# Patient Record
Sex: FEMALE | Race: WHITE | NOT HISPANIC OR LATINO | Employment: FULL TIME | URBAN - METROPOLITAN AREA
[De-identification: names, ages, dates, MRNs, and addresses within clinical notes are randomized per-mention and may not be internally consistent; named-entity substitution may affect disease eponyms.]

---

## 2017-02-05 DIAGNOSIS — I10 BENIGN ESSENTIAL HTN: ICD-10-CM

## 2017-02-06 RX ORDER — LISINOPRIL AND HYDROCHLOROTHIAZIDE 25; 20 MG/1; MG/1
TABLET ORAL
Qty: 30 TABLET | Refills: 2 | Status: SHIPPED | OUTPATIENT
Start: 2017-02-06 | End: 2018-10-04 | Stop reason: SDUPTHER

## 2017-03-11 DIAGNOSIS — I10 BENIGN ESSENTIAL HTN: ICD-10-CM

## 2017-03-13 RX ORDER — LISINOPRIL AND HYDROCHLOROTHIAZIDE 25; 20 MG/1; MG/1
TABLET ORAL
Qty: 30 TABLET | Refills: 3 | Status: SHIPPED | OUTPATIENT
Start: 2017-03-13 | End: 2017-03-21 | Stop reason: SDUPTHER

## 2017-03-21 ENCOUNTER — HOSPITAL ENCOUNTER (OUTPATIENT)
Dept: GENERAL RADIOLOGY | Facility: HOSPITAL | Age: 47
Discharge: HOME | End: 2017-03-21
Admitting: NURSE PRACTITIONER

## 2017-03-21 ENCOUNTER — OFFICE VISIT (OUTPATIENT)
Dept: INTERNAL MEDICINE | Facility: CLINIC | Age: 47
End: 2017-03-21

## 2017-03-21 VITALS
DIASTOLIC BLOOD PRESSURE: 80 MMHG | BODY MASS INDEX: 45.99 KG/M2 | HEIGHT: 67 IN | OXYGEN SATURATION: 98 % | WEIGHT: 293 LBS | HEART RATE: 92 BPM | SYSTOLIC BLOOD PRESSURE: 122 MMHG | TEMPERATURE: 98.3 F

## 2017-03-21 DIAGNOSIS — R31.9 HEMATURIA: Primary | ICD-10-CM

## 2017-03-21 DIAGNOSIS — R05.9 COUGH: ICD-10-CM

## 2017-03-21 DIAGNOSIS — J40 BRONCHITIS: ICD-10-CM

## 2017-03-21 LAB
BACTERIA UR QL AUTO: ABNORMAL /HPF
BILIRUB UR QL STRIP: NEGATIVE
CLARITY UR: ABNORMAL
COLOR UR: YELLOW
GLUCOSE UR STRIP-MCNC: NEGATIVE MG/DL
HGB UR QL STRIP.AUTO: NEGATIVE
HYALINE CASTS UR QL AUTO: ABNORMAL /LPF
KETONES UR QL STRIP: NEGATIVE
LEUKOCYTE ESTERASE UR QL STRIP.AUTO: ABNORMAL
MUCOUS THREADS URNS QL MICRO: ABNORMAL /HPF
NITRITE UR QL STRIP: NEGATIVE
PH UR STRIP.AUTO: 6 [PH] (ref 5–8)
PROT UR QL STRIP: ABNORMAL
RBC # UR: ABNORMAL /HPF
REF LAB TEST METHOD: ABNORMAL
SP GR UR STRIP: 1.01 (ref 1–1.03)
SQUAMOUS #/AREA URNS HPF: ABNORMAL /HPF
UROBILINOGEN UR QL STRIP: ABNORMAL
WBC UR QL AUTO: ABNORMAL /HPF

## 2017-03-21 PROCEDURE — 81001 URINALYSIS AUTO W/SCOPE: CPT | Performed by: NURSE PRACTITIONER

## 2017-03-21 PROCEDURE — 71020 HC CHEST PA AND LATERAL: CPT

## 2017-03-21 PROCEDURE — 99214 OFFICE O/P EST MOD 30 MIN: CPT | Performed by: NURSE PRACTITIONER

## 2017-03-21 RX ORDER — GUAIFENESIN 600 MG/1
1200 TABLET, EXTENDED RELEASE ORAL 2 TIMES DAILY
Qty: 14 TABLET
Start: 2017-03-21 | End: 2017-03-28

## 2017-03-21 RX ORDER — LEVOFLOXACIN 500 MG/1
500 TABLET, FILM COATED ORAL DAILY
Qty: 7 TABLET | Refills: 0 | Status: SHIPPED | OUTPATIENT
Start: 2017-03-21 | End: 2017-03-28

## 2017-03-21 RX ORDER — BUDESONIDE AND FORMOTEROL FUMARATE DIHYDRATE 160; 4.5 UG/1; UG/1
2 AEROSOL RESPIRATORY (INHALATION)
Qty: 6 G | Refills: 0 | COMMUNITY
Start: 2017-03-21 | End: 2017-04-04

## 2017-03-22 NOTE — PROGRESS NOTES
Subjective   Emilie Soto is a 46 y.o. female who presents due to respiratory symptoms.    HPI Comments: She was treated for similar sx 2 months ago which improved. However, she c/o return of symptoms over the past couple weeks.  She states symptoms began with head congestion and sinus pressure and have gradually worsened.  She now complains of a worsening cough which she states is mainly productive along with chest tightness and intermittent wheezing.    URI    This is a new problem. The current episode started 1 to 4 weeks ago. The problem has been gradually worsening. There has been no fever. Associated symptoms include chest pain (c/o tightness), congestion, coughing, headaches, nausea, rhinorrhea, sinus pain, a sore throat and wheezing (at night). Pertinent negatives include no abdominal pain, diarrhea, ear pain, neck pain, sneezing or vomiting. She has tried antihistamine (Mucinex) for the symptoms. The treatment provided moderate relief.        The following portions of the patient's history were reviewed and updated as appropriate: allergies, current medications, past social history and problem list.    Past Medical History:   Diagnosis Date   • Anxiety    • Hyperlipidemia    • Hypertension    • Lumbago    • Mood disorder    • Vitamin D deficiency          Current Outpatient Prescriptions:   •  ibuprofen (ADVIL,MOTRIN) 800 MG tablet, Take 1 tablet by mouth Every 6 (Six) Hours As Needed for mild pain (1-3) or moderate pain (4-6)., Disp: 30 tablet, Rfl: 0  •  lisinopril-hydrochlorothiazide (PRINZIDE,ZESTORETIC) 20-25 MG per tablet, TAKE 1 TABLET BY MOUTH DAILY, Disp: 30 tablet, Rfl: 2  •  budesonide-formoterol (SYMBICORT) 160-4.5 MCG/ACT inhaler, Inhale 2 puffs 2 (Two) Times a Day for 14 days. Rinse and spit after use, Disp: 6 g, Rfl: 0  •  guaiFENesin (MUCINEX) 600 MG 12 hr tablet, Take 2 tablets by mouth 2 (Two) Times a Day for 7 days., Disp: 14 tablet, Rfl:   •  levoFLOXacin (LEVAQUIN) 500 MG tablet, Take  "1 tablet by mouth Daily for 7 days., Disp: 7 tablet, Rfl: 0    Allergies   Allergen Reactions   • Citalopram    • Flexeril [Cyclobenzaprine]    • Doxycycline Rash     Blisters on hands   • Keflex [Cephalexin] Rash   • Sulfa Antibiotics Rash       Review of Systems   Constitutional: Negative for appetite change, chills, fatigue and fever.   HENT: Positive for congestion, rhinorrhea and sore throat. Negative for ear discharge, ear pain, facial swelling, hearing loss, postnasal drip, sinus pressure, sneezing and tinnitus.    Respiratory: Positive for cough and wheezing (at night). Negative for chest tightness and shortness of breath.    Cardiovascular: Positive for chest pain (c/o tightness). Negative for palpitations and leg swelling.   Gastrointestinal: Positive for nausea. Negative for abdominal pain, diarrhea and vomiting.   Musculoskeletal: Negative for neck pain and neck stiffness.   Neurological: Positive for headaches.   Hematological: Negative for adenopathy.       Objective   Vitals:    03/21/17 1252   BP: 122/80   BP Location: Left arm   Patient Position: Sitting   Cuff Size: Adult   Pulse: 92   Temp: 98.3 °F (36.8 °C)   TempSrc: Oral   SpO2: 98%   Weight: (!) 311 lb (141 kg)   Height: 66.5\" (168.9 cm)     Physical Exam   Constitutional: She appears well-developed and well-nourished. She is cooperative. She does not have a sickly appearance. She does not appear ill.   HENT:   Head: Normocephalic.   Right Ear: Hearing, tympanic membrane and external ear normal. No drainage, swelling or tenderness. No mastoid tenderness. Tympanic membrane is not injected, not scarred, not erythematous and not bulging. Tympanic membrane mobility is normal. No middle ear effusion. No decreased hearing is noted.   Left Ear: Hearing, tympanic membrane and external ear normal.   Nose: Nose normal. No mucosal edema, rhinorrhea, sinus tenderness or nasal deformity. Right sinus exhibits no maxillary sinus tenderness and no frontal " sinus tenderness. Left sinus exhibits no maxillary sinus tenderness and no frontal sinus tenderness.   Mouth/Throat: Oropharynx is clear and moist and mucous membranes are normal. Normal dentition.   Eyes: Conjunctivae and lids are normal. Pupils are equal, round, and reactive to light. Right eye exhibits no discharge and no exudate. Left eye exhibits no discharge and no exudate.   Neck: Trachea normal and normal range of motion. Carotid bruit is not present. No edema present. No thyroid mass and no thyromegaly present.   Cardiovascular: Regular rhythm, normal heart sounds and normal pulses.    No murmur heard.  Pulmonary/Chest: No respiratory distress. She has no decreased breath sounds. She has wheezes (expiratory wheezes). She has no rhonchi. She has no rales. She exhibits no tenderness.   Lymphadenopathy:        Head (right side): No submental, no submandibular, no tonsillar, no preauricular, no posterior auricular and no occipital adenopathy present.        Head (left side): No submental, no submandibular, no tonsillar, no preauricular, no posterior auricular and no occipital adenopathy present.   Neurological: She is alert.   Skin: Skin is warm, dry and intact. No cyanosis. Nails show no clubbing.       Assessment/Plan   Emilie was seen today for uri.    Diagnoses and all orders for this visit:    Hematuria  Comments:  White blood cells noted at last exam however she was treated for UTI, will repeat UA in office today.  Orders:  -     Urinalysis With / Microscopic If Indicated  -     Urinalysis, Microscopic Only; Future  -     Urinalysis, Microscopic Only    Cough  Comments:  Due to recurrent cough with wheezing will send for chest x-ray for further evaluation  Orders:  -     Cancel: XR Chest 2 View  -     XR Chest 2 View    Bronchitis  Comments:  Will start Symbicort and due to bronchospasm and wheezing  Orders:  -     XR Chest 2 View  -     levoFLOXacin (LEVAQUIN) 500 MG tablet; Take 1 tablet by mouth Daily  for 7 days.  -     guaiFENesin (MUCINEX) 600 MG 12 hr tablet; Take 2 tablets by mouth 2 (Two) Times a Day for 7 days.  -     budesonide-formoterol (SYMBICORT) 160-4.5 MCG/ACT inhaler; Inhale 2 puffs 2 (Two) Times a Day for 14 days. Rinse and spit after use    Repeat UA showed resolution of hematuria.

## 2017-07-11 DIAGNOSIS — I10 BENIGN ESSENTIAL HTN: ICD-10-CM

## 2017-07-12 RX ORDER — LISINOPRIL AND HYDROCHLOROTHIAZIDE 25; 20 MG/1; MG/1
TABLET ORAL
Qty: 30 TABLET | Refills: 3 | Status: SHIPPED | OUTPATIENT
Start: 2017-07-12 | End: 2017-09-11 | Stop reason: SDUPTHER

## 2017-09-11 ENCOUNTER — OFFICE VISIT (OUTPATIENT)
Dept: INTERNAL MEDICINE | Facility: CLINIC | Age: 47
End: 2017-09-11

## 2017-09-11 VITALS
DIASTOLIC BLOOD PRESSURE: 78 MMHG | TEMPERATURE: 100.8 F | BODY MASS INDEX: 45.99 KG/M2 | HEART RATE: 101 BPM | OXYGEN SATURATION: 96 % | SYSTOLIC BLOOD PRESSURE: 122 MMHG | WEIGHT: 293 LBS | HEIGHT: 67 IN

## 2017-09-11 DIAGNOSIS — R82.90 ABNORMAL FINDING IN URINE: ICD-10-CM

## 2017-09-11 DIAGNOSIS — R50.9 FEVER, UNSPECIFIED FEVER CAUSE: Primary | ICD-10-CM

## 2017-09-11 DIAGNOSIS — M79.605 LEFT LEG PAIN: ICD-10-CM

## 2017-09-11 DIAGNOSIS — J40 BRONCHITIS: ICD-10-CM

## 2017-09-11 LAB
ALBUMIN SERPL-MCNC: 4.4 G/DL (ref 3.5–5.2)
ALBUMIN/GLOB SERPL: 1.3 G/DL
ALP SERPL-CCNC: 74 U/L (ref 39–117)
ALT SERPL W P-5'-P-CCNC: 25 U/L (ref 1–33)
ANION GAP SERPL CALCULATED.3IONS-SCNC: 14 MMOL/L
AST SERPL-CCNC: 18 U/L (ref 1–32)
BACTERIA UR QL AUTO: ABNORMAL /HPF
BASOPHILS # BLD AUTO: 0.04 10*3/MM3 (ref 0–0.2)
BASOPHILS NFR BLD AUTO: 0.5 % (ref 0–2)
BILIRUB SERPL-MCNC: 1.4 MG/DL (ref 0.1–1.2)
BILIRUB UR QL CFM: NEGATIVE
BILIRUB UR QL STRIP: ABNORMAL
BUN BLD-MCNC: 7 MG/DL (ref 6–20)
BUN/CREAT SERPL: 8.3 (ref 7–25)
CALCIUM SPEC-SCNC: 9.9 MG/DL (ref 8.6–10.5)
CHLORIDE SERPL-SCNC: 96 MMOL/L (ref 98–107)
CLARITY UR: CLEAR
CO2 SERPL-SCNC: 26 MMOL/L (ref 22–29)
COLOR UR: ABNORMAL
CREAT BLD-MCNC: 0.84 MG/DL (ref 0.57–1)
DEPRECATED RDW RBC AUTO: 40 FL (ref 37–54)
EOSINOPHIL # BLD AUTO: 0.15 10*3/MM3 (ref 0–0.7)
EOSINOPHIL NFR BLD AUTO: 1.7 % (ref 0–5)
ERYTHROCYTE [DISTWIDTH] IN BLOOD BY AUTOMATED COUNT: 13 % (ref 11.5–15)
GFR SERPL CREATININE-BSD FRML MDRD: 73 ML/MIN/1.73
GLOBULIN UR ELPH-MCNC: 3.3 GM/DL
GLUCOSE BLD-MCNC: 135 MG/DL (ref 65–99)
GLUCOSE UR STRIP-MCNC: NEGATIVE MG/DL
HCT VFR BLD AUTO: 40.8 % (ref 34.1–44.9)
HGB BLD-MCNC: 13.7 G/DL (ref 11.2–15.7)
HGB UR QL STRIP.AUTO: ABNORMAL
HYALINE CASTS UR QL AUTO: ABNORMAL /LPF
KETONES UR QL STRIP: NEGATIVE
LEUKOCYTE ESTERASE UR QL STRIP.AUTO: ABNORMAL
LYMPHOCYTES # BLD AUTO: 1.13 10*3/MM3 (ref 0.8–7)
LYMPHOCYTES NFR BLD AUTO: 13.1 % (ref 10–60)
MCH RBC QN AUTO: 28.7 PG (ref 26–34)
MCHC RBC AUTO-ENTMCNC: 33.6 G/DL (ref 31–37)
MCV RBC AUTO: 85.5 FL (ref 80–100)
MONOCYTES # BLD AUTO: 0.64 10*3/MM3 (ref 0–1)
MONOCYTES NFR BLD AUTO: 7.4 % (ref 0–13)
MUCOUS THREADS URNS QL MICRO: ABNORMAL /HPF
NEUTROPHILS # BLD AUTO: 6.64 10*3/MM3 (ref 1–11)
NEUTROPHILS NFR BLD AUTO: 77.3 % (ref 30–85)
NITRITE UR QL STRIP: NEGATIVE
PH UR STRIP.AUTO: 6 [PH] (ref 5–8)
PLATELET # BLD AUTO: 242 10*3/MM3 (ref 150–450)
PMV BLD AUTO: 9.4 FL (ref 6–12)
POTASSIUM BLD-SCNC: 3.7 MMOL/L (ref 3.5–5.2)
PROT SERPL-MCNC: 7.7 G/DL (ref 6–8.5)
PROT UR QL STRIP: ABNORMAL
RBC # BLD AUTO: 4.77 10*6/MM3 (ref 3.93–5.22)
RBC # UR: ABNORMAL /HPF
REF LAB TEST METHOD: ABNORMAL
SODIUM BLD-SCNC: 136 MMOL/L (ref 136–145)
SP GR UR STRIP: 1.02 (ref 1–1.03)
SQUAMOUS #/AREA URNS HPF: ABNORMAL /HPF
UROBILINOGEN UR QL STRIP: ABNORMAL
WBC NRBC COR # BLD: 8.6 10*3/MM3 (ref 5–10)
WBC UR QL AUTO: ABNORMAL /HPF

## 2017-09-11 PROCEDURE — 85025 COMPLETE CBC W/AUTO DIFF WBC: CPT | Performed by: NURSE PRACTITIONER

## 2017-09-11 PROCEDURE — 99214 OFFICE O/P EST MOD 30 MIN: CPT | Performed by: NURSE PRACTITIONER

## 2017-09-11 PROCEDURE — 87086 URINE CULTURE/COLONY COUNT: CPT | Performed by: NURSE PRACTITIONER

## 2017-09-11 PROCEDURE — 80053 COMPREHEN METABOLIC PANEL: CPT | Performed by: NURSE PRACTITIONER

## 2017-09-11 PROCEDURE — 81001 URINALYSIS AUTO W/SCOPE: CPT | Performed by: NURSE PRACTITIONER

## 2017-09-11 PROCEDURE — 36415 COLL VENOUS BLD VENIPUNCTURE: CPT | Performed by: NURSE PRACTITIONER

## 2017-09-11 RX ORDER — LORATADINE 10 MG/1
10 TABLET ORAL DAILY
Qty: 10 TABLET
Start: 2017-09-11 | End: 2017-09-21

## 2017-09-11 RX ORDER — GUAIFENESIN 600 MG/1
1200 TABLET, EXTENDED RELEASE ORAL 2 TIMES DAILY
Qty: 14 TABLET
Start: 2017-09-11 | End: 2017-09-18

## 2017-09-11 RX ORDER — AMOXICILLIN AND CLAVULANATE POTASSIUM 875; 125 MG/1; MG/1
1 TABLET, FILM COATED ORAL 2 TIMES DAILY
Qty: 14 TABLET | Refills: 0 | Status: SHIPPED | OUTPATIENT
Start: 2017-09-11 | End: 2017-09-18

## 2017-09-11 NOTE — PROGRESS NOTES
Jaspal Soto is a 47 y.o. female who presents due to fever with diffuse body aches.    HPI Comments: She c/o elevated temp since Saturday night with chest congestion and cough, denies shortness of breath. She c/o hallucinations (believed her right 5th finger was falling off) last night with high fever. She denies rashes.  She also c/o left lower leg pain which began Sunday evening, denies swelling or redness. She reports difficulty bearing weight yesterday (denies acute injury) but states pain has improved somewhat today.    Leg Pain    There was no injury mechanism. The pain is present in the left leg. The quality of the pain is described as aching. The pain is mild. The pain has been improving since onset. Associated symptoms include a loss of motion. Pertinent negatives include no numbness. The symptoms are aggravated by weight bearing.   Fever    This is a new problem. The current episode started in the past 7 days (began Sat pm). The maximum temperature noted was 103 to 103.9 F. The temperature was taken using an oral thermometer. Associated symptoms include congestion, coughing, headaches and a sore throat. Pertinent negatives include no abdominal pain, chest pain, diarrhea, ear pain, nausea, urinary pain, vomiting or wheezing. She has tried acetaminophen for the symptoms. The treatment provided mild relief.   URI    This is a new problem. The current episode started in the past 7 days. The problem has been gradually worsening. Associated symptoms include congestion, coughing, headaches, rhinorrhea, sneezing and a sore throat. Pertinent negatives include no abdominal pain, chest pain, diarrhea, dysuria, ear pain, nausea, vomiting or wheezing.        The following portions of the patient's history were reviewed and updated as appropriate: allergies, current medications, past social history and problem list.    Past Medical History:   Diagnosis Date   • Anxiety    • Hyperlipidemia    •  Hypertension    • Lumbago    • Mood disorder    • Vitamin D deficiency          Current Outpatient Prescriptions:   •  ibuprofen (ADVIL,MOTRIN) 800 MG tablet, Take 1 tablet by mouth Every 6 (Six) Hours As Needed for mild pain (1-3) or moderate pain (4-6)., Disp: 30 tablet, Rfl: 0  •  lisinopril-hydrochlorothiazide (PRINZIDE,ZESTORETIC) 20-25 MG per tablet, TAKE 1 TABLET BY MOUTH DAILY, Disp: 30 tablet, Rfl: 2  •  amoxicillin-clavulanate (AUGMENTIN) 875-125 MG per tablet, Take 1 tablet by mouth 2 (Two) Times a Day for 7 days., Disp: 14 tablet, Rfl: 0  •  guaiFENesin (MUCINEX) 600 MG 12 hr tablet, Take 2 tablets by mouth 2 (Two) Times a Day for 7 days., Disp: 14 tablet, Rfl:   •  loratadine (CLARITIN) 10 MG tablet, Take 1 tablet by mouth Daily for 10 days., Disp: 10 tablet, Rfl:     Allergies   Allergen Reactions   • Citalopram    • Flexeril [Cyclobenzaprine]    • Doxycycline Rash     Blisters on hands   • Keflex [Cephalexin] Rash   • Sulfa Antibiotics Rash       Review of Systems   Constitutional: Positive for fatigue and fever. Negative for activity change, appetite change, chills and unexpected weight change.   HENT: Positive for congestion, postnasal drip, rhinorrhea, sinus pressure, sneezing and sore throat. Negative for drooling, ear pain, facial swelling, hearing loss, mouth sores, nosebleeds, trouble swallowing and voice change.    Eyes: Negative for pain, discharge, itching and visual disturbance.   Respiratory: Positive for cough and chest tightness. Negative for choking, shortness of breath and wheezing.    Cardiovascular: Negative for chest pain and palpitations.   Gastrointestinal: Negative for abdominal pain, constipation, diarrhea, nausea and vomiting.   Endocrine: Negative for polyuria.   Genitourinary: Negative for dysuria and frequency.   Musculoskeletal: Positive for myalgias. Negative for back pain and joint swelling.   Neurological: Positive for headaches. Negative for numbness.       Objective  "  Vitals:    09/11/17 1446 09/11/17 1530   BP: 122/78    BP Location: Left arm    Patient Position: Sitting    Cuff Size: Adult    Pulse: 101    Temp: (!) 103 °F (39.4 °C) (!) 100.8 °F (38.2 °C)   TempSrc: Oral    SpO2: 96%    Weight: (!) 304 lb (138 kg)    Height: 66.5\" (168.9 cm)      Physical Exam   Constitutional: She appears well-developed and well-nourished. She is cooperative. She does not have a sickly appearance. She does not appear ill.   HENT:   Head: Normocephalic.   Right Ear: Hearing and external ear normal. No drainage, swelling or tenderness. Tympanic membrane is bulging. Tympanic membrane is not erythematous. No middle ear effusion. No decreased hearing is noted.   Left Ear: Hearing and external ear normal. No drainage, swelling or tenderness. Tympanic membrane is bulging. Tympanic membrane is not erythematous.  No middle ear effusion. No decreased hearing is noted.   Nose: Nose normal. No mucosal edema, rhinorrhea, sinus tenderness or nasal deformity. Right sinus exhibits no maxillary sinus tenderness and no frontal sinus tenderness. Left sinus exhibits no maxillary sinus tenderness and no frontal sinus tenderness.   Mouth/Throat: Mucous membranes are normal. Posterior oropharyngeal erythema present.   Eyes: Conjunctivae and lids are normal.   Neck: Trachea normal.   Cardiovascular: Regular rhythm, normal heart sounds and normal pulses.    No murmur heard.  Pulmonary/Chest: Breath sounds normal. No respiratory distress. She has no decreased breath sounds. She has no wheezes. She has no rhonchi. She has no rales.   Musculoskeletal:   No swelling or redness left lower extremity, mild tenderness in left calf   Lymphadenopathy:     She has no cervical adenopathy.   Neurological: She is alert.   Skin: Skin is warm, dry and intact.   Nursing note and vitals reviewed.      Assessment/Plan   Emilie was seen today for flu symptoms and leg pain.    Diagnoses and all orders for this visit:    Fever, " unspecified fever cause  Comments:  due to persistently elevated temp will obtain blood cultures, advised to rotate Tylenol & Ibuprofen q4 h for fever control  Orders:  -     CBC & Differential  -     BLOOD CULTURE; Future  -     Comprehensive Metabolic Panel; Future  -     Urinalysis With / Microscopic If Indicated  -     Comprehensive Metabolic Panel  -     CBC Auto Differential  -     Urinalysis, Microscopic Only; Future  -     Urinalysis, Microscopic Only  -     Ictotest, Urine; Future  -     Ictotest, Urine  -     Blood Culture; Future  -     Cancel: BLOOD CULTURE  -     Blood Culture; Future  -     Blood Culture  -     Blood Culture    Bronchitis  Comments:  pt declined CXR but will report to ER with worsening symptoms  Orders:  -     amoxicillin-clavulanate (AUGMENTIN) 875-125 MG per tablet; Take 1 tablet by mouth 2 (Two) Times a Day for 7 days.  -     guaiFENesin (MUCINEX) 600 MG 12 hr tablet; Take 2 tablets by mouth 2 (Two) Times a Day for 7 days.  -     loratadine (CLARITIN) 10 MG tablet; Take 1 tablet by mouth Daily for 10 days.    Left leg pain  Comments:  sx appear muscular, discussed possiblity of DVT; she will continue to monitor & report to ER with worsening sx    Abnormal finding in urine  -     Urine Culture; Future  -     Urine Culture

## 2017-09-13 LAB — BACTERIA SPEC AEROBE CULT: NORMAL

## 2017-09-14 ENCOUNTER — OFFICE VISIT (OUTPATIENT)
Dept: INTERNAL MEDICINE | Facility: CLINIC | Age: 47
End: 2017-09-14

## 2017-09-14 ENCOUNTER — HOSPITAL ENCOUNTER (OUTPATIENT)
Dept: GENERAL RADIOLOGY | Facility: HOSPITAL | Age: 47
Discharge: HOME | End: 2017-09-14
Admitting: NURSE PRACTITIONER

## 2017-09-14 VITALS
OXYGEN SATURATION: 98 % | TEMPERATURE: 99.1 F | BODY MASS INDEX: 45.99 KG/M2 | HEART RATE: 88 BPM | HEIGHT: 67 IN | WEIGHT: 293 LBS | SYSTOLIC BLOOD PRESSURE: 122 MMHG | DIASTOLIC BLOOD PRESSURE: 80 MMHG

## 2017-09-14 DIAGNOSIS — J40 BRONCHITIS: Primary | ICD-10-CM

## 2017-09-14 PROCEDURE — 71020 HC CHEST PA AND LATERAL: CPT

## 2017-09-14 PROCEDURE — 99213 OFFICE O/P EST LOW 20 MIN: CPT | Performed by: NURSE PRACTITIONER

## 2017-09-14 NOTE — PROGRESS NOTES
Jaspal Soto is a 47 y.o. female who presents due to respiratory symptoms.    HPI Comments: Urine and blood cultures were negative.  She reports feeling mildly better, still with head congestion and drainage. However, she now c/o increased chest congestion and cough. Temp has remained low grade during the day and spikes at night.  Her left lower leg pain has resolved, denies further episodes of pain or discomfort.      Fever    This is a new problem. The current episode started in the past 7 days (began Sat pm). The maximum temperature noted was 103 to 103.9 F. The temperature was taken using an oral thermometer. Associated symptoms include congestion, coughing, headaches, a sore throat and wheezing. Pertinent negatives include no abdominal pain, chest pain, diarrhea, ear pain, nausea, urinary pain or vomiting. She has tried acetaminophen for the symptoms. The treatment provided mild relief.   URI    This is a new problem. The current episode started in the past 7 days. The problem has been gradually improving. The fever has been present for 3 to 4 days. Associated symptoms include congestion, coughing, headaches, rhinorrhea, sneezing, a sore throat and wheezing. Pertinent negatives include no abdominal pain, chest pain, diarrhea, dysuria, ear pain, nausea or vomiting. She has tried acetaminophen and NSAIDs for the symptoms. The treatment provided moderate relief.        The following portions of the patient's history were reviewed and updated as appropriate: allergies, current medications, past social history and problem list.    Past Medical History:   Diagnosis Date   • Anxiety    • Hyperlipidemia    • Hypertension    • Lumbago    • Mood disorder    • Vitamin D deficiency          Current Outpatient Prescriptions:   •  amoxicillin-clavulanate (AUGMENTIN) 875-125 MG per tablet, Take 1 tablet by mouth 2 (Two) Times a Day for 7 days., Disp: 14 tablet, Rfl: 0  •  guaiFENesin (MUCINEX) 600 MG 12 hr  "tablet, Take 2 tablets by mouth 2 (Two) Times a Day for 7 days., Disp: 14 tablet, Rfl:   •  ibuprofen (ADVIL,MOTRIN) 800 MG tablet, Take 1 tablet by mouth Every 6 (Six) Hours As Needed for mild pain (1-3) or moderate pain (4-6)., Disp: 30 tablet, Rfl: 0  •  lisinopril-hydrochlorothiazide (PRINZIDE,ZESTORETIC) 20-25 MG per tablet, TAKE 1 TABLET BY MOUTH DAILY, Disp: 30 tablet, Rfl: 2  •  loratadine (CLARITIN) 10 MG tablet, Take 1 tablet by mouth Daily for 10 days., Disp: 10 tablet, Rfl:   •  Fluticasone Furoate-Vilanterol 100-25 MCG/INH aerosol powder , Inhale 1 puff Daily for 14 days. Rinse and spit after use, Disp: 14 puff, Rfl: 0    Allergies   Allergen Reactions   • Citalopram    • Flexeril [Cyclobenzaprine]    • Doxycycline Rash     Blisters on hands   • Keflex [Cephalexin] Rash   • Sulfa Antibiotics Rash       Review of Systems   Constitutional: Positive for fatigue (c/o generalized malaise and fatigue) and fever.   HENT: Positive for congestion, postnasal drip, rhinorrhea, sneezing and sore throat. Negative for ear pain.    Respiratory: Positive for cough, chest tightness and wheezing.    Cardiovascular: Negative for chest pain.   Gastrointestinal: Negative for abdominal pain, diarrhea, nausea and vomiting.   Genitourinary: Negative for dysuria.   Neurological: Positive for headaches. Negative for numbness.       Objective   Vitals:    09/14/17 1404   BP: 122/80   BP Location: Left arm   Patient Position: Sitting   Cuff Size: Adult   Pulse: 88   Temp: 99.1 °F (37.3 °C)   TempSrc: Oral   SpO2: 98%   Weight: 298 lb (135 kg)   Height: 66.5\" (168.9 cm)     Physical Exam   Constitutional: She appears well-developed and well-nourished. She is cooperative. She does not have a sickly appearance. She does not appear ill.   HENT:   Head: Normocephalic.   Right Ear: Hearing and external ear normal. No drainage, swelling or tenderness. Tympanic membrane is bulging. Tympanic membrane is not erythematous. No middle ear " effusion. No decreased hearing is noted.   Left Ear: Hearing and external ear normal. No drainage, swelling or tenderness. Tympanic membrane is bulging. Tympanic membrane is not erythematous.  No middle ear effusion. No decreased hearing is noted.   Nose: Nose normal. No mucosal edema, rhinorrhea, sinus tenderness or nasal deformity. Right sinus exhibits no maxillary sinus tenderness and no frontal sinus tenderness. Left sinus exhibits no maxillary sinus tenderness and no frontal sinus tenderness.   Mouth/Throat: Mucous membranes are normal. Posterior oropharyngeal erythema present.   Eyes: Conjunctivae and lids are normal.   Neck: Trachea normal.   Cardiovascular: Regular rhythm, normal heart sounds and normal pulses.    No murmur heard.  Pulmonary/Chest: Breath sounds normal. No respiratory distress. She has no decreased breath sounds. She has no wheezes. She has no rhonchi. She has no rales.   Lymphadenopathy:     She has no cervical adenopathy.   Neurological: She is alert.   Skin: Skin is warm, dry and intact.   Nursing note and vitals reviewed.      Assessment/Plan   Emilie was seen today for follow-up.    Diagnoses and all orders for this visit:    Bronchitis  Comments:  due to persistence of fever & wheezing will send for CXR to r/o infiltrate, reviewed labs w/ pt. Complete Augmentin & add Breo for bronchospasm.  Orders:  -     XR Chest 2 View  -     Fluticasone Furoate-Vilanterol 100-25 MCG/INH aerosol powder ; Inhale 1 puff Daily for 14 days. Rinse and spit after use

## 2017-09-17 DIAGNOSIS — R93.89 ABNORMAL CXR: Primary | ICD-10-CM

## 2017-09-17 LAB
BACTERIA BLD CULT: NORMAL
BACTERIA BLD CULT: NORMAL
Lab: NORMAL
Lab: NORMAL

## 2017-09-19 ENCOUNTER — HOSPITAL ENCOUNTER (OUTPATIENT)
Dept: CT IMAGING | Facility: HOSPITAL | Age: 47
Discharge: HOME | End: 2017-09-19
Admitting: NURSE PRACTITIONER

## 2017-09-19 DIAGNOSIS — R93.89 ABNORMAL CXR: ICD-10-CM

## 2017-09-19 LAB — CREAT BLDA-MCNC: 0.7 MG/DL (ref 0.6–1.3)

## 2017-09-19 PROCEDURE — 71260 CT THORAX DX C+: CPT

## 2017-09-19 PROCEDURE — 82565 ASSAY OF CREATININE: CPT

## 2017-09-19 PROCEDURE — 0 IOPAMIDOL 61 % SOLUTION: Performed by: NURSE PRACTITIONER

## 2017-09-19 PROCEDURE — 0 DIATRIZOATE MEGLUMINE & SODIUM PER 1 ML: Performed by: NURSE PRACTITIONER

## 2017-09-19 RX ADMIN — IOPAMIDOL 85 ML: 612 INJECTION, SOLUTION INTRAVENOUS at 14:52

## 2017-09-19 RX ADMIN — DIATRIZOATE MEGLUMINE AND DIATRIZOATE SODIUM 30 ML: 660; 100 LIQUID ORAL; RECTAL at 14:52

## 2017-09-22 DIAGNOSIS — Z88.9 MULTIPLE ALLERGIES: Primary | ICD-10-CM

## 2017-09-22 NOTE — TELEPHONE ENCOUNTER
"----- Message from Jaleesa Garber MA sent at 9/22/2017 10:01 AM EDT -----  Pt calling and needs either a new sample of Breo or script to local pharmacy. Pt says that inhaler is \"on a red tab\" and not dispensing    Pls call and advise    Pt#[phone]  "

## 2017-11-15 DIAGNOSIS — I10 BENIGN ESSENTIAL HTN: ICD-10-CM

## 2017-11-16 RX ORDER — LISINOPRIL AND HYDROCHLOROTHIAZIDE 25; 20 MG/1; MG/1
TABLET ORAL
Qty: 30 TABLET | Refills: 3 | Status: SHIPPED | OUTPATIENT
Start: 2017-11-16 | End: 2018-03-22 | Stop reason: SDUPTHER

## 2018-02-10 DIAGNOSIS — Z88.9 MULTIPLE ALLERGIES: ICD-10-CM

## 2018-03-06 RX ORDER — TERBINAFINE HYDROCHLORIDE 250 MG/1
250 TABLET ORAL DAILY
Qty: 30 TABLET | Refills: 1 | Status: SHIPPED | OUTPATIENT
Start: 2018-03-06 | End: 2018-10-04 | Stop reason: SDUPTHER

## 2018-03-22 DIAGNOSIS — I10 BENIGN ESSENTIAL HTN: ICD-10-CM

## 2018-03-23 RX ORDER — LISINOPRIL AND HYDROCHLOROTHIAZIDE 25; 20 MG/1; MG/1
TABLET ORAL
Qty: 30 TABLET | Refills: 0 | Status: SHIPPED | OUTPATIENT
Start: 2018-03-23 | End: 2018-04-17 | Stop reason: SDUPTHER

## 2018-04-17 DIAGNOSIS — I10 BENIGN ESSENTIAL HTN: ICD-10-CM

## 2018-04-18 RX ORDER — LISINOPRIL AND HYDROCHLOROTHIAZIDE 25; 20 MG/1; MG/1
TABLET ORAL
Qty: 30 TABLET | Refills: 0 | Status: SHIPPED | OUTPATIENT
Start: 2018-04-18 | End: 2018-05-23 | Stop reason: SDUPTHER

## 2018-05-23 DIAGNOSIS — I10 BENIGN ESSENTIAL HTN: ICD-10-CM

## 2018-05-24 RX ORDER — LISINOPRIL AND HYDROCHLOROTHIAZIDE 25; 20 MG/1; MG/1
TABLET ORAL
Qty: 30 TABLET | Refills: 0 | Status: SHIPPED | OUTPATIENT
Start: 2018-05-24 | End: 2018-06-24 | Stop reason: SDUPTHER

## 2018-06-24 DIAGNOSIS — I10 BENIGN ESSENTIAL HTN: ICD-10-CM

## 2018-06-25 RX ORDER — LISINOPRIL AND HYDROCHLOROTHIAZIDE 25; 20 MG/1; MG/1
TABLET ORAL
Qty: 30 TABLET | Refills: 0 | Status: SHIPPED | OUTPATIENT
Start: 2018-06-25 | End: 2018-07-22 | Stop reason: SDUPTHER

## 2018-07-22 DIAGNOSIS — I10 BENIGN ESSENTIAL HTN: ICD-10-CM

## 2018-07-23 RX ORDER — LISINOPRIL AND HYDROCHLOROTHIAZIDE 25; 20 MG/1; MG/1
TABLET ORAL
Qty: 30 TABLET | Refills: 0 | Status: SHIPPED | OUTPATIENT
Start: 2018-07-23 | End: 2018-08-26 | Stop reason: SDUPTHER

## 2018-08-26 DIAGNOSIS — I10 BENIGN ESSENTIAL HTN: ICD-10-CM

## 2018-08-27 RX ORDER — LISINOPRIL AND HYDROCHLOROTHIAZIDE 25; 20 MG/1; MG/1
TABLET ORAL
Qty: 30 TABLET | Refills: 0 | Status: SHIPPED | OUTPATIENT
Start: 2018-08-27 | End: 2018-10-04 | Stop reason: SDUPTHER

## 2018-09-28 DIAGNOSIS — I10 BENIGN ESSENTIAL HTN: ICD-10-CM

## 2018-10-01 RX ORDER — LISINOPRIL AND HYDROCHLOROTHIAZIDE 25; 20 MG/1; MG/1
TABLET ORAL
Qty: 30 TABLET | Refills: 0 | OUTPATIENT
Start: 2018-10-01

## 2018-10-04 ENCOUNTER — OFFICE VISIT (OUTPATIENT)
Dept: INTERNAL MEDICINE | Facility: CLINIC | Age: 48
End: 2018-10-04

## 2018-10-04 VITALS
HEIGHT: 66 IN | DIASTOLIC BLOOD PRESSURE: 90 MMHG | SYSTOLIC BLOOD PRESSURE: 132 MMHG | BODY MASS INDEX: 47.09 KG/M2 | OXYGEN SATURATION: 98 % | WEIGHT: 293 LBS | HEART RATE: 65 BPM

## 2018-10-04 DIAGNOSIS — B35.1 ONYCHOMYCOSIS: ICD-10-CM

## 2018-10-04 DIAGNOSIS — L71.9 ROSACEA: ICD-10-CM

## 2018-10-04 DIAGNOSIS — J20.9 ACUTE BRONCHITIS, UNSPECIFIED ORGANISM: ICD-10-CM

## 2018-10-04 DIAGNOSIS — I10 BENIGN ESSENTIAL HTN: Primary | ICD-10-CM

## 2018-10-04 PROCEDURE — 99214 OFFICE O/P EST MOD 30 MIN: CPT | Performed by: NURSE PRACTITIONER

## 2018-10-04 RX ORDER — GUAIFENESIN 600 MG/1
600 TABLET, EXTENDED RELEASE ORAL EVERY 12 HOURS SCHEDULED
Qty: 14 TABLET
Start: 2018-10-04 | End: 2018-10-11

## 2018-10-04 RX ORDER — LISINOPRIL AND HYDROCHLOROTHIAZIDE 25; 20 MG/1; MG/1
1 TABLET ORAL DAILY
Qty: 30 TABLET | Refills: 5 | Status: SHIPPED | OUTPATIENT
Start: 2018-10-04 | End: 2019-04-04 | Stop reason: SDUPTHER

## 2018-10-04 RX ORDER — AMOXICILLIN AND CLAVULANATE POTASSIUM 875; 125 MG/1; MG/1
1 TABLET, FILM COATED ORAL EVERY 12 HOURS SCHEDULED
Qty: 14 TABLET | Refills: 0 | Status: SHIPPED | OUTPATIENT
Start: 2018-10-04 | End: 2018-10-11

## 2018-10-04 RX ORDER — METRONIDAZOLE 7.5 MG/G
GEL TOPICAL EVERY 12 HOURS SCHEDULED
Qty: 45 G | Refills: 0 | Status: SHIPPED | OUTPATIENT
Start: 2018-10-04 | End: 2019-06-14

## 2018-10-04 RX ORDER — TERBINAFINE HYDROCHLORIDE 250 MG/1
250 TABLET ORAL DAILY
Qty: 30 TABLET | Refills: 2 | Status: SHIPPED | OUTPATIENT
Start: 2018-10-04 | End: 2019-07-08 | Stop reason: DRUGHIGH

## 2018-12-17 ENCOUNTER — OFFICE VISIT (OUTPATIENT)
Dept: INTERNAL MEDICINE | Facility: CLINIC | Age: 48
End: 2018-12-17

## 2018-12-17 VITALS
TEMPERATURE: 98.6 F | WEIGHT: 293 LBS | HEART RATE: 83 BPM | BODY MASS INDEX: 45.99 KG/M2 | OXYGEN SATURATION: 98 % | HEIGHT: 67 IN | DIASTOLIC BLOOD PRESSURE: 78 MMHG | SYSTOLIC BLOOD PRESSURE: 118 MMHG

## 2018-12-17 DIAGNOSIS — J06.9 ACUTE URI: Primary | ICD-10-CM

## 2018-12-17 DIAGNOSIS — H10.9 CONJUNCTIVITIS OF RIGHT EYE, UNSPECIFIED CONJUNCTIVITIS TYPE: ICD-10-CM

## 2018-12-17 PROCEDURE — 99214 OFFICE O/P EST MOD 30 MIN: CPT | Performed by: NURSE PRACTITIONER

## 2018-12-17 RX ORDER — AMOXICILLIN 875 MG/1
875 TABLET, COATED ORAL EVERY 12 HOURS SCHEDULED
Qty: 14 TABLET | Refills: 0 | Status: SHIPPED | OUTPATIENT
Start: 2018-12-17 | End: 2018-12-24

## 2018-12-17 RX ORDER — MOXIFLOXACIN 5 MG/ML
1 SOLUTION/ DROPS OPHTHALMIC 3 TIMES DAILY
Qty: 3 ML | Refills: 0 | Status: SHIPPED | OUTPATIENT
Start: 2018-12-17 | End: 2019-01-15

## 2018-12-17 RX ORDER — GUAIFENESIN 600 MG/1
600 TABLET, EXTENDED RELEASE ORAL EVERY 12 HOURS SCHEDULED
Qty: 14 TABLET
Start: 2018-12-17 | End: 2018-12-24

## 2018-12-17 RX ORDER — METHYLPREDNISOLONE 4 MG/1
TABLET ORAL
Qty: 21 TABLET | Refills: 0 | Status: SHIPPED | OUTPATIENT
Start: 2018-12-17 | End: 2018-12-23

## 2018-12-17 RX ORDER — FLUTICASONE PROPIONATE 50 MCG
2 SPRAY, SUSPENSION (ML) NASAL DAILY
Qty: 1 BOTTLE | Refills: 3 | Status: SHIPPED | OUTPATIENT
Start: 2018-12-17 | End: 2019-01-16

## 2018-12-18 NOTE — PROGRESS NOTES
Jaspal Soto is a 48 y.o. female who presents due to sinus pressure and drainage. She also c/o right eye swelling.    She c/o increased sinus pressure and drainage over the past 2 weeks; however, over the past couple of days she c/o drainage from her right eye (stopped wearing contacts) and now swelling of the eye. Denies visual changes.      URI    This is a new problem. The current episode started in the past 7 days. The problem has been gradually worsening. There has been no fever. Associated symptoms include congestion, coughing, headaches, rhinorrhea and a sore throat. Pertinent negatives include no abdominal pain, chest pain, diarrhea, ear pain, nausea, neck pain, sneezing, vomiting or wheezing. She has tried antihistamine for the symptoms. The treatment provided mild relief.        The following portions of the patient's history were reviewed and updated as appropriate: allergies, current medications, past social history and problem list.    Past Medical History:   Diagnosis Date   • Anxiety    • Hyperlipidemia    • Hypertension    • Lumbago    • Mood disorder (CMS/HCC)    • Vitamin D deficiency          Current Outpatient Medications:   •  BREO ELLIPTA 100-25 MCG/INH aerosol powder , INHALE 1 PUFF BY MOUTH DAILY, Disp: 60 each, Rfl: 2  •  ibuprofen (ADVIL,MOTRIN) 800 MG tablet, Take 1 tablet by mouth Every 6 (Six) Hours As Needed for mild pain (1-3) or moderate pain (4-6)., Disp: 30 tablet, Rfl: 0  •  lisinopril-hydrochlorothiazide (PRINZIDE,ZESTORETIC) 20-25 MG per tablet, Take 1 tablet by mouth Daily., Disp: 30 tablet, Rfl: 5  •  metroNIDAZOLE (METROGEL) 0.75 % gel, Apply  topically to the appropriate area as directed Every 12 (Twelve) Hours., Disp: 45 g, Rfl: 0  •  terbinafine (lamiSIL) 250 MG tablet, Take 1 tablet by mouth Daily., Disp: 30 tablet, Rfl: 2  •  amoxicillin (AMOXIL) 875 MG tablet, Take 1 tablet by mouth Every 12 (Twelve) Hours for 7 days., Disp: 14 tablet, Rfl: 0  •   "fluticasone (FLONASE) 50 MCG/ACT nasal spray, 2 sprays into the nostril(s) as directed by provider Daily for 30 days., Disp: 1 bottle, Rfl: 3  •  guaiFENesin (MUCINEX) 600 MG 12 hr tablet, Take 1 tablet by mouth Every 12 (Twelve) Hours for 7 days., Disp: 14 tablet, Rfl:   •  MethylPREDNISolone (MEDROL) 4 MG tablet, follow package directions, Disp: 21 tablet, Rfl: 0  •  moxifloxacin (VIGAMOX) 0.5 % ophthalmic solution, Administer 1 drop to the right eye 3 (Three) Times a Day., Disp: 3 mL, Rfl: 0    Allergies   Allergen Reactions   • Citalopram    • Flexeril [Cyclobenzaprine]    • Doxycycline Rash     Blisters on hands   • Keflex [Cephalexin] Rash   • Sulfa Antibiotics Rash       Review of Systems   Constitutional: Negative for appetite change, chills, fatigue and fever.   HENT: Positive for congestion, postnasal drip, rhinorrhea, sinus pressure and sore throat. Negative for ear discharge, ear pain, facial swelling, sneezing and tinnitus.    Eyes: Positive for discharge, redness and itching. Negative for visual disturbance.   Respiratory: Positive for cough. Negative for chest tightness, shortness of breath and wheezing.    Cardiovascular: Negative for chest pain, palpitations and leg swelling.   Gastrointestinal: Negative for abdominal pain, diarrhea, nausea and vomiting.   Musculoskeletal: Negative for neck pain and neck stiffness.   Neurological: Positive for headaches.   Hematological: Negative for adenopathy.       Objective   Vitals:    12/17/18 1421   BP: 118/78   BP Location: Left arm   Patient Position: Sitting   Cuff Size: Adult   Pulse: 83   Temp: 98.6 °F (37 °C)   TempSrc: Oral   SpO2: 98%   Weight: (!) 139 kg (306 lb)   Height: 168.9 cm (66.5\")     Physical Exam   Constitutional: She appears well-developed and well-nourished. She is cooperative. She does not have a sickly appearance. She does not appear ill.   HENT:   Head: Normocephalic.   Right Ear: Hearing and external ear normal. No drainage, swelling " or tenderness. Tympanic membrane is bulging. Tympanic membrane is not erythematous. No middle ear effusion. No decreased hearing is noted.   Left Ear: Hearing and external ear normal. No drainage, swelling or tenderness. Tympanic membrane is bulging. Tympanic membrane is not erythematous.  No middle ear effusion. No decreased hearing is noted.   Nose: No mucosal edema, rhinorrhea, sinus tenderness or nasal deformity. Right sinus exhibits maxillary sinus tenderness and frontal sinus tenderness. Left sinus exhibits no maxillary sinus tenderness and no frontal sinus tenderness.   Mouth/Throat: Mucous membranes are normal. Posterior oropharyngeal erythema present.   Eyes: EOM are normal. Right eye exhibits discharge. Right conjunctiva is injected. Left conjunctiva is not injected.   +swelling upper and lower eyelids   Neck: Trachea normal.   Cardiovascular: Regular rhythm, normal heart sounds and normal pulses.   No murmur heard.  Pulmonary/Chest: Breath sounds normal. No respiratory distress. She has no decreased breath sounds. She has no wheezes. She has no rhonchi. She has no rales.   Lymphadenopathy:     She has no cervical adenopathy.   Neurological: She is alert.   Skin: Skin is warm, dry and intact.   Nursing note and vitals reviewed.      Assessment/Plan   Emilie was seen today for eye problem.    Diagnoses and all orders for this visit:    Acute URI  -     amoxicillin (AMOXIL) 875 MG tablet; Take 1 tablet by mouth Every 12 (Twelve) Hours for 7 days.  -     fluticasone (FLONASE) 50 MCG/ACT nasal spray; 2 sprays into the nostril(s) as directed by provider Daily for 30 days.  -     guaiFENesin (MUCINEX) 600 MG 12 hr tablet; Take 1 tablet by mouth Every 12 (Twelve) Hours for 7 days.  -     MethylPREDNISolone (MEDROL) 4 MG tablet; follow package directions    Conjunctivitis of right eye, unspecified conjunctivitis type  -     moxifloxacin (VIGAMOX) 0.5 % ophthalmic solution; Administer 1 drop to the right eye 3  (Three) Times a Day.    She will apply warm compresses to right eye and start eyedrops, f/u if no improvement in swelling in next 2-3 days.    She has lost weight by reducing her calorie intake, is starting to feel better (has PE scheduled for next month).

## 2019-01-15 ENCOUNTER — OFFICE VISIT (OUTPATIENT)
Dept: INTERNAL MEDICINE | Facility: CLINIC | Age: 49
End: 2019-01-15

## 2019-01-15 VITALS
BODY MASS INDEX: 45.99 KG/M2 | HEIGHT: 67 IN | HEART RATE: 77 BPM | SYSTOLIC BLOOD PRESSURE: 138 MMHG | DIASTOLIC BLOOD PRESSURE: 82 MMHG | OXYGEN SATURATION: 99 % | WEIGHT: 293 LBS

## 2019-01-15 DIAGNOSIS — D36.7: ICD-10-CM

## 2019-01-15 DIAGNOSIS — Z23 NEED FOR INFLUENZA VACCINATION: ICD-10-CM

## 2019-01-15 DIAGNOSIS — E55.9 VITAMIN D DEFICIENCY: ICD-10-CM

## 2019-01-15 DIAGNOSIS — Z12.39 SCREENING FOR BREAST CANCER: ICD-10-CM

## 2019-01-15 DIAGNOSIS — E74.39 GLUCOSE INTOLERANCE: ICD-10-CM

## 2019-01-15 DIAGNOSIS — Z00.00 PHYSICAL EXAM: Primary | ICD-10-CM

## 2019-01-15 LAB
25(OH)D3 SERPL-MCNC: 11.8 NG/ML (ref 30–100)
ALBUMIN SERPL-MCNC: 4.1 G/DL (ref 3.5–5.2)
ALBUMIN/GLOB SERPL: 1.2 G/DL
ALP SERPL-CCNC: 80 U/L (ref 39–117)
ALT SERPL W P-5'-P-CCNC: 22 U/L (ref 1–33)
ANION GAP SERPL CALCULATED.3IONS-SCNC: 13 MMOL/L
AST SERPL-CCNC: 15 U/L (ref 1–32)
BACTERIA UR QL AUTO: ABNORMAL /HPF
BASOPHILS # BLD AUTO: 0.04 10*3/MM3 (ref 0–0.2)
BASOPHILS NFR BLD AUTO: 0.4 % (ref 0–2)
BILIRUB SERPL-MCNC: 0.5 MG/DL (ref 0.1–1.2)
BILIRUB UR QL STRIP: NEGATIVE
BUN BLD-MCNC: 9 MG/DL (ref 6–20)
BUN/CREAT SERPL: 12.2 (ref 7–25)
CALCIUM SPEC-SCNC: 10.1 MG/DL (ref 8.6–10.5)
CHLORIDE SERPL-SCNC: 96 MMOL/L (ref 98–107)
CHOLEST SERPL-MCNC: 202 MG/DL (ref 0–200)
CLARITY UR: CLEAR
CO2 SERPL-SCNC: 29 MMOL/L (ref 22–29)
COLOR UR: YELLOW
CREAT BLD-MCNC: 0.74 MG/DL (ref 0.57–1)
DEPRECATED RDW RBC AUTO: 38.9 FL (ref 37–54)
EOSINOPHIL # BLD AUTO: 0.21 10*3/MM3 (ref 0–0.7)
EOSINOPHIL NFR BLD AUTO: 2.1 % (ref 0–5)
ERYTHROCYTE [DISTWIDTH] IN BLOOD BY AUTOMATED COUNT: 12.7 % (ref 11.5–15)
FOLATE SERPL-MCNC: 9.83 NG/ML (ref 4.78–24.2)
GFR SERPL CREATININE-BSD FRML MDRD: 84 ML/MIN/1.73
GLOBULIN UR ELPH-MCNC: 3.3 GM/DL
GLUCOSE BLD-MCNC: 123 MG/DL (ref 65–99)
GLUCOSE UR STRIP-MCNC: NEGATIVE MG/DL
HBA1C MFR BLD: 5.7 % (ref 4.8–5.6)
HCT VFR BLD AUTO: 38.6 % (ref 34.1–44.9)
HDLC SERPL-MCNC: 48 MG/DL (ref 40–60)
HGB BLD-MCNC: 13 G/DL (ref 11.2–15.7)
HGB UR QL STRIP.AUTO: NEGATIVE
HYALINE CASTS UR QL AUTO: ABNORMAL /LPF
KETONES UR QL STRIP: NEGATIVE
LDLC SERPL CALC-MCNC: 105 MG/DL (ref 0–100)
LDLC/HDLC SERPL: 2.18 {RATIO}
LEUKOCYTE ESTERASE UR QL STRIP.AUTO: NEGATIVE
LYMPHOCYTES # BLD AUTO: 2.34 10*3/MM3 (ref 0.8–7)
LYMPHOCYTES NFR BLD AUTO: 23.4 % (ref 10–60)
MCH RBC QN AUTO: 29 PG (ref 26–34)
MCHC RBC AUTO-ENTMCNC: 33.7 G/DL (ref 31–37)
MCV RBC AUTO: 86.2 FL (ref 80–100)
MONOCYTES # BLD AUTO: 0.46 10*3/MM3 (ref 0–1)
MONOCYTES NFR BLD AUTO: 4.6 % (ref 0–13)
MUCOUS THREADS URNS QL MICRO: ABNORMAL /HPF
NEUTROPHILS # BLD AUTO: 6.94 10*3/MM3 (ref 1–11)
NEUTROPHILS NFR BLD AUTO: 69.5 % (ref 30–85)
NITRITE UR QL STRIP: NEGATIVE
PH UR STRIP.AUTO: 7 [PH] (ref 5–8)
PLATELET # BLD AUTO: 309 10*3/MM3 (ref 150–450)
PMV BLD AUTO: 9.5 FL (ref 6–12)
POTASSIUM BLD-SCNC: 3.7 MMOL/L (ref 3.5–5.2)
PROT SERPL-MCNC: 7.4 G/DL (ref 6–8.5)
PROT UR QL STRIP: ABNORMAL
RBC # BLD AUTO: 4.48 10*6/MM3 (ref 3.93–5.22)
RBC # UR: ABNORMAL /HPF
REF LAB TEST METHOD: ABNORMAL
SODIUM BLD-SCNC: 138 MMOL/L (ref 136–145)
SP GR UR STRIP: 1.02 (ref 1–1.03)
SQUAMOUS #/AREA URNS HPF: ABNORMAL /HPF
TRIGL SERPL-MCNC: 247 MG/DL (ref 0–150)
TSH SERPL-ACNC: 2.21 MIU/ML (ref 0.27–4.2)
UROBILINOGEN UR QL STRIP: ABNORMAL
VIT B12 SERPL-MCNC: 270 PG/ML (ref 211–946)
VLDLC SERPL-MCNC: 49.4 MG/DL (ref 5–40)
WBC NRBC COR # BLD: 9.99 10*3/MM3 (ref 5–10)
WBC UR QL AUTO: ABNORMAL /HPF

## 2019-01-15 PROCEDURE — 81001 URINALYSIS AUTO W/SCOPE: CPT | Performed by: NURSE PRACTITIONER

## 2019-01-15 PROCEDURE — 93000 ELECTROCARDIOGRAM COMPLETE: CPT | Performed by: NURSE PRACTITIONER

## 2019-01-15 PROCEDURE — 83036 HEMOGLOBIN GLYCOSYLATED A1C: CPT | Performed by: NURSE PRACTITIONER

## 2019-01-15 PROCEDURE — 99396 PREV VISIT EST AGE 40-64: CPT | Performed by: NURSE PRACTITIONER

## 2019-01-15 PROCEDURE — 80061 LIPID PANEL: CPT | Performed by: NURSE PRACTITIONER

## 2019-01-15 PROCEDURE — 80053 COMPREHEN METABOLIC PANEL: CPT | Performed by: NURSE PRACTITIONER

## 2019-01-15 PROCEDURE — 36415 COLL VENOUS BLD VENIPUNCTURE: CPT | Performed by: NURSE PRACTITIONER

## 2019-01-15 PROCEDURE — 85025 COMPLETE CBC W/AUTO DIFF WBC: CPT | Performed by: NURSE PRACTITIONER

## 2019-01-15 NOTE — PROGRESS NOTES
Jaspal Soto is a 48 y.o. female who presents for a physical exam.    History of Present Illness     The following portions of the patient's history were reviewed and updated as appropriate: allergies, current medications, past social history and problem list.    Past Medical History:   Diagnosis Date   • Anxiety    • Hyperlipidemia    • Hypertension    • Lumbago    • Mood disorder (CMS/HCC)    • Vitamin D deficiency          Current Outpatient Medications:   •  fluticasone (FLONASE) 50 MCG/ACT nasal spray, 2 sprays into the nostril(s) as directed by provider Daily for 30 days., Disp: 1 bottle, Rfl: 3  •  ibuprofen (ADVIL,MOTRIN) 800 MG tablet, Take 1 tablet by mouth Every 6 (Six) Hours As Needed for mild pain (1-3) or moderate pain (4-6)., Disp: 30 tablet, Rfl: 0  •  lisinopril-hydrochlorothiazide (PRINZIDE,ZESTORETIC) 20-25 MG per tablet, Take 1 tablet by mouth Daily., Disp: 30 tablet, Rfl: 5  •  metroNIDAZOLE (METROGEL) 0.75 % gel, Apply  topically to the appropriate area as directed Every 12 (Twelve) Hours., Disp: 45 g, Rfl: 0  •  terbinafine (lamiSIL) 250 MG tablet, Take 1 tablet by mouth Daily., Disp: 30 tablet, Rfl: 2  No current facility-administered medications for this visit.     Allergies   Allergen Reactions   • Citalopram    • Flexeril [Cyclobenzaprine]    • Doxycycline Rash     Blisters on hands   • Keflex [Cephalexin] Rash   • Sulfa Antibiotics Rash       Review of Systems   Constitutional: Negative for activity change, appetite change, chills, diaphoresis, fatigue, fever and unexpected weight change.   HENT: Positive for congestion and postnasal drip. Negative for dental problem, drooling, ear discharge, ear pain, facial swelling, hearing loss, mouth sores, nosebleeds, rhinorrhea, sinus pressure, sore throat, tinnitus and trouble swallowing.    Eyes: Negative for photophobia, pain, discharge, redness, itching and visual disturbance.   Respiratory: Negative for apnea, cough, choking,  "chest tightness, shortness of breath and wheezing.    Cardiovascular: Negative for chest pain, palpitations and leg swelling.        No orthopnea, PND, MACDONALD   Gastrointestinal: Negative for abdominal pain, blood in stool, constipation, diarrhea, nausea and vomiting.   Endocrine: Negative for cold intolerance, heat intolerance, polydipsia and polyuria.   Genitourinary: Negative for decreased urine volume, dysuria, enuresis, flank pain, frequency, hematuria and urgency.   Musculoskeletal: Negative for arthralgias, back pain, gait problem, joint swelling, myalgias, neck pain and neck stiffness.   Skin: Negative for color change and rash.        No hair changes, no nail changes   Allergic/Immunologic: Negative for environmental allergies, food allergies and immunocompromised state.   Neurological: Negative for dizziness, tremors, seizures, syncope, speech difficulty, weakness, light-headedness, numbness and headaches.   Hematological: Negative for adenopathy. Does not bruise/bleed easily.   Psychiatric/Behavioral: Negative for agitation, confusion, decreased concentration, dysphoric mood, sleep disturbance and suicidal ideas. The patient is not nervous/anxious.        Objective   Vitals:    01/15/19 0810   BP: 138/82   BP Location: Left arm   Patient Position: Sitting   Cuff Size: Adult   Pulse: 77   SpO2: 99%   Weight: (!) 144 kg (317 lb)   Height: 168.9 cm (66.5\")     Physical Exam   Constitutional: She is oriented to person, place, and time. She appears well-developed and well-nourished. No distress.   HENT:   Right Ear: Hearing, tympanic membrane, external ear and ear canal normal.   Left Ear: Hearing, tympanic membrane, external ear and ear canal normal.   Nose: Right sinus exhibits no maxillary sinus tenderness and no frontal sinus tenderness. Left sinus exhibits no maxillary sinus tenderness and no frontal sinus tenderness.   Eyes: Conjunctivae, EOM and lids are normal. Pupils are equal, round, and reactive to " light.   Neck: Trachea normal and phonation normal. Neck supple. Normal carotid pulses and no JVD present. Carotid bruit is not present. No thyroid mass and no thyromegaly present.   Cardiovascular: Normal rate, regular rhythm, S1 normal and S2 normal. PMI is not displaced. Exam reveals no gallop and no friction rub.   No murmur heard.  Pulses:       Carotid pulses are 2+ on the right side, and 2+ on the left side.       Radial pulses are 2+ on the right side, and 2+ on the left side.        Dorsalis pedis pulses are 2+ on the right side, and 2+ on the left side.   Pulmonary/Chest: Effort normal and breath sounds normal. She has no wheezes. She has no rhonchi. She has no rales. Chest wall is not dull to percussion. Right breast exhibits no inverted nipple, no mass, no nipple discharge, no skin change and no tenderness. Left breast exhibits no inverted nipple, no mass, no nipple discharge, no skin change and no tenderness.   Abdominal: Soft. Normal appearance, normal aorta and bowel sounds are normal. She exhibits no abdominal bruit and no mass. There is no hepatosplenomegaly. There is no tenderness.   Musculoskeletal: Normal range of motion. She exhibits no edema or tenderness.   Lymphadenopathy:     She has no cervical adenopathy.        Right: No supraclavicular adenopathy present.        Left: No supraclavicular adenopathy present.   Neurological: She is alert and oriented to person, place, and time. She has normal strength and normal reflexes. No cranial nerve deficit or sensory deficit. Coordination normal.   Skin: Skin is warm and dry. Lesion and rash noted. Rash is maculopapular (face (bilateral cheeks and nose)).   Large flesh-colored papule in left posterior knee with surrounding inflammation   Psychiatric: She has a normal mood and affect. Her speech is normal and behavior is normal. Judgment and thought content normal. Cognition and memory are normal. She is attentive.   Nursing note and vitals  reviewed.      Assessment/Plan   Emilie was seen today for annual exam.    Diagnoses and all orders for this visit:    Physical exam  -     CBC Auto Differential; Future  -     Comprehensive Metabolic Panel; Future  -     Lipid Panel; Future  -     TSH; Future  -     Urinalysis With Microscopic If Indicated (No Culture) - Urine, Clean Catch; Future  -     Vitamin B12 & Folate; Future  -     CBC Auto Differential  -     Comprehensive Metabolic Panel  -     Lipid Panel  -     TSH  -     Urinalysis With Microscopic If Indicated (No Culture) - Urine, Clean Catch  -     Vitamin B12 & Folate  -     ECG 12 Lead  -     Urinalysis, Microscopic Only - Urine, Clean Catch; Future  -     Urinalysis, Microscopic Only - Urine, Clean Catch    Vitamin D deficiency  Comments:  recheck levels  Orders:  -     Vitamin D 25 Hydroxy; Future  -     Vitamin D 25 Hydroxy    Glucose intolerance  Comments:  recheck A1c  Orders:  -     Hemoglobin A1c; Future    Benign neoplasm of knee  Comments:  recommended derm excision due to size of lesion which she has declined, continue to monitor    Screening for breast cancer  -     Mammo Screening Bilateral With CAD; Future    Need for influenza vaccination  -     Influenza Vac Subunit Quad (FLUCELVAX) injection 0.5 mL; Inject 0.5 mL into the appropriate muscle as directed by prescriber 1 (One) Time.      ECG 12 Lead  Date/Time: 1/15/2019 8:44 AM  Performed by: Trena Butterfield MA  Authorized by: Pinky Hyatt APRN   Comparison: not compared with previous ECG   Previous ECG: no previous ECG available  Rhythm: sinus rhythm  Rate: normal  BPM: 82  Conduction: conduction normal  ST Segments: ST segments normal  T Waves: T waves normal  QRS axis: normal  Clinical impression: normal ECG        Risk assessment:  She has declined a repeat CT scan of chest (f/u on lymphadenopathy) due to cost, continue to monitor.  She has an elevated BMI (50.4), she has started low-carb diet with the goal of weight loss.  She has also increased her activity level.  She has not yet started MetroGel or Lamisil but does have prescription at home.    Prevention:  Influenza vaccine is administered today.  Discussed Hepatitis A vaccine, she will check with pharmacy regarding coverage and availability.  She is due for her annual mammogram which she will schedule.  She is also overdue for her pap smear, will postpone today due to menses.

## 2019-01-17 ENCOUNTER — PATIENT MESSAGE (OUTPATIENT)
Dept: INTERNAL MEDICINE | Facility: CLINIC | Age: 49
End: 2019-01-17

## 2019-01-18 DIAGNOSIS — E55.9 VITAMIN D DEFICIENCY: Primary | ICD-10-CM

## 2019-01-18 RX ORDER — ERGOCALCIFEROL 1.25 MG/1
50000 CAPSULE ORAL WEEKLY
Qty: 4 CAPSULE | Refills: 1 | Status: SHIPPED | OUTPATIENT
Start: 2019-01-18 | End: 2019-04-23 | Stop reason: SDUPTHER

## 2019-01-18 NOTE — TELEPHONE ENCOUNTER
From: Emilie Soto  Sent: 1/18/2019 8:31 AM EST  To: [facility]  Subject: RE: Test Results Question    ----- Message from Mychart, Generic sent at 1/18/2019 8:31 AM EST -----    Good Morning Pinky,    Thank you for looking out for me. :-) ... Have a great day!    Emilie  ----- Message -----  From: VICENTE Ruth  Sent: 1/18/2019 6:04 AM EST  To: Emilie Soto  Subject: RE: Test Results Question  Emilie,    You should be able to see all your labs at this point, if not let me know. Your Hemoglobin A1c is 5.70 which is within the glucose intolerance range. We'll keep a close eye on this.    I'm most concerned about your low Vitamin D-I'm going to send in a prescription for Vitamin D 50,000 units weekly for 4 weeks. After you complete this, start Vitamin D 3,000 units daily. Also, your Vitamin B12 is mildly low, I recommend starting a daily supplement.    ThanksPinky      ----- Message -----   From: Emilie Soto   Sent: 1/17/2019 3:47 PM EST   To: VICENTE Ruth  Subject: RE: Test Results Question    No Ma'am... This is what I see :-)    Component Results  Component Your Value Standard Range  Glucose 123 mg/dL 65 - 99 mg/dL  BUN 9 mg/dL 6 - 20 mg/dL  Creatinine 0.74 mg/dL 0.57 - 1.00 mg/dL  Sodium 138 mmol/L 136 - 145 mmol/L  Potassium 3.7 mmol/L 3.5 - 5.2 mmol/L  Chloride 96 mmol/L 98 - 107 mmol/L  CO2 29.0 mmol/L 22.0 - 29.0 mmol/L  Calcium 10.1 mg/dL 8.6 - 10.5 mg/dL  Total Protein 7.4 g/dL 6.0 - 8.5 g/dL  Albumin 4.10 g/dL 3.50 - 5.20 g/dL  ALT (SGPT) 22 U/L 1 - 33 U/L  AST (SGOT) 15 U/L 1 - 32 U/L  Alkaline Phosphatase 80 U/L 39 - 117 U/L  Total Bilirubin 0.5 mg/dL 0.1 - 1.2 mg/dL  eGFR Non African Amer 84 mL/min/1.73 >60 mL/min/1.73  Globulin 3.3 gm/dL gm/dL  A/G Ratio 1.2 g/dL g/dL  BUN/Creatinine Ratio 12.2  7.0 - 25.0  Anion Gap 13.0 mmol/L mmol/L  ----- Message -----  From: MARLENA SANTIAGO  Sent: 1/17/2019 3:33 PM EST  To: Eimlie Soto  Subject: RE: Test Results  Question  It is called Hemoglobin A1c, but you probably already looked for that! I can see the result in the chart, but it doesn't look like they have been reviewed yet. You should still be able to see them though. Please let me know.     ----- Message -----   From: Emilie Soto   Sent: 1/17/2019 3:16 PM EST   To: VICENTE Ruth  Subject: Test Results Question    Sg Vance,    So I see my test results but I don't see A1C..... Is it called something else on the test or is it too high to post before you talk to me?    Thanks :-)  Emilie

## 2019-01-21 ENCOUNTER — TELEPHONE (OUTPATIENT)
Dept: INTERNAL MEDICINE | Facility: CLINIC | Age: 49
End: 2019-01-21

## 2019-01-21 NOTE — TELEPHONE ENCOUNTER
Spoke w/ pt. Told her I could print labs for her out of [facility].  Will print and mail lipid panels, CMP and BMP's from [id number] in [facility] per pt request.

## 2019-01-30 RX ORDER — AMOXICILLIN 875 MG/1
875 TABLET, COATED ORAL EVERY 12 HOURS SCHEDULED
Qty: 14 TABLET | Refills: 0 | Status: SHIPPED | OUTPATIENT
Start: 2019-01-30 | End: 2019-02-06

## 2019-01-30 RX ORDER — GUAIFENESIN 600 MG/1
600 TABLET, EXTENDED RELEASE ORAL EVERY 12 HOURS SCHEDULED
Qty: 14 TABLET
Start: 2019-01-30 | End: 2019-02-06

## 2019-01-31 NOTE — PROGRESS NOTES
Pt seen in the office 1/30 while here with wife. She c/o increased cough and chest congestion. Will treat with Amoxil and start Mucinex for increased drainage.

## 2019-02-14 ENCOUNTER — TELEPHONE (OUTPATIENT)
Dept: INTERNAL MEDICINE | Facility: CLINIC | Age: 49
End: 2019-02-14

## 2019-02-14 NOTE — TELEPHONE ENCOUNTER
Regarding: FW: Test Results Question  Contact: [phone]      ----- Message -----  From: Emilie Soto  Sent: 2/14/2019   9:42 AM  To: [facility]  Subject: Test Results Question                            ----- Message from Mychart, Generic sent at 2/14/2019  9:42 AM EST -----    Sg Vance,    I just finished my 50,000 units of Vitamin D- 4 weeks.  You indicated on my test results page that you want to retest it when I complete this (6-8 weeks).  Do you want me to make an appointment or is it ok to wait for my appointment on April 18 when you do lab work again?  I have also started the B12 as well.    Thanks and have a great day!  Emilie  ----- Message -----  From: VICENTE Ruth  Sent: 1/18/2019  6:04 AM EST  To: Emilie Soto  Subject: RE: Test Results Question  Emilie,    You should be able to see all your labs at this point, if not let me know. Your Hemoglobin A1c is 5.70 which is within the glucose intolerance range. We'll keep a close eye on this.    I'm most concerned about your low Vitamin D-I'm going to send in a prescription for Vitamin D 50,000 units weekly for 4 weeks. After you complete this, start Vitamin D 3,000 units daily. Also, your Vitamin B12 is mildly low, I recommend starting a daily supplement.    Pinky Ojeda      ----- Message -----     From: Emilie Soto     Sent: 1/17/2019  3:47 PM EST       To: VICENTE Ruth  Subject: RE: Test Results Question    No Ma'am... This is what I see :-)    Component Results  Component Your Value Standard Range  Glucose 123 mg/dL 65 - 99 mg/dL  BUN 9 mg/dL 6 - 20 mg/dL  Creatinine 0.74 mg/dL 0.57 - 1.00 mg/dL  Sodium 138 mmol/L 136 - 145 mmol/L  Potassium 3.7 mmol/L 3.5 - 5.2 mmol/L  Chloride 96 mmol/L 98 - 107 mmol/L  CO2 29.0 mmol/L 22.0 - 29.0 mmol/L  Calcium 10.1 mg/dL 8.6 - 10.5 mg/dL  Total Protein 7.4 g/dL 6.0 - 8.5 g/dL  Albumin 4.10 g/dL 3.50 - 5.20 g/dL  ALT (SGPT) 22 U/L 1 - 33 U/L  AST (SGOT) 15 U/L 1 -  32 U/L  Alkaline Phosphatase 80 U/L 39 - 117 U/L  Total Bilirubin 0.5 mg/dL 0.1 - 1.2 mg/dL  eGFR Non African Amer 84 mL/min/1.73 >60 mL/min/1.73  Globulin 3.3 gm/dL gm/dL  A/G Ratio 1.2 g/dL g/dL  BUN/Creatinine Ratio 12.2   7.0 - 25.0  Anion Gap 13.0 mmol/L mmol/L  ----- Message -----  From: MARLENA SANTIAGO  Sent: 1/17/2019  3:33 PM EST  To: Emilie Soto  Subject: RE: Test Results Question  It is called Hemoglobin A1c, but you probably already looked for that!  I can see the result in the chart, but it doesn't look like they have been reviewed yet.  You should still be able to see them though.  Please let me know.     ----- Message -----     From: Emilie Soto     Sent: 1/17/2019  3:16 PM EST       To: VICENTE Ruth  Subject: Test Results Question    Sg Vance,    So I see my test results but I don't see A1C.....  Is it called something else on the test or is it too high to post before you talk to me?    Thanks :-)  Emilie

## 2019-04-04 DIAGNOSIS — I10 BENIGN ESSENTIAL HTN: ICD-10-CM

## 2019-04-05 RX ORDER — LISINOPRIL AND HYDROCHLOROTHIAZIDE 25; 20 MG/1; MG/1
1 TABLET ORAL DAILY
Qty: 30 TABLET | Refills: 0 | Status: SHIPPED | OUTPATIENT
Start: 2019-04-05 | End: 2019-05-02 | Stop reason: SDUPTHER

## 2019-04-18 ENCOUNTER — APPOINTMENT (OUTPATIENT)
Dept: WOMENS IMAGING | Facility: HOSPITAL | Age: 49
End: 2019-04-18

## 2019-04-18 ENCOUNTER — OFFICE VISIT (OUTPATIENT)
Dept: INTERNAL MEDICINE | Facility: CLINIC | Age: 49
End: 2019-04-18

## 2019-04-18 VITALS
SYSTOLIC BLOOD PRESSURE: 120 MMHG | BODY MASS INDEX: 45.99 KG/M2 | OXYGEN SATURATION: 99 % | HEIGHT: 67 IN | HEART RATE: 110 BPM | DIASTOLIC BLOOD PRESSURE: 78 MMHG | WEIGHT: 293 LBS

## 2019-04-18 DIAGNOSIS — E55.9 VITAMIN D DEFICIENCY: ICD-10-CM

## 2019-04-18 DIAGNOSIS — E74.39 GLUCOSE INTOLERANCE: ICD-10-CM

## 2019-04-18 DIAGNOSIS — E66.01 CLASS 3 SEVERE OBESITY WITH SERIOUS COMORBIDITY AND BODY MASS INDEX (BMI) OF 45.0 TO 49.9 IN ADULT, UNSPECIFIED OBESITY TYPE (HCC): ICD-10-CM

## 2019-04-18 DIAGNOSIS — Z12.39 SCREENING FOR BREAST CANCER: ICD-10-CM

## 2019-04-18 DIAGNOSIS — I10 BENIGN ESSENTIAL HTN: Primary | ICD-10-CM

## 2019-04-18 LAB
25(OH)D3 SERPL-MCNC: 21.1 NG/ML (ref 30–100)
ALBUMIN SERPL-MCNC: 4.4 G/DL (ref 3.5–5.2)
ALBUMIN/GLOB SERPL: 1.2 G/DL
ALP SERPL-CCNC: 72 U/L (ref 39–117)
ALT SERPL W P-5'-P-CCNC: 37 U/L (ref 1–33)
ANION GAP SERPL CALCULATED.3IONS-SCNC: 14.3 MMOL/L
AST SERPL-CCNC: 27 U/L (ref 1–32)
BILIRUB SERPL-MCNC: 0.9 MG/DL (ref 0.2–1.2)
BUN BLD-MCNC: 12 MG/DL (ref 6–20)
BUN/CREAT SERPL: 18.5 (ref 7–25)
CALCIUM SPEC-SCNC: 10.8 MG/DL (ref 8.6–10.5)
CHLORIDE SERPL-SCNC: 93 MMOL/L (ref 98–107)
CHOLEST SERPL-MCNC: 183 MG/DL (ref 0–200)
CO2 SERPL-SCNC: 24.7 MMOL/L (ref 22–29)
CREAT BLD-MCNC: 0.65 MG/DL (ref 0.57–1)
GFR SERPL CREATININE-BSD FRML MDRD: 97 ML/MIN/1.73
GLOBULIN UR ELPH-MCNC: 3.8 GM/DL
GLUCOSE BLD-MCNC: 112 MG/DL (ref 65–99)
HBA1C MFR BLD: 5.7 % (ref 4.8–5.6)
HDLC SERPL-MCNC: 38 MG/DL (ref 40–60)
LDLC SERPL CALC-MCNC: 88 MG/DL (ref 0–100)
LDLC/HDLC SERPL: 2.31 {RATIO}
POTASSIUM BLD-SCNC: 4 MMOL/L (ref 3.5–5.2)
PROT SERPL-MCNC: 8.2 G/DL (ref 6–8.5)
SODIUM BLD-SCNC: 132 MMOL/L (ref 136–145)
TRIGL SERPL-MCNC: 287 MG/DL (ref 0–150)
VLDLC SERPL-MCNC: 57.4 MG/DL (ref 5–40)

## 2019-04-18 PROCEDURE — 80061 LIPID PANEL: CPT | Performed by: NURSE PRACTITIONER

## 2019-04-18 PROCEDURE — 77067 SCR MAMMO BI INCL CAD: CPT | Performed by: RADIOLOGY

## 2019-04-18 PROCEDURE — 80053 COMPREHEN METABOLIC PANEL: CPT | Performed by: NURSE PRACTITIONER

## 2019-04-18 PROCEDURE — 99214 OFFICE O/P EST MOD 30 MIN: CPT | Performed by: NURSE PRACTITIONER

## 2019-04-18 PROCEDURE — 77067 SCR MAMMO BI INCL CAD: CPT | Performed by: INTERNAL MEDICINE

## 2019-04-19 NOTE — PROGRESS NOTES
Subjective   Emilie Soto is a 48 y.o. female who presents for f/u regarding HTN, Vitamin D deficiency and obesity.    She is following a low carb diet and has lost weight over the past 6 months, lulu 15 pounds. However, she admits to frequent fluctuations and difficulty sticking with the low carb diet.   She has started Vitamin D supplement due to deficiency noted at her PE and does report improvement in her energy, now currently taking Vitamin D 2,000 units 2 tablets daily.  She has not yet started Lamisil for fungus of her toenails.      Hypertension   This is a chronic problem. The current episode started more than 1 year ago. The problem is unchanged. The problem is controlled. Pertinent negatives include no chest pain, headaches or palpitations. Current antihypertension treatment includes ACE inhibitors and diuretics. The current treatment provides significant improvement. There are no compliance problems.    Hyperlipidemia   Pertinent negatives include no chest pain.        The following portions of the patient's history were reviewed and updated as appropriate: allergies, current medications, past social history and problem list.    Past Medical History:   Diagnosis Date   • Anxiety    • Hyperlipidemia    • Hypertension    • Lumbago    • Mood disorder (CMS/HCC)    • Vitamin D deficiency          Current Outpatient Medications:   •  ibuprofen (ADVIL,MOTRIN) 800 MG tablet, Take 1 tablet by mouth Every 6 (Six) Hours As Needed for mild pain (1-3) or moderate pain (4-6)., Disp: 30 tablet, Rfl: 0  •  lisinopril-hydrochlorothiazide (PRINZIDE,ZESTORETIC) 20-25 MG per tablet, TAKE 1 TABLET BY MOUTH DAILY, Disp: 30 tablet, Rfl: 0  •  metroNIDAZOLE (METROGEL) 0.75 % gel, Apply  topically to the appropriate area as directed Every 12 (Twelve) Hours., Disp: 45 g, Rfl: 0  •  terbinafine (lamiSIL) 250 MG tablet, Take 1 tablet by mouth Daily., Disp: 30 tablet, Rfl: 2  •  vitamin D (ERGOCALCIFEROL) 97870 units capsule  "capsule, Take 1 capsule by mouth 1 (One) Time Per Week., Disp: 4 capsule, Rfl: 1    Allergies   Allergen Reactions   • Citalopram    • Flexeril [Cyclobenzaprine]    • Doxycycline Rash     Blisters on hands   • Keflex [Cephalexin] Rash   • Sulfa Antibiotics Rash       Review of Systems   Constitutional: Negative for chills, fatigue, fever and unexpected weight change.   HENT: Negative for congestion, ear pain, postnasal drip, sinus pressure, sore throat and trouble swallowing.    Eyes: Negative for visual disturbance.   Respiratory: Negative for cough, chest tightness and wheezing.    Cardiovascular: Negative for chest pain, palpitations and leg swelling.   Gastrointestinal: Negative for abdominal pain, blood in stool, nausea and vomiting.   Genitourinary: Negative for dysuria, frequency and urgency.   Musculoskeletal: Negative for arthralgias and joint swelling.   Skin: Positive for rash (c/o dryness of bilateral lower legs). Negative for color change.   Neurological: Negative for syncope, weakness and headaches.   Hematological: Does not bruise/bleed easily.       Objective   Vitals:    04/18/19 1042   BP: 120/78   BP Location: Left arm   Patient Position: Sitting   Cuff Size: Adult   Pulse: 110   SpO2: 99%   Weight: (!) 140 kg (309 lb)   Height: 168.9 cm (66.5\")     Physical Exam   Constitutional: She appears well-developed and well-nourished. She is cooperative. She does not have a sickly appearance. She does not appear ill.   HENT:   Head: Normocephalic.   Right Ear: Hearing, tympanic membrane and external ear normal.   Left Ear: Hearing, tympanic membrane and external ear normal.   Nose: Nose normal. No mucosal edema, rhinorrhea, sinus tenderness or nasal deformity. Right sinus exhibits no maxillary sinus tenderness and no frontal sinus tenderness. Left sinus exhibits no maxillary sinus tenderness and no frontal sinus tenderness.   Mouth/Throat: Oropharynx is clear and moist and mucous membranes are normal. " Normal dentition.   Eyes: Conjunctivae and lids are normal. Right eye exhibits no discharge and no exudate. Left eye exhibits no discharge and no exudate.   Neck: Trachea normal. Carotid bruit is not present. No edema present. No thyroid mass present.   Cardiovascular: Regular rhythm, normal heart sounds and normal pulses.   No murmur heard.  Pulmonary/Chest: Breath sounds normal. No respiratory distress. She has no decreased breath sounds. She has no wheezes. She has no rhonchi. She has no rales.   Abdominal: Soft. There is no tenderness.   Lymphadenopathy:        Head (right side): No submental, no submandibular, no tonsillar, no preauricular, no posterior auricular and no occipital adenopathy present.        Head (left side): No submental, no submandibular, no tonsillar, no preauricular, no posterior auricular and no occipital adenopathy present.   Neurological: She is alert.   Skin: Skin is warm, dry and intact. No cyanosis. Nails show no clubbing.   Dry, scaly skin on bilateral lower extremities       Assessment/Plan   Emilie was seen today for hypertension and hyperlipidemia.    Diagnoses and all orders for this visit:    Benign essential HTN  Comments:  stable on current meds  Orders:  -     Comprehensive Metabolic Panel; Future  -     Lipid Panel; Future  -     Comprehensive Metabolic Panel  -     Lipid Panel    Vitamin D deficiency  Comments:  recheck levels since starting supplement  Orders:  -     Vitamin D 25 Hydroxy; Future  -     Vitamin D 25 Hydroxy    Glucose intolerance  Comments:  recheck A1c  Orders:  -     Hemoglobin A1c; Future  -     Hemoglobin A1c    Class 3 severe obesity with serious comorbidity and body mass index (BMI) of 45.0 to 49.9 in adult, unspecified obesity type (CMS/[facility])  -     Ambulatory Referral to General Surgery    Discussed benefits of weight loss and importance of increasing activity level which she will do. Continue low-carb, low-sugar diet. However, I believe she would  benefit from surgical intervention such as the gastric sleeve which we discussed today-will refer for surgical consult.

## 2019-04-22 DIAGNOSIS — N63.10 BREAST MASS, RIGHT: Primary | ICD-10-CM

## 2019-04-23 ENCOUNTER — DOCUMENTATION (OUTPATIENT)
Dept: INTERNAL MEDICINE | Facility: CLINIC | Age: 49
End: 2019-04-23

## 2019-04-23 DIAGNOSIS — E55.9 VITAMIN D DEFICIENCY: Primary | ICD-10-CM

## 2019-04-23 RX ORDER — ERGOCALCIFEROL 1.25 MG/1
50000 CAPSULE ORAL WEEKLY
Qty: 4 CAPSULE | Refills: 1 | Status: SHIPPED | OUTPATIENT
Start: 2019-04-23 | End: 2019-05-28 | Stop reason: HOSPADM

## 2019-04-23 NOTE — PROGRESS NOTES
Diagnostic Breast testing as recommended on Screening Mammogram results, has been scheduled at [facility] on 4-2-19 at 2:45PM. Pt veronica.    SHANIA Barnes)(CAROL),ARRT

## 2019-04-29 ENCOUNTER — APPOINTMENT (OUTPATIENT)
Dept: WOMENS IMAGING | Facility: HOSPITAL | Age: 49
End: 2019-04-29

## 2019-04-29 PROCEDURE — 77065 DX MAMMO INCL CAD UNI: CPT | Performed by: RADIOLOGY

## 2019-04-29 PROCEDURE — 77061 BREAST TOMOSYNTHESIS UNI: CPT | Performed by: RADIOLOGY

## 2019-04-29 PROCEDURE — G0279 TOMOSYNTHESIS, MAMMO: HCPCS | Performed by: RADIOLOGY

## 2019-04-29 PROCEDURE — 76641 ULTRASOUND BREAST COMPLETE: CPT | Performed by: RADIOLOGY

## 2019-04-29 PROCEDURE — MDREVIEWSP: Performed by: RADIOLOGY

## 2019-04-30 DIAGNOSIS — N63.10 BREAST MASS, RIGHT: Primary | ICD-10-CM

## 2019-04-30 DIAGNOSIS — R09.89 LYMPH NODE SYMPTOM: ICD-10-CM

## 2019-05-01 DIAGNOSIS — N63.10 BREAST MASS, RIGHT: ICD-10-CM

## 2019-05-02 DIAGNOSIS — I10 BENIGN ESSENTIAL HTN: ICD-10-CM

## 2019-05-02 RX ORDER — LISINOPRIL AND HYDROCHLOROTHIAZIDE 25; 20 MG/1; MG/1
1 TABLET ORAL DAILY
Qty: 30 TABLET | Refills: 5 | Status: SHIPPED | OUTPATIENT
Start: 2019-05-02 | End: 2019-10-31 | Stop reason: SDUPTHER

## 2019-05-06 ENCOUNTER — APPOINTMENT (OUTPATIENT)
Dept: WOMENS IMAGING | Facility: HOSPITAL | Age: 49
End: 2019-05-06

## 2019-05-06 PROCEDURE — 19084 BX BREAST ADD LESION US IMAG: CPT | Performed by: RADIOLOGY

## 2019-05-06 PROCEDURE — 77065 DX MAMMO INCL CAD UNI: CPT | Performed by: RADIOLOGY

## 2019-05-06 PROCEDURE — G0279 TOMOSYNTHESIS, MAMMO: HCPCS | Performed by: RADIOLOGY

## 2019-05-06 PROCEDURE — 76641 ULTRASOUND BREAST COMPLETE: CPT | Performed by: RADIOLOGY

## 2019-05-06 PROCEDURE — 19083 BX BREAST 1ST LESION US IMAG: CPT | Performed by: RADIOLOGY

## 2019-05-07 ENCOUNTER — TELEPHONE (OUTPATIENT)
Dept: SURGERY | Facility: CLINIC | Age: 49
End: 2019-05-07

## 2019-05-07 NOTE — TELEPHONE ENCOUNTER
Breast MRI is scheduled on 5/9/2019 @ 7:45am.    New patient appointment with Dr. Greene is scheduled on 5/14/2019 @ 10:30am.    Called and spoke to patient, patient expressed verbal understanding of appointment times.    Sent patient a reminder letter in the mail.

## 2019-05-09 ENCOUNTER — HOSPITAL ENCOUNTER (OUTPATIENT)
Dept: MRI IMAGING | Facility: HOSPITAL | Age: 49
Discharge: HOME | End: 2019-05-09
Admitting: SURGERY

## 2019-05-09 DIAGNOSIS — C50.111 MALIGNANT NEOPLASM OF CENTRAL PORTION OF RIGHT FEMALE BREAST, UNSPECIFIED ESTROGEN RECEPTOR STATUS (HCC): ICD-10-CM

## 2019-05-09 DIAGNOSIS — N63.10 BREAST MASS, RIGHT: ICD-10-CM

## 2019-05-09 DIAGNOSIS — R09.89 LYMPH NODE SYMPTOM: ICD-10-CM

## 2019-05-09 PROCEDURE — 0 GADOBENATE DIMEGLUMINE 529 MG/ML SOLUTION: Performed by: SURGERY

## 2019-05-09 PROCEDURE — 77049 MRI BREAST C-+ W/CAD BI: CPT

## 2019-05-09 PROCEDURE — A9577 INJ MULTIHANCE: HCPCS | Performed by: SURGERY

## 2019-05-09 RX ADMIN — GADOBENATE DIMEGLUMINE 20 ML: 529 INJECTION, SOLUTION INTRAVENOUS at 08:40

## 2019-05-14 ENCOUNTER — PREP FOR SURGERY (OUTPATIENT)
Dept: OTHER | Facility: HOSPITAL | Age: 49
End: 2019-05-14

## 2019-05-14 ENCOUNTER — TELEPHONE (OUTPATIENT)
Dept: OTHER | Facility: HOSPITAL | Age: 49
End: 2019-05-14

## 2019-05-14 ENCOUNTER — OFFICE VISIT (OUTPATIENT)
Dept: SURGERY | Facility: CLINIC | Age: 49
End: 2019-05-14

## 2019-05-14 ENCOUNTER — TELEPHONE (OUTPATIENT)
Dept: SURGERY | Facility: CLINIC | Age: 49
End: 2019-05-14

## 2019-05-14 VITALS
DIASTOLIC BLOOD PRESSURE: 80 MMHG | WEIGHT: 293 LBS | HEART RATE: 70 BPM | HEIGHT: 66 IN | BODY MASS INDEX: 47.09 KG/M2 | SYSTOLIC BLOOD PRESSURE: 138 MMHG

## 2019-05-14 DIAGNOSIS — C50.111 MALIGNANT NEOPLASM OF CENTRAL PORTION OF RIGHT BREAST IN FEMALE, ESTROGEN RECEPTOR POSITIVE (HCC): Primary | ICD-10-CM

## 2019-05-14 DIAGNOSIS — Z17.0 MALIGNANT NEOPLASM OF CENTRAL PORTION OF RIGHT BREAST IN FEMALE, ESTROGEN RECEPTOR POSITIVE (HCC): Primary | ICD-10-CM

## 2019-05-14 DIAGNOSIS — Z80.3 FAMILY HISTORY OF BREAST CANCER: ICD-10-CM

## 2019-05-14 PROCEDURE — 99244 OFF/OP CNSLTJ NEW/EST MOD 40: CPT | Performed by: SURGERY

## 2019-05-14 RX ORDER — HEPARIN SODIUM 5000 [USP'U]/ML
5000 INJECTION, SOLUTION INTRAVENOUS; SUBCUTANEOUS
Status: CANCELLED | OUTPATIENT
Start: 2019-05-28 | End: 2019-05-29

## 2019-05-14 RX ORDER — CLINDAMYCIN PHOSPHATE 900 MG/50ML
900 INJECTION INTRAVENOUS ONCE
Status: CANCELLED | OUTPATIENT
Start: 2019-05-28 | End: 2019-05-14

## 2019-05-14 NOTE — PROGRESS NOTES
[facility]     Referring Provider: Mel Cline MD     Chief complaint: Newly diagnosed breast cancer     HPI: Ms. Emilie Soto is a 47 yo woman, seen at the request of Dr. Mel Cline, for a new diagnosis of right breast cancer. This was initially detected as an imaging abnormality. Her work-up is detailed in the oncologic history below. She denies any breast lumps, pain, skin changes, or nipple discharge. She has a past history of a benign left breast stereotactic biopsy in 2014. 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 She has a family history of breast cancer in a paternal aunt and paternal cousin (unknown ages at diagnosis). She denies any family history of ovarian cancer. She was joined today in clinic by her wife, Trena. She gave consent for her wife to be present during her examination and participate in the discussion.       Oncology/Hematology History    1/7/14, Screening MMG ([facility]):   There is a stable small focal asymmetry seen in the left breast at 12:00-1:00. In the right breast, no masses, significant calcifications or other abnormalities are seen.   BI-RADS 0: Incomplete.    2/4/14, Left Diagnostic MMG & Left US ([facility]):   MMG:  On the present examination, there is an isodense focal asymmetry measuring 15 mm with indistinct margins in the left breast at 12:00-1:00 located 10 cm from the nipple. Focal asymmetric density has become more defined.   US:  There is no evidence of any solid mass or abnormal cystic elements. Stereotactic biopsy is recommended.   BI-RADS 4A: Suspicious.    2/11/14, Left Breast, 12-1:00, Stereotactic Biopsy ([facility]):   Fibroadenomatoid type stromal changes, mild columnar cell hyperplasia, mild fibrocystic changes. Negative for atypia or malignancy.   -Concordant.  Recommend 6 month follow-up mammogram and ultrasound.        Malignant neoplasm of central portion of right breast in female, estrogen receptor positive  (CMS/HCC)    4/17/2019 Initial Diagnosis     Malignant neoplasm of central portion of right breast in female, estrogen receptor positive (CMS/HCC)         4/18/2019 Imaging     Screening MMG ([facility]):   There are scattered areas of fibroglandular density. There is a small, oval mass measuring 10 mm with indistinct margins seen in the sub-areolar region of the right breast. Note is made of a biopsy clip in the left breast as evidence of a previous surgical procedure. In the left breast, no suspicious masses, significant calcifications or other abnormalities are seen.   BI-RADS 0: Incomplete.           4/29/2019 Imaging     Right Diagnostic MMG with [place] & Right US ([facility]):   MMG:  On the present examination, there is a new high density, round mass measuring 10 mm with indistinct margins in the anterior one-third sub-areolar region of the right breast located 2 cm from the nipple.   US:   1. Ultrasound is suggestive of a round solid mass with indistinct margins measuring 9 mm. Internal echotexture is homogeneous and hypoechoic relative adipose tissue.   2. There is an abnormal axillary lymph node in the right axilla. There are 2 adjacent lymph nodes in the right axilla with mildly prominent cortices. The cortex has been measured at 3.1 mm, although no significant focal thickening is seen. The left axilla was evaluated for comparison, and the nodes on the right are of greater cortical thickness.   BI-RADS 4C: Suspicious.         5/6/2019 Biopsy     Right breast & Right axillary lymph node, US-guided biopsy ([facility]):    1. Right Subareolar, core biopsy:    Invasive High Grade Mammary Carcinoma (Invasive Ductal Carcinoma, Grade III). Tubule Score 3, Nuclear Grade Score 3, and Mitotic Score 2 for A Total Score of 8. Neoplasm is Present in Four of Eight Biopsies and Measures 4 mm in Greatest Dimension.    ER+ (82.35%, moderate)  MS+ (92.68%, strong)  Her2 negative (IHC 1+)  Ki-67 14.81%    2. Right axilla, core  biopsy:  Benign Lymph Node, Negative for Metastatic Carcinoma.         5/9/2019 Imaging     Bilateral Breast MRI ([facility]):  Within the retroareolar region of the right breast located slightly medial to the plane of the nipple at the 2:30 position on the order of 1.8 cm from the nipple there is an irregular enhancing mass that measures 1.0 cm in the superior-inferior dimension, 1.0 cm in anterior to posterior dimension and 1.0 cm in the medial to lateral dimension. A metallic clip is seen within the mass. This represents the biopsy-proven site malignancy.     No other areas of abnormal enhancement or morphology are seen within the right breast. I see no evidence for abnormal right breast skin, nipple or chest wall enhancement and there is no evidence for right axillary adenopathy.     There are no areas of abnormal enhancement or morphology in the left breast. I see no evidence for abnormal left breast skin, nipple or chest wall enhancement and there is no evidence for left axillary adenopathy.  BI-RADS 6: Known malignancy.            Review of Systems   Constitutional: Negative for appetite change, chills, diaphoresis, fatigue, fever and unexpected weight change.   HENT:   Negative for hearing loss, lump/mass, mouth sores, nosebleeds, sore throat, tinnitus, trouble swallowing and voice change.    Eyes: Negative for eye problems and icterus.   Respiratory: Negative for chest tightness, cough, hemoptysis, shortness of breath and wheezing.    Cardiovascular: Negative for chest pain, leg swelling and palpitations.   Gastrointestinal: Negative for abdominal distention, abdominal pain, blood in stool, constipation, diarrhea, nausea, rectal pain and vomiting.   Endocrine: Negative for hot flashes.   Genitourinary: Negative for bladder incontinence, difficulty urinating, dyspareunia, dysuria, frequency, hematuria, menstrual problem, nocturia, pelvic pain, vaginal bleeding and vaginal discharge.    Musculoskeletal: Negative  for arthralgias, back pain, flank pain, gait problem, myalgias, neck pain and neck stiffness.   Skin: Negative for itching, rash and wound.   Neurological: Negative for dizziness, extremity weakness, gait problem, headaches, light-headedness, numbness, seizures and speech difficulty.   Hematological: Negative for adenopathy. Does not bruise/bleed easily.   Psychiatric/Behavioral: Positive for depression. Negative for confusion, decreased concentration, sleep disturbance and suicidal ideas. The patient is nervous/anxious.    I personally reviewed and updated the ROS.      Medications:    Current Outpatient Medications:   •  ibuprofen (ADVIL,MOTRIN) 800 MG tablet, Take 1 tablet by mouth Every 6 (Six) Hours As Needed for mild pain (1-3) or moderate pain (4-6)., Disp: 30 tablet, Rfl: 0  •  lisinopril-hydrochlorothiazide (PRINZIDE,ZESTORETIC) 20-25 MG per tablet, TAKE 1 TABLET BY MOUTH DAILY, Disp: 30 tablet, Rfl: 5  •  metroNIDAZOLE (METROGEL) 0.75 % gel, Apply  topically to the appropriate area as directed Every 12 (Twelve) Hours., Disp: 45 g, Rfl: 0  •  terbinafine (lamiSIL) 250 MG tablet, Take 1 tablet by mouth Daily., Disp: 30 tablet, Rfl: 2  •  vitamin D (ERGOCALCIFEROL) 73753 units capsule capsule, Take 1 capsule by mouth 1 (One) Time Per Week., Disp: 4 capsule, Rfl: 1      Allergies   Allergen Reactions   • Citalopram    • Flexeril [Cyclobenzaprine]    • Doxycycline Rash     Blisters on hands   • Keflex [Cephalexin] Rash   • Sulfa Antibiotics Rash       Past Medical History:   Diagnosis Date   • Anxiety    • Hyperlipidemia    • Hypertension    • Lumbago    • Mood disorder (CMS/HCC)    • Vitamin D deficiency        Past Surgical History:   Procedure Laterality Date   • APPENDECTOMY      removed when removing dermoid on right ovary   • CHOLECYSTECTOMY     • DERMOID CYST  EXCISION  1989   • HERNIA REPAIR  2006    umbilical   • OVARY SURGERY      dermoid removed   • SALIVARY GLAND SURGERY  1993    due to cancer        Family History   Problem Relation Age of Onset   • Hypertension Mother    • Arthritis Mother    • Kidney cancer Mother    • Heart disease Father    • Hernia Father    • Hypertension Father    • No Known Problems Brother    • Breast cancer Paternal Aunt    • Breast cancer Paternal Cousin        Social History     Socioeconomic History   • Marital status:      Spouse name: Trena   • Number of children: 0   • Years of education: Not on file   • Highest education level: Not on file   Occupational History     Comment: 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    Tobacco Use   • Smoking status: Never Smoker   • Smokeless tobacco: Never Used   Substance and Sexual Activity   • Alcohol use: Yes     Comment: occ   • Drug use: No   • Sexual activity: Yes     Partners: Female     Birth control/protection: None     Patient drinks 3 servings of caffeine per day.        GYNECOLOGIC HISTORY:   . P: 0. AB: 0.  Last menstrual period: 2019   Age at menarche: 9  Age at first childbirth: n/a  Lactation/How long: n/a  Age at menopause: premenopausal  Total years of oral contraceptive use: Less than 1 year  Total years of hormone replacement therapy: none      PHYSICAL EXAMINATION:   Vitals:    19 0957   BP: 138/80   Pulse: 70     ECOG 0 - Asymptomatic  General: NAD, well appearing, obese  Psych: a&o x 3, normal mood and affect  Eyes: EOMI, no scleral icterus  ENMT: neck supple without masses or thyromegaly, mucus membranes moist  Resp: normal effort, CTAB  CV: RRR, no murmurs, no edema   GI: soft, NT, ND  MSK: normal gait, normal ROM in bilateral shoulders  Lymph nodes: no cervical, supraclavicular or axillary lymphadenopathy  Breast: symmetric, very large size, grade 3 ptosis  Right: There is a biopsy site laterally with ecchymoses, otherwise no visible abnormalities on inspection while seated, with arms raised or hands on hips. No masses or nipple abnormalities.  Left: No visible abnormalities on  inspection while seated, with arms raised or hands on hips. No masses, skin changes, or nipple abnormalities.    Right breast, in-office ultrasound: At 9:00, below the areola there is a biopsy clip visualized just deep to the dermis. Medial to the clip, below the nipple, there is a hypoechoic mass, about 3 mm deep to the dermis.       I have independently reviewed her imaging and here are my findings:   In the right breast, there is an irregular subareolar mass. The biopsy clip is located just lateral to the mass. Both of these findings are very superficial.      Assessment:  48 y.o. F with a new diagnosis of right breast cancer: high grade, invasive ductal carcinoma, ER/AL+, Her2 negative; clinical T1bN0, anatomic stage IA, prognostic stage IA.      Discussion:  I had an extensive discussion with the patient and her wife about the nature of her breast cancer diagnosis. We reviewed the components of breast tissue including ducts and lobules. We reviewed her pathology report in detail. We reviewed breast cancer histology, including stage, grade, ER/AL receptors, HER2 receptors and how this applies to her diagnosis. We reviewed the basics of systemic and local/regional management of breast cancer.      We discussed that in her case, systemic treatment would involve endocrine therapy and possibly chemotherapy. She will be referred to medical oncology postoperatively to discuss these issues further. We reviewed potential surgical treatments to include partial mastectomy (with radiation), mastectomy (with or without reconstruction), sentinel lymph node biopsy and axillary node dissection and discussed the rationale associated with each approach. Regarding radiation therapy, we discussed that radiation is indicated in all cases of breast conservation and in only limited circumstances following mastectomy. We discussed that the primary goal of adjuvant radiation is to decrease the likelihood of local recurrence.     In her  case, she is a good candidate for lumpectomy given the size of the lesion relative to her breast size. However, because of the superficial subareolar location, a lumpectomy would require removal of the nipple-areola-complex in order to make sure we obtain negative margins. I explained that a mastectomy in her case would also require removal of the nipple-areola-complex due to the location of the lesion, as well as her degree of ptosis. I explained that with a central lumpectomy or a mastectomy, there are multiple options for nipple reconstruction and I can refer her to plastic surgery to discuss these further. I briefly mentioned the option of a central lumpectomy along with an oncoplastic reduction and contralateral symmetry procedure, however this surgery would have a significantly higher risk of perioperative complications in light of her morbid obesity.    She would prefer breast conservation with a central lumpectomy. She understands that this will include the removal of her nipple-areola-complex. She does not want a referral to plastic surgery at this time to discuss options for reconstruction. We discussed the risk of positive margins and that she must have negative margins for lumpectomy to be an appropriate oncologic procedure. I will make every effort to obtain negative margins at her initial operation, but there is a 10-15% chance that she will require a second operation for re-excision, or possibly a total mastectomy. We will not know the margin status until after her final pathology has returned.     We discussed that most breast cancer is not hereditary, however given her young age at diagnosis and family history, this may play a role in her case. Genetic testing is warranted and an [employer] stat panel was sent today. This could affect surgical decision making. Should the results show a pathologic mutation, I would recommend a mastectomy on the cancer side and a contralateral risk-reduction mastectomy.  She understands that this would not be for the treatment of her right breast cancer and will not improve her prognosis long term. This would be to reduce her risk of a second, primary breast cancer. She understands that risk reduction is excellent with mastectomy, but not 100%.     We discussed axillary staging. I described the procedure for sentinel lymph node biopsy in detail, including the preoperative injection of technetium sulfur colloid and intraoperative injection of lymphazurin blue dye. I explained that this is a mapping test and not a cancer test, that all of the lymph nodes containing these dyes will be removed for complete testing by pathology, and that the results could impact the decision for adjuvant treatment or additional surgery.    I described additional risks and potential complications associated with surgery, including, but not limited to, bleeding, infection, complications related to blue dye, lymphedema, deformity/poor cosmetic result, chronic pain, neurovascular injury, numbness, seroma, hematoma, deep venous thrombosis, skin flap necrosis, disease recurrence and the possibility of requiring additional surgery. We also discussed other treatment options including the option of not undergoing any surgical treatment and the risks associated with this including disease progression. She expressed an understanding of these factors and wished to proceed.    Plan:  A multidisciplinary plan has been formulated for the patient:      (1) Surgical Oncology:  -Genetic testing sent today. I will call her with results. If the results could affect surgical decision making, I will see her back to discuss.  -Nurse navigator will contact patient and set up Behavioral Oncology consult.   -Right central partial mastectomy (with removal of the nipple-areola-complex) and sentinel lymph node biopsy    (2) Medical Oncology  -Will refer post-operatively.    (3) Radiation Oncology  -Will refer  post-operatively.    Trena Greene MD    I have spent 70 min in face to face time with the patient and 55 min of this time was spent in counseling on the above stated issues.       CC:  MD Pinky Neal APRN

## 2019-05-14 NOTE — TELEPHONE ENCOUNTER
Referral received from Dr. Greene's office. I called Emilie and introduced myself and navigational services. She states the consult went well and Dr. Greene explained everything to her thoroughly. She did not have any questions at this time. She did state she was a little anxious during the consult, but felt much better towards the end.       We discussed integrative therapies and other services at the [facility] including the opportunity to speak with our Oncology Dietitian or Oncology Social Worker. She stated she is on a fixed income is stressed about finical concerns. We also discussed that we have counseling services available through our [facility] and she is interested in this as well. Referral sent to Helena Stovall. She verbalized interest in receiving a navigation folder outlining services. I verified her mailing address and will send out a navigation folder with the following information:     [facility],  Sh[facility] Our Stories Breast Cancer Support Group, Cancer and Restorative Exercise (CARE), [employer] Exercise program, Together for Breast Cancer Survival,  For Women Facing Breast Cancer, [employer], Cancer [facility] [place], Massage Therapy, Reiki Therapy, [employer] [place], Cancer Nutrition, Survivorship Clinic, [facility], Suvivorship conference.       She verbalized appreciation for navigational services and she has my contact information and will call with any questions that arise.

## 2019-05-14 NOTE — TELEPHONE ENCOUNTER
[facility] STAT panel sent today including MINI and CHEK2 genes.     Test will be re-re questioned to Common Hereditary Panel 47. Lml

## 2019-05-15 ENCOUNTER — TELEPHONE (OUTPATIENT)
Dept: OTHER | Facility: HOSPITAL | Age: 49
End: 2019-05-15

## 2019-05-15 ENCOUNTER — TELEPHONE (OUTPATIENT)
Dept: SURGERY | Facility: CLINIC | Age: 49
End: 2019-05-15

## 2019-05-15 NOTE — TELEPHONE ENCOUNTER
Surgery is scheduled on 5/28/2019 @ 8:00am,  SI @ 7:00am, patient arrival @ 5:30am.    PAT appointment is scheduled on 5/21/2019 @ 1:30pm.    Post-op appointment with Dr. Greene on 6/7/2019 @ 11:00am.    Called and spoke to patient, patient expressed verbal understanding of appointment times.

## 2019-05-15 NOTE — TELEPHONE ENCOUNTER
Oncology Social Work  [facility]    Referral received from Soha Ma, RN - Breast Nurse Navigator.  Called and spoke to patient.  Scheduled an appt with OSW for Tuesday 5/21/2019 at 12:30pm    Thank you  Helena Stovall, MALENAW, CSW, OSW-C

## 2019-05-21 ENCOUNTER — APPOINTMENT (OUTPATIENT)
Dept: PREADMISSION TESTING | Facility: HOSPITAL | Age: 49
End: 2019-05-21

## 2019-05-21 ENCOUNTER — OFFICE VISIT (OUTPATIENT)
Dept: OTHER | Facility: HOSPITAL | Age: 49
End: 2019-05-21

## 2019-05-21 VITALS
WEIGHT: 293 LBS | TEMPERATURE: 98.7 F | HEART RATE: 73 BPM | RESPIRATION RATE: 20 BRPM | BODY MASS INDEX: 47.09 KG/M2 | HEIGHT: 66 IN | SYSTOLIC BLOOD PRESSURE: 124 MMHG | DIASTOLIC BLOOD PRESSURE: 74 MMHG | OXYGEN SATURATION: 98 %

## 2019-05-21 DIAGNOSIS — Z17.0 MALIGNANT NEOPLASM OF CENTRAL PORTION OF RIGHT BREAST IN FEMALE, ESTROGEN RECEPTOR POSITIVE (HCC): ICD-10-CM

## 2019-05-21 DIAGNOSIS — C50.111 MALIGNANT NEOPLASM OF CENTRAL PORTION OF RIGHT BREAST IN FEMALE, ESTROGEN RECEPTOR POSITIVE (HCC): Primary | ICD-10-CM

## 2019-05-21 DIAGNOSIS — Z17.0 MALIGNANT NEOPLASM OF CENTRAL PORTION OF RIGHT BREAST IN FEMALE, ESTROGEN RECEPTOR POSITIVE (HCC): Primary | ICD-10-CM

## 2019-05-21 DIAGNOSIS — C50.111 MALIGNANT NEOPLASM OF CENTRAL PORTION OF RIGHT BREAST IN FEMALE, ESTROGEN RECEPTOR POSITIVE (HCC): ICD-10-CM

## 2019-05-21 LAB
ANION GAP SERPL CALCULATED.3IONS-SCNC: 13.7 MMOL/L
BASOPHILS # BLD AUTO: 0.04 10*3/MM3 (ref 0–0.2)
BASOPHILS NFR BLD AUTO: 0.5 % (ref 0–1.5)
BUN BLD-MCNC: 8 MG/DL (ref 6–20)
BUN/CREAT SERPL: 12.5 (ref 7–25)
CALCIUM SPEC-SCNC: 9.8 MG/DL (ref 8.6–10.5)
CHLORIDE SERPL-SCNC: 103 MMOL/L (ref 98–107)
CO2 SERPL-SCNC: 24.3 MMOL/L (ref 22–29)
CREAT BLD-MCNC: 0.64 MG/DL (ref 0.57–1)
DEPRECATED RDW RBC AUTO: 39.7 FL (ref 37–54)
EOSINOPHIL # BLD AUTO: 0.19 10*3/MM3 (ref 0–0.4)
EOSINOPHIL NFR BLD AUTO: 2.2 % (ref 0.3–6.2)
ERYTHROCYTE [DISTWIDTH] IN BLOOD BY AUTOMATED COUNT: 12.5 % (ref 12.3–15.4)
GFR SERPL CREATININE-BSD FRML MDRD: 99 ML/MIN/1.73
GLUCOSE BLD-MCNC: 106 MG/DL (ref 65–99)
HCT VFR BLD AUTO: 38.1 % (ref 34–46.6)
HGB BLD-MCNC: 12.4 G/DL (ref 12–15.9)
IMM GRANULOCYTES # BLD AUTO: 0.05 10*3/MM3 (ref 0–0.05)
IMM GRANULOCYTES NFR BLD AUTO: 0.6 % (ref 0–0.5)
LYMPHOCYTES # BLD AUTO: 2.18 10*3/MM3 (ref 0.7–3.1)
LYMPHOCYTES NFR BLD AUTO: 25.2 % (ref 19.6–45.3)
MCH RBC QN AUTO: 28.2 PG (ref 26.6–33)
MCHC RBC AUTO-ENTMCNC: 32.5 G/DL (ref 31.5–35.7)
MCV RBC AUTO: 86.6 FL (ref 79–97)
MONOCYTES # BLD AUTO: 0.47 10*3/MM3 (ref 0.1–0.9)
MONOCYTES NFR BLD AUTO: 5.4 % (ref 5–12)
NEUTROPHILS # BLD AUTO: 5.72 10*3/MM3 (ref 1.7–7)
NEUTROPHILS NFR BLD AUTO: 66.1 % (ref 42.7–76)
NRBC BLD AUTO-RTO: 0 /100 WBC (ref 0–0.2)
PLATELET # BLD AUTO: 303 10*3/MM3 (ref 140–450)
PMV BLD AUTO: 9.9 FL (ref 6–12)
POTASSIUM BLD-SCNC: 3.9 MMOL/L (ref 3.5–5.2)
RBC # BLD AUTO: 4.4 10*6/MM3 (ref 3.77–5.28)
SODIUM BLD-SCNC: 141 MMOL/L (ref 136–145)
WBC NRBC COR # BLD: 8.65 10*3/MM3 (ref 3.4–10.8)

## 2019-05-21 PROCEDURE — 85025 COMPLETE CBC W/AUTO DIFF WBC: CPT | Performed by: SURGERY

## 2019-05-21 PROCEDURE — 80048 BASIC METABOLIC PNL TOTAL CA: CPT | Performed by: SURGERY

## 2019-05-21 PROCEDURE — 36415 COLL VENOUS BLD VENIPUNCTURE: CPT

## 2019-05-21 RX ORDER — LANOLIN ALCOHOL/MO/W.PET/CERES
1000 CREAM (GRAM) TOPICAL DAILY
COMMUNITY
End: 2021-11-22

## 2019-05-21 NOTE — PROGRESS NOTES
Oncology Social [facility]      Chief Complaint: high distress, anxiety, trouble sleeping, constant worry    Subjective  Patient ID: Emilie Soto is a 48 y.o. female who presents to the Supportive Oncology Services (SOS) clinic for initial consultation at the request of Trena Greene MD to see for psychosocial support.  Patient is newly diagnosed right breast cancer.  She is scheduled to have a lumpectomy on 5/28/2019.  Patient arrived to SOS clinic with her wife, Trena.  They have been together for 19 years and  for the last 2.  Patient states that she feels nervous all the time, and on edge.  She has trouble relaxing, is restless and becomes easily annoyed and irritated.  Patient reports trouble falling asleep and staying asleep, poor appetite.  Patient works for [employer] and works year round.  She and her wife do not have kids.  Her wife is a  and will be off this summer to help with caregiving needs.  Patient also worried about their finances.  She expresses feeling on guard, watchful and easily gets started.     Social History  Marital Status:   Children: No  Support Community: Significant Other; brother, parents  Highest Level of Education: high school diploma/GED  Career: [employer]  Tobacco Use: The patient denies current or previous tobacco use.  Alcohol Use: occasional/rare use  Marijuana/ Other drug Use: denied.    Medical History  Psychiatric History: Outpatient;  Patient states that she has participated in outpatient therapy in the past. She has had many therapists.  She has seen a Psychiatrist in the past, but states that it was along time ago.  Her PCP usually prescribes and manages her meds,  She has tried, Zoloft, Wellbutrin and Seroquel in the past and all three of these did not help patient.  She has a prescription for Ativan but takes it PRN -  She worries that she could become addicted to it.     PHQ9 Total Score: 13  NAIMA 7 Total  Score: 21     Review of Systems    Objective   Mental Status Exam  Appearance:  disheveled, unkempt  Attitude toward clinician:  cooperative and agreeable   Speech:    Rate:  regular rate and rhythm   Volume:  normal  Motor:  no abnormal movements present  Mood:  Sad, anxious   Affect:  anxious; depressed  Thought Processes:  linear, logical, and goal directed  Thought Content:  normal  Suicidal Thoughts:  absent  Homicidal Thoughts:  absent  Perceptual Disturbance: no perceptual disturbance  Attention and Concentration:  fair  Insight and Judgement:  fair  Memory:  memory appears to be intact    Physical Exam  Station and gait observed to be WNL -  Patient is active and independent with ADL's.  She cares for self independently. Still drives and uses no DME at home.    Plan of Care  Patient with presents with moderate depression and severe anxiety; trouble adjusting to her new diagnosis.  She seeks assistance with her finances and is interested in supportive counseling to help decrease anxiety, build better coping strategies and decrease stress.  Patient desires to sleep better and is also interested in massage therapy and Reiki Therapy.  Reviewed different financial assistance options with patient.  Will compete goals at next session.  Informed patient and wife of [employer], [employer] and Cancer Care.  Patient does not having a living will -  OSW will encourage her and partner to attend ACP class.  Next Appt:  6/7/2019 at 12 noon    Thank you  Helena Stovall, MALENAW, CSW, OSW-C

## 2019-05-23 ENCOUNTER — OFFICE VISIT (OUTPATIENT)
Dept: INTERNAL MEDICINE | Facility: CLINIC | Age: 49
End: 2019-05-23

## 2019-05-23 ENCOUNTER — OFFICE VISIT (OUTPATIENT)
Dept: SURGERY | Facility: CLINIC | Age: 49
End: 2019-05-23

## 2019-05-23 VITALS
SYSTOLIC BLOOD PRESSURE: 138 MMHG | WEIGHT: 293 LBS | DIASTOLIC BLOOD PRESSURE: 78 MMHG | OXYGEN SATURATION: 99 % | BODY MASS INDEX: 47.09 KG/M2 | HEIGHT: 66 IN | HEART RATE: 83 BPM

## 2019-05-23 VITALS — OXYGEN SATURATION: 98 % | DIASTOLIC BLOOD PRESSURE: 88 MMHG | SYSTOLIC BLOOD PRESSURE: 152 MMHG | HEART RATE: 95 BPM

## 2019-05-23 DIAGNOSIS — I10 BENIGN ESSENTIAL HTN: Primary | ICD-10-CM

## 2019-05-23 DIAGNOSIS — Z15.89 MONOALLELIC MUTATION OF CHEK2 GENE IN FEMALE PATIENT: ICD-10-CM

## 2019-05-23 DIAGNOSIS — G47.00 INSOMNIA, UNSPECIFIED TYPE: ICD-10-CM

## 2019-05-23 DIAGNOSIS — C50.111 MALIGNANT NEOPLASM OF CENTRAL PORTION OF RIGHT BREAST IN FEMALE, ESTROGEN RECEPTOR POSITIVE (HCC): Primary | ICD-10-CM

## 2019-05-23 DIAGNOSIS — Z15.02 MONOALLELIC MUTATION OF CHEK2 GENE IN FEMALE PATIENT: ICD-10-CM

## 2019-05-23 DIAGNOSIS — Z80.3 FAMILY HISTORY OF BREAST CANCER: ICD-10-CM

## 2019-05-23 DIAGNOSIS — Z17.0 MALIGNANT NEOPLASM OF CENTRAL PORTION OF RIGHT BREAST IN FEMALE, ESTROGEN RECEPTOR POSITIVE (HCC): Primary | ICD-10-CM

## 2019-05-23 DIAGNOSIS — E55.9 VITAMIN D DEFICIENCY: ICD-10-CM

## 2019-05-23 DIAGNOSIS — Z15.01 MONOALLELIC MUTATION OF CHEK2 GENE IN FEMALE PATIENT: ICD-10-CM

## 2019-05-23 DIAGNOSIS — Z15.09 MONOALLELIC MUTATION OF CHEK2 GENE IN FEMALE PATIENT: ICD-10-CM

## 2019-05-23 DIAGNOSIS — F41.9 ANXIETY: ICD-10-CM

## 2019-05-23 PROCEDURE — 99214 OFFICE O/P EST MOD 30 MIN: CPT | Performed by: SURGERY

## 2019-05-23 PROCEDURE — 99214 OFFICE O/P EST MOD 30 MIN: CPT | Performed by: NURSE PRACTITIONER

## 2019-05-23 RX ORDER — ZOLPIDEM TARTRATE 10 MG/1
10 TABLET ORAL NIGHTLY PRN
Qty: 5 TABLET | Refills: 0 | Status: SHIPPED | OUTPATIENT
Start: 2019-05-23 | End: 2019-05-28

## 2019-05-23 NOTE — PROGRESS NOTES
[facility]     Referring Provider: Mel Cline MD     Chief complaint: Follow-up genetic testing results     HPI:   5/14/19:   Ms. Emilie Soto is a 49 yo woman, seen at the request of Dr. Mel Cline, for a new diagnosis of right breast cancer. This was initially detected as an imaging abnormality. Her work-up is detailed in the oncologic history below. She denies any breast lumps, pain, skin changes, or nipple discharge. She has a past history of a benign left breast stereotactic biopsy in 2014. 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 She has a family history of breast cancer in a paternal aunt and paternal cousin (unknown ages at diagnosis). She denies any family history of ovarian cancer.     5/23/19, Interval History:  She returns today to discuss the results of her genetic testing which showed a CHEK2 mutation. She denies any new complaints. She was joined today in clinic by her wife, Trena. She gave consent for her wife to be present during her examination and participate in the discussion.        Oncology/Hematology History    1/7/14, Screening MMG ([facility]):   There is a stable small focal asymmetry seen in the left breast at 12:00-1:00. In the right breast, no masses, significant calcifications or other abnormalities are seen.   BI-RADS 0: Incomplete.    2/4/14, Left Diagnostic MMG & Left US ([facility]):   MMG:  On the present examination, there is an isodense focal asymmetry measuring 15 mm with indistinct margins in the left breast at 12:00-1:00 located 10 cm from the nipple. Focal asymmetric density has become more defined.   US:  There is no evidence of any solid mass or abnormal cystic elements. Stereotactic biopsy is recommended.   BI-RADS 4A: Suspicious.    2/11/14, Left Breast, 12-1:00, Stereotactic Biopsy ([facility]):   Fibroadenomatoid type stromal changes, mild columnar cell hyperplasia, mild fibrocystic changes. Negative for atypia or  malignancy.   -Concordant.  Recommend 6 month follow-up mammogram and ultrasound.        Malignant neoplasm of central portion of right breast in female, estrogen receptor positive (CMS/HCC)    4/17/2019 Initial Diagnosis     Malignant neoplasm of central portion of right breast in female, estrogen receptor positive (CMS/HCC)         4/18/2019 Imaging     Screening MMG ([facility]):   There are scattered areas of fibroglandular density. There is a small, oval mass measuring 10 mm with indistinct margins seen in the sub-areolar region of the right breast. Note is made of a biopsy clip in the left breast as evidence of a previous surgical procedure. In the left breast, no suspicious masses, significant calcifications or other abnormalities are seen.   BI-RADS 0: Incomplete.           4/29/2019 Imaging     Right Diagnostic MMG with Clarence & Right US ([facility]):   MMG:  On the present examination, there is a new high density, round mass measuring 10 mm with indistinct margins in the anterior one-third sub-areolar region of the right breast located 2 cm from the nipple.   US:   1. Ultrasound is suggestive of a round solid mass with indistinct margins measuring 9 mm. Internal echotexture is homogeneous and hypoechoic relative adipose tissue.   2. There is an abnormal axillary lymph node in the right axilla. There are 2 adjacent lymph nodes in the right axilla with mildly prominent cortices. The cortex has been measured at 3.1 mm, although no significant focal thickening is seen. The left axilla was evaluated for comparison, and the nodes on the right are of greater cortical thickness.   BI-RADS 4C: Suspicious.         5/6/2019 Biopsy     Right breast & Right axillary lymph node, US-guided biopsy ([facility]):    1. Right Subareolar, core biopsy:    Invasive High Grade Mammary Carcinoma (Invasive Ductal Carcinoma, Grade III). Tubule Score 3, Nuclear Grade Score 3, and Mitotic Score 2 for A Total Score of 8. Neoplasm is Present in Four  of Eight Biopsies and Measures 4 mm in Greatest Dimension.    ER+ (82.35%, moderate)  MN+ (92.68%, strong)  Her2 negative (IHC 1+)  Ki-67 14.81%    2. Right axilla, core biopsy:  Benign Lymph Node, Negative for Metastatic Carcinoma.         5/9/2019 Imaging     Bilateral Breast MRI ([facility]):  Within the retroareolar region of the right breast located slightly medial to the plane of the nipple at the 2:30 position on the order of 1.8 cm from the nipple there is an irregular enhancing mass that measures 1.0 cm in the superior-inferior dimension, 1.0 cm in anterior to posterior dimension and 1.0 cm in the medial to lateral dimension. A metallic clip is seen within the mass. This represents the biopsy-proven site malignancy.     No other areas of abnormal enhancement or morphology are seen within the right breast. I see no evidence for abnormal right breast skin, nipple or chest wall enhancement and there is no evidence for right axillary adenopathy.     There are no areas of abnormal enhancement or morphology in the left breast. I see no evidence for abnormal left breast skin, nipple or chest wall enhancement and there is no evidence for left axillary adenopathy.  BI-RADS 6: Known malignancy.         5/14/2019 Genetic Testing     [facility] Breast Cancer STAT Panel (9 genes):    CHEK2 mutation            Review of Systems:  See interval history.       Medications:    Current Outpatient Medications:   •  ibuprofen (ADVIL,MOTRIN) 800 MG tablet, Take 1 tablet by mouth Every 6 (Six) Hours As Needed for mild pain (1-3) or moderate pain (4-6). (Patient taking differently: Take 800 mg by mouth Every 6 (Six) Hours As Needed for Mild Pain  or Moderate Pain  (ON HOLD FOR SX).), Disp: 30 tablet, Rfl: 0  •  lisinopril-hydrochlorothiazide (PRINZIDE,ZESTORETIC) 20-25 MG per tablet, TAKE 1 TABLET BY MOUTH DAILY, Disp: 30 tablet, Rfl: 5  •  metroNIDAZOLE (METROGEL) 0.75 % gel, Apply  topically to the appropriate area as directed Every 12  (Twelve) Hours. (Patient taking differently: Apply 1 application topically to the appropriate area as directed As Needed.), Disp: 45 g, Rfl: 0  •  terbinafine (lamiSIL) 250 MG tablet, Take 1 tablet by mouth Daily. (Patient taking differently: Take 250 mg by mouth Daily. PT HAS NOT STARTED THIS MED), Disp: 30 tablet, Rfl: 2  •  vitamin B-12 (CYANOCOBALAMIN) 1000 MCG tablet, Take 1,000 mcg by mouth Daily., Disp: , Rfl:   •  vitamin D (ERGOCALCIFEROL) 92008 units capsule capsule, Take 1 capsule by mouth 1 (One) Time Per Week. (Patient taking differently: Take 50,000 Units by mouth 1 (One) Time Per Week. THURSDAY), Disp: 4 capsule, Rfl: 1  •  zolpidem (AMBIEN) 10 MG tablet, Take 1 tablet by mouth At Night As Needed for Sleep for up to 5 days., Disp: 5 tablet, Rfl: 0      Allergies   Allergen Reactions   • Citalopram Rash   • Flexeril [Cyclobenzaprine] Other (See Comments)     MUSCLES TENSE-OPPOSITE OF WHAT IT IS INTENDED TO DO-ELEVATES B/P   • Doxycycline Rash     Blisters on hands   • Keflex [Cephalexin] Rash   • Sulfa Antibiotics Rash     NAUSEA/VOMITING/FEVER       Family History   Problem Relation Age of Onset   • Hypertension Mother    • Arthritis Mother    • Kidney cancer Mother    • Heart disease Father    • Hernia Father    • Hypertension Father    • No Known Problems Brother    • Breast cancer Paternal Aunt    • Breast cancer Paternal Cousin    • Malig Hyperthermia Neg Hx        PHYSICAL EXAMINATION:   Vitals:    05/23/19 1537   BP: 152/88   Pulse: 95   SpO2: 98%     ECOG 0 - Asymptomatic  General: NAD, well appearing  Psych: a&o x 3, normal mood and affect  Remainder of exam deferred      Assessment:  48 y.o. F with a new diagnosis of right breast cancer: high grade, invasive ductal carcinoma, ER/KS+, Her2 negative; clinical T1bN0, anatomic stage IA, prognostic stage IA. She has a pathogenic CHEK2 mutation.    Discussion:  We discussed her genetic testing results. I explained that the CHEK2 mutation is  associated with a 25-39% lifetime risk of breast cancer, and that I would suspect her risk is toward the higher end of the range. She would prefer breast conservation for the management of her right breast cancer and the current plan is for a right partial mastectomy. I explained that she is a candidate for a bilateral risk-reduction mastectomy. She understands that this would not be for the treatment of her right breast cancer and will not improve her prognosis long term. This would be to reduce her risk of a second, primary breast cancer. She still would prefer breast conservation and understands that she will remain at high risk for a second primary breast cancer in her lifetime. I will follow her closely with annual MMG & MRI.    I explained that a CHEK2 mutation is also reportedly associated with an increased risk of male breast, colorectal, prostate, and thyroid cancers, but lifetime risks are currently unknown. I will refer her to the genetics clinic for further management of her CHEK2 mutation and to coordinate testing for her immediate family members.     We again reviewed the plan for surgery next week and she did not have any additional questions.     Plan:  -Genetics clinic referral.  -Right central partial mastectomy (with removal of the nipple-areola-complex) and sentinel lymph node biopsy  -Start high risk screening after she completes treatment for the right breast cancer.  -Will refer to medical oncology and radiation oncology postoperatively.    Trena Greene MD    I have spent 30 min in face to face time with the patient and 30 min of this time was spent in counseling on the above stated issues.     CC:  MD Pinky Neal APRN

## 2019-05-24 NOTE — PROGRESS NOTES
Jaspal Soto is a 48 y.o. female who presents for f/u regarding HTN and new diagnosis of breast cancer.    She is scheduled for a right central partial mastectomy and sentinel lymph node biopsy next week for the new diagnosis of breast cancer. She reports increased anxiety and difficulty sleeping due to the diagnosis, is also experiencing many changes at work. She also reports difficulty eating.  She has tried several medications for anxiety which she did not tolerate (Sertraline, Wellbutrin, Citalopram and Effexor XR).  In spite of her anxiety her BP has been well-controlled on current meds.      Hypertension   This is a chronic problem. The current episode started more than 1 year ago. The problem is unchanged. The problem is controlled. Pertinent negatives include no chest pain, headaches or palpitations. Current antihypertension treatment includes ACE inhibitors and diuretics. The current treatment provides significant improvement. There are no compliance problems.         The following portions of the patient's history were reviewed and updated as appropriate: allergies, current medications, past social history and problem list.    Past Medical History:   Diagnosis Date   • Anxiety    • Anxiety and depression    • Cancer (CMS/HCC)     BREAST   • Cancer (CMS/[facility]) 1993    PAROTID GLAND   • History of kidney stones    • Hyperlipidemia    • Hypertension    • Lumbago    • Mood disorder (CMS/[facility])    • PONV (postoperative nausea and vomiting)    • 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   • Sleep apnea     C-PAP IN USE   • Vitamin D deficiency          Current Outpatient Medications:   •  ibuprofen (ADVIL,MOTRIN) 800 MG tablet, Take 1 tablet by mouth Every 6 (Six) Hours As Needed for mild pain (1-3) or moderate pain (4-6). (Patient taking differently: Take 800 mg by mouth Every 6 (Six) Hours As Needed for Mild Pain  or Moderate Pain  (ON HOLD FOR SX).), Disp: 30 tablet, Rfl: 0  •   lisinopril-hydrochlorothiazide (PRINZIDE,ZESTORETIC) 20-25 MG per tablet, TAKE 1 TABLET BY MOUTH DAILY, Disp: 30 tablet, Rfl: 5  •  metroNIDAZOLE (METROGEL) 0.75 % gel, Apply  topically to the appropriate area as directed Every 12 (Twelve) Hours. (Patient taking differently: Apply 1 application topically to the appropriate area as directed As Needed.), Disp: 45 g, Rfl: 0  •  terbinafine (lamiSIL) 250 MG tablet, Take 1 tablet by mouth Daily. (Patient taking differently: Take 250 mg by mouth Daily. PT HAS NOT STARTED THIS MED), Disp: 30 tablet, Rfl: 2  •  vitamin B-12 (CYANOCOBALAMIN) 1000 MCG tablet, Take 1,000 mcg by mouth Daily., Disp: , Rfl:   •  vitamin D (ERGOCALCIFEROL) 16697 units capsule capsule, Take 1 capsule by mouth 1 (One) Time Per Week. (Patient taking differently: Take 50,000 Units by mouth 1 (One) Time Per Week. THURSDAY), Disp: 4 capsule, Rfl: 1  •  zolpidem (AMBIEN) 10 MG tablet, Take 1 tablet by mouth At Night As Needed for Sleep for up to 5 days., Disp: 5 tablet, Rfl: 0    Allergies   Allergen Reactions   • Citalopram Rash   • Flexeril [Cyclobenzaprine] Other (See Comments)     MUSCLES TENSE-OPPOSITE OF WHAT IT IS INTENDED TO DO-ELEVATES B/P   • Doxycycline Rash     Blisters on hands   • Keflex [Cephalexin] Rash   • Sulfa Antibiotics Rash     NAUSEA/VOMITING/FEVER       Review of Systems   Constitutional: Positive for fatigue. Negative for chills, fever and unexpected weight change.   HENT: Negative for congestion, ear pain, postnasal drip, sinus pressure, sore throat and trouble swallowing.    Eyes: Negative for visual disturbance.   Respiratory: Negative for cough, chest tightness and wheezing.    Cardiovascular: Negative for chest pain, palpitations and leg swelling.   Gastrointestinal: Negative for abdominal pain, blood in stool, nausea and vomiting.   Genitourinary: Negative for dysuria, frequency and urgency.   Musculoskeletal: Negative for arthralgias and joint swelling.   Skin: Negative  "for color change.   Neurological: Negative for syncope, weakness and headaches.   Hematological: Does not bruise/bleed easily.   Psychiatric/Behavioral: Positive for decreased concentration, dysphoric mood and sleep disturbance. Negative for self-injury and suicidal ideas. The patient is nervous/anxious.        Objective   Vitals:    05/23/19 0931   BP: 138/78   BP Location: Left arm   Patient Position: Sitting   Cuff Size: Adult   Pulse: 83   SpO2: 99%   Weight: (!) 139 kg (307 lb)   Height: 167.6 cm (66\")     Physical Exam   Constitutional: She appears well-developed and well-nourished. She is cooperative. She does not have a sickly appearance. She does not appear ill.   HENT:   Head: Normocephalic.   Right Ear: Hearing, tympanic membrane and external ear normal.   Left Ear: Hearing, tympanic membrane and external ear normal.   Nose: Nose normal. No mucosal edema, rhinorrhea, sinus tenderness or nasal deformity. Right sinus exhibits no maxillary sinus tenderness and no frontal sinus tenderness. Left sinus exhibits no maxillary sinus tenderness and no frontal sinus tenderness.   Mouth/Throat: Oropharynx is clear and moist and mucous membranes are normal. Normal dentition.   Eyes: Conjunctivae and lids are normal. Right eye exhibits no discharge and no exudate. Left eye exhibits no discharge and no exudate.   Neck: Trachea normal. Carotid bruit is not present. No edema present. No thyroid mass present.   Cardiovascular: Regular rhythm, normal heart sounds and normal pulses.   No murmur heard.  Pulmonary/Chest: Breath sounds normal. No respiratory distress. She has no decreased breath sounds. She has no wheezes. She has no rhonchi. She has no rales.   Lymphadenopathy:        Head (right side): No submental, no submandibular, no tonsillar, no preauricular, no posterior auricular and no occipital adenopathy present.        Head (left side): No submental, no submandibular, no tonsillar, no preauricular, no posterior " auricular and no occipital adenopathy present.   Neurological: She is alert.   Skin: Skin is warm, dry and intact. No cyanosis. Nails show no clubbing.       Assessment/Plan   Emilie was seen today for hypertension.    Diagnoses and all orders for this visit:    Benign essential HTN  Comments:  stable on current meds    Anxiety    Insomnia, unspecified type  -     zolpidem (AMBIEN) 10 MG tablet; Take 1 tablet by mouth At Night As Needed for Sleep for up to 5 days.    Vitamin D deficiency  Comments:  start Vitamin D 2,000 units daily    She has completed Vitamin D 50,000 units weekly x3 months, will start OTC Vitamin D 2000 units daily.   Discussed anxiety at length with patient which is compounded with not sleeping for the past couple of weeks-will give Ambien short-term only prior to surgery. Unfortunately she has not tolerated several different antidepressants, she has Ativan which she has taken very rarely (has taken 2 in past week).    HILLARY query complete. Treatment plan to include limited course of prescribed  controlled substance. Risks including addiction, benefits, and alternatives presented to patient.

## 2019-05-27 PROBLEM — Z15.89 MONOALLELIC MUTATION OF CHEK2 GENE IN FEMALE PATIENT: Status: ACTIVE | Noted: 2019-05-27

## 2019-05-27 PROBLEM — Z15.02 MONOALLELIC MUTATION OF CHEK2 GENE IN FEMALE PATIENT: Status: ACTIVE | Noted: 2019-05-27

## 2019-05-27 PROBLEM — Z15.01 MONOALLELIC MUTATION OF CHEK2 GENE IN FEMALE PATIENT: Status: ACTIVE | Noted: 2019-05-27

## 2019-05-27 PROBLEM — Z15.09 MONOALLELIC MUTATION OF CHEK2 GENE IN FEMALE PATIENT: Status: ACTIVE | Noted: 2019-05-27

## 2019-05-28 ENCOUNTER — ANESTHESIA EVENT (OUTPATIENT)
Dept: PERIOP | Facility: HOSPITAL | Age: 49
End: 2019-05-28

## 2019-05-28 ENCOUNTER — ANESTHESIA (OUTPATIENT)
Dept: PERIOP | Facility: HOSPITAL | Age: 49
End: 2019-05-28

## 2019-05-28 ENCOUNTER — HOSPITAL ENCOUNTER (OUTPATIENT)
Dept: NUCLEAR MEDICINE | Facility: HOSPITAL | Age: 49
Discharge: HOME | End: 2019-05-28

## 2019-05-28 ENCOUNTER — APPOINTMENT (OUTPATIENT)
Dept: GENERAL RADIOLOGY | Facility: HOSPITAL | Age: 49
End: 2019-05-28

## 2019-05-28 ENCOUNTER — HOSPITAL ENCOUNTER (OUTPATIENT)
Facility: HOSPITAL | Age: 49
Setting detail: HOSPITAL OUTPATIENT SURGERY
Discharge: HOME | End: 2019-05-28
Attending: SURGERY | Admitting: SURGERY

## 2019-05-28 VITALS
RESPIRATION RATE: 16 BRPM | WEIGHT: 293 LBS | DIASTOLIC BLOOD PRESSURE: 80 MMHG | SYSTOLIC BLOOD PRESSURE: 142 MMHG | OXYGEN SATURATION: 94 % | TEMPERATURE: 98.6 F | BODY MASS INDEX: 47.09 KG/M2 | HEIGHT: 66 IN | HEART RATE: 87 BPM

## 2019-05-28 DIAGNOSIS — Z17.0 MALIGNANT NEOPLASM OF CENTRAL PORTION OF RIGHT BREAST IN FEMALE, ESTROGEN RECEPTOR POSITIVE (HCC): ICD-10-CM

## 2019-05-28 DIAGNOSIS — C50.111 MALIGNANT NEOPLASM OF CENTRAL PORTION OF RIGHT BREAST IN FEMALE, ESTROGEN RECEPTOR POSITIVE (HCC): ICD-10-CM

## 2019-05-28 LAB
B-HCG UR QL: NEGATIVE
INTERNAL NEGATIVE CONTROL: NEGATIVE
INTERNAL POSITIVE CONTROL: POSITIVE
Lab: NORMAL

## 2019-05-28 PROCEDURE — 88341 IMHCHEM/IMCYTCHM EA ADD ANTB: CPT | Performed by: SURGERY

## 2019-05-28 PROCEDURE — 25010000002 PROPOFOL 10 MG/ML EMULSION: Performed by: NURSE ANESTHETIST, CERTIFIED REGISTERED

## 2019-05-28 PROCEDURE — 25010000002 NEOSTIGMINE PER 0.5 MG: Performed by: NURSE ANESTHETIST, CERTIFIED REGISTERED

## 2019-05-28 PROCEDURE — 25010000002 FENTANYL CITRATE (PF) 100 MCG/2ML SOLUTION: Performed by: NURSE ANESTHETIST, CERTIFIED REGISTERED

## 2019-05-28 PROCEDURE — 25010000002 SUCCINYLCHOLINE PER 20 MG: Performed by: NURSE ANESTHETIST, CERTIFIED REGISTERED

## 2019-05-28 PROCEDURE — 25010000002 ONDANSETRON PER 1 MG: Performed by: ANESTHESIOLOGY

## 2019-05-28 PROCEDURE — 81025 URINE PREGNANCY TEST: CPT | Performed by: SURGERY

## 2019-05-28 PROCEDURE — 38900 IO MAP OF SENT LYMPH NODE: CPT | Performed by: SURGERY

## 2019-05-28 PROCEDURE — A9541 TC99M SULFUR COLLOID: HCPCS | Performed by: SURGERY

## 2019-05-28 PROCEDURE — 88342 IMHCHEM/IMCYTCHM 1ST ANTB: CPT | Performed by: SURGERY

## 2019-05-28 PROCEDURE — 88307 TISSUE EXAM BY PATHOLOGIST: CPT | Performed by: SURGERY

## 2019-05-28 PROCEDURE — 25010000002 ONDANSETRON PER 1 MG: Performed by: NURSE ANESTHETIST, CERTIFIED REGISTERED

## 2019-05-28 PROCEDURE — 38792 RA TRACER ID OF SENTINL NODE: CPT

## 2019-05-28 PROCEDURE — 38525 BIOPSY/REMOVAL LYMPH NODES: CPT | Performed by: SURGERY

## 2019-05-28 PROCEDURE — 88305 TISSUE EXAM BY PATHOLOGIST: CPT | Performed by: SURGERY

## 2019-05-28 PROCEDURE — 25010000002 HYDROMORPHONE PER 4 MG: Performed by: NURSE ANESTHETIST, CERTIFIED REGISTERED

## 2019-05-28 PROCEDURE — 78195 LYMPH SYSTEM IMAGING: CPT | Performed by: SURGERY

## 2019-05-28 PROCEDURE — 88360 TUMOR IMMUNOHISTOCHEM/MANUAL: CPT | Performed by: SURGERY

## 2019-05-28 PROCEDURE — 76098 X-RAY EXAM SURGICAL SPECIMEN: CPT

## 2019-05-28 PROCEDURE — 25010000002 HEPARIN (PORCINE) PER 1000 UNITS: Performed by: SURGERY

## 2019-05-28 PROCEDURE — 0 TECHNETIUM FILTERED SULFUR COLLOID: Performed by: SURGERY

## 2019-05-28 PROCEDURE — 19301 PARTIAL MASTECTOMY: CPT | Performed by: SURGERY

## 2019-05-28 RX ORDER — ACETAMINOPHEN 500 MG
1000 TABLET ORAL EVERY 8 HOURS
Qty: 30 TABLET | Refills: 0 | Status: SHIPPED | OUTPATIENT
Start: 2019-05-28 | End: 2019-06-02

## 2019-05-28 RX ORDER — FAMOTIDINE 10 MG/ML
20 INJECTION, SOLUTION INTRAVENOUS ONCE
Status: COMPLETED | OUTPATIENT
Start: 2019-05-28 | End: 2019-05-28

## 2019-05-28 RX ORDER — SCOLOPAMINE TRANSDERMAL SYSTEM 1 MG/1
1 PATCH, EXTENDED RELEASE TRANSDERMAL ONCE
Status: DISCONTINUED | OUTPATIENT
Start: 2019-05-28 | End: 2019-05-28 | Stop reason: HOSPADM

## 2019-05-28 RX ORDER — FENTANYL CITRATE 50 UG/ML
50 INJECTION, SOLUTION INTRAMUSCULAR; INTRAVENOUS
Status: DISCONTINUED | OUTPATIENT
Start: 2019-05-28 | End: 2019-05-28 | Stop reason: HOSPADM

## 2019-05-28 RX ORDER — HYDRALAZINE HYDROCHLORIDE 20 MG/ML
5 INJECTION INTRAMUSCULAR; INTRAVENOUS
Status: DISCONTINUED | OUTPATIENT
Start: 2019-05-28 | End: 2019-05-28 | Stop reason: HOSPADM

## 2019-05-28 RX ORDER — NALOXONE HCL 0.4 MG/ML
0.2 VIAL (ML) INJECTION AS NEEDED
Status: DISCONTINUED | OUTPATIENT
Start: 2019-05-28 | End: 2019-05-28 | Stop reason: HOSPADM

## 2019-05-28 RX ORDER — MIDAZOLAM HYDROCHLORIDE 1 MG/ML
1 INJECTION INTRAMUSCULAR; INTRAVENOUS
Status: DISCONTINUED | OUTPATIENT
Start: 2019-05-28 | End: 2019-05-28 | Stop reason: HOSPADM

## 2019-05-28 RX ORDER — SODIUM CHLORIDE, SODIUM LACTATE, POTASSIUM CHLORIDE, CALCIUM CHLORIDE 600; 310; 30; 20 MG/100ML; MG/100ML; MG/100ML; MG/100ML
9 INJECTION, SOLUTION INTRAVENOUS CONTINUOUS
Status: DISCONTINUED | OUTPATIENT
Start: 2019-05-28 | End: 2019-05-28 | Stop reason: HOSPADM

## 2019-05-28 RX ORDER — HYDROCODONE BITARTRATE AND ACETAMINOPHEN 7.5; 325 MG/1; MG/1
1 TABLET ORAL ONCE AS NEEDED
Status: DISCONTINUED | OUTPATIENT
Start: 2019-05-28 | End: 2019-05-28 | Stop reason: HOSPADM

## 2019-05-28 RX ORDER — HYDROMORPHONE HYDROCHLORIDE 1 MG/ML
0.5 INJECTION, SOLUTION INTRAMUSCULAR; INTRAVENOUS; SUBCUTANEOUS
Status: DISCONTINUED | OUTPATIENT
Start: 2019-05-28 | End: 2019-05-28 | Stop reason: HOSPADM

## 2019-05-28 RX ORDER — ACETAMINOPHEN 325 MG/1
650 TABLET ORAL ONCE AS NEEDED
Status: DISCONTINUED | OUTPATIENT
Start: 2019-05-28 | End: 2019-05-28 | Stop reason: HOSPADM

## 2019-05-28 RX ORDER — CLINDAMYCIN PHOSPHATE 900 MG/50ML
900 INJECTION INTRAVENOUS ONCE
Status: COMPLETED | OUTPATIENT
Start: 2019-05-28 | End: 2019-05-28

## 2019-05-28 RX ORDER — MAGNESIUM HYDROXIDE 1200 MG/15ML
LIQUID ORAL AS NEEDED
Status: DISCONTINUED | OUTPATIENT
Start: 2019-05-28 | End: 2019-05-28 | Stop reason: HOSPADM

## 2019-05-28 RX ORDER — MIDAZOLAM HYDROCHLORIDE 1 MG/ML
2 INJECTION INTRAMUSCULAR; INTRAVENOUS
Status: DISCONTINUED | OUTPATIENT
Start: 2019-05-28 | End: 2019-05-28 | Stop reason: HOSPADM

## 2019-05-28 RX ORDER — GLYCOPYRROLATE 0.2 MG/ML
INJECTION INTRAMUSCULAR; INTRAVENOUS AS NEEDED
Status: DISCONTINUED | OUTPATIENT
Start: 2019-05-28 | End: 2019-05-28 | Stop reason: SURG

## 2019-05-28 RX ORDER — ONDANSETRON 2 MG/ML
INJECTION INTRAMUSCULAR; INTRAVENOUS AS NEEDED
Status: DISCONTINUED | OUTPATIENT
Start: 2019-05-28 | End: 2019-05-28 | Stop reason: SURG

## 2019-05-28 RX ORDER — SODIUM CHLORIDE 0.9 % (FLUSH) 0.9 %
1-10 SYRINGE (ML) INJECTION AS NEEDED
Status: DISCONTINUED | OUTPATIENT
Start: 2019-05-28 | End: 2019-05-28 | Stop reason: HOSPADM

## 2019-05-28 RX ORDER — OXYCODONE HYDROCHLORIDE 5 MG/1
5-10 TABLET ORAL EVERY 4 HOURS PRN
Qty: 25 TABLET | Refills: 0 | Status: SHIPPED | OUTPATIENT
Start: 2019-05-28 | End: 2019-06-14

## 2019-05-28 RX ORDER — OXYCODONE AND ACETAMINOPHEN 7.5; 325 MG/1; MG/1
1 TABLET ORAL ONCE AS NEEDED
Status: COMPLETED | OUTPATIENT
Start: 2019-05-28 | End: 2019-05-28

## 2019-05-28 RX ORDER — LIDOCAINE HYDROCHLORIDE 20 MG/ML
INJECTION, SOLUTION INFILTRATION; PERINEURAL AS NEEDED
Status: DISCONTINUED | OUTPATIENT
Start: 2019-05-28 | End: 2019-05-28 | Stop reason: SURG

## 2019-05-28 RX ORDER — SUCCINYLCHOLINE CHLORIDE 20 MG/ML
INJECTION INTRAMUSCULAR; INTRAVENOUS AS NEEDED
Status: DISCONTINUED | OUTPATIENT
Start: 2019-05-28 | End: 2019-05-28 | Stop reason: SURG

## 2019-05-28 RX ORDER — ONDANSETRON 2 MG/ML
4 INJECTION INTRAMUSCULAR; INTRAVENOUS ONCE AS NEEDED
Status: COMPLETED | OUTPATIENT
Start: 2019-05-28 | End: 2019-05-28

## 2019-05-28 RX ORDER — ROCURONIUM BROMIDE 10 MG/ML
INJECTION, SOLUTION INTRAVENOUS AS NEEDED
Status: DISCONTINUED | OUTPATIENT
Start: 2019-05-28 | End: 2019-05-28 | Stop reason: SURG

## 2019-05-28 RX ORDER — PROMETHAZINE HYDROCHLORIDE 25 MG/ML
6.25 INJECTION, SOLUTION INTRAMUSCULAR; INTRAVENOUS
Status: DISCONTINUED | OUTPATIENT
Start: 2019-05-28 | End: 2019-05-28 | Stop reason: HOSPADM

## 2019-05-28 RX ORDER — ISOSULFAN BLUE 50 MG/5ML
INJECTION, SOLUTION SUBCUTANEOUS AS NEEDED
Status: DISCONTINUED | OUTPATIENT
Start: 2019-05-28 | End: 2019-05-28 | Stop reason: HOSPADM

## 2019-05-28 RX ORDER — DIAZEPAM 5 MG/1
10 TABLET ORAL ONCE
Status: COMPLETED | OUTPATIENT
Start: 2019-05-28 | End: 2019-05-28

## 2019-05-28 RX ORDER — FLUMAZENIL 0.1 MG/ML
0.2 INJECTION INTRAVENOUS AS NEEDED
Status: DISCONTINUED | OUTPATIENT
Start: 2019-05-28 | End: 2019-05-28 | Stop reason: HOSPADM

## 2019-05-28 RX ORDER — SODIUM CHLORIDE, SODIUM LACTATE, POTASSIUM CHLORIDE, CALCIUM CHLORIDE 600; 310; 30; 20 MG/100ML; MG/100ML; MG/100ML; MG/100ML
INJECTION, SOLUTION INTRAVENOUS CONTINUOUS PRN
Status: DISCONTINUED | OUTPATIENT
Start: 2019-05-28 | End: 2019-05-28 | Stop reason: SURG

## 2019-05-28 RX ORDER — LIDOCAINE AND PRILOCAINE 25; 25 MG/G; MG/G
1 CREAM TOPICAL TAKE AS DIRECTED
COMMUNITY
End: 2019-05-28 | Stop reason: HOSPADM

## 2019-05-28 RX ORDER — EPHEDRINE SULFATE 50 MG/ML
INJECTION, SOLUTION INTRAVENOUS AS NEEDED
Status: DISCONTINUED | OUTPATIENT
Start: 2019-05-28 | End: 2019-05-28 | Stop reason: SURG

## 2019-05-28 RX ORDER — LIDOCAINE HYDROCHLORIDE 10 MG/ML
0.5 INJECTION, SOLUTION EPIDURAL; INFILTRATION; INTRACAUDAL; PERINEURAL ONCE AS NEEDED
Status: DISCONTINUED | OUTPATIENT
Start: 2019-05-28 | End: 2019-05-28 | Stop reason: HOSPADM

## 2019-05-28 RX ORDER — LABETALOL HYDROCHLORIDE 5 MG/ML
5 INJECTION, SOLUTION INTRAVENOUS
Status: DISCONTINUED | OUTPATIENT
Start: 2019-05-28 | End: 2019-05-28 | Stop reason: HOSPADM

## 2019-05-28 RX ORDER — PROMETHAZINE HYDROCHLORIDE 25 MG/1
25 SUPPOSITORY RECTAL ONCE AS NEEDED
Status: DISCONTINUED | OUTPATIENT
Start: 2019-05-28 | End: 2019-05-28 | Stop reason: HOSPADM

## 2019-05-28 RX ORDER — HYDROMORPHONE HCL 110MG/55ML
PATIENT CONTROLLED ANALGESIA SYRINGE INTRAVENOUS AS NEEDED
Status: DISCONTINUED | OUTPATIENT
Start: 2019-05-28 | End: 2019-05-28 | Stop reason: SURG

## 2019-05-28 RX ORDER — PROPOFOL 10 MG/ML
VIAL (ML) INTRAVENOUS AS NEEDED
Status: DISCONTINUED | OUTPATIENT
Start: 2019-05-28 | End: 2019-05-28 | Stop reason: SURG

## 2019-05-28 RX ORDER — MELATONIN
2000 DAILY
COMMUNITY
End: 2020-11-02

## 2019-05-28 RX ORDER — PROMETHAZINE HYDROCHLORIDE 25 MG/ML
12.5 INJECTION, SOLUTION INTRAMUSCULAR; INTRAVENOUS ONCE AS NEEDED
Status: DISCONTINUED | OUTPATIENT
Start: 2019-05-28 | End: 2019-05-28 | Stop reason: HOSPADM

## 2019-05-28 RX ORDER — DIPHENHYDRAMINE HYDROCHLORIDE 50 MG/ML
12.5 INJECTION INTRAMUSCULAR; INTRAVENOUS
Status: DISCONTINUED | OUTPATIENT
Start: 2019-05-28 | End: 2019-05-28 | Stop reason: HOSPADM

## 2019-05-28 RX ORDER — HEPARIN SODIUM 5000 [USP'U]/ML
5000 INJECTION, SOLUTION INTRAVENOUS; SUBCUTANEOUS
Status: COMPLETED | OUTPATIENT
Start: 2019-05-28 | End: 2019-05-28

## 2019-05-28 RX ORDER — FENTANYL CITRATE 50 UG/ML
INJECTION, SOLUTION INTRAMUSCULAR; INTRAVENOUS AS NEEDED
Status: DISCONTINUED | OUTPATIENT
Start: 2019-05-28 | End: 2019-05-28 | Stop reason: SURG

## 2019-05-28 RX ORDER — PROMETHAZINE HYDROCHLORIDE 25 MG/1
25 TABLET ORAL ONCE AS NEEDED
Status: DISCONTINUED | OUTPATIENT
Start: 2019-05-28 | End: 2019-05-28 | Stop reason: HOSPADM

## 2019-05-28 RX ORDER — DIPHENHYDRAMINE HCL 25 MG
25 CAPSULE ORAL
Status: DISCONTINUED | OUTPATIENT
Start: 2019-05-28 | End: 2019-05-28 | Stop reason: HOSPADM

## 2019-05-28 RX ORDER — EPHEDRINE SULFATE 50 MG/ML
5 INJECTION, SOLUTION INTRAVENOUS ONCE AS NEEDED
Status: DISCONTINUED | OUTPATIENT
Start: 2019-05-28 | End: 2019-05-28 | Stop reason: HOSPADM

## 2019-05-28 RX ADMIN — NEOSTIGMINE METHYLSULFATE 3 MG: 1 INJECTION INTRAMUSCULAR; INTRAVENOUS; SUBCUTANEOUS at 10:21

## 2019-05-28 RX ADMIN — ONDANSETRON 4 MG: 2 INJECTION INTRAMUSCULAR; INTRAVENOUS at 11:41

## 2019-05-28 RX ADMIN — PROPOFOL 200 MG: 10 INJECTION, EMULSION INTRAVENOUS at 08:05

## 2019-05-28 RX ADMIN — FENTANYL CITRATE 100 MCG: 50 INJECTION, SOLUTION INTRAMUSCULAR; INTRAVENOUS at 08:05

## 2019-05-28 RX ADMIN — SUCCINYLCHOLINE CHLORIDE 160 MG: 20 INJECTION, SOLUTION INTRAMUSCULAR; INTRAVENOUS; PARENTERAL at 08:05

## 2019-05-28 RX ADMIN — HEPARIN SODIUM 5000 UNITS: 5000 INJECTION INTRAVENOUS; SUBCUTANEOUS at 07:47

## 2019-05-28 RX ADMIN — FENTANYL CITRATE 100 MCG: 50 INJECTION, SOLUTION INTRAMUSCULAR; INTRAVENOUS at 08:35

## 2019-05-28 RX ADMIN — ROCURONIUM BROMIDE 50 MG: 10 INJECTION INTRAVENOUS at 08:15

## 2019-05-28 RX ADMIN — SODIUM CHLORIDE, POTASSIUM CHLORIDE, SODIUM LACTATE AND CALCIUM CHLORIDE: 600; 310; 30; 20 INJECTION, SOLUTION INTRAVENOUS at 07:54

## 2019-05-28 RX ADMIN — FENTANYL CITRATE 50 MCG: 50 INJECTION, SOLUTION INTRAMUSCULAR; INTRAVENOUS at 08:45

## 2019-05-28 RX ADMIN — LIDOCAINE HYDROCHLORIDE 100 MG: 20 INJECTION, SOLUTION INFILTRATION; PERINEURAL at 08:05

## 2019-05-28 RX ADMIN — ONDANSETRON 4 MG: 2 INJECTION INTRAMUSCULAR; INTRAVENOUS at 08:15

## 2019-05-28 RX ADMIN — FAMOTIDINE 20 MG: 10 INJECTION INTRAVENOUS at 06:50

## 2019-05-28 RX ADMIN — CLINDAMYCIN PHOSPHATE 900 MG: 900 INJECTION INTRAVENOUS at 07:56

## 2019-05-28 RX ADMIN — SCOPALAMINE 1 PATCH: 1 PATCH, EXTENDED RELEASE TRANSDERMAL at 06:51

## 2019-05-28 RX ADMIN — EPHEDRINE SULFATE 10 MG: 50 INJECTION INTRAMUSCULAR; INTRAVENOUS; SUBCUTANEOUS at 09:45

## 2019-05-28 RX ADMIN — SODIUM CHLORIDE, POTASSIUM CHLORIDE, SODIUM LACTATE AND CALCIUM CHLORIDE 9 ML/HR: 600; 310; 30; 20 INJECTION, SOLUTION INTRAVENOUS at 06:49

## 2019-05-28 RX ADMIN — ONDANSETRON 4 MG: 2 INJECTION INTRAMUSCULAR; INTRAVENOUS at 06:50

## 2019-05-28 RX ADMIN — GLYCOPYRROLATE 0.4 MG: 0.2 INJECTION INTRAMUSCULAR; INTRAVENOUS at 10:21

## 2019-05-28 RX ADMIN — TECHNETIUM TC 99M SULFUR COLLOID 1 DOSE: KIT at 07:05

## 2019-05-28 RX ADMIN — HYDROMORPHONE HYDROCHLORIDE 1 MG: 2 INJECTION INTRAMUSCULAR; INTRAVENOUS; SUBCUTANEOUS at 10:00

## 2019-05-28 RX ADMIN — OXYCODONE HYDROCHLORIDE AND ACETAMINOPHEN 1 TABLET: 7.5; 325 TABLET ORAL at 11:50

## 2019-05-28 RX ADMIN — DIAZEPAM 10 MG: 5 TABLET ORAL at 06:49

## 2019-05-28 NOTE — ANESTHESIA PREPROCEDURE EVALUATION
Anesthesia Evaluation     Patient summary reviewed and Nursing notes reviewed   no history of anesthetic complications:  NPO Solid Status: > 8 hours  NPO Liquid Status: > 2 hours           Airway   Mallampati: II  TM distance: >3 FB  Neck ROM: full  Large neck circumference and Possible difficult intubation  Dental - normal exam     Pulmonary - normal exam   (+) sleep apnea on CPAP,   (-) COPD, asthma, not a smoker, lung cancer  Cardiovascular - normal exam  Exercise tolerance: good (4-7 METS)    ECG reviewed  Rhythm: regular  Rate: normal    (+) hypertension well controlled less than 2 medications, hyperlipidemia,   (-) valvular problems/murmurs, past MI, CAD, dysrhythmias, angina, CHF, cardiac stents, CABG      Neuro/Psych  (+) psychiatric history Anxiety and Depression,     (-) seizures, TIA, CVA  GI/Hepatic/Renal/Endo    (+) morbid obesity,    (-) GERD, PUD, hepatitis, liver disease, no renal disease, diabetes, hypothyroidism, GI bleed    Musculoskeletal (-) negative ROS    Abdominal  - normal exam  (+) obese,     Abdomen: soft.   Substance History - negative use     OB/GYN negative ob/gyn ROS         Other      history of cancer active                    Anesthesia Plan    ASA 3     general     intravenous induction   Anesthetic plan, all risks, benefits, and alternatives have been provided, discussed and informed consent has been obtained with: patient.    Plan discussed with CRNA and attending.

## 2019-05-28 NOTE — ANESTHESIA PROCEDURE NOTES
Airway  Urgency: elective    Airway not difficult    General Information and Staff    Patient location during procedure: OR  Anesthesiologist: Abilio Abbott MD  CRNA: Alisia Thomson CRNA    Indications and Patient Condition  Indications for airway management: airway protection    Preoxygenated: yes  MILS maintained throughout  Mask difficulty assessment: 2 - vent by mask + OA or adjuvant +/- NMBA    Final Airway Details  Final airway type: endotracheal airway      Successful airway: ETT  Cuffed: yes   Successful intubation technique: direct laryngoscopy  Facilitating devices/methods: intubating stylet and anterior pressure/BURP  Endotracheal tube insertion site: oral  Blade: Gonzalez  Blade size: 2  ETT size (mm): 7.0  Cormack-Lehane Classification: grade IIa - partial view of glottis  Placement verified by: chest auscultation and capnometry   Cuff volume (mL): 8  Measured from: lips  ETT to lips (cm): 20  Number of attempts at approach: 1    Additional Comments  Atraumatic placement of ETT, dentition unchanged from preop.

## 2019-05-31 ENCOUNTER — TELEPHONE (OUTPATIENT)
Dept: SURGERY | Facility: CLINIC | Age: 49
End: 2019-05-31

## 2019-05-31 DIAGNOSIS — Z17.0 MALIGNANT NEOPLASM OF CENTRAL PORTION OF RIGHT BREAST IN FEMALE, ESTROGEN RECEPTOR POSITIVE (HCC): Primary | ICD-10-CM

## 2019-05-31 DIAGNOSIS — C50.111 MALIGNANT NEOPLASM OF CENTRAL PORTION OF RIGHT BREAST IN FEMALE, ESTROGEN RECEPTOR POSITIVE (HCC): Primary | ICD-10-CM

## 2019-06-03 ENCOUNTER — TELEPHONE (OUTPATIENT)
Dept: SURGERY | Facility: CLINIC | Age: 49
End: 2019-06-03

## 2019-06-03 NOTE — TELEPHONE ENCOUNTER
Genetics appt. is scheduled on 7/9/2019 @ 11:00am.    Called and spoke to patient, patient expressed verbal understanding of appointment time.    Sent patient a reminder letter in the mail.

## 2019-06-07 ENCOUNTER — OFFICE VISIT (OUTPATIENT)
Dept: INTERNAL MEDICINE | Facility: CLINIC | Age: 49
End: 2019-06-07

## 2019-06-07 ENCOUNTER — OFFICE VISIT (OUTPATIENT)
Dept: OTHER | Facility: HOSPITAL | Age: 49
End: 2019-06-07

## 2019-06-07 ENCOUNTER — OFFICE VISIT (OUTPATIENT)
Dept: SURGERY | Facility: CLINIC | Age: 49
End: 2019-06-07

## 2019-06-07 VITALS — OXYGEN SATURATION: 98 % | SYSTOLIC BLOOD PRESSURE: 144 MMHG | HEART RATE: 83 BPM | DIASTOLIC BLOOD PRESSURE: 88 MMHG

## 2019-06-07 VITALS
OXYGEN SATURATION: 99 % | HEART RATE: 79 BPM | HEIGHT: 66 IN | WEIGHT: 293 LBS | DIASTOLIC BLOOD PRESSURE: 90 MMHG | SYSTOLIC BLOOD PRESSURE: 140 MMHG | BODY MASS INDEX: 47.09 KG/M2

## 2019-06-07 DIAGNOSIS — Z17.0 MALIGNANT NEOPLASM OF CENTRAL PORTION OF RIGHT BREAST IN FEMALE, ESTROGEN RECEPTOR POSITIVE (HCC): Primary | ICD-10-CM

## 2019-06-07 DIAGNOSIS — C50.111 MALIGNANT NEOPLASM OF CENTRAL PORTION OF RIGHT BREAST IN FEMALE, ESTROGEN RECEPTOR POSITIVE (HCC): ICD-10-CM

## 2019-06-07 DIAGNOSIS — F41.9 ANXIETY: Primary | ICD-10-CM

## 2019-06-07 DIAGNOSIS — Z17.0 MALIGNANT NEOPLASM OF CENTRAL PORTION OF RIGHT BREAST IN FEMALE, ESTROGEN RECEPTOR POSITIVE (HCC): ICD-10-CM

## 2019-06-07 DIAGNOSIS — C50.111 MALIGNANT NEOPLASM OF CENTRAL PORTION OF RIGHT BREAST IN FEMALE, ESTROGEN RECEPTOR POSITIVE (HCC): Primary | ICD-10-CM

## 2019-06-07 DIAGNOSIS — Z48.89 POSTOPERATIVE VISIT: Primary | ICD-10-CM

## 2019-06-07 PROCEDURE — 99024 POSTOP FOLLOW-UP VISIT: CPT | Performed by: SURGERY

## 2019-06-07 PROCEDURE — 99213 OFFICE O/P EST LOW 20 MIN: CPT | Performed by: NURSE PRACTITIONER

## 2019-06-07 NOTE — PROGRESS NOTES
[facility]     Referring Provider: Mel Cline MD     Chief complaint: Postoperative visit     HPI:   5/14/19:   Ms. Emilie Soto is a 47 yo woman, seen at the request of Dr. Mel Cline, for a new diagnosis of right breast cancer. This was initially detected as an imaging abnormality. Her work-up is detailed in the oncologic history below. She denies any breast lumps, pain, skin changes, or nipple discharge. She has a past history of a benign left breast stereotactic biopsy in 2014. 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 She has a family history of breast cancer in a paternal aunt and paternal cousin (unknown ages at diagnosis). She denies any family history of ovarian cancer.      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 She denies any new complaints.     6/7/19, Interval History:  She underwent right central partial mastectomy & SLNB on 5/28/19. See surgery & pathology details below in oncologic history. She is complaining of soreness at her axillary incision, however says that her breast feels fine. She also is complaining of generalized anxiety, which has been worse since her diagnosis. She was by herself in clinic today.       Oncology/Hematology History    1/7/14, Screening MMG ([facility]):   There is a stable small focal asymmetry seen in the left breast at 12:00-1:00. In the right breast, no masses, significant calcifications or other abnormalities are seen.   BI-RADS 0: Incomplete.    2/4/14, Left Diagnostic MMG & Left US ([facility]):   MMG:  On the present examination, there is an isodense focal asymmetry measuring 15 mm with indistinct margins in the left breast at 12:00-1:00 located 10 cm from the nipple. Focal asymmetric density has become more defined.   US:  There is no evidence of any solid mass or abnormal cystic elements. Stereotactic biopsy is recommended.   BI-RADS 4A:  Suspicious.    2/11/14, Left Breast, 12-1:00, Stereotactic Biopsy ([facility]):   Fibroadenomatoid type stromal changes, mild columnar cell hyperplasia, mild fibrocystic changes. Negative for atypia or malignancy.   -Concordant.  Recommend 6 month follow-up mammogram and ultrasound.        Malignant neoplasm of central portion of right breast in female, estrogen receptor positive (CMS/HCC)    4/17/2019 Initial Diagnosis     Malignant neoplasm of central portion of right breast in female, estrogen receptor positive (CMS/HCC)         4/18/2019 Imaging     Screening MMG ([facility]):   There are scattered areas of fibroglandular density. There is a small, oval mass measuring 10 mm with indistinct margins seen in the sub-areolar region of the right breast. Note is made of a biopsy clip in the left breast as evidence of a previous surgical procedure. In the left breast, no suspicious masses, significant calcifications or other abnormalities are seen.   BI-RADS 0: Incomplete.           4/29/2019 Imaging     Right Diagnostic MMG with [place] & Right US ([facility]):   MMG:  On the present examination, there is a new high density, round mass measuring 10 mm with indistinct margins in the anterior one-third sub-areolar region of the right breast located 2 cm from the nipple.   US:   1. Ultrasound is suggestive of a round solid mass with indistinct margins measuring 9 mm. Internal echotexture is homogeneous and hypoechoic relative adipose tissue.   2. There is an abnormal axillary lymph node in the right axilla. There are 2 adjacent lymph nodes in the right axilla with mildly prominent cortices. The cortex has been measured at 3.1 mm, although no significant focal thickening is seen. The left axilla was evaluated for comparison, and the nodes on the right are of greater cortical thickness.   BI-RADS 4C: Suspicious.         5/6/2019 Biopsy     Right breast & Right axillary lymph node, US-guided biopsy ([facility]):    1. Right Subareolar, core  biopsy:    Invasive High Grade Mammary Carcinoma (Invasive Ductal Carcinoma, Grade III). Tubule Score 3, Nuclear Grade Score 3, and Mitotic Score 2 for A Total Score of 8. Neoplasm is Present in Four of Eight Biopsies and Measures 4 mm in Greatest Dimension.    ER+ (82.35%, moderate)  SC+ (92.68%, strong)  Her2 negative (IHC 1+)  Ki-67 14.81%    2. Right axilla, core biopsy:  Benign Lymph Node, Negative for Metastatic Carcinoma.         5/9/2019 Imaging     Bilateral Breast MRI ([facility]):  Within the retroareolar region of the right breast located slightly medial to the plane of the nipple at the 2:30 position on the order of 1.8 cm from the nipple there is an irregular enhancing mass that measures 1.0 cm in the superior-inferior dimension, 1.0 cm in anterior to posterior dimension and 1.0 cm in the medial to lateral dimension. A metallic clip is seen within the mass. This represents the biopsy-proven site malignancy.     No other areas of abnormal enhancement or morphology are seen within the right breast. I see no evidence for abnormal right breast skin, nipple or chest wall enhancement and there is no evidence for right axillary adenopathy.     There are no areas of abnormal enhancement or morphology in the left breast. I see no evidence for abnormal left breast skin, nipple or chest wall enhancement and there is no evidence for left axillary adenopathy.  BI-RADS 6: Known malignancy.         5/14/2019 Genetic Testing     [employer] Breast Cancer STAT Panel (9 genes):    CHEK2 mutation         5/28/2019 Surgery     Right central partial mastectomy (with removal of the nipple-areola-complex) and sentinel lymph node biopsy    1.  Right Axilla, [place] Lymph Node #1 (hot, blue, count 628):                A.  One lymph node with fatty infiltration and focal florid foreign body giant cell response consistent with clip                     Placement.               B.  No metastatic carcinoma identified by routine staining  (0/1).     2.  Right Axilla, Non-[place] Lymph Node:                A.  Benign fibrofatty tissue with focal minimal fat necrosis.                B.  No intact lymph node tissue identified.      3.  Right Central Partial Mastectomy:                A.  Invasive mammary carcinoma of no special type (ductal carcinoma).                            1.  Invasive carcinoma measures 10 mm x 8 mm x 8 mm.                            2.  Overall Milnesand grade II (glandular score = 2, nuclear score = 2, mitotic score = 2).                 B.  No associated ductal carcinoma in situ component by IHC staining (see comment).                   C.  Margins are negative for invasive carcinoma.  Invasive carcinoma measures at least 15 mm from                     the closest (Posterior) margin of excision.                     All other margins measure at least 20 mm from invasive carcinoma including:                             Anterior margin = 20 mm.                            Superior margin = 20 mm.                            Inferior margin = 53 mm.                            Medial Margin = 50 mm.                            Lateral margin = 60 mm.                   D.  No lymphovascular space invasion identified.                E.  No pagetoid involvement of skin nor nipple by carcinoma identified.               F.  Focal stromal fibrosis (scar) organizing fat necrosis and foreign giant cell reaction noted consistent with                     previous needled biopsy site.                G.  Nonneoplastic breast tissue with fibrocystic change, focal adenosis, organizing fat necrosis.                H.  Microcalcifications are identified within benign ductal tissue.                I.   Previously reported biomarkers: Estrogen receptor: positive (82.35% moderate), Progesterone receptor:                    positive (92.68%, strong), HER2/Amy: negative (IHC score1+) and Ki-67=14.8%                     (not reviewed).                    Pathologic Stage: pT1b, (sn)N0 (G2).                 See synoptic for tumor details.     4.  Additional Lateral and Superior Margins:                A.  No in situ nor infiltrating carcinoma identified.               B.  New superior margin is negative for malignancy by an additional 25 mm.     Oncotype DX Score: pending            Review of Systems:  See interval history.       Medications:    Current Outpatient Medications:   •  cholecalciferol (VITAMIN D3) 1000 units tablet, Take 3,000 Units by mouth Daily., Disp: , Rfl:   •  ibuprofen (ADVIL,MOTRIN) 800 MG tablet, Take 1 tablet by mouth Every 6 (Six) Hours As Needed for mild pain (1-3) or moderate pain (4-6). (Patient taking differently: Take 800 mg by mouth Every 6 (Six) Hours As Needed for Mild Pain  or Moderate Pain  (ON HOLD FOR SX).), Disp: 30 tablet, Rfl: 0  •  lisinopril-hydrochlorothiazide (PRINZIDE,ZESTORETIC) 20-25 MG per tablet, TAKE 1 TABLET BY MOUTH DAILY, Disp: 30 tablet, Rfl: 5  •  metroNIDAZOLE (METROGEL) 0.75 % gel, Apply  topically to the appropriate area as directed Every 12 (Twelve) Hours. (Patient taking differently: Apply 1 application topically to the appropriate area as directed As Needed.), Disp: 45 g, Rfl: 0  •  oxyCODONE (ROXICODONE) 5 MG immediate release tablet, Take 1-2 tablets by mouth Every 4 (Four) Hours As Needed for Moderate Pain  or Severe Pain ., Disp: 25 tablet, Rfl: 0  •  terbinafine (lamiSIL) 250 MG tablet, Take 1 tablet by mouth Daily. (Patient taking differently: Take 250 mg by mouth Daily. PT HAS NOT STARTED THIS MED), Disp: 30 tablet, Rfl: 2  •  vitamin B-12 (CYANOCOBALAMIN) 1000 MCG tablet, Take 1,000 mcg by mouth Daily., Disp: , Rfl:   •  Vortioxetine HBr 5 MG tablet, Take 5 mg by mouth Daily With Breakfast., Disp: 30 tablet, Rfl: 1      Allergies   Allergen Reactions   • Citalopram Rash   • Flexeril [Cyclobenzaprine] Other (See Comments)     MUSCLES TENSE-OPPOSITE OF WHAT IT IS INTENDED TO DO-ELEVATES B/P   •  Doxycycline Rash     Blisters on hands   • Keflex [Cephalexin] Rash   • Sulfa Antibiotics Rash     NAUSEA/VOMITING/FEVER       Family History   Problem Relation Age of Onset   • Hypertension Mother    • Arthritis Mother    • Kidney cancer Mother    • Heart disease Father    • Hernia Father    • Hypertension Father    • No Known Problems Brother    • Breast cancer Paternal Aunt    • Breast cancer Paternal Cousin    • Malig Hyperthermia Neg Hx        PHYSICAL EXAMINATION:   Vitals:    06/07/19 1057   BP: 144/88   Pulse: 83   SpO2: 98%     ECOG 0 - Asymptomatic  General: NAD, well appearing  Psych: a&o x 3, normal mood and affect  Eyes: EOMI, no scleral icterus  ENMT: neck supple without masses or thyromegaly, mucus membranes moist  MSK: normal gait, normal ROM in bilateral shoulders  Lymph nodes: well-healing right axillary incision with dermabond intact, no fullness  Breast:   Right: well-healing central incision with dermabond intact  Left: deferred    Right axilla, in-office ultrasound: In the right axilla, at the site of sentinel node biopsy, there is minimal expected postoperative fluid.       Assessment:  48 y.o. F with a diagnosis of right breast cancer: intermediate grade, invasive ductal carcinoma, ER/GA+, Her2 negative. She is sp right central partial mastectomy & SLNB on 5/28/19, pT1bN0, anatomic stage IA, prognostic stage IA.   -She has a pathogenic CHEK2 mutation and has a higher than average risk of a subsequent breast cancer, however declined prophylactic mastectomy.    Plan:  - F/u oncotype. Medical oncology referral. Appt with Dr. Trevizo scheduled on 6/14/19.  -Radiation oncology referral. Appt with Dr. Craballo scheduled on 6/18/2019.  -Genetics referral. Appt scheduled on 7/9/19. Will try to move this up, so she does not miss the 90-day window for family testing.  -PT/LE clinic referral.  -We will start high risk screening due to CHEK2 mutation next year.  -F/u in 3 months for CBE.  -She was  instructed to call sooner with any questions, concerns or changes on BSE.    Trena Greene MD      CC:  MD Pinky Neal APRN

## 2019-06-10 NOTE — PROGRESS NOTES
Jaspal Soto is a 49 y.o. female who presents for f/u regarding breast cancer and anxiety.    She underwent a right breast lumpectomy 5/28/2019 for breast cancer and has done well, reports tenderness in axillary area but otherwise denies discomfort. She is off work for now, anticipating return in early July. She continues to c/o significant anxiety and difficulty with daily activities, reports anxiety and sense of panic with going to grocery store. She has been intolerant to many medications in the past (Sertraline, Wellbutrin, Citalopram and Effexor). She does have Ativan at home which she takes for an acute panic attack (taking rarely).      Anxiety   Presents for follow-up visit. Symptoms include decreased concentration, depressed mood, excessive worry, malaise, nervous/anxious behavior and panic. Patient reports no chest pain, insomnia, nausea, palpitations or suicidal ideas. Symptoms occur constantly. The severity of symptoms is interfering with daily activities and causing significant distress. The quality of sleep is good.            The following portions of the patient's history were reviewed and updated as appropriate: allergies, current medications, past social history and problem list.    Past Medical History:   Diagnosis Date   • Anxiety    • Anxiety and depression    • Cancer (CMS/HCC)     BREAST   • Cancer (CMS/HCC) 1993    PAROTID GLAND   • History of kidney stones    • Hyperlipidemia    • Hypertension    • Lumbago    • Mood disorder (CMS/HCC)    • PONV (postoperative nausea and vomiting)    • 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   • Sleep apnea     C-PAP IN USE   • Vitamin D deficiency          Current Outpatient Medications:   •  cholecalciferol (VITAMIN D3) 1000 units tablet, Take 3,000 Units by mouth Daily., Disp: , Rfl:   •  ibuprofen (ADVIL,MOTRIN) 800 MG tablet, Take 1 tablet by mouth Every 6 (Six) Hours As Needed for mild pain (1-3) or moderate pain (4-6).  (Patient taking differently: Take 800 mg by mouth Every 6 (Six) Hours As Needed for Mild Pain  or Moderate Pain  (ON HOLD FOR SX).), Disp: 30 tablet, Rfl: 0  •  lisinopril-hydrochlorothiazide (PRINZIDE,ZESTORETIC) 20-25 MG per tablet, TAKE 1 TABLET BY MOUTH DAILY, Disp: 30 tablet, Rfl: 5  •  metroNIDAZOLE (METROGEL) 0.75 % gel, Apply  topically to the appropriate area as directed Every 12 (Twelve) Hours. (Patient taking differently: Apply 1 application topically to the appropriate area as directed As Needed.), Disp: 45 g, Rfl: 0  •  oxyCODONE (ROXICODONE) 5 MG immediate release tablet, Take 1-2 tablets by mouth Every 4 (Four) Hours As Needed for Moderate Pain  or Severe Pain ., Disp: 25 tablet, Rfl: 0  •  terbinafine (lamiSIL) 250 MG tablet, Take 1 tablet by mouth Daily. (Patient taking differently: Take 250 mg by mouth Daily. PT HAS NOT STARTED THIS MED), Disp: 30 tablet, Rfl: 2  •  vitamin B-12 (CYANOCOBALAMIN) 1000 MCG tablet, Take 1,000 mcg by mouth Daily., Disp: , Rfl:   •  Vortioxetine HBr 5 MG tablet, Take 5 mg by mouth Daily With Breakfast., Disp: 30 tablet, Rfl: 1    Allergies   Allergen Reactions   • Citalopram Rash   • Flexeril [Cyclobenzaprine] Other (See Comments)     MUSCLES TENSE-OPPOSITE OF WHAT IT IS INTENDED TO DO-ELEVATES B/P   • Doxycycline Rash     Blisters on hands   • Keflex [Cephalexin] Rash   • Sulfa Antibiotics Rash     NAUSEA/VOMITING/FEVER       Review of Systems   Constitutional: Negative for chills, fatigue, fever and unexpected weight change.   HENT: Negative for congestion, ear pain, postnasal drip, sinus pressure, sore throat and trouble swallowing.    Eyes: Negative for visual disturbance.   Respiratory: Negative for cough, chest tightness and wheezing.    Cardiovascular: Negative for chest pain, palpitations and leg swelling.   Gastrointestinal: Negative for abdominal pain, blood in stool, nausea and vomiting.   Genitourinary: Negative for dysuria, frequency and urgency.  "  Musculoskeletal: Negative for arthralgias and joint swelling.   Skin: Negative for color change.   Neurological: Negative for syncope, weakness and headaches.   Hematological: Does not bruise/bleed easily.   Psychiatric/Behavioral: Positive for agitation and decreased concentration. Negative for suicidal ideas. The patient is nervous/anxious. The patient does not have insomnia.        Objective   Vitals:    06/07/19 1349   BP: 140/90   BP Location: Left arm   Patient Position: Sitting   Cuff Size: Adult   Pulse: 79   SpO2: 99%   Weight: (!) 137 kg (303 lb)   Height: 167.6 cm (66\")     Physical Exam   Constitutional: She appears well-developed and well-nourished. She is cooperative. She does not have a sickly appearance. She does not appear ill.   HENT:   Head: Normocephalic.   Right Ear: Hearing, tympanic membrane and external ear normal.   Left Ear: Hearing, tympanic membrane and external ear normal.   Nose: Nose normal. No mucosal edema, rhinorrhea, sinus tenderness or nasal deformity. Right sinus exhibits no maxillary sinus tenderness and no frontal sinus tenderness. Left sinus exhibits no maxillary sinus tenderness and no frontal sinus tenderness.   Mouth/Throat: Oropharynx is clear and moist and mucous membranes are normal. Normal dentition.   Eyes: Conjunctivae and lids are normal. Right eye exhibits no discharge and no exudate. Left eye exhibits no discharge and no exudate.   Neck: Trachea normal. Carotid bruit is not present. No edema present. No thyroid mass present.   Cardiovascular: Regular rhythm, normal heart sounds and normal pulses.   No murmur heard.  Pulmonary/Chest: Breath sounds normal. No respiratory distress. She has no decreased breath sounds. She has no wheezes. She has no rhonchi. She has no rales.   Surgical incision on right breast with edges/sutures intact and minimal surrounding erythema, no drainage noted.   Lymphadenopathy:        Head (right side): No submental, no submandibular, no " tonsillar, no preauricular, no posterior auricular and no occipital adenopathy present.        Head (left side): No submental, no submandibular, no tonsillar, no preauricular, no posterior auricular and no occipital adenopathy present.   Neurological: She is alert.   Skin: Skin is warm, dry and intact. No cyanosis. Nails show no clubbing.       Assessment/Plan   Emilie was seen today for follow-up and anxiety.    Diagnoses and all orders for this visit:    Anxiety  -     Vortioxetine HBr 5 MG tablet; Take 5 mg by mouth Daily With Breakfast.    Discussed symptoms at length with patient, she will start Trintellix (will request samples) and titrate dosage as needed. She is working with a  through [facility] and has been referred to psychiatry for further evaluation and management.            Placenta Previa/Placenta Accreta

## 2019-06-11 ENCOUNTER — TELEPHONE (OUTPATIENT)
Dept: SURGERY | Facility: CLINIC | Age: 49
End: 2019-06-11

## 2019-06-11 NOTE — TELEPHONE ENCOUNTER
F/u appt. With Dr. Greene is scheduled on 9/13/2019 @ 10:30am.    Called and left message with patient about appointment time, patient to call back and confirm.    Sent patient a reminder letter in the mail.

## 2019-06-12 ENCOUNTER — APPOINTMENT (OUTPATIENT)
Dept: PSYCHIATRY | Facility: HOSPITAL | Age: 49
End: 2019-06-12

## 2019-06-12 ENCOUNTER — OFFICE VISIT (OUTPATIENT)
Dept: PSYCHIATRY | Facility: HOSPITAL | Age: 49
End: 2019-06-12

## 2019-06-12 DIAGNOSIS — F41.1 GENERALIZED ANXIETY DISORDER: Primary | ICD-10-CM

## 2019-06-12 PROCEDURE — G0463 HOSPITAL OUTPT CLINIC VISIT: HCPCS | Performed by: NURSE PRACTITIONER

## 2019-06-12 PROCEDURE — 90792 PSYCH DIAG EVAL W/MED SRVCS: CPT | Performed by: NURSE PRACTITIONER

## 2019-06-12 RX ORDER — GABAPENTIN 100 MG/1
100 CAPSULE ORAL 4 TIMES DAILY
Qty: 120 CAPSULE | Refills: 1 | Status: SHIPPED | OUTPATIENT
Start: 2019-06-12 | End: 2019-08-01 | Stop reason: SINTOL

## 2019-06-12 RX ORDER — MODAFINIL 200 MG/1
200 TABLET ORAL DAILY
Qty: 30 TABLET | Refills: 0 | Status: SHIPPED | OUTPATIENT
Start: 2019-06-12 | End: 2019-07-28 | Stop reason: SDUPTHER

## 2019-06-12 NOTE — PROGRESS NOTES
Chief Complaint: Heightened anxiety, restlessness, panic attacks, sense of worry, fatigue    Subjective  Patient ID: Emilie Soto is a 49 y.o. female who [facility] for initial consultation at the request of Trena Greene MD. Pt with recent dx of right breast cancer: intermediate grade, invasive ductal carcinoma, ER/HI+, Her2 negative.. S/P right central partial mastectomy & SLNB on 19, pT1bN0, anatomic stage IA, prognostic stage IA. She has a pathogenic CHEK2 mutation and has a higher than average risk of a subsequent breast cancer, however declined prophylactic mastectomy.    Patient has been seen in counseling with Helena Lerma MSW, OSW.  Defer to [facility] for assistance with medication management.  Patient presents to clinic alert, oriented, and engaged in conversation.  Station and gait within normal limits.  Affect and mood somewhat blunted; appearance somewhat disheveled. Pt describes shock related to cancer diagnosis. Pt describes having paternal cousin who was diagnosed with breast cancer at 40 and . Aunt and grandmother with breast cancer. Some have , some have been treated and never recurred. Mother with recent kidney cancer. 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 Removal and radiation. Was being worked up for bariatric surgery when breast lump was found. Took patient off path to weight reduction, which, again, has been frustrating.  Per discussions with Helena Lerma, patient has reported eeling nervous all the time, and on edge.  She has trouble relaxing, is restless and becomes easily annoyed and irritated. Patient reports trouble falling asleep and staying asleep, poor appetite. Patient works for [employer] and works year round.  She and her wife do not have kids.  Her wife is a  and will be off this summer to help with caregiving needs.  Patient also worried about their finances.  She  expresses feeling on guard, watchful and easily gets started.     Describes sense of worry as being new. Has worked on these sx in the past with deep breathing, self talk, although currently describes pervasive sense of worry with panic attacks. Being with others has helped. Feels out of control, which is especially unbearable. Pt denies being able to pinpoint what she is anxious about. Becomes overwhelmed and irritable when things don't go as easily as planned.   Has ativan available from VICENTE Hyatt, which she rarely uses. Was prescribed alongside dissolution of marriage, losing a job, self identity crisis.  Generally describes his appreciation for sense of being in control, does not like medications or feeling as though she has to take something to be in control.  Denies having Ativan available for recent panic attacks.    Sleep is described as easy to initiate; has always felt this is somewhat a waste of time; generally gets 6-7 hours of sleep nightly. Feels restored at first, atlhough fatigue has been increased. Utilizes CPAP for MARIBELL.  Pain is minimal and isolated to surgical site. Alleviated by rest.     Social History  Raised by  parents, one brother, good childhood, supportive environment  Denies hx of trauma  Marital Status: Wife, Trena.  They have been together for 19 years and  for the last 2.  from  of 20 years.   Wife doing ok; has to be strong for patient. Pt feels she also has unmet needs. Both nervous and on edge.    Have never participated in counseling together, although Trena has been present in some time together with Helena Stovall, MSW, OSW.  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  Tobacco Use: The patient denies current or previous tobacco use.  Alcohol Use: occasional/rare  "use; a couple times a year; 1-2 glasses.  Marijuana/ Other drug Use: denied.  Pt describes \"healthy fear\" of controlled substances. Does not like feeling out of control. Does not want to become dependent on medication. Does not like being unable to manage distress with nonpharmamcological interventions.    Medical History  Pt describes hx of generalized anxiety and depression with hx of medication mgmt and therapy.    Pt denies family hx of mental illness, although recognizes this was stigmatized and did not necessarily represent an absence of a problem.  Discusses need for self worth, recognizing the need to participate in self care.  Feels very supported by VICENTE Elliott, who manages her medications. Describes med hx to include Zoloft (didn't work), Prozac (didn't work), Celexa (rash), Wellbutrin (ok, although did not help with weight loss), and Seroquel (made her loopy) in the past.  She has a prescription for Ativan that she rarely takes PRN -  She worries that she could become addicted to it.   Denies hx of SI in self or family  Latter day    Family History  Denies family mental health history    PHQ9 Total Score: 8  NAIMA 7 Total Score: 21    The following portions of the patient's history were reviewed and updated as appropriate: She  has a past medical history of Anxiety, Anxiety and depression, Cancer (CMS/HCC), Cancer (CMS/HCC) (1993), History of kidney stones, Hyperlipidemia, Hypertension, Lumbago, Mood disorder (CMS/HCC), PONV (postoperative nausea and vomiting), S/P radiation therapy (1993), Sleep apnea, and Vitamin D deficiency.  She  has a past surgical history that includes Cholecystectomy; Appendectomy; Ovary surgery; Hernia repair (2006); Dermoid cyst  excision (1989); Salivary gland surgery (1993); and breast lumpectomy with sentinel node biopsy (Right, 5/28/2019).  Her family history includes Arthritis in her mother; Breast cancer in her paternal aunt and paternal cousin; Heart disease in her " father; Hernia in her father; Hypertension in her father and mother; Kidney cancer in her mother; No Known Problems in her brother.  She  reports that she has never smoked. She has never used smokeless tobacco. She reports that she drinks alcohol. She reports that she does not use drugs.  Current Outpatient Medications   Medication Sig Dispense Refill   • cholecalciferol (VITAMIN D3) 1000 units tablet Take 3,000 Units by mouth Daily.     • gabapentin (NEURONTIN) 100 MG capsule Take 1 capsule by mouth 4 (Four) Times a Day. 120 capsule 1   • ibuprofen (ADVIL,MOTRIN) 800 MG tablet Take 1 tablet by mouth Every 6 (Six) Hours As Needed for mild pain (1-3) or moderate pain (4-6). (Patient taking differently: Take 800 mg by mouth Every 6 (Six) Hours As Needed for Mild Pain  or Moderate Pain  (ON HOLD FOR SX).) 30 tablet 0   • lisinopril-hydrochlorothiazide (PRINZIDE,ZESTORETIC) 20-25 MG per tablet TAKE 1 TABLET BY MOUTH DAILY 30 tablet 5   • metroNIDAZOLE (METROGEL) 0.75 % gel Apply  topically to the appropriate area as directed Every 12 (Twelve) Hours. (Patient taking differently: Apply 1 application topically to the appropriate area as directed As Needed.) 45 g 0   • modafinil (PROVIGIL) 200 MG tablet Take 1 tablet by mouth Daily. 30 tablet 0   • oxyCODONE (ROXICODONE) 5 MG immediate release tablet Take 1-2 tablets by mouth Every 4 (Four) Hours As Needed for Moderate Pain  or Severe Pain . 25 tablet 0   • terbinafine (lamiSIL) 250 MG tablet Take 1 tablet by mouth Daily. (Patient taking differently: Take 250 mg by mouth Daily. PT HAS NOT STARTED THIS MED) 30 tablet 2   • vitamin B-12 (CYANOCOBALAMIN) 1000 MCG tablet Take 1,000 mcg by mouth Daily.     • Vortioxetine HBr 5 MG tablet Take 5 mg by mouth Daily With Breakfast. 30 tablet 1     No current facility-administered medications for this visit.      Current Outpatient Medications on File Prior to Visit   Medication Sig   • cholecalciferol (VITAMIN D3) 1000 units tablet  Take 3,000 Units by mouth Daily.   • ibuprofen (ADVIL,MOTRIN) 800 MG tablet Take 1 tablet by mouth Every 6 (Six) Hours As Needed for mild pain (1-3) or moderate pain (4-6). (Patient taking differently: Take 800 mg by mouth Every 6 (Six) Hours As Needed for Mild Pain  or Moderate Pain  (ON HOLD FOR SX).)   • lisinopril-hydrochlorothiazide (PRINZIDE,ZESTORETIC) 20-25 MG per tablet TAKE 1 TABLET BY MOUTH DAILY   • metroNIDAZOLE (METROGEL) 0.75 % gel Apply  topically to the appropriate area as directed Every 12 (Twelve) Hours. (Patient taking differently: Apply 1 application topically to the appropriate area as directed As Needed.)   • oxyCODONE (ROXICODONE) 5 MG immediate release tablet Take 1-2 tablets by mouth Every 4 (Four) Hours As Needed for Moderate Pain  or Severe Pain .   • terbinafine (lamiSIL) 250 MG tablet Take 1 tablet by mouth Daily. (Patient taking differently: Take 250 mg by mouth Daily. PT HAS NOT STARTED THIS MED)   • vitamin B-12 (CYANOCOBALAMIN) 1000 MCG tablet Take 1,000 mcg by mouth Daily.   • Vortioxetine HBr 5 MG tablet Take 5 mg by mouth Daily With Breakfast.     No current facility-administered medications on file prior to visit.      She is allergic to citalopram; flexeril [cyclobenzaprine]; doxycycline; keflex [cephalexin]; and sulfa antibiotics..    Review of Systems    Objective   Mental Status Exam  Appearance:  disheveled, unkempt  Attitude toward clinician:  cooperative and agreeable   Speech:    Rate:  regular rate and rhythm   Volume:  normal  Motor:  no abnormal movements present  Mood:  Hopeful, anxious, frustrated, uncomfortable  Affect:  blunted  Thought Processes:  linear, logical, and goal directed  Thought Content:  normal  Suicidal Thoughts:  absent  Homicidal Thoughts:  absent  Perceptual Disturbance: no perceptual disturbance  Attention and Concentration:  good  Insight and Judgement:  good  Memory:  memory appears to be intact    Physical Exam  Station and gait observed to  be WNL.    Lab Review:   Admission on 05/28/2019, Discharged on 05/28/2019   Component Date Value   • HCG, Urine, QL 05/28/2019 Negative    • Lot Number 05/28/2019 [id number]    • Internal Positive Control 05/28/2019 Positive    • Internal Negative Control 05/28/2019 Negative    • Addendum 05/28/2019                      Value:This result contains rich text formatting which cannot be displayed here.   • Case Report 05/28/2019                      Value:Surgical Pathology Report                         Case: [id number]                                  Authorizing Provider:  Trena Greene MD    Collected:           05/28/2019 09:03 AM          Ordering Location:     [facility]  Received:            05/28/2019 09:21 AM                                 MAIN OR                                                                      Pathologist:           Francis Benítez MD                                                         Specimens:   1) - [place] Lymph Node, Right axilla, sentinel node #1, hot and blue, count [id number]                   2) - Axilla, Right, Right axilla  nonsentinel ana tissue                                          3) - Breast, Right, Right central partial mastectomy, ink marks margins                             4) - Breast, Right, Additional lateral and superiori margins, ink marks margins           • Clinical Information 05/28/2019                      Value:This result contains rich text formatting which cannot be displayed here.   • Final Diagnosis 05/28/2019                      Value:This result contains rich text formatting which cannot be displayed here.   • Synoptic Checklist 05/28/2019                      Value:INVASIVE CARCINOMA OF THE BREAST  (Breast Invasive - All Specimens)                                                                                    CLINICAL                               Radiologic Finding:    Mass or architectural distortion                                                          SPECIMEN                               Procedure:    Central partial mastectomy                                Specimen Laterality:    Right                                                         TUMOR                               Tumor Site: Invasive Carcinoma:    Central                                  Clock Position of Tumor Site:    2 o'clock                                  Clock Position of Tumor Site:    3 o'clock                                Histologic Type:    Invasive carcinoma of no special type (ductal, not otherwise specified)                                Glandular (Acinar) / Tubular Differentiation:    Score 2                                Nuclear Pleomorphism:    Score 2                                Mitotic Rate:    Score 2 (4-7 mitoses per mm2)                                  Number of Mitoses per 10 High-Power Fields:    15 mitotic figures                                 Diameter of Microscope Field in Millimeters (mm):    0.55 Millimeters (mm)                               Overall Grade:    Grade 2 (scores of 6 or 7)                                Tumor Size:    Greatest dimension of largest invasive focus in Millimeters (mm): 10 Millimeters (mm)                                 Additional Dimension in Millimeters (mm):    8 Millimeters (mm)                                 Additional Dimension in Millimeters (mm):    8 Millimeters (mm)                               Ductal Carcinoma In Situ (DCIS):    Not identified                                Lobular Carcinoma In Situ (LCIS):    No LCIS in specimen                                Tumor Extent:                                     Nipple DCIS:    Not applicable: No DCIS                                Accessory Findings:                                     Lymphovascular Invasion:    Not identified                                  Dermal Lymphovascular Invasion:    Not identified                                   Microcalcifications:    Present in non-neoplastic tissue                                  Treatment Effect:    No known presurgical therapy                                                         MARGINS                               Invasive Carcinoma Margins:    Uninvolved by invasive carcinoma                                  Distance from Anterior Margin in Millimeters (mm):    20 Millimeters (mm)                                 Distance from Posterior Margin in Millimeters (mm):    15 Millimeters (mm)                                 Distance from Superior Margin in Millimeters (mm):    45 Millimeters (mm)                                 Distance from Inferior Margin in Millimeters (mm):    53 Millimeters (mm)                                 Distance from Medial Margin in Millimeters (mm):    50 Millimeters (mm)                                 Distance from Lateral Margin in Millimeters (mm):    85 Millimeters (mm)                                 Distance from Closest Margin in Millimeters (mm):    Distance is > 10 Millimeters (mm)                                  Closest Margin:    Posterior                                                         LYMPH NODES                               Regional Lymph Nodes:    Uninvolved by tumor cells                                  Number of Lymph Nodes Examined:    1                                  Number of [place] Nodes Examined:    1                                                         PATHOLOGIC STAGE CLASSIFICATION (pTNM, AJCC 8th Edition)                               TNM Descriptors:    Not applicable                                Primary Tumor (Invasive Carcinoma) (pT):    pT1b                                Regional Lymph Nodes (pN):                                     Modifier:    (sn): Only sentinel node(s) evaluated.                                  Category (pN):    pN0      • Comment 05/28/2019                      Value:This result  contains rich text formatting which cannot be displayed here.   • Gross Description 05/28/2019                      Value:This result contains rich text formatting which cannot be displayed here.   • Microscopic Description 05/28/2019                      Value:This result contains rich text formatting which cannot be displayed here.   Appointment on 05/21/2019   Component Date Value   • Glucose 05/21/2019 106*   • BUN 05/21/2019 8    • Creatinine 05/21/2019 0.64    • Sodium 05/21/2019 141    • Potassium 05/21/2019 3.9    • Chloride 05/21/2019 103    • CO2 05/21/2019 24.3    • Calcium 05/21/2019 9.8    • eGFR Non African Amer 05/21/2019 99    • BUN/Creatinine Ratio 05/21/2019 12.5    • Anion Gap 05/21/2019 13.7    • WBC 05/21/2019 8.65    • RBC 05/21/2019 4.40    • Hemoglobin 05/21/2019 12.4    • Hematocrit 05/21/2019 38.1    • MCV 05/21/2019 86.6    • MCH 05/21/2019 28.2    • MCHC 05/21/2019 32.5    • RDW 05/21/2019 12.5    • RDW-SD 05/21/2019 39.7    • MPV 05/21/2019 9.9    • Platelets 05/21/2019 303    • Neutrophil % 05/21/2019 66.1    • Lymphocyte % 05/21/2019 25.2    • Monocyte % 05/21/2019 5.4    • Eosinophil % 05/21/2019 2.2    • Basophil % 05/21/2019 0.5    • Immature Grans % 05/21/2019 0.6*   • Neutrophils, Absolute 05/21/2019 5.72    • Lymphocytes, Absolute 05/21/2019 2.18    • Monocytes, Absolute 05/21/2019 0.47    • Eosinophils, Absolute 05/21/2019 0.19    • Basophils, Absolute 05/21/2019 0.04    • Immature Grans, Absolute 05/21/2019 0.05    • nRBC 05/21/2019 0.0    Office Visit on 04/18/2019   Component Date Value   • 25 Hydroxy, Vitamin D 04/18/2019 21.1*   • Hemoglobin A1C 04/18/2019 5.70*   • Glucose 04/18/2019 112*   • BUN 04/18/2019 12    • Creatinine 04/18/2019 0.65    • Sodium 04/18/2019 132*   • Potassium 04/18/2019 4.0    • Chloride 04/18/2019 93*   • CO2 04/18/2019 24.7    • Calcium 04/18/2019 10.8*   • Total Protein 04/18/2019 8.2    • Albumin 04/18/2019 4.40    • ALT (SGPT) 04/18/2019 37*    • AST (SGOT) 04/18/2019 27    • Alkaline Phosphatase 04/18/2019 72    • Total Bilirubin 04/18/2019 0.9    • eGFR Non African Amer 04/18/2019 97    • Globulin 04/18/2019 3.8    • A/G Ratio 04/18/2019 1.2    • BUN/Creatinine Ratio 04/18/2019 18.5    • Anion Gap 04/18/2019 14.3    • Total Cholesterol 04/18/2019 183    • Triglycerides 04/18/2019 287*   • HDL Cholesterol 04/18/2019 38*   • LDL Cholesterol  04/18/2019 88    • VLDL Cholesterol 04/18/2019 57.4*   • LDL/HDL Ratio 04/18/2019 2.31        Diagnoses and all orders for this visit:    Generalized anxiety disorder    Other orders  -     modafinil (PROVIGIL) 200 MG tablet; Take 1 tablet by mouth Daily.  -     gabapentin (NEURONTIN) 100 MG capsule; Take 1 capsule by mouth 4 (Four) Times a Day.    Plan of Care  Patient with history of major depressive disorder and generalized anxiety disorder; current symptoms more indicative of anxiety disruption, although maintains increased sense of fatigue and disengagement.  Discussed with patient target symptoms to include anxiety reduction, as well as fatigue management.  Dr. Shaye Post was present for a portion of the session and contributed to medication discussion.  Evaluated history of poor response to SSRIs, Wellbutrin, and antipsychotics.  Discussed benefits of Genesight testing, although recognizes financial impact of this.  At present, will initiate gabapentin 100 mg up to 4 times daily.  Additionally will provide patient with prescription for modafinil for increased wakefulness alongside obstructive sleep apnea and anticipated radiation therapy.  Patient aware recommendation is to target anxiety prior to initiation of modafinil.  Follow-up scheduled in clinic in 2 weeks to assess response.    Addendum: Pt did return to clinic with desire for Genesight testing which was collected and sent out

## 2019-06-14 ENCOUNTER — LAB (OUTPATIENT)
Dept: LAB | Facility: HOSPITAL | Age: 49
End: 2019-06-14

## 2019-06-14 ENCOUNTER — CONSULT (OUTPATIENT)
Dept: ONCOLOGY | Facility: CLINIC | Age: 49
End: 2019-06-14

## 2019-06-14 VITALS
HEART RATE: 80 BPM | OXYGEN SATURATION: 100 % | BODY MASS INDEX: 47.09 KG/M2 | HEIGHT: 66 IN | WEIGHT: 293 LBS | DIASTOLIC BLOOD PRESSURE: 85 MMHG | SYSTOLIC BLOOD PRESSURE: 128 MMHG | TEMPERATURE: 98.9 F | RESPIRATION RATE: 20 BRPM

## 2019-06-14 DIAGNOSIS — Z17.0 MALIGNANT NEOPLASM OF CENTRAL PORTION OF RIGHT BREAST IN FEMALE, ESTROGEN RECEPTOR POSITIVE (HCC): Primary | ICD-10-CM

## 2019-06-14 DIAGNOSIS — C50.111 MALIGNANT NEOPLASM OF CENTRAL PORTION OF RIGHT BREAST IN FEMALE, ESTROGEN RECEPTOR NEGATIVE (HCC): Primary | ICD-10-CM

## 2019-06-14 DIAGNOSIS — Z17.1 MALIGNANT NEOPLASM OF CENTRAL PORTION OF RIGHT BREAST IN FEMALE, ESTROGEN RECEPTOR NEGATIVE (HCC): Primary | ICD-10-CM

## 2019-06-14 DIAGNOSIS — C50.111 MALIGNANT NEOPLASM OF CENTRAL PORTION OF RIGHT BREAST IN FEMALE, ESTROGEN RECEPTOR POSITIVE (HCC): Primary | ICD-10-CM

## 2019-06-14 LAB
BASOPHILS # BLD AUTO: 0.07 10*3/MM3 (ref 0–0.2)
BASOPHILS NFR BLD AUTO: 0.6 % (ref 0–1.5)
DEPRECATED RDW RBC AUTO: 38.7 FL (ref 37–54)
EOSINOPHIL # BLD AUTO: 0.26 10*3/MM3 (ref 0–0.4)
EOSINOPHIL NFR BLD AUTO: 2.3 % (ref 0.3–6.2)
ERYTHROCYTE [DISTWIDTH] IN BLOOD BY AUTOMATED COUNT: 12.9 % (ref 12.3–15.4)
HCT VFR BLD AUTO: 40.7 % (ref 34–46.6)
HGB BLD-MCNC: 14.2 G/DL (ref 12–15.9)
IMM GRANULOCYTES # BLD AUTO: 0.08 10*3/MM3 (ref 0–0.05)
IMM GRANULOCYTES NFR BLD AUTO: 0.7 % (ref 0–0.5)
LYMPHOCYTES # BLD AUTO: 2.88 10*3/MM3 (ref 0.7–3.1)
LYMPHOCYTES NFR BLD AUTO: 25.5 % (ref 19.6–45.3)
MCH RBC QN AUTO: 29 PG (ref 26.6–33)
MCHC RBC AUTO-ENTMCNC: 34.9 G/DL (ref 31.5–35.7)
MCV RBC AUTO: 83.1 FL (ref 79–97)
MONOCYTES # BLD AUTO: 0.64 10*3/MM3 (ref 0.1–0.9)
MONOCYTES NFR BLD AUTO: 5.7 % (ref 5–12)
NEUTROPHILS # BLD AUTO: 7.36 10*3/MM3 (ref 1.7–7)
NEUTROPHILS NFR BLD AUTO: 65.2 % (ref 42.7–76)
NRBC BLD AUTO-RTO: 0.2 /100 WBC (ref 0–0.2)
PLATELET # BLD AUTO: 295 10*3/MM3 (ref 140–450)
PMV BLD AUTO: 9.8 FL (ref 6–12)
RBC # BLD AUTO: 4.9 10*6/MM3 (ref 3.77–5.28)
WBC NRBC COR # BLD: 11.29 10*3/MM3 (ref 3.4–10.8)

## 2019-06-14 PROCEDURE — 85025 COMPLETE CBC W/AUTO DIFF WBC: CPT

## 2019-06-14 PROCEDURE — 36415 COLL VENOUS BLD VENIPUNCTURE: CPT

## 2019-06-14 PROCEDURE — 99245 OFF/OP CONSLTJ NEW/EST HI 55: CPT | Performed by: INTERNAL MEDICINE

## 2019-06-14 NOTE — PROGRESS NOTES
Subjective     REASON FOR CONSULTATION: New diagnosis of right breast cancer  Provide an opinion on any further workup or treatment                             REQUESTING PHYSICIAN: Trena Greene MD, Merchant, MD Angela Arnold, APRN  RECORDS OBTAINED:  Records of the patients history including those obtained from the referring provider were reviewed and summarized in detail.    HISTORY OF PRESENT ILLNESS:  The patient is a 49 y.o. year old female who is here for an opinion about the above issue.    Patient is a 49-year old female who presents today as a new patient for consultation on her newly diagnosed high grade, invasive ductal carcinoma of right breast.   Patient had seen Dr. Greene on May 14, 2019.    She underwent a screening mammogram but did not appreciate any mass herself and denied any breast pain or nipple discharge.    Patient is accompanied with her wife Trena.  They have been together for 19 years and  for the last 2 years.    April 18, 2019: Screening mammogram showed a 10 mm small oval mass in the subareolar region of the right breast.  Left breast was negative.  Spot compression and ultrasound recommended.  Of the right breast.    April 29, 2019: Right diagnostic mammogram showed a high density round mass 10 mm with indistinct margins in the anterior one third subareolar region of the right breast located to centimeters from the nipple.    April 29, 2019: Ultrasound suggested a solid mass with indistinct margins 9 mm and subareolar region.  There is abnormal axillary node in the right axilla.  There are 2 adjacent lymph nodes in the right axilla with mildly prominent cortices.  The left axilla was evaluated for comparison and the nodes on the right are of greater cortical thickness.      Ultrasound-guided biopsy of the breast mass and right axillary lymph node was suggested.      5/9/19:  US guided biopsy sent to [facility]  showing solid mass in the sub-areorolar region of right breast revealing invasive high-grade mammary carcinoma, grade 3. Tubule score 3, nuclear grade 3, and mitotic score 2 for a total score of 8. Neoplasm is present in 4/8 biopsies and measures 4 mm in greatest dimension.   Biopsy performed on 05/09/2019 of prominent lymph node of right axilla was benign- negative for metastatic carcinoma.  ER: 82.35%,  positive  SC: 92.68%, positive  HER-2/latoya: 1+ negative  Ki-67 14.81%       05/09/2019 MRI of bilateral breast:   IMPRESSION:  1. Biopsy-proven malignancy in the right breast in the anterior  one-third approximately 1.8 cm from the nipple at the 2:30 position with  an internal metallic clip. The mass measures on the order of 1 cm in  greatest dimension. No other suspicious findings are seen within the  right breast and there is no evidence for right axillary or internal  mammary chain adenopathy. The patient appears to be a candidate for  breast conservation therapy.  2. There are no findings suspicious for malignancy in the left breast.  BI-RADS Category 6: Known biopsy-proven malignancy.    5/14/19: Patient was seen by Dr. Greene who felt that she was a good candidate for lumpectomy given the size of the lesion related to her breast size.  Because of superficial subareolar location she would require removal of the nipple areolar complex in order to make sure she has negative margins.  Patient preferred breast conservation with central lumpectomy.  This included the removal of the nipple areolar complex.      Also obtained INVITAE  genetic test given her family history of malignancy.    5/28/2019 - Patient underwent right central partial mastectomy (with removal of the nipple-areola-complex) and sentinel lymph node biopsy.   Pathology report from surgery shows:    Final Diagnosis  1. Right Axilla, [place] Lymph Node #1 (hot, blue, count 628):  A. One lymph node with  fatty infiltration and focal florid foreign body giant cell response consistent with clip  Placement.  B. No metastatic carcinoma identified by routine staining (0/1).  2. Right Axilla, Non-[place] Lymph Node:  A. Benign fibrofatty tissue with focal minimal fat necrosis.  B. No intact lymph node tissue identified.  3. Right Central Partial Mastectomy:  A. Invasive mammary carcinoma of no special type (ductal carcinoma).  1. Invasive carcinoma measures 10 mm x 8 mm x 8 mm.  2. Overall Larsen grade II (glandular score = 2, nuclear score = 2, mitotic score = 2).  B. No associated ductal carcinoma in situ component by IHC staining (see comment).  C. Margins are negative for invasive carcinoma. Invasive carcinoma measures at least 15 mm from  the closest (Posterior) margin of excision.  All other margins measure at least 20 mm from invasive carcinoma including:    Anterior margin = 20 mm.  Superior margin = 20 mm.  Inferior margin = 53 mm.  Medial Margin = 50 mm.  Lateral margin = 60 mm.  D. No lymphovascular space invasion identified.  E. No pagetoid involvement of skin nor nipple by carcinoma identified.  F. Focal stromal fibrosis (scar) organizing fat necrosis and foreign giant cell reaction noted consistent with  previous needled biopsy site.  G. Nonneoplastic breast tissue with fibrocystic change, focal adenosis, organizing fat necrosis.  H. Microcalcifications are identified within benign ductal tissue.  I. Previously reported biomarkers:   Estrogen receptor: positive (82.35% moderate),   Progesterone receptor:  positive (92.68%, strong)   HER2/Amy: negative (IHC score1+) and Ki-67=14.8%  Pathologic Stage: pT1b, (sn)N0 (G2).    Synoptic Report :  TUMOR  Tumor Site: Invasive Carcinoma Central  Clock Position of Tumor Site 2 o'clock  3 o'clock  Histologic Type Invasive carcinoma of no special type (ductal, not otherwise  specified)  Glandular (Acinar) / Tubular Differentiation Score 2  Nuclear Pleomorphism Score  2  Mitotic Rate Score 2 (4-7 mitoses per mm2)  Number of Mitoses per 10 High-Power Fields 15 mitotic figures  Diameter of Microscope Field in Millimeters (mm) 0.55 Millimeters (mm)  Overall Grade Grade 2 (scores of 6 or 7)  Tumor Size Greatest dimension of largest invasive focus in Millimeters (mm): 10  Millimeters (mm)    MARGINS  Invasive Carcinoma Margins Uninvolved by invasive carcinoma    LYMPH NODES  Regional Lymph Nodes Uninvolved by tumor cells  Number of Lymph Nodes Examined 1  Number of [place] Nodes Examined 1    Oncotype DX testing ordered but pending.    Patient has a family history of paternal grandmother with breast cancer, paternal aunt with breast cancer, cousin on father's side dying of breast cancer at age 43.  History of ovarian or prostate cancer or uterine cancer.  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     Patient is now scheduled for counseling with genetic and also with radiation oncology next week.    Patient is here to discuss further options of treatment.    PAST MEDICAL HISTORY:  1. Hypertension- Under control with medications  2. Pre-diabetes- Diet control  3. Obesity  4. PCOS   5. Anxiety- Worse since cancer diagnosis. Taking Gabapentin since yesterday which helped with her symptoms. Followed by Alexa Trinh.   6. No hx of CVA or CAD.     FAMILY HISTORY:  Paternal cousin with breast cancer diagnosed at 43 and . Paternal aunt with breast cancer s/p mastectomy; paternal grandmother with breast cancer s/p lumpectomy.   No other family history of ovarian cancer or prostrate cancer  Mother had kidney cancer s/p surgery.     OB-GYN:  Menarche- 9 years old  Menopause- n/a  Pregnancies- none  Post menopausal HRT- N/A  Hx of birth control pills- None    SOCIAL HISTORY:  She works at [employer].  She is  to her wife for the last 2 years but lived with her for 19.  Years.  Patient's wife is a  schoolteacher.  Patient does not smoke.  She does not drink.  Allergies reviewed with the patient today.      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   • Sleep apnea     C-PAP IN USE   • Vitamin D deficiency         Past Surgical History:   Procedure Laterality Date   • APPENDECTOMY      removed when removing dermoid on right ovary   • BREAST BIOPSY Right 2019   • BREAST LUMPECTOMY WITH SENTINEL NODE BIOPSY Right 5/28/2019    Procedure: Right central partial mastectomy (with removal of the nipple-areola-complex) and sentinel lymph node biopsy;  Surgeon: Trena Greene MD;  Location: [facility];  Service: General   • CHOLECYSTECTOMY     • DERMOID CYST  EXCISION  1989   • HERNIA REPAIR  2006    umbilical   • OVARY SURGERY      dermoid removed   • SALIVARY GLAND SURGERY  1993    due to cancer        Current Outpatient Medications on File Prior to Visit   Medication Sig Dispense Refill   • cholecalciferol (VITAMIN D3) 1000 units tablet Take 3,000 Units by mouth Daily.     • gabapentin (NEURONTIN) 100 MG capsule Take 1 capsule by mouth 4 (Four) Times a Day. 120 capsule 1   • ibuprofen (ADVIL,MOTRIN) 800 MG tablet Take 1 tablet by mouth Every 6 (Six) Hours As Needed for mild pain (1-3) or moderate pain (4-6). (Patient taking differently: Take 800 mg by mouth Every 6 (Six) Hours As Needed for Mild Pain  or Moderate Pain  (ON HOLD FOR SX).) 30 tablet 0   • lisinopril-hydrochlorothiazide (PRINZIDE,ZESTORETIC) 20-25 MG per tablet TAKE 1 TABLET BY MOUTH DAILY 30 tablet 5   • modafinil (PROVIGIL) 200 MG tablet Take 1 tablet by mouth Daily. 30 tablet 0   • terbinafine (lamiSIL) 250 MG tablet  Take 1 tablet by mouth Daily. (Patient taking differently: Take 250 mg by mouth Daily. PT HAS NOT STARTED THIS MED) 30 tablet 2   • vitamin B-12 (CYANOCOBALAMIN) 1000 MCG tablet Take 1,000 mcg by mouth Daily.     • Vortioxetine HBr 5 MG tablet Take 5 mg by mouth Daily With Breakfast. 30 tablet 1   • [DISCONTINUED] metroNIDAZOLE (METROGEL) 0.75 % gel Apply  topically to the appropriate area as directed Every 12 (Twelve) Hours. (Patient taking differently: Apply 1 application topically to the appropriate area as directed As Needed.) 45 g 0   • [DISCONTINUED] oxyCODONE (ROXICODONE) 5 MG immediate release tablet Take 1-2 tablets by mouth Every 4 (Four) Hours As Needed for Moderate Pain  or Severe Pain . 25 tablet 0     No current facility-administered medications on file prior to visit.         ALLERGIES:    Allergies   Allergen Reactions   • Citalopram Rash   • Flexeril [Cyclobenzaprine] Other (See Comments)     MUSCLES TENSE-OPPOSITE OF WHAT IT IS INTENDED TO DO-ELEVATES B/P   • Doxycycline Rash     Blisters on hands   • Keflex [Cephalexin] Rash   • Sulfa Antibiotics Rash     NAUSEA/VOMITING/FEVER        Social History     Socioeconomic History   • Marital status:      Spouse name: Trena Montenegro   • Number of children: 0   • Years of education: Not on file   • Highest education level: Not on file   Occupational History     Employer: [employer]     Comment: 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    Tobacco Use   • Smoking status: Never Smoker   • Smokeless tobacco: Never Used   Substance and Sexual Activity   • Alcohol use: Yes     Comment: occ   • Drug use: No   • Sexual activity: Yes     Partners: Female     Birth control/protection: None        Family History   Problem Relation Age of Onset   • Hypertension Mother    • Arthritis Mother    • Kidney cancer Mother    • Heart disease Father    • Hernia Father    • Hypertension Father    • No Known Problems Brother    • Breast cancer  "Paternal Aunt    • Breast cancer Paternal Cousin    • Malig Hyperthermia Neg Hx         Review of Systems   Constitutional: Negative for appetite change, chills, diaphoresis, fatigue, fever and unexpected weight change.   HENT: Negative for hearing loss, sore throat and trouble swallowing.    Respiratory: Negative for cough, chest tightness, shortness of breath and wheezing.    Cardiovascular: Negative for chest pain, palpitations and leg swelling.   Gastrointestinal: Negative for abdominal distention, abdominal pain, constipation, diarrhea, nausea and vomiting.   Genitourinary: Negative for dysuria, frequency, hematuria and urgency.   Musculoskeletal: Negative for joint swelling.        No muscle weakness.   Skin: Negative for rash and wound.   Neurological: Negative for seizures, syncope, speech difficulty, weakness, numbness and headaches.   Hematological: Negative for adenopathy. Does not bruise/bleed easily.   Psychiatric/Behavioral: Negative for behavioral problems, confusion and suicidal ideas.        Objective     Vitals:    06/14/19 0952   BP: 128/85   Pulse: 80   Resp: 20   Temp: 98.9 °F (37.2 °C)   TempSrc: Oral   SpO2: 100%   Weight: (!) 138 kg (304 lb 1.6 oz)   Height: 167.6 cm (65.98\")  Comment: new patient height   PainSc: 0-No pain     Current Status 6/14/2019   ECOG score 0       PHYSICAL EXAMINATION:    GENERAL:  Well-developed, well-nourished in no acute distress.   SKIN:  Warm, dry without rashes, purpura or petechiae.  EYES:  Pupils equal, round and reactive to light.  EOMs intact.  Conjunctivae normal.  EARS:  Hearing intact.  NOSE:  Septum midline.  No excoriations or nasal discharge.  MOUTH:  Tongue is well-papillated; no stomatitis or ulcers.  Lips normal.  THROAT:  Oropharynx without lesions or exudates.  NECK:  Supple with good range of motion; no thyromegaly or masses, no JVD.  LYMPHATICS:  No cervical, supraclavicular, axillary or inguinal adenopathy.  CHEST:  Lungs clear to " auscultation. Good airflow.  BREAST: Right breast: Status post right lumpectomy with all of nipple areolar complex and sentinel lymph node.  Surgical site is well-healed.  No skin changes, no evidence of breast mass,  no evidence of any right axillary adenopathy or right supraclavicular adenopathy  Left breast: No evidence of any skin changes, no evidence of any left breast mass and no evidence of left nipple discharge as well as no left axillary adenopathy or left supraclavicular adenopathy.  CARDIAC:  Regular rate and rhythm without murmurs, rubs or gallops. Normal S1,S2.  ABDOMEN:  Soft, nontender with no hepatosplenomegaly or masses.  EXTREMITIES:  No clubbing, cyanosis or edema.  NEUROLOGICAL:  Cranial Nerves II-XII grossly intact.  No focal neurological deficits.  PSYCHIATRIC:  Normal affect and mood.        RECENT LABS:  Hematology WBC   Date Value Ref Range Status   06/14/2019 11.29 (H) 3.40 - 10.80 10*3/mm3 Final     RBC   Date Value Ref Range Status   06/14/2019 4.90 3.77 - 5.28 10*6/mm3 Final     Hemoglobin   Date Value Ref Range Status   06/14/2019 14.2 12.0 - 15.9 g/dL Final     Hematocrit   Date Value Ref Range Status   06/14/2019 40.7 34.0 - 46.6 % Final     Platelets   Date Value Ref Range Status   06/14/2019 295 140 - 450 10*3/mm3 Final          Assessment/Plan     1. High grade, invasive ductal carcinoma of right breast, ER/SC+, Her2 negative; clinical T1bN0, anatomic stage IA, prognostic stage IA:   · May 6, 2019 ultrasound-guided biopsy of the right breast mass,  - grade 3, invasive mammary carcinoma, [place] score of 8, biopsy of right axillary lymph node negative  ER 82.35%, SC 92.68%, HER-2/latoya 1+ , Ki-67 14.8%    · May 28, 2019 right lumpectomy with removal of nipple areolar complex and right sentinel lymph node biopsy  -Central 2:00, invasive ductal carcinoma, grade 2, Elvin score of 6, tumor size is 10 mm, no DCIS, one lymph node negative, good margins  Oncotype DX testing  pending.        · I discussed the pathology findings with the patient today including ER/NH+, Her2 negative status of her cancer.  I discussed about consideration of chemotherapy if Oncotype DX testing falls in a high risk category.  Patient is not  keen on receiving chemotherapy.  I further discussed in length with patient on the 3 different endocrine therapies tamoxifen/aromatase inhibitors/Evista. Discussed Tamoxifen is for pre-menopausal women and other two are for post-menopausal women. Since she is pre-menopausal, Tamoxifen will be her best and first option. I have detailed the side effects of tamoxifen including depression, hot flashes, rare chance for uterine cancer, weight gain, blood clots, DVT, PE, hair thinning, rare chance for thrombocytopenia, cataracts.  Aromatase inhibitors can cause arthralgias, hot flashes, decrease in bone density, rare chance for diarrhea, also discussed about hot flashes with it.  Evista is approved for osteopenia and can also be used for prophylaxis with fewer side effects except hot flashes and rare chance for cataracts but it can improve bone density.     · I Explained ONCOTYPE DX evaluation which tells us about her percent recurrence risk in next 10 years and her benefit rate from chemotherapy. Patient is not interested in chemotherapy. We will further discuss chemotherapy in detail once the results of oncotype DX are back.       2. Family history of cancer: Paternal cousin with breast cancer diagnosed at 43 and . Paternal aunt with breast cancer s/p mastectomy; paternal grandmother with breast cancer s/p lumpectomy.   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  ·  She has an appointment with  in early July.     3. Mild leukocytosis: CBC done today showed mild elevation of WBC at 11.29. Will monitor.     4. Vitamin B-12 deficiency: B12 level 270, advised patient to take over  the counter B-12 thousand micrograms orally daily.     5. PCOS, Complicated with obesity: Patient attempted to undergo bariatric surgery in order to reduce weight and further help with her PCOS. This was stalled due to her recent breast cancer diagnosis. In past patient has been able to lose 85 lbs with diet modifications.   · Advised patient to exercise 5 times per week with 20 minutes of aerobics, total of 40 minutes/day .   · Suggested [email or web address] program and patient to visit the cancer resource center.  · Referral to Nutritionist for further recommendations.     6.  Anxiety: Patient has significant anxiety, has been referred to [facility] Trinh.  In the past she has tried Zoloft Wellbutrin and Seroquel and did not have good response to any of these medications.      PLAN:  1. Reviewed screening mammogram, diagnostic mammogram, and results of all pathology reports with the patient today.   2. Patient to follow-up with  in July as scheduled counseling.   2. Will discuss chemotherapy after ONCOTYPE DX results are back  3. Patient is a candidate for hormonal therapy with tamoxifen given that she is premenopausal and will be soon postmenopausal.  Tamoxifen is approved for 10 years.  She could receive 5 years of tamoxifen and subsequently 5 years of aromatase inhibitor once postmenopausal  4. Patient has follow-up with radiation oncology next week.   5. Advised patient to exercise, will see if she is eligible for the live strong program and she will follow-up with the cancer [facility] center.   6. Referral to nutritionist .  7. Given that she has CHEK 2  mutation, she could likely alternate mammogram with MRI of the breast for surveillance every 6 months, or do both of them yearly.  8. Patient to follow-up with Alexa Trinh as she has significant anxiety  9. Follow up with me in 2 weeks with labs at which time we will discuss further treatment options with chemotherapy based on ONCOTYPE DX results.       I,  Nevin Dumont, acted as scribe for Tia Trevizo MD  in documenting the service or procedure. To the best of my knowledge, I recorded what was dictated by MD Tia Gonzalez MD

## 2019-06-18 ENCOUNTER — CONSULT (OUTPATIENT)
Dept: RADIATION ONCOLOGY | Facility: HOSPITAL | Age: 49
End: 2019-06-18

## 2019-06-18 ENCOUNTER — APPOINTMENT (OUTPATIENT)
Dept: RADIATION ONCOLOGY | Facility: HOSPITAL | Age: 49
End: 2019-06-18

## 2019-06-18 VITALS
HEIGHT: 66 IN | WEIGHT: 293 LBS | OXYGEN SATURATION: 99 % | DIASTOLIC BLOOD PRESSURE: 67 MMHG | TEMPERATURE: 98.8 F | SYSTOLIC BLOOD PRESSURE: 108 MMHG | HEART RATE: 88 BPM | BODY MASS INDEX: 47.09 KG/M2

## 2019-06-18 DIAGNOSIS — Z17.0 MALIGNANT NEOPLASM OF CENTRAL PORTION OF RIGHT BREAST IN FEMALE, ESTROGEN RECEPTOR POSITIVE (HCC): Primary | ICD-10-CM

## 2019-06-18 DIAGNOSIS — C50.111 MALIGNANT NEOPLASM OF CENTRAL PORTION OF RIGHT BREAST IN FEMALE, ESTROGEN RECEPTOR POSITIVE (HCC): Primary | ICD-10-CM

## 2019-06-18 PROCEDURE — G0463 HOSPITAL OUTPT CLINIC VISIT: HCPCS | Performed by: RADIOLOGY

## 2019-06-18 PROCEDURE — 99205 OFFICE O/P NEW HI 60 MIN: CPT | Performed by: RADIOLOGY

## 2019-06-18 NOTE — PROGRESS NOTES
Subjective     Trena Greene MD     Diagnosis Plan   1. Malignant neoplasm of central portion of right breast in female, estrogen receptor positive (CMS/HCC)       Chief Complaint   Patient presents with   • Consult     Breast CA   CC: T1bN0 right breast cancer   ERPR pos Her 2 neg                          Dear Trena Greene MD    I had the pleasure of seeing Emilie Soto  today in the [facility].    The patient is a 49 y.o. year old female with recently diagnosed right breast cancer, pT1cN0.  She had her yearly mammogram at [facility] on 19 which showed a small oval mass 10mm in sub areolar region right breast.  She had a diagnostic right breast mammogram on 19 which confirmed a 10mm mass in anterior one third subareaolar region, 2cm from the nipple.  Ultrasound of the right breast also showed a solid mass 9mm hypoechoic and an abnormal lymph node in the right axilla, birads 4C.  She then had an ultrasound guided biopsy on 19 which revealed invasive high grade ductal carcinoma, grade III, with tumor present in 4 of 8 cores, 4mm largest size, ER 82% NC 92.68% Her 2 negative and ki 67 14.8%.   . Biopsy of the right axillary node was negative.  She had a breast MRI on 19 which showed the 1cm mass in anterior one third right breast, 2:30 position, 1.8cm from the nipple, with no other suspicious findings.      She underwent a right central lumpectomy and sentinel node biopsy on 19 with pathology revealing a 10 x 8 x8 mm invasive ductal carcinoma, grade II,  With no associated DCIS, and widely negative margins with the tumor located 15mm from the closest posterior margin.  One sentinel node was negative for metastatic disease.  Her final pathological stage was pT1bN0.  No lymphvascular space invasion was identified.    She is , menarche age 9, premenopausal with lmp last month.  She took birth control for one year.    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   She has a paternal aunt and a paternal cousin with breast cancer and denies any hx of ovarian cancer.  She had a benign left breast biopsy in 2014.    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      She is recovering well from her surgery and referred today for evaluation for adjuvant radiation.  She met with Dr. Trevizo with CBC last week and she has on ordered an Onctoype test which is still pending to determine need for chemotherapy.  .        Review of Systems   Constitutional: Negative.    HENT: Negative.    Respiratory: Negative.    Cardiovascular: Negative.    Gastrointestinal: Negative.    Musculoskeletal: Negative.    Skin: Negative.    Allergic/Immunologic: Negative.    Neurological: Negative.    Psychiatric/Behavioral: The patient is nervous/anxious.          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   • Sleep apnea     C-PAP IN USE   • Vitamin D deficiency          Past Surgical History:   Procedure Laterality Date   • APPENDECTOMY      removed when removing dermoid on right ovary   • BREAST BIOPSY Right 2019   • BREAST LUMPECTOMY WITH SENTINEL NODE BIOPSY Right 5/28/2019    Procedure: Right central partial mastectomy (with removal of the nipple-areola-complex) and sentinel lymph node biopsy;  Surgeon: Trena Greene MD;  Location: [facility] OR;  Service: General   • CHOLECYSTECTOMY     • DERMOID CYST  EXCISION  1989   • HERNIA REPAIR  2006    umbilical   • OVARY SURGERY      dermoid removed   • SALIVARY GLAND  SURGERY  1993    due to cancer         Social History     Socioeconomic History   • Marital status:      Spouse name: Trena Montenegro   • Number of children: 0   • Years of education: Not on file   • Highest education level: Not on file   Occupational History     Employer: [employer]     Comment: 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    Tobacco Use   • Smoking status: Never Smoker   • Smokeless tobacco: Never Used   Substance and Sexual Activity   • Alcohol use: Yes     Comment: occ   • Drug use: No   • Sexual activity: Yes     Partners: Female     Birth control/protection: None         Family History   Problem Relation Age of Onset   • Hypertension Mother    • Arthritis Mother    • Kidney cancer Mother    • Heart disease Father    • Hernia Father    • Hypertension Father    • No Known Problems Brother    • Breast cancer Paternal Aunt    • Breast cancer Paternal Cousin    • Malig Hyperthermia Neg Hx           Objective    Physical Exam   Constitutional: She is oriented to person, place, and time.   Obese,    HENT:   Head: Atraumatic.   Eyes: EOM are normal.   Neck: Neck supple.   Pulmonary/Chest: Effort normal.   Right nipple/areolar complex sugically absent, incision healing well, no palpable masses in either breast or axilla       Abdominal: Soft.   Musculoskeletal: Normal range of motion.   Neurological: She is alert and oriented to person, place, and time.   Skin: Skin is warm and dry.   Psychiatric: She has a normal mood and affect. Her behavior is normal. Judgment and thought content normal.         Current Outpatient Medications on File Prior to Visit   Medication Sig Dispense Refill   • cholecalciferol (VITAMIN D3) 1000 units tablet Take 3,000 Units by mouth Daily.     • gabapentin (NEURONTIN) 100 MG capsule Take 1 capsule by mouth 4 (Four) Times a Day. 120 capsule 1   • ibuprofen (ADVIL,MOTRIN) 800 MG tablet Take 1 tablet by mouth Every 6 (Six) Hours As Needed for mild  pain (1-3) or moderate pain (4-6). (Patient taking differently: Take 800 mg by mouth Every 6 (Six) Hours As Needed for Mild Pain  or Moderate Pain  (ON HOLD FOR SX).) 30 tablet 0   • lisinopril-hydrochlorothiazide (PRINZIDE,ZESTORETIC) 20-25 MG per tablet TAKE 1 TABLET BY MOUTH DAILY 30 tablet 5   • modafinil (PROVIGIL) 200 MG tablet Take 1 tablet by mouth Daily. 30 tablet 0   • terbinafine (lamiSIL) 250 MG tablet Take 1 tablet by mouth Daily. (Patient taking differently: Take 250 mg by mouth Daily. PT HAS NOT STARTED THIS MED) 30 tablet 2   • vitamin B-12 (CYANOCOBALAMIN) 1000 MCG tablet Take 1,000 mcg by mouth Daily.     • Vortioxetine HBr 5 MG tablet Take 5 mg by mouth Daily With Breakfast. 30 tablet 1     No current facility-administered medications on file prior to visit.        ALLERGIES:    Allergies   Allergen Reactions   • Citalopram Rash   • Flexeril [Cyclobenzaprine] Other (See Comments)     MUSCLES TENSE-OPPOSITE OF WHAT IT IS INTENDED TO DO-ELEVATES B/P   • Doxycycline Rash     Blisters on hands   • Keflex [Cephalexin] Rash   • Sulfa Antibiotics Rash     NAUSEA/VOMITING/FEVER       LMP 05/19/2019 (Exact Date)      Current Status 6/14/2019   ECOG score 0         Assessment/Plan   49 year old female with pT1bN0 right breast cancer, ERPR positive now 3 weeks out from surgery.  I discussed with her my recommendation for post-operative radiation therapy to the right breast to decrease the risk of local recurrence.      I discussed with her in detail the risks, benefits and rationale of radiation therapy to the right breast to include but not limited to the following:    Acute: skin erythema, breakdown/moist desquamation, swelling or discomfort of the breast, fatigue, pneumonitis resulting in shortness of breath, cough or pain    Late: Permanent skin changes including hyperpigmentation, telangiectasias, fibrosis of the breast resulting in smaller size or poor cosmetic outcome, late edema or cellulitis, late  rib fracture, late pulmonary fibrosis and the remote risk of second malignancies.      She voiced understanding and was given an opportunity to ask questions which I believe were answered to her satisfaction.  I have scheduled her to return the week of July 8 for CT simulation for treatment planning.I plan to treat the right breast with tangential fields to a dose of approximately 50 Gy over the course of 5 1/2 weeks.  If her oncotype returns with a high score and she proceeds with chemotherapy, I asked her to call and cancel this appointment and I will see her back once she has completed chemo.    I spent greater than 60 minutes in face-to-face time with the patient and 45 minutes of that time were spent in counseling and coordination of care, including review of imaging and pathology; indications, goals, logistics, alternatives and risks - both common and rare - for my recommendations as well as surveillance and potential outcomes.               Thank you very much for allowing me to participate in the care of this very pleasant patient.    Sincerely,      Chanel Carballo MD

## 2019-06-19 ENCOUNTER — PATIENT MESSAGE (OUTPATIENT)
Dept: INTERNAL MEDICINE | Facility: CLINIC | Age: 49
End: 2019-06-19

## 2019-06-19 DIAGNOSIS — F41.9 ANXIETY: Primary | ICD-10-CM

## 2019-06-19 RX ORDER — LORAZEPAM 0.5 MG/1
0.5 TABLET ORAL EVERY 8 HOURS PRN
Qty: 45 TABLET | Refills: 0 | Status: SHIPPED | OUTPATIENT
Start: 2019-06-19 | End: 2019-09-05 | Stop reason: SDUPTHER

## 2019-06-19 NOTE — TELEPHONE ENCOUNTER
From: Emilie Soto  To: Pinky HyattVICENTE  Sent: 6/19/2019 9:28 AM EDT  Subject: Non-Urgent Medical Question    Good Morning Pinky,    Can you refill my Ativan? Everyone says I need to take it :-). I just have consults with Dr. LIN & Dr. Carballo. We don't have my oncotype score back yet so Dr. LIN (and me) is waiting for that to make a better informed recommendation for Chemo. She said she wouldn't force it on me. So we will see what the score is and go from there. She is going to put me on Tamoxifen at some point- she said after I heal more. Radiation will be the first of July- July 8 is when I go get marked for Radiation. And that will only happen if we don't do Chemo. If I have to do chemo then chemo will come first.     I hope you are doing well. Have a great weekend!  Emilie

## 2019-06-24 DIAGNOSIS — C50.111 MALIGNANT NEOPLASM OF CENTRAL PORTION OF RIGHT BREAST IN FEMALE, ESTROGEN RECEPTOR POSITIVE (HCC): Primary | ICD-10-CM

## 2019-06-24 DIAGNOSIS — Z17.0 MALIGNANT NEOPLASM OF CENTRAL PORTION OF RIGHT BREAST IN FEMALE, ESTROGEN RECEPTOR POSITIVE (HCC): Primary | ICD-10-CM

## 2019-06-26 ENCOUNTER — OFFICE VISIT (OUTPATIENT)
Dept: PSYCHIATRY | Facility: HOSPITAL | Age: 49
End: 2019-06-26

## 2019-06-26 ENCOUNTER — OFFICE VISIT (OUTPATIENT)
Dept: OTHER | Facility: HOSPITAL | Age: 49
End: 2019-06-26

## 2019-06-26 DIAGNOSIS — F33.1 MODERATE EPISODE OF RECURRENT MAJOR DEPRESSIVE DISORDER (HCC): ICD-10-CM

## 2019-06-26 DIAGNOSIS — Z17.0 MALIGNANT NEOPLASM OF CENTRAL PORTION OF RIGHT BREAST IN FEMALE, ESTROGEN RECEPTOR POSITIVE (HCC): Primary | ICD-10-CM

## 2019-06-26 DIAGNOSIS — F41.1 GENERALIZED ANXIETY DISORDER: Primary | ICD-10-CM

## 2019-06-26 DIAGNOSIS — C50.111 MALIGNANT NEOPLASM OF CENTRAL PORTION OF RIGHT BREAST IN FEMALE, ESTROGEN RECEPTOR POSITIVE (HCC): Primary | ICD-10-CM

## 2019-06-26 LAB
CYTO UR: NORMAL
LAB AP CASE REPORT: NORMAL
LAB AP CLINICAL INFORMATION: NORMAL
LAB AP DIAGNOSIS COMMENT: NORMAL
LAB AP SYNOPTIC CHECKLIST: NORMAL
Lab: NORMAL
PATH REPORT.ADDENDUM SPEC: NORMAL
PATH REPORT.FINAL DX SPEC: NORMAL
PATH REPORT.GROSS SPEC: NORMAL

## 2019-06-26 PROCEDURE — G0463 HOSPITAL OUTPT CLINIC VISIT: HCPCS | Performed by: NURSE PRACTITIONER

## 2019-06-26 PROCEDURE — 99214 OFFICE O/P EST MOD 30 MIN: CPT | Performed by: NURSE PRACTITIONER

## 2019-06-26 NOTE — PROGRESS NOTES
Oncology Social Work  [facility] - [address]    Met with patient today for supportive counseling.  Patient met with VICENTE Magana prior to meeting with OSW.  They reviewed [employer] testing.  Patient with decreases anxiety.  Symptoms have improved some.    Patient reports plans for 6 weeks of chemo.  Awaiting Oncotype score to help determine if chemo is needed.  Patient has returned to work this past two weeks.  Patient with need for some financial assistance.  Provided info on how to apply for [address] and [email or web address]    Will cont supportive counseling.  Thank you  Helena Stovall, MALENAW, CSW, OSW-C

## 2019-06-27 ENCOUNTER — TELEPHONE (OUTPATIENT)
Dept: INTERNAL MEDICINE | Facility: CLINIC | Age: 49
End: 2019-06-27

## 2019-06-27 ENCOUNTER — OFFICE VISIT (OUTPATIENT)
Dept: OTHER | Facility: HOSPITAL | Age: 49
End: 2019-06-27

## 2019-06-27 NOTE — TELEPHONE ENCOUNTER
"Regarding: FW: Non-Urgent Medical Question  Contact: [phone]      ----- Message -----  From: Emilie Soto  Sent: 6/26/2019   7:16 AM  To: Savana Hidalgo [facility]  Subject: Non-Urgent Medical Question                      ----- Message from Mychart, Generic sent at 6/26/2019  7:16 AM EDT -----    Good Morning Pinky,    Hope you are doing well.  We are going camping this weekend and [place]  It will be a month since surgery.  Do you think its safe to be in the lake?  The surgery area looks great and nothing is open.   I wont use my arm to actually \"swim\" that much.   I actually came on to email Dr. Greene, so I wouldn't bother you again, but she isn't on my list to be able to email.      Thanks & Have a great day!  Emilie    Called patient and discussed swimming-she is fine to swim in the lake but advised to decrease overuse of right arm.   "

## 2019-06-28 ENCOUNTER — LAB (OUTPATIENT)
Dept: LAB | Facility: HOSPITAL | Age: 49
End: 2019-06-28

## 2019-06-28 ENCOUNTER — OFFICE VISIT (OUTPATIENT)
Dept: ONCOLOGY | Facility: CLINIC | Age: 49
End: 2019-06-28

## 2019-06-28 ENCOUNTER — DOCUMENTATION (OUTPATIENT)
Dept: OTHER | Facility: HOSPITAL | Age: 49
End: 2019-06-28

## 2019-06-28 VITALS
WEIGHT: 293 LBS | SYSTOLIC BLOOD PRESSURE: 142 MMHG | RESPIRATION RATE: 18 BRPM | HEART RATE: 92 BPM | DIASTOLIC BLOOD PRESSURE: 85 MMHG | OXYGEN SATURATION: 97 % | BODY MASS INDEX: 47.09 KG/M2 | HEIGHT: 66 IN | TEMPERATURE: 98.7 F

## 2019-06-28 DIAGNOSIS — Z17.0 MALIGNANT NEOPLASM OF CENTRAL PORTION OF RIGHT BREAST IN FEMALE, ESTROGEN RECEPTOR POSITIVE (HCC): ICD-10-CM

## 2019-06-28 DIAGNOSIS — C50.111 MALIGNANT NEOPLASM OF CENTRAL PORTION OF RIGHT BREAST IN FEMALE, ESTROGEN RECEPTOR POSITIVE (HCC): ICD-10-CM

## 2019-06-28 DIAGNOSIS — C50.111 MALIGNANT NEOPLASM OF CENTRAL PORTION OF RIGHT BREAST IN FEMALE, ESTROGEN RECEPTOR POSITIVE (HCC): Primary | ICD-10-CM

## 2019-06-28 DIAGNOSIS — Z17.0 MALIGNANT NEOPLASM OF CENTRAL PORTION OF RIGHT BREAST IN FEMALE, ESTROGEN RECEPTOR POSITIVE (HCC): Primary | ICD-10-CM

## 2019-06-28 LAB
ALBUMIN SERPL-MCNC: 4.2 G/DL (ref 3.5–5.2)
ALBUMIN/GLOB SERPL: 1.4 G/DL (ref 1.1–2.4)
ALP SERPL-CCNC: 94 U/L (ref 38–116)
ALT SERPL W P-5'-P-CCNC: 31 U/L (ref 0–33)
ANION GAP SERPL CALCULATED.3IONS-SCNC: 13.1 MMOL/L (ref 5–15)
AST SERPL-CCNC: 21 U/L (ref 0–32)
BASOPHILS # BLD AUTO: 0.05 10*3/MM3 (ref 0–0.2)
BASOPHILS NFR BLD AUTO: 0.5 % (ref 0–1.5)
BILIRUB SERPL-MCNC: 0.7 MG/DL (ref 0.2–1.2)
BUN BLD-MCNC: 8 MG/DL (ref 6–20)
BUN/CREAT SERPL: 11.6 (ref 7.3–30)
CALCIUM SPEC-SCNC: 9.7 MG/DL (ref 8.5–10.2)
CHLORIDE SERPL-SCNC: 98 MMOL/L (ref 98–107)
CO2 SERPL-SCNC: 25.9 MMOL/L (ref 22–29)
CREAT BLD-MCNC: 0.69 MG/DL (ref 0.6–1.1)
DEPRECATED RDW RBC AUTO: 39 FL (ref 37–54)
EOSINOPHIL # BLD AUTO: 0.25 10*3/MM3 (ref 0–0.4)
EOSINOPHIL NFR BLD AUTO: 2.5 % (ref 0.3–6.2)
ERYTHROCYTE [DISTWIDTH] IN BLOOD BY AUTOMATED COUNT: 12.8 % (ref 12.3–15.4)
GFR SERPL CREATININE-BSD FRML MDRD: 90 ML/MIN/1.73
GLOBULIN UR ELPH-MCNC: 2.9 GM/DL (ref 1.8–3.5)
GLUCOSE BLD-MCNC: 185 MG/DL (ref 74–124)
HCT VFR BLD AUTO: 39.4 % (ref 34–46.6)
HGB BLD-MCNC: 13.3 G/DL (ref 12–15.9)
IMM GRANULOCYTES # BLD AUTO: 0.07 10*3/MM3 (ref 0–0.05)
IMM GRANULOCYTES NFR BLD AUTO: 0.7 % (ref 0–0.5)
LYMPHOCYTES # BLD AUTO: 2.14 10*3/MM3 (ref 0.7–3.1)
LYMPHOCYTES NFR BLD AUTO: 21.6 % (ref 19.6–45.3)
MCH RBC QN AUTO: 28.9 PG (ref 26.6–33)
MCHC RBC AUTO-ENTMCNC: 33.8 G/DL (ref 31.5–35.7)
MCV RBC AUTO: 85.7 FL (ref 79–97)
MONOCYTES # BLD AUTO: 0.45 10*3/MM3 (ref 0.1–0.9)
MONOCYTES NFR BLD AUTO: 4.5 % (ref 5–12)
NEUTROPHILS # BLD AUTO: 6.94 10*3/MM3 (ref 1.7–7)
NEUTROPHILS NFR BLD AUTO: 70.2 % (ref 42.7–76)
NRBC BLD AUTO-RTO: 0 /100 WBC (ref 0–0.2)
PLATELET # BLD AUTO: 259 10*3/MM3 (ref 140–450)
PMV BLD AUTO: 9.5 FL (ref 6–12)
POTASSIUM BLD-SCNC: 3.8 MMOL/L (ref 3.5–4.7)
PROT SERPL-MCNC: 7.1 G/DL (ref 6.3–8)
RBC # BLD AUTO: 4.6 10*6/MM3 (ref 3.77–5.28)
SODIUM BLD-SCNC: 137 MMOL/L (ref 134–145)
WBC NRBC COR # BLD: 9.9 10*3/MM3 (ref 3.4–10.8)

## 2019-06-28 PROCEDURE — 85025 COMPLETE CBC W/AUTO DIFF WBC: CPT | Performed by: INTERNAL MEDICINE

## 2019-06-28 PROCEDURE — 80053 COMPREHEN METABOLIC PANEL: CPT | Performed by: INTERNAL MEDICINE

## 2019-06-28 PROCEDURE — 99215 OFFICE O/P EST HI 40 MIN: CPT | Performed by: INTERNAL MEDICINE

## 2019-06-28 PROCEDURE — 36415 COLL VENOUS BLD VENIPUNCTURE: CPT | Performed by: INTERNAL MEDICINE

## 2019-06-28 RX ORDER — TAMOXIFEN CITRATE 20 MG/1
20 TABLET ORAL DAILY
Qty: 30 TABLET | Refills: 6 | Status: SHIPPED | OUTPATIENT
Start: 2019-06-28 | End: 2019-07-28

## 2019-06-28 NOTE — PROGRESS NOTES
Subjective     REASON FOR CONSULTATION:1. High grade, invasive ductal carcinoma of right breast, ER/[place]+, Her2 negative; clinical T1bN0, anatomic stage IA, prognostic stage IA  · May 28, 2019 right lumpectomy with removal of nipple areolar complex and right sentinel lymph node biopsy  -Central 2:00, invasive ductal carcinoma, grade 2, Elvin score of 6, tumor size is 10 mm, no DCIS, one lymph node negative, good margins    2.  Oncotype DX score of 6, low risk, no chemo necessary    3.  June 28, 2019: Started tamoxifen    4.  Family history of breast cancer, CHEK2  positive          HISTORY OF PRESENT ILLNESS:     Patient is a 49-year-old female with history of T1b N0 stage Ia, ER [place] positive HER-2 negative right breast cancer status post lumpectomy, with sentinel lymph nodes and Oncotype DX test was sent.  She has a score of 6.  Her benefit from chemotherapy is that less than 1% and given such a low risk there is no need for chemotherapy.  I did discuss with her about endocrine therapy with tamoxifen as she is still premenopausal.  I discussed with her in length the side effects of tamoxifen.      Oncologic history:   The patient is a 49 y.o. year old female who is here for an opinion about the above issue.    Patient is a 49-year old female who presents today as a new patient for consultation on her newly diagnosed high grade, invasive ductal carcinoma of right breast.   Patient had seen Dr. Greene on May 14, 2019.    She underwent a screening mammogram but did not appreciate any mass herself and denied any breast pain or nipple discharge.    Patient is accompanied with her wife Trena.  They have been together for 19 years and  for the last 2 years.    April 18, 2019: Screening mammogram showed a 10 mm small oval mass in the subareolar region of the right breast.  Left breast was negative.  Spot compression and ultrasound recommended.  Of the right breast.    April 29, 2019: Right diagnostic  mammogram showed a high density round mass 10 mm with indistinct margins in the anterior one third subareolar region of the right breast located to centimeters from the nipple.    April 29, 2019: Ultrasound suggested a solid mass with indistinct margins 9 mm and subareolar region.  There is abnormal axillary node in the right axilla.  There are 2 adjacent lymph nodes in the right axilla with mildly prominent cortices.  The left axilla was evaluated for comparison and the nodes on the right are of greater cortical thickness.      Ultrasound-guided biopsy of the breast mass and right axillary lymph node was suggested.     5/9/19:  US guided biopsy sent to [facility]  showing solid mass in the sub-areorolar region of right breast revealing invasive high-grade mammary carcinoma, grade 3. Tubule score 3, nuclear grade 3, and mitotic score 2 for a total score of 8. Neoplasm is present in 4/8 biopsies and measures 4 mm in greatest dimension.   Biopsy performed on 05/09/2019 of prominent lymph node of right axilla was benign- negative for metastatic carcinoma.  ER: 82.35%,  positive  ND: 92.68%, positive  HER-2/latoya: 1+ negative  Ki-67 14.81%       05/09/2019 MRI of bilateral breast:   IMPRESSION:  1. Biopsy-proven malignancy in the right breast in the anterior  one-third approximately 1.8 cm from the nipple at the 2:30 position with  an internal metallic clip. The mass measures on the order of 1 cm in  greatest dimension. No other suspicious findings are seen within the  right breast and there is no evidence for right axillary or internal  mammary chain adenopathy. The patient appears to be a candidate for  breast conservation therapy.  2. There are no findings suspicious for malignancy in the left breast.  BI-RADS Category 6: Known biopsy-proven malignancy.    5/14/19: Patient was seen by Dr. Greene who felt that she was a good candidate for lumpectomy given the size of the lesion related to her breast size.  Because of  superficial subareolar location she would require removal of the nipple areolar complex in order to make sure she has negative margins.  Patient preferred breast conservation with central lumpectomy.  This included the removal of the nipple areolar complex.      Also obtained INVITAE  genetic test given her family history of malignancy.    Estrogen receptor: positive (82.35% moderate),   Progesterone receptor:  positive (92.68%, strong)   HER2/Amy: negative (IHC score1+) and Ki-67=14.8%  Pathologic Stage: pT1b, (sn)N0 (G2).    Synoptic Report :  TUMOR  Tumor Site: Invasive Carcinoma Central  Clock Position of Tumor Site 2 o'clock  3 o'clock  Histologic Type Invasive carcinoma of no special type (ductal, not otherwise  specified)  Glandular (Acinar) / Tubular Differentiation Score 2  Nuclear Pleomorphism Score 2  Mitotic Rate Score 2 (4-7 mitoses per mm2)  Number of Mitoses per 10 High-Power Fields 15 mitotic figures  Diameter of Microscope Field in Millimeters (mm) 0.55 Millimeters (mm)  Overall Grade Grade 2 (scores of 6 or 7)  Tumor Size Greatest dimension of largest invasive focus in Millimeters (mm): 10  Millimeters (mm)    MARGINS  Invasive Carcinoma Margins Uninvolved by invasive carcinoma    LYMPH NODES  Regional Lymph Nodes Uninvolved by tumor cells  Number of Lymph Nodes Examined 1  Number of [place] Nodes Examined 1    Oncotype DX testing ordered but pending.    Patient has a family history of paternal grandmother with breast cancer, paternal aunt with breast cancer, cousin on father's side dying of breast cancer at age 43.  History of ovarian or prostate cancer or uterine cancer.  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     Patient is now scheduled for counseling with genetic and also with radiation oncology next week.    Patient is here to discuss further options of treatment.    PAST MEDICAL  HISTORY:  1. Hypertension- Under control with medications  2. Pre-diabetes- Diet control  3. Obesity  4. PCOS   5. Anxiety- Worse since cancer diagnosis. Taking Gabapentin since yesterday which helped with her symptoms. Followed by Alexa Trinh.   6. No hx of CVA or CAD.     FAMILY HISTORY:  Paternal cousin with breast cancer diagnosed at 43 and . Paternal aunt with breast cancer s/p mastectomy; paternal grandmother with breast cancer s/p lumpectomy.   No other family history of ovarian cancer or prostrate cancer  Mother had kidney cancer s/p surgery.     OB-GYN:  Menarche- 9 years old  Menopause- n/a  Pregnancies- none  Post menopausal HRT- N/A  Hx of birth control pills- None    SOCIAL HISTORY:  She works at [employer].  She is  to her wife for the last 2 years but lived with her for 19.  Years.  Patient's wife is a schoolteacher.  Patient does not smoke.  She does not drink.  Allergies reviewed with the patient today.      Past Medical History:   Diagnosis Date   • Anxiety    • Anxiety and depression    • Cancer (CMS/HCC)     RIGHT BREAST   • Cancer (CMS/HCC)     PAROTID GLAND   • History of kidney stones    • Hyperlipidemia    • Hypertension    • Lumbago    • Mood disorder (CMS/HCC)    • PONV (postoperative nausea and vomiting)    • S/P radiation therapy     PAROTID GLAND CANCER   • Sleep apnea     C-PAP IN USE   • Vitamin D deficiency         Past Surgical History:   Procedure Laterality Date   • APPENDECTOMY      removed when removing dermoid on right ovary   • BREAST BIOPSY Right 2019   • BREAST LUMPECTOMY WITH SENTINEL NODE BIOPSY Right 2019    Procedure: Right central partial mastectomy (with removal of the nipple-areola-complex) and sentinel lymph node biopsy;  Surgeon: Trena Greene MD;  Location: [facility] OR;  Service: General   • CHOLECYSTECTOMY     • DERMOID CYST  EXCISION     • HERNIA REPAIR      umbilical   • OVARY SURGERY      dermoid removed   • SALIVARY GLAND  SURGERY  1993    due to cancer        Current Outpatient Medications on File Prior to Visit   Medication Sig Dispense Refill   • cholecalciferol (VITAMIN D3) 1000 units tablet Take 3,000 Units by mouth Daily.     • gabapentin (NEURONTIN) 100 MG capsule Take 1 capsule by mouth 4 (Four) Times a Day. 120 capsule 1   • ibuprofen (ADVIL,MOTRIN) 800 MG tablet Take 1 tablet by mouth Every 6 (Six) Hours As Needed for mild pain (1-3) or moderate pain (4-6). (Patient taking differently: Take 800 mg by mouth Every 6 (Six) Hours As Needed for Mild Pain  or Moderate Pain  (ON HOLD FOR SX).) 30 tablet 0   • lisinopril-hydrochlorothiazide (PRINZIDE,ZESTORETIC) 20-25 MG per tablet TAKE 1 TABLET BY MOUTH DAILY 30 tablet 5   • LORazepam (ATIVAN) 0.5 MG tablet Take 1 tablet by mouth Every 8 (Eight) Hours As Needed for Anxiety. 45 tablet 0   • modafinil (PROVIGIL) 200 MG tablet Take 1 tablet by mouth Daily. 30 tablet 0   • oxyCODONE HCl 5 MG tablet  Take  by mouth.     • terbinafine (lamiSIL) 250 MG tablet Take 1 tablet by mouth Daily. (Patient taking differently: Take 250 mg by mouth Daily. PT HAS NOT STARTED THIS MED) 30 tablet 2   • vitamin B-12 (CYANOCOBALAMIN) 1000 MCG tablet Take 1,000 mcg by mouth Daily.       No current facility-administered medications on file prior to visit.         ALLERGIES:    Allergies   Allergen Reactions   • Citalopram Rash   • Flexeril [Cyclobenzaprine] Other (See Comments)     MUSCLES TENSE-OPPOSITE OF WHAT IT IS INTENDED TO DO-ELEVATES B/P   • Doxycycline Rash     Blisters on hands   • Keflex [Cephalexin] Rash   • Sulfa Antibiotics Rash     NAUSEA/VOMITING/FEVER        Social History     Socioeconomic History   • Marital status:      Spouse name: Trena Montenegro   • Number of children: 0   • Years of education: Not on file   • Highest education level: Not on file   Occupational History     Employer: 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   "  Tobacco Use   • Smoking status: Never Smoker   • Smokeless tobacco: Never Used   Substance and Sexual Activity   • Alcohol use: Yes     Comment: occ   • Drug use: No   • Sexual activity: Yes     Partners: Female     Birth control/protection: None        Family History   Problem Relation Age of Onset   • Hypertension Mother    • Arthritis Mother    • Kidney cancer Mother    • Heart disease Father    • Hernia Father    • Hypertension Father    • No Known Problems Brother    • Breast cancer Paternal Aunt    • Breast cancer Paternal Cousin    • Malig Hyperthermia Neg Hx         Review of Systems   Constitutional: Negative for appetite change, chills, diaphoresis, fatigue, fever and unexpected weight change.   HENT: Negative for hearing loss, sore throat and trouble swallowing.    Respiratory: Negative for cough, chest tightness, shortness of breath and wheezing.    Cardiovascular: Negative for chest pain, palpitations and leg swelling.   Gastrointestinal: Negative for abdominal distention, abdominal pain, constipation, diarrhea, nausea and vomiting.   Genitourinary: Negative for dysuria, frequency, hematuria and urgency.   Musculoskeletal: Negative for joint swelling.        No muscle weakness.   Skin: Negative for rash and wound.   Neurological: Negative for seizures, syncope, speech difficulty, weakness, numbness and headaches.   Hematological: Negative for adenopathy. Does not bruise/bleed easily.   Psychiatric/Behavioral: Negative for behavioral problems, confusion and suicidal ideas.        Objective     Vitals:    06/28/19 0854   BP: 142/85   Pulse: 92   Resp: 18   Temp: 98.7 °F (37.1 °C)   TempSrc: Oral   SpO2: 97%   Weight: (!) 141 kg (310 lb 4.8 oz)   Height: 167.6 cm (65.98\")   PainSc: 0-No pain     Current Status 6/28/2019   ECOG score 0       PHYSICAL EXAMINATION:    GENERAL:  Well-developed, well-nourished in no acute distress.   LYMPHATICS:  No cervical, supraclavicular, axillary or inguinal " adenopathy.  CHEST:  Lungs clear to auscultation. Good airflow.  CARDIAC:  Regular rate and rhythm without murmurs, rubs or gallops. Normal S1,S2.  ABDOMEN:  Soft, nontender with no hepatosplenomegaly or masses.  EXTREMITIES:  No clubbing, cyanosis or edema.  NEUROLOGICAL:  Cranial Nerves II-XII grossly intact.  No focal neurological deficits.  PSYCHIATRIC:  Normal affect and mood.          RECENT LABS:  Hematology WBC   Date Value Ref Range Status   06/14/2019 11.29 (H) 3.40 - 10.80 10*3/mm3 Final     RBC   Date Value Ref Range Status   06/14/2019 4.90 3.77 - 5.28 10*6/mm3 Final     Hemoglobin   Date Value Ref Range Status   06/14/2019 14.2 12.0 - 15.9 g/dL Final     Hematocrit   Date Value Ref Range Status   06/14/2019 40.7 34.0 - 46.6 % Final     Platelets   Date Value Ref Range Status   06/14/2019 295 140 - 450 10*3/mm3 Final          Assessment/Plan     1. High grade, invasive ductal carcinoma of right breast, ER/[place]+, Her2 negative; clinical T1bN0, anatomic stage IA, prognostic stage IA:   · May 6, 2019 ultrasound-guided biopsy of the right breast mass,  - grade 3, invasive mammary carcinoma, Elvin score of 8, biopsy of right axillary lymph node negative  [place] 82.35%, [place] 92.68%, HER-2/latoya 1+ , Ki-67 14.8%    · May 28, 2019 right lumpectomy with removal of nipple areolar complex and right sentinel lymph node biopsy  -Central 2:00, invasive ductal carcinoma, grade 2, Patterson score of 6, tumor size is 10 mm, no DCIS, one lymph node negative, good margins  · Oncotype DX score of 6, low risk disease, been not give chemotherapy    · I discussed the pathology findings with the patient today including ER/[place]+, Her2 negative status of her cancer.  I discussed about consideration of chemotherapy if Oncotype DX testing falls in a high risk category.  Patient is not  keen on receiving chemotherapy.  I further discussed in length with patient on the 3 different endocrine therapies tamoxifen/aromatase inhibitors/Evista.  Discussed Tamoxifen is for pre-menopausal women and other two are for post-menopausal women. Since she is pre-menopausal, Tamoxifen will be her best and first option. I have detailed the side effects of tamoxifen including depression, hot flashes, rare chance for uterine cancer, weight gain, blood clots, DVT, PE, hair thinning, rare chance for thrombocytopenia, cataracts.  Aromatase inhibitors can cause arthralgias, hot flashes, decrease in bone density, rare chance for diarrhea, also discussed about hot flashes with it.  Evista is approved for osteopenia and can also be used for prophylaxis with fewer side effects except hot flashes and rare chance for cataracts but it can improve bone density.     · We will plan on starting tamoxifen given premenopausal for 2 to 3 years followed by Arimidex once postmenopausal.    2. Family history of cancer: Paternal cousin with breast cancer diagnosed at 43 and . Paternal aunt with breast cancer s/p mastectomy; paternal grandmother with breast cancer s/p lumpectomy.   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  ·  She has an appointment with  in early July.   · We will plan on alternating MRI of breast with mammogram alternating.  · Discussed in breast cancer conference today and Dr. Greene and we agreed that best option is to make an MRI of breast with mammogram every 6 months.    3. Mild leukocytosis: CBC done today showed mild elevation of WBC at 11.29. Will monitor.     4. Vitamin B-12 deficiency: B12 level 270, advised patient to take over the counter B-12 thousand micrograms orally daily.     5. PCOS, Complicated with obesity: Patient attempted to undergo bariatric surgery in order to reduce weight and further help with her PCOS. This was stalled due to her recent breast cancer diagnosis. In past patient has been able to lose 85 lbs with diet modifications.   · Advised  patient to exercise 5 times per week with 20 minutes of aerobics, total of 40 minutes/day .   · Suggested Live strong program and patient to visit the cancer resource center.  · Referral to Nutritionist for further recommendations.     6.  Anxiety: Patient has significant anxiety, has been referred to Alexa Trinh.  In the past she has tried Zoloft Wellbutrin and Seroquel and did not have good response to any of these medications.      PLAN:  1. Reviewed Oncotype DX score, low risk, no chemo needed  2. Start tamoxifen 20 mg daily starting last 2 weeks of radiation  3. Followed by Dr. Chanel Sutton, seen June 18, 2019.  Has follow-up with Dr. Sutton on July 8 to start radiation.  With plans of giving total of 5-1/2 weeks.  4. Advised patient to exercise, will see if she is eligible for the live strong program and she will follow-up with the cancer resource center.   5. Patient has seen nutritionist .  6. Given that she has CHEK 2  mutation, she could likely alternate mammogram with MRI of the breast for surveillance every 6 months, recent MRI in May 2019.  Mammogram will be due December 2019  7. Patient to follow-up with Alexa Trinh as she has significant anxiety  8. As follow-up with Dr. Trena Greene on September 13, 2019.  9. Following completion of radiation we will refer her to survivorship clinic        Tia Trevizo MD           Cc: [address]IC[address]

## 2019-07-02 ENCOUNTER — HOSPITAL ENCOUNTER (OUTPATIENT)
Dept: PHYSICAL THERAPY | Facility: HOSPITAL | Age: 49
Setting detail: THERAPIES SERIES
Discharge: HOME | End: 2019-07-02

## 2019-07-02 ENCOUNTER — TELEPHONE (OUTPATIENT)
Dept: PSYCHIATRY | Facility: HOSPITAL | Age: 49
End: 2019-07-02

## 2019-07-02 DIAGNOSIS — Z17.0 MALIGNANT NEOPLASM OF CENTRAL PORTION OF RIGHT BREAST IN FEMALE, ESTROGEN RECEPTOR POSITIVE (HCC): Primary | ICD-10-CM

## 2019-07-02 DIAGNOSIS — M25.511 ACUTE PAIN OF RIGHT SHOULDER: ICD-10-CM

## 2019-07-02 DIAGNOSIS — C50.111 MALIGNANT NEOPLASM OF CENTRAL PORTION OF RIGHT BREAST IN FEMALE, ESTROGEN RECEPTOR POSITIVE (HCC): Primary | ICD-10-CM

## 2019-07-02 DIAGNOSIS — Z48.89 AFTERCARE FOLLOWING SURGERY: ICD-10-CM

## 2019-07-02 DIAGNOSIS — Z15.01 MONOALLELIC MUTATION OF CHEK2 GENE IN FEMALE PATIENT: ICD-10-CM

## 2019-07-02 DIAGNOSIS — Z90.11 STATUS POST PARTIAL MASTECTOMY OF RIGHT BREAST: ICD-10-CM

## 2019-07-02 DIAGNOSIS — Z15.89 MONOALLELIC MUTATION OF CHEK2 GENE IN FEMALE PATIENT: ICD-10-CM

## 2019-07-02 DIAGNOSIS — Z15.09 MONOALLELIC MUTATION OF CHEK2 GENE IN FEMALE PATIENT: ICD-10-CM

## 2019-07-02 DIAGNOSIS — Z91.89 AT RISK FOR LYMPHEDEMA: ICD-10-CM

## 2019-07-02 DIAGNOSIS — Z15.02 MONOALLELIC MUTATION OF CHEK2 GENE IN FEMALE PATIENT: ICD-10-CM

## 2019-07-02 PROCEDURE — 97162 PT EVAL MOD COMPLEX 30 MIN: CPT | Performed by: PHYSICAL THERAPIST

## 2019-07-02 PROCEDURE — 93702 BIS XTRACELL FLUID ANALYSIS: CPT | Performed by: PHYSICAL THERAPIST

## 2019-07-02 PROCEDURE — 97110 THERAPEUTIC EXERCISES: CPT | Performed by: PHYSICAL THERAPIST

## 2019-07-02 RX ORDER — VILAZODONE HYDROCHLORIDE 20 MG/1
20 TABLET ORAL DAILY
Qty: 30 TABLET | Refills: 0 | Status: SHIPPED | OUTPATIENT
Start: 2019-07-02 | End: 2019-07-17 | Stop reason: SDDI

## 2019-07-02 NOTE — THERAPY TREATMENT NOTE
Physical Therapy Lymphedema Initial Evaluation   Ramin     Patient Name: Emilie Soto  : 1970  MRN: [id number]  Today's Date: 2019      Visit Date: 2019    Visit Dx:    ICD-10-CM ICD-9-CM   1. Malignant neoplasm of central portion of right breast in female, estrogen receptor positive (CMS/HCC) C50.111 174.1    Z17.0 V86.0   2. Monoallelic mutation of CHEK2 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 Z15.01 V84.01    Z15.89 V84.09    Z15.09 V84.89    Z15.02 V84.02   3. Status post partial mastectomy of right breast Z90.11 V45.71   4. Acute pain of right shoulder M25.511 719.41   5. At risk for lymphedema Z91.89 V49.89   6. Aftercare following surgery Z48.89 V58.89       Patient Active Problem List   Diagnosis   • Anxiety   • Hypertension   • Vitamin D deficiency   • Hyperlipidemia   • Lumbago   • Malignant neoplasm of central portion of right breast in female, estrogen receptor positive (CMS/HCC)   • Family history of breast cancer   • Monoallelic mutation of CHEK2 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        Past Medical History:   Diagnosis Date   • Anxiety    • Anxiety and depression    • Cancer (CMS/HCC)     RIGHT BREAST   • Cancer (CMS/HCC)     PAROTID GLAND   • History of kidney stones    • Hyperlipidemia    • Hypertension    • Lumbago    • Mood disorder (CMS/HCC)    • PONV (postoperative nausea and vomiting)    • S/P radiation therapy     PAROTID GLAND CANCER   • Sleep apnea     C-PAP IN USE   • Vitamin D deficiency         Past Surgical History:   Procedure Laterality Date   • APPENDECTOMY      removed when removing dermoid on right ovary   • BREAST BIOPSY Right    • BREAST LUMPECTOMY WITH SENTINEL NODE BIOPSY Right 2019    Procedure: Right central partial mastectomy (with removal of the nipple-areola-complex) and sentinel lymph node biopsy;  Surgeon: Trena Greene MD;  Location: [facility] OR;  Service: General   • CHOLECYSTECTOMY     • DERMOID CYST  EXCISION     • HERNIA REPAIR       umbilical   • OVARY SURGERY      dermoid removed   • SALIVARY GLAND SURGERY  1993    due to cancer       Visit Dx:    ICD-10-CM ICD-9-CM   1. Malignant neoplasm of central portion of right breast in female, estrogen receptor positive (CMS/HCC) C50.111 174.1    Z17.0 V86.0   2. Monoallelic mutation of CHEK2 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 Z15.01 V84.01    Z15.89 V84.09    Z15.09 V84.89    Z15.02 V84.02   3. Status post partial mastectomy of right breast Z90.11 V45.71   4. Acute pain of right shoulder M25.511 719.41   5. At risk for lymphedema Z91.89 V49.89   6. Aftercare following surgery Z48.89 V58.89       Patient History     Row Name 07/02/19 0735             History    Chief Complaint  Pain  -SC      Type of Pain  Shoulder pain  -SC      Date Current Problem(s) Began  05/28/19  -SC      Brief Description of Current Complaint  Patient recently diagnosed with right breast cancer, s/p partial mastectomy 05/28/2019 Dr. Greene, 0/1 R lymph node, does not require chemotherapy however to start radiation next week for 5.5 weeks to right breast. She does have CHEK2 mutation. Is to start Tamoxifen. 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  with good family support. Does report high anxiety but seeing supportive oncology. Is interested in the livest[employer] and exercise program however due to work schedule difficulty finding exercise program. Is wanting to loose weight.   -SC      Patient/Caregiver Goals  Relieve pain;Return to prior level of function;Improve mobility;Improve strength;Know what to do to help the symptoms  -SC      Current Tobacco Use  none  -SC      Patient's Rating of General Health  Good  -SC      Hand Dominance  right-handed  -SC      Occupation/sports/leisure activities  camping, hiking  -SC      History of Previous Related Injuries  no  -SC      Are you or can you be pregnant  No  -SC         Pain     Pain Location  Shoulder  -SC      Pain at Present  1  -SC      Pain at Best  1  -SC      Pain  at Worst  3  -SC      Pain Frequency  Constant/continuous  -SC      Pain Description  Aching  -SC      What Performance Factors Make the Current Problem(s) WORSE?  increased use of right UE  -SC      What Performance Factors Make the Current Problem(s) BETTER?  rest  -SC      Pain Comments  right scapular/shoulder (subscapularis, terres minor)  -SC         Fall Risk Assessment    Any falls in the past year:  No  -SC      Previous Functional Level  -- independent  -SC         Services    Are you currently receiving Home Health services  No  -SC         Daily Activities    Primary Language  English  -SC      Are you able to read  Yes  -SC      Are you able to write  Yes  -SC      How does patient learn best?  Listening;Reading  -SC      Teaching needs identified  Home Exercise Program;Management of Condition  -SC      Patient is concerned about/has problems with  Flexibility;Grasping objects lifting;Performing home management (household chores, shopping, care of dependents);Performing job responsibilities/community activities (work, school,;Performing sports, recreation, and play activities;Reaching over head;Repetitive movements of the hand, arm, shoulder  -SC      Does patient have problems with the following?  Anxiety;Panic Attack  -SC      Barriers to learning  None  -SC      Pt Participated in POC and Goals  Yes  -SC         Safety    Are you being hurt, hit, or frightened by anyone at home or in your life?  No  -SC      Are you being neglected by a caregiver  No  -SC        User Key  (r) = Recorded By, (t) = Taken By, (c) = Cosigned By    Initials Name Provider Type    SC Helena Lemus, PT Physical Therapist          Lymphedema     Row Name 07/02/19 0735             Lymphedema Assessment    Lymphedema Classification  RUE:;at risk/stage 0;Other:;secondary right breast, at risk  -SC      Lymphedema Cancer Related Sx  right;lumpectomy;sentinel node biopsy  -SC      Lymphedema Surgery Comments  05/28/2019  -SC       Lymph Nodes Removed #  1  -SC      Positive Lymph Nodes #  0  -SC      Stage of Cancer  Stage I  -SC      Chemo Received  no  -SC      Radiation Therapy Received  yes  -SC      Radiation Treatments #/Timeframe  to initiate 07/08/2019  -SC      Infections or Cellulitis?  no  -SC      Lymphedema Assessment Comments  negative currently  -SC         Lymphedema Edema Assessment    Ptting Edema Category  By grade out of 4  -SC      Pitting Edema  + 1/4 (+1 of 4) negative  -SC      Edema Assessment Comment  negative  -SC         Skin Changes/Observations    Skin Observations Comment  incision healing well  -SC         Lymphedema Sensation    Lymphedema Sensation Comments  intact  -SC         Lymphedema Pulses/Capillary Refill    Lymph Pulses Capillary Refill Comments  intact  -SC         L-Dex Bioimpedence Screening    L-Dex Measurement Extremity  RUE  -SC      L-Dex Patient Position  Standing  -SC      L-Dex UE Dominate Side  Right  -SC      L-Dex UE At Risk Side  Right  -SC      L-Dex UE Pre Surgical Value  No  -SC      L-Dex UE Score  1.4  -SC      L-Dex UE Baseline Score  1.4  -SC      L-Dex UE Value Change  0  -SC      L-Dex UE Comment  WNL  -SC      $ L-Dex Charge  yes  -SC        User Key  (r) = Recorded By, (t) = Taken By, (c) = Cosigned By    Initials Name Provider Type    SC Helena Lemus PT Physical Therapist            PT Ortho     Row Name 07/02/19 1103       Subjective Comments    Subjective Comments  initial evaluation  -SC       Precautions and Contraindications    Precautions  SLNB R, no BP/IV R UE  -SC    Contraindications  no modalities  -SC       Subjective Pain    Able to rate subjective pain?  yes  -SC    Pre-Treatment Pain Level  1  -SC       Posture/Observations    Alignment Options  Forward head;Cervical lordosis;Thoracic kyphosis;Rounded shoulders;Scapular elevation;Scapular winging;Lumbar lordosis  -SC    Forward Head  Mild;Increased;Sitting posture  -SC    Cervical Lordosis  Normal   -SC    Thoracic Kyphosis  Mild;Moderate;Increased;Sitting posture  -SC    Rounded Shoulders  Moderate;Increased;Sitting posture  -SC    Scapular Elevation  Right:;Mild;Moderate;Increased;Elevated;Sitting posture  -SC    Scapular winging  Normal  -SC    Lumbar lordosis  Mild;Moderate;Decreased;Sitting posture  -SC    Observations  Incision healing  -SC       Special Tests/Palpation    Special Tests/Palpation  Shoulder  -SC       Shoulder Impingement/Rotator Cuff Special Tests    Kyle-Popeye Test (RC Lesion vs. Bursitis)  Right:;Positive  -SC    Neer Impingement Test (RC Lesion vs. Bursitis)  Right:;Positive  -SC    Full Can Test (RC Lesion)  Right:;Positive  -SC    Empty Can Test (RC Lesion)  Right:;Positive  -SC    Drop Arm Test (Full Thickness RC Lesion)  Right:;Negative  -SC    Internal Impingement Sign  Right:;Negative  -SC    Lift-Off Test (Subscapularis Lesion)  Right:;Negative limited range of motion  -SC       Shoulder Girdle Palpation    Shoulder Girdle Palpation?  Yes  -SC    Subscapularis  Right:;Tender;Guarded/taut  -SC    Infraspinatus  Right:;Tender;Guarded/taut  -SC    Teres Minor  Right:;Tender  -SC       General ROM    GENERAL ROM COMMENTS  AROM B UEs WNL, painful end range right shoulder flexion  -SC       MMT (Manual Muscle Testing)    General MMT Comments  AROM B UEs WNL, mild RTC tendonitis, pain with testing  -SC       Gait/Stairs Assessment/Training    Comment (Gait/Stairs)  forward flexed posture but normal  -SC      User Key  (r) = Recorded By, (t) = Taken By, (c) = Cosigned By    Initials Name Provider Type    Helena Noyola, PT Physical Therapist                    Therapy Education  Education Details: lymphedema, stages, early intervention/prevention/treatment options, risk factor with radiation, bioimpedance test and results, continuation of care, early s/s of lymphedema, HEP and provided theraband for use, community exercise program options  Given: HEP, Symptoms/condition  management, Pain management, Posture/body mechanics, Mobility training, Edema management, Other (comment)  Program: New  How Provided: Verbal, Demonstration, Written  Provided to: Patient  Level of Understanding: Teach back education performed, Verbalized, Demonstrated      Exercises     Row Name 07/02/19 0735             Subjective Comments    Subjective Comments  initial evaluation  -SC         Subjective Pain    Able to rate subjective pain?  yes  -SC      Pre-Treatment Pain Level  1  -SC         Exercise 1    Exercise Name 1  standing shoulder rows TB   -SC      Sets 1  2  -SC      Reps 1  10  -SC      Additional Comments  GTB  -SC         Exercise 2    Exercise Name 2  standing shoulder ER TB B  -SC      Sets 2  2  -SC      Reps 2  10  -SC      Additional Comments  GTB  -SC         Exercise 3    Exercise Name 3  standing shoulder ext TB   -SC      Sets 3  2  -SC      Reps 3  10  -SC      Additional Comments  GTB  -SC         Exercise 4    Exercise Name 4  wallslides flex standing B  -SC      Reps 4  5  -SC      Time 4  5 seconds  -SC        User Key  (r) = Recorded By, (t) = Taken By, (c) = Cosigned By    Initials Name Provider Type    Helena Noyola, PT Physical Therapist                      PT OP Goals     Row Name 07/02/19 0735          PT Short Term Goals    STG Date to Achieve  08/13/19  -SC     STG 1  Patient independent and compliant with initial Home Exercise Program focused on diaphragmatic breathing, flexibility, range of motion, mobility and strength for improved posture, function, with improved sleeping patterns and decreased pain/symptoms of upper extremity.   -SC     STG 2  Patient score </=15/100 on DASH for improved function and transition to self-management of condition.   -SC        Long Term Goals    LTG Date to Achieve  09/24/19  -SC     LTG 1  Patient's bioimpedance score to remain between -10 and 6.5 for decreased risk of developing stage II post lumpectomy lymphedema syndrome  right UE and to establish treatment for early intervention of condition if/when indicated.  -SC     LTG 2  Patient independent and compliant with advanced Home Exercise Program and self-care techniques for self-management of condition.   -SC     LTG 3  Patient able to wear fitted post lumpectomy/radiation bra with padding right (if indicated)  >/=6-8 hours for work schedule for improved compression to lateral flank/right breast.  -SC     LTG 4  Patient demonstrate independence and compliance with proper skin care during/post radiation for improved mobility, decreased scar tissue, axillary cording and edema.  -SC        Time Calculation    PT Goal Re-Cert Due Date  09/24/19  -SC       User Key  (r) = Recorded By, (t) = Taken By, (c) = Cosigned By    Initials Name Provider Type    SC Helena Lemus, PT Physical Therapist          PT Assessment/Plan     Row Name 07/02/19 0735          PT Assessment    Functional Limitations  Limitation in home management;Performance in leisure activities;Performance in sport activities;Performance in work activities;Limitations in community activities  -SC     Impairments  Endurance;Impaired flexibility;Impaired lymphatic circulation;Integumentary integrity;Pain;Poor body mechanics;Posture  -SC     Assessment Comments  Ms. Soto is a 49 year old female, recently diagnosed with right breast cancer, s/p right partial mastectomy with removal of aerola/nipple complex and right sentinel node biopsy 0/1 R ALN (+) does not require adjuvant chemotherapy, to initiate Tamoxifen, will initiate radiation with Dr. Carballo next week. Reports pain right shoulder since surgery however negative symptoms in right breast and negative feelings of fullness or edema. She has returned to work. She is  with good family support. We did complete a baseline bioimpedance assessment due to increased risk of post partial mastectomy lymphedema syndrome right breast/UE. Her current L-Dex score is 1.4,  which is WNL and negative for lymphedema at this time. Her BMI is 50.0 kg/m2 however. She does have positive impingement and RTC tenderness on right. Was provided with initial HEP to address these symptoms. To return in 2 weeks to reassess unless needed sooner.   -SC     Please refer to paper survey for additional self-reported information  Yes  -SC     Rehab Potential  Good  -SC     Patient/caregiver participated in establishment of treatment plan and goals  Yes  -SC     Patient would benefit from skilled therapy intervention  Yes  -SC        PT Plan    Predicted Duration of Therapy Intervention (Therapy Eval)  to return in 2 weeks to assess exercise program  -SC     Planned CPT's?  PT EVAL MOD COMPLELITY: 29534;PT RE-EVAL: 00801;PT THER PROC EA 15 MIN: 27323;PT THER ACT EA 15 MIN: 40209;PT MANUAL THERAPY EA 15 MIN: 70948;PT NEUROMUSC RE-EDUCATION EA 15 MIN: 96806;PT GAIT TRAINING EA 15 MIN: 92886;PT EVAL AQUA: 67913;PT AQUATIC THERAPY EA 15 MIN: 54567;PT SELF CARE/HOME MGMT/TRAIN EA 15: 43985;PT HOT OR COLD PACK TREAT MCARE;PT BIS XTRACELL FLUID ANALYSIS: 31567  -SC     PT Plan Comments  assess RTC stabilization program, ice if indicated, set up post radiation bioimpedance  -SC       User Key  (r) = Recorded By, (t) = Taken By, (c) = Cosigned By    Initials Name Provider Type    Helena Noyola, PT Physical Therapist             Outcome Measure Options: Disabilities of the Arm, Shoulder, and Hand (DASH)  DASH  Open a tight or new jar.: Unable  Write: No Difficulty  Turn a key: No Difficulty  Prepare a meal: Mild Difficulty  Push open a heavy door: Moderate Difficulty  Place an object on a shelf above your head: Mild Difficulty  Do heavy household chores (e.g., wash walls, wash floors): Unable  Garden or do yard work: Unable  Make a bed: No Difficulty  Carry a shopping bag or briefcase: Unable  Carry a heavy object (over 10 lbs): Unable  Change a lightbulb overhead: Unable  Wash or blow dry your hair: No  Difficulty  Wash your back: Mild Difficulty  Put on a pullover sweater: No Difficulty  Use a knife to cut food: No Difficulty  Recreational activities in which require little effort (e.g., cardplaying, knitting, etc.): No Difficulty  Recreational activities in which you take some force or impact through your arm, should or hand (e.g. golf, hammering, tennis, etc.): Unable  Recreational Activities in which you move your arm freely (e.g., frisbee, badminton, etc.): Unable  Manage transportation needs (getting from one place to another): No Difficulty  Sexual Activities: No Difficulty  During the past week, to what extent has your arm, shoulder, or hand problem interfered with your normal social activites with family, friends, neighbors or groups?: Slightly  During the past week, were you limited in your work or other regular daily activities as a result of your arm, shoulder or hand problem?: Slightly Limited  Arm, Shoulder, or hand pain: Mild  Arm, shoulder or hand pain when you performed any specific activity: Moderate  Tingling (pins and needles) in your arm, shoulder, or hand: None  Weakness in your arm, shoulder or hand: Moderate  Stiffness in your arm, shoulder or hand: Moderate  During the past week, how much difficulty have you had sleeping because of the pain in your arm, shoulder or hand?: Mild Difficulty  I feel less capable, less confident or less useful because of my arm, shoulder or hand problem: Agree  DASH Sum : 80  Number of Questions Answered: 30  DASH Score: 41.67         Time Calculation:   Start Time: 0735  Stop Time: 0820  Time Calculation (min): 45 min   Therapy Charges for Today     Code Description Service Date Service Provider Modifiers Qty    50351470411 HC PT BIS XTRACELL FLUID ANALYSIS 7/2/2019 Helena Lemus, PT  1    03142568958 HC PT THER PROC EA 15 MIN 7/2/2019 Helena Lemus, PT GP 1    32111961868 HC PT EVAL MOD COMPLEXITY 1 7/2/2019 Helena Lemus, PT GP 1          PT  G-Codes  Outcome Measure Options: Disabilities of the Arm, Shoulder, and Hand (DASH)         Helena Saunders, PT  7/2/2019

## 2019-07-02 NOTE — TELEPHONE ENCOUNTER
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  Discussed continued use of nonpharmacological anxiety reduction techniques as well as reminded her that she has Ativan available.  Continued discussion regarding need for medication decisions to be made in clinic; however, evaluated potential benefit of long-acting anxiety/mood agent for more continuous assistance outside of low-dose gabapentin.  We will go ahead and prescribe Viibryd at 20 mg daily in order to verify whether insurance coverage would allow patient to be on this medication; pt aware not to initiate this medication yet.  Will discuss fully with patient next visit.

## 2019-07-08 ENCOUNTER — OFFICE VISIT (OUTPATIENT)
Dept: INTERNAL MEDICINE | Facility: CLINIC | Age: 49
End: 2019-07-08

## 2019-07-08 ENCOUNTER — APPOINTMENT (OUTPATIENT)
Dept: RADIATION ONCOLOGY | Facility: HOSPITAL | Age: 49
End: 2019-07-08

## 2019-07-08 ENCOUNTER — OFFICE VISIT (OUTPATIENT)
Dept: RADIATION ONCOLOGY | Facility: HOSPITAL | Age: 49
End: 2019-07-08

## 2019-07-08 VITALS
BODY MASS INDEX: 50.23 KG/M2 | OXYGEN SATURATION: 96 % | HEART RATE: 90 BPM | WEIGHT: 293 LBS | DIASTOLIC BLOOD PRESSURE: 67 MMHG | SYSTOLIC BLOOD PRESSURE: 114 MMHG

## 2019-07-08 VITALS
BODY MASS INDEX: 47.09 KG/M2 | HEART RATE: 86 BPM | SYSTOLIC BLOOD PRESSURE: 134 MMHG | WEIGHT: 293 LBS | DIASTOLIC BLOOD PRESSURE: 80 MMHG | HEIGHT: 66 IN | OXYGEN SATURATION: 99 %

## 2019-07-08 DIAGNOSIS — C50.111 MALIGNANT NEOPLASM OF CENTRAL PORTION OF RIGHT BREAST IN FEMALE, ESTROGEN RECEPTOR POSITIVE (HCC): Primary | ICD-10-CM

## 2019-07-08 DIAGNOSIS — Z17.0 MALIGNANT NEOPLASM OF CENTRAL PORTION OF RIGHT BREAST IN FEMALE, ESTROGEN RECEPTOR POSITIVE (HCC): Primary | ICD-10-CM

## 2019-07-08 DIAGNOSIS — B35.1 ONYCHOMYCOSIS: Primary | ICD-10-CM

## 2019-07-08 DIAGNOSIS — Z17.0 MALIGNANT NEOPLASM OF CENTRAL PORTION OF RIGHT BREAST IN FEMALE, ESTROGEN RECEPTOR POSITIVE (HCC): ICD-10-CM

## 2019-07-08 DIAGNOSIS — F41.9 ANXIETY: ICD-10-CM

## 2019-07-08 DIAGNOSIS — C50.111 MALIGNANT NEOPLASM OF CENTRAL PORTION OF RIGHT BREAST IN FEMALE, ESTROGEN RECEPTOR POSITIVE (HCC): ICD-10-CM

## 2019-07-08 PROCEDURE — 77333 RADIATION TREATMENT AID(S): CPT | Performed by: RADIOLOGY

## 2019-07-08 PROCEDURE — 99214 OFFICE O/P EST MOD 30 MIN: CPT | Performed by: NURSE PRACTITIONER

## 2019-07-08 PROCEDURE — 99214 OFFICE O/P EST MOD 30 MIN: CPT | Performed by: RADIOLOGY

## 2019-07-08 PROCEDURE — 77263 THER RADIOLOGY TX PLNG CPLX: CPT | Performed by: RADIOLOGY

## 2019-07-08 PROCEDURE — 77290 THER RAD SIMULAJ FIELD CPLX: CPT | Performed by: RADIOLOGY

## 2019-07-08 NOTE — PROGRESS NOTES
Subjective     No ref. provider found     Diagnosis Plan   1. Malignant neoplasm of central portion of right breast in female, estrogen receptor positive (CMS/HCC)       Chief Complaint   Patient presents with   • Consult     re-eval Breast CA     CC: T1bN0 right breast cancer   ERPR pos Her 2 neg                          Dear Trena Greene MD     I had the pleasure of seeing Emilie Soto  today in the [facility].   The patient is a 49 year old female with recently diagnosed right breast cancer, pT1cN0.  She had her yearly mammogram at [facility] on 19 which showed a small oval mass 10mm in sub areolar region right breast.  She had a diagnostic right breast mammogram on 19 which confirmed a 10mm mass in anterior one third subareaolar region, 2cm from the nipple.  Ultrasound of the right breast also showed a solid mass 9mm hypoechoic and an abnormal lymph node in the right axilla, birads 4C.  She then had an ultrasound guided biopsy on 19 which revealed invasive high grade ductal carcinoma, grade III, with tumor present in 4 of 8 cores, 4mm largest size, ER 82% VA 92.68% Her 2 negative and ki 67 14.8%.   . Biopsy of the right axillary node was negative.  She had a breast MRI on 19 which showed the 1cm mass in anterior one third right breast, 2:30 position, 1.8cm from the nipple, with no other suspicious findings.       She underwent a right central lumpectomy and sentinel node biopsy on 19 with pathology revealing a 10 x 8 x8 mm invasive ductal carcinoma, grade II,  With no associated DCIS, and widely negative margins with the tumor located 15mm from the closest posterior margin.  One sentinel node was negative for metastatic disease.  Her final pathological stage was pT1bN0.  No lymphvascular space invasion was identified.     She is , menarche age 9, premenopausal with lmp last month.  She took birth control for one year.    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  She has a paternal aunt and a paternal cousin with breast cancer and denies any hx of ovarian cancer.  She had a benign left breast biopsy in 2014.     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       She is recovering well from her surgery and referred today for evaluation for adjuvant radiation.  She met with Dr. Trevizo and her Onctoype test returned with a score of 6, thus no chemotherapy is recommended, only tamoxifen.                            .        Review of Systems   Constitutional: Negative.    HENT: Negative.    Respiratory: Negative.    Cardiovascular: Negative.    Gastrointestinal: Negative.    Musculoskeletal: Negative.    Psychiatric/Behavioral: The patient is nervous/anxious.          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   • Sleep apnea     C-PAP IN USE   • Vitamin D deficiency          Past Surgical History:   Procedure Laterality Date   • APPENDECTOMY      removed when removing dermoid on right ovary   • BREAST BIOPSY Right 2019   • BREAST LUMPECTOMY WITH SENTINEL NODE BIOPSY Right 5/28/2019    Procedure: Right central partial mastectomy (with removal of the nipple-areola-complex) and sentinel lymph node biopsy;  Surgeon: Trena Greene MD;  Location: [facility];  Service: General   • CHOLECYSTECTOMY     • DERMOID CYST  EXCISION  1989   • HERNIA REPAIR  2006    umbilical   • OVARY SURGERY      dermoid removed   • SALIVARY GLAND SURGERY  1993    due to cancer         Social History      Socioeconomic History   • Marital status:      Spouse name: Trena Montenegro   • Number of children: 0   • Years of education: Not on file   • Highest education level: Not on file   Occupational History     Employer: [employer]     Comment: 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    Tobacco Use   • Smoking status: Never Smoker   • Smokeless tobacco: Never Used   Substance and Sexual Activity   • Alcohol use: Yes     Comment: occ   • Drug use: No   • Sexual activity: Yes     Partners: Female     Birth control/protection: None         Family History   Problem Relation Age of Onset   • Hypertension Mother    • Arthritis Mother    • Kidney cancer Mother    • Heart disease Father    • Hernia Father    • Hypertension Father    • No Known Problems Brother    • Breast cancer Paternal Aunt    • Breast cancer Paternal Cousin    • Malig Hyperthermia Neg Hx           Objective    Physical Exam   Constitutional:   Obese, no acute distress   Eyes: EOM are normal.   Neck: Neck supple.   Pulmonary/Chest:   Central lumpectomy scar, nipple absent,  No palpable masses in either breast       Lymphadenopathy:     She has cervical adenopathy.   Neurological: She is alert.   Skin: Skin is warm.   Psychiatric: She has a normal mood and affect. Her behavior is normal. Judgment and thought content normal.         Current Outpatient Medications on File Prior to Visit   Medication Sig Dispense Refill   • cholecalciferol (VITAMIN D3) 1000 units tablet Take 3,000 Units by mouth Daily.     • gabapentin (NEURONTIN) 100 MG capsule Take 1 capsule by mouth 4 (Four) Times a Day. 120 capsule 1   • ibuprofen (ADVIL,MOTRIN) 800 MG tablet Take 1 tablet by mouth Every 6 (Six) Hours As Needed for mild pain (1-3) or moderate pain (4-6). (Patient taking differently: Take 800 mg by mouth Every 6 (Six) Hours As Needed for Mild Pain  or Moderate Pain  (ON HOLD FOR SX).) 30 tablet 0   • lisinopril-hydrochlorothiazide  (PRINZIDE,ZESTORETIC) 20-25 MG per tablet TAKE 1 TABLET BY MOUTH DAILY 30 tablet 5   • LORazepam (ATIVAN) 0.5 MG tablet Take 1 tablet by mouth Every 8 (Eight) Hours As Needed for Anxiety. 45 tablet 0   • modafinil (PROVIGIL) 200 MG tablet Take 1 tablet by mouth Daily. 30 tablet 0   • oxyCODONE HCl 5 MG tablet  Take  by mouth.     • tamoxifen (NOLVADEX) 20 MG chemo tablet Take 1 tablet by mouth Daily for 30 days. 30 tablet 6   • terbinafine (lamiSIL) 250 MG tablet Take 1 tablet by mouth Daily. (Patient taking differently: Take 250 mg by mouth Daily. PT HAS NOT STARTED THIS MED) 30 tablet 2   • vilazodone (VIIBRYD) 20 MG tablet tablet Take 1 tablet by mouth Daily. 30 tablet 0   • vitamin B-12 (CYANOCOBALAMIN) 1000 MCG tablet Take 1,000 mcg by mouth Daily.       No current facility-administered medications on file prior to visit.        ALLERGIES:    Allergies   Allergen Reactions   • Citalopram Rash   • Flexeril [Cyclobenzaprine] Other (See Comments)     MUSCLES TENSE-OPPOSITE OF WHAT IT IS INTENDED TO DO-ELEVATES B/P   • Doxycycline Rash     Blisters on hands   • Keflex [Cephalexin] Rash   • Sulfa Antibiotics Rash     NAUSEA/VOMITING/FEVER       There were no vitals taken for this visit.     Current Status 6/28/2019   ECOG score 0         Assessment/Plan     49 year old female with pT1bN0 right breast cancer, ERPR positive now 5 weeks out from surgery.  I discussed with her again  my recommendation for post-operative radiation therapy to the right breast to decrease the risk of local recurrence.       I discussed with her in detail the risks, benefits and rationale of radiation therapy to the right breast to include but not limited to the following:     Acute: skin erythema, breakdown/moist desquamation, swelling or discomfort of the breast, fatigue, pneumonitis resulting in shortness of breath, cough or pain     Late: Permanent skin changes including hyperpigmentation, telangiectasias, fibrosis of the breast  resulting in smaller size or poor cosmetic outcome, late edema or cellulitis, late rib fracture, late pulmonary fibrosis and the remote risk of second malignancies.       She voiced understanding and was given an opportunity to ask questions which I believe were answered to her satisfaction.  We will proceed with CT simulation for treatment planning today.  I plan to treat the right breast with tangential fields to a dose of approximately 50 Gy over the course of 5 1/2 weeks.              Thank you very much for allowing me to participate in the care of this very pleasant patient.    Sincerely,      Chanel Carballo MD

## 2019-07-09 ENCOUNTER — CLINICAL SUPPORT (OUTPATIENT)
Dept: OTHER | Facility: HOSPITAL | Age: 49
End: 2019-07-09

## 2019-07-09 DIAGNOSIS — C50.111 MALIGNANT NEOPLASM OF CENTRAL PORTION OF RIGHT BREAST IN FEMALE, ESTROGEN RECEPTOR POSITIVE (HCC): Primary | ICD-10-CM

## 2019-07-09 DIAGNOSIS — Z80.3 FAMILY HISTORY OF MALIGNANT NEOPLASM OF BREAST: ICD-10-CM

## 2019-07-09 DIAGNOSIS — Z17.0 MALIGNANT NEOPLASM OF CENTRAL PORTION OF RIGHT BREAST IN FEMALE, ESTROGEN RECEPTOR POSITIVE (HCC): Primary | ICD-10-CM

## 2019-07-09 PROCEDURE — G0463 HOSPITAL OUTPT CLINIC VISIT: HCPCS
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
## 2019-07-10 ENCOUNTER — OFFICE VISIT (OUTPATIENT)
Dept: PSYCHIATRY | Facility: HOSPITAL | Age: 49
End: 2019-07-10

## 2019-07-10 ENCOUNTER — APPOINTMENT (OUTPATIENT)
Dept: OTHER | Facility: HOSPITAL | Age: 49
End: 2019-07-10

## 2019-07-10 DIAGNOSIS — F33.1 MODERATE EPISODE OF RECURRENT MAJOR DEPRESSIVE DISORDER (HCC): ICD-10-CM

## 2019-07-10 DIAGNOSIS — F41.1 GENERALIZED ANXIETY DISORDER: Primary | ICD-10-CM

## 2019-07-10 PROCEDURE — 77300 RADIATION THERAPY DOSE PLAN: CPT | Performed by: RADIOLOGY

## 2019-07-10 PROCEDURE — 77295 3-D RADIOTHERAPY PLAN: CPT | Performed by: RADIOLOGY

## 2019-07-10 PROCEDURE — 77334 RADIATION TREATMENT AID(S): CPT | Performed by: RADIOLOGY

## 2019-07-10 PROCEDURE — 99214 OFFICE O/P EST MOD 30 MIN: CPT | Performed by: NURSE PRACTITIONER

## 2019-07-10 PROCEDURE — G0463 HOSPITAL OUTPT CLINIC VISIT: HCPCS | Performed by: NURSE PRACTITIONER

## 2019-07-10 NOTE — PROGRESS NOTES
Chief Complaint: Continued anxiety with panic; fatigue    Subjective  Patient ID: Emilie Soto is a 49 y.o. female who presents to [facility] clinic regarding medication management and supportive counseling. Pt with recent dx of right breast cancer: intermediate grade, invasive ductal carcinoma, ER/IL+, Her2 negative.. S/P right central partial mastectomy & SLNB on 5/28/19, pT1bN0, anatomic stage IA, prognostic stage IA. She has a pathogenic CHEK2 mutation and has a higher than average risk of a subsequent breast cancer, however declined prophylactic mastectomy.    Patient presents to clinic alert, oriented, and engaged in conversation.  Station and gait within normal limits.  Affect and mood remain blunted.  Patient is accompanied by significant other and friend.  Continues to report intense sx of fatigue, unassisted by modafinil.  Continues to report sx of panic related to loud sounds, sense of being startled  Recognizes sense of PTSD, although has never been unable to control these sx are of where these have come from.  Trauma history revisited and denied.  Feels sense of hypervigilance is excessive.  Denies nightmares, although is somewhat restless at night  Is taking ativan with intense anxiety episodes; remains resistant to this, although family and close support people have encouraged her to take it in times of panic  Denies physical activity; somewhat interested in increasing physical activity  Patient and family feel modafinil has very limited benefit with extreme fatigue and evening hours.  Additionally feels gabapentin is not being well tolerated with significant symptoms of fatigue.    PHQ9 Total Score: 6  NAIMA 7 Total Score: 7    PHQ9 and GAD7 sx significantly improved, although pt continues to report significant burden of sx  Sleep is reported to be greatly improved  Pain is reported to be well managed  Pt target sx include fatigue mgmt, panic episodes    Diagnoses and all orders for this  visit:    Generalized anxiety disorder    Moderate episode of recurrent major depressive disorder (CMS/[facility])    Plan of Care  Patient with intrusive symptoms of anxiety and mood disruption, although significantly decreased since her initial evaluation in clinic.  Explored potential ability for gabapentin to mask effects of modafinil, as it can be somewhat sedating.  Advised patient to reduce to twice daily dosing, 1 at dinner and 1 at bedtime, with removal of a.m. dose.  We will continue modafinil as written. Again discussed addition of Viibryd for persistence of mood and anxiety symptoms, although pt denies interest at this time.  Patient is interested in connection to holistic therapist, someone who will assist in setting goals, and is working with Helena Lerma to assist in this transition when cancer related sessions in clinic are complete.    Total face to face time spent 29 minutes. 21 minutes spent in supportive counseling surrounding issues of In medication education, exploration of recommendations, benefits of behavioral activation and nonpharmacological anxiety reduction techniques, benefits of counseling, and continued support

## 2019-07-11 ENCOUNTER — DOCUMENTATION (OUTPATIENT)
Dept: OTHER | Facility: HOSPITAL | Age: 49
End: 2019-07-11

## 2019-07-11 PROCEDURE — 77412 RADIATION TX DELIVERY LVL 3: CPT | Performed by: RADIOLOGY

## 2019-07-11 PROCEDURE — 77427 RADIATION TX MANAGEMENT X5: CPT | Performed by: RADIOLOGY

## 2019-07-11 PROCEDURE — 77280 THER RAD SIMULAJ FIELD SMPL: CPT | Performed by: RADIOLOGY
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
## 2019-07-12 PROCEDURE — 77412 RADIATION TX DELIVERY LVL 3: CPT | Performed by: RADIOLOGY

## 2019-07-12 PROCEDURE — 77387 GUIDANCE FOR RADJ TX DLVR: CPT | Performed by: RADIOLOGY

## 2019-07-15 PROCEDURE — 77412 RADIATION TX DELIVERY LVL 3: CPT | Performed by: RADIOLOGY

## 2019-07-15 PROCEDURE — 77387 GUIDANCE FOR RADJ TX DLVR: CPT | Performed by: RADIOLOGY

## 2019-07-15 PROCEDURE — 77336 RADIATION PHYSICS CONSULT: CPT | Performed by: RADIOLOGY

## 2019-07-16 ENCOUNTER — APPOINTMENT (OUTPATIENT)
Dept: PHYSICAL THERAPY | Facility: HOSPITAL | Age: 49
End: 2019-07-16

## 2019-07-16 ENCOUNTER — TELEPHONE (OUTPATIENT)
Dept: PHYSICAL THERAPY | Facility: HOSPITAL | Age: 49
End: 2019-07-16

## 2019-07-16 ENCOUNTER — RADIATION ONCOLOGY WEEKLY ASSESSMENT (OUTPATIENT)
Dept: RADIATION ONCOLOGY | Facility: HOSPITAL | Age: 49
End: 2019-07-16

## 2019-07-16 VITALS — OXYGEN SATURATION: 97 % | DIASTOLIC BLOOD PRESSURE: 80 MMHG | HEART RATE: 79 BPM | SYSTOLIC BLOOD PRESSURE: 124 MMHG

## 2019-07-16 DIAGNOSIS — Z17.0 MALIGNANT NEOPLASM OF CENTRAL PORTION OF RIGHT BREAST IN FEMALE, ESTROGEN RECEPTOR POSITIVE (HCC): Primary | ICD-10-CM

## 2019-07-16 DIAGNOSIS — C50.111 MALIGNANT NEOPLASM OF CENTRAL PORTION OF RIGHT BREAST IN FEMALE, ESTROGEN RECEPTOR POSITIVE (HCC): Primary | ICD-10-CM

## 2019-07-16 PROCEDURE — 77412 RADIATION TX DELIVERY LVL 3: CPT | Performed by: RADIOLOGY

## 2019-07-16 PROCEDURE — 77387 GUIDANCE FOR RADJ TX DLVR: CPT | Performed by: RADIOLOGY

## 2019-07-16 NOTE — TELEPHONE ENCOUNTER
Emilie did not show for her f/u physical therapy appointment today 07/16/2019 at 3:15pm. Left voice message to reschedule at her convenience.

## 2019-07-16 NOTE — PROGRESS NOTES
Physician Weekly Management Note    Diagnosis:     Diagnosis Plan   1. Malignant neoplasm of central portion of right breast in female, estrogen receptor positive (CMS/HCC)         RT Details:  fx 4/25 right breast  Notes on Treatment course, Films, Medical progress:  Doing well so far,     Weekly Management:  Medication reviewed?   Yes  New medications given?   No  Problemlist reviewed?   Yes  Problem added?   No  Issues raised requiring referral to support services - task assigned to:  na    Technical aspects reviewed:  Weekly OBI approved?   Yes  Weekly port films approved?   Yes  Change requests noted on port film?   No  Patient setup and plan reviewed?   Yes    Chart Reviewed:  Continue current treatment plan?   Yes  Treatment plan change requested?   No  CBC reviewed?   No  Concurrent Chemo?   No    Objective     Toxicities:   none     Review of Systems   Constitutional: Negative.    Skin: Negative.           Vitals:    07/16/19 1620   BP: 124/80   Pulse: 79   SpO2: 97%       Current Status 6/28/2019   ECOG score 0       Physical Exam   Constitutional: She appears well-nourished.   HENT:   Head: Normocephalic.   Pulmonary/Chest:   No erythema               Problem Summary List    Diagnosis:     Diagnosis Plan   1. Malignant neoplasm of central portion of right breast in female, estrogen receptor positive (CMS/HCC)       Pathology:   breast    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   • Sleep apnea     C-PAP IN USE   • Vitamin D deficiency          Past Surgical History:   Procedure Laterality Date   • APPENDECTOMY      removed when removing dermoid on right ovary   • BREAST BIOPSY  Right 2019   • BREAST LUMPECTOMY WITH SENTINEL NODE BIOPSY Right 5/28/2019    Procedure: Right central partial mastectomy (with removal of the nipple-areola-complex) and sentinel lymph node biopsy;  Surgeon: Trena Greene MD;  Location: [facility] OR;  Service: General   • CHOLECYSTECTOMY     • DERMOID CYST  EXCISION  1989   • HERNIA REPAIR  2006    umbilical   • OVARY SURGERY      dermoid removed   • SALIVARY GLAND SURGERY  1993    due to cancer         Current Outpatient Medications on File Prior to Visit   Medication Sig Dispense Refill   • cholecalciferol (VITAMIN D3) 1000 units tablet Take 3,000 Units by mouth Daily.     • Efinaconazole (JUBLIA) 10 % solution Apply 1 Units topically Daily. 8 mL 3   • gabapentin (NEURONTIN) 100 MG capsule Take 1 capsule by mouth 4 (Four) Times a Day. 120 capsule 1   • ibuprofen (ADVIL,MOTRIN) 800 MG tablet Take 1 tablet by mouth Every 6 (Six) Hours As Needed for mild pain (1-3) or moderate pain (4-6). (Patient taking differently: Take 800 mg by mouth Every 6 (Six) Hours As Needed for Mild Pain  or Moderate Pain  (ON HOLD FOR SX).) 30 tablet 0   • lisinopril-hydrochlorothiazide (PRINZIDE,ZESTORETIC) 20-25 MG per tablet TAKE 1 TABLET BY MOUTH DAILY 30 tablet 5   • LORazepam (ATIVAN) 0.5 MG tablet Take 1 tablet by mouth Every 8 (Eight) Hours As Needed for Anxiety. 45 tablet 0   • modafinil (PROVIGIL) 200 MG tablet Take 1 tablet by mouth Daily. 30 tablet 0   • oxyCODONE HCl 5 MG tablet  Take  by mouth.     • tamoxifen (NOLVADEX) 20 MG chemo tablet Take 1 tablet by mouth Daily for 30 days. 30 tablet 6   • vilazodone (VIIBRYD) 20 MG tablet tablet Take 1 tablet by mouth Daily. 30 tablet 0   • vitamin B-12 (CYANOCOBALAMIN) 1000 MCG tablet Take 1,000 mcg by mouth Daily.       No current facility-administered medications on file prior to visit.        Allergies   Allergen Reactions   • Citalopram Rash   • Flexeril [Cyclobenzaprine] Other (See Comments)     MUSCLES TENSE-OPPOSITE  OF WHAT IT IS INTENDED TO DO-ELEVATES B/P   • Doxycycline Rash     Blisters on hands   • Keflex [Cephalexin] Rash   • Sulfa Antibiotics Rash     NAUSEA/VOMITING/FEVER         Referring Provider:    No referring provider defined for this encounter.    Oncologist:  No primary care provider on file.      Seen and approved by:  Chanel Carballo MD  07/16/2019

## 2019-07-17 PROCEDURE — 77412 RADIATION TX DELIVERY LVL 3: CPT | Performed by: RADIOLOGY

## 2019-07-17 PROCEDURE — 77387 GUIDANCE FOR RADJ TX DLVR: CPT | Performed by: RADIOLOGY

## 2019-07-18 PROCEDURE — 77387 GUIDANCE FOR RADJ TX DLVR: CPT | Performed by: RADIOLOGY

## 2019-07-18 PROCEDURE — 77412 RADIATION TX DELIVERY LVL 3: CPT | Performed by: RADIOLOGY

## 2019-07-18 PROCEDURE — 77427 RADIATION TX MANAGEMENT X5: CPT | Performed by: RADIOLOGY

## 2019-07-19 PROCEDURE — 77412 RADIATION TX DELIVERY LVL 3: CPT | Performed by: RADIOLOGY

## 2019-07-19 PROCEDURE — 77387 GUIDANCE FOR RADJ TX DLVR: CPT | Performed by: RADIOLOGY

## 2019-07-22 ENCOUNTER — HOSPITAL ENCOUNTER (OUTPATIENT)
Dept: PHYSICAL THERAPY | Facility: HOSPITAL | Age: 49
Setting detail: THERAPIES SERIES
Discharge: HOME | End: 2019-07-22

## 2019-07-22 DIAGNOSIS — Z15.89 MONOALLELIC MUTATION OF CHEK2 GENE IN FEMALE PATIENT: ICD-10-CM

## 2019-07-22 DIAGNOSIS — Z15.01 MONOALLELIC MUTATION OF CHEK2 GENE IN FEMALE PATIENT: ICD-10-CM

## 2019-07-22 DIAGNOSIS — Z15.09 MONOALLELIC MUTATION OF CHEK2 GENE IN FEMALE PATIENT: ICD-10-CM

## 2019-07-22 DIAGNOSIS — Z90.11 STATUS POST PARTIAL MASTECTOMY OF RIGHT BREAST: ICD-10-CM

## 2019-07-22 DIAGNOSIS — C50.111 MALIGNANT NEOPLASM OF CENTRAL PORTION OF RIGHT BREAST IN FEMALE, ESTROGEN RECEPTOR POSITIVE (HCC): Primary | ICD-10-CM

## 2019-07-22 DIAGNOSIS — Z17.0 MALIGNANT NEOPLASM OF CENTRAL PORTION OF RIGHT BREAST IN FEMALE, ESTROGEN RECEPTOR POSITIVE (HCC): Primary | ICD-10-CM

## 2019-07-22 DIAGNOSIS — Z48.89 AFTERCARE FOLLOWING SURGERY: ICD-10-CM

## 2019-07-22 DIAGNOSIS — Z15.02 MONOALLELIC MUTATION OF CHEK2 GENE IN FEMALE PATIENT: ICD-10-CM

## 2019-07-22 DIAGNOSIS — Z91.89 AT RISK FOR LYMPHEDEMA: ICD-10-CM

## 2019-07-22 PROCEDURE — 77412 RADIATION TX DELIVERY LVL 3: CPT | Performed by: RADIOLOGY

## 2019-07-22 PROCEDURE — 93702 BIS XTRACELL FLUID ANALYSIS: CPT | Performed by: PHYSICAL THERAPIST

## 2019-07-22 PROCEDURE — 97110 THERAPEUTIC EXERCISES: CPT | Performed by: PHYSICAL THERAPIST

## 2019-07-22 PROCEDURE — 77387 GUIDANCE FOR RADJ TX DLVR: CPT | Performed by: RADIOLOGY

## 2019-07-22 PROCEDURE — 77336 RADIATION PHYSICS CONSULT: CPT | Performed by: RADIOLOGY

## 2019-07-22 NOTE — THERAPY TREATMENT NOTE
Outpatient Physical Therapy Lymphedema Treatment Note  [facility]     Patient Name: Emilie Soto  : 1970  MRN: [id number]  Today's Date: 2019        Visit Date: 2019    Visit Dx:    ICD-10-CM ICD-9-CM   1. Malignant neoplasm of central portion of right breast in female, estrogen receptor positive (CMS/HCC) C50.111 174.1    Z17.0 V86.0   2. Monoallelic mutation of CHEK2 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 Z15.01 V84.01    Z15.89 V84.09    Z15.09 V84.89    Z15.02 V84.02   3. Status post partial mastectomy of right breast Z90.11 V45.71   4. At risk for lymphedema Z91.89 V49.89   5. Aftercare following surgery Z48.89 V58.89       Patient Active Problem List   Diagnosis   • Anxiety   • Hypertension   • Vitamin D deficiency   • Hyperlipidemia   • Lumbago   • Malignant neoplasm of central portion of right breast in female, estrogen receptor positive (CMS/HCC)   • Family history of breast cancer   • Monoallelic mutation of CHEK2 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        Lymphedema     Row Name 19 1338             Subjective Pain    Able to rate subjective pain?  yes  -SC      Pre-Treatment Pain Level  0  -SC         Subjective Comments    Subjective Comments  I am doing radiation. Just started. I finish mild of August. [facility] talk to you about it first. My shoulder pain is gone. I can get into the radiation position with no issues. I haven't noticed any swelling.   -SC         Lymphedema Assessment    Chemo Received  no  -SC      Radiation Therapy Received  yes  -SC      Radiation Treatments #/Timeframe  7/  -SC         General ROM    GENERAL ROM COMMENTS  B UEs WNL  -SC         MMT (Manual Muscle Testing)    General MMT Comments  B UES WNL  -SC         Lymphedema Edema Assessment    Ptting Edema Category  By grade out of 4  -SC      Pitting Edema  + 1/4 (+1 of 4) negative  -SC      Edema Assessment Comment  negative  -SC         Skin Changes/Observations     Skin Observations Comment  incision well healed, negative changes with radiation at this time.   -SC         Lymphedema Sensation    Lymphedema Sensation Comments  intact  -SC         Lymphedema Pulses/Capillary Refill    Lymph Pulses Capillary Refill Comments  intact  -SC         Compression/Skin Care    Compression/Skin Care Comments  education of compression during radiation if indicated for right UE  -SC         L-Dex Bioimpedence Screening    L-Dex Measurement Extremity  RUE  -SC      L-Dex Patient Position  Standing  -SC      L-Dex UE Dominate Side  Right  -SC      L-Dex UE At Risk Side  Right  -SC      L-Dex UE Pre Surgical Value  No  -SC      L-Dex UE Score  3.8  -SC      L-Dex UE Baseline Score  1.4  -SC      L-Dex UE Value Change  2.4  -SC      L-Dex UE Comment  WNL  -SC      $ L-Dex Charge  yes  -SC        User Key  (r) = Recorded By, (t) = Taken By, (c) = Cosigned By    Initials Name Provider Type    Helena Noyola, PT Physical Therapist            PT Ortho     Row Name 07/22/19 3300       Shoulder Impingement/Rotator Cuff Special Tests    Kyle-Popeye Test (RC Lesion vs. Bursitis)  Right:;Negative  -SC    Neer Impingement Test (RC Lesion vs. Bursitis)  Right:;Negative  -SC    Full Can Test (RC Lesion)  Right:;Negative  -SC    Empty Can Test (RC Lesion)  Right:;Negative  -SC    Drop Arm Test (Full Thickness RC Lesion)  Right:;Negative  -SC    Internal Impingement Sign  Right:;Negative  -SC    Lift-Off Test (Subscapularis Lesion)  Right:;Negative positive limited range  -SC       Shoulder Girdle Palpation    Shoulder Girdle Palpation?  No Abnormality/Tenderness  -SC       Gait/Stairs Assessment/Training    Comment (Gait/Stairs)  forward flexed posture but normal  -SC      User Key  (r) = Recorded By, (t) = Taken By, (c) = Cosigned By    Initials Name Provider Type    Helena Noyola, PT Physical Therapist                  PT Assessment/Plan     Row Name 07/22/19 0532          PT  Assessment    Assessment Comments  current L-Dex 3.8, WNL but slightly elevated. Will reassess toward end of radiation unless otherwise indicated. Otherwise progressing well. R shoulder impingement/pain has resolved.   -SC        PT Plan    PT Plan Comments  bioimpedance, post radiation care, bra fitting when indicated, assess community exercise program, DASH  -SC       User Key  (r) = Recorded By, (t) = Taken By, (c) = Cosigned By    Initials Name Provider Type    Helena Noyola, PT Physical Therapist             Exercises     Row Name 07/22/19 9785             Subjective Comments    Subjective Comments  I am doing radiation. Just started. I finish mild of August. [facility] talk to you about it first. My shoulder pain is gone. I can get into the radiation position with no issues. I haven't noticed any swelling.   -SC         Subjective Pain    Able to rate subjective pain?  yes  -SC      Pre-Treatment Pain Level  0  -SC        User Key  (r) = Recorded By, (t) = Taken By, (c) = Cosigned By    Initials Name Provider Type    Helena Noyola, PT Physical Therapist                      PT OP Goals     Row Name 07/22/19 1335          PT Short Term Goals    STG Date to Achieve  08/13/19  -SC     STG 1  Patient independent and compliant with initial Home Exercise Program focused on diaphragmatic breathing, flexibility, range of motion, mobility and strength for improved posture, function, with improved sleeping patterns and decreased pain/symptoms of upper extremity.   -SC     STG 1 Progress  Met  -SC     STG 2  Patient score </=15/100 on DASH for improved function and transition to self-management of condition.   -SC     STG 2 Progress  Progressing  -SC        Long Term Goals    LTG Date to Achieve  09/24/19  -SC     LTG 1  Patient's bioimpedance score to remain between -10 and 6.5 for decreased risk of developing stage II post lumpectomy lymphedema  syndrome right UE and to establish treatment for early intervention of condition if/when indicated.  -SC     LTG 1 Progress  Progressing  -SC     LTG 1 Progress Comments  currently 3.8, WNL and stable for patient  -SC     LTG 2  Patient independent and compliant with advanced Home Exercise Program and self-care techniques for self-management of condition.   -SC     LTG 2 Progress  Ongoing  -SC     LTG 3  Patient able to wear fitted post lumpectomy/radiation bra with padding right (if indicated)  >/=6-8 hours for work schedule for improved compression to lateral flank/right breast.  -SC     LTG 3 Progress  Ongoing  -SC     LTG 4  Patient demonstrate independence and compliance with proper skin care during/post radiation for improved mobility, decreased scar tissue, axillary cording and edema.  -SC     LTG 4 Progress  Progressing  -SC     LTG 4 Progress Comments  instructed during today  -SC        Time Calculation    PT Goal Re-Cert Due Date  09/24/19  -SC       User Key  (r) = Recorded By, (t) = Taken By, (c) = Cosigned By    Initials Name Provider Type    Helena Noyola, PT Physical Therapist          Therapy Education  Education Details: bioimpedance test and results, continuation of care, risk of lymphedema during radiation, early s/s of lymphedema, reasons to return prior to next scheduled appointment if indicated, bootcamp and return to exercise, skin care during radiation, bra fitting post radiation  Given: HEP, Symptoms/condition management, Pain management, Posture/body mechanics, Mobility training, Edema management, Other (comment)  Program: Progressed, Modified  How Provided: Verbal, Demonstration  Provided to: Patient  Level of Understanding: Teach back education performed, Verbalized, Demonstrated              Time Calculation:   Start Time: 1338  Stop Time: 1402  Time Calculation (min): 24 min   Therapy Charges for Today     Code Description Service Date Service Provider Modifiers Qty     41826513389 HC PT BIS XTRACELL FLUID ANALYSIS 7/22/2019 Helena Lemus, PT  1    53355791181 HC PT THER PROC EA 15 MIN 7/22/2019 Helena Lemus, PT GP 1                    Helena Saunders, PT  7/22/2019

## 2019-07-23 PROCEDURE — 77412 RADIATION TX DELIVERY LVL 3: CPT | Performed by: RADIOLOGY

## 2019-07-23 PROCEDURE — 77387 GUIDANCE FOR RADJ TX DLVR: CPT | Performed by: RADIOLOGY

## 2019-07-24 ENCOUNTER — RADIATION ONCOLOGY WEEKLY ASSESSMENT (OUTPATIENT)
Dept: RADIATION ONCOLOGY | Facility: HOSPITAL | Age: 49
End: 2019-07-24

## 2019-07-24 ENCOUNTER — APPOINTMENT (OUTPATIENT)
Dept: OTHER | Facility: HOSPITAL | Age: 49
End: 2019-07-24

## 2019-07-24 DIAGNOSIS — Z17.0 MALIGNANT NEOPLASM OF CENTRAL PORTION OF RIGHT BREAST IN FEMALE, ESTROGEN RECEPTOR POSITIVE (HCC): Primary | ICD-10-CM

## 2019-07-24 DIAGNOSIS — C50.111 MALIGNANT NEOPLASM OF CENTRAL PORTION OF RIGHT BREAST IN FEMALE, ESTROGEN RECEPTOR POSITIVE (HCC): Primary | ICD-10-CM

## 2019-07-24 PROCEDURE — 77412 RADIATION TX DELIVERY LVL 3: CPT | Performed by: RADIOLOGY

## 2019-07-24 PROCEDURE — 77387 GUIDANCE FOR RADJ TX DLVR: CPT | Performed by: RADIOLOGY

## 2019-07-25 ENCOUNTER — DOCUMENTATION (OUTPATIENT)
Dept: OTHER | Facility: HOSPITAL | Age: 49
End: 2019-07-25

## 2019-07-25 PROCEDURE — 77412 RADIATION TX DELIVERY LVL 3: CPT | Performed by: RADIOLOGY

## 2019-07-25 PROCEDURE — 77427 RADIATION TX MANAGEMENT X5: CPT | Performed by: RADIOLOGY

## 2019-07-25 PROCEDURE — 77387 GUIDANCE FOR RADJ TX DLVR: CPT | Performed by: RADIOLOGY

## 2019-07-25 NOTE — PROGRESS NOTES
Oncology Social Work  Supportive Oncology Services - [place]    OSW met with patient for supportive counseling to improve mood,  anxiety and explore strategies to improve her quality of life.      Patient presents with flat affect today. Low energy, extreme fatigue.  Patient states that she is getting disrupted sleep, and is still very restless.  Patient having a difficult time functioning with normal every day tasks.  Asked VICENTE Magana to interrupt session to review medications and symptoms with patient.      Alexa has decided to eliminate the gabapentin and see if that helps improve sleep/restlessness.  Patient has not experienced this type of restlessness before starting the gabapentin.  Discussed possibly starting an anti-depressant, Viibryd ?  That will be discussed at patient's next appt with Alexa.    Introduced SMART GOALS and Self Care wheel to patient.  Also, Provided name and phone number of a therapist in the community that specializes in EMDR and trauma work, Ms. Rupal Shepard.  Explored CBT thought record and unhelpful thinking habits.  Home work given.  Patient has begun Bootcamp exercise program with hopes for weight loss and increase in energy.     Next appt Aug 7 at 3:30pm    Thank you  Helena Stovall, MALENAW, CSW, OSW-C

## 2019-07-26 PROCEDURE — 77412 RADIATION TX DELIVERY LVL 3: CPT | Performed by: RADIOLOGY

## 2019-07-26 PROCEDURE — 77387 GUIDANCE FOR RADJ TX DLVR: CPT | Performed by: RADIOLOGY

## 2019-07-29 PROCEDURE — 77412 RADIATION TX DELIVERY LVL 3: CPT | Performed by: RADIOLOGY

## 2019-07-29 PROCEDURE — 77336 RADIATION PHYSICS CONSULT: CPT | Performed by: RADIOLOGY

## 2019-07-29 PROCEDURE — 77387 GUIDANCE FOR RADJ TX DLVR: CPT | Performed by: RADIOLOGY

## 2019-07-30 PROCEDURE — 77387 GUIDANCE FOR RADJ TX DLVR: CPT | Performed by: RADIOLOGY

## 2019-07-30 PROCEDURE — 77412 RADIATION TX DELIVERY LVL 3: CPT | Performed by: RADIOLOGY

## 2019-07-30 RX ORDER — MODAFINIL 200 MG/1
200 TABLET ORAL DAILY
Qty: 30 TABLET | Refills: 0 | Status: SHIPPED | OUTPATIENT
Start: 2019-07-30 | End: 2019-08-22

## 2019-07-31 PROCEDURE — 77387 GUIDANCE FOR RADJ TX DLVR: CPT | Performed by: RADIOLOGY

## 2019-07-31 PROCEDURE — 77412 RADIATION TX DELIVERY LVL 3: CPT | Performed by: RADIOLOGY

## 2019-08-01 ENCOUNTER — OFFICE VISIT (OUTPATIENT)
Dept: PSYCHIATRY | Facility: HOSPITAL | Age: 49
End: 2019-08-01

## 2019-08-01 ENCOUNTER — RADIATION ONCOLOGY WEEKLY ASSESSMENT (OUTPATIENT)
Dept: RADIATION ONCOLOGY | Facility: HOSPITAL | Age: 49
End: 2019-08-01

## 2019-08-01 ENCOUNTER — APPOINTMENT (OUTPATIENT)
Dept: RADIATION ONCOLOGY | Facility: HOSPITAL | Age: 49
End: 2019-08-01

## 2019-08-01 VITALS — DIASTOLIC BLOOD PRESSURE: 86 MMHG | SYSTOLIC BLOOD PRESSURE: 124 MMHG | HEART RATE: 82 BPM | OXYGEN SATURATION: 99 %

## 2019-08-01 DIAGNOSIS — C50.111 MALIGNANT NEOPLASM OF CENTRAL PORTION OF RIGHT BREAST IN FEMALE, ESTROGEN RECEPTOR POSITIVE (HCC): Primary | ICD-10-CM

## 2019-08-01 DIAGNOSIS — F33.1 MODERATE EPISODE OF RECURRENT MAJOR DEPRESSIVE DISORDER (HCC): ICD-10-CM

## 2019-08-01 DIAGNOSIS — Z17.0 MALIGNANT NEOPLASM OF CENTRAL PORTION OF RIGHT BREAST IN FEMALE, ESTROGEN RECEPTOR POSITIVE (HCC): Primary | ICD-10-CM

## 2019-08-01 DIAGNOSIS — F41.1 GENERALIZED ANXIETY DISORDER: Primary | ICD-10-CM

## 2019-08-01 PROCEDURE — 99214 OFFICE O/P EST MOD 30 MIN: CPT | Performed by: NURSE PRACTITIONER

## 2019-08-01 PROCEDURE — 77412 RADIATION TX DELIVERY LVL 3: CPT | Performed by: RADIOLOGY

## 2019-08-01 PROCEDURE — 77387 GUIDANCE FOR RADJ TX DLVR: CPT | Performed by: RADIOLOGY

## 2019-08-01 PROCEDURE — 77427 RADIATION TX MANAGEMENT X5: CPT | Performed by: RADIOLOGY

## 2019-08-01 NOTE — PROGRESS NOTES
Physician Weekly Management Note    Diagnosis:     Diagnosis Plan   1. Malignant neoplasm of central portion of right breast in female, estrogen receptor positive (CMS/HCC)         RT Details: fx 16/25 right breast    Notes on Treatment course, Films, Medical progress:  Doing ok mild erythema    Weekly Management:  Medication reviewed?   Yes  New medications given?   No  Problemlist reviewed?   Yes  Problem added?   No  Issues raised requiring referral to support services - task assigned to:  na    Technical aspects reviewed:  Weekly OBI approved?   Yes  Weekly port films approved?   Yes  Change requests noted on port film?   No  Patient setup and plan reviewed?   Yes    Chart Reviewed:  Continue current treatment plan?   Yes  Treatment plan change requested?   No  CBC reviewed?   No  Concurrent Chemo?   No    Objective     Toxicities:   fatigue     Review of Systems   Constitutional: Positive for fatigue.   Genitourinary: Negative.    Skin: Positive for color change and rash.   Neurological: Negative.    Psychiatric/Behavioral: Negative.           There were no vitals filed for this visit.    Current Status 6/28/2019   ECOG score 0       Physical Exam   Constitutional: She appears well-developed and well-nourished.   Pulmonary/Chest: Effort normal.   Mild erythema       Abdominal: Soft.   Musculoskeletal: Normal range of motion.   Psychiatric: She has a normal mood and affect. Her behavior is normal. Judgment and thought content normal.           Problem Summary List    Diagnosis:     Diagnosis Plan   1. Malignant neoplasm of central portion of right breast in female, estrogen receptor positive (CMS/HCC)       Pathology:   breast    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   • History of kidney stones    • Hyperlipidemia    • Hypertension    • Lumbago    • Mood disorder (CMS/HCC)    • PONV  (postoperative nausea and vomiting)    • 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   • Sleep apnea     C-PAP IN USE   • Vitamin D deficiency          Past Surgical History:   Procedure Laterality Date   • APPENDECTOMY      removed when removing dermoid on right ovary   • BREAST BIOPSY Right 2019   • BREAST LUMPECTOMY WITH SENTINEL NODE BIOPSY Right 5/28/2019    Procedure: Right central partial mastectomy (with removal of the nipple-areola-complex) and sentinel lymph node biopsy;  Surgeon: Trena Greene MD;  Location: [facility];  Service: General   • CHOLECYSTECTOMY     • DERMOID CYST  EXCISION  1989   • HERNIA REPAIR  2006    umbilical   • OVARY SURGERY      dermoid removed   • SALIVARY GLAND SURGERY  1993    due to cancer         Current Outpatient Medications on File Prior to Visit   Medication Sig Dispense Refill   • cholecalciferol (VITAMIN D3) 1000 units tablet Take 3,000 Units by mouth Daily.     • Efinaconazole (JUBLIA) 10 % solution Apply 1 Units topically Daily. 8 mL 3   • gabapentin (NEURONTIN) 100 MG capsule Take 1 capsule by mouth 4 (Four) Times a Day. 120 capsule 1   • ibuprofen (ADVIL,MOTRIN) 800 MG tablet Take 1 tablet by mouth Every 6 (Six) Hours As Needed for mild pain (1-3) or moderate pain (4-6). (Patient taking differently: Take 800 mg by mouth Every 6 (Six) Hours As Needed for Mild Pain  or Moderate Pain  (ON HOLD FOR SX).) 30 tablet 0   • lisinopril-hydrochlorothiazide (PRINZIDE,ZESTORETIC) 20-25 MG per tablet TAKE 1 TABLET BY MOUTH DAILY 30 tablet 5   • LORazepam (ATIVAN) 0.5 MG tablet Take 1 tablet by mouth Every 8 (Eight) Hours As Needed for Anxiety. 45 tablet 0   • modafinil (PROVIGIL) 200 MG tablet TAKE 1 TABLET BY MOUTH DAILY 30 tablet 0   • oxyCODONE HCl 5 MG tablet  Take  by mouth.     • vitamin B-12 (CYANOCOBALAMIN) 1000 MCG tablet Take 1,000 mcg by mouth Daily.       No current facility-administered medications on file prior to visit.        Allergies   Allergen  Reactions   • Citalopram Rash   • Flexeril [Cyclobenzaprine] Other (See Comments)     MUSCLES TENSE-OPPOSITE OF WHAT IT IS INTENDED TO DO-ELEVATES B/P   • Doxycycline Rash     Blisters on hands   • Keflex [Cephalexin] Rash   • Sulfa Antibiotics Rash     NAUSEA/VOMITING/FEVER         Referring Provider:    No referring provider defined for this encounter.    Oncologist:  No primary care provider on file.      Seen and approved by:  Chanel Carballo MD  08/01/2019

## 2019-08-01 NOTE — PROGRESS NOTES
Chief Complaint: Fatigue, depressed mood    Subjective  Patient ID: Emilie Soto is a 49 y.o. female who presents to SOS clinic regarding medication management and supportive counseling. Pt with recent dx of right breast cancer: intermediate grade, invasive ductal carcinoma, ER/AR+, Her2 negative.. S/P right central partial mastectomy & SLNB on 5/28/19, pT1bN0, anatomic stage IA, prognostic stage IA. She has a pathogenic CHEK2 mutation and has a higher than average risk of a subsequent breast cancer, however declined prophylactic mastectomy. Currently undergoing radiation.    Patient presents to clinic alert, oriented, and engaged in conversation.  Station and gait within normal limits.  Affect and mood dysthymic.  Patient is accompanied by partner, Trena.  Continues to recognize mood is low, specifically related to cancer related fatigue.  Continues to show up to work at daily with radiation treatment each afternoon.  Recognizes work demands during summer are significantly reduced, although will ramp up when school begins.  Has apprecaied therapy with john Malik is having challenges contributing toward goals.  Finds self work to be especially exhausting and notes primary symptom of fatigue.  Feels current ability to get hrough radiation, although becomes sad related to inability to particpate in mood increasing interventions.  Medications reviewed: Patient reports improvement in sleep/restlessness associated with sleep with discontinuation of gabapentin.  Finds anxiety is reduced and panic at a minimum, although attributes this to not having the energy to worry.    PHQ9 Total Score: 10  NAIMA 7 Total Score: 11     Diagnoses and all orders for this visit:    Generalized anxiety disorder    Moderate episode of recurrent major depressive disorder (CMS/HCC)    Other orders  -     Vilazodone HCl (VIIBRYD STARTER PACK) 10 & 20 MG kit; Take 10 mg by mouth Daily for 7 days, THEN 20 mg Daily for 7 days.    Plan of  Care  Continues with symptoms of major depressive disorder and generalized anxiety disorder, alongside treatment for breast cancer.  Discussed with patient starting medication recommendations, specifically regarding initiation of Viibryd.  Patient is agreeable.  Starter pack provided to patient.  Follow-up scheduled in 2 weeks to assess response and confirm compliance.  Counseling provided to patient regarding symptoms of fatigue in relation to radiation as well as depression.  Discussed recommendations for behavioral activation, specifically to include sleep hygiene techniques, maintenance of sleep-wake cycle, active participation in therapy including homework assignments, and prioritization of tasks/goals throughout the day, with specific focus on a higher energy periods.  Plan to follow in clinic in 2 weeks.    Total face to face time spent 31 minutes.  24 minutes spent in supportive counseling surrounding issues of exploration of negative attributions toward medications, medication education, patient quality of life goals, behavioral activation strategies, goals of care in clinic/therapy, and plan for continuity of care on my upcoming medical leave.    Care Continuity  Patient target symptoms: Debilitating fatigue, negative outlook, panic, sleep disruption  Current management: Exacerbated fatigue with radiation/depression.  Somewhat improved anxiety and sleep.  Willingness for medication trial due to depressed mood  Medications reviewed: Patient could not tolerate gabapentin; today initiated on Viibryd with starter pack 10 mg for 1 week followed by increase to 20 mg daily, take with food  Patient triggers for relapse: Increase in negative outlook, fatigue, disengagement  Plan for follow-up: Patient with current care through VICENTE Elliott.  Will coordinate medication management through her with plans to transition to group setting in January.

## 2019-08-02 PROCEDURE — 77387 GUIDANCE FOR RADJ TX DLVR: CPT | Performed by: RADIOLOGY

## 2019-08-02 PROCEDURE — 77412 RADIATION TX DELIVERY LVL 3: CPT | Performed by: RADIOLOGY

## 2019-08-05 PROCEDURE — 77336 RADIATION PHYSICS CONSULT: CPT | Performed by: RADIOLOGY

## 2019-08-05 PROCEDURE — 77412 RADIATION TX DELIVERY LVL 3: CPT | Performed by: RADIOLOGY

## 2019-08-05 PROCEDURE — 77387 GUIDANCE FOR RADJ TX DLVR: CPT | Performed by: RADIOLOGY

## 2019-08-06 PROCEDURE — 77412 RADIATION TX DELIVERY LVL 3: CPT | Performed by: RADIOLOGY

## 2019-08-06 PROCEDURE — 77387 GUIDANCE FOR RADJ TX DLVR: CPT | Performed by: RADIOLOGY

## 2019-08-07 ENCOUNTER — APPOINTMENT (OUTPATIENT)
Dept: OTHER | Facility: HOSPITAL | Age: 49
End: 2019-08-07

## 2019-08-07 ENCOUNTER — DOCUMENTATION (OUTPATIENT)
Dept: OTHER | Facility: HOSPITAL | Age: 49
End: 2019-08-07

## 2019-08-07 PROCEDURE — 77412 RADIATION TX DELIVERY LVL 3: CPT | Performed by: RADIOLOGY

## 2019-08-07 PROCEDURE — 77387 GUIDANCE FOR RADJ TX DLVR: CPT | Performed by: RADIOLOGY

## 2019-08-07 NOTE — PROGRESS NOTES
Oncology Social Work  [facility]  [facility] [place] met with patient and her partner, Trena.  This is session 5/6 today.  Patient and I  Reviewed her CBT thought record and SMART GOALS.  Patient still with low energy, extreme fatigue and low motivation.      Her final Radiation Tx is 8/14/2019.  Patient looking forward to this day.     Discussed ways to increase motivation and mood.     Next session with OSW 8/14/ at 2pm

## 2019-08-08 ENCOUNTER — RADIATION ONCOLOGY WEEKLY ASSESSMENT (OUTPATIENT)
Dept: RADIATION ONCOLOGY | Facility: HOSPITAL | Age: 49
End: 2019-08-08

## 2019-08-08 VITALS — OXYGEN SATURATION: 97 % | SYSTOLIC BLOOD PRESSURE: 137 MMHG | DIASTOLIC BLOOD PRESSURE: 83 MMHG | HEART RATE: 85 BPM

## 2019-08-08 DIAGNOSIS — Z17.0 MALIGNANT NEOPLASM OF CENTRAL PORTION OF RIGHT BREAST IN FEMALE, ESTROGEN RECEPTOR POSITIVE (HCC): Primary | ICD-10-CM

## 2019-08-08 DIAGNOSIS — C50.111 MALIGNANT NEOPLASM OF CENTRAL PORTION OF RIGHT BREAST IN FEMALE, ESTROGEN RECEPTOR POSITIVE (HCC): Primary | ICD-10-CM

## 2019-08-08 PROCEDURE — 77412 RADIATION TX DELIVERY LVL 3: CPT | Performed by: RADIOLOGY

## 2019-08-08 PROCEDURE — 77427 RADIATION TX MANAGEMENT X5: CPT | Performed by: RADIOLOGY

## 2019-08-08 PROCEDURE — 77387 GUIDANCE FOR RADJ TX DLVR: CPT | Performed by: RADIOLOGY

## 2019-08-08 NOTE — PROGRESS NOTES
Physician Weekly Management Note    Diagnosis:     Diagnosis Plan   1. Malignant neoplasm of central portion of right breast in female, estrogen receptor positive (CMS/HCC)         RT Details:  Treatment #21/25    Notes on Treatment course, Films, Medical progress:  Tanning in the anterior axillary line on the right, some very tiny areas of desquamation in the inframammary crease.  Does not require Silvadene.  Continue as planned.    Weekly Management:  Medication reviewed?   Yes  New medications given?   No  Problemlist reviewed?   Yes  Problem added?   No       Technical aspects reviewed:  Weekly OBI approved?   Yes  Weekly port films approved?   Yes  Change requests noted on port film?   No  Patient setup and plan reviewed?   Yes    Chart Reviewed:  Continue current treatment plan?   Yes  Treatment plan change requested?   No  CBC reviewed?   No  Concurrent Chemo?   No    Objective       Toxicities:   As above     Review of Systems   As above    There were no vitals filed for this visit.    Current Status 6/28/2019   ECOG score 0       Physical Exam  As above      Problem Summary List    Diagnosis:     Diagnosis Plan   1. Malignant neoplasm of central portion of right breast in female, estrogen receptor positive (CMS/HCC)       Pathology:       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   • Sleep apnea     C-PAP IN USE   • Vitamin D deficiency          Past Surgical History:   Procedure Laterality Date   • APPENDECTOMY      removed when removing dermoid on right ovary   • BREAST BIOPSY Right 2019   • BREAST LUMPECTOMY WITH SENTINEL NODE BIOPSY Right 5/28/2019    Procedure: Right central  partial mastectomy (with removal of the nipple-areola-complex) and sentinel lymph node biopsy;  Surgeon: Trena Greene MD;  Location: [facility];  Service: General   • CHOLECYSTECTOMY     • DERMOID CYST  EXCISION  1989   • HERNIA REPAIR  2006    umbilical   • OVARY SURGERY      dermoid removed   • SALIVARY GLAND SURGERY  1993    due to cancer         Current Outpatient Medications on File Prior to Visit   Medication Sig Dispense Refill   • cholecalciferol (VITAMIN D3) 1000 units tablet Take 3,000 Units by mouth Daily.     • Efinaconazole (JUBLIA) 10 % solution Apply 1 Units topically Daily. 8 mL 3   • ibuprofen (ADVIL,MOTRIN) 800 MG tablet Take 1 tablet by mouth Every 6 (Six) Hours As Needed for mild pain (1-3) or moderate pain (4-6). (Patient taking differently: Take 800 mg by mouth Every 6 (Six) Hours As Needed for Mild Pain  or Moderate Pain  (ON HOLD FOR SX).) 30 tablet 0   • lisinopril-hydrochlorothiazide (PRINZIDE,ZESTORETIC) 20-25 MG per tablet TAKE 1 TABLET BY MOUTH DAILY 30 tablet 5   • LORazepam (ATIVAN) 0.5 MG tablet Take 1 tablet by mouth Every 8 (Eight) Hours As Needed for Anxiety. 45 tablet 0   • modafinil (PROVIGIL) 200 MG tablet TAKE 1 TABLET BY MOUTH DAILY 30 tablet 0   • oxyCODONE HCl 5 MG tablet  Take  by mouth.     • Vilazodone HCl (VIIBRYD STARTER PACK) 10 & 20 MG kit Take 10 mg by mouth Daily for 7 days, THEN 20 mg Daily for 7 days. 1 kit 0   • vitamin B-12 (CYANOCOBALAMIN) 1000 MCG tablet Take 1,000 mcg by mouth Daily.       No current facility-administered medications on file prior to visit.        Allergies   Allergen Reactions   • Citalopram Rash   • Flexeril [Cyclobenzaprine] Other (See Comments)     MUSCLES TENSE-OPPOSITE OF WHAT IT IS INTENDED TO DO-ELEVATES B/P   • Doxycycline Rash     Blisters on hands   • Keflex [Cephalexin] Rash   • Sulfa Antibiotics Rash     NAUSEA/VOMITING/FEVER       Primary care MD:    Pinky Hyatt APRN    Oncologist:   SHAYE Trevizo MD    Seen and  approved by:  Kenny Alvarez MD  08/08/2019

## 2019-08-09 PROCEDURE — 77387 GUIDANCE FOR RADJ TX DLVR: CPT | Performed by: RADIOLOGY

## 2019-08-09 PROCEDURE — 77412 RADIATION TX DELIVERY LVL 3: CPT | Performed by: RADIOLOGY

## 2019-08-11 PROCEDURE — 77336 RADIATION PHYSICS CONSULT: CPT | Performed by: RADIOLOGY

## 2019-08-12 ENCOUNTER — RADIATION ONCOLOGY WEEKLY ASSESSMENT (OUTPATIENT)
Dept: RADIATION ONCOLOGY | Facility: HOSPITAL | Age: 49
End: 2019-08-12

## 2019-08-12 VITALS — OXYGEN SATURATION: 99 % | DIASTOLIC BLOOD PRESSURE: 85 MMHG | HEART RATE: 106 BPM | SYSTOLIC BLOOD PRESSURE: 150 MMHG

## 2019-08-12 DIAGNOSIS — Z17.0 MALIGNANT NEOPLASM OF CENTRAL PORTION OF RIGHT BREAST IN FEMALE, ESTROGEN RECEPTOR POSITIVE (HCC): Primary | ICD-10-CM

## 2019-08-12 DIAGNOSIS — C50.111 MALIGNANT NEOPLASM OF CENTRAL PORTION OF RIGHT BREAST IN FEMALE, ESTROGEN RECEPTOR POSITIVE (HCC): Primary | ICD-10-CM

## 2019-08-12 PROCEDURE — 77412 RADIATION TX DELIVERY LVL 3: CPT | Performed by: RADIOLOGY

## 2019-08-12 PROCEDURE — 77387 GUIDANCE FOR RADJ TX DLVR: CPT | Performed by: RADIOLOGY

## 2019-08-12 NOTE — PROGRESS NOTES
Physician Weekly Management Note    Diagnosis:     Diagnosis Plan   1. Malignant neoplasm of central portion of right breast in female, estrogen receptor positive (CMS/HCC)         RT Details:  fx 23/25 right breast    Notes on Treatment course, Films, Medical progress:  Doing ok, moderate fatigue, moderate erythema, small area dry desquamation axillary fold and inframmary fold, will send silvadene cream    Weekly Management:  Medication reviewed?   Yes  New medications given?   No  Problemlist reviewed?   Yes  Problem added?   No  Issues raised requiring referral to support services - task assigned to:  karena    Technical aspects reviewed:  Weekly OBI approved?   Yes  Weekly port films approved?   Yes  Change requests noted on port film?   No  Patient setup and plan reviewed?   Yes    Chart Reviewed:  Continue current treatment plan?   Yes  Treatment plan change requested?   No  CBC reviewed?   No  Concurrent Chemo?   No    Objective     Toxicities:   fatigue     Review of Systems   Constitutional: Positive for fatigue.          There were no vitals filed for this visit.    Current Status 6/28/2019   ECOG score 0       Physical Exam   Constitutional:   obese   Pulmonary/Chest:   Dry desquamation axillary and inframmary fold               Problem Summary List    Diagnosis:     Diagnosis Plan   1. Malignant neoplasm of central portion of right breast in female, estrogen receptor positive (CMS/HCC)       Pathology:   breast    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   • Sleep apnea     C-PAP IN USE   • Vitamin D deficiency          Past Surgical History:   Procedure Laterality Date   •  APPENDECTOMY      removed when removing dermoid on right ovary   • BREAST BIOPSY Right 2019   • BREAST LUMPECTOMY WITH SENTINEL NODE BIOPSY Right 5/28/2019    Procedure: Right central partial mastectomy (with removal of the nipple-areola-complex) and sentinel lymph node biopsy;  Surgeon: Trena Greene MD;  Location: [facility] OR;  Service: General   • CHOLECYSTECTOMY     • DERMOID CYST  EXCISION  1989   • HERNIA REPAIR  2006    umbilical   • OVARY SURGERY      dermoid removed   • SALIVARY GLAND SURGERY  1993    due to cancer         Current Outpatient Medications on File Prior to Visit   Medication Sig Dispense Refill   • cholecalciferol (VITAMIN D3) 1000 units tablet Take 3,000 Units by mouth Daily.     • Efinaconazole (JUBLIA) 10 % solution Apply 1 Units topically Daily. 8 mL 3   • ibuprofen (ADVIL,MOTRIN) 800 MG tablet Take 1 tablet by mouth Every 6 (Six) Hours As Needed for mild pain (1-3) or moderate pain (4-6). (Patient taking differently: Take 800 mg by mouth Every 6 (Six) Hours As Needed for Mild Pain  or Moderate Pain  (ON HOLD FOR SX).) 30 tablet 0   • lisinopril-hydrochlorothiazide (PRINZIDE,ZESTORETIC) 20-25 MG per tablet TAKE 1 TABLET BY MOUTH DAILY 30 tablet 5   • LORazepam (ATIVAN) 0.5 MG tablet Take 1 tablet by mouth Every 8 (Eight) Hours As Needed for Anxiety. 45 tablet 0   • modafinil (PROVIGIL) 200 MG tablet TAKE 1 TABLET BY MOUTH DAILY 30 tablet 0   • oxyCODONE HCl 5 MG tablet  Take  by mouth.     • Vilazodone HCl (VIIBRYD STARTER PACK) 10 & 20 MG kit Take 10 mg by mouth Daily for 7 days, THEN 20 mg Daily for 7 days. 1 kit 0   • vitamin B-12 (CYANOCOBALAMIN) 1000 MCG tablet Take 1,000 mcg by mouth Daily.       No current facility-administered medications on file prior to visit.        Allergies   Allergen Reactions   • Citalopram Rash   • Flexeril [Cyclobenzaprine] Other (See Comments)     MUSCLES TENSE-OPPOSITE OF WHAT IT IS INTENDED TO DO-ELEVATES B/P   • Doxycycline Rash     Blisters  on hands   • Keflex [Cephalexin] Rash   • Sulfa Antibiotics Rash     NAUSEA/VOMITING/FEVER         Referring Provider:    No referring provider defined for this encounter.    Oncologist:  No primary care provider on file.      Seen and approved by:  Chanel Carballo MD  08/12/2019

## 2019-08-13 PROCEDURE — 77387 GUIDANCE FOR RADJ TX DLVR: CPT | Performed by: RADIOLOGY

## 2019-08-13 PROCEDURE — 77412 RADIATION TX DELIVERY LVL 3: CPT | Performed by: RADIOLOGY

## 2019-08-14 ENCOUNTER — APPOINTMENT (OUTPATIENT)
Dept: OTHER | Facility: HOSPITAL | Age: 49
End: 2019-08-14

## 2019-08-14 ENCOUNTER — HOSPITAL ENCOUNTER (OUTPATIENT)
Dept: PHYSICAL THERAPY | Facility: HOSPITAL | Age: 49
Setting detail: THERAPIES SERIES
Discharge: HOME | End: 2019-08-14

## 2019-08-14 ENCOUNTER — OFFICE VISIT (OUTPATIENT)
Dept: PSYCHIATRY | Facility: HOSPITAL | Age: 49
End: 2019-08-14

## 2019-08-14 DIAGNOSIS — C50.111 MALIGNANT NEOPLASM OF CENTRAL PORTION OF RIGHT BREAST IN FEMALE, ESTROGEN RECEPTOR POSITIVE (HCC): Primary | ICD-10-CM

## 2019-08-14 DIAGNOSIS — Z15.89 MONOALLELIC MUTATION OF CHEK2 GENE IN FEMALE PATIENT: ICD-10-CM

## 2019-08-14 DIAGNOSIS — Z90.11 STATUS POST PARTIAL MASTECTOMY OF RIGHT BREAST: ICD-10-CM

## 2019-08-14 DIAGNOSIS — Z15.01 MONOALLELIC MUTATION OF CHEK2 GENE IN FEMALE PATIENT: ICD-10-CM

## 2019-08-14 DIAGNOSIS — Z48.89 AFTERCARE FOLLOWING SURGERY: ICD-10-CM

## 2019-08-14 DIAGNOSIS — Z91.89 AT RISK FOR LYMPHEDEMA: ICD-10-CM

## 2019-08-14 DIAGNOSIS — F33.1 MODERATE EPISODE OF RECURRENT MAJOR DEPRESSIVE DISORDER (HCC): ICD-10-CM

## 2019-08-14 DIAGNOSIS — F41.1 GENERALIZED ANXIETY DISORDER: Primary | ICD-10-CM

## 2019-08-14 DIAGNOSIS — Z15.09 MONOALLELIC MUTATION OF CHEK2 GENE IN FEMALE PATIENT: ICD-10-CM

## 2019-08-14 DIAGNOSIS — Z17.0 MALIGNANT NEOPLASM OF CENTRAL PORTION OF RIGHT BREAST IN FEMALE, ESTROGEN RECEPTOR POSITIVE (HCC): Primary | ICD-10-CM

## 2019-08-14 DIAGNOSIS — Z15.02 MONOALLELIC MUTATION OF CHEK2 GENE IN FEMALE PATIENT: ICD-10-CM

## 2019-08-14 PROCEDURE — 77412 RADIATION TX DELIVERY LVL 3: CPT | Performed by: RADIOLOGY

## 2019-08-14 PROCEDURE — 77387 GUIDANCE FOR RADJ TX DLVR: CPT | Performed by: RADIOLOGY

## 2019-08-14 PROCEDURE — G0463 HOSPITAL OUTPT CLINIC VISIT: HCPCS | Performed by: NURSE PRACTITIONER

## 2019-08-14 PROCEDURE — 93702 BIS XTRACELL FLUID ANALYSIS: CPT | Performed by: PHYSICAL THERAPIST

## 2019-08-14 PROCEDURE — 97110 THERAPEUTIC EXERCISES: CPT | Performed by: PHYSICAL THERAPIST

## 2019-08-14 PROCEDURE — 99214 OFFICE O/P EST MOD 30 MIN: CPT | Performed by: NURSE PRACTITIONER

## 2019-08-14 RX ORDER — DESVENLAFAXINE SUCCINATE 50 MG/1
50 TABLET, EXTENDED RELEASE ORAL DAILY
Qty: 30 TABLET | Refills: 0 | Status: SHIPPED | OUTPATIENT
Start: 2019-08-14 | End: 2019-08-28 | Stop reason: SDUPTHER

## 2019-08-14 NOTE — THERAPY TREATMENT NOTE
Outpatient Physical Therapy Lymphedema Treatment Note  [facility]     Patient Name: Emilie Soto  : 1970  MRN: [id number]  Today's Date: 2019        Visit Date: 2019    Visit Dx:    ICD-10-CM ICD-9-CM   1. Malignant neoplasm of central portion of right breast in female, estrogen receptor positive (CMS/HCC) C50.111 174.1    Z17.0 V86.0   2. Monoallelic mutation of CHEK2 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 Z15.01 V84.01    Z15.89 V84.09    Z15.09 V84.89    Z15.02 V84.02   3. Status post partial mastectomy of right breast Z90.11 V45.71   4. At risk for lymphedema Z91.89 V49.89   5. Aftercare following surgery Z48.89 V58.89       Patient Active Problem List   Diagnosis   • Anxiety   • Hypertension   • Vitamin D deficiency   • Hyperlipidemia   • Lumbago   • Malignant neoplasm of central portion of right breast in female, estrogen receptor positive (CMS/HCC)   • Family history of breast cancer   • Monoallelic mutation of CHEK2 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        Lymphedema     Row Name 19 1635             Subjective Pain    Able to rate subjective pain?  yes  -SC      Pre-Treatment Pain Level  8  -SC         Lymphedema Assessment    Chemo Received  no  -SC      Radiation Therapy Received  yes  -SC      Radiation Treatments #/Timeframe   completed today 2019  -SC      Adverse Radiation Reactions/Complication  skin changes, fatigue, pain  -SC      Lymphedema Assessment Comments  high risk at this time  -SC         Lymphedema Measurements    Measurement Type(s)  Quick Girth  -SC      Quick Girth Areas  Upper extremities  -SC         LUE Quick Girth (cm)    Axilla  41 cm  -SC      Mid upper arm  37 cm  -SC      Elbow  29 cm  -SC      Mid forearm  27 cm  -SC      Wrist crease  18 cm  -SC      LUE Quick Girth Total  152  -SC         RUE Quick Girth (cm)    Axilla  44 cm  -SC      Mid upper arm  37.4 cm  -SC      Elbow  27.5 cm  -SC      Mid forearm  27 cm  -SC      Wrist crease  17.7 cm  -SC      RUE  Quick Girth Total  153.6  -SC         Compression/Skin Care    Compression/Skin Care Comments  education of bra fitting when indicated  -SC         L-Dex Bioimpedence Screening    L-Dex Measurement Extremity  RUE  -SC      L-Dex Patient Position  Standing  -SC      L-Dex UE Dominate Side  Right  -SC      L-Dex UE At Risk Side  Right  -SC      L-Dex UE Pre Surgical Value  No  -SC      L-Dex UE Score  5.9  -SC      L-Dex UE Baseline Score  1.4  -SC      L-Dex UE Value Change  4.5  -SC      L-Dex UE Comment  WNL, but elevated  -SC      $ L-Dex Charge  yes  -SC        User Key  (r) = Recorded By, (t) = Taken By, (c) = Cosigned By    Initials Name Provider Type    Helena Noyola, PT Physical Therapist                        PT Assessment/Plan     Row Name 08/14/19 7545          PT Assessment    Assessment Comments  L-Dex 5.9, elevated, high risk of post lumpectomy lymphedema syndrome. Instructed post radiation skin care. To return in one month to reassess unless otherwise indicated. Encouraged to contact me during this time with any questions, concerns, or changes in symptoms.   -SC        PT Plan    PT Plan Comments  bioimpedance, Quick DASH, circumferential, referral bra fitting if indicated, return to sport.   -SC       User Key  (r) = Recorded By, (t) = Taken By, (c) = Cosigned By    Initials Name Provider Type    Helena Noyola, PT Physical Therapist             Exercises     Row Name 08/14/19 1634             Subjective Pain    Able to rate subjective pain?  yes  -SC      Pre-Treatment Pain Level  8  -SC        User Key  (r) = Recorded By, (t) = Taken By, (c) = Cosigned By    Initials Name Provider Type    Helena Noyola, PT Physical Therapist                      PT OP Goals     Row Name 08/14/19 1635          PT Short Term Goals    STG Date to Achieve  08/28/19  -SC     STG 1  Patient independent and compliant with initial Home Exercise Program focused on diaphragmatic breathing,  flexibility, range of motion, mobility and strength for improved posture, function, with improved sleeping patterns and decreased pain/symptoms of upper extremity.   -SC     STG 1 Progress  Met  -SC     STG 2  Patient score </=15/100 on DASH for improved function and transition to self-management of condition.   -SC     STG 2 Progress  Progressing  -SC        Long Term Goals    LTG Date to Achieve  09/24/19  -SC     LTG 1  Patient's bioimpedance score to remain between -10 and 6.5 for decreased risk of developing stage II post lumpectomy lymphedema syndrome right UE and to establish treatment for early intervention of condition if/when indicated.  -SC     LTG 1 Progress  Progressing  -SC     LTG 1 Progress Comments  currently 5.9, elevated, reassess 1 month unless otherwise indicated, high risk of post lumpectomy lymphedema syndrome  -SC     LTG 2  Patient independent and compliant with advanced Home Exercise Program and self-care techniques for self-management of condition.   -SC     LTG 2 Progress  Ongoing  -SC     LTG 3  Patient able to wear fitted post lumpectomy/radiation bra with padding right (if indicated)  >/=6-8 hours for work schedule for improved compression to lateral flank/right breast.  -SC     LTG 3 Progress  Ongoing  -SC     LTG 4  Patient demonstrate independence and compliance with proper skin care during/post radiation for improved mobility, decreased scar tissue, axillary cording and edema.  -SC     LTG 4 Progress  Progressing  -SC     LTG 4 Progress Comments  instructed post radiation skin care today  -SC        Time Calculation    PT Goal Re-Cert Due Date  09/24/19  -SC       User Key  (r) = Recorded By, (t) = Taken By, (c) = Cosigned By    Initials Name Provider Type    Helena Noyola, PT Physical Therapist          Therapy Education  Education Details: bioimpedance test and results, post radiation skin care, limited range of motion and lifting for one month, resting/managing fatigue,  bra fitting when indicated. continuation of care  Given: HEP, Symptoms/condition management, Pain management, Posture/body mechanics, Mobility training, Edema management, Other (comment)  Program: Progressed, Modified  How Provided: Verbal, Demonstration, Written  Provided to: Patient  Level of Understanding: Teach back education performed, Verbalized, Demonstrated              Time Calculation:   Start Time: 1635  Stop Time: 1706  Time Calculation (min): 31 min   Therapy Charges for Today     Code Description Service Date Service Provider Modifiers Qty    68330873292 HC PT BIS XTRACELL FLUID ANALYSIS 8/14/2019 Helena Lemus, PT  1    86833281277 HC PT THER PROC EA 15 MIN 8/14/2019 Helena Lemus, PT GP 1                    Helena Saunders, PT  8/14/2019

## 2019-08-14 NOTE — PROGRESS NOTES
Chief Complaint: Fatigue    Subjective  Patient ID: Emilie Soto is a 49 y.o. female who presents to [facility] clinic regarding medication management and supportive counseling.     Patient presents to clinic alert, oriented, and engaged in conversation.  Station and gait within normal limits.  Affect and mood blunted and anxious.  Patient discusses general continuation of mood disruption and anxiety, alongside demoralization related to apparent drug-related rash associated with initiation of Viibryd.  Describes frustration with failed medication history, and inability to get symptoms under control.  Patient describes a fatigue to be most intrusive symptom.  States anxiety does seem to be a bit lower, although seemingly related to not having the energy to worry.  At this point, patient continues with radiation.  Medications reviewed: Patient reports also discontinuing Provigil due to potential association with rash.  It denies significant change in fatigue/energy, and thus we will not restart.  Is no longer taking gabapentin.  At this time, continues on Ativan 0.5 mg as needed, although taking rarely.  Patient is describes sleep to be somewhat improved, less restless.    Diagnoses and all orders for this visit:    Generalized anxiety disorder    Moderate episode of recurrent major depressive disorder (CMS/formerly [facility])    Other orders  -     desvenlafaxine (PRISTIQ) 50 MG 24 hr tablet; Take 1 tablet by mouth Daily.    Plan of Care  Patient continues with chronic symptoms of major depressive disorder and anxiety.  Again reviewed it [facility] report, and evaluated recommendation for initiating Pristiq 50 mg daily.  Discussed with patient risks and benefits; will plan to follow in clinic in 2 weeks to assess response.    Total face to face time spent 31 minutes.  27 minutes spent in supportive counseling surrounding issues of continued benefits of therapy/nonpharmacological interventions for mood and anxiety management, discussion  of quality of life goals, evaluation of impact his symptoms on work and personal life, medication education, fatigue management, and realistic expectations.

## 2019-08-15 ENCOUNTER — DOCUMENTATION (OUTPATIENT)
Dept: RADIATION ONCOLOGY | Facility: HOSPITAL | Age: 49
End: 2019-08-15

## 2019-08-15 NOTE — PROGRESS NOTES
Patient: Emilie Soto  YOB: 1970    RADIATION THERAPY TREATMENT SUMMARY  Diagnosis:    Malignant neoplasm of central portion of right breast in female, estrogen receptor positive (CMS/HCC)       Patient Care Team:  Pinky Hyatt APRN as PCP - General (Internal Medicine)  Chanel Carballo MD as Consulting Physician (Radiation Oncology)  Tia Trevizo MD as Consulting Physician (Hematology and Oncology)    Emilie Soto has recently completed the course of radiation therapy prescribed for the above-mentioned diagnosis.  Below, please find the specifics of the course of treatment delivered:    Radiation Details:    Dates of treatment:  7/11/2019 - 8/14/2019.  Treatment Site - RT BREAST  Treatment Intent - Curative, Postoperative  Total Dose in cGy - 5000  Number of Treatments - 25  Dose per fraction - 200 cGy per fraction  Fractions per day - 1 fx/day  Fractions per week - 5 fx/week  Treatment Type - Tangents, 3D  Energy - 18 MVP  Normalization - Calc point, Prescribed at 100%  Imaging/Field Verification - Simulation before first treatment to verify field, blocking, placement and positioning, Simulation for all ports of change, Weekly port films of all ports, Other, see below  Additional comments: - IGRT USING OSMS DAILY      Tolerance and Toxicities: she tolerated the treatments well, requiring no treatment breaks. She developed mild erythema and fatigue at completion.  she completed the treatments in 34 elapsed days.    Follow-up Plans:  I have asked the patient to return to see me in approximately 4 weeks .  I have also made a referral to our Survivorship Clinic.

## 2019-08-16 ENCOUNTER — DOCUMENTATION (OUTPATIENT)
Dept: OTHER | Facility: HOSPITAL | Age: 49
End: 2019-08-16

## 2019-08-16 NOTE — PROGRESS NOTES
Oncology Social Work  Supportive Oncology Services - [facility]    OSW had final counseling session with patient today - 6/6 sessions.  Patient with improved mood, decreased anxiety.  Patient had her final XRT tx yesterday .  Patient very happy that this tx is over.    Patient states that her sleep has improved. Still with low energy, fatigue.     Provided application and handout on [facility] program through the [facility].    Patient has been referred to Rupal Shepard LCSW Therapist in the community.    Helena Stovall, MALENAW, CSW,OSW-C

## 2019-08-19 ENCOUNTER — OFFICE VISIT (OUTPATIENT)
Dept: INTERNAL MEDICINE | Facility: CLINIC | Age: 49
End: 2019-08-19

## 2019-08-19 VITALS
HEART RATE: 86 BPM | HEIGHT: 66 IN | OXYGEN SATURATION: 99 % | DIASTOLIC BLOOD PRESSURE: 70 MMHG | WEIGHT: 293 LBS | BODY MASS INDEX: 47.09 KG/M2 | SYSTOLIC BLOOD PRESSURE: 112 MMHG

## 2019-08-19 DIAGNOSIS — F41.9 ANXIETY: ICD-10-CM

## 2019-08-19 DIAGNOSIS — I10 BENIGN ESSENTIAL HTN: Primary | ICD-10-CM

## 2019-08-19 DIAGNOSIS — E74.39 GLUCOSE INTOLERANCE: ICD-10-CM

## 2019-08-19 DIAGNOSIS — Z17.0 MALIGNANT NEOPLASM OF CENTRAL PORTION OF RIGHT BREAST IN FEMALE, ESTROGEN RECEPTOR POSITIVE (HCC): ICD-10-CM

## 2019-08-19 DIAGNOSIS — C50.111 MALIGNANT NEOPLASM OF CENTRAL PORTION OF RIGHT BREAST IN FEMALE, ESTROGEN RECEPTOR POSITIVE (HCC): ICD-10-CM

## 2019-08-19 LAB — GLUCOSE BLDC GLUCOMTR-MCNC: 126 MG/DL (ref 70–130)

## 2019-08-19 PROCEDURE — 99214 OFFICE O/P EST MOD 30 MIN: CPT | Performed by: NURSE PRACTITIONER

## 2019-08-19 PROCEDURE — 82962 GLUCOSE BLOOD TEST: CPT | Performed by: NURSE PRACTITIONER

## 2019-08-19 RX ORDER — TAMOXIFEN CITRATE 20 MG/1
TABLET ORAL DAILY
COMMUNITY
End: 2019-09-09 | Stop reason: SDUPTHER

## 2019-08-20 NOTE — PROGRESS NOTES
Subjective   Emilie Stoo is a 49 y.o. female who presents due to increased fatigue.    She was started on Viibryd but unfortunately developed a rash and was forced to stop. She was recently started on Pristiq which she is tolerating well thus far.  She continues to c/o fatigue throughout the day, still awakens throughout the day with panic but this is steadily improving.  She c/o skin irritation of her right breast, has applied Silvadene cream with mild improvement.  She started Tamoxifen today.      Fatigue   Associated symptoms include congestion, fatigue and a rash. Pertinent negatives include no abdominal pain, arthralgias, chest pain, chills, coughing, fever, headaches, joint swelling, nausea, sore throat, vomiting or weakness.        The following portions of the patient's history were reviewed and updated as appropriate: allergies, current medications, past social history and problem list.    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 (Patient taking differently: Take 800 mg by mouth Every 6 (Six) Hours As Needed for Mild Pain  or Moderate Pain  (ON HOLD  FOR SX).), Disp: 30 tablet, Rfl: 0  •  lisinopril-hydrochlorothiazide (PRINZIDE,ZESTORETIC) 20-25 MG per tablet, TAKE 1 TABLET BY MOUTH DAILY, Disp: 30 tablet, Rfl: 5  •  LORazepam (ATIVAN) 0.5 MG tablet, Take 1 tablet by mouth Every 8 (Eight) Hours As Needed for Anxiety., Disp: 45 tablet, Rfl: 0  •  modafinil (PROVIGIL) 200 MG tablet, TAKE 1 TABLET BY MOUTH DAILY, Disp: 30 tablet, Rfl: 0  •  oxyCODONE HCl 5 MG tablet , Take  by mouth., Disp: , Rfl:   •  silver sulfadiazine (SILVADENE, SSD) 1 % cream, Apply  topically to the appropriate area as directed 2 (Two) Times a Day., Disp: 50 g, Rfl: 1  •  tamoxifen (NOLVADEX) 20 MG chemo tablet, Take  by mouth Daily., Disp: , Rfl:   •  vitamin B-12 (CYANOCOBALAMIN) 1000 MCG tablet, Take 1,000 mcg by mouth Daily., Disp: , Rfl:     Allergies   Allergen Reactions   • Citalopram Rash   • Flexeril [Cyclobenzaprine] Other (See Comments)     MUSCLES TENSE-OPPOSITE OF WHAT IT IS INTENDED TO DO-ELEVATES B/P   • Doxycycline Rash     Blisters on hands   • Keflex [Cephalexin] Rash   • Sulfa Antibiotics Rash     NAUSEA/VOMITING/FEVER   • Vilazodone Hcl Hives, Itching and Rash       Review of Systems   Constitutional: Positive for fatigue. Negative for chills, fever and unexpected weight change.   HENT: Positive for congestion and postnasal drip. Negative for ear pain, sinus pressure, sore throat and trouble swallowing.    Eyes: Negative for visual disturbance.   Respiratory: Negative for cough, chest tightness and wheezing.    Cardiovascular: Negative for chest pain, palpitations and leg swelling.   Gastrointestinal: Negative for abdominal pain, blood in stool, nausea and vomiting.   Genitourinary: Negative for dysuria, frequency and urgency.   Musculoskeletal: Negative for arthralgias and joint swelling.   Skin: Positive for rash. Negative for color change.   Neurological: Negative for syncope, weakness and headaches.   Hematological: Does not bruise/bleed easily.  "  Psychiatric/Behavioral: Positive for agitation, decreased concentration, dysphoric mood and sleep disturbance. The patient is nervous/anxious.        Objective   Vitals:    08/19/19 1327   BP: 112/70   BP Location: Left arm   Patient Position: Sitting   Cuff Size: Adult   Pulse: 86   SpO2: 99%   Weight: (!) 137 kg (303 lb)   Height: 167.4 cm (65.9\")     Physical Exam   Constitutional: She appears well-developed and well-nourished. She is cooperative. She does not have a sickly appearance. She does not appear ill.   HENT:   Head: Normocephalic.   Right Ear: Hearing, tympanic membrane and external ear normal.   Left Ear: Hearing, tympanic membrane and external ear normal.   Nose: Nose normal. No mucosal edema, rhinorrhea, sinus tenderness or nasal deformity. Right sinus exhibits no maxillary sinus tenderness and no frontal sinus tenderness. Left sinus exhibits no maxillary sinus tenderness and no frontal sinus tenderness.   Mouth/Throat: Oropharynx is clear and moist and mucous membranes are normal. Normal dentition.   Eyes: Conjunctivae and lids are normal. Right eye exhibits no discharge and no exudate. Left eye exhibits no discharge and no exudate.   Neck: Trachea normal. Carotid bruit is not present. No edema present. No thyroid mass present.   Cardiovascular: Regular rhythm, normal heart sounds and normal pulses.   No murmur heard.  Pulmonary/Chest: Breath sounds normal. No respiratory distress. She has no decreased breath sounds. She has no wheezes. She has no rhonchi. She has no rales.   Abdominal: Soft. There is no tenderness.   Lymphadenopathy:        Head (right side): No submental, no submandibular, no tonsillar, no preauricular, no posterior auricular and no occipital adenopathy present.        Head (left side): No submental, no submandibular, no tonsillar, no preauricular, no posterior auricular and no occipital adenopathy present.   Neurological: She is alert.   Skin: Skin is warm, dry and intact. No " cyanosis. Nails show no clubbing.   There is exfoliation of the right axillary and upper breast area, no drainage from rash       Assessment/Plan   Emilie was seen today for fatigue.    Diagnoses and all orders for this visit:    Benign essential HTN    Glucose intolerance  -     POCT Glucose    Anxiety    Malignant neoplasm of central portion of right breast in female, estrogen receptor positive (CMS/HCC)    She was concerned about a recent nonfasting glucose of 185, 2 hour PP today is 126. She has lost lulu 8# over the past month which she attributes to decreased appetite, will continue to monitor.  She has recently (Saturday) started Pristiq which she is tolerating well thus far.

## 2019-08-28 ENCOUNTER — OFFICE VISIT (OUTPATIENT)
Dept: PSYCHIATRY | Facility: HOSPITAL | Age: 49
End: 2019-08-28

## 2019-08-28 DIAGNOSIS — F33.1 MODERATE EPISODE OF RECURRENT MAJOR DEPRESSIVE DISORDER (HCC): ICD-10-CM

## 2019-08-28 DIAGNOSIS — F41.1 GENERALIZED ANXIETY DISORDER: Primary | ICD-10-CM

## 2019-08-28 PROCEDURE — 99214 OFFICE O/P EST MOD 30 MIN: CPT | Performed by: NURSE PRACTITIONER

## 2019-08-28 PROCEDURE — G0463 HOSPITAL OUTPT CLINIC VISIT: HCPCS | Performed by: NURSE PRACTITIONER

## 2019-08-28 RX ORDER — DESVENLAFAXINE 100 MG/1
100 TABLET, EXTENDED RELEASE ORAL DAILY
Qty: 30 TABLET | Refills: 2 | Status: SHIPPED | OUTPATIENT
Start: 2019-08-28 | End: 2019-11-21 | Stop reason: SDUPTHER

## 2019-08-28 NOTE — PROGRESS NOTES
"Chief Complaint: Fatigue, demoralization, low mood    Subjective  Patient ID: Emilie Soto is a 49 y.o. female who presents to [facility] clinic regarding medication management and supportive counseling. Pt with recent dx of right breast cancer: intermediate grade, invasive ductal carcinoma, ER/SD+, Her2 negative.. S/P right central partial mastectomy & SLNB on 5/28/19, pT1bN0, anatomic stage IA, prognostic stage IA. She has a pathogenic CHEK2 mutation and has a higher than average risk of a subsequent breast cancer, however declined prophylactic mastectomy. 's post radiation.  Currently on tamoxifen.    Patient presents to clinic alert, oriented, and engaged in conversation.  Station and gait within normal limits.  Affect and mood euthymic at best, although generally somewhat dysphoric.  Patient reports appropriate tolerability of Pristiq 50 mg daily.  Is taking in morning.  Denies adverse effects, although also feels it has not done anything.  Reports some concern as medication is notably present in stool.    Sleep is reported to be easy to initiate and continuous; unfortunately nonrestorative.  Describes to be going to bed around 8 PM and waking up around 6 AM.  Denies daytime napping.  Pain is reported to be improving with skin healing since discontinuation of radiation  Appetite is reported to be significantly reduced  Patient continues to explore previous history with radiation including prior experience of fatigue.  States this was incredibly impactful in the past, almost resulting in getting fired from job.  Fortunately, denies feeling as though job has been in jeopardy, although does recognize severe impact on life that fatigue has taken.  Has been drinking \"Rehab\" [employer] drink occasionally; finds this to be incredibly helpful to energy and denies impact on anxiety.  Discuses continued frustration with time, irritability, and some restlessness at night with desire to touch wife for comfort/ supportContinues to " explore anxiety related to being startled with unexpected noises, situations related to time, and generally feels this to be controllable.  Has explored potential option of trauma based therapy with josefina Cho, although denies feeling this is the appropriate work for her.  Pt has discussed discontinuation of therapy with Helena Wilman, and continues to search for long term therapy option. Has received names from [facility] team, john is not interested in EMDR or trauma based approaches recommended. Is interested in holistic, spiritual, and naturopathic approach. Desires sense of calm.  Has investigated various options to assist with this.  Continues to report fatigue as being intrusive factor to motivation towards therapy at this time.  Pt denies physical activity, although is interested in yoga and rocio chi offered at [facility].  Plans to meet with him tomorrow.  Is going to a free meditation class next Wednesday    PHQ9 Total Score: 11  NAIMA 7 Total Score: 10    PHQ9 and GAD7 sx generally stable    Diagnoses and all orders for this visit:    Generalized anxiety disorder    Moderate episode of recurrent major depressive disorder (CMS/HCC)    Other orders  -     desvenlafaxine (PRISTIQ) 100 MG 24 hr tablet; Take 1 tablet by mouth Daily.    Plan of Care  Patient with continued symptoms of depression and anxiety disorder, currently on Pristiq 50 mg daily.  Will increase to 100 mg daily.  Explored with patient benefits of activity to cancer related fatigue; encouraged patient to participate in yoga, rocio chi, or other mood meditation, specifically including her desire for mind body focus.  Encouraged patient to attend [facility] as desired, and will follow in group setting in 4 weeks in clinic.    Total face to face time spent 35 minutes.  28 minutes spent in supportive counseling surrounding issues of personal quality of life goals, target symptom of fatigue and fatigue management, evaluation of medications  with expectation of response, and care continuity in clinic.

## 2019-09-05 DIAGNOSIS — F41.9 ANXIETY: ICD-10-CM

## 2019-09-05 RX ORDER — LORAZEPAM 0.5 MG/1
TABLET ORAL
Qty: 45 TABLET | Refills: 0 | Status: SHIPPED | OUTPATIENT
Start: 2019-09-05 | End: 2019-11-21 | Stop reason: SDUPTHER

## 2019-09-05 NOTE — TELEPHONE ENCOUNTER
Regarding: Prescription Question  Contact: [phone]  ----- Message from [employer], Generic sent at 9/5/2019  1:51 PM EDT -----    Sg Vance,    Can you please refill my Lorazepam?  Waiting on Dr marin.  Just an update on condition.  Trying very hard not to get sick.  I am wearing some essential oils that are supposed to help your immune system.  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  UGH.  Anyway.  The burning of the skin from radiation is done - thank goodness.  It hurt so bad.  The deep part is still sensitive.   But getting better.  The fatigue is still there.  Big time.  By the end of the week I am just worn down.  I guess it's hard to work through all of this but I have no choice.   Hope you are doing ok and have a wonderful weekend! :-)

## 2019-09-09 ENCOUNTER — APPOINTMENT (OUTPATIENT)
Dept: LAB | Facility: HOSPITAL | Age: 49
End: 2019-09-09

## 2019-09-09 ENCOUNTER — OFFICE VISIT (OUTPATIENT)
Dept: ONCOLOGY | Facility: CLINIC | Age: 49
End: 2019-09-09

## 2019-09-09 VITALS
BODY MASS INDEX: 47.09 KG/M2 | WEIGHT: 293 LBS | HEART RATE: 78 BPM | DIASTOLIC BLOOD PRESSURE: 87 MMHG | OXYGEN SATURATION: 97 % | TEMPERATURE: 99.2 F | SYSTOLIC BLOOD PRESSURE: 133 MMHG | RESPIRATION RATE: 18 BRPM | HEIGHT: 66 IN

## 2019-09-09 DIAGNOSIS — C50.111 MALIGNANT NEOPLASM OF CENTRAL PORTION OF RIGHT BREAST IN FEMALE, ESTROGEN RECEPTOR POSITIVE (HCC): Primary | ICD-10-CM

## 2019-09-09 DIAGNOSIS — Z17.0 MALIGNANT NEOPLASM OF CENTRAL PORTION OF RIGHT BREAST IN FEMALE, ESTROGEN RECEPTOR POSITIVE (HCC): Primary | ICD-10-CM

## 2019-09-09 LAB
BASOPHILS # BLD AUTO: 0.08 10*3/MM3 (ref 0–0.2)
BASOPHILS NFR BLD AUTO: 0.9 % (ref 0–1.5)
DEPRECATED RDW RBC AUTO: 40 FL (ref 37–54)
EOSINOPHIL # BLD AUTO: 0.14 10*3/MM3 (ref 0–0.4)
EOSINOPHIL NFR BLD AUTO: 1.6 % (ref 0.3–6.2)
ERYTHROCYTE [DISTWIDTH] IN BLOOD BY AUTOMATED COUNT: 12.9 % (ref 12.3–15.4)
HCT VFR BLD AUTO: 38.7 % (ref 34–46.6)
HGB BLD-MCNC: 13.3 G/DL (ref 12–15.9)
IMM GRANULOCYTES # BLD AUTO: 0.12 10*3/MM3 (ref 0–0.05)
IMM GRANULOCYTES NFR BLD AUTO: 1.4 % (ref 0–0.5)
LYMPHOCYTES # BLD AUTO: 2.17 10*3/MM3 (ref 0.7–3.1)
LYMPHOCYTES NFR BLD AUTO: 25.4 % (ref 19.6–45.3)
MCH RBC QN AUTO: 29.7 PG (ref 26.6–33)
MCHC RBC AUTO-ENTMCNC: 34.4 G/DL (ref 31.5–35.7)
MCV RBC AUTO: 86.4 FL (ref 79–97)
MONOCYTES # BLD AUTO: 0.56 10*3/MM3 (ref 0.1–0.9)
MONOCYTES NFR BLD AUTO: 6.6 % (ref 5–12)
NEUTROPHILS # BLD AUTO: 5.47 10*3/MM3 (ref 1.7–7)
NEUTROPHILS NFR BLD AUTO: 64.1 % (ref 42.7–76)
NRBC BLD AUTO-RTO: 0.5 /100 WBC (ref 0–0.2)
PLATELET # BLD AUTO: 293 10*3/MM3 (ref 140–450)
PMV BLD AUTO: 9.7 FL (ref 6–12)
RBC # BLD AUTO: 4.48 10*6/MM3 (ref 3.77–5.28)
WBC NRBC COR # BLD: 8.54 10*3/MM3 (ref 3.4–10.8)

## 2019-09-09 PROCEDURE — G0463 HOSPITAL OUTPT CLINIC VISIT: HCPCS | Performed by: INTERNAL MEDICINE

## 2019-09-09 PROCEDURE — 99214 OFFICE O/P EST MOD 30 MIN: CPT | Performed by: INTERNAL MEDICINE

## 2019-09-09 PROCEDURE — 85025 COMPLETE CBC W/AUTO DIFF WBC: CPT | Performed by: INTERNAL MEDICINE

## 2019-09-09 PROCEDURE — 36416 COLLJ CAPILLARY BLOOD SPEC: CPT | Performed by: INTERNAL MEDICINE

## 2019-09-09 RX ORDER — TAMOXIFEN CITRATE 20 MG/1
20 TABLET ORAL DAILY
Qty: 90 TABLET | Refills: 2 | Status: SHIPPED | OUTPATIENT
Start: 2019-09-09 | End: 2020-02-04 | Stop reason: SDUPTHER

## 2019-09-09 NOTE — PROGRESS NOTES
Subjective     REASON FOR CONSULTATION:1. High grade, invasive ductal carcinoma of right breast, ER/[place]+, Her2 negative; clinical T1bN0, anatomic stage IA, prognostic stage IA  · May 28, 2019 right lumpectomy with removal of nipple areolar complex and right sentinel lymph node biopsy  -Central 2:00, invasive ductal carcinoma, grade 2, Elvin score of 6, tumor size is 10 mm, no DCIS, one lymph node negative, good margins    2.  Oncotype DX score of 6, low risk, no chemo necessary    3.  June 28, 2019: Started tamoxifen    4.  Family history of breast cancer, CHEK2  positive    HISTORY OF PRESENT ILLNESS:     Patient is a 49-year-old female with history of T1b N0 stage Ia, ER [place] positive HER-2 negative right breast cancer status post lumpectomy, with sentinel lymph nodes and Oncotype DX test was sent.  She has a score of 6.      She states she has been tolerating the tamoxifen very well and has no complaints. She denies any hot flashes. She states she does not know if still has a regular menstrual period due to her PCOS, she states she has never had regular menstrual periods. She denies any feet swelling or worsening feelings of depression.    She completed the radiation on 8/18/2019 and is still complaining of some fatigue.    Is tolerating tamoxifen    Oncologic history:   The patient is a 49 y.o. year old female who is here for an opinion about the above issue.    Patient is a 49-year old female who presents today as a new patient for consultation on her newly diagnosed high grade, invasive ductal carcinoma of right breast.   Patient had seen Dr. Greene on May 14, 2019.    She underwent a screening mammogram but did not appreciate any mass herself and denied any breast pain or nipple discharge.    Patient is accompanied with her wife Trena.  They have been together for 19 years and  for the last 2 years.    April 18, 2019: Screening mammogram showed a 10 mm small oval mass in the subareolar region  of the right breast.  Left breast was negative.  Spot compression and ultrasound recommended.  Of the right breast.    April 29, 2019: Right diagnostic mammogram showed a high density round mass 10 mm with indistinct margins in the anterior one third subareolar region of the right breast located to centimeters from the nipple.    April 29, 2019: Ultrasound suggested a solid mass with indistinct margins 9 mm and subareolar region.  There is abnormal axillary node in the right axilla.  There are 2 adjacent lymph nodes in the right axilla with mildly prominent cortices.  The left axilla was evaluated for comparison and the nodes on the right are of greater cortical thickness.      Ultrasound-guided biopsy of the breast mass and right axillary lymph node was suggested.     5/9/19:  US guided biopsy sent to [facility] lab  showing solid mass in the sub-areorolar region of right breast revealing invasive high-grade mammary carcinoma, grade 3. Tubule score 3, nuclear grade 3, and mitotic score 2 for a total score of 8. Neoplasm is present in 4/8 biopsies and measures 4 mm in greatest dimension.   Biopsy performed on 05/09/2019 of prominent lymph node of right axilla was benign- negative for metastatic carcinoma.  ER: 82.35%,  positive  AL: 92.68%, positive  HER-2/latoya: 1+ negative  Ki-67 14.81%       05/09/2019 MRI of bilateral breast:   IMPRESSION:  1. Biopsy-proven malignancy in the right breast in the anterior  one-third approximately 1.8 cm from the nipple at the 2:30 position with  an internal metallic clip. The mass measures on the order of 1 cm in  greatest dimension. No other suspicious findings are seen within the  right breast and there is no evidence for right axillary or internal  mammary chain adenopathy. The patient appears to be a candidate for  breast conservation therapy.  2. There are no findings suspicious for malignancy in the left breast.  BI-RADS Category 6: Known biopsy-proven malignancy.    5/14/19: Patient  was seen by Dr. Greene who felt that she was a good candidate for lumpectomy given the size of the lesion related to her breast size.  Because of superficial subareolar location she would require removal of the nipple areolar complex in order to make sure she has negative margins.  Patient preferred breast conservation with central lumpectomy.  This included the removal of the nipple areolar complex.      Also obtained INVITAE  genetic test given her family history of malignancy.    Estrogen receptor: positive (82.35% moderate),   Progesterone receptor:  positive (92.68%, strong)   HER2/Amy: negative (IHC score1+) and Ki-67=14.8%  Pathologic Stage: pT1b, (sn)N0 (G2).    Synoptic Report :  TUMOR  Tumor Site: Invasive Carcinoma Central  Clock Position of Tumor Site 2 o'clock  3 o'clock  Histologic Type Invasive carcinoma of no special type (ductal, not otherwise  specified)  Glandular (Acinar) / Tubular Differentiation Score 2  Nuclear Pleomorphism Score 2  Mitotic Rate Score 2 (4-7 mitoses per mm2)  Number of Mitoses per 10 High-Power Fields 15 mitotic figures  Diameter of Microscope Field in Millimeters (mm) 0.55 Millimeters (mm)  Overall Grade Grade 2 (scores of 6 or 7)  Tumor Size Greatest dimension of largest invasive focus in Millimeters (mm): 10  Millimeters (mm)    MARGINS  Invasive Carcinoma Margins Uninvolved by invasive carcinoma    LYMPH NODES  Regional Lymph Nodes Uninvolved by tumor cells  Number of Lymph Nodes Examined 1  Number of [place] Nodes Examined 1    Oncotype DX testing ordered but pending.    Patient has a family history of paternal grandmother with breast cancer, paternal aunt with breast cancer, cousin on father's side dying of breast cancer at age 43.  History of ovarian or prostate cancer or uterine cancer.  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     Patient is now scheduled for counseling with  genetic and also with radiation oncology next week.    Patient is here to discuss further options of treatment.    PAST MEDICAL HISTORY:  1. Hypertension- Under control with medications  2. Pre-diabetes- Diet control  3. Obesity  4. PCOS   5. Anxiety- Worse since cancer diagnosis. Taking Gabapentin since yesterday which helped with her symptoms. Followed by Alexa Trinh.   6. No hx of CVA or CAD.     FAMILY HISTORY:  Paternal cousin with breast cancer diagnosed at 43 and . Paternal aunt with breast cancer s/p mastectomy; paternal grandmother with breast cancer s/p lumpectomy.   No other family history of ovarian cancer or prostrate cancer  Mother had kidney cancer s/p surgery.     OB-GYN:  Menarche- 9 years old  Menopause- n/a  Pregnancies- none  Post menopausal HRT- N/A  Hx of birth control pills- None    SOCIAL HISTORY:  She works at [employer].  She is  to her wife for the last 2 years but lived with her for 19.  Years.  Patient's wife is a schoolteacher.  Patient does not smoke.  She does not drink.  Allergies reviewed with the patient today.      Past Medical History:   Diagnosis Date   • Anxiety    • Anxiety and depression    • Cancer (CMS/HCC)     RIGHT BREAST   • Cancer (CMS/HCC)     PAROTID GLAND   • History of kidney stones    • Hyperlipidemia    • Hypertension    • Lumbago    • Mood disorder (CMS/HCC)    • PONV (postoperative nausea and vomiting)    • S/P radiation therapy     PAROTID GLAND CANCER   • Sleep apnea     C-PAP IN USE   • Vitamin D deficiency         Past Surgical History:   Procedure Laterality Date   • APPENDECTOMY      removed when removing dermoid on right ovary   • BREAST BIOPSY Right 2019   • BREAST LUMPECTOMY WITH SENTINEL NODE BIOPSY Right 2019    Procedure: Right central partial mastectomy (with removal of the nipple-areola-complex) and sentinel lymph node biopsy;  Surgeon: Trena Greene MD;  Location: [facility];  Service: General   • CHOLECYSTECTOMY      • DERMOID CYST  EXCISION  1989   • HERNIA REPAIR  2006    umbilical   • OVARY SURGERY      dermoid removed   • SALIVARY GLAND SURGERY  1993    due to cancer        Current Outpatient Medications on File Prior to Visit   Medication Sig Dispense Refill   • cholecalciferol (VITAMIN D3) 1000 units tablet Take 3,000 Units by mouth Daily.     • desvenlafaxine (PRISTIQ) 100 MG 24 hr tablet Take 1 tablet by mouth Daily. 30 tablet 2   • Efinaconazole (JUBLIA) 10 % solution Apply 1 Units topically Daily. 8 mL 3   • ibuprofen (ADVIL,MOTRIN) 800 MG tablet Take 1 tablet by mouth Every 6 (Six) Hours As Needed for mild pain (1-3) or moderate pain (4-6). (Patient taking differently: Take 800 mg by mouth Every 6 (Six) Hours As Needed for Mild Pain  or Moderate Pain  (ON HOLD FOR SX).) 30 tablet 0   • lisinopril-hydrochlorothiazide (PRINZIDE,ZESTORETIC) 20-25 MG per tablet TAKE 1 TABLET BY MOUTH DAILY 30 tablet 5   • LORazepam (ATIVAN) 0.5 MG tablet TAKE 1 TABLET BY MOUTH EVERY 8 HOURS AS NEEDED FOR ANXIETY 45 tablet 0   • oxyCODONE HCl 5 MG tablet  Take  by mouth.     • silver sulfadiazine (SILVADENE, SSD) 1 % cream Apply  topically to the appropriate area as directed 2 (Two) Times a Day. 50 g 1   • tamoxifen (NOLVADEX) 20 MG chemo tablet Take  by mouth Daily.     • vitamin B-12 (CYANOCOBALAMIN) 1000 MCG tablet Take 1,000 mcg by mouth Daily.       No current facility-administered medications on file prior to visit.         ALLERGIES:    Allergies   Allergen Reactions   • Citalopram Rash   • Flexeril [Cyclobenzaprine] Other (See Comments)     MUSCLES TENSE-OPPOSITE OF WHAT IT IS INTENDED TO DO-ELEVATES B/P   • Doxycycline Rash     Blisters on hands   • Keflex [Cephalexin] Rash   • Sulfa Antibiotics Rash     NAUSEA/VOMITING/FEVER   • Vilazodone Hcl Hives, Itching and Rash        Social History     Socioeconomic History   • Marital status:      Spouse name: Trena Montenegro   • Number of children: 0   • Years of education: Not  "on file   • Highest education level: Not on file   Occupational History     Employer: [employer]     Comment: 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    Tobacco Use   • Smoking status: Never Smoker   • Smokeless tobacco: Never Used   Substance and Sexual Activity   • Alcohol use: Yes     Comment: occ   • Drug use: No   • Sexual activity: Yes     Partners: Female     Birth control/protection: None        Family History   Problem Relation Age of Onset   • Hypertension Mother    • Arthritis Mother    • Kidney cancer Mother    • Heart disease Father    • Hernia Father    • Hypertension Father    • No Known Problems Brother    • Breast cancer Paternal Aunt    • Breast cancer Paternal Cousin    • Malig Hyperthermia Neg Hx         Review of Systems   Constitutional: Negative for appetite change, chills, diaphoresis, fatigue, fever and unexpected weight change.   HENT: Negative for hearing loss, sore throat and trouble swallowing.    Respiratory: Negative for cough, chest tightness, shortness of breath and wheezing.    Cardiovascular: Negative for chest pain, palpitations and leg swelling.   Gastrointestinal: Negative for abdominal distention, abdominal pain, constipation, diarrhea, nausea and vomiting.   Genitourinary: Negative for dysuria, frequency, hematuria and urgency.   Musculoskeletal: Negative for joint swelling.        No muscle weakness.   Skin: Negative for rash and wound.   Neurological: Negative for seizures, syncope, speech difficulty, weakness, numbness and headaches.   Hematological: Negative for adenopathy. Does not bruise/bleed easily.   Psychiatric/Behavioral: Negative for behavioral problems, confusion and suicidal ideas.        Objective     Vitals:    09/09/19 1544   Height: 167.4 cm (65.91\")     Current Status 6/28/2019   ECOG score 0       PHYSICAL EXAMINATION:  GENERAL:  Well-developed, well-nourished in no acute distress.   SKIN:  Warm, dry without rashes, purpura or " petechiae.  NECK:  Supple with good range of motion; no thyromegaly or masses, no JVD.  LYMPHATICS:  No cervical, supraclavicular, axillary or inguinal adenopathy.  CHEST:  Lungs clear to auscultation. Good airflow.  CARDIAC:  Regular rate and rhythm without murmurs, rubs or gallops. Normal S1,S2.  ABDOMEN:  Soft, nontender with no hepatosplenomegaly or masses.  EXTREMITIES:  No clubbing, cyanosis or edema.  NEUROLOGICAL:  Cranial Nerves II-XII grossly intact.  No focal neurological deficits.  PSYCHIATRIC:  Normal affect and mood.        RECENT LABS:  Hematology WBC   Date Value Ref Range Status   09/09/2019 8.54 3.40 - 10.80 10*3/mm3 Final     RBC   Date Value Ref Range Status   09/09/2019 4.48 3.77 - 5.28 10*6/mm3 Final     Hemoglobin   Date Value Ref Range Status   09/09/2019 13.3 12.0 - 15.9 g/dL Final     Hematocrit   Date Value Ref Range Status   09/09/2019 38.7 34.0 - 46.6 % Final     Platelets   Date Value Ref Range Status   09/09/2019 293 140 - 450 10*3/mm3 Final          Assessment/Plan     1. High grade, invasive ductal carcinoma of right breast, ER/[place]+, Her2 negative; clinical T1bN0, anatomic stage IA, prognostic stage IA:   · May 6, 2019 ultrasound-guided biopsy of the right breast mass,  - grade 3, invasive mammary carcinoma, Elvin score of 8, biopsy of right axillary lymph node negative  ER 82.35%, WI 92.68%, HER-2/latoya 1+ , Ki-67 14.8%    · May 28, 2019 right lumpectomy with removal of nipple areolar complex and right sentinel lymph node biopsy  -Central 2:00, invasive ductal carcinoma, grade 2, [place] score of 6, tumor size is 10 mm, no DCIS, one lymph node negative, good margins  · Oncotype DX score of 6, low risk disease, been not give chemotherapy    · I discussed the pathology findings with the patient today including ER/[place]I+, Her2 negative status of her cancer.  I discussed about consideration of chemotherapy if Oncotype DX testing falls in a high risk category.  Patient is not  keen on  receiving chemotherapy.  I further discussed in length with patient on the 3 different endocrine therapies tamoxifen/aromatase inhibitors/Evista. Discussed Tamoxifen is for pre-menopausal women and other two are for post-menopausal women. Since she is pre-menopausal, Tamoxifen will be her best and first option. I have detailed the side effects of tamoxifen including depression, hot flashes, rare chance for uterine cancer, weight gain, blood clots, DVT, PE, hair thinning, rare chance for thrombocytopenia, cataracts.  Aromatase inhibitors can cause arthralgias, hot flashes, decrease in bone density, rare chance for diarrhea, also discussed about hot flashes with it.  Evista is approved for osteopenia and can also be used for prophylaxis with fewer side effects except hot flashes and rare chance for cataracts but it can improve bone density.     · We will plan on starting tamoxifen given premenopausal for 2 to 3 years followed by Arimidex once postmenopausal.    2. Family history of cancer: Paternal cousin with breast cancer diagnosed at 43 and . Paternal aunt with breast cancer s/p mastectomy; paternal grandmother with breast cancer s/p lumpectomy.   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  ·  She has an appointment with  in early July.   · We will plan on alternating MRI of breast with mammogram alternating.  · Discussed in breast cancer conference today and Dr. Greene and we agreed that best option is to make an MRI of breast with mammogram every 6 months.    3. Mild leukocytosis: CBC done today showed mild elevation of WBC at 11.29. Will monitor.     4. Vitamin B-12 deficiency: B12 level 270, advised patient to take over the counter B-12 thousand micrograms orally daily.     5. PCOS, Complicated with obesity: Patient attempted to undergo bariatric surgery in order to reduce weight and further help with her PCOS.  This was stalled due to her recent breast cancer diagnosis. In past patient has been able to lose 85 lbs with diet modifications.   · Advised patient to exercise 5 times per week with 20 minutes of aerobics, total of 40 minutes/day .   · Suggested Live strong program and patient to visit the cancer resource center.  · Referral to Nutritionist for further recommendations.   · Referred patient to OB/GYN today for further evaluation.     6.  Anxiety: Patient has significant anxiety, has been referred to [facility] Trin[facility].  In the past she has tried Zoloft Wellbutrin and Seroquel and did not have good response to any of these medications.  · Patient is being monitored by Alexa Trinh.    PLAN:  1. Reviewed Oncotype DX score, low risk, no chemo needed  2. Continue tamoxifen 20 mg daily   3. Advised patient to exercise, will see if she is eligible for the live strong program and she will follow-up with the cancer resource center.   4. Patient has an appointment for bridge therapy.  5. Given that she has CHEK 2  mutation, she could likely alternate mammogram with MRI of the breast for surveillance every 6 months, recent MRI in May 2019.  Mammogram will be due December 2019.  6. She has a follow-up with Dr. Trena Greene on September 13, 2019.  7. Follow up with me in four months with labs.    I, Mavis Dunbar, acted as scribe for Tia Trevizo MD  in documenting the service or procedure. To the best of my knowledge, I recorded what was dictated by MD Tia Gonzalez MD       Cc: VICENTE Simon Dr.

## 2019-09-12 NOTE — TELEPHONE ENCOUNTER
I called Ms. Soto to discuss her pathology report. She is recovering well from surgery. I will order an oncotype and place referrals for med/onc and rad/onc.     Trena Greene MD     no

## 2019-09-13 ENCOUNTER — OFFICE VISIT (OUTPATIENT)
Dept: SURGERY | Facility: CLINIC | Age: 49
End: 2019-09-13

## 2019-09-13 ENCOUNTER — OFFICE VISIT (OUTPATIENT)
Dept: RADIATION ONCOLOGY | Facility: HOSPITAL | Age: 49
End: 2019-09-13

## 2019-09-13 ENCOUNTER — TELEPHONE (OUTPATIENT)
Dept: ONCOLOGY | Facility: HOSPITAL | Age: 49
End: 2019-09-13

## 2019-09-13 ENCOUNTER — HOSPITAL ENCOUNTER (OUTPATIENT)
Dept: PHYSICAL THERAPY | Facility: HOSPITAL | Age: 49
Setting detail: THERAPIES SERIES
Discharge: HOME | End: 2019-09-13

## 2019-09-13 VITALS
OXYGEN SATURATION: 99 % | BODY MASS INDEX: 45.99 KG/M2 | SYSTOLIC BLOOD PRESSURE: 140 MMHG | DIASTOLIC BLOOD PRESSURE: 78 MMHG | HEART RATE: 81 BPM | HEIGHT: 67 IN | WEIGHT: 293 LBS

## 2019-09-13 VITALS — OXYGEN SATURATION: 100 % | DIASTOLIC BLOOD PRESSURE: 73 MMHG | SYSTOLIC BLOOD PRESSURE: 121 MMHG | HEART RATE: 73 BPM

## 2019-09-13 DIAGNOSIS — C50.111 MALIGNANT NEOPLASM OF CENTRAL PORTION OF RIGHT BREAST IN FEMALE, ESTROGEN RECEPTOR POSITIVE (HCC): Primary | ICD-10-CM

## 2019-09-13 DIAGNOSIS — Z17.0 MALIGNANT NEOPLASM OF CENTRAL PORTION OF RIGHT BREAST IN FEMALE, ESTROGEN RECEPTOR POSITIVE (HCC): Primary | ICD-10-CM

## 2019-09-13 DIAGNOSIS — Z48.89 AFTERCARE FOLLOWING SURGERY: ICD-10-CM

## 2019-09-13 DIAGNOSIS — Z15.89 MONOALLELIC MUTATION OF CHEK2 GENE IN FEMALE PATIENT: ICD-10-CM

## 2019-09-13 DIAGNOSIS — Z91.89 AT RISK FOR LYMPHEDEMA: ICD-10-CM

## 2019-09-13 DIAGNOSIS — Z15.02 MONOALLELIC MUTATION OF CHEK2 GENE IN FEMALE PATIENT: ICD-10-CM

## 2019-09-13 DIAGNOSIS — Z15.01 MONOALLELIC MUTATION OF CHEK2 GENE IN FEMALE PATIENT: ICD-10-CM

## 2019-09-13 DIAGNOSIS — N64.4 MASTODYNIA: ICD-10-CM

## 2019-09-13 DIAGNOSIS — L90.5 SCAR TISSUE: ICD-10-CM

## 2019-09-13 DIAGNOSIS — Z90.11 STATUS POST PARTIAL MASTECTOMY OF RIGHT BREAST: ICD-10-CM

## 2019-09-13 DIAGNOSIS — Z12.39 BREAST SCREENING: ICD-10-CM

## 2019-09-13 DIAGNOSIS — Z15.09 MONOALLELIC MUTATION OF CHEK2 GENE IN FEMALE PATIENT: ICD-10-CM

## 2019-09-13 PROCEDURE — 97110 THERAPEUTIC EXERCISES: CPT | Performed by: PHYSICAL THERAPIST

## 2019-09-13 PROCEDURE — 93702 BIS XTRACELL FLUID ANALYSIS: CPT | Performed by: PHYSICAL THERAPIST

## 2019-09-13 PROCEDURE — 99214 OFFICE O/P EST MOD 30 MIN: CPT | Performed by: SURGERY

## 2019-09-13 PROCEDURE — 97535 SELF CARE MNGMENT TRAINING: CPT | Performed by: PHYSICAL THERAPIST

## 2019-09-13 PROCEDURE — 99024 POSTOP FOLLOW-UP VISIT: CPT | Performed by: RADIOLOGY

## 2019-09-13 NOTE — PROGRESS NOTES
[facility]     Referring Provider: Mel Cline MD     Chief complaint: Routine followup breast cancer     HPI:   5/14/19:   Ms. Emilie Soto is a 47 yo woman, seen at the request of Dr. Mel Cline, for a new diagnosis of right breast cancer. This was initially detected as an imaging abnormality. Her work-up is detailed in the oncologic history below. She denies any breast lumps, pain, skin changes, or nipple discharge. She has a past history of a benign left breast stereotactic biopsy in 2014. 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 She has a family history of breast cancer in a paternal aunt and paternal cousin (unknown ages at diagnosis). She denies any family history of ovarian cancer.      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 She denies any new complaints.      6/7/19:  She underwent right central partial mastectomy & SLNB on 5/28/19. See surgery & pathology details below in oncologic history. She is complaining of soreness at her axillary incision, however says that her breast feels fine. She also is complaining of generalized anxiety, which has been worse since her diagnosis.     9/13/19, Interval History:  She returns today for scheduled follow-up. I have reviewed the interval records since her last visit. She completed radiation on 8/14/19. She had issues with fatigue during treatment, however this is been slowly improving. She started tamoxifen about a month ago and has been tolerating this well. She has been seen by the genetics clinic for her CHEK2 mutation. She is being followed by PT/LE clinic. On 8/14/19 her L-Dex bioimpedance score was slightly elevated, however this was reevaluated today and back down to baseline. She is working with PT on increasing her right upper extremity strength, because she has not been using her right arm very much since surgery. She denies any  new complaints.       Oncology/Hematology History    1/7/14, Screening MMG ([facility]):   There is a stable small focal asymmetry seen in the left breast at 12:00-1:00. In the right breast, no masses, significant calcifications or other abnormalities are seen.   BI-RADS 0: Incomplete.    2/4/14, Left Diagnostic MMG & Left US ([facility]):   MMG:  On the present examination, there is an isodense focal asymmetry measuring 15 mm with indistinct margins in the left breast at 12:00-1:00 located 10 cm from the nipple. Focal asymmetric density has become more defined.   US:  There is no evidence of any solid mass or abnormal cystic elements. Stereotactic biopsy is recommended.   BI-RADS 4A: Suspicious.    2/11/14, Left Breast, 12-1:00, Stereotactic Biopsy ([facility]):   Fibroadenomatoid type stromal changes, mild columnar cell hyperplasia, mild fibrocystic changes. Negative for atypia or malignancy.   -Concordant.  Recommend 6 month follow-up mammogram and ultrasound.        Malignant neoplasm of central portion of right breast in female, estrogen receptor positive (CMS/HCC)    4/17/2019 Initial Diagnosis     Malignant neoplasm of central portion of right breast in female, estrogen receptor positive (CMS/HCC)         4/18/2019 Imaging     Screening MMG ([facility]):   There are scattered areas of fibroglandular density. There is a small, oval mass measuring 10 mm with indistinct margins seen in the sub-areolar region of the right breast. Note is made of a biopsy clip in the left breast as evidence of a previous surgical procedure. In the left breast, no suspicious masses, significant calcifications or other abnormalities are seen.   BI-RADS 0: Incomplete.           4/29/2019 Imaging     Right Diagnostic MMG with [place] & Right US ([facility]):   MMG:  On the present examination, there is a new high density, round mass measuring 10 mm with indistinct margins in the anterior one-third sub-areolar region of the right breast located 2 cm from the  nipple.   US:   1. Ultrasound is suggestive of a round solid mass with indistinct margins measuring 9 mm. Internal echotexture is homogeneous and hypoechoic relative adipose tissue.   2. There is an abnormal axillary lymph node in the right axilla. There are 2 adjacent lymph nodes in the right axilla with mildly prominent cortices. The cortex has been measured at 3.1 mm, although no significant focal thickening is seen. The left axilla was evaluated for comparison, and the nodes on the right are of greater cortical thickness.   BI-RADS 4C: Suspicious.         5/6/2019 Biopsy     Right breast & Right axillary lymph node, US-guided biopsy ([facility]):    1. Right Subareolar, core biopsy:    Invasive High Grade Mammary Carcinoma (Invasive Ductal Carcinoma, Grade III). Tubule Score 3, Nuclear Grade Score 3, and Mitotic Score 2 for A Total Score of 8. Neoplasm is Present in Four of Eight Biopsies and Measures 4 mm in Greatest Dimension.    ER+ (82.35%, moderate)  OH+ (92.68%, strong)  Her2 negative (IHC 1+)  Ki-67 14.81%    2. Right axilla, core biopsy:  Benign Lymph Node, Negative for Metastatic Carcinoma.         5/9/2019 Imaging     Bilateral Breast MRI ([facility]):  Within the retroareolar region of the right breast located slightly medial to the plane of the nipple at the 2:30 position on the order of 1.8 cm from the nipple there is an irregular enhancing mass that measures 1.0 cm in the superior-inferior dimension, 1.0 cm in anterior to posterior dimension and 1.0 cm in the medial to lateral dimension. A metallic clip is seen within the mass. This represents the biopsy-proven site malignancy.     No other areas of abnormal enhancement or morphology are seen within the right breast. I see no evidence for abnormal right breast skin, nipple or chest wall enhancement and there is no evidence for right axillary adenopathy.     There are no areas of abnormal enhancement or morphology in the left breast. I see no evidence for  abnormal left breast skin, nipple or chest wall enhancement and there is no evidence for left axillary adenopathy.  BI-RADS 6: Known malignancy.         5/14/2019 Genetic Testing     [employer] Breast Cancer STAT Panel (9 genes):    CHEK2 mutation         5/28/2019 Surgery     Right central partial mastectomy (with removal of the nipple-areola-complex) and sentinel lymph node biopsy    1.  Right Axilla, [place] Lymph Node #1 (hot, blue, count 628):                A.  One lymph node with fatty infiltration and focal florid foreign body giant cell response consistent with clip                     Placement.               B.  No metastatic carcinoma identified by routine staining (0/1).     2.  Right Axilla, Non-[place] Lymph Node:                A.  Benign fibrofatty tissue with focal minimal fat necrosis.                B.  No intact lymph node tissue identified.      3.  Right Central Partial Mastectomy:                A.  Invasive mammary carcinoma of no special type (ductal carcinoma).                            1.  Invasive carcinoma measures 10 mm x 8 mm x 8 mm.                            2.  Overall Elvin grade II (glandular score = 2, nuclear score = 2, mitotic score = 2).                 B.  No associated ductal carcinoma in situ component by IHC staining (see comment).                   C.  Margins are negative for invasive carcinoma.  Invasive carcinoma measures at least 15 mm from                     the closest (Posterior) margin of excision.                     All other margins measure at least 20 mm from invasive carcinoma including:                             Anterior margin = 20 mm.                            Superior margin = 20 mm.                            Inferior margin = 53 mm.                            Medial Margin = 50 mm.                            Lateral margin = 60 mm.                   D.  No lymphovascular space invasion identified.                E.  No pagetoid involvement of  skin nor nipple by carcinoma identified.               F.  Focal stromal fibrosis (scar) organizing fat necrosis and foreign giant cell reaction noted consistent with                     previous needled biopsy site.                G.  Nonneoplastic breast tissue with fibrocystic change, focal adenosis, organizing fat necrosis.                H.  Microcalcifications are identified within benign ductal tissue.                I.   Previously reported biomarkers: Estrogen receptor: positive (82.35% moderate), Progesterone receptor:                    positive (92.68%, strong), HER2/Amy: negative (IHC score1+) and Ki-67=14.8%                     (not reviewed).                   Pathologic Stage: pT1b, (sn)N0 (G2).                 See synoptic for tumor details.     4.  Additional Lateral and Superior Margins:                A.  No in situ nor infiltrating carcinoma identified.               B.  New superior margin is negative for malignancy by an additional 25 mm.     Oncotype DX Score: 6         7/11/2019 - 8/14/2019 Radiation     Radiation OncologyTreatment Course:  Emilie Soto received 5000 cGy in 25 fractions to the right breast.         8/1/2019 -  Hormonal Therapy     Tamoxifen            Review of Systems:  See interval history.       Medications:    Current Outpatient Medications:   •  cholecalciferol (VITAMIN D3) 1000 units tablet, Take 3,000 Units by mouth Daily., Disp: , Rfl:   •  desvenlafaxine (PRISTIQ) 100 MG 24 hr tablet, Take 1 tablet by mouth Daily., Disp: 30 tablet, Rfl: 2  •  Efinaconazole (JUBLIA) 10 % solution, Apply 1 Units topically Daily., Disp: 8 mL, Rfl: 3  •  ibuprofen (ADVIL,MOTRIN) 800 MG tablet, Take 1 tablet by mouth Every 6 (Six) Hours As Needed for mild pain (1-3) or moderate pain (4-6). (Patient taking differently: Take 800 mg by mouth Every 6 (Six) Hours As Needed for Mild Pain  or Moderate Pain  (ON HOLD FOR SX).), Disp: 30 tablet, Rfl: 0  •  lisinopril-hydrochlorothiazide  (PRINZIDE,ZESTORETIC) 20-25 MG per tablet, TAKE 1 TABLET BY MOUTH DAILY, Disp: 30 tablet, Rfl: 5  •  LORazepam (ATIVAN) 0.5 MG tablet, TAKE 1 TABLET BY MOUTH EVERY 8 HOURS AS NEEDED FOR ANXIETY, Disp: 45 tablet, Rfl: 0  •  silver sulfadiazine (SILVADENE, SSD) 1 % cream, Apply  topically to the appropriate area as directed 2 (Two) Times a Day., Disp: 50 g, Rfl: 1  •  tamoxifen (NOLVADEX) 20 MG chemo tablet, Take 1 tablet by mouth Daily., Disp: 90 tablet, Rfl: 2  •  vitamin B-12 (CYANOCOBALAMIN) 1000 MCG tablet, Take 1,000 mcg by mouth Daily., Disp: , Rfl:       Allergies   Allergen Reactions   • Citalopram Rash   • Flexeril [Cyclobenzaprine] Other (See Comments)     MUSCLES TENSE-OPPOSITE OF WHAT IT IS INTENDED TO DO-ELEVATES B/P   • Doxycycline Rash     Blisters on hands   • Keflex [Cephalexin] Rash   • Sulfa Antibiotics Rash     NAUSEA/VOMITING/FEVER   • Vilazodone Hcl Hives, Itching and Rash       Family History   Problem Relation Age of Onset   • Hypertension Mother    • Arthritis Mother    • Kidney cancer Mother    • Depression Mother         Not Dr azalia hogan go   • Heart disease Father    • Hernia Father    • Hypertension Father    • No Known Problems Brother    • Breast cancer Paternal Aunt    • Breast cancer Paternal Cousin    • Drug abuse Paternal Uncle         life long   • Drug abuse Paternal Uncle         life long   • Malig Hyperthermia Neg Hx        PHYSICAL EXAMINATION:   Vitals:    09/13/19 1033   BP: 140/78   Pulse: 81   SpO2: 99%     ECOG 0 - Asymptomatic  General: NAD, well appearing  Psych: a&o x 3, normal mood and affect  Eyes: EOMI, no scleral icterus  ENMT: neck supple without masses or thyromegaly, mucus membranes moist  MSK: normal gait, normal ROM in bilateral shoulders  Lymph nodes: no cervical, supraclavicular or axillary lymphadenopathy, well-healed right axillary incision  Breast:   Right: There is moderate hyperpigmentation of the entire breast and axilla. Well-healed central  incision. No masses.  Left: No visible abnormalities on inspection while seated, with arms raised or hands on hips. No masses, skin changes, or nipple abnormalities.      Assessment:  49 y.o. F with a diagnosis of right breast cancer: intermediate grade, invasive ductal carcinoma, ER/HI+, Her2 negative. She is sp right central partial mastectomy & SLNB on 5/28/19, pT1bN0, anatomic stage IA, prognostic stage IA. She completed radiation on 8/14/19. She is currently on tamoxifen.  -She has a pathogenic CHEK2 mutation and has a higher than average risk of a subsequent breast cancer, however declined prophylactic mastectomy.    Discussion:  We again discussed the plan for high risk screening. I would like to wait at least 6 months after radiation before her next mammogram.    Plan:  -Continue follow-up with Dr. Trevizo.  -Continue follow-up with PT/LE clinic.  -F/u in 2/2020 with MMG. Will plan on MRI in 8/2020.  -She was instructed to call sooner with any questions, concerns or changes on BSE.    Trena Greene MD      CC:  Pinky Hyatt, VICENTE Trevizo MD

## 2019-09-13 NOTE — TELEPHONE ENCOUNTER
Notified patient, she v/u.    ----- Message from Tia Trevizo MD sent at 9/12/2019  8:22 PM EDT -----  Please let the patient know that her CBC is normal.  I do not see any elevation of granulocytes on the blood count from September 9, 2019.  She does not show evidence of infection and tell her not to be worried about it as normally also patients can have a mildly elevated white count up and down.  Tia Trevizo MD

## 2019-09-13 NOTE — PROGRESS NOTES
Subjective     No ref. provider found     Diagnosis Plan   1. Malignant neoplasm of central portion of right breast in female, estrogen receptor positive (CMS/HCC)       Chief Complaint   Patient presents with   • Follow-up     S/P XRT to Breast     CC:      T1bN0 right breast cancer, ERPR pos and Her 2 neg                         S:    I had the pleasure of seeing Emilie Soto  today in the Radiation Center.  She returns today for follow up now 4 weeks out from completion of her radiation therapy.  She has been doing well since I last saw her but still having fatigue.  She completed radiation to the right breast on 8/14/19.  She received a dose of 50gy.      Review of Systems   Constitutional: Positive for fatigue.   HENT: Negative.    Respiratory: Negative.    Skin: Positive for color change.         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   • Sleep apnea     C-PAP IN USE   • Vitamin D deficiency          Past Surgical History:   Procedure Laterality Date   • APPENDECTOMY      removed when removing dermoid on right ovary   • BREAST BIOPSY Right 2019   • BREAST LUMPECTOMY WITH SENTINEL NODE BIOPSY Right 5/28/2019    Procedure: Right central partial mastectomy (with removal of the nipple-areola-complex) and sentinel lymph node biopsy;  Surgeon: Trena Greene MD;  Location: [facility];  Service: General   • CHOLECYSTECTOMY     • DERMOID CYST  EXCISION  1989   • HERNIA REPAIR  2006    umbilical   • OVARY SURGERY      dermoid removed   • SALIVARY GLAND SURGERY  1993    due to cancer   •  TONSILLECTOMY  01/01/1979    I was 9 years old         Social History     Socioeconomic History   • Marital status:      Spouse name: Trena Montenegro   • Number of children: 0   • Years of education: Not on file   • Highest education level: Not on file   Occupational History     Employer: [employer]     Comment: 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    Tobacco Use   • Smoking status: Never Smoker   • Smokeless tobacco: Never Used   • Tobacco comment: Parents smoked.  I have never smoked   Substance and Sexual Activity   • Alcohol use: Yes     Comment: Only once or twice a year.  Mixed Drinks   • Drug use: No   • Sexual activity: Yes     Partners: Female     Birth control/protection: None         Family History   Problem Relation Age of Onset   • Hypertension Mother    • Arthritis Mother    • Kidney cancer Mother    • Depression Mother         Not Dr azalia onofre   • Heart disease Father    • Hernia Father    • Hypertension Father    • No Known Problems Brother    • Breast cancer Paternal Aunt    • Breast cancer Paternal Cousin    • Drug abuse Paternal Uncle         life long   • Drug abuse Paternal Uncle         life long   • Malig Hyperthermia Neg Hx           Objective    Physical Exam   Constitutional: She appears well-nourished.   Pulmonary/Chest:   Moderate hyperpigmentation over right breast             Current Outpatient Medications on File Prior to Visit   Medication Sig Dispense Refill   • cholecalciferol (VITAMIN D3) 1000 units tablet Take 3,000 Units by mouth Daily.     • desvenlafaxine (PRISTIQ) 100 MG 24 hr tablet Take 1 tablet by mouth Daily. 30 tablet 2   • Efinaconazole (JUBLIA) 10 % solution Apply 1 Units topically Daily. 8 mL 3   • ibuprofen (ADVIL,MOTRIN) 800 MG tablet Take 1 tablet by mouth Every 6 (Six) Hours As Needed for mild pain (1-3) or moderate pain (4-6). (Patient taking differently: Take 800 mg by mouth Every 6 (Six) Hours As Needed for Mild  Pain  or Moderate Pain  (ON HOLD FOR SX).) 30 tablet 0   • lisinopril-hydrochlorothiazide (PRINZIDE,ZESTORETIC) 20-25 MG per tablet TAKE 1 TABLET BY MOUTH DAILY 30 tablet 5   • LORazepam (ATIVAN) 0.5 MG tablet TAKE 1 TABLET BY MOUTH EVERY 8 HOURS AS NEEDED FOR ANXIETY 45 tablet 0   • silver sulfadiazine (SILVADENE, SSD) 1 % cream Apply  topically to the appropriate area as directed 2 (Two) Times a Day. 50 g 1   • tamoxifen (NOLVADEX) 20 MG chemo tablet Take 1 tablet by mouth Daily. 90 tablet 2   • vitamin B-12 (CYANOCOBALAMIN) 1000 MCG tablet Take 1,000 mcg by mouth Daily.       No current facility-administered medications on file prior to visit.        ALLERGIES:    Allergies   Allergen Reactions   • Citalopram Rash   • Flexeril [Cyclobenzaprine] Other (See Comments)     MUSCLES TENSE-OPPOSITE OF WHAT IT IS INTENDED TO DO-ELEVATES B/P   • Doxycycline Rash     Blisters on hands   • Keflex [Cephalexin] Rash   • Sulfa Antibiotics Rash     NAUSEA/VOMITING/FEVER   • Vilazodone Hcl Hives, Itching and Rash       /73   Pulse 73   SpO2 100%      Current Status 9/9/2019   ECOG score 0         Assessment/Plan     49 year old female with T1bN0 right breast cancer, ERPR pos and Her 2 neg now 4 weeks out from radiation.   She will continue on the tamoxifen.    She will be due for her yearly mammogram in April 2020 and will see Dr. Greene shortly after.  She will have regular follow up with the medical oncologist.  I will see her back in one year or sooner if necessary.             Thank you very much for allowing me to participate in the care of this very pleasant patient.    Sincerely,      Chanel Carballo MD
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
## 2019-09-16 ENCOUNTER — TELEPHONE (OUTPATIENT)
Dept: SURGERY | Facility: CLINIC | Age: 49
End: 2019-09-16

## 2019-09-16 ENCOUNTER — TELEPHONE (OUTPATIENT)
Dept: INTERNAL MEDICINE | Facility: CLINIC | Age: 49
End: 2019-09-16

## 2019-09-20 ENCOUNTER — OFFICE VISIT (OUTPATIENT)
Dept: OTHER | Facility: HOSPITAL | Age: 49
End: 2019-09-20

## 2019-09-20 VITALS
HEART RATE: 79 BPM | OXYGEN SATURATION: 99 % | RESPIRATION RATE: 18 BRPM | DIASTOLIC BLOOD PRESSURE: 81 MMHG | WEIGHT: 293 LBS | BODY MASS INDEX: 48.17 KG/M2 | SYSTOLIC BLOOD PRESSURE: 144 MMHG | TEMPERATURE: 97.1 F

## 2019-09-20 DIAGNOSIS — C50.111 MALIGNANT NEOPLASM OF CENTRAL PORTION OF RIGHT BREAST IN FEMALE, ESTROGEN RECEPTOR POSITIVE (HCC): Primary | ICD-10-CM

## 2019-09-20 DIAGNOSIS — Z17.0 MALIGNANT NEOPLASM OF CENTRAL PORTION OF RIGHT BREAST IN FEMALE, ESTROGEN RECEPTOR POSITIVE (HCC): Primary | ICD-10-CM

## 2019-09-20 PROCEDURE — 99215 OFFICE O/P EST HI 40 MIN: CPT | Performed by: NURSE PRACTITIONER

## 2019-09-20 PROCEDURE — G0463 HOSPITAL OUTPT CLINIC VISIT: HCPCS | Performed by: NURSE PRACTITIONER

## 2019-09-20 NOTE — PROGRESS NOTES
[facility] MULTIDISCIPLINARY CLINIC  SURVIVORSHIP VISIT    Emilie Soto is a pleasant 49 y.o.   female being followed by Tia Trevizo MD  for High grade, invasive ductal carcinoma of right breast, ER/[place]+, Her2 negative; clinical T1bN0, anatomic stage IA, prognostic stage IA. Here today with her spouse Trena in our Cancer Survivorship Clinic, to review survivorship care plan.    TREATMENT HISTORY:     Oncology/Hematology History    1/7/14, Screening MMG ([facility]):   There is a stable small focal asymmetry seen in the left breast at 12:00-1:00. In the right breast, no masses, significant calcifications or other abnormalities are seen.   BI-RADS 0: Incomplete.    2/4/14, Left Diagnostic MMG & Left US ([facility]):   MMG:  On the present examination, there is an isodense focal asymmetry measuring 15 mm with indistinct margins in the left breast at 12:00-1:00 located 10 cm from the nipple. Focal asymmetric density has become more defined.   US:  There is no evidence of any solid mass or abnormal cystic elements. Stereotactic biopsy is recommended.   BI-RADS 4A: Suspicious.    2/11/14, Left Breast, 12-1:00, Stereotactic Biopsy ([facility]):   Fibroadenomatoid type stromal changes, mild columnar cell hyperplasia, mild fibrocystic changes. Negative for atypia or malignancy.   -Concordant.  Recommend 6 month follow-up mammogram and ultrasound.        Malignant neoplasm of central portion of right breast in female, estrogen receptor positive (CMS/HCC)    4/17/2019 Initial Diagnosis     Malignant neoplasm of central portion of right breast in female, estrogen receptor positive (CMS/HCC)         4/18/2019 Imaging     Screening MMG ([facility]):   There are scattered areas of fibroglandular density. There is a small, oval mass measuring 10 mm with indistinct margins seen in the sub-areolar region of the right breast. Note is made of a biopsy clip in the left breast as evidence of a previous surgical procedure.  In the left breast, no suspicious masses, significant calcifications or other abnormalities are seen.   BI-RADS 0: Incomplete.           4/29/2019 Imaging     Right Diagnostic MMG with [place]rence & Right US ([facility]):   MMG:  On the present examination, there is a new high density, round mass measuring 10 mm with indistinct margins in the anterior one-third sub-areolar region of the right breast located 2 cm from the nipple.   US:   1. Ultrasound is suggestive of a round solid mass with indistinct margins measuring 9 mm. Internal echotexture is homogeneous and hypoechoic relative adipose tissue.   2. There is an abnormal axillary lymph node in the right axilla. There are 2 adjacent lymph nodes in the right axilla with mildly prominent cortices. The cortex has been measured at 3.1 mm, although no significant focal thickening is seen. The left axilla was evaluated for comparison, and the nodes on the right are of greater cortical thickness.   BI-RADS 4C: Suspicious.         5/6/2019 Biopsy     Right breast & Right axillary lymph node, US-guided biopsy ([facility]):    1. Right Subareolar, core biopsy:    Invasive High Grade Mammary Carcinoma (Invasive Ductal Carcinoma, Grade III). Tubule Score 3, Nuclear Grade Score 3, and Mitotic Score 2 for A Total Score of 8. Neoplasm is Present in Four of Eight Biopsies and Measures 4 mm in Greatest Dimension.    ER+ (82.35%, moderate)  LA+ (92.68%, strong)  Her2 negative (IHC 1+)  Ki-67 14.81%    2. Right axilla, core biopsy:  Benign Lymph Node, Negative for Metastatic Carcinoma.         5/9/2019 Imaging     Bilateral Breast MRI ([facility]):  Within the retroareolar region of the right breast located slightly medial to the plane of the nipple at the 2:30 position on the order of 1.8 cm from the nipple there is an irregular enhancing mass that measures 1.0 cm in the superior-inferior dimension, 1.0 cm in anterior to posterior dimension and 1.0 cm in the medial to lateral dimension. A metallic clip is  seen within the mass. This represents the biopsy-proven site malignancy.     No other areas of abnormal enhancement or morphology are seen within the right breast. I see no evidence for abnormal right breast skin, nipple or chest wall enhancement and there is no evidence for right axillary adenopathy.     There are no areas of abnormal enhancement or morphology in the left breast. I see no evidence for abnormal left breast skin, nipple or chest wall enhancement and there is no evidence for left axillary adenopathy.  BI-RADS 6: Known malignancy.         5/14/2019 Genetic Testing     [employer] Breast Cancer STAT Panel (9 genes):    CHEK2 mutation         5/28/2019 Surgery     Right central partial mastectomy (with removal of the nipple-areola-complex) and sentinel lymph node biopsy    1.  Right Axilla, [place] Lymph Node #1 (hot, blue, count 628):                A.  One lymph node with fatty infiltration and focal florid foreign body giant cell response consistent with clip                     Placement.               B.  No metastatic carcinoma identified by routine staining (0/1).     2.  Right Axilla, Non-[place] Lymph Node:                A.  Benign fibrofatty tissue with focal minimal fat necrosis.                B.  No intact lymph node tissue identified.      3.  Right Central Partial Mastectomy:                A.  Invasive mammary carcinoma of no special type (ductal carcinoma).                            1.  Invasive carcinoma measures 10 mm x 8 mm x 8 mm.                            2.  Overall Elvin grade II (glandular score = 2, nuclear score = 2, mitotic score = 2).                 B.  No associated ductal carcinoma in situ component by IHC staining (see comment).                   C.  Margins are negative for invasive carcinoma.  Invasive carcinoma measures at least 15 mm from                     the closest (Posterior) margin of excision.                     All other margins measure at least 20 mm  from invasive carcinoma including:                             Anterior margin = 20 mm.                            Superior margin = 20 mm.                            Inferior margin = 53 mm.                            Medial Margin = 50 mm.                            Lateral margin = 60 mm.                   D.  No lymphovascular space invasion identified.                E.  No pagetoid involvement of skin nor nipple by carcinoma identified.               F.  Focal stromal fibrosis (scar) organizing fat necrosis and foreign giant cell reaction noted consistent with                     previous needled biopsy site.                G.  Nonneoplastic breast tissue with fibrocystic change, focal adenosis, organizing fat necrosis.                H.  Microcalcifications are identified within benign ductal tissue.                I.   Previously reported biomarkers: Estrogen receptor: positive (82.35% moderate), Progesterone receptor:                    positive (92.68%, strong), HER2/Amy: negative (IHC score1+) and Ki-67=14.8%                     (not reviewed).                   Pathologic Stage: pT1b, (sn)N0 (G2).                 See synoptic for tumor details.     4.  Additional Lateral and Superior Margins:                A.  No in situ nor infiltrating carcinoma identified.               B.  New superior margin is negative for malignancy by an additional 25 mm.     Oncotype DX Score: 6         7/11/2019 - 8/14/2019 Radiation     Radiation OncologyTreatment Course:  Emilie Soto received 5000 cGy in 25 fractions to the right breast.         8/17/2019 -  Hormonal Therapy     Tamoxifen            Allergies as of 09/20/2019 - Reviewed 09/20/2019   Allergen Reaction Noted   • Citalopram Rash 11/10/2016   • Flexeril [cyclobenzaprine] Other (See Comments) 11/10/2016   • Doxycycline Rash 11/10/2016   • Keflex [cephalexin] Rash 11/10/2016   • Sulfa antibiotics Rash 11/10/2016   • Vilazodone hcl Hives, Itching, and Rash         MEDICATIONS:  I have reviewed the medication list with the patient and I updated it in the electronic medical record. Medication dosages and frequencies were confirmed to be accurate with the patient.      Review of Systems   Constitutional: Positive for appetite change (Decreased) and fatigue. Negative for unexpected weight change.        Denies hot flashes   Respiratory: Negative for chest tightness and shortness of breath.    Cardiovascular: Positive for leg swelling (Bilateral feet). Negative for chest pain.   Gastrointestinal: Positive for constipation. Negative for blood in stool and diarrhea.   Genitourinary: Negative for dysuria.   Musculoskeletal: Negative for arthralgias and myalgias.   Skin:        Dry, itchy   Neurological: Positive for weakness (Left upper extremity). Negative for dizziness, light-headedness and numbness.   Psychiatric/Behavioral: Positive for decreased concentration, dysphoric mood and sleep disturbance (Restless). The patient is nervous/anxious.        DISTRESS QUESTIONNAIRE: 5/10 EFFECT TO LEVEL OF FUNCTIONING: Moderate  Patient identified areas of distress: see flowsheet      /81   Pulse 79   Temp 97.1 °F (36.2 °C) (Oral)   Resp 18   Wt (!) 137 kg (303 lb)   SpO2 99% Comment: room air  BMI 48.17 kg/m²     Wt Readings from Last 3 Encounters:   09/20/19 (!) 137 kg (303 lb)   09/13/19 (!) 138 kg (304 lb)   09/09/19 (!) 138 kg (304 lb 6.4 oz)       Pain Score    09/20/19 1042   PainSc: 0-No pain         Physical Exam   Constitutional: She is oriented to person, place, and time. She appears well-developed and well-nourished. She is cooperative.   HENT:   Head: Normocephalic and atraumatic.   Cardiovascular: Normal rate and regular rhythm.   Pulmonary/Chest: Effort normal. No respiratory distress.   Abdominal: Soft. Normal appearance.   Musculoskeletal:        Right shoulder: She exhibits decreased range of motion.   Neurological: She is alert and oriented to person,  place, and time.   Skin: Skin is warm, dry and intact.   Psychiatric: Her speech is normal and behavior is normal. Judgment and thought content normal. Cognition and memory are normal. She exhibits a depressed mood.   Vitals reviewed.        Problem List Items Addressed This Visit        Active Problems    Malignant neoplasm of central portion of right breast in female, estrogen receptor positive (CMS/HCC) - Primary            SURVIVORSHIP DISCUSSION HELD TODAY:   Primary patient goal(s): late and long-term effects of dz/tx and wellness     Management of disease and treatment related effects: Reports fair tolerance of tamoxifen.  Denies menopausal symptoms arthralgias or myalgias.  Reviewed risk of rare adverse events associated with tamoxifen including clotting event and uterine cancer.  Reviewed signs and symptoms of DVT/clot and reasons to report for immediate evaluation.  Also reviewed importance of annual gynecologic exam while on tamoxifen.  Patient currently without OB/GYN.  I have asked her to call  [facility] for new patient evaluation.    She is currently followed by Helena Lemus in the lymphedema clinic and is working on increasing left upper extremity range of motion gradually.  She is working on getting connected with the [facility] strong program.    Discussed strategies for preservation of bone mass including calcium and vitamin D supplementation and importance of weight bearing exercise to include moderate intensity walking most days of the week and continued abstinence from tobacco.    Reports generalized skin changes with skin more dry and itchy since initiation of cancer treatment.  She has been using a homemade salve of coconut oil and essential oils and have encouraged her to continue that.    Psychosocial and spiritual: Rates her distress a 5 today.  Currently followed by support of oncology services.  Has been utilizing [employer]'s club as a resource for support groups and activities.  Reports  working diligently on personal development and processing prior traumas which have been stirred up by this cancer experience.  She and Trena have been  for 2 years.  They are excellent supports for each other.  Both admit to placing all others needs first before their own and are trying to work on prioritizing self-care.      Emilie has recently connected with a community art therapist By the name of Leda Whaley and has met with her for an initial evaluation.    Advance Care Planning   Advance Care Planning Discussion:    Patient does not have advance care planning complete.  She does identify her spouse Trena Montenegro as her healthcare surrogate.  Brief discussion and written information provided regarding advance care planning and appropriateness for all healthy adults, choosing a healthcare surrogate and upcoming Advance Care Planning classes offered monthly at the [facility].         Maintaining personal wellness: Emilie has met with our oncology dietitian for consultation.  Today she does report some constipation x2 days.  She reports typically her issue is chronic diarrhea.  She prefers minimal medical interventions and is interested in more information on dietary fiber.  I have provided her with some written information regarding daily recommended intake of 25 to 30 g of dietary fiber as well as a handout with examples of high-fiber foods, serving sizes and fiber content.  We also discussed increasing intake of foods including natural probiotics such as yogurt, Leda chi, sauerkraut, kombucha.  Discussed over-the-counter probiotics if desired and not cost prohibitive as well as senna teas available over-the-counter.  Discussed importance of adequate fluid intake of 8, 8 ounce glasses of water daily and continue to increase physical activity.    Discussed NCCN recommendations for all cancer survivors of 150 minutes/week moderate intensity exercise, achieve and maintain a healthy  BMI, plants-based whole-foods diet, avoid tobacco and second hand smoke, avoid alcohol or minimize alcohol intake - no more than 1 drink in a day for women, 2 drinks in a day for men.     No orders of the defined types were placed in this encounter.      After review of the Survivorship Treatment Summary & Care Plan, the patient verbalized understanding of recommendations for follow-up. As outlined in the care plan, they were advised to continue with follow-up care in accordance with the NCCN surveillance guidelines while transitioning back to their primary care physician for continued general preventive and healthcare needs. We discussed the importance of healthy eating, exercise and weight management. We reviewed current guidelines for routine screening of other cancers.     A copy of the Survivorship Treatment Summary & Care Plan for Ms. Soto (see below) was provided to and forwarded to the providers identified on the care team.      Greater than 40 minutes spent with patient, more than half of that spent face-to-face counseling patient extensively on treatment summary, surveillance for recurrence, late and long-term effects of disease and treatment, intimacy and self-image, preventative screening for other cancers, achieving/maintaining healthy BMI and link to risk reduction, adherence to current therapies, management of anxiety/uncertainty and self care strategies.         Breast Cancer Survivorship Plan      General Information   Patient name Emilie Soto   Date of birth 1970    Phone Home Phone [phone]        Email [email or web address]      Cancer Treatment Team     Patient Care Team:  Chanel Carballo MD as Consulting Physician (Radiation Oncology)  Tia Trevizo MD as Consulting Physician (Hematology and Oncology)    Provider Phone numbers  Care Team Provider: Chanel Carballo MD,  ([phone])  Care Team Provider: Tia Trevizo MD,  ([phone])     Post Treatment Care Team    Primary Care Physician Pinky Hyatt, APRN  [address] [place] [address]   [phone]         Background Information   Medical history Past Medical History:   • Anxiety   • Anxiety and depression   • Cancer (CMS/HCC)    RIGHT BREAST   • Cancer (CMS/HCC)    PAROTID GLAND   • Depression   • Headache   • History of kidney stones   • Hyperlipidemia   • Hypertension   • Irritable bowel syndrome   • Lumbago   • Mood disorder (CMS/HCC)   • Obesity   • PONV (postoperative nausea and vomiting)   • S/P radiation therapy    PAROTID GLAND CANCER   • Sleep apnea    C-PAP IN USE   • Vitamin D deficiency        Surgical history Past Surgical History:   • APPENDECTOMY    removed when removing dermoid on right ovary   • BREAST BIOPSY   • BREAST LUMPECTOMY WITH SENTINEL NODE BIOPSY    Procedure: Right central partial mastectomy (with removal of the nipple-areola-complex) and sentinel lymph node biopsy;  Surgeon: Trena Greene MD;  Location: [facility];  Service: General   • CHOLECYSTECTOMY   • DERMOID CYST  EXCISION   • HERNIA REPAIR    umbilical   • OVARY SURGERY    dermoid removed   • SALIVARY GLAND SURGERY    due to cancer   • TONSILLECTOMY    I was 9 years old        Tobacco use Social History     Tobacco Use   Smoking Status Never Smoker   Smokeless Tobacco Never Used   Tobacco Comment    Parents smoked.  I have never smoked        Family oncology history Cancer-related family history includes Breast cancer in her paternal aunt and paternal cousin; Kidney cancer in her mother.      Oncology Information      Malignant neoplasm of central portion of right breast in female, estrogen receptor positive (CMS/HCC)    4/17/2019 Initial Diagnosis     Malignant neoplasm of central portion of right breast in female, estrogen receptor positive (CMS/HCC)           4/18/2019 Imaging     Screening MMG ([facility]):   There are scattered areas of fibroglandular density. There is a small, oval mass measuring  10 mm with indistinct margins seen in the sub-areolar region of the right breast. Note is made of a biopsy clip in the left breast as evidence of a previous surgical procedure. In the left breast, no suspicious masses, significant calcifications or other abnormalities are seen.   BI-RADS 0: Incomplete.           4/29/2019 Imaging     Right Diagnostic MMG with [place] & Right US ([facility]):   MMG:  On the present examination, there is a new high density, round mass measuring 10 mm with indistinct margins in the anterior one-third sub-areolar region of the right breast located 2 cm from the nipple.   US:   1. Ultrasound is suggestive of a round solid mass with indistinct margins measuring 9 mm. Internal echotexture is homogeneous and hypoechoic relative adipose tissue.   2. There is an abnormal axillary lymph node in the right axilla. There are 2 adjacent lymph nodes in the right axilla with mildly prominent cortices. The cortex has been measured at 3.1 mm, although no significant focal thickening is seen. The left axilla was evaluated for comparison, and the nodes on the right are of greater cortical thickness.   BI-RADS 4C: Suspicious.         5/6/2019 Biopsy     Right breast & Right axillary lymph node, US-guided biopsy ([facility]):    1. Right Subareolar, core biopsy:    Invasive High Grade Mammary Carcinoma (Invasive Ductal Carcinoma, Grade III). Tubule Score 3, Nuclear Grade Score 3, and Mitotic Score 2 for A Total Score of 8. Neoplasm is Present in Four of Eight Biopsies and Measures 4 mm in Greatest Dimension.    ER+ (82.35%, moderate)  [place]+ (92.68%, strong)  Her2 negative (IHC 1+)  Ki-67 14.81%    2. Right axilla, core biopsy:  Benign Lymph Node, Negative for Metastatic Carcinoma.         5/9/2019 Imaging     Bilateral Breast MRI ([facility]):  Within the retroareolar region of the right breast located slightly medial to the plane of the nipple at the 2:30 position on the order of 1.8 cm from the nipple there is an irregular  enhancing mass that measures 1.0 cm in the superior-inferior dimension, 1.0 cm in anterior to posterior dimension and 1.0 cm in the medial to lateral dimension. A metallic clip is seen within the mass. This represents the biopsy-proven site malignancy.     No other areas of abnormal enhancement or morphology are seen within the right breast. I see no evidence for abnormal right breast skin, nipple or chest wall enhancement and there is no evidence for right axillary adenopathy.     There are no areas of abnormal enhancement or morphology in the left breast. I see no evidence for abnormal left breast skin, nipple or chest wall enhancement and there is no evidence for left axillary adenopathy.  BI-RADS 6: Known malignancy.         5/14/2019 Genetic Testing     [employer]ae Breast Cancer STAT Panel (9 genes):    CHEK2 mutation           5/28/2019 Surgery     Right central partial mastectomy (with removal of the nipple-areola-complex) and sentinel lymph node biopsy    1.  Right Axilla, [place] Lymph Node #1 (hot, blue, count 628):                A.  One lymph node with fatty infiltration and focal florid foreign body giant cell response consistent with clip                     Placement.               B.  No metastatic carcinoma identified by routine staining (0/1).     2.  Right Axilla, Non-[place] Lymph Node:                A.  Benign fibrofatty tissue with focal minimal fat necrosis.                B.  No intact lymph node tissue identified.      3.  Right Central Partial Mastectomy:                A.  Invasive mammary carcinoma of no special type (ductal carcinoma).                            1.  Invasive carcinoma measures 10 mm x 8 mm x 8 mm.                            2.  Overall [place] grade II (glandular score = 2, nuclear score = 2, mitotic score = 2).                 B.  No associated ductal carcinoma in situ component by IHC staining (see comment).                   C.  Margins are negative for invasive  carcinoma.  Invasive carcinoma measures at least 15 mm from                     the closest (Posterior) margin of excision.                     All other margins measure at least 20 mm from invasive carcinoma including:                             Anterior margin = 20 mm.                            Superior margin = 20 mm.                            Inferior margin = 53 mm.                            Medial Margin = 50 mm.                            Lateral margin = 60 mm.                   D.  No lymphovascular space invasion identified.                E.  No pagetoid involvement of skin nor nipple by carcinoma identified.               F.  Focal stromal fibrosis (scar) organizing fat necrosis and foreign giant cell reaction noted consistent with                     previous needled biopsy site.                G.  Nonneoplastic breast tissue with fibrocystic change, focal adenosis, organizing fat necrosis.                H.  Microcalcifications are identified within benign ductal tissue.                I.   Previously reported biomarkers: Estrogen receptor: positive (82.35% moderate), Progesterone receptor:                    positive (92.68%, strong), HER2/Amy: negative (IHC score1+) and Ki-67=14.8%                     (not reviewed).                   Pathologic Stage: pT1b, (sn)N0 (G2).                 See synoptic for tumor details.     4.  Additional Lateral and Superior Margins:                A.  No in situ nor infiltrating carcinoma identified.               B.  New superior margin is negative for malignancy by an additional 25 mm.     Oncotype DX Score: 6           7/11/2019 - 8/14/2019 Radiation     Radiation OncologyTreatment Course:  Emilie Soto received 5000 cGy in 25 fractions to the right breast.           8/17/2019 -  Hormonal Therapy     Tamoxifen                  Complications during Therapy:   Mild leukocytosis; anxiety    Modification to Treatment Plan:  No Modifications      Lifetime Dose  Tracking:   No doses have been documented on this patient for the following tracked chemicals: Doxorubicin, Epirubicin, Idarubicin, Daunorubicin, Mitoxantrone, Bleomycin, Mitomycin, Doxorubicin Liposomal             [No treatment plan]         Persistent Treatment-Associated Adverse Effects at Completion of Therapy   It is important to recognize that not every person experiences the following adverse events after treatment.  You may not have any of these issues, a few or many adverse effects.  Experiences are highly variable.  Please discuss any adverse effects of cancer treatment with your cancer care team.      After Surgical Therapy   Breast Conserving Surgery (Lumpectomy)     Breast conserving surgery (lumpectomy) involves the removal of the breast mass (cancer lump) and a surrounding area of normal tissue. After surgery, there may be pain and soreness in the chest, underarm or shoulder which should get better over time. Nerves may be damaged in the breast and areas of lymph node removal which may result in numbness and other changes in sensation. Ask your doctor or nurse if you have issues with pain, numbness or tingling, or any changes in function or mobility.      Breast conserving surgery allows women to keep their breast but the breast may look different than it did before surgery. The breast may be smaller and may be different in size and shape. There will be a scar from the surgery and scar tissue may feel different. Radiation therapy can also affect the way the breast looks and feels. How you think and feel about your body is important and coping with changes after breast surgery takes time.  It's important to look at your scar, which should become less red and swollen over time. It's important to touch your scar, too.  Your physician may give you instructions about massaging the scar to help with healing and to soften scar tissue. If you have a partner, let your partner look at and feel the scar when  you're ready.  Working through feelings about the cancer and changes as a result of surgery may take time and support. Talk with your doctor or nurse about any issues with body image and coping.      Survivors of breast cancer should speak with their health care provider regarding the possibility of a genetic or family syndrome. If there does appear to be a family history or possible genetic syndrome, genetic counseling and testing may be recommended.      [place] Node Biopsy   Removal of the sentinel lymph node is the removal of the first lymph node to which cancer cells are most likely to spread. After surgery, there may be pain and soreness in the area where the node was removed.  Nerves may be damaged which may result in numbness or other changes in sensation. While sentinel node biopsy (as opposed to a lymph node dissection where more lymph nodes are removed) decreases the risk of developing lymphedema, the risk is not completely gone.     Lymphedema   Removal of lymph nodes can slow the normal flow of lymph in the area which can lead to swelling in that limb, also called lymphedema.  Survivors who also received radiation therapy to the area where a lymph node was removed may be at increased risk of developing lymphedema. In some cases, the lymphedema can occur years after cancer therapy was completed. Lymphedema can cause pain or discomfort, disfigurement, change in function, and increased risk of infection in the affected area and closest limb. Signs of lymphedema can include a feeling of fullness or heaviness, changes in the skin (red, thick, stiff), aching, tightness, and difficulty moving or flexing nearby joints.  Other signs could be that your jewelry or clothes, like socks, pants or sleeves, may begin to feel tight on the affected limb. As signs of lymphedema could develop months or years after treatment, continue to monitor for signs and notify your doctor.      Several steps can be taken to help  prevent and control lymphedema. Survivors should protect the potentially affected limb by avoiding cuts, scrapes, burns, insect bites, shots/vaccines, blood draws, and IV sticks in order to decrease the risk of developing an infection in the limb. In addition, the survivor should protect the limb from the sun to avoid sunburn.  Lastly, survivors should avoid tight clothing and jewelry, and blood pressures in the affected limb that might further slow the normal flow of lymph.      Survivors of cancer, including those at risk for lymphedema, can and should exercise. Survivors should start slow and gradually increase intensity while monitoring your limb for changes in swelling or redness. If either occurs, stop exercising and notify your doctor for further direction.            After Chemotherapy   No chemotherapy received.      After Radiation Therapy   Radiation therapy can cause early and late side effects.  Early side effects are those that happen during or shortly after treatment and are usually gone within a few weeks after treatment ends.  Late side effects may take months or years to develop and vary depending on the areas of the body included in the field of radiation and the radiation techniques that were used.      The most common early side effects are fatigue (feeling tired) and skin changes.  Other early side effects are usually related to the area being treated. If you continue to have some skin problems after treatment ends, be gentle with the skin in the treatment area until all signs of irritation are gone and continue to care for your skin as your doctors and nurses have advised. If you continue to have fatigue, you may need to plan your activities to maximize energy, get extra rest while your body is still recovering and follow other instructions from your doctors and nurses such as exercise and activity.    Long term effects of radiation therapy vary greatly depending on the areas included in the  field of radiation and the radiation techniques that were used. Breast tissue exposed to radiation may become more firm over time and you may notice changes in the size and shape of the breast. The skin where the breast was treated may have a different coloration, be more red or appear tanned. If the lymph nodes under the arm (axillary nodes) were in the radiation field, there may be an increased risk of developing lymphedema.  Lymphedema is a condition in which fluid collects in the arm or other areas such as the hand, fingers, chest or back and cause swelling. In rare cases, radiation therapy can increase the risk of a second cancer. Ask your doctors and nurses about the risk of long term effects. Keep your follow up appointments and keep up with recommended health screenings        Hormone Therapy    Hormones, like estrogen and progesterone, are naturally produced in the body. Typically these hormones help our body function in various ways, however, in some cases these hormones can also cause cancer to grow.  When your cancer is positive for hormone receptors, your doctor may recommend hormone therapy. Hormone therapy works by either blocking the effects of the hormone on the cancer cell, or by reducing the amount of hormone produced by the body.  In doing so, the cancer cells no longer receive or use the hormone to grow.      It is very important for you to take the medicine as prescribed by your doctor and keep your regular follow-up appointments. Some medications interfere with hormone therapy medications, so be sure and discuss all medications that you take with your doctor.  Common side effects women experience from hormone therapy includes hot flashes, vaginal dryness, and lack of a period in women who have not yet reached menopause.  Some less common but serious side effects of hormone therapy may include increased risk for blood clots, joint pain, bone loss, weakness, mood changes, nausea, fatigue, and  loss of interest in sexual activity, endometrial and uterine cancers, risk for stroke, heart attack, angina, and heart failure. Share with your doctor the side effects you experience so a plan can be made to minimize the effects. In addition, these medications will be taken long-term, in some cases 5-10 years. When taking oral hormone therapy, it may be helpful to create a calendar, use a pillbox, or set an alarm on your watch or phone to remind you daily to take the medication.      Care of your Venous Access Device   No Venous Access Device currently in place      General After Cancer Treatment        It is not uncommon for cancer to impact other areas of your life such as relationships, work and mental health.  If you develop financial concerns, resources are sometimes available to assist in these areas.  Depression and anxiety can present either during or after cancer diagnosis and treatment.  It is important to discuss with your physician any of these concerns so these resources can be made available to you.      General Cancer Support & Resources    [facility]    Cancer Resource Center & Multidisciplinary Clinic:  [address] [place] [address]  [phone]    Survivorship Clinical Nurse Specialist  Chanel Ortega [facility] - [phone]    Oncology Social Worker:  Helena Stovall [facility] - [phone]     Psychiatric Nurse Practitioner:  Alexa Anne [facility] - [phone]    Financial Counselor and Contact Information:  [facility] - [phone]    Oncology Dietician Contact Information:  Ronel Jimenez  - [phone]     Common Concerns After Treatment    Managing Anxiety or Depression:  Referral to support group, e.g. American Cancer Society (www.cancer.org, 683.522.9086), Cancer Support Community (www.wellnessandcancer.org, 577.991.4827)    Referral to a community provider for cognitive behavioral therapy or counseling.    Psychiatric care or  counseling and/or initiation of pharmacotherapy are available at the [facility] . Please call [phone] or talk to a member of your treatment team about a referral.    For anonymous information, resource requests or support you can also call the 24-hour Crisis and Information Center at [phone]      Fear of Cancer Coming Back:  Patients need to know that these feelings are normal, and they won’t cause the cancer to come back.    • Referral for cognitive behavioral therapy or counseling    • Referral to support group, e.g. American Cancer Society (www.cancer.org, 371.890.7930), Cancer Support Community (www.wellnessandcancer.org, 706.684.8479)      Fatigue:  Fatigue is one of the most common problems in patients with cancer. It may be related to the disease itself or cancer treatment and may continue beyond completion of treatment among long-term cancer survivors. Among people with cancer, 80% to 100% report fatigue.     Fatigue may be an isolated problem or occur as one part of a cluster of symptoms, such as pain, depression, dyspnea, anorexia, and sleep problems.    If you are taking an immunotherapy, fatigue may be a symptom of an endocrine disorder secondary to immunotherapy, such as thyroid or pituitary disorder, not just general cancer-related fatigue. It is important to report any changes to your doctor (ONS, 2017)    Regular physical activity (goal of 150 minutes of activity per week) is the most important thing you can do to manage fatigue. Other treatments that are likely to be helpful include:    · Progressive muscle relaxation and/or relaxation breathing with or without imagery or distraction  · Cognitive behavioral therapy for sleep  · Yoga  · Meditation, mindfulness based stress reduction, and cognitive behavioral stress management  · Cognitive behavioral therapy for fatigue, depression, and pain with or without hypnosis  · Evaluation for other  causes of fatigue including hypothyroidism, anemia and depression      Financial Concerns:  The financial challenges that people with cancer and their families face are very real.     For hospital bills, you may want to talk with a hospital financial counselor. You may be able to work out a monthly payment plan or even get a reduced rate. You may also want to stay in touch with your insurance company to make sure costs are covered.    For information about resources that are available you can go to the NCI database, “Organizations That Offer Support Services,” at http://www.cancer.gov, search terms “financial assistance.”     Or call the [facility] toll-free 2-889-3-CANCER (1-482.961.5873) to ask for help.      Managing Hot Flashes:  · Avoiding sugar may reduce night sweats  · New research studies suggest acupuncture may be helpful in control of hot flashes  · Regular meditation calms the adrenal glands and may reduce hot flashes and night sweats  · Ask your doctor about appropriate medical treatments. Medicines that may help manage hot flashes include Venlafaxine (Effexor) 37.5mg up to 75 mg daily OR Gabapentin (Neurontin) 300mg at bedtime up to 3 times/day.         Managing Insomnia  · Mindfulness based stress reduction  · Practice good sleep hygiene (reduce/eliminate TV and electronic device screen time one hour before bed)  · Avoid stimulants like caffeine and nicotine close to bedtime  · Avoid alcohol close to bedtime. While alcohol is well-known to help you fall asleep faster, too much close to bedtime can disrupt sleep in the second half of the night as the body begins to process the alcohol.     · Evaluation and management of other symptoms (hot flashes, anxiety, and pain)  · Regular physical activity (goal of 150 minutes of activity per week)  · The supplement melatonin may help with sleep disruption    More information at www.sleepfoundation.org      Concerns about Sexuality, Intimacy and Body Image  · Ask your  treatment team about referrals to counseling and/or support groups to address body image concerns.  · Non-hormonal remedies for vaginal dryness for sexual activity include water-based vaginal lubricants (Astroglide, [place] Jelly). Avoid products with spermicides or additives as they can be irritating.  · Some people use a non-hormonal vaginal moisturizer 1-3 times per week to improve general comfort (Replens, coconut oil).  · Discuss pain with intercourse or sexual activity with your doctor.    Look Good Feel Better is a non-medical, brand-neutral public service program that teaches beauty techniques to people with cancer to help them manage the appearance-related side effects of cancer treatment. The program includes lessons on skin and nail care, cosmetics, wigs and turbans, accessories and styling, helping people with cancer to find some normalcy in a life that is by no means normal.    For more information and helpful videos visit:  http://lookgoodfeelbetter.org/       About Lymphedema:  According to the National Cancer [place], anywhere from 5-17% of women who have sentinel lymph node biopsy develops lymphedema. Among women who have axillary lymph node dissection, the percentage is higher - from 20-53% - and risk increases with the number of nodes taken out.     · Referral to lymphedema physical therapy specialist for lymphatic massage or proper fitting of compression garments or bandages  · Weight training and regular exercise  · Achieve and maintain a healthy weight    Tips to reduce the risk of injury or infection to the arm:  · Treat infections of the at-risk arm and hand right away.  · Wear gloves when doing house or garden work.  · Keep skin clean and well-moisturized.  · Use the arm not at risk when having blood drawn, getting injections or having blood pressure taken.  · Avoid sunburn and excess heat from saunas, hot baths, tanning and other sources.  · Don't cut nail cuticles.  · Use insect repellant  "when outdoors.  · Avoid injuries, including scratches and bruises, to the at-risk arm.  · Rest the at-risk arm in an elevated position (above the heart or shoulder).     If you have an infection, injury or any of the symptoms listed above, see your health care provider.        Changes in Memory or \"Brain Fog\"  Patients should know that 25% of cancer patients have cognitive dysfunction (changes in thinking or memory) after treatment and it usually gets better over time    Some things you can do to manage \"brain fog\" or memory problems include:  · Mental exercises (e.g., word games, crossword puzzles)   · Setting up and following routines  · Repeating information  · Keeping a memory journal  · Avoiding multitasking  · Exercising  · Maintaining a healthy diet.   · There is some evidence that Group Cognitive Training is also effective  · Rule out depression, sleep disturbance      Tamoxifen  Possible side effects of Tamoxifen include clotting events which could include strokes, heart attacks, pulmonary emboli, DVT, as well as uterine cancer, mood swings, hot flashes, depression.    Contact prescriber immediately if you experience pain, swelling, or tenderness in your legs or calves or if you have unexpected shortness of  breath, sudden chest pain, coughing up blood, new breast lumps, vaginal bleeding, menstrual irregularities, changes in vaginal discharge, pelvic pain or pressure, or vision changes.     Please do not stop taking Tamoxifen without first speaking to your medical oncologist.    Avoid drugs or foods that interfere with efficacy of Tamoxifen.     Potent inhibitors of CYP2D2 which may result in lower levels of Tamoxifen include:  · antidepressants that act as selective serotonin reuptake inhibitors (SSRIs) or selective noradrenergic reuptake inhibitors (SNRIs) -- paroxetine, fluoxetine, bupropion, and duloxetine  · antipsychotics -- thioridazine, perphenazine, and pimozide  · cardiac drugs -- quinidine and " ticlopidine  · medications for infectious disease -- terbinafine and quinidine  · miscellaneous medication -- cinacalcet.      Prevention and Wellness:  · Achieve and maintain a healthy body weight as weight gain is a risk factor for development or recurrence of some cancers (BMI between 20-25m2).  · Current Body mass index is 48.33 kg/m².  · Regular physical activity (preferably daily). Strive for at least 150 minutes of moderate or 75 minutes of vigorous activity per week.  · Maintain a diet high in vegetables, fruits and whole grains and low in sugars and fats. Limit red meat and avoid processed foods.  · Avoid all tobacco and second hand smoke.  · Avoid all alcohol intake.  · If you do drink, minimize alcohol exposure - no more than one drink in a day for women and two drinks in a day for men.  · Continue all standard non-cancer related healthcare with your primary care provider. Any new, unusual, and/or persistent symptoms should be brought to the attention of your provider.            Surveillance    How Frequent?    Medical Oncology visits 1 - 4 times per year as clinically appropriate for 5 years, then annually      Lab tests As directed    Imaging exams      Mammography Annual clinical breast exam needed    Schedule a mammogram one year after the first mammogram that led to diagnosis, but no earlier than six months after radiation therapy. Obtain a mammogram every six to 12 months thereafter.    Annual mammogram - next due 2/2020  Annual breast MRI - next due 8/2020    Lymphedema Monitor for lymphedema    Genetic Counseling Periodic screening for changes in family history and referral to genetic counseling as indicated    Gynecologic assessment For women on tamoxifen, gynecologic assessment every 12 months if uterus is present.    Pap smear: Beginning at age 30, every three years if the last three PAP tests in a row were normal. Every year if the last PAP was abnormal. every 3 years as long as the last 3  were normal, every year if abnormal.     Women age 70 and older who have had 3 or more normal PAP tests in a row, and no abnormal PAP tests results in the last 10 years, may choose to stop having PAP tests.    Pelvic exam:  Continue to visit a gynecologist annually.      Bone Density study For women on aromatase inhibitor or who experience ovarian failure secondary to treatment should have monitoring of bone health and bone mineral density determination at baseline and periodically thereafter    Bone densitometry (DEXA scan) every 1 to 2 years after initiation of aromatase inhibitor (if applicable) and/or at age 65 or older.    Calcium and Vitamin D.    Weight-bearing exercise most days of the week e.g. moderate intensity walking (can have a conversation but cannot sing).    Avoid all smoke and second hand smoke.     Bisphosphonate, if indicated by your doctor based on bone densitometry (DEXA scan) results.      Immunizations       Influenza       Herpes Zoster       Pneumococcal Yearly  Once  As appropriate    Tobacco Cessation The patient is not currently a tobacco user.  Counseling given: Not Answered  Comment: Parents smoked.  I have never smoked         Monitor for ongoing toxicities:   None Specified      Call your doctor if you have any of these signs or symptoms   New or worsening symptoms.  Pain that is new, unrelieved or bothersome.  Difficulty with your emotions, anxiety, and/or depression.  Physical problems that affect your abilities in your daily life or those that are bothersome (for example, continued fatigue, trouble sleeping, sexual problems, or edema).      Referrals provided   There are no referral needs at this time.

## 2019-09-23 ENCOUNTER — OFFICE VISIT (OUTPATIENT)
Dept: PSYCHIATRY | Facility: HOSPITAL | Age: 49
End: 2019-09-23

## 2019-09-23 DIAGNOSIS — F41.1 GENERALIZED ANXIETY DISORDER: Primary | ICD-10-CM

## 2019-09-23 DIAGNOSIS — F43.10 POST TRAUMATIC STRESS DISORDER (PTSD): ICD-10-CM

## 2019-09-23 DIAGNOSIS — F33.1 MODERATE EPISODE OF RECURRENT MAJOR DEPRESSIVE DISORDER (HCC): ICD-10-CM

## 2019-09-23 PROBLEM — R53.0 NEOPLASTIC MALIGNANT RELATED FATIGUE: Status: ACTIVE | Noted: 2019-09-23

## 2019-09-23 PROCEDURE — 90853 GROUP PSYCHOTHERAPY: CPT | Performed by: PSYCHIATRY & NEUROLOGY

## 2019-09-23 PROCEDURE — 99211 OFF/OP EST MAY X REQ PHY/QHP: CPT | Performed by: PSYCHIATRY & NEUROLOGY

## 2019-09-23 RX ORDER — METHYLPHENIDATE HYDROCHLORIDE 10 MG/1
10 TABLET ORAL 2 TIMES DAILY
Qty: 60 TABLET | Refills: 0 | Status: SHIPPED | OUTPATIENT
Start: 2019-09-23 | End: 2019-10-21 | Stop reason: SDUPTHER

## 2019-09-23 NOTE — PROGRESS NOTES
Subjective  Patient ID: Emilie Soto is a 49 y.o.. female seen in regularly scheduled group session.    Group Therapy  Group topics explored including resurgence of mood and anxiety sx alongside cancer dx, exploration of nonpharmacological and nonpharmacological management of sx, discussion of early and late effects of treatment, exploration of new normal, management of anxieties alongside treatment and survivorship. Explored fears surrounding positive family history, exploration of resiliency and success in survivorship, and benefits of peer support.     Counseling provided regarding mental health symptoms, exploration of medications and SE, discussion regarding continued health and wellness. Discussed community offerings including [employer] with "[employer] [facility], as well as other various community offerings. Recognized strengths of group members, exploration of fatigue management, discussion of sleep hygiene and impact of sleep apnea.     Medications Management  Pt describes survivorship from breast cancer, status post surgery and radiation. Currently on tamoxifen. Denies impact of SE on current lifestyle, although continues to experience sx of anxiety and depression sx. Explores hx of PTSD and anxiety, previously controlled with non-pharmacological strategies. Pt explores healthy fear of medications with generally negative attributions, although has been tolerant of most recent medication change to Pristiq. Pt has recently connected to new therapist with hopes for continued relationship. Discusses significant impact of fatigue with ability to fall asleep during day; pt does have sleep apnea with adherence to CPAP machine. No benefit noted with modafinil trial 200 mg daily. Pt remains frustrated with significant need for rest and inability to achieve restorative sleep.    Medications reviewed and renewed as appropriate. Currently takes Pristiq 100 mg daily with reported benefit to sx of anxiey  allowing tolerability although with recognition of residual sx, specifically with PRN use of ativan per primary care APRN. Explored stimulant trial, although patient discusses general disinterest in agent that could potentially increase anxiety. Following discussion of risks and benefits, will attempt trial of 5 mg q am with ability to increase to 10 mg daily if well tolerated.    Diagnoses and all orders for this visit:    Generalized anxiety disorder    Moderate episode of recurrent major depressive disorder (CMS/HCC)    Post traumatic stress disorder (PTSD)    Other orders  -     methylphenidate (RITALIN) 10 MG tablet; Take 1 tablet by mouth 2 (Two) Times a Day.

## 2019-10-01 ENCOUNTER — OFFICE VISIT (OUTPATIENT)
Dept: INTERNAL MEDICINE | Facility: CLINIC | Age: 49
End: 2019-10-01

## 2019-10-01 VITALS
HEART RATE: 94 BPM | HEIGHT: 67 IN | WEIGHT: 293 LBS | OXYGEN SATURATION: 97 % | DIASTOLIC BLOOD PRESSURE: 82 MMHG | BODY MASS INDEX: 45.99 KG/M2 | SYSTOLIC BLOOD PRESSURE: 122 MMHG

## 2019-10-01 DIAGNOSIS — Z23 NEED FOR INFLUENZA VACCINATION: ICD-10-CM

## 2019-10-01 DIAGNOSIS — Z12.11 SCREENING FOR COLON CANCER: ICD-10-CM

## 2019-10-01 DIAGNOSIS — Z15.89 MONOALLELIC MUTATION OF CHEK2 GENE IN FEMALE PATIENT: ICD-10-CM

## 2019-10-01 DIAGNOSIS — I10 BENIGN ESSENTIAL HTN: ICD-10-CM

## 2019-10-01 DIAGNOSIS — C50.111 MALIGNANT NEOPLASM OF CENTRAL PORTION OF RIGHT BREAST IN FEMALE, ESTROGEN RECEPTOR POSITIVE (HCC): ICD-10-CM

## 2019-10-01 DIAGNOSIS — Z23 NEED FOR PNEUMOCOCCAL VACCINATION: ICD-10-CM

## 2019-10-01 DIAGNOSIS — E55.9 VITAMIN D DEFICIENCY: ICD-10-CM

## 2019-10-01 DIAGNOSIS — Z15.02 MONOALLELIC MUTATION OF CHEK2 GENE IN FEMALE PATIENT: ICD-10-CM

## 2019-10-01 DIAGNOSIS — Z15.01 MONOALLELIC MUTATION OF CHEK2 GENE IN FEMALE PATIENT: ICD-10-CM

## 2019-10-01 DIAGNOSIS — Z15.09 MONOALLELIC MUTATION OF CHEK2 GENE IN FEMALE PATIENT: ICD-10-CM

## 2019-10-01 DIAGNOSIS — Z17.0 MALIGNANT NEOPLASM OF CENTRAL PORTION OF RIGHT BREAST IN FEMALE, ESTROGEN RECEPTOR POSITIVE (HCC): ICD-10-CM

## 2019-10-01 DIAGNOSIS — R10.2 SUPRAPUBIC PRESSURE: Primary | ICD-10-CM

## 2019-10-01 LAB
ALBUMIN SERPL-MCNC: 4.3 G/DL (ref 3.5–5.2)
ALBUMIN/GLOB SERPL: 1.5 G/DL
ALP SERPL-CCNC: 63 U/L (ref 39–117)
ALT SERPL-CCNC: 22 U/L (ref 1–33)
AST SERPL-CCNC: 14 U/L (ref 1–32)
BACTERIA UR QL AUTO: ABNORMAL /HPF
BASOPHILS # BLD AUTO: 0.06 10*3/MM3 (ref 0–0.2)
BASOPHILS NFR BLD AUTO: 0.7 % (ref 0–1.5)
BILIRUB SERPL-MCNC: 0.6 MG/DL (ref 0.2–1.2)
BILIRUB UR QL STRIP: NEGATIVE
BUN SERPL-MCNC: 12 MG/DL (ref 6–20)
BUN/CREAT SERPL: 18.5 (ref 7–25)
CALCIUM SERPL-MCNC: 9.6 MG/DL (ref 8.6–10.5)
CHLORIDE SERPL-SCNC: 102 MMOL/L (ref 98–107)
CLARITY UR: CLEAR
CO2 SERPL-SCNC: 23.7 MMOL/L (ref 22–29)
COLOR UR: YELLOW
CREAT SERPL-MCNC: 0.65 MG/DL (ref 0.57–1)
DEPRECATED RDW RBC AUTO: 38.6 FL (ref 37–54)
EOSINOPHIL # BLD AUTO: 0.15 10*3/MM3 (ref 0–0.4)
EOSINOPHIL NFR BLD AUTO: 1.7 % (ref 0.3–6.2)
ERYTHROCYTE [DISTWIDTH] IN BLOOD BY AUTOMATED COUNT: 12.6 % (ref 12.3–15.4)
GLOBULIN SER CALC-MCNC: 2.8 GM/DL
GLUCOSE SERPL-MCNC: 116 MG/DL (ref 65–99)
GLUCOSE UR STRIP-MCNC: NEGATIVE MG/DL
HCT VFR BLD AUTO: 39 % (ref 34–46.6)
HGB BLD-MCNC: 13.3 G/DL (ref 12–15.9)
HGB UR QL STRIP.AUTO: NEGATIVE
HYALINE CASTS UR QL AUTO: ABNORMAL /LPF
KETONES UR QL STRIP: NEGATIVE
LEUKOCYTE ESTERASE UR QL STRIP.AUTO: NEGATIVE
LYMPHOCYTES # BLD AUTO: 2.16 10*3/MM3 (ref 0.7–3.1)
LYMPHOCYTES NFR BLD AUTO: 24 % (ref 19.6–45.3)
MCH RBC QN AUTO: 29 PG (ref 26.6–33)
MCHC RBC AUTO-ENTMCNC: 34.1 G/DL (ref 31.5–35.7)
MCV RBC AUTO: 85 FL (ref 79–97)
MONOCYTES # BLD AUTO: 0.41 10*3/MM3 (ref 0.1–0.9)
MONOCYTES NFR BLD AUTO: 4.6 % (ref 5–12)
NEUTROPHILS # BLD AUTO: 6.22 10*3/MM3 (ref 1.7–7)
NEUTROPHILS NFR BLD AUTO: 69 % (ref 42.7–76)
NITRITE UR QL STRIP: NEGATIVE
PH UR STRIP.AUTO: <=5 [PH] (ref 5–8)
PLATELET # BLD AUTO: 284 10*3/MM3 (ref 140–450)
PMV BLD AUTO: 9.6 FL (ref 6–12)
POTASSIUM SERPL-SCNC: 4.2 MMOL/L (ref 3.5–5.2)
PROT SERPL-MCNC: 7.1 G/DL (ref 6–8.5)
PROT UR QL STRIP: ABNORMAL
RBC # BLD AUTO: 4.59 10*6/MM3 (ref 3.77–5.28)
RBC # UR: ABNORMAL /HPF
REF LAB TEST METHOD: ABNORMAL
SODIUM SERPL-SCNC: 137 MMOL/L (ref 136–145)
SP GR UR STRIP: 1.02 (ref 1–1.03)
SQUAMOUS #/AREA URNS HPF: ABNORMAL /HPF
UROBILINOGEN UR QL STRIP: ABNORMAL
WBC NRBC COR # BLD: 9 10*3/MM3 (ref 3.4–10.8)
WBC UR QL AUTO: ABNORMAL /HPF

## 2019-10-01 PROCEDURE — 81001 URINALYSIS AUTO W/SCOPE: CPT | Performed by: NURSE PRACTITIONER

## 2019-10-01 PROCEDURE — 90674 CCIIV4 VAC NO PRSV 0.5 ML IM: CPT | Performed by: NURSE PRACTITIONER

## 2019-10-01 PROCEDURE — 90472 IMMUNIZATION ADMIN EACH ADD: CPT | Performed by: NURSE PRACTITIONER

## 2019-10-01 PROCEDURE — 90732 PPSV23 VACC 2 YRS+ SUBQ/IM: CPT | Performed by: NURSE PRACTITIONER

## 2019-10-01 PROCEDURE — 99214 OFFICE O/P EST MOD 30 MIN: CPT | Performed by: NURSE PRACTITIONER

## 2019-10-01 PROCEDURE — 85025 COMPLETE CBC W/AUTO DIFF WBC: CPT | Performed by: NURSE PRACTITIONER

## 2019-10-01 PROCEDURE — 90471 IMMUNIZATION ADMIN: CPT | Performed by: NURSE PRACTITIONER

## 2019-10-02 DIAGNOSIS — I10 BENIGN ESSENTIAL HTN: Primary | ICD-10-CM

## 2019-10-02 DIAGNOSIS — E55.9 VITAMIN D DEFICIENCY: Primary | ICD-10-CM

## 2019-10-02 LAB
25(OH)D3 SERPL-MCNC: 17.7 NG/ML (ref 30–100)
CHOLEST SERPL-MCNC: 210 MG/DL (ref 0–200)
HDLC SERPL-MCNC: 51 MG/DL (ref 40–60)
LDLC SERPL CALC-MCNC: 108 MG/DL (ref 0–100)
TRIGL SERPL-MCNC: 255 MG/DL (ref 0–150)
TSH SERPL-ACNC: 2.51 UIU/ML (ref 0.27–4.2)
VLDLC SERPL-MCNC: 51 MG/DL

## 2019-10-02 RX ORDER — ERGOCALCIFEROL 1.25 MG/1
50000 CAPSULE ORAL WEEKLY
Qty: 4 CAPSULE | Refills: 2 | Status: SHIPPED | OUTPATIENT
Start: 2019-10-02 | End: 2020-02-04

## 2019-10-03 LAB — SPECIMEN STATUS: NORMAL

## 2019-10-03 NOTE — PROGRESS NOTES
Subjective   Emilie Soto is a 49 y.o. female who presents due to suprapubic pressure with urinary frequency, HTN and Vitamin D deficiency.    She has completed radiation therapy for breast cancer and is currently maintained on Tamoxifen. Her biggest complaint is lingering fatigue.  Her Vitamin D level was low 1/2019 (11.8), she was started on weekly Vitamin D x4 weeks and is now taking a daily supplement.  She c/o suprapubic with urinary frequency, denies dysuria.  She continues to c/o depressive symptoms, states panic episodes are less frequent. She is sleeping better at night but is still somewhat restless.      Urinary Tract Infection    Associated symptoms include frequency and urgency. Pertinent negatives include no chills, nausea or vomiting.        The following portions of the patient's history were reviewed and updated as appropriate: allergies, current medications, past social history and problem list.    Past Medical History:   Diagnosis Date   • Anxiety    • Anxiety and depression    • Cancer (CMS/[facility])     RIGHT BREAST   • Cancer (CMS/[facility]) 1993    PAROTID GLAND   • Depression 01/01/1988   • Headache    • History of kidney stones    • Hyperlipidemia    • Hypertension    • Irritable bowel syndrome    • Lumbago    • Mood disorder (CMS/[facility])    • Obesity life   • PONV (postoperative nausea and vomiting)    • 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   • Sleep apnea     C-PAP IN USE   • Vitamin D deficiency          Current Outpatient Medications:   •  cholecalciferol (VITAMIN D3) 1000 units tablet, Take 2,000 Units by mouth Daily., Disp: , Rfl:   •  desvenlafaxine (PRISTIQ) 100 MG 24 hr tablet, Take 1 tablet by mouth Daily., Disp: 30 tablet, Rfl: 2  •  Efinaconazole (JUBLIA) 10 % solution, Apply 1 Units topically Daily., Disp: 8 mL, Rfl: 3  •  ibuprofen (ADVIL,MOTRIN) 800 MG tablet, Take 1 tablet by mouth Every 6 (Six) Hours As Needed for mild pain (1-3) or moderate pain (4-6). (Patient taking  differently: Take 800 mg by mouth Every 6 (Six) Hours As Needed for Mild Pain  or Moderate Pain  (ON HOLD FOR SX).), Disp: 30 tablet, Rfl: 0  •  lisinopril-hydrochlorothiazide (PRINZIDE,ZESTORETIC) 20-25 MG per tablet, TAKE 1 TABLET BY MOUTH DAILY, Disp: 30 tablet, Rfl: 5  •  LORazepam (ATIVAN) 0.5 MG tablet, TAKE 1 TABLET BY MOUTH EVERY 8 HOURS AS NEEDED FOR ANXIETY, Disp: 45 tablet, Rfl: 0  •  methylphenidate (RITALIN) 10 MG tablet, Take 1 tablet by mouth 2 (Two) Times a Day., Disp: 60 tablet, Rfl: 0  •  silver sulfadiazine (SILVADENE, SSD) 1 % cream, Apply  topically to the appropriate area as directed 2 (Two) Times a Day., Disp: 50 g, Rfl: 1  •  tamoxifen (NOLVADEX) 20 MG chemo tablet, Take 1 tablet by mouth Daily., Disp: 90 tablet, Rfl: 2  •  vitamin B-12 (CYANOCOBALAMIN) 1000 MCG tablet, Take 1,000 mcg by mouth Daily., Disp: , Rfl:   •  vitamin D (ERGOCALCIFEROL) 33192 units capsule capsule, Take 1 capsule by mouth 1 (One) Time Per Week., Disp: 4 capsule, Rfl: 2    Allergies   Allergen Reactions   • Citalopram Rash   • Flexeril [Cyclobenzaprine] Other (See Comments)     MUSCLES TENSE-OPPOSITE OF WHAT IT IS INTENDED TO DO-ELEVATES B/P   • Doxycycline Rash     Blisters on hands   • Keflex [Cephalexin] Rash   • Sulfa Antibiotics Rash     NAUSEA/VOMITING/FEVER   • Vilazodone Hcl Hives, Itching and Rash       Review of Systems   Constitutional: Positive for fatigue. Negative for chills, fever and unexpected weight change.   HENT: Positive for congestion and postnasal drip. Negative for ear pain, sinus pressure, sore throat and trouble swallowing.    Eyes: Negative for visual disturbance.   Respiratory: Negative for cough, chest tightness and wheezing.    Cardiovascular: Negative for chest pain, palpitations and leg swelling.   Gastrointestinal: Negative for abdominal pain, blood in stool, nausea and vomiting.   Genitourinary: Positive for frequency and urgency. Negative for dysuria.   Musculoskeletal: Negative for  "arthralgias and joint swelling.   Skin: Negative for color change.   Neurological: Negative for syncope, weakness and headaches.   Hematological: Does not bruise/bleed easily.   Psychiatric/Behavioral: Positive for decreased concentration and dysphoric mood. The patient is nervous/anxious.        Objective   Vitals:    10/01/19 0944   BP: 122/82   BP Location: Left arm   Patient Position: Sitting   Cuff Size: Adult   Pulse: 94   SpO2: 97%   Weight: 135 kg (298 lb)   Height: 168.9 cm (66.5\")     Physical Exam   Constitutional: She appears well-developed and well-nourished. She is cooperative. She does not have a sickly appearance. She does not appear ill.   HENT:   Head: Normocephalic.   Right Ear: Hearing, tympanic membrane and external ear normal.   Left Ear: Hearing, tympanic membrane and external ear normal.   Nose: Nose normal. No mucosal edema, rhinorrhea, sinus tenderness or nasal deformity. Right sinus exhibits no maxillary sinus tenderness and no frontal sinus tenderness. Left sinus exhibits no maxillary sinus tenderness and no frontal sinus tenderness.   Mouth/Throat: Oropharynx is clear and moist and mucous membranes are normal. Normal dentition.   Eyes: Conjunctivae and lids are normal. Right eye exhibits no discharge and no exudate. Left eye exhibits no discharge and no exudate.   Neck: Trachea normal. Carotid bruit is not present. No edema present. No thyroid mass present.   Cardiovascular: Regular rhythm, normal heart sounds and normal pulses.   No murmur heard.  Pulmonary/Chest: Breath sounds normal. No respiratory distress. She has no decreased breath sounds. She has no wheezes. She has no rhonchi. She has no rales.   Abdominal: Soft. There is no tenderness.   Lymphadenopathy:        Head (right side): No submental, no submandibular, no tonsillar, no preauricular, no posterior auricular and no occipital adenopathy present.        Head (left side): No submental, no submandibular, no tonsillar, no " preauricular, no posterior auricular and no occipital adenopathy present.   Neurological: She is alert.   Skin: Skin is warm, dry and intact. No cyanosis. Nails show no clubbing.       Assessment/Plan   Emilie was seen today for urinary tract infection.    Diagnoses and all orders for this visit:    Suprapubic pressure  Comments:  UA is normal, continue to monitor  Orders:  -     Cancel: Urinalysis With Microscopic If Indicated (No Culture) - Urine, Clean Catch  -     Urinalysis, Microscopic Only - Urine, Clean Catch; Future  -     Cancel: Urinalysis, Microscopic Only - Urine, Clean Catch  -     Urinalysis With Culture If Indicated - Urine, Clean Catch; Future  -     Urinalysis With Culture If Indicated - Urine, Clean Catch  -     Urinalysis, Microscopic Only - Urine, Clean Catch; Future  -     Urinalysis, Microscopic Only - Urine, Clean Catch    Screening for colon cancer  -     Ambulatory Referral to General Surgery    Vitamin D deficiency  Comments:  recheck levels  Orders:  -     Vitamin D 25 Hydroxy; Future  -     Vitamin D 25 Hydroxy    Malignant neoplasm of central portion of right breast in female, estrogen receptor positive (CMS/HCC)    Monoallelic mutation of CHEK2 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  -     Ambulatory Referral to General Surgery    Benign essential HTN  Comments:  stable on curernt meds  Orders:  -     CBC Auto Differential; Future  -     Comprehensive Metabolic Panel; Future  -     TSH; Future  -     CBC Auto Differential  -     Comprehensive Metabolic Panel  -     TSH  -     Lipid Panel  -     Specimen Status Report; Future  -     Specimen Status Report  -     Lipid Panel; Future  -     Lipid Panel    Need for influenza vaccination  -     Influenza Vac Subunit Quad (FLUCELVAX) injection 0.5 mL    Need for pneumococcal vaccination  -     pneumococcal polysaccharide 23-valent (PNEUMOVAX-23) vaccine 0.5 mL    She has a hx of CHEK2 gene and personal hx of breast cancer, will refer for screening  colonoscopy (due for regular screening 6/2020).    She was recently given a prescription for Ritalin which she has not yet started, hopefully this will help with lingering fatigue and depression.    Check fasting labs today (may need to restart weekly vitamin D tablets).

## 2019-10-07 ENCOUNTER — TELEPHONE (OUTPATIENT)
Dept: INTERNAL MEDICINE | Facility: CLINIC | Age: 49
End: 2019-10-07

## 2019-10-07 NOTE — TELEPHONE ENCOUNTER
Regarding: FW: Prescription Question  Contact: [phone]  Please see message below thanks.      ----- Message -----  From: Emilie Soto  Sent: 10/7/2019   3:38 PM  To: Savana Hidalgo [facility] [place]  Subject: Prescription Question                            ----- Message from Mychart, Generic sent at 10/7/2019  3:38 PM EDT -----    Sg Vance,    I know I am a pest but you know me and my medicine anxiety.  I took the first Ritalin today.  I felt so much like myself it wasn't funny.  But then it wore off about 4 hours later.  I got really upset and was crying.  I dont want to feel fatigued all the time.  So I didnt want to take the medicine any more because I am afraid I will be dependent on it because it makes me feel normal.  So Trena told me to email you and ask if I could get addicted to it.  She wants me to take it but I said I was afraid of wanting it too much because I actually feel normal.  What are your thoughts?    Thanks,    Emilie    Discussed with patient-she tolerated morning dose and is encouraged to continue medication for now. Reassured patient that she has a low risk of addiction and that we will re-evaluate medication use at psychiatry appt and her appt in November.

## 2019-10-08 LAB — REF LAB TEST METHOD: NORMAL

## 2019-10-11 ENCOUNTER — PREP FOR SURGERY (OUTPATIENT)
Dept: OTHER | Facility: HOSPITAL | Age: 49
End: 2019-10-11

## 2019-10-11 DIAGNOSIS — Z12.11 SCREENING FOR COLON CANCER: Primary | ICD-10-CM

## 2019-10-18 ENCOUNTER — HOSPITAL ENCOUNTER (OUTPATIENT)
Dept: PHYSICAL THERAPY | Facility: HOSPITAL | Age: 49
Setting detail: THERAPIES SERIES
Discharge: HOME | End: 2019-10-18

## 2019-10-18 DIAGNOSIS — Z91.89 AT RISK FOR LYMPHEDEMA: ICD-10-CM

## 2019-10-18 DIAGNOSIS — L90.5 SCAR TISSUE: ICD-10-CM

## 2019-10-18 DIAGNOSIS — Z15.02 MONOALLELIC MUTATION OF CHEK2 GENE IN FEMALE PATIENT: ICD-10-CM

## 2019-10-18 DIAGNOSIS — Z90.11 STATUS POST PARTIAL MASTECTOMY OF RIGHT BREAST: ICD-10-CM

## 2019-10-18 DIAGNOSIS — Z48.89 AFTERCARE FOLLOWING SURGERY: ICD-10-CM

## 2019-10-18 DIAGNOSIS — C50.111 MALIGNANT NEOPLASM OF CENTRAL PORTION OF RIGHT BREAST IN FEMALE, ESTROGEN RECEPTOR POSITIVE (HCC): Primary | ICD-10-CM

## 2019-10-18 DIAGNOSIS — Z15.89 MONOALLELIC MUTATION OF CHEK2 GENE IN FEMALE PATIENT: ICD-10-CM

## 2019-10-18 DIAGNOSIS — N64.4 MASTODYNIA: ICD-10-CM

## 2019-10-18 DIAGNOSIS — Z15.01 MONOALLELIC MUTATION OF CHEK2 GENE IN FEMALE PATIENT: ICD-10-CM

## 2019-10-18 DIAGNOSIS — Z17.0 MALIGNANT NEOPLASM OF CENTRAL PORTION OF RIGHT BREAST IN FEMALE, ESTROGEN RECEPTOR POSITIVE (HCC): Primary | ICD-10-CM

## 2019-10-18 DIAGNOSIS — Z15.09 MONOALLELIC MUTATION OF CHEK2 GENE IN FEMALE PATIENT: ICD-10-CM

## 2019-10-18 PROCEDURE — 93702 BIS XTRACELL FLUID ANALYSIS: CPT | Performed by: PHYSICAL THERAPIST

## 2019-10-18 PROCEDURE — 97110 THERAPEUTIC EXERCISES: CPT | Performed by: PHYSICAL THERAPIST

## 2019-10-18 PROCEDURE — 97164 PT RE-EVAL EST PLAN CARE: CPT | Performed by: PHYSICAL THERAPIST

## 2019-10-18 NOTE — THERAPY RE-EVALUATION
Physical Therapy Lymphedema Re-Evaluation  [facility]     Patient Name: Emilie Soto  : 1970  MRN: [id number]  Today's Date: 10/18/2019      Visit Date: 10/18/2019    Visit Dx:    ICD-10-CM ICD-9-CM   1. Malignant neoplasm of central portion of right breast in female, estrogen receptor positive (CMS/HCC) C50.111 174.1    Z17.0 V86.0   2. Monoallelic mutation of CHEK2 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 Z15.01 V84.01    Z15.89 V84.09    Z15.09 V84.89    Z15.02 V84.02   3. Status post partial mastectomy of right breast Z90.11 V45.71   4. At risk for lymphedema Z91.89 V49.89   5. Aftercare following surgery Z48.89 V58.89   6. Scar tissue L90.5 709.2   7. Mastodynia N64.4 611.71       Patient Active Problem List   Diagnosis   • Anxiety   • Hypertension   • Vitamin D deficiency   • Hyperlipidemia   • Lumbago   • Malignant neoplasm of central portion of right breast in female, estrogen receptor positive (CMS/HCC)   • Family history of breast cancer   • Monoallelic mutation of CHEK2 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   • Neoplastic malignant related fatigue        Past Medical History:   Diagnosis Date   • Anxiety    • Anxiety and depression    • Cancer (CMS/HCC)     RIGHT BREAST   • Cancer (CMS/HCC)     PAROTID GLAND   • Depression 1988   • Headache    • History of kidney stones    • Hyperlipidemia    • Hypertension    • Irritable bowel syndrome    • Lumbago    • Mood disorder (CMS/HCC)    • Obesity life   • PONV (postoperative nausea and vomiting)    • S/P radiation therapy     PAROTID GLAND CANCER   • Sleep apnea     C-PAP IN USE   • Vitamin D deficiency         Past Surgical History:   Procedure Laterality Date   • APPENDECTOMY      removed when removing dermoid on right ovary   • BREAST BIOPSY Right 2019   • BREAST LUMPECTOMY WITH SENTINEL NODE BIOPSY Right 2019    Procedure: Right central partial mastectomy (with removal of the nipple-areola-complex) and sentinel lymph node biopsy;  Surgeon: Jc  Trena CR MD;  Location: [facility] OR;  Service: General   • CHOLECYSTECTOMY     • DERMOID CYST  EXCISION  1989   • HERNIA REPAIR  2006    umbilical   • OVARY SURGERY      dermoid removed   • SALIVARY GLAND SURGERY  1993    due to cancer   • TONSILLECTOMY  01/01/1979    I was 9 years old       Visit Dx:    ICD-10-CM ICD-9-CM   1. Malignant neoplasm of central portion of right breast in female, estrogen receptor positive (CMS/HCC) C50.111 174.1    Z17.0 V86.0   2. Monoallelic mutation of CHEK2 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 Z15.01 V84.01    Z15.89 V84.09    Z15.09 V84.89    Z15.02 V84.02   3. Status post partial mastectomy of right breast Z90.11 V45.71   4. At risk for lymphedema Z91.89 V49.89   5. Aftercare following surgery Z48.89 V58.89   6. Scar tissue L90.5 709.2   7. Mastodynia N64.4 611.71           Lymphedema     Row Name 10/18/19 1340             Subjective Pain    Able to rate subjective pain?  yes  -SC      Pre-Treatment Pain Level  0  -SC         Subjective Comments    Subjective Comments  I am doing well. Still doing my skin care. My right breast does seem smaller. Slipped out of my bra when I am doing things overhead. I am trying to get into the livestrong. I am all set up, just waiting for Vianca to call me back for start time. I have lost about 30# since surgery and working on loosing more. I think I need to see you in mornings because I am always more swollen end of day (correlated with elevated L-Dex). I just feel weak all over. Right arm for sure weak but it is generalized. I am involved with [employer].   -SC         General ROM    GENERAL ROM COMMENTS  B UEs WNLs  -SC         MMT (Manual Muscle Testing)    General MMT Comments  B UEs WNLs  -SC         Lymphedema Edema Assessment    Ptting Edema Category  By grade out of 4  -SC      Pitting Edema  + 1/4 (+1 of 4) negative  -SC      Edema Assessment Comment  negative  -SC         Skin Changes/Observations    Skin Observations Comment  mild post radiation  skin changes, negative scar tissue, edema or fibrosis  -SC         Lymphedema Sensation    Lymphedema Sensation Comments  intact  -SC         Lymphedema Pulses/Capillary Refill    Lymph Pulses Capillary Refill Comments  intact  -SC         Lymphedema Measurements    Measurement Type(s)  Quick Girth  -SC      Quick Girth Areas  Upper extremities  -SC         LUE Quick Girth (cm)    Axilla  37.6 cm  -SC      Mid upper arm  34 cm  -SC      Elbow  26.7 cm  -SC      Mid forearm  26.2 cm  -SC      Wrist crease  16.5 cm  -SC      LUE Quick Girth Total  141  -SC         RUE Quick Girth (cm)    Axilla  38.6 cm  -SC      Mid upper arm  35.2 cm  -SC      Elbow  27.4 cm  -SC      Mid forearm  27 cm  -SC      Wrist crease  17 cm  -SC      RUE Quick Girth Total  145.2  -SC         Manual Lymphatic Drainage    Manual Therapy  continuation of self massage and skin care post lumpectomy and radiation  -SC         Compression/Skin Care    Compression/Skin Care Comments  education bra fitting and referral to [facility]  -SC         L-Dex Bioimpedence Screening    L-Dex Measurement Extremity  RUE  -SC      L-Dex Patient Position  Standing  -SC      L-Dex UE Dominate Side  Right  -SC      L-Dex UE At Risk Side  Right  -SC      L-Dex UE Pre Surgical Value  No  -SC      L-Dex UE Score  6.1  -SC      L-Dex UE Baseline Score  1.4  -SC      L-Dex UE Value Change  4.7  -SC      L-Dex UE Comment  elevated, to return next session in AM to reassess  -SC      $ L-Dex Charge  yes  -SC        User Key  (r) = Recorded By, (t) = Taken By, (c) = Cosigned By    Initials Name Provider Type    SC Helena Lemus, PT Physical Therapist                          Therapy Education  Education Details: bioimpedance test and results, continuation of skin care, livestrong, exercise program, bra fitting  Given: Symptoms/condition management, Edema management, Other (comment)  Program: Progressed, Modified  How Provided: Verbal, Demonstration, Written  Provided  to: Patient  Level of Understanding: Teach back education performed, Verbalized, Demonstrated      OP Exercises     Row Name 10/18/19 1340             Subjective Comments    Subjective Comments  I am doing well. Still doing my skin care. My right breast does seem smaller. Slipped out of my bra when I am doing things overhead. I am trying to get into the livestrong. I am all set up, just waiting for Vianca to call me back for start time. I have lost about 30# since surgery and working on loosing more. I think I need to see you in mornings because I am always more swollen end of day (correlated with elevated L-Dex). I just feel weak all over. Right arm for sure weak but it is generalized. I am involved with [employer].   -SC         Subjective Pain    Able to rate subjective pain?  yes  -SC      Pre-Treatment Pain Level  0  -SC        User Key  (r) = Recorded By, (t) = Taken By, (c) = Cosigned By    Initials Name Provider Type    SC Helena Lemus, PT Physical Therapist                      PT OP Goals     Row Name 10/18/19 1340          PT Short Term Goals    STG Date to Achieve  11/29/19  -SC     STG 1  Patient independent and compliant with initial Home Exercise Program focused on diaphragmatic breathing, flexibility, range of motion, mobility and strength for improved posture, function, with improved sleeping patterns and decreased pain/symptoms of upper extremity.   -SC     STG 1 Progress  Met  -SC     STG 2  Patient score </=15% on Quick DASH for improved function and transition to self-management of condition.   -SC     STG 2 Progress  Progressing;Goal Revised  -SC     STG 2 Progress Comments  currently 27% (improved from 50% 09/13)  -SC        Long Term Goals    LTG Date to Achieve  01/17/20  -SC     LTG 1  Patient's bioimpedance score to remain between -10 and 6.5 for decreased risk of developing stage II post lumpectomy lymphedema syndrome right UE and to establish treatment for early intervention of  condition if/when indicated.  -SC     LTG 1 Progress  Progressing  -SC     LTG 1 Progress Comments  currently 6.1, re-elevated however states has not eaten today, has not taken blood pressure medication with diuretic, and states always elevated later in day (does correlate with L-Dex scoring)  -SC     LTG 2  Patient independent and compliant with advanced Home Exercise Program and self-care techniques for self-management of condition.   -SC     LTG 2 Progress  Met  [facility]  Patient able to wear fitted post lumpectomy/radiation bra with padding right (if indicated)  >/=6-8 hours for work schedule for improved compression to lateral flank/right breast.  -SC     LTG 3 Progress  Ongoing  -SC     LTG 3 Progress Comments  referral to [facility] and education of bra fitting today  [facility]  Patient demonstrate independence and compliance with proper skin care during/post radiation for improved mobility, decreased scar tissue, axillary cording and edema.  [facility] Progress  Met  -SC        Time Calculation    PT Goal Re-Cert Due Date  01/17/20  -SC       User Key  (r) = Recorded By, (t) = Taken By, (c) = Cosigned By    Initials Name Provider Type    SC Helena Lemus, PT Physical Therapist          PT Assessment/Plan     Row Name 10/18/19 1340          PT Assessment    Functional Limitations  Limitation in home management;Limitations in community activities;Performance in leisure activities;Performance in sport activities  -SC     Impairments  Endurance;Impaired lymphatic circulation;Integumentary integrity;Muscle strength;Poor body mechanics;Posture  -SC     Assessment Comments  Ms. Soto returns for one month f/u with history s/p right lumpectomy with SLNB, no chemo, completion of radiation, due to increased risk of post lumpectomy lymphedema syndrome right breast/UE due to surgery/radiation, obesity, sedentary lifestyle, and BMI46 kg/m2. Patient has been working on weight loss. Has lost 25# since initial  evaluation 07/02/2019. BMI has improved from 50 kg/m2 to 46. Fat mass has improved from 36% to 34%, skeletal muscle has maintained at 24%. She is to initiate livestrong this month. Her skin is healing well post radiation and she is compliant with skin care. Her right breast is small with improper fitting bra. Referral to [facility]. Quick DASH has improved from 50% to 27% since last month. Overall, progressing quite well. L-Dex is elevated again to 6.1 however may be related to time of day and not taking BP medication today. To return in one month in AM to determine progress.   -SC        PT Plan    PT Frequency  Other (comment)  -SC     Predicted Duration of Therapy Intervention (Therapy Eval)  one month f/u bioimpedance, monitor closely due to elevated L-Dex score  -SC     PT Plan Comments  bioimpedance, Quick DASH, livestrong, bra fitting, skin care post radiation  -SC       User Key  (r) = Recorded By, (t) = Taken By, (c) = Cosigned By    Initials Name Provider Type    Helena Noyola, PT Physical Therapist             Outcome Measure Options: Quick DASH  Quick DASH  Open a tight or new jar.: Unable  Do heavy household chores (e.g., wash walls, wash floors): Moderate Difficulty  Carry a shopping bag or briefcase: No Difficulty  Wash your back: No Difficulty  Use a knife to cut food: No Difficulty  Recreational activities in which you take some force or impact through your arm, should or hand (e.g. golf, hammering, tennis, etc.): Unable  During the past week, to what extent has your arm, shoulder, or hand problem interfered with your normal social activites with family, friends, neighbors or groups?: Slightly  During the past week, were you limited in your work or other regular daily activities as a result of your arm, shoulder or hand problem?: Not limited at all  Arm, Shoulder, or hand pain: Mild  Tingling (pins and needles) in your arm, shoulder, or hand: None  During the past week, how much difficulty  have you had sleeping because of the pain in your arm, shoulder or hand?: No difficulty  Number of Questions Answered: 11  Quick DASH Score: 27.27         Time Calculation:   Start Time: 1340  Stop Time: 1413  Time Calculation (min): 33 min   Therapy Charges for Today     Code Description Service Date Service Provider Modifiers Qty    57715256511 HC PT BIS XTRACELL FLUID ANALYSIS 10/18/2019 Helena Lemus, PT  1    04032103060 HC PT THER PROC EA 15 MIN 10/18/2019 Helena Lemus, PT GP 1    25648560448 HC PT RE-EVAL ESTABLISHED PLAN 2 10/18/2019 Helena Lemus, PT GP 1          PT G-Codes  Outcome Measure Options: Quick DASH  Quick DASH Score: 27.27         Helena Saunders, PT  10/18/2019

## 2019-10-21 ENCOUNTER — OFFICE VISIT (OUTPATIENT)
Dept: PSYCHIATRY | Facility: HOSPITAL | Age: 49
End: 2019-10-21

## 2019-10-21 ENCOUNTER — TELEPHONE (OUTPATIENT)
Dept: INTERNAL MEDICINE | Facility: CLINIC | Age: 49
End: 2019-10-21

## 2019-10-21 DIAGNOSIS — F41.9 ANXIETY: Primary | ICD-10-CM

## 2019-10-21 DIAGNOSIS — F32.0 CURRENT MILD EPISODE OF MAJOR DEPRESSIVE DISORDER, UNSPECIFIED WHETHER RECURRENT (HCC): ICD-10-CM

## 2019-10-21 DIAGNOSIS — R53.0 NEOPLASTIC MALIGNANT RELATED FATIGUE: ICD-10-CM

## 2019-10-21 PROCEDURE — 90853 GROUP PSYCHOTHERAPY: CPT | Performed by: PSYCHIATRY & NEUROLOGY

## 2019-10-21 PROCEDURE — 99211 OFF/OP EST MAY X REQ PHY/QHP: CPT | Performed by: PSYCHIATRY & NEUROLOGY

## 2019-10-21 RX ORDER — METHYLPHENIDATE HYDROCHLORIDE 10 MG/1
10 TABLET ORAL 2 TIMES DAILY
Qty: 60 TABLET | Refills: 0 | Status: SHIPPED | OUTPATIENT
Start: 2019-10-21 | End: 2019-12-02 | Stop reason: SDUPTHER

## 2019-10-21 NOTE — TELEPHONE ENCOUNTER
Regarding: FW: Prescription Question  Contact: [phone]  Please see message below thanks.      ----- Message -----  From: Emilie Soto  Sent: 10/21/2019  12:51 PM  To: Savana Hidalgo [facility]  Subject: Prescription Question                            ----- Message from Birdsholadafne, Generic sent at 10/21/2019 12:51 PM EDT -----    Good Morning Pinky--  Oh boy. I think we may have a little issue. So Trena is taking the prednisone, inhaler breathing treatments every 4 hours, mucinex, some other medication like dayquil... and the cough med.  Friday night she went off on me and threatened to leave me at our friends house. And it was like a shock to all of us. She said she was depressed-very very low. She is shaking all over. Today she was at lunch and txting me and said she felt her mind was very distant but still going a million miles a minute, she felt like everyone was watching her and she wanted to go into a corner and hide (paranoid), extremely nervous and agitated.  So we are thinking it was meds- specific Prednisone. She asked me to email you for help.    Discussed with Emilie-she (Trena [place]en[place]ro) c/o increased agitation and nervousness, does report improvement in dyspnea. She will hold any additional nebulizer treatments for now. She has taken 1 Prednisone today (scheduled to take 1 bid today)-advised to hold any additional tablets. She will call with an update of her sx in 1-2 days.

## 2019-10-21 NOTE — PROGRESS NOTES
Subjective  Patient ID: Emilie Soto is a 49 y.o.. female seen in regularly scheduled group session.    Group TherapyPreventative care education provided including risks associated with smoking, alcohol, and drug use as well as discussion of options available for help with cessation. Reviewed community rehabilitation programs including [facility] at [facility], [facility] Program at [facility], [employer], and physical rehab for energy, fitness, weight mgmt, and health.     Counseling provided regarding reintroduction to activity through graded tasks, recognition and allowance of need for rest, importance of continued follow up  and balancing avoidance with approach through encouragement of one yes for every no.  Reviewed community rehabilitation programs including [facility] at [facility], [facility] at [facility], [employer], and physical rehab for energy, fitness, weight mgmt, and health.     Medications Management  Medications reviewed and renewed as appropriate. Shared breast cancer diagnosis, completed lumpectomy and radiation and started tamoxifen. Has fears of recurrence.  Persistent depressive symptoms, anxiety less intense and frequent.Able to identify situational triggers largely related to life changes, financial pressures. Divorce, job loss.  Cancer diagnosis hit like a ton of bricks with new onset debilitating anxiety.    Reviewed response to ritalin which pt believes has been phenomenal    ,has experienced some crash when wheres off, has negative attributions and fears regarding medication and dependency. However given low dose, good response and defineable benefit will continue. Discussed option of longer acting but pt likes control of managing with less if she can.    There are no diagnoses linked to this encounter.

## 2019-10-29 PROBLEM — Z12.11 SCREENING FOR COLON CANCER: Status: ACTIVE | Noted: 2019-10-29

## 2019-10-31 DIAGNOSIS — I10 BENIGN ESSENTIAL HTN: ICD-10-CM

## 2019-10-31 RX ORDER — LISINOPRIL AND HYDROCHLOROTHIAZIDE 25; 20 MG/1; MG/1
1 TABLET ORAL DAILY
Qty: 30 TABLET | Refills: 5 | Status: SHIPPED | OUTPATIENT
Start: 2019-10-31 | End: 2020-04-28

## 2019-11-05 ENCOUNTER — APPOINTMENT (OUTPATIENT)
Dept: WOMENS IMAGING | Facility: HOSPITAL | Age: 49
End: 2019-11-05

## 2019-11-05 ENCOUNTER — OFFICE VISIT (OUTPATIENT)
Dept: INTERNAL MEDICINE | Facility: CLINIC | Age: 49
End: 2019-11-05

## 2019-11-05 VITALS
SYSTOLIC BLOOD PRESSURE: 122 MMHG | HEIGHT: 67 IN | DIASTOLIC BLOOD PRESSURE: 82 MMHG | OXYGEN SATURATION: 100 % | HEART RATE: 84 BPM | BODY MASS INDEX: 47.38 KG/M2

## 2019-11-05 DIAGNOSIS — M76.61 ACHILLES TENDINITIS OF RIGHT LOWER EXTREMITY: Primary | ICD-10-CM

## 2019-11-05 DIAGNOSIS — M79.671 RIGHT FOOT PAIN: ICD-10-CM

## 2019-11-05 PROCEDURE — 73630 X-RAY EXAM OF FOOT: CPT | Performed by: NURSE PRACTITIONER

## 2019-11-05 PROCEDURE — 99214 OFFICE O/P EST MOD 30 MIN: CPT | Performed by: NURSE PRACTITIONER

## 2019-11-05 PROCEDURE — 73630 X-RAY EXAM OF FOOT: CPT | Performed by: RADIOLOGY

## 2019-11-05 RX ORDER — IBUPROFEN 800 MG/1
800 TABLET ORAL EVERY 6 HOURS PRN
Qty: 45 TABLET | Refills: 0 | Status: SHIPPED | OUTPATIENT
Start: 2019-11-05 | End: 2020-03-27

## 2019-11-06 NOTE — PROGRESS NOTES
Subjective   Emilei Soto is a 49 y.o. female who presents due to right heel pain.    She presents due to right heel pain since Saturday.  Her puppy threw off her balance on Saturday and she slammed her foot against the floor.  She felt well until a few hours later when she developed severe pain in the Achilles area which is worse with flexion and extension of the foot.  There is also point tenderness.  Denies swelling.  No paresthesias the right foot.      Foot Pain   This is a new problem. The current episode started in the past 7 days. The problem occurs constantly. The problem has been unchanged. Pertinent negatives include no abdominal pain, arthralgias, chest pain, chills, congestion, coughing, fatigue, fever, headaches, joint swelling, nausea, numbness, rash, sore throat, vomiting or weakness. The symptoms are aggravated by walking and twisting. She has tried nothing for the symptoms.        The following portions of the patient's history were reviewed and updated as appropriate: allergies, current medications, past social history and problem list.    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   • Sleep apnea     C-PAP IN USE   • Vitamin D deficiency          Current Outpatient Medications:   •  cholecalciferol (VITAMIN D3) 1000 units tablet, Take 2,000 Units by mouth Daily., Disp: , Rfl:   •  desvenlafaxine (PRISTIQ) 100 MG 24 hr tablet, Take 1 tablet by mouth Daily., Disp: 30 tablet, Rfl: 2  •  Efinaconazole (JUBLIA) 10 % solution, Apply 1 Units topically Daily., Disp: 8 mL, Rfl: 3  •  ibuprofen  (ADVIL,MOTRIN) 800 MG tablet, Take 1 tablet by mouth Every 6 (Six) Hours As Needed for Mild Pain  or Moderate Pain ., Disp: 45 tablet, Rfl: 0  •  lisinopril-hydrochlorothiazide (PRINZIDE,ZESTORETIC) 20-25 MG per tablet, TAKE 1 TABLET BY MOUTH DAILY, Disp: 30 tablet, Rfl: 5  •  LORazepam (ATIVAN) 0.5 MG tablet, TAKE 1 TABLET BY MOUTH EVERY 8 HOURS AS NEEDED FOR ANXIETY, Disp: 45 tablet, Rfl: 0  •  methylphenidate (RITALIN) 10 MG tablet, Take 1 tablet by mouth 2 (Two) Times a Day., Disp: 60 tablet, Rfl: 0  •  silver sulfadiazine (SILVADENE, SSD) 1 % cream, Apply  topically to the appropriate area as directed 2 (Two) Times a Day., Disp: 50 g, Rfl: 1  •  tamoxifen (NOLVADEX) 20 MG chemo tablet, Take 1 tablet by mouth Daily., Disp: 90 tablet, Rfl: 2  •  vitamin B-12 (CYANOCOBALAMIN) 1000 MCG tablet, Take 1,000 mcg by mouth Daily., Disp: , Rfl:   •  vitamin D (ERGOCALCIFEROL) 66352 units capsule capsule, Take 1 capsule by mouth 1 (One) Time Per Week., Disp: 4 capsule, Rfl: 2    Allergies   Allergen Reactions   • Citalopram Rash   • Flexeril [Cyclobenzaprine] Other (See Comments)     MUSCLES TENSE-OPPOSITE OF WHAT IT IS INTENDED TO DO-ELEVATES B/P   • Doxycycline Rash     Blisters on hands   • Keflex [Cephalexin] Rash   • Sulfa Antibiotics Rash     NAUSEA/VOMITING/FEVER   • Vilazodone Hcl Hives, Itching and Rash       Review of Systems   Constitutional: Negative for chills, fatigue, fever and unexpected weight change.   HENT: Negative for congestion, ear pain, postnasal drip, sinus pressure, sore throat and trouble swallowing.    Eyes: Negative for visual disturbance.   Respiratory: Negative for cough, chest tightness and wheezing.    Cardiovascular: Negative for chest pain, palpitations and leg swelling.   Gastrointestinal: Negative for abdominal pain, blood in stool, nausea and vomiting.   Genitourinary: Negative for dysuria, frequency and urgency.   Musculoskeletal: Negative for arthralgias and joint swelling.   Skin:  "Negative for color change and rash.   Neurological: Negative for syncope, weakness, numbness and headaches.   Hematological: Does not bruise/bleed easily.   Psychiatric/Behavioral: The patient is nervous/anxious (working with psychiatry).        Objective   Vitals:    11/05/19 0948   BP: 122/82   BP Location: Left arm   Patient Position: Sitting   Cuff Size: Adult   Pulse: 84   SpO2: 100%   Height: 168.9 cm (66.5\")     Body mass index is 47.38 kg/m².  Physical Exam   Constitutional: She appears well-developed and well-nourished. She is cooperative. She does not have a sickly appearance. She does not appear ill.   HENT:   Head: Normocephalic.   Right Ear: Hearing, tympanic membrane and external ear normal.   Left Ear: Hearing, tympanic membrane and external ear normal.   Nose: Nose normal. No mucosal edema, rhinorrhea, sinus tenderness or nasal deformity. Right sinus exhibits no maxillary sinus tenderness and no frontal sinus tenderness. Left sinus exhibits no maxillary sinus tenderness and no frontal sinus tenderness.   Mouth/Throat: Oropharynx is clear and moist and mucous membranes are normal. Normal dentition.   Eyes: Conjunctivae and lids are normal. Right eye exhibits no discharge and no exudate. Left eye exhibits no discharge and no exudate.   Neck: Trachea normal. Carotid bruit is not present. No edema present. No thyroid mass present.   Cardiovascular: Regular rhythm, normal heart sounds and normal pulses.   No murmur heard.  Pulmonary/Chest: Breath sounds normal. No respiratory distress. She has no decreased breath sounds. She has no wheezes. She has no rhonchi. She has no rales.   Musculoskeletal:        Right foot: There is tenderness. There is no swelling.   Point tenderness to palpation over the Achilles tendon, increased pain with flexion and extension of foot   Lymphadenopathy:        Head (right side): No submental, no submandibular, no tonsillar, no preauricular, no posterior auricular and no " occipital adenopathy present.        Head (left side): No submental, no submandibular, no tonsillar, no preauricular, no posterior auricular and no occipital adenopathy present.   Neurological: She is alert.   Skin: Skin is warm, dry and intact. No cyanosis. Nails show no clubbing.       Assessment/Plan   Emilie was seen today for foot pain.    Diagnoses and all orders for this visit:    Achilles tendinitis of right lower extremity  -     ibuprofen (ADVIL,MOTRIN) 800 MG tablet; Take 1 tablet by mouth Every 6 (Six) Hours As Needed for Mild Pain  or Moderate Pain .    Right foot pain  -     XR Foot 3+ View Right    There is calcification over the Achilles tendon-we will send to radiology for review.  She will start ibuprofen for inflammation and elevate the leg.  Consider further evaluation if symptoms persist or worsen.  She is done very well with Ritalin and reports improvement in ability to concentrate and focus, tolerating medication well.  She is working with psychiatry regarding medications.

## 2019-11-11 ENCOUNTER — PATIENT MESSAGE (OUTPATIENT)
Dept: INTERNAL MEDICINE | Facility: CLINIC | Age: 49
End: 2019-11-11

## 2019-11-11 ENCOUNTER — HOSPITAL ENCOUNTER (OUTPATIENT)
Dept: PHYSICAL THERAPY | Facility: HOSPITAL | Age: 49
Setting detail: THERAPIES SERIES
Discharge: HOME | End: 2019-11-11

## 2019-11-11 DIAGNOSIS — Z15.02 MONOALLELIC MUTATION OF CHEK2 GENE IN FEMALE PATIENT: ICD-10-CM

## 2019-11-11 DIAGNOSIS — C50.111 MALIGNANT NEOPLASM OF CENTRAL PORTION OF RIGHT BREAST IN FEMALE, ESTROGEN RECEPTOR POSITIVE (HCC): Primary | ICD-10-CM

## 2019-11-11 DIAGNOSIS — M25.511 ACUTE PAIN OF RIGHT SHOULDER: ICD-10-CM

## 2019-11-11 DIAGNOSIS — N64.4 MASTODYNIA: ICD-10-CM

## 2019-11-11 DIAGNOSIS — L90.5 SCAR TISSUE: ICD-10-CM

## 2019-11-11 DIAGNOSIS — Z17.0 MALIGNANT NEOPLASM OF CENTRAL PORTION OF RIGHT BREAST IN FEMALE, ESTROGEN RECEPTOR POSITIVE (HCC): Primary | ICD-10-CM

## 2019-11-11 DIAGNOSIS — Z48.89 AFTERCARE FOLLOWING SURGERY: ICD-10-CM

## 2019-11-11 DIAGNOSIS — Z15.01 MONOALLELIC MUTATION OF CHEK2 GENE IN FEMALE PATIENT: ICD-10-CM

## 2019-11-11 DIAGNOSIS — Z91.89 AT RISK FOR LYMPHEDEMA: ICD-10-CM

## 2019-11-11 DIAGNOSIS — Z15.09 MONOALLELIC MUTATION OF CHEK2 GENE IN FEMALE PATIENT: ICD-10-CM

## 2019-11-11 DIAGNOSIS — Z15.89 MONOALLELIC MUTATION OF CHEK2 GENE IN FEMALE PATIENT: ICD-10-CM

## 2019-11-11 DIAGNOSIS — Z90.11 STATUS POST PARTIAL MASTECTOMY OF RIGHT BREAST: ICD-10-CM

## 2019-11-11 PROCEDURE — 93702 BIS XTRACELL FLUID ANALYSIS: CPT | Performed by: PHYSICAL THERAPIST

## 2019-11-11 PROCEDURE — 97110 THERAPEUTIC EXERCISES: CPT | Performed by: PHYSICAL THERAPIST

## 2019-11-11 NOTE — TELEPHONE ENCOUNTER
From: Emilie Soto  To: Pinky Hyatt APRN  Sent: 11/11/2019 9:07 AM EST  Subject: Prescription Question    Good morning Trena Vance is out of her gabapentin. Took the last 1/2 this morning. Have a great day.     ThanksEmilie.

## 2019-11-11 NOTE — THERAPY TREATMENT NOTE
Outpatient Physical Therapy Lymphedema Treatment Note  [facility]     Patient Name: Emilie Soto  : 1970  MRN: [id number]  Today's Date: 2019        Visit Date: 2019    Visit Dx:    ICD-10-CM ICD-9-CM   1. Malignant neoplasm of central portion of right breast in female, estrogen receptor positive (CMS/HCC) C50.111 174.1    Z17.0 V86.0   2. Monoallelic mutation of CHEK2 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 Z15.01 V84.01    Z15.89 V84.09    Z15.09 V84.89    Z15.02 V84.02   3. Status post partial mastectomy of right breast Z90.11 V45.71   4. At risk for lymphedema Z91.89 V49.89   5. Aftercare following surgery Z48.89 V58.89   6. Scar tissue L90.5 709.2   7. Mastodynia N64.4 611.71   8. Acute pain of right shoulder M25.511 719.41       Patient Active Problem List   Diagnosis   • Anxiety   • Hypertension   • Vitamin D deficiency   • Hyperlipidemia   • Lumbago   • Malignant neoplasm of central portion of right breast in female, estrogen receptor positive (CMS/HCC)   • Family history of breast cancer   • Monoallelic mutation of CHEK2 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   • Neoplastic malignant related fatigue   • Screening for colon cancer        Lymphedema     Row Name 19 0813 19 0800          Subjective Pain    Able to rate subjective pain?  yes  -SC  --     Pre-Treatment Pain Level  0  -SC  --        Subjective Comments    Subjective Comments  I took a new job, working 6 hours Sat/Sun to help pay for bills. I haven't been able to do [employer] because of the hours. I will look at [employer]. I Haven't gotten into [employer] yet. I need to call still to set something up. I was worried how my breast looked after radiation so I wanted you to look at that. Sore in the armpit when I am cleaning, the next day, but otherwise no issues. More like it is weak than pain.   -SC  --        Skin Changes/Observations    Skin Observations Comment  --  post radiation skin changes, intact, no issues noted at this time.    -SC        Manual Lymphatic Drainage    Manual Therapy  --  reviewed post radiation skin care and self massage  -SC        Compression/Skin Care    Compression/Skin Care Comments  --  reviewed importance of bra fitting  -SC        L-Dex Bioimpedence Screening    L-Dex Measurement Extremity  --  RUE  -SC     L-Dex Patient Position  --  Standing  -SC     L-Dex UE Dominate Side  --  Right  -SC     L-Dex UE At Risk Side  --  Right  -SC     L-Dex UE Pre Surgical Value  --  No  -SC     L-Dex UE Score  --  -1.5  -SC     L-Dex UE Baseline Score  --  1.4  -SC     L-Dex UE Value Change  --  -2.9  -SC     L-Dex UE Comment  --  WNL, back in normal range  -SC     $ L-Dex Charge  --  yes  -SC       User Key  (r) = Recorded By, (t) = Taken By, (c) = Cosigned By    Initials Name Provider Type    SC Helena Lemus, PT Physical Therapist                        PT Assessment/Plan     Row Name 11/11/19 0800          PT Assessment    Assessment Comments  bioimpedance back in normal range, to return in 3 months to reassess unless otherwise indicated  -SC        PT Plan    PT Plan Comments  recert, bio, quick DASH, bra fitting, post radiation skin (porous changes), community exercise  -SC       User Key  (r) = Recorded By, (t) = Taken By, (c) = Cosigned By    Initials Name Provider Type    SC Helena Lemus, PT Physical Therapist             OP Exercises     Row Name 11/11/19 0813             Subjective Comments    Subjective Comments  I took a new job, working 6 hours Sat/Sun to help pay for bills. I haven't been able to do [employer] because of the hours. I will look at [employer]. I Haven't gotten into [employer] yet. I need to call still to set something up. I was worried how my breast looked after radiation so I wanted you to look at that. Sore in the armpit when I am cleaning, the next day, but otherwise no issues. More like it is weak than pain.   -SC         Subjective Pain    Able to rate subjective pain?  yes  -SC       Pre-Treatment Pain Level  0  -SC        User Key  (r) = Recorded By, (t) = Taken By, (c) = Cosigned By    Initials Name Provider Type    Helena Noyola, PT Physical Therapist                      PT OP Goals     Row Name 11/11/19 0800          PT Short Term Goals    STG Date to Achieve  01/17/20  -SC     STG 1  Patient independent and compliant with initial Home Exercise Program focused on diaphragmatic breathing, flexibility, range of motion, mobility and strength for improved posture, function, with improved sleeping patterns and decreased pain/symptoms of upper extremity.   -SC     STG 1 Progress  Met  -SC     STG 2  Patient score </=15% on Quick DASH for improved function and transition to self-management of condition.   -SC     STG 2 Progress  Progressing;Goal Revised  -[facility] 2 Progress Comments  reports improved, did not formally assess  -SC        Long Term Goals    LTG Date to Achieve  01/17/20  -SC     LTG 1  Patient's bioimpedance score to remain between -10 and 6.5 for decreased risk of developing stage II post lumpectomy lymphedema syndrome right UE and to establish treatment for early intervention of condition if/when indicated.  -SC     LTG 1 Progress  Progressing  -SC     LTG 1 Progress Comments  currently -1.5, WNL back in normal range  -SC     LTG 2  Patient independent and compliant with advanced Home Exercise Program and self-care techniques for self-management of condition.   -SC     LTG 2 Progress  Met  -SC     LTG 3  Patient able to wear fitted post lumpectomy/radiation bra with padding right (if indicated)  >/=6-8 hours for work schedule for improved compression to lateral flank/right breast.  -SC     LTG 3 Progress  Ongoing  -SC     LTG 3 Progress Comments  reviewed importance of fitting today  -SC     LTG 4  Patient demonstrate independence and compliance with proper skin care during/post radiation for improved mobility, decreased scar tissue, axillary cording and edema.  -SC      LTG 4 Progress  Met  -SC     LTG 5  Initiate introductory level training at community exercise program such as [employer] or [employer] program to allow for transition to gym exercise program and self-care of condition.  -SC     LTG 5 Progress  New  -SC     LTG 5 Progress Comments  limited due to work hours, education of [employer], can bring spouse for free, free , 30 min circuit  -SC        Time Calculation    PT Goal Re-Cert Due Date  01/17/20  -SC       User Key  (r) = Recorded By, (t) = Taken By, (c) = Cosigned By    Initials Name Provider Type    SC Helena Lemus, PT Physical Therapist          Therapy Education  Education Details: bioimpedance test and results, continuation of care, [employer]/community exercise options, bra fitting  Given: Symptoms/condition management, Edema management, Other (comment)  Program: Progressed, Modified  How Provided: Verbal, Demonstration, Written  Provided to: Patient  Level of Understanding: Teach back education performed, Verbalized, Demonstrated              Time Calculation:   Start Time: 0813(patient arrived at 0813 for 0800 appt)  Stop Time: 0830  Time Calculation (min): 17 min   Therapy Charges for Today     Code Description Service Date Service Provider Modifiers Qty    79104471501 HC PT BIS XTRACELL FLUID ANALYSIS 11/11/2019 Helena Lemus, PT  1    21169847060 HC PT THER PROC EA 15 MIN 11/11/2019 Helena Lemus, PT GP 1                    Helena Saunders, PT  11/11/2019

## 2019-11-19 ENCOUNTER — OFFICE VISIT (OUTPATIENT)
Dept: INTERNAL MEDICINE | Facility: CLINIC | Age: 49
End: 2019-11-19

## 2019-11-19 VITALS
HEART RATE: 82 BPM | OXYGEN SATURATION: 99 % | WEIGHT: 293 LBS | HEIGHT: 67 IN | DIASTOLIC BLOOD PRESSURE: 82 MMHG | SYSTOLIC BLOOD PRESSURE: 138 MMHG | BODY MASS INDEX: 45.99 KG/M2

## 2019-11-19 DIAGNOSIS — F41.9 ANXIETY: ICD-10-CM

## 2019-11-19 DIAGNOSIS — M76.61 ACHILLES TENDINITIS OF RIGHT LOWER EXTREMITY: ICD-10-CM

## 2019-11-19 DIAGNOSIS — J06.9 ACUTE URI: ICD-10-CM

## 2019-11-19 DIAGNOSIS — I10 BENIGN ESSENTIAL HTN: Primary | ICD-10-CM

## 2019-11-19 DIAGNOSIS — E55.9 VITAMIN D DEFICIENCY: ICD-10-CM

## 2019-11-19 PROCEDURE — 99214 OFFICE O/P EST MOD 30 MIN: CPT | Performed by: NURSE PRACTITIONER

## 2019-11-21 ENCOUNTER — OFFICE VISIT (OUTPATIENT)
Dept: PSYCHIATRY | Facility: HOSPITAL | Age: 49
End: 2019-11-21

## 2019-11-21 DIAGNOSIS — F41.9 ANXIETY: ICD-10-CM

## 2019-11-21 DIAGNOSIS — F41.1 GENERALIZED ANXIETY DISORDER: ICD-10-CM

## 2019-11-21 DIAGNOSIS — F43.10 POST TRAUMATIC STRESS DISORDER (PTSD): ICD-10-CM

## 2019-11-21 DIAGNOSIS — F33.1 MODERATE EPISODE OF RECURRENT MAJOR DEPRESSIVE DISORDER (HCC): Primary | ICD-10-CM

## 2019-11-21 PROCEDURE — 99211 OFF/OP EST MAY X REQ PHY/QHP: CPT | Performed by: PSYCHIATRY & NEUROLOGY

## 2019-11-21 PROCEDURE — 90853 GROUP PSYCHOTHERAPY: CPT | Performed by: PSYCHIATRY & NEUROLOGY

## 2019-11-21 RX ORDER — LORAZEPAM 0.5 MG/1
0.5 TABLET ORAL EVERY 8 HOURS PRN
Qty: 45 TABLET | Refills: 0 | Status: SHIPPED | OUTPATIENT
Start: 2019-11-21 | End: 2020-02-10 | Stop reason: SDUPTHER

## 2019-11-21 RX ORDER — METHYLPHENIDATE HYDROCHLORIDE 20 MG/1
20 CAPSULE, EXTENDED RELEASE ORAL DAILY
Qty: 30 CAPSULE | Refills: 0 | Status: SHIPPED | OUTPATIENT
Start: 2019-11-21 | End: 2019-12-26 | Stop reason: SDUPTHER

## 2019-11-21 RX ORDER — DESVENLAFAXINE 100 MG/1
100 TABLET, EXTENDED RELEASE ORAL DAILY
Qty: 30 TABLET | Refills: 2 | Status: SHIPPED | OUTPATIENT
Start: 2019-11-21 | End: 2020-02-10 | Stop reason: SDUPTHER

## 2019-11-21 NOTE — PROGRESS NOTES
Subjective  Patient ID: Emilie Soto is a 49 y.o.. female seen in regularly scheduled group session.    Group Therapy  Group topics explored including development of new normal, interpersonal sensitivity, continued effects of dz/tx, advanced care planning, anticipitory anxiety, difficulty connecting with others, pain management, physical deconditioning, weight management, conflict with significant other, conflict with children, expectation of return to previous normal, provider burden and physical limitations.      Medications Management    Breast cancer survivor.  Suffering from fatigue.  Increased anxiety regarding upcoming colonscopy procedure. Significant other suffering from depression and not providing support as in the past.  Reports depression as being situational and inability to handle stress as in the past.  States Ritalin has significantly improved fatigue and mental focus.    Medications reviewed and renewed as appropriate.   Discussed starting on long acting stimulant in addition to immediate release has had positive impact on functional status.    Diagnoses and all orders for this visit:    Moderate episode of recurrent major depressive disorder (CMS/HCC)    Post traumatic stress disorder (PTSD)    Generalized anxiety disorder

## 2019-11-22 DIAGNOSIS — J06.9 ACUTE URI: Primary | ICD-10-CM

## 2019-11-22 RX ORDER — AZITHROMYCIN 250 MG/1
TABLET, FILM COATED ORAL
Qty: 6 TABLET | Refills: 0 | Status: SHIPPED | OUTPATIENT
Start: 2019-11-22 | End: 2019-11-28

## 2019-11-22 NOTE — PROGRESS NOTES
Jaspal Soto is a 49 y.o. female who presents for f/u regarding HTN, hyperlipidemia and anxiety.    Her right Achilles/heel pain is steadily improving since staring Ibuprofen, xray showed mild degenerative changes of foot.  Her anxiety and energy level have significantly improved since starting Ritalin, tolerating well without palpitations. She is also sleeping better.  She does c/o increased congestion and postnasal drainage over the past week, denies fever/chills.  She is taking a Vitamin D supplement which she is tolerating well.         The following portions of the patient's history were reviewed and updated as appropriate: allergies, current medications, past social history and problem list.    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   • Sleep apnea     C-PAP IN USE   • Vitamin D deficiency          Current Outpatient Medications:   •  cholecalciferol (VITAMIN D3) 1000 units tablet, Take 2,000 Units by mouth Daily., Disp: , Rfl:   •  Efinaconazole (JUBLIA) 10 % solution, Apply 1 Units topically Daily., Disp: 8 mL, Rfl: 3  •  ibuprofen (ADVIL,MOTRIN) 800 MG tablet, Take 1 tablet by mouth Every 6 (Six) Hours As Needed for Mild Pain  or Moderate Pain ., Disp: 45 tablet, Rfl: 0  •  lisinopril-hydrochlorothiazide (PRINZIDE,ZESTORETIC) 20-25 MG per tablet, TAKE 1 TABLET BY MOUTH DAILY, Disp: 30 tablet, Rfl: 5  •  methylphenidate (RITALIN) 10 MG tablet, Take 1 tablet by mouth 2 (Two) Times a Day., Disp: 60 tablet, Rfl: 0  •  silver sulfadiazine (SILVADENE, SSD) 1 % cream, Apply  topically to  the appropriate area as directed 2 (Two) Times a Day., Disp: 50 g, Rfl: 1  •  tamoxifen (NOLVADEX) 20 MG chemo tablet, Take 1 tablet by mouth Daily., Disp: 90 tablet, Rfl: 2  •  vitamin B-12 (CYANOCOBALAMIN) 1000 MCG tablet, Take 1,000 mcg by mouth Daily., Disp: , Rfl:   •  vitamin D (ERGOCALCIFEROL) 59754 units capsule capsule, Take 1 capsule by mouth 1 (One) Time Per Week., Disp: 4 capsule, Rfl: 2  •  azithromycin (ZITHROMAX Z-GERDA) 250 MG tablet, Take 2 tablets the first day, then 1 tablet daily for 4 days., Disp: 6 tablet, Rfl: 0  •  desvenlafaxine (PRISTIQ) 100 MG 24 hr tablet, Take 1 tablet by mouth Daily., Disp: 30 tablet, Rfl: 2  •  LORazepam (ATIVAN) 0.5 MG tablet, Take 1 tablet by mouth Every 8 (Eight) Hours As Needed for Anxiety. for anxiety, Disp: 45 tablet, Rfl: 0  •  methylphenidate LA (RITALIN LA) 20 MG 24 hr capsule, Take 1 capsule by mouth Daily, Disp: 30 capsule, Rfl: 0    Allergies   Allergen Reactions   • Citalopram Rash   • Flexeril [Cyclobenzaprine] Other (See Comments)     MUSCLES TENSE-OPPOSITE OF WHAT IT IS INTENDED TO DO-ELEVATES B/P   • Doxycycline Rash     Blisters on hands   • Keflex [Cephalexin] Rash   • Sulfa Antibiotics Rash     NAUSEA/VOMITING/FEVER   • Vilazodone Hcl Hives, Itching and Rash       Review of Systems   Constitutional: Negative for appetite change, chills, fatigue and fever.   HENT: Positive for congestion, postnasal drip and rhinorrhea. Negative for ear discharge, ear pain, facial swelling, sinus pressure, sneezing, sore throat and tinnitus.    Respiratory: Negative for cough, chest tightness, shortness of breath and wheezing.    Cardiovascular: Negative for chest pain, palpitations and leg swelling.   Gastrointestinal: Negative for abdominal pain, diarrhea, nausea and vomiting.   Musculoskeletal: Negative for neck pain and neck stiffness.   Neurological: Negative for headaches.   Hematological: Negative for adenopathy.   Psychiatric/Behavioral: Positive for decreased  "concentration, dysphoric mood and sleep disturbance. The patient is nervous/anxious.        Objective   Vitals:    11/19/19 1301 11/19/19 1318   BP: 132/80 138/82   BP Location: Left arm Left arm   Patient Position: Sitting Sitting   Cuff Size: Adult Large Adult   Pulse: 82    SpO2: 99%    Weight: 135 kg (297 lb)    Height: 168.9 cm (66.5\")      Body mass index is 47.22 kg/m².  Physical Exam   Constitutional: She appears well-developed and well-nourished. She is cooperative. She does not have a sickly appearance. She does not appear ill.   HENT:   Head: Normocephalic.   Right Ear: Hearing and external ear normal. No drainage, swelling or tenderness. Tympanic membrane is bulging. Tympanic membrane is not erythematous. No middle ear effusion. No decreased hearing is noted.   Left Ear: Hearing and external ear normal. No drainage, swelling or tenderness. Tympanic membrane is bulging. Tympanic membrane is not erythematous.  No middle ear effusion. No decreased hearing is noted.   Nose: Nose normal. No mucosal edema, rhinorrhea, sinus tenderness or nasal deformity. Right sinus exhibits no maxillary sinus tenderness and no frontal sinus tenderness. Left sinus exhibits no maxillary sinus tenderness and no frontal sinus tenderness.   Mouth/Throat: Mucous membranes are normal. Posterior oropharyngeal erythema present.   Eyes: Conjunctivae and lids are normal.   Neck: Trachea normal.   Cardiovascular: Regular rhythm, normal heart sounds and normal pulses.   No murmur heard.  Pulmonary/Chest: Breath sounds normal. No respiratory distress. She has no decreased breath sounds. She has no wheezes. She has no rhonchi. She has no rales.   Lymphadenopathy:     She has no cervical adenopathy.   Neurological: She is alert.   Skin: Skin is warm, dry and intact.   Nursing note and vitals reviewed.      Assessment/Plan   Emilie was seen today for hypertension and hyperlipidemia.    Diagnoses and all orders for this visit:    Benign " essential HTN  Comments:  stable on current meds    Vitamin D deficiency    Acute URI  Comments:  start Mucinex for increased drainage and continue to monitor    Achilles tendinitis of right lower extremity    Anxiety  Comments:  followed by psychiatry, doing well on current meds    She is scheduled for a screening colonoscopy next month with Dr. Fulton.  She is tolerating Tamoxifen well, followed by oncology due to hx breast cancer.  Will recheck Vitamin D levels with next labs (due for PE in January).

## 2019-12-02 ENCOUNTER — OFFICE VISIT (OUTPATIENT)
Dept: PSYCHIATRY | Facility: HOSPITAL | Age: 49
End: 2019-12-02

## 2019-12-02 DIAGNOSIS — R53.0 NEOPLASTIC MALIGNANT RELATED FATIGUE: ICD-10-CM

## 2019-12-02 DIAGNOSIS — F43.10 POST TRAUMATIC STRESS DISORDER (PTSD): ICD-10-CM

## 2019-12-02 DIAGNOSIS — F33.1 MODERATE EPISODE OF RECURRENT MAJOR DEPRESSIVE DISORDER (HCC): Primary | ICD-10-CM

## 2019-12-02 DIAGNOSIS — F41.1 GENERALIZED ANXIETY DISORDER: ICD-10-CM

## 2019-12-02 PROCEDURE — 90853 GROUP PSYCHOTHERAPY: CPT | Performed by: PSYCHIATRY & NEUROLOGY

## 2019-12-02 PROCEDURE — 99211 OFF/OP EST MAY X REQ PHY/QHP: CPT | Performed by: PSYCHIATRY & NEUROLOGY

## 2019-12-02 RX ORDER — METHYLPHENIDATE HYDROCHLORIDE 10 MG/1
10 TABLET ORAL 2 TIMES DAILY
Qty: 60 TABLET | Refills: 0 | Status: SHIPPED | OUTPATIENT
Start: 2019-12-02 | End: 2019-12-26 | Stop reason: SDUPTHER

## 2019-12-02 NOTE — PROGRESS NOTES
Subjective  Patient ID: Emilie Soto is a 49 y.o.. female seen in regularly scheduled group session.    Group Therapy  Group topics explored including development of new normal, continued effects of dz/tx, anticipitory anxiety, physical deconditioning, conflict with significant other and physical limitations.    Counseling provided regarding reintroduction to activity through graded tasks, importance of continued follow up  and balancing avoidance with approach through encouragement of one yes for every no.  Reviewed community rehabilitation programs including [facility] at [facility], Cares Program at [facility], [facility], and physical rehab for energy, fitness, weight mgmt, and health.  Educated group on nonpharmacologic techniques for anxiety reduction including deep breathing, mindfulness meditation, rocio chi/ yoga/ physical exercise, and journaling. Discussed varied Cancer Center offerings including massage, reike, Taking Time to Talk and other support groups, Look Good Feel Better, [facility], and opportunity for individualized nutritional consultation with Ronel Jimenez RD.    Medications Management  Medications reviewed and renewed as appropriate.  Just started extended release, notices not having the crash, still has significant fatigue especially when medications wear off. 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 [employer].  In distant past used to run, has gained a lot of weight,Did boot camp at [employer] in past.Plans to do Livest[employer] program at the .        Diagnoses and all orders for this visit:    Moderate episode of recurrent major depressive disorder (CMS/HCC)    Neoplastic malignant related fatigue    Post traumatic stress disorder (PTSD)    Generalized anxiety disorder    Other orders  -     methylphenidate (RITALIN) 10 MG tablet; Take 1 tablet by mouth 2 (Two)  Times a Day.      Continue long acting stimulant supplement with immediate release, pt stressed but able to function in occupational realm with addition of stimulant, depressive symptoms stable.

## 2019-12-04 ENCOUNTER — TELEPHONE (OUTPATIENT)
Dept: INTERNAL MEDICINE | Facility: CLINIC | Age: 49
End: 2019-12-04

## 2019-12-04 NOTE — TELEPHONE ENCOUNTER
Regarding: FW: Non-Urgent Medical Question  Contact: [phone]  Please see message below thanks.      ----- Message -----  From: Emilie Soto  Sent: 12/4/2019  10:50 AM  To: Savana Hidalgo [facility]  Subject: Non-Urgent Medical Question                      ----- Message from Mychart, Generic sent at 12/4/2019 10:50 AM EST -----    Good Morning Pinky & Trena,    Tomorrow is my colonoscopy and I am worried.  So I am just emailing you to let you know that.  No response is required.  I'm just worried about the results.  I don't think I could handle it if it came back that I had colon cancer.  I appreciate both of you.  You guys take very good care of us and we appreciate all the calls, responses, concern, kindness, comfort and helpful information.  You guys rock!      Have a great day,  Emilie    Called and discussed with patient.

## 2019-12-05 ENCOUNTER — ANESTHESIA EVENT (OUTPATIENT)
Dept: GASTROENTEROLOGY | Facility: HOSPITAL | Age: 49
End: 2019-12-05

## 2019-12-05 ENCOUNTER — HOSPITAL ENCOUNTER (OUTPATIENT)
Facility: HOSPITAL | Age: 49
Setting detail: HOSPITAL OUTPATIENT SURGERY
Discharge: HOME | End: 2019-12-05
Attending: SURGERY | Admitting: SURGERY

## 2019-12-05 ENCOUNTER — ANESTHESIA (OUTPATIENT)
Dept: GASTROENTEROLOGY | Facility: HOSPITAL | Age: 49
End: 2019-12-05

## 2019-12-05 VITALS
RESPIRATION RATE: 16 BRPM | WEIGHT: 292.3 LBS | HEIGHT: 66 IN | SYSTOLIC BLOOD PRESSURE: 138 MMHG | OXYGEN SATURATION: 100 % | BODY MASS INDEX: 46.97 KG/M2 | HEART RATE: 72 BPM | TEMPERATURE: 99.4 F | DIASTOLIC BLOOD PRESSURE: 90 MMHG

## 2019-12-05 PROCEDURE — S0260 H&P FOR SURGERY: HCPCS | Performed by: SURGERY

## 2019-12-05 PROCEDURE — 25010000002 PROPOFOL 10 MG/ML EMULSION: Performed by: ANESTHESIOLOGY

## 2019-12-05 PROCEDURE — 81025 URINE PREGNANCY TEST: CPT | Performed by: SURGERY

## 2019-12-05 PROCEDURE — 45378 DIAGNOSTIC COLONOSCOPY: CPT | Performed by: SURGERY

## 2019-12-05 RX ORDER — SODIUM CHLORIDE, SODIUM LACTATE, POTASSIUM CHLORIDE, CALCIUM CHLORIDE 600; 310; 30; 20 MG/100ML; MG/100ML; MG/100ML; MG/100ML
30 INJECTION, SOLUTION INTRAVENOUS CONTINUOUS PRN
Status: DISCONTINUED | OUTPATIENT
Start: 2019-12-05 | End: 2019-12-05 | Stop reason: HOSPADM

## 2019-12-05 RX ORDER — PROPOFOL 10 MG/ML
VIAL (ML) INTRAVENOUS CONTINUOUS PRN
Status: DISCONTINUED | OUTPATIENT
Start: 2019-12-05 | End: 2019-12-05 | Stop reason: SURG

## 2019-12-05 RX ORDER — PROPOFOL 10 MG/ML
VIAL (ML) INTRAVENOUS AS NEEDED
Status: DISCONTINUED | OUTPATIENT
Start: 2019-12-05 | End: 2019-12-05 | Stop reason: SURG

## 2019-12-05 RX ADMIN — SODIUM CHLORIDE, POTASSIUM CHLORIDE, SODIUM LACTATE AND CALCIUM CHLORIDE 30 ML/HR: 600; 310; 30; 20 INJECTION, SOLUTION INTRAVENOUS at 09:00

## 2019-12-05 RX ADMIN — PROPOFOL 50 MG: 10 INJECTION, EMULSION INTRAVENOUS at 09:47

## 2019-12-05 RX ADMIN — PROPOFOL 50 MG: 10 INJECTION, EMULSION INTRAVENOUS at 09:37

## 2019-12-05 RX ADMIN — PROPOFOL 140 MCG/KG/MIN: 10 INJECTION, EMULSION INTRAVENOUS at 09:31

## 2019-12-05 RX ADMIN — PROPOFOL 150 MG: 10 INJECTION, EMULSION INTRAVENOUS at 09:31

## 2019-12-05 RX ADMIN — PROPOFOL 50 MG: 10 INJECTION, EMULSION INTRAVENOUS at 09:43

## 2019-12-05 NOTE — H&P
Patient Care Team:  Pinky Hyatt APRN as PCP - General (Internal Medicine)  Chnael Carballo MD as Consulting Physician (Radiation Oncology)  Tia Trevizo MD as Consulting Physician (Hematology and Oncology)    Chief complaint:  Need for screening colonoscopy    Subjective     History of Present Illness     The patient is a very pleasant 49-year-old female who presents for screening colonoscopy.  She has no significant GI symptoms.  She has never had a colonoscopy in the past.    Review of Systems   Constitutional: Negative for fatigue and fever.   Respiratory: Negative for chest tightness and shortness of breath.    Cardiovascular: Negative for chest pain and palpitations.   Gastrointestinal: Negative for abdominal pain, blood in stool, constipation, diarrhea, nausea and vomiting.        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   • Sleep apnea     C-PAP IN USE   • Vitamin D deficiency      Past Surgical History:   Procedure Laterality Date   • APPENDECTOMY      removed when removing dermoid on right ovary   • BREAST BIOPSY Right 2019   • BREAST LUMPECTOMY Right    • BREAST LUMPECTOMY WITH SENTINEL NODE BIOPSY Right 5/28/2019    Procedure: Right central partial mastectomy (with removal of the nipple-areola-complex) and sentinel lymph node biopsy;  Surgeon: Trena Greene MD;  Location: [facility] OR;  Service: General   • CHOLECYSTECTOMY     • DERMOID CYST  EXCISION  1989   • HERNIA REPAIR  2006    umbilical   • OVARY SURGERY      dermoid removed   • SALIVARY GLAND SURGERY  1993     due to cancer   • TONSILLECTOMY  01/01/1979    I was 9 years old     Family History   Problem Relation Age of Onset   • Hypertension Mother    • Arthritis Mother    • Kidney cancer Mother    • Depression Mother         Not Dr azalia onofre   • Heart disease Father    • Hernia Father    • Hypertension Father    • No Known Problems Brother    • Breast cancer Paternal Aunt    • Breast cancer Paternal Cousin    • Drug abuse Paternal Uncle         life long   • Drug abuse Paternal Uncle         life long   • Breast cancer Paternal Grandmother    • Osteoporosis Maternal Grandmother    • Malig Hyperthermia Neg Hx      Social History     Tobacco Use   • Smoking status: Never Smoker   • Smokeless tobacco: Never Used   • Tobacco comment: Parents smoked.  I have never smoked   Substance Use Topics   • Alcohol use: Yes     Frequency: Monthly or less     Drinks per session: 1 or 2     Comment: Only once or twice a year.  Mixed Drinks   • Drug use: No     Allergies:  Citalopram; Flexeril [cyclobenzaprine]; Doxycycline; Keflex [cephalexin]; Sulfa antibiotics; and Vilazodone hcl    Objective      Vital Signs  Temp:  [99.4 °F (37.4 °C)] 99.4 °F (37.4 °C)  Heart Rate:  [74] 74  Resp:  [16] 16  BP: (140)/(90) 140/90    Physical Exam   Constitutional: She is oriented to person, place, and time. She appears well-developed and well-nourished.  Non-toxic appearance.   Eyes: EOM are normal. No scleral icterus.   Pulmonary/Chest: Effort normal. No respiratory distress.   Abdominal: Soft. Normal appearance. There is no tenderness.   Neurological: She is alert and oriented to person, place, and time.   Skin: Skin is warm and dry.   Psychiatric: She has a normal mood and affect. Her behavior is normal. Judgment and thought content normal.       Results Review:   I reviewed the patient's new clinical results.      Assessment/Plan       Screening for colon cancer      Assessment & Plan     1.  Need for screening colonoscopy: Plan  colonoscopy.  The patient understands the indications, alternatives, risks, and benefits of the procedure and wishes to proceed.    I discussed the patients findings and my recommendations with patient    Pedrito Fulton Jr., MD  12/05/19  9:29 AM

## 2019-12-05 NOTE — ANESTHESIA POSTPROCEDURE EVALUATION
"Patient: Emilie CR Soto    Procedure Summary     Date:  12/05/19 Room / Location:  [facility] ENDOSCOPY 4 /  [facility]    Anesthesia Start:  0928 Anesthesia Stop:  0951    Procedure:  COLONOSCOPY TO CECUM (N/A ) Diagnosis:       Screening for colon cancer      (Screening for colon cancer [Z12.11])    Surgeon:  Pedrito Fulton Jr., MD Provider:  Leeroy Villarreal MD    Anesthesia Type:  MAC ASA Status:  3          Anesthesia Type: MAC  Last vitals  BP   137/84 (12/05/19 1006)   Temp   37.4 °C (99.4 °F) (12/05/19 0846)   Pulse   75 (12/05/19 1006)   Resp   16 (12/05/19 1006)     SpO2   100 % (12/05/19 1006)     Post Anesthesia Care and Evaluation    Patient location during evaluation: bedside  Patient participation: complete - patient participated  Level of consciousness: awake and alert  Pain management: adequate  Airway patency: patent  Anesthetic complications: No anesthetic complications    Cardiovascular status: acceptable  Respiratory status: acceptable  Hydration status: acceptable    Comments: /84 (BP Location: Left arm, Patient Position: Lying)   Pulse 75   Temp 37.4 °C (99.4 °F) (Oral)   Resp 16   Ht 167.6 cm (66\")   Wt 133 kg (292 lb 4.8 oz)   LMP 11/15/2019 (Approximate)   SpO2 100%   BMI 47.18 kg/m²       "

## 2019-12-05 NOTE — ANESTHESIA PREPROCEDURE EVALUATION
Anesthesia Evaluation     Patient summary reviewed and Nursing notes reviewed   history of anesthetic complications: PONV               Airway   Mallampati: II  TM distance: >3 FB  Neck ROM: full  Large neck circumference and Possible difficult intubation  Dental - normal exam     Pulmonary     breath sounds clear to auscultation  (+) sleep apnea on CPAP,   (-) shortness of breath, decreased breath sounds, wheezes  Cardiovascular - normal exam  Exercise tolerance: good (4-7 METS)    Rhythm: regular  Rate: normal    (+) hypertension, hyperlipidemia,   (-) past MI, angina, CHF, orthopnea, PND, MACDONALD, PVD      Neuro/Psych- negative ROS  (-) seizures, neuromuscular disease, TIA, CVA, dizziness/light headedness, weakness, numbness  GI/Hepatic/Renal/Endo    (+) morbid obesity,    (-) liver disease, diabetes    Musculoskeletal     (+) back pain,   Abdominal  - normal exam   Substance History - negative use  (-) alcohol use, drug use     OB/GYN negative ob/gyn ROS         Other      history of cancer (breast and parotid) remission                    Anesthesia Plan    ASA 3     MAC     intravenous induction     Anesthetic plan, all risks, benefits, and alternatives have been provided, discussed and informed consent has been obtained with: patient.

## 2019-12-05 NOTE — DISCHARGE INSTRUCTIONS
Colonoscopy, Adult, Care After  DR VOGT [phone]  This sheet gives you information about how to care for yourself after your procedure. Your health care provider may also give you more specific instructions. If you have problems or questions, contact your health care provider.  What can I expect after the procedure?  After the procedure, it is common to have:  · A small amount of blood in your stool for 24 hours after the procedure.  · Some gas.  · Mild abdominal cramping or bloating.  Follow these instructions at home:  General instructions  · For the first 24 hours after the procedure:  ? Do not drive or use machinery.  ? Do not sign important documents.  ? Do not drink alcohol.  ? Do your regular daily activities at a slower pace than normal.  ? Eat soft, easy-to-digest foods.  · Take over-the-counter or prescription medicines only as told by your health care provider.  Relieving cramping and bloating    · Try walking around when you have cramps or feel bloated.  · Apply heat to your abdomen as told by your health care provider. Use a heat source that your health care provider recommends, such as a moist heat pack or a heating pad.  ? Place a towel between your skin and the heat source.  ? Leave the heat on for 20-30 minutes.  ? Remove the heat if your skin turns bright red. This is especially important if you are unable to feel pain, heat, or cold. You may have a greater risk of getting burned.  Eating and drinking    · Drink enough fluid to keep your urine pale yellow.  · Resume your normal diet as instructed by your health care provider. Avoid heavy or fried foods that are hard to digest.  · Avoid drinking alcohol for as long as instructed by your health care provider.  Contact a health care provider if:  · You have blood in your stool 2-3 days after the procedure.  Get help right away if:  · You have more than a small spotting of blood in your stool.  · You pass large blood clots in your stool.  · Your  abdomen is swollen.  · You have nausea or vomiting.  · You have a fever.  · You have increasing abdominal pain that is not relieved with medicine.  Summary  · After the procedure, it is common to have a small amount of blood in your stool. You may also have mild abdominal cramping and bloating.  · For the first 24 hours after the procedure, do not drive or use machinery, sign important documents, or drink alcohol.  · Contact your health care provider if you have a lot of blood in your stool, nausea or vomiting, a fever, or increased abdominal pain.  This information is not intended to replace advice given to you by your health care provider. Make sure you discuss any questions you have with your health care provider.  Document Released: 08/01/2005 Document Revised: 10/10/2018 Document Reviewed: 02/28/2017  [employer] Interactive Patient Education © 2019 [employer]  Diverticulosis    Diverticulosis is a condition that develops when small pouches (diverticula) form in the wall of the large intestine (colon). The colon is where water is absorbed and stool is formed. The pouches form when the inside layer of the colon pushes through weak spots in the outer layers of the colon. You may have a few pouches or many of them.  What are the causes?  The cause of this condition is not known.  What increases the risk?  The following factors may make you more likely to develop this condition:  · Being older than age 60. Your risk for this condition increases with age. Diverticulosis is rare among people younger than age 30. By age 80, many people have it.  · Eating a low-fiber diet.  · Having frequent constipation.  · Being overweight.  · Not getting enough exercise.  · Smoking.  · Taking over-the-counter pain medicines, like aspirin and ibuprofen.  · Having a family history of diverticulosis.  What are the signs or symptoms?  In most people, there are no symptoms of this condition. If you do have symptoms, they may  include:  · Bloating.  · Cramps in the abdomen.  · Constipation or diarrhea.  · Pain in the lower left side of the abdomen.  How is this diagnosed?  This condition is most often diagnosed during an exam for other colon problems. Because diverticulosis usually has no symptoms, it often cannot be diagnosed independently. This condition may be diagnosed by:  · Using a flexible scope to examine the colon (colonoscopy).  · Taking an X-ray of the colon after dye has been put into the colon (barium enema).  · Doing a CT scan.  How is this treated?  You may not need treatment for this condition if you have never developed an infection related to diverticulosis. If you have had an infection before, treatment may include:  · Eating a high-fiber diet. This may include eating more fruits, vegetables, and grains.  · Taking a fiber supplement.  · Taking a live bacteria supplement (probiotic).  · Taking medicine to relax your colon.  · Taking antibiotic medicines.  Follow these instructions at home:  · Drink 6-8 glasses of water or more each day to prevent constipation.  · Try not to strain when you have a bowel movement.  · If you have had an infection before:  ? Eat more fiber as directed by your health care provider or your diet and nutrition specialist (dietitian).  ? Take a fiber supplement or probiotic, if your health care provider approves.  · Take over-the-counter and prescription medicines only as told by your health care provider.  · If you were prescribed an antibiotic, take it as told by your health care provider. Do not stop taking the antibiotic even if you start to feel better.  · Keep all follow-up visits as told by your health care provider. This is important.  Contact a health care provider if:  · You have pain in your abdomen.  · You have bloating.  · You have cramps.  · You have not had a bowel movement in 3 days.  Get help right away if:  · Your pain gets worse.  · Your bloating becomes very bad.  · You have a  fever or chills, and your symptoms suddenly get worse.  · You vomit.  · You have bowel movements that are bloody or black.  · You have bleeding from your rectum.  Summary  · Diverticulosis is a condition that develops when small pouches (diverticula) form in the wall of the large intestine (colon).  · You may have a few pouches or many of them.  · This condition is most often diagnosed during an exam for other colon problems.  · If you have had an infection related to diverticulosis, treatment may include increasing the fiber in your diet, taking supplements, or taking medicines.  This information is not intended to replace advice given to you by your health care provider. Make sure you discuss any questions you have with your health care provider.  Document Released: 09/14/2005 Document Revised: 11/06/2017 Document Reviewed: 11/06/2017  [employer] Interactive Patient Education © 2019 [employer]  High-Fiber Diet  Fiber, also called dietary fiber, is a type of carbohydrate that is found in fruits, vegetables, whole grains, and beans. A high-fiber diet can have many health benefits. Your health care provider may recommend a high-fiber diet to help:  · Prevent constipation. Fiber can make your bowel movements more regular.  · Lower your cholesterol.  · Relieve the following conditions:  ? Swelling of veins in the anus (hemorrhoids).  ? Swelling and irritation (inflammation) of specific areas of the digestive tract (uncomplicated diverticulosis).  ? A problem of the large intestine (colon) that sometimes causes pain and diarrhea (irritable bowel syndrome, IBS).  · Prevent overeating as part of a weight-loss plan.  · Prevent heart disease, type 2 diabetes, and certain cancers.  What is my plan?  The recommended daily fiber intake in grams (g) includes:  · 38 g for men age 50 or younger.  · 30 g for men over age 50.  · 25 g for women age 50 or younger.  · 21 g for women over age 50.  You can get the recommended daily  intake of dietary fiber by:  · Eating a variety of fruits, vegetables, grains, and beans.  · Taking a fiber supplement, if it is not possible to get enough fiber through your diet.  What do I need to know about a high-fiber diet?  · It is better to get fiber through food sources rather than from fiber supplements. There is not a lot of research about how effective supplements are.  · Always check the fiber content on the nutrition facts label of any prepackaged food. Look for foods that contain 5 g of fiber or more per serving.  · Talk with a diet and nutrition specialist (dietitian) if you have questions about specific foods that are recommended or not recommended for your medical condition, especially if those foods are not listed below.  · Gradually increase how much fiber you consume. If you increase your intake of dietary fiber too quickly, you may have bloating, cramping, or gas.  · Drink plenty of water. Water helps you to digest fiber.  What are tips for following this plan?  · Eat a wide variety of high-fiber foods.  · Make sure that half of the grains that you eat each day are whole grains.  · Eat breads and cereals that are made with whole-grain flour instead of refined flour or white flour.  · Eat brown rice, bulgur wheat, or millet instead of white rice.  · Start the day with a breakfast that is high in fiber, such as a cereal that contains 5 g of fiber or more per serving.  · Use beans in place of meat in soups, salads, and pasta dishes.  · Eat high-fiber snacks, such as berries, raw vegetables, nuts, and popcorn.  · Choose whole fruits and vegetables instead of processed forms like juice or sauce.  What foods can I eat?    Fruits  Berries. Pears. Apples. Oranges. Avocado. Prunes and raisins. Dried figs.  Vegetables  Sweet potatoes. Spinach. Kale. Artichokes. Cabbage. Broccoli. Cauliflower. Green peas. Carrots. Squash.  Grains  Whole-grain breads. Multigrain cereal. Oats and oatmeal. Brown rice.  Barley. Bulgur wheat. Millet. Quinoa. Bran muffins. Popcorn. Rye wafer crackers.  Meats and other proteins  Navy, kidney, and duran beans. Soybeans. Split peas. Lentils. Nuts and seeds.  Dairy  Fiber-fortified yogurt.  Beverages  Fiber-fortified soy milk. Fiber-fortified orange juice.  Other foods  Fiber bars.  The items listed above may not be a complete list of recommended foods and beverages. Contact a dietitian for more options.  What foods are not recommended?  Fruits  Fruit juice. Cooked, strained fruit.  Vegetables  Fried potatoes. Canned vegetables. Well-cooked vegetables.  Grains  White bread. Pasta made with refined flour. White rice.  Meats and other proteins  Fatty cuts of meat. Fried chicken or fried fish.  Dairy  Milk. Yogurt. Cream cheese. Sour cream.  Fats and oils  [place].  Beverages  Soft drinks.  Other foods  Cakes and pastries.  The items listed above may not be a complete list of foods and beverages to avoid. Contact a dietitian for more information.  Summary  · Fiber is a type of carbohydrate. It is found in fruits, vegetables, whole grains, and beans.  · There are many health benefits of eating a high-fiber diet, such as preventing constipation, lowering blood cholesterol, helping with weight loss, and reducing your risk of heart disease, diabetes, and certain cancers.  · Gradually increase your intake of fiber. Increasing too fast can result in cramping, bloating, and gas. Drink plenty of water while you increase your fiber.  · The best sources of fiber include whole fruits and vegetables, whole grains, nuts, seeds, and beans.  This information is not intended to replace advice given to you by your health care provider. Make sure you discuss any questions you have with your health care provider.  Document Released: 12/18/2006 Document Revised: 10/22/2018 Document Reviewed: 10/22/2018  [employer] Interactive Patient Education © 2019 [employer]

## 2019-12-09 ENCOUNTER — TELEPHONE (OUTPATIENT)
Dept: INTERNAL MEDICINE | Facility: CLINIC | Age: 49
End: 2019-12-09

## 2019-12-09 ENCOUNTER — APPOINTMENT (OUTPATIENT)
Dept: CT IMAGING | Facility: HOSPITAL | Age: 49
End: 2019-12-09

## 2019-12-09 ENCOUNTER — HOSPITAL ENCOUNTER (EMERGENCY)
Facility: HOSPITAL | Age: 49
Discharge: HOME | End: 2019-12-09
Attending: EMERGENCY MEDICINE | Admitting: EMERGENCY MEDICINE

## 2019-12-09 VITALS
RESPIRATION RATE: 18 BRPM | HEIGHT: 66 IN | OXYGEN SATURATION: 100 % | SYSTOLIC BLOOD PRESSURE: 143 MMHG | WEIGHT: 293 LBS | TEMPERATURE: 97.7 F | DIASTOLIC BLOOD PRESSURE: 85 MMHG | BODY MASS INDEX: 47.09 KG/M2 | HEART RATE: 80 BPM

## 2019-12-09 DIAGNOSIS — R11.10 VOMITING AND DIARRHEA: Primary | ICD-10-CM

## 2019-12-09 DIAGNOSIS — R55 VASOVAGAL SYNCOPE: ICD-10-CM

## 2019-12-09 DIAGNOSIS — R19.7 VOMITING AND DIARRHEA: Primary | ICD-10-CM

## 2019-12-09 LAB
ALBUMIN SERPL-MCNC: 3.9 G/DL (ref 3.5–5.2)
ALBUMIN/GLOB SERPL: 1.2 G/DL
ALP SERPL-CCNC: 52 U/L (ref 39–117)
ALT SERPL W P-5'-P-CCNC: 17 U/L (ref 1–33)
ANION GAP SERPL CALCULATED.3IONS-SCNC: 18.7 MMOL/L (ref 5–15)
AST SERPL-CCNC: 12 U/L (ref 1–32)
BASOPHILS # BLD AUTO: 0.07 10*3/MM3 (ref 0–0.2)
BASOPHILS NFR BLD AUTO: 0.4 % (ref 0–1.5)
BILIRUB SERPL-MCNC: 0.8 MG/DL (ref 0.2–1.2)
BUN BLD-MCNC: 7 MG/DL (ref 6–20)
BUN/CREAT SERPL: 9.7 (ref 7–25)
CALCIUM SPEC-SCNC: 10.1 MG/DL (ref 8.6–10.5)
CHLORIDE SERPL-SCNC: 99 MMOL/L (ref 98–107)
CO2 SERPL-SCNC: 19.3 MMOL/L (ref 22–29)
CREAT BLD-MCNC: 0.72 MG/DL (ref 0.57–1)
DEPRECATED RDW RBC AUTO: 40.9 FL (ref 37–54)
EOSINOPHIL # BLD AUTO: 0.11 10*3/MM3 (ref 0–0.4)
EOSINOPHIL NFR BLD AUTO: 0.7 % (ref 0.3–6.2)
ERYTHROCYTE [DISTWIDTH] IN BLOOD BY AUTOMATED COUNT: 13.9 % (ref 12.3–15.4)
GFR SERPL CREATININE-BSD FRML MDRD: 86 ML/MIN/1.73
GLOBULIN UR ELPH-MCNC: 3.2 GM/DL
GLUCOSE BLD-MCNC: 181 MG/DL (ref 65–99)
HCT VFR BLD AUTO: 42.6 % (ref 34–46.6)
HGB BLD-MCNC: 15 G/DL (ref 12–15.9)
IMM GRANULOCYTES # BLD AUTO: 0.13 10*3/MM3 (ref 0–0.05)
IMM GRANULOCYTES NFR BLD AUTO: 0.8 % (ref 0–0.5)
LIPASE SERPL-CCNC: 18 U/L (ref 13–60)
LYMPHOCYTES # BLD AUTO: 2.8 10*3/MM3 (ref 0.7–3.1)
LYMPHOCYTES NFR BLD AUTO: 16.7 % (ref 19.6–45.3)
MCH RBC QN AUTO: 29 PG (ref 26.6–33)
MCHC RBC AUTO-ENTMCNC: 35.2 G/DL (ref 31.5–35.7)
MCV RBC AUTO: 82.4 FL (ref 79–97)
MONOCYTES # BLD AUTO: 0.74 10*3/MM3 (ref 0.1–0.9)
MONOCYTES NFR BLD AUTO: 4.4 % (ref 5–12)
NEUTROPHILS # BLD AUTO: 12.95 10*3/MM3 (ref 1.7–7)
NEUTROPHILS NFR BLD AUTO: 77 % (ref 42.7–76)
NRBC BLD AUTO-RTO: 0 /100 WBC (ref 0–0.2)
PLATELET # BLD AUTO: 325 10*3/MM3 (ref 140–450)
PMV BLD AUTO: 9.4 FL (ref 6–12)
POTASSIUM BLD-SCNC: 3.9 MMOL/L (ref 3.5–5.2)
PROT SERPL-MCNC: 7.1 G/DL (ref 6–8.5)
RBC # BLD AUTO: 5.17 10*6/MM3 (ref 3.77–5.28)
SODIUM BLD-SCNC: 137 MMOL/L (ref 136–145)
TROPONIN T SERPL-MCNC: <0.01 NG/ML (ref 0–0.03)
WBC NRBC COR # BLD: 16.8 10*3/MM3 (ref 3.4–10.8)

## 2019-12-09 PROCEDURE — 80053 COMPREHEN METABOLIC PANEL: CPT | Performed by: EMERGENCY MEDICINE

## 2019-12-09 PROCEDURE — 74176 CT ABD & PELVIS W/O CONTRAST: CPT

## 2019-12-09 PROCEDURE — 70450 CT HEAD/BRAIN W/O DYE: CPT

## 2019-12-09 PROCEDURE — 83690 ASSAY OF LIPASE: CPT | Performed by: EMERGENCY MEDICINE

## 2019-12-09 PROCEDURE — 93005 ELECTROCARDIOGRAM TRACING: CPT | Performed by: EMERGENCY MEDICINE

## 2019-12-09 PROCEDURE — 96374 THER/PROPH/DIAG INJ IV PUSH: CPT

## 2019-12-09 PROCEDURE — 85025 COMPLETE CBC W/AUTO DIFF WBC: CPT | Performed by: EMERGENCY MEDICINE

## 2019-12-09 PROCEDURE — 84484 ASSAY OF TROPONIN QUANT: CPT | Performed by: EMERGENCY MEDICINE

## 2019-12-09 PROCEDURE — 25010000002 ONDANSETRON PER 1 MG: Performed by: EMERGENCY MEDICINE

## 2019-12-09 PROCEDURE — 93010 ELECTROCARDIOGRAM REPORT: CPT | Performed by: INTERNAL MEDICINE

## 2019-12-09 PROCEDURE — 99283 EMERGENCY DEPT VISIT LOW MDM: CPT

## 2019-12-09 RX ORDER — ONDANSETRON 2 MG/ML
4 INJECTION INTRAMUSCULAR; INTRAVENOUS ONCE
Status: COMPLETED | OUTPATIENT
Start: 2019-12-09 | End: 2019-12-09

## 2019-12-09 RX ORDER — ONDANSETRON 4 MG/1
4 TABLET, FILM COATED ORAL 4 TIMES DAILY PRN
Qty: 10 TABLET | Refills: 0 | Status: SHIPPED | OUTPATIENT
Start: 2019-12-09 | End: 2020-03-27

## 2019-12-09 RX ADMIN — ONDANSETRON 4 MG: 2 INJECTION INTRAMUSCULAR; INTRAVENOUS at 00:47

## 2019-12-09 RX ADMIN — SODIUM CHLORIDE 1000 ML: 9 INJECTION, SOLUTION INTRAVENOUS at 00:46

## 2019-12-09 NOTE — TELEPHONE ENCOUNTER
Pt seen in ER for vasovagal syncope with weakness, has had several episodes of nausea/vomiting today. No acute abnl noted on CT scan abd/pelvis and CT brain (small bowel inflammation noted on CT abd/pelvis). She is taking Zofran, will continue to monitor sx and f/u if sx persist/worsen.

## 2019-12-09 NOTE — ED PROVIDER NOTES
"EMERGENCY DEPARTMENT ENCOUNTER    Room Number:  29/29  PCP: Pinky Hyatt APRN  Historian: Patient  History Limited By: Nothing      HPI  Chief Complaint: N/VD  Context: Emilie Soto is a 49 y.o. female who presents to the ED c/o N/V/D associated with diffuse abd pain and \"clammy\" sensation an hour ago PTA which has been progressively worsening since onset. Pt reports that she \"passed out\" at the onset of her sxs for unknown time. Pt reports that she hit her head during the syncope episode. Pt denies fever, chills, CP, SOA, urinary sxs. Pt denies any aggravating or alleviating factors. Pt denies having similar sxs in the past. Pt reports that she recently had a colonoscopy which was negative acute. Pt reports PMHx of breast cancer. Family at bedside.    Location: GI  Radiation: N/A  Character: N/V/D  Duration: An hour PTA  Severity: Moderate  Progression: Worsening  Aggravating Factors: None  Alleviating Factors: None        MEDICAL RECORD REVIEW    Pt was here for colonoscopy on 12/05/19.        PAST MEDICAL HISTORY  Active Ambulatory Problems     Diagnosis Date Noted   • Anxiety    • Hypertension    • Vitamin D deficiency    • Hyperlipidemia    • Lumbago    • Malignant neoplasm of central portion of right breast in female, estrogen receptor positive (CMS/HCC) 05/14/2019   • Family history of breast cancer 05/14/2019   • Monoallelic mutation of CHEK2 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 05/27/2019   • Neoplastic malignant related fatigue 09/23/2019   • Screening for colon cancer 10/29/2019     Resolved Ambulatory Problems     Diagnosis Date Noted   • No Resolved Ambulatory Problems     Past Medical History:   Diagnosis Date   • Anxiety and depression    • Cancer (CMS/HCC)    • Cancer (CMS/HCC) 1993   • Depression 01/01/1988   • Headache    • History of kidney stones    • Irritable bowel syndrome    • Mood disorder (CMS/HCC)    • Obesity life   • PONV (postoperative nausea and vomiting)    • S/P radiation therapy 1993   • " Sleep apnea          PAST SURGICAL HISTORY  Past Surgical History:   Procedure Laterality Date   • APPENDECTOMY      removed when removing dermoid on right ovary   • BREAST BIOPSY Right 2019   • BREAST LUMPECTOMY Right    • BREAST LUMPECTOMY WITH SENTINEL NODE BIOPSY Right 5/28/2019    Procedure: Right central partial mastectomy (with removal of the nipple-areola-complex) and sentinel lymph node biopsy;  Surgeon: Trena Greene MD;  Location: [facility] MAIN OR;  Service: General   • CHOLECYSTECTOMY     • COLONOSCOPY N/A 12/5/2019    Procedure: COLONOSCOPY TO CECUM;  Surgeon: Pedrito Fulton Jr., MD;  Location: [facility] ENDOSCOPY;  Service: General   • DERMOID CYST  EXCISION  1989   • HERNIA REPAIR  2006    umbilical   • OVARY SURGERY      dermoid removed   • SALIVARY GLAND SURGERY  1993    due to cancer   • TONSILLECTOMY  01/01/1979    I was 9 years old         FAMILY HISTORY  Family History   Problem Relation Age of Onset   • Hypertension Mother    • Arthritis Mother    • Kidney cancer Mother    • Depression Mother         Not Dr azalia hogan go   • Heart disease Father    • Hernia Father    • Hypertension Father    • No Known Problems Brother    • Breast cancer Paternal Aunt    • Breast cancer Paternal Cousin    • Drug abuse Paternal Uncle         life long   • Drug abuse Paternal Uncle         life long   • Breast cancer Paternal Grandmother    • Osteoporosis Maternal Grandmother    • Malig Hyperthermia Neg Hx          SOCIAL HISTORY  Social History     Socioeconomic History   • Marital status:      Spouse name: Trena Montenegro   • Number of children: 0   • Years of education: Not on file   • Highest education level: Not on file   Occupational History     Employer: [employer]     Comment: 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    Tobacco Use   • Smoking status: Never Smoker   • Smokeless tobacco: Never Used   • Tobacco comment: Parents smoked.  I have never smoked  "  Substance and Sexual Activity   • Alcohol use: Yes     Frequency: Monthly or less     Drinks per session: 1 or 2     Comment: Only once or twice a year.  Mixed Drinks   • Drug use: No   • Sexual activity: Yes     Partners: Female     Birth control/protection: None         ALLERGIES  Citalopram; Flexeril [cyclobenzaprine]; Doxycycline; Keflex [cephalexin]; Sulfa antibiotics; and Vilazodone hcl        REVIEW OF SYSTEMS  Review of Systems   Constitutional: Negative for chills and fever.   HENT: Negative for sore throat.    Eyes: Negative.    Respiratory: Negative for cough and shortness of breath.    Cardiovascular: Negative for chest pain.   Gastrointestinal: Positive for abdominal pain (diffuse), diarrhea, nausea and vomiting.   Genitourinary: Negative for dysuria.   Musculoskeletal: Negative for neck pain.   Skin: Negative for rash.   Allergic/Immunologic: Negative.    Neurological: Positive for syncope (\"passed out\"). Negative for weakness, numbness and headaches.   Hematological: Negative.    Psychiatric/Behavioral: Negative.    All other systems reviewed and are negative.           PHYSICAL EXAM  ED Triage Vitals   Temp Heart Rate Resp BP SpO2   12/09/19 0010 12/09/19 0010 12/09/19 0010 12/09/19 0010 12/09/19 0010   97.7 °F (36.5 °C) 90 18 144/86 98 %      Temp src Heart Rate Source Patient Position BP Location FiO2 (%)   12/09/19 0010 12/09/19 0032 -- -- --   Tympanic Monitor          Physical Exam   Constitutional: She is oriented to person, place, and time. No distress.   HENT:   Head: Normocephalic and atraumatic.   Eyes: Pupils are equal, round, and reactive to light. EOM are normal.   Neck: Normal range of motion. Neck supple.   Cardiovascular: Normal rate, regular rhythm and normal heart sounds.   Pulmonary/Chest: Effort normal and breath sounds normal. No respiratory distress.   Abdominal: Soft. There is tenderness (mild diffuse abd tenderness). There is no rebound and no guarding.   Musculoskeletal: " Normal range of motion. She exhibits no edema.   Neurological: She is alert and oriented to person, place, and time. She has normal sensation and normal strength.   Skin: Skin is warm and dry. No rash noted.   Abrasion over right shoulder   Psychiatric: Mood and affect normal.   Nursing note and vitals reviewed.          LAB RESULTS  Recent Results (from the past 24 hour(s))   Comprehensive Metabolic Panel    Collection Time: 12/09/19 12:44 AM   Result Value Ref Range    Glucose 181 (H) 65 - 99 mg/dL    BUN 7 6 - 20 mg/dL    Creatinine 0.72 0.57 - 1.00 mg/dL    Sodium 137 136 - 145 mmol/L    Potassium 3.9 3.5 - 5.2 mmol/L    Chloride 99 98 - 107 mmol/L    CO2 19.3 (L) 22.0 - 29.0 mmol/L    Calcium 10.1 8.6 - 10.5 mg/dL    Total Protein 7.1 6.0 - 8.5 g/dL    Albumin 3.90 3.50 - 5.20 g/dL    ALT (SGPT) 17 1 - 33 U/L    AST (SGOT) 12 1 - 32 U/L    Alkaline Phosphatase 52 39 - 117 U/L    Total Bilirubin 0.8 0.2 - 1.2 mg/dL    eGFR Non African Amer 86 >60 mL/min/1.73    Globulin 3.2 gm/dL    A/G Ratio 1.2 g/dL    BUN/Creatinine Ratio 9.7 7.0 - 25.0    Anion Gap 18.7 (H) 5.0 - 15.0 mmol/L   Lipase    Collection Time: 12/09/19 12:44 AM   Result Value Ref Range    Lipase 18 13 - 60 U/L   Troponin    Collection Time: 12/09/19 12:44 AM   Result Value Ref Range    Troponin T <0.010 0.000 - 0.030 ng/mL   CBC Auto Differential    Collection Time: 12/09/19 12:44 AM   Result Value Ref Range    WBC 16.80 (H) 3.40 - 10.80 10*3/mm3    RBC 5.17 3.77 - 5.28 10*6/mm3    Hemoglobin 15.0 12.0 - 15.9 g/dL    Hematocrit 42.6 34.0 - 46.6 %    MCV 82.4 79.0 - 97.0 fL    MCH 29.0 26.6 - 33.0 pg    MCHC 35.2 31.5 - 35.7 g/dL    RDW 13.9 12.3 - 15.4 %    RDW-SD 40.9 37.0 - 54.0 fl    MPV 9.4 6.0 - 12.0 fL    Platelets 325 140 - 450 10*3/mm3    Neutrophil % 77.0 (H) 42.7 - 76.0 %    Lymphocyte % 16.7 (L) 19.6 - 45.3 %    Monocyte % 4.4 (L) 5.0 - 12.0 %    Eosinophil % 0.7 0.3 - 6.2 %    Basophil % 0.4 0.0 - 1.5 %    Immature Grans % 0.8 (H) 0.0 -  0.5 %    Neutrophils, Absolute 12.95 (H) 1.70 - 7.00 10*3/mm3    Lymphocytes, Absolute 2.80 0.70 - 3.10 10*3/mm3    Monocytes, Absolute 0.74 0.10 - 0.90 10*3/mm3    Eosinophils, Absolute 0.11 0.00 - 0.40 10*3/mm3    Basophils, Absolute 0.07 0.00 - 0.20 10*3/mm3    Immature Grans, Absolute 0.13 (H) 0.00 - 0.05 10*3/mm3    nRBC 0.0 0.0 - 0.2 /100 WBC       Ordered the above labs and reviewed the results.        RADIOLOGY  CT Abdomen Pelvis Without Contrast   Preliminary Result   1. Cluster of nondistended but abnormal appearing small bowel loops   centered to the left of midline favored to be infectious or inflammatory   in nature. No obstruction.   2. Minimal ascites, likely reactive to the small bowel inflammation.   3. Minimal colonic diverticulosis                                  CT Head Without Contrast   Preliminary Result   1. No acute intracranial abnormality.                                       Ordered the above noted radiological studies. Reviewed by me in PACS.  Spoke with Dr. Lobato (radiologist) regarding CT/MRI scan results.          PROCEDURES  Procedures      EKG:          EKG time: 0055  Rhythm/Rate: NSR 75 BPM  P waves and AL: Normal  QRS, axis: Normal  ST and T waves: Normal    Interpreted Contemporaneously by me, independently viewed  No prior EKG to compare       MEDICATIONS GIVEN IN ER  Medications   sodium chloride 0.9 % bolus 1,000 mL (1,000 mL Intravenous New Bag 12/9/19 0046)   ondansetron (ZOFRAN) injection 4 mg (4 mg Intravenous Given 12/9/19 0047)             PROGRESS AND CONSULTS  ED Course as of Dec 09 0144   Mon Dec 09, 2019   0131 1:32 AM  Patient here for vomiting, diarrhea and syncopal episode.  Appears to have viral gastroenteritis.  Ct shows no obstruction.  I think she then had a vasovagal episode.  Has prodrome and broke out into a cold sweat.  Feels better now.  Will discharge home.  Zofran.    [SL]      ED Course User Index  [SL] Amilcar Marquez MD     0125: Discussed pt  case with Dr. Lobato (radiology) who states pt's head CT is negative acute and her CT abd/pelvis show regional enteritis.     0130: Rechecked pt. Pt is resting comfortably. Pt reports that she is sxs free and feeling better. Notified pt of negative acute lab work and imaging studies results. Discussed the plan to discharge the pt home with prescriptions for Zofran. I instructed the pt to follow up with her PCP as needed for further evaluation and management. Pt understands and agrees with the plan, all questions answered.      MEDICAL DECISION MAKING      MDM  Number of Diagnoses or Management Options  Vasovagal syncope:   Vomiting and diarrhea:      Amount and/or Complexity of Data Reviewed  Clinical lab tests: reviewed and ordered (Creatinine 0.72, WBC 16.80, Troponin <0.010)  Tests in the radiology section of CPT®: reviewed and ordered (Head CT is negative acute and her CT abd/pelvis show regional enteritis.)  Tests in the medicine section of CPT®: reviewed and ordered (See EKG procedure note)  Decide to obtain previous medical records or to obtain history from someone other than the patient: yes  Review and summarize past medical records: yes (Pt was here for colonoscopy on 12/05/19.)  Discuss the patient with other providers: yes (Dr. Lobato (radiology))               DIAGNOSIS  Final diagnoses:   Vomiting and diarrhea   Vasovagal syncope           DISPOSITION  DISCHARGE    Patient discharged in stable condition.    Reviewed implications of results, diagnosis, meds, responsibility to follow up, warning signs and symptoms of possible worsening, potential complications and reasons to return to ER.    Patient/Family voiced understanding of above instructions.    Discussed plan for discharge, as there is no emergent indication for admission. Patient referred to primary care provider for BP management due to today's BP. Pt/family is agreeable and understands need for follow up and repeat testing.  Pt is aware that  discharge does not mean that nothing is wrong but it indicates no emergency is present that requires admission and they must continue care with follow-up as given below or physician of their choice.     FOLLOW-UP  Pinky Hyatt, APRN  [address]  [place] [address]  [phone]    Schedule an appointment as soon as possible for a visit            Medication List      New Prescriptions    ondansetron 4 MG tablet  Commonly known as:  ZOFRAN  Take 1 tablet by mouth 4 (Four) Times a Day As Needed for Nausea or   Vomiting.                  Latest Documented Vital Signs:  As of 1:44 AM  BP- 123/78 HR- 81 Temp- 97.7 °F (36.5 °C) (Tympanic) O2 sat- 100%        --  Documentation assistance provided by aparna Puente for Dr. Jimmy MD.  Information recorded by the scribe was done at my direction and has been verified and validated by me.           Ayanna Puente  12/09/19 0150       Amilcar Marquez MD  12/09/19 0153

## 2019-12-09 NOTE — ED TRIAGE NOTES
To ER via EMS.  Pt was sitting and started feeling nauseated.  Went to bathroom and vomited and started having diarrhea.  While sitting on toilet, pt had syncopal episode.  Pt denies injury from falling off toilet.  C/o nausea and cold sweat at this time.

## 2019-12-09 NOTE — ED NOTES
Pt to ER for nausea and vomiting. Pt states she had some diarrhea. Daughter states pt was in bathroom sitting on toilet and fell unto shower. Pt denies hitting her head but daughter states Pt was unconscious when she checked on her.  Pt not on blood thinners. A&O x4 at this time.  Pt with no appendix or gallbladder     Becky Tan RN  12/09/19 0036

## 2019-12-12 ENCOUNTER — OFFICE VISIT (OUTPATIENT)
Dept: INTERNAL MEDICINE | Facility: CLINIC | Age: 49
End: 2019-12-12

## 2019-12-12 VITALS
WEIGHT: 293 LBS | HEIGHT: 66 IN | SYSTOLIC BLOOD PRESSURE: 124 MMHG | TEMPERATURE: 99 F | OXYGEN SATURATION: 99 % | DIASTOLIC BLOOD PRESSURE: 80 MMHG | BODY MASS INDEX: 47.09 KG/M2 | HEART RATE: 81 BPM

## 2019-12-12 DIAGNOSIS — R05.3 PERSISTENT COUGH: Primary | ICD-10-CM

## 2019-12-12 DIAGNOSIS — J06.9 ACUTE URI: ICD-10-CM

## 2019-12-12 LAB — RSV AG SPEC QL: NEGATIVE

## 2019-12-12 PROCEDURE — 99213 OFFICE O/P EST LOW 20 MIN: CPT | Performed by: NURSE PRACTITIONER

## 2019-12-12 PROCEDURE — 87807 RSV ASSAY W/OPTIC: CPT | Performed by: NURSE PRACTITIONER

## 2019-12-12 RX ORDER — CETIRIZINE HYDROCHLORIDE 10 MG/1
10 TABLET ORAL DAILY
Qty: 10 TABLET
Start: 2019-12-12 | End: 2019-12-22

## 2019-12-12 NOTE — PROGRESS NOTES
Jaspal Soto is a 49 y.o. female who presents due to respiratory symptoms.    She presents due to c/o a recurrent cough which is mainly dry and occasionally productive of clear sputum. Denies dyspnea. No fever/chills. She does not have history of allergies.  She is here with her wife who has had similar symptoms as well as her mother-in-law who is living with them.  She reports feeling well, denies fever or chills.  She takes DayQuil and NyQuil as needed.    URI    This is a new problem. The current episode started 1 to 4 weeks ago. The problem has been waxing and waning. There has been no fever. Associated symptoms include congestion, coughing, ear pain and rhinorrhea. Pertinent negatives include no abdominal pain, chest pain, diarrhea, headaches, nausea, neck pain, sneezing, sore throat, vomiting or wheezing. She has tried antihistamine and decongestant for the symptoms. The treatment provided moderate relief.        The following portions of the patient's history were reviewed and updated as appropriate: allergies, current medications, past social history and problem list.    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   • Depression 01/01/1988   • Headache    • History of kidney stones    • Hyperlipidemia    • Hypertension    • Irritable bowel syndrome    • Lumbago    • Mood disorder (CMS/[facility])    • Obesity life   • PONV (postoperative nausea and vomiting)    • 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   • Sleep apnea     C-PAP IN USE   • Vitamin D deficiency          Current Outpatient Medications:   •  cholecalciferol (VITAMIN D3) 1000 units tablet, Take 2,000 Units by mouth Daily., Disp: , Rfl:   •  desvenlafaxine (PRISTIQ) 100 MG 24 hr tablet, Take 1 tablet by mouth Daily., Disp: 30 tablet, Rfl: 2  •  Efinaconazole (JUBLIA) 10 % solution, Apply 1 Units topically Daily.,  Disp: 8 mL, Rfl: 3  •  ibuprofen (ADVIL,MOTRIN) 800 MG tablet, Take 1 tablet by mouth Every 6 (Six) Hours As Needed for Mild Pain  or Moderate Pain ., Disp: 45 tablet, Rfl: 0  •  lisinopril-hydrochlorothiazide (PRINZIDE,ZESTORETIC) 20-25 MG per tablet, TAKE 1 TABLET BY MOUTH DAILY, Disp: 30 tablet, Rfl: 5  •  LORazepam (ATIVAN) 0.5 MG tablet, Take 1 tablet by mouth Every 8 (Eight) Hours As Needed for Anxiety. for anxiety, Disp: 45 tablet, Rfl: 0  •  methylphenidate (RITALIN) 10 MG tablet, Take 1 tablet by mouth 2 (Two) Times a Day., Disp: 60 tablet, Rfl: 0  •  methylphenidate LA (RITALIN LA) 20 MG 24 hr capsule, Take 1 capsule by mouth Daily, Disp: 30 capsule, Rfl: 0  •  ondansetron (ZOFRAN) 4 MG tablet, Take 1 tablet by mouth 4 (Four) Times a Day As Needed for Nausea or Vomiting., Disp: 10 tablet, Rfl: 0  •  tamoxifen (NOLVADEX) 20 MG chemo tablet, Take 1 tablet by mouth Daily., Disp: 90 tablet, Rfl: 2  •  vitamin B-12 (CYANOCOBALAMIN) 1000 MCG tablet, Take 1,000 mcg by mouth Daily., Disp: , Rfl:   •  vitamin D (ERGOCALCIFEROL) 59070 units capsule capsule, Take 1 capsule by mouth 1 (One) Time Per Week., Disp: 4 capsule, Rfl: 2  •  cetirizine (zyrTEC) 10 MG tablet, Take 1 tablet by mouth Daily for 10 days., Disp: 10 tablet, Rfl:     Allergies   Allergen Reactions   • Citalopram Rash   • Flexeril [Cyclobenzaprine] Other (See Comments)     MUSCLES TENSE-OPPOSITE OF WHAT IT IS INTENDED TO DO-ELEVATES B/P   • Doxycycline Rash     Blisters on hands   • Keflex [Cephalexin] Rash   • Sulfa Antibiotics Rash     NAUSEA/VOMITING/FEVER   • Vilazodone Hcl Hives, Itching and Rash       Review of Systems   Constitutional: Negative for appetite change, chills, fatigue and fever.   HENT: Positive for congestion, ear pain, postnasal drip, rhinorrhea and sinus pressure. Negative for ear discharge, facial swelling, sneezing, sore throat and tinnitus.    Respiratory: Positive for cough. Negative for chest tightness, shortness of breath  "and wheezing.    Cardiovascular: Negative for chest pain, palpitations and leg swelling.   Gastrointestinal: Negative for abdominal pain, diarrhea, nausea and vomiting.   Musculoskeletal: Negative for neck pain and neck stiffness.   Neurological: Negative for headaches.   Hematological: Negative for adenopathy.       Objective   Vitals:    12/12/19 0850   BP: 124/80   BP Location: Left arm   Patient Position: Sitting   Cuff Size: Adult   Pulse: 81   Temp: 99 °F (37.2 °C)   TempSrc: Oral   SpO2: 99%   Weight: 135 kg (297 lb)   Height: 167.6 cm (66\")     Body mass index is 47.94 kg/m².  Physical Exam   Constitutional: She appears well-developed and well-nourished. She is cooperative. She does not have a sickly appearance. She does not appear ill.   HENT:   Head: Normocephalic.   Right Ear: Hearing and external ear normal. No drainage, swelling or tenderness. Tympanic membrane is bulging. Tympanic membrane is not erythematous. No middle ear effusion. No decreased hearing is noted.   Left Ear: Hearing and external ear normal. No drainage, swelling or tenderness. Tympanic membrane is bulging. Tympanic membrane is not erythematous.  No middle ear effusion. No decreased hearing is noted.   Nose: Nose normal. No mucosal edema, rhinorrhea, sinus tenderness or nasal deformity. Right sinus exhibits no maxillary sinus tenderness and no frontal sinus tenderness. Left sinus exhibits no maxillary sinus tenderness and no frontal sinus tenderness.   Mouth/Throat: Mucous membranes are normal. Posterior oropharyngeal erythema present.   Eyes: Conjunctivae and lids are normal.   Neck: Trachea normal.   Cardiovascular: Regular rhythm, normal heart sounds and normal pulses.   No murmur heard.  Pulmonary/Chest: Breath sounds normal. No respiratory distress. She has no decreased breath sounds. She has no wheezes. She has no rhonchi. She has no rales.   Lymphadenopathy:     She has no cervical adenopathy.   Neurological: She is alert. "   Skin: Skin is warm, dry and intact.   Nursing note and vitals reviewed.      Assessment/Plan   Emilie was seen today for uri.    Diagnoses and all orders for this visit:    Persistent cough  -     RSV Screen - Swab, Nasopharynx; Future  -     RSV Screen - Swab, Nasopharynx    Acute URI  -     cetirizine (zyrTEC) 10 MG tablet; Take 1 tablet by mouth Daily for 10 days.    RSV swab is negative, will treat for viral illness and continue to monitor.  She will start Zyrtec 10 mg daily, it may take Mucinex if symptoms worsen.

## 2019-12-23 ENCOUNTER — TELEPHONE (OUTPATIENT)
Dept: INTERNAL MEDICINE | Facility: CLINIC | Age: 49
End: 2019-12-23

## 2019-12-23 DIAGNOSIS — M79.672 LEFT FOOT PAIN: Primary | ICD-10-CM

## 2019-12-23 RX ORDER — PREDNISONE 10 MG/1
TABLET ORAL
Qty: 20 TABLET | Refills: 0 | Status: SHIPPED | OUTPATIENT
Start: 2019-12-23 | End: 2020-01-03

## 2019-12-23 NOTE — TELEPHONE ENCOUNTER
----- Message from Trena Butterfield MA sent at 12/23/2019  1:20 PM EST -----  Regarding: FW: Non-Urgent Medical Question  Contact: [phone]  Please see message below thanks.      ----- Message -----  From: Emilie Soto  Sent: 12/23/2019   1:02 PM EST  To: Savana Hidalgo [facility]  Subject: Non-Urgent Medical Question                      Good afternoon Pinky,    The side of my foot is red and swollen at the joint and going into my big toe, 2nd and 3rd toe.  I almost sure its Gout.  I am taking the Ibuprofen 800s left over from November.  I am down to 13.  Any suggestions?  I can't hardly walk and the pain is excruciating.   It also throbs.  Walking on it is not the only thing that makes it hurt.  It is hurting non stop.  The ibuprofen lessens the pain (if I don't walk on it) for about 3 hours.  I can only take it every six hours.    Thanks for your help.  Gracia Mackey :-).    Thanks,  Emilie    Discussed with patient-she c/o swelling with erythema and warmth in left 5th toe. Will begin tapering dose of Prednisone and check uric acid level on Monday.

## 2019-12-26 ENCOUNTER — OFFICE VISIT (OUTPATIENT)
Dept: PSYCHIATRY | Facility: HOSPITAL | Age: 49
End: 2019-12-26

## 2019-12-26 DIAGNOSIS — R53.0 NEOPLASTIC MALIGNANT RELATED FATIGUE: ICD-10-CM

## 2019-12-26 DIAGNOSIS — F33.1 MODERATE EPISODE OF RECURRENT MAJOR DEPRESSIVE DISORDER (HCC): ICD-10-CM

## 2019-12-26 DIAGNOSIS — F43.10 POST TRAUMATIC STRESS DISORDER (PTSD): ICD-10-CM

## 2019-12-26 DIAGNOSIS — F90.2 ATTENTION DEFICIT HYPERACTIVITY DISORDER, COMBINED TYPE: Primary | ICD-10-CM

## 2019-12-26 DIAGNOSIS — F41.1 GENERALIZED ANXIETY DISORDER: ICD-10-CM

## 2019-12-26 PROCEDURE — 90853 GROUP PSYCHOTHERAPY: CPT | Performed by: PSYCHIATRY & NEUROLOGY

## 2019-12-26 PROCEDURE — 99211 OFF/OP EST MAY X REQ PHY/QHP: CPT | Performed by: PSYCHIATRY & NEUROLOGY

## 2019-12-26 RX ORDER — METHYLPHENIDATE HYDROCHLORIDE 10 MG/1
10 TABLET ORAL 2 TIMES DAILY
Qty: 60 TABLET | Refills: 0 | Status: SHIPPED | OUTPATIENT
Start: 2019-12-26 | End: 2020-06-11 | Stop reason: ALTCHOICE

## 2019-12-26 RX ORDER — METHYLPHENIDATE HYDROCHLORIDE 20 MG/1
20 CAPSULE, EXTENDED RELEASE ORAL DAILY
Qty: 30 CAPSULE | Refills: 0 | Status: SHIPPED | OUTPATIENT
Start: 2019-12-26 | End: 2020-02-10 | Stop reason: SDUPTHER

## 2019-12-26 NOTE — PROGRESS NOTES
Subjective  Patient ID: Emilie Soto is a 49 y.o.. female seen in regularly scheduled group session.    Group Therapy  Group topics explored including development of new normal, interpersonal sensitivity, continued effects of dz/tx, anticipitory anxiety and expectation of return to previous normal.    Counseling provided regarding importance of continued follow up  and balancing avoidance with approach through encouragement of one yes for every no.  Educated group on nonpharmacologic techniques for anxiety reduction including deep breathing, mindfulness meditation, rocio chi/ yoga/ physical exercise, and journaling. Discussed varied [facility] offerings including massage, reike, Taking Time to Talk and other support groups, Look Good Feel Better, Friend for Life, and opportunity for individualized nutritional consultation with Ronel Jimenez RD.    Medications   Shared stage I breast cancer, lumpectomy and lymph node dissection, followed by radiation.     Reports doing well with ritalin for fatigue management, spent holidays on own sent wife to be with her parents in [place] her father battling renal disease.    Medications reviewed and renewed as appropriate. Does well on low dose stimulant, able to function, thinks better and less fatigued         There are no diagnoses linked to this encounter.

## 2019-12-27 ENCOUNTER — OFFICE VISIT (OUTPATIENT)
Dept: INTERNAL MEDICINE | Facility: CLINIC | Age: 49
End: 2019-12-27

## 2019-12-27 VITALS
DIASTOLIC BLOOD PRESSURE: 94 MMHG | HEIGHT: 66 IN | SYSTOLIC BLOOD PRESSURE: 132 MMHG | BODY MASS INDEX: 46.45 KG/M2 | OXYGEN SATURATION: 98 % | WEIGHT: 289 LBS | HEART RATE: 112 BPM

## 2019-12-27 DIAGNOSIS — M79.672 LEFT FOOT PAIN: Primary | ICD-10-CM

## 2019-12-27 PROCEDURE — 99213 OFFICE O/P EST LOW 20 MIN: CPT | Performed by: NURSE PRACTITIONER

## 2019-12-29 NOTE — PROGRESS NOTES
Subjective   Emilie Soto is a 49 y.o. female who presents due to left foot pain and swelling.    She presents for f/u regarding left foot pain and swelling which began approximately 1 week ago. She reports a longstanding history of bilateral foot pain and stiffness but developed acute left foot pain with erythema and swelling in th 1st metatarsal last week. She was started on Prednisone for presumed gout flare (has not had in past) and reports pain is steadily improving but still worse with prolonged standing and/or sitting. Denies acute injury or trauma.       The following portions of the patient's history were reviewed and updated as appropriate: allergies, current medications, past social history and problem list.    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   • Sleep apnea     C-PAP IN USE   • Vitamin D deficiency          Current Outpatient Medications:   •  cholecalciferol (VITAMIN D3) 1000 units tablet, Take 2,000 Units by mouth Daily., Disp: , Rfl:   •  desvenlafaxine (PRISTIQ) 100 MG 24 hr tablet, Take 1 tablet by mouth Daily., Disp: 30 tablet, Rfl: 2  •  Efinaconazole (JUBLIA) 10 % solution, Apply 1 Units topically Daily., Disp: 8 mL, Rfl: 3  •  ibuprofen (ADVIL,MOTRIN) 800 MG tablet, Take 1 tablet by mouth Every 6 (Six) Hours As Needed for Mild Pain  or Moderate Pain ., Disp: 45 tablet, Rfl: 0  •  lisinopril-hydrochlorothiazide (PRINZIDE,ZESTORETIC) 20-25 MG per tablet, TAKE 1 TABLET BY MOUTH DAILY, Disp: 30 tablet, Rfl: 5  •  LORazepam (ATIVAN) 0.5 MG tablet, Take 1 tablet by mouth Every 8  (Eight) Hours As Needed for Anxiety. for anxiety, Disp: 45 tablet, Rfl: 0  •  methylphenidate (RITALIN) 10 MG tablet, Take 1 tablet by mouth 2 (Two) Times a Day., Disp: 60 tablet, Rfl: 0  •  methylphenidate LA (RITALIN LA) 20 MG 24 hr capsule, Take 1 capsule by mouth Daily, Disp: 30 capsule, Rfl: 0  •  ondansetron (ZOFRAN) 4 MG tablet, Take 1 tablet by mouth 4 (Four) Times a Day As Needed for Nausea or Vomiting., Disp: 10 tablet, Rfl: 0  •  predniSONE (DELTASONE) 10 MG tablet, 1 tab tid x3 days then 1 tab bid x3 days then 1 tab qd x5 days, Disp: 20 tablet, Rfl: 0  •  tamoxifen (NOLVADEX) 20 MG chemo tablet, Take 1 tablet by mouth Daily., Disp: 90 tablet, Rfl: 2  •  vitamin B-12 (CYANOCOBALAMIN) 1000 MCG tablet, Take 1,000 mcg by mouth Daily., Disp: , Rfl:   •  vitamin D (ERGOCALCIFEROL) 67991 units capsule capsule, Take 1 capsule by mouth 1 (One) Time Per Week., Disp: 4 capsule, Rfl: 2    Allergies   Allergen Reactions   • Citalopram Rash   • Flexeril [Cyclobenzaprine] Other (See Comments)     MUSCLES TENSE-OPPOSITE OF WHAT IT IS INTENDED TO DO-ELEVATES B/P   • Doxycycline Rash     Blisters on hands   • Keflex [Cephalexin] Rash   • Sulfa Antibiotics Rash     NAUSEA/VOMITING/FEVER   • Vilazodone Hcl Hives, Itching and Rash       Review of Systems   Constitutional: Negative for chills, fatigue, fever and unexpected weight change.        Hast lost lulu 8# due to decreased appetite, increased stress   HENT: Negative for congestion, ear pain, postnasal drip, sinus pressure, sore throat and trouble swallowing.    Eyes: Negative for visual disturbance.   Respiratory: Negative for cough, chest tightness and wheezing.    Cardiovascular: Negative for chest pain, palpitations and leg swelling.   Gastrointestinal: Negative for abdominal pain, blood in stool, nausea and vomiting.   Genitourinary: Negative for dysuria, frequency and urgency.   Musculoskeletal: Positive for arthralgias and joint swelling.   Skin: Positive for color  "change.   Neurological: Negative for syncope, weakness and headaches.   Hematological: Does not bruise/bleed easily.   Psychiatric/Behavioral: Positive for agitation. The patient is nervous/anxious.        Objective   Vitals:    12/27/19 1355   BP: 132/94   Pulse: 112   SpO2: 98%   Weight: 131 kg (289 lb)   Height: 167.6 cm (65.98\")     Body mass index is 46.67 kg/m².  Physical Exam   Constitutional: She appears well-developed and well-nourished. She is cooperative. She does not have a sickly appearance. She does not appear ill.   HENT:   Head: Normocephalic.   Right Ear: Hearing, tympanic membrane and external ear normal.   Left Ear: Hearing, tympanic membrane and external ear normal.   Nose: Nose normal. No mucosal edema, rhinorrhea, sinus tenderness or nasal deformity. Right sinus exhibits no maxillary sinus tenderness and no frontal sinus tenderness. Left sinus exhibits no maxillary sinus tenderness and no frontal sinus tenderness.   Mouth/Throat: Oropharynx is clear and moist and mucous membranes are normal. Normal dentition.   Eyes: Conjunctivae and lids are normal. Right eye exhibits no discharge and no exudate. Left eye exhibits no discharge and no exudate.   Neck: Trachea normal. Carotid bruit is not present. No edema present. No thyroid mass present.   Cardiovascular: Regular rhythm, normal heart sounds and normal pulses.   No murmur heard.  Pulmonary/Chest: Breath sounds normal. No respiratory distress. She has no decreased breath sounds. She has no wheezes. She has no rhonchi. She has no rales.   Musculoskeletal:        Left foot: There is tenderness. There is normal range of motion.        Lymphadenopathy:        Head (right side): No submental, no submandibular, no tonsillar, no preauricular, no posterior auricular and no occipital adenopathy present.        Head (left side): No submental, no submandibular, no tonsillar, no preauricular, no posterior auricular and no occipital adenopathy present. "   Neurological: She is alert.   Skin: Skin is warm, dry and intact. No cyanosis. Nails show no clubbing.       Assessment/Plan   Emilie was seen today for foot swelling.    Diagnoses and all orders for this visit:    Left foot pain    Sx suggestive of gout flare which is steadily improving, will complete tapering Prednisone and continue to monitor. Will obtain xray of foot (she has declined going to hospital radiology today for xray) and check uric acid at f/u appt in January. RTC if sx worsen.

## 2020-01-10 ENCOUNTER — TELEPHONE (OUTPATIENT)
Dept: INTERNAL MEDICINE | Facility: CLINIC | Age: 50
End: 2020-01-10

## 2020-01-10 DIAGNOSIS — M79.672 LEFT FOOT PAIN: Primary | ICD-10-CM

## 2020-01-10 RX ORDER — COLCHICINE 0.6 MG/1
TABLET ORAL
Qty: 30 TABLET | Refills: 0 | Status: SHIPPED | OUTPATIENT
Start: 2020-01-10 | End: 2020-12-16

## 2020-01-10 NOTE — TELEPHONE ENCOUNTER
----- Message from Trena Butterfield MA sent at 1/10/2020  1:45 PM EST -----  Regarding: FW: Visit Follow-Up Question  Contact: [phone]  Please see message below thanks.      ----- Message -----  From: Emilie Soto  Sent: 1/10/2020   1:38 PM EST  To: Savana Hidalgo [facility]  Subject: Visit Follow-Up Question                         Sg Vance (and Trena :-)),    My gout.  UGH.. Anyway -it was almost gone.  It was like I was just stepping on a bruise.  I had started the Tart Cherry Extract 1200 MG after I finished the Prednisone.  Also wearing my house shoe everywhere (work specifically) as you told me.  I stopped the Tart Cherry because I thought it was done.  Well - yesterday I woke up with it inflamed again (red and hot to touch) and hurting.  So I started the Tart Burr again.  But Trena insisted I contact you and ask if it is normal to happen again so fast and if there is anything we can do.  Because it is back to excruciating, sheets cant touch, tears.. and I am frustrated.  Also, nightly colin horses??  Thanks,  Emilie    Discussed acute gout flare, will start Colchicine and continue to monitor.

## 2020-01-13 ENCOUNTER — APPOINTMENT (OUTPATIENT)
Dept: LAB | Facility: HOSPITAL | Age: 50
End: 2020-01-13

## 2020-02-03 PROBLEM — E28.2 PCOS (POLYCYSTIC OVARIAN SYNDROME): Status: ACTIVE | Noted: 2020-02-03

## 2020-02-03 PROBLEM — E53.8 VITAMIN B12 DEFICIENCY: Status: ACTIVE | Noted: 2020-02-03

## 2020-02-03 NOTE — PROGRESS NOTES
Subjective     REASON FOR CONSULTATION:1. High grade, invasive ductal carcinoma of right breast, ER/WY+, Her2 negative; clinical T1bN0, anatomic stage IA, prognostic stage IA  · May 28, 2019 right lumpectomy with removal of nipple areolar complex and right sentinel lymph node biopsy  -Central 2:00, invasive ductal carcinoma, grade 2, Elvin score of 6, tumor size is 10 mm, no DCIS, one lymph node negative, good margins    2.  Oncotype DX score of 6, low risk, no chemo necessary    3.  June 28, 2019: Started tamoxifen    4.  Family history of breast cancer, CHEK2  positive    HISTORY OF PRESENT ILLNESS:     Patient is a 49-year-old female with history of T1b N0 stage Ia, ER W[place] positive HER-2 negative right breast cancer status post lumpectomy, with sentinel lymph nodes and Oncotype DX test was sent.  She has a score of 6.      She is taking Tamoxifen and is tolerating it well.  She states her periods have become more regular after starting the pill.  She denies any leg edema, joint pains, and hot flashes.     She had a colonoscopy done on 12/05/19 which showed some diverticulitis.  She will be due for her next colonoscopy in December 2029.    Patient's labs from 02/04/20 are normal.    Oncologic history:   The patient is a 49 y.o. year old female who is here for an opinion about the above issue.    Patient is a 49-year old female who presents today as a new patient for consultation on her newly diagnosed high grade, invasive ductal carcinoma of right breast.   Patient had seen Dr. Greene on May 14, 2019.    She underwent a screening mammogram but did not appreciate any mass herself and denied any breast pain or nipple discharge.    Patient is accompanied with her wife Trena.  They have been together for 19 years and  for the last 2 years.    April 18, 2019: Screening mammogram showed a 10 mm small oval mass in the subareolar region of the right breast.  Left breast was negative.  Spot compression and  ultrasound recommended.  Of the right breast.    April 29, 2019: Right diagnostic mammogram showed a high density round mass 10 mm with indistinct margins in the anterior one third subareolar region of the right breast located to centimeters from the nipple.    April 29, 2019: Ultrasound suggested a solid mass with indistinct margins 9 mm and subareolar region.  There is abnormal axillary node in the right axilla.  There are 2 adjacent lymph nodes in the right axilla with mildly prominent cortices.  The left axilla was evaluated for comparison and the nodes on the right are of greater cortical thickness.      Ultrasound-guided biopsy of the breast mass and right axillary lymph node was suggested.     5/9/19:  US guided biopsy sent to [facility]  showing solid mass in the sub-areorolar region of right breast revealing invasive high-grade mammary carcinoma, grade 3. Tubule score 3, nuclear grade 3, and mitotic score 2 for a total score of 8. Neoplasm is present in 4/8 biopsies and measures 4 mm in greatest dimension.   Biopsy performed on 05/09/2019 of prominent lymph node of right axilla was benign- negative for metastatic carcinoma.  ER: 82.35%,  positive  OR: 92.68%, positive  HER-2/latoya: 1+ negative  Ki-67 14.81%       05/09/2019 MRI of bilateral breast:   IMPRESSION:  1. Biopsy-proven malignancy in the right breast in the anterior  one-third approximately 1.8 cm from the nipple at the 2:30 position with  an internal metallic clip. The mass measures on the order of 1 cm in  greatest dimension. No other suspicious findings are seen within the  right breast and there is no evidence for right axillary or internal  mammary chain adenopathy. The patient appears to be a candidate for  breast conservation therapy.  2. There are no findings suspicious for malignancy in the left breast.  BI-RADS Category 6: Known biopsy-proven malignancy.    5/14/19: Patient was seen by Dr. Greene who felt that she was a good candidate for  lumpectomy given the size of the lesion related to her breast size.  Because of superficial subareolar location she would require removal of the nipple areolar complex in order to make sure she has negative margins.  Patient preferred breast conservation with central lumpectomy.  This included the removal of the nipple areolar complex.      Also obtained INVITAE  genetic test given her family history of malignancy.    Estrogen receptor: positive (82.35% moderate),   Progesterone receptor:  positive (92.68%, strong)   HER2/Amy: negative (IHC score1+) and Ki-67=14.8%  Pathologic Stage: pT1b, (sn)N0 (G2).    Synoptic Report :  TUMOR  Tumor Site: Invasive Carcinoma Central  Clock Position of Tumor Site 2 o'clock  3 o'clock  Histologic Type Invasive carcinoma of no special type (ductal, not otherwise  specified)  Glandular (Acinar) / Tubular Differentiation Score 2  Nuclear Pleomorphism Score 2  Mitotic Rate Score 2 (4-7 mitoses per mm2)  Number of Mitoses per 10 High-Power Fields 15 mitotic figures  Diameter of Microscope Field in Millimeters (mm) 0.55 Millimeters (mm)  Overall Grade Grade 2 (scores of 6 or 7)  Tumor Size Greatest dimension of largest invasive focus in Millimeters (mm): 10  Millimeters (mm)    MARGINS  Invasive Carcinoma Margins Uninvolved by invasive carcinoma    LYMPH NODES  Regional Lymph Nodes Uninvolved by tumor cells  Number of Lymph Nodes Examined 1  Number of Rheems Nodes Examined 1    Oncotype DX testing ordered but pending.    Patient has a family history of paternal grandmother with breast cancer, paternal aunt with breast cancer, cousin on father's side dying of breast cancer at age 43.  History of ovarian or prostate cancer or uterine cancer.  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     Patient is now scheduled for counseling with genetic and also with radiation oncology next week.      PAST  MEDICAL HISTORY:  1. Hypertension- Under control with medications  2. Pre-diabetes- Diet control  3. Obesity  4. PCOS   5. Anxiety- Worse since cancer diagnosis. Taking Gabapentin since yesterday which helped with her symptoms. Followed by [facility].   6. No hx of CVA or CAD.     FAMILY HISTORY:  Paternal cousin with breast cancer diagnosed at 43 and . Paternal aunt with breast cancer s/p mastectomy; paternal grandmother with breast cancer s/p lumpectomy.   No other family history of ovarian cancer or prostrate cancer  Mother had kidney cancer s/p surgery.     OB-GYN:  Menarche- 9 years old  Menopause- n/a  Pregnancies- none  Post menopausal HRT- N/A  Hx of birth control pills- None    SOCIAL HISTORY:  She works at [employer].  She is  to her wife for the last 2 years but lived with her for 19.  Years.  Patient's wife is a schoolteacher.  Patient does not smoke.  She does not drink.  Allergies reviewed with the patient today.      Past Medical History:   Diagnosis Date   • Anxiety    • Anxiety and depression    • Cancer (CMS/HCC)     RIGHT BREAST   • Cancer (CMS/HCC)     PAROTID GLAND   • Depression 1988   • Headache    • History of kidney stones    • Hyperlipidemia    • Hypertension    • Irritable bowel syndrome    • Lumbago    • Mood disorder (CMS/HCC)    • Obesity life   • PONV (postoperative nausea and vomiting)    • S/P radiation therapy     PAROTID GLAND CANCER   • Sleep apnea     C-PAP IN USE   • Vitamin D deficiency         Past Surgical History:   Procedure Laterality Date   • APPENDECTOMY      removed when removing dermoid on right ovary   • BREAST BIOPSY Right    • BREAST LUMPECTOMY Right    • BREAST LUMPECTOMY WITH SENTINEL NODE BIOPSY Right 2019    Procedure: Right central partial mastectomy (with removal of the nipple-areola-complex) and sentinel lymph node biopsy;  Surgeon: Trena Greene MD;  Location: [facility];  Service: General   • CHOLECYSTECTOMY     •  COLONOSCOPY N/A 12/5/2019    Procedure: COLONOSCOPY TO CECUM;  Surgeon: Pedrito Fulton Jr., MD;  Location: [facility] ENDOSCOPY;  Service: General   • DERMOID CYST  EXCISION  1989   • HERNIA REPAIR  2006    umbilical   • OVARY SURGERY      dermoid removed   • SALIVARY GLAND SURGERY  1993    due to cancer   • TONSILLECTOMY  01/01/1979    I was 9 years old        Current Outpatient Medications on File Prior to Visit   Medication Sig Dispense Refill   • cholecalciferol (VITAMIN D3) 1000 units tablet Take 2,000 Units by mouth Daily.     • colchicine 0.6 MG tablet 2 tablets at first sign of gout flare, then 1 tablet 1 hour later, max 3 tablets over 1 hour period. May begin 1 tablet afterward. 30 tablet 0   • desvenlafaxine (PRISTIQ) 100 MG 24 hr tablet Take 1 tablet by mouth Daily. 30 tablet 2   • ibuprofen (ADVIL,MOTRIN) 800 MG tablet Take 1 tablet by mouth Every 6 (Six) Hours As Needed for Mild Pain  or Moderate Pain . 45 tablet 0   • lisinopril-hydrochlorothiazide (PRINZIDE,ZESTORETIC) 20-25 MG per tablet TAKE 1 TABLET BY MOUTH DAILY 30 tablet 5   • LORazepam (ATIVAN) 0.5 MG tablet Take 1 tablet by mouth Every 8 (Eight) Hours As Needed for Anxiety. for anxiety 45 tablet 0   • methylphenidate (RITALIN) 10 MG tablet Take 1 tablet by mouth 2 (Two) Times a Day. 60 tablet 0   • methylphenidate LA (RITALIN LA) 20 MG 24 hr capsule Take 1 capsule by mouth Daily 30 capsule 0   • ondansetron (ZOFRAN) 4 MG tablet Take 1 tablet by mouth 4 (Four) Times a Day As Needed for Nausea or Vomiting. 10 tablet 0   • tamoxifen (NOLVADEX) 20 MG chemo tablet Take 1 tablet by mouth Daily. 90 tablet 2   • vitamin B-12 (CYANOCOBALAMIN) 1000 MCG tablet Take 1,000 mcg by mouth Daily.     • Efinaconazole (JUBLIA) 10 % solution Apply 1 Units topically Daily. 8 mL 3   • vitamin D (ERGOCALCIFEROL) 99262 units capsule capsule Take 1 capsule by mouth 1 (One) Time Per Week. 4 capsule 2     No current facility-administered medications on file prior to visit.          ALLERGIES:    Allergies   Allergen Reactions   • Citalopram Rash   • Flexeril [Cyclobenzaprine] Unknown - High Severity     MUSCLES TENSE-OPPOSITE OF WHAT IT IS INTENDED TO DO-ELEVATES B/P   • Doxycycline Rash     Blisters on hands   • Keflex [Cephalexin] Rash   • Sulfa Antibiotics Rash     NAUSEA/VOMITING/FEVER   • Vilazodone Hcl Hives, Itching and Rash        Social History     Socioeconomic History   • Marital status:      Spouse name: Trena Montenegro   • Number of children: 0   • Years of education: Not on file   • Highest education level: Not on file   Occupational History     Employer: [employer]     Comment: 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    Tobacco Use   • Smoking status: Never Smoker   • Smokeless tobacco: Never Used   • Tobacco comment: Parents smoked.  I have never smoked   Substance and Sexual Activity   • Alcohol use: Yes     Frequency: Monthly or less     Drinks per session: 1 or 2     Comment: Only once or twice a year.  Mixed Drinks   • Drug use: No   • Sexual activity: Yes     Partners: Female     Birth control/protection: None        Family History   Problem Relation Age of Onset   • Hypertension Mother    • Arthritis Mother    • Kidney cancer Mother    • Depression Mother         Not Dr azalia hogan go   • Heart disease Father    • Hernia Father    • Hypertension Father    • No Known Problems Brother    • Breast cancer Paternal Aunt    • Breast cancer Paternal Cousin    • Drug abuse Paternal Uncle         life long   • Drug abuse Paternal Uncle         life long   • Breast cancer Paternal Grandmother    • Osteoporosis Maternal Grandmother    • Malig Hyperthermia Neg Hx         Review of Systems   Constitutional: Negative for appetite change, chills, diaphoresis, fatigue, fever and unexpected weight change.   HENT: Negative for hearing loss, sore throat and trouble swallowing.    Respiratory: Negative for cough, chest tightness, shortness of  "breath and wheezing.    Cardiovascular: Negative for chest pain, palpitations and leg swelling.   Gastrointestinal: Negative for abdominal distention, abdominal pain, constipation, diarrhea, nausea and vomiting.   Genitourinary: Negative for dysuria, frequency, hematuria and urgency.   Musculoskeletal: Negative for joint swelling.        No muscle weakness.   Skin: Negative for rash and wound.   Neurological: Negative for seizures, syncope, speech difficulty, weakness, numbness and headaches.   Hematological: Negative for adenopathy. Does not bruise/bleed easily.   Psychiatric/Behavioral: Negative for behavioral problems, confusion and suicidal ideas.        Objective     Vitals:    02/04/20 0948   BP: 130/87   Pulse: 80   Resp: 16   Temp: 98.9 °F (37.2 °C)   TempSrc: Oral   SpO2: 99%   Weight: 131 kg (289 lb 9.6 oz)   Height: 167.6 cm (65.98\")   PainSc: 0-No pain      Current Status 2/4/2020   ECOG score 0       PHYSICAL EXAMINATION:  GENERAL:  Well-developed, well-nourished in no acute distress.   SKIN:  Warm, dry without rashes, purpura or petechiae.  NECK:  Supple with good range of motion; no thyromegaly or masses, no JVD.  LYMPHATICS:  No cervical, supraclavicular, axillary or inguinal adenopathy.  CHEST:  Lungs clear to auscultation. Good airflow.  BREAST: Right breast: S/p right lumpectomy. No skin changes, no evidence of breast mass, no nipple discharge, no evidence of any right axillary adenopathy or right supraclavicular adenopathy  Left breast: No evidence of any skin changes, no evidence of any left breast mass and no evidence of left nipple discharge as well as no left axillary adenopathy or left supraclavicular adenopathy.  CARDIAC:  Regular rate and rhythm without murmurs, rubs or gallops. Normal S1,S2.  ABDOMEN:  Soft, nontender with no hepatosplenomegaly or masses.  EXTREMITIES:  No clubbing, cyanosis or edema.  NEUROLOGICAL:  Cranial Nerves II-XII grossly intact.  No focal neurological " deficits.  PSYCHIATRIC:  Normal affect and mood.        RECENT LABS:  Hematology WBC   Date Value Ref Range Status   02/04/2020 6.81 3.40 - 10.80 10*3/mm3 Final     RBC   Date Value Ref Range Status   02/04/2020 4.48 3.77 - 5.28 10*6/mm3 Final     Hemoglobin   Date Value Ref Range Status   02/04/2020 12.7 12.0 - 15.9 g/dL Final     Hematocrit   Date Value Ref Range Status   02/04/2020 39.4 34.0 - 46.6 % Final     Platelets   Date Value Ref Range Status   02/04/2020 245 140 - 450 10*3/mm3 Final          Assessment/Plan      1. High grade, invasive ductal carcinoma of right breast, ER/NH+, Her2 negative; clinical T1bN0, anatomic stage IA, prognostic stage IA:   · May 6, 2019 ultrasound-guided biopsy of the right breast mass,  - grade 3, invasive mammary carcinoma, Elvin score of 8, biopsy of right axillary lymph node negative  ER 82.35%, NH 92.68%, HER-2/latoya 1+ , Ki-67 14.8%    · May 28, 2019 right lumpectomy with removal of nipple areolar complex and right sentinel lymph node biopsy  -Central 2:00, invasive ductal carcinoma, grade 2, Elvin score of 6, tumor size is 10 mm, no DCIS, one lymph node negative, good margins  · Oncotype DX score of 6, low risk disease, been not give chemotherapy    · I discussed the pathology findings with the patient today including ER/NH+, Her2 negative status of her cancer.  I discussed about consideration of chemotherapy if Oncotype DX testing falls in a high risk category.  Patient is not  keen on receiving chemotherapy.  I further discussed in length with patient on the 3 different endocrine therapies tamoxifen/aromatase inhibitors/Evista. Discussed Tamoxifen is for pre-menopausal women and other two are for post-menopausal women. Since she is pre-menopausal, Tamoxifen will be her best and first option. I have detailed the side effects of tamoxifen including depression, hot flashes, rare chance for uterine cancer, weight gain, blood clots, DVT, PE, hair thinning, rare chance  for thrombocytopenia, cataracts.  Aromatase inhibitors can cause arthralgias, hot flashes, decrease in bone density, rare chance for diarrhea, also discussed about hot flashes with it.  Evista is approved for osteopenia and can also be used for prophylaxis with fewer side effects except hot flashes and rare chance for cataracts but it can improve bone density.     · We will plan on starting tamoxifen given premenopausal for 2 to 3 years followed by Arimidex once postmenopausal.    2. Family history of cancer: Paternal cousin with breast cancer diagnosed at 43 and . Paternal aunt with breast cancer s/p mastectomy; paternal grandmother with breast cancer s/p lumpectomy.   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  ·  She has an appointment with  in early July.   · We will plan on alternating MRI of breast with mammogram alternating.  · Discussed in breast cancer conference today and Dr. Greene and we agreed that best option is to make an MRI of breast with mammogram every 6 months.    3. Mild leukocytosis: CBC done today showed mild elevation of WBC at 11.29. Will monitor.     4. Vitamin B-12 deficiency: B12 level 270, advised patient to take over the counter B-12 thousand micrograms orally daily.     5. PCOS, Complicated with obesity: Patient attempted to undergo bariatric surgery in order to reduce weight and further help with her PCOS. This was stalled due to her recent breast cancer diagnosis. In past patient has been able to lose 85 lbs with diet modifications.   · Advised patient to exercise 5 times per week with 20 minutes of aerobics, total of 40 minutes/day .   · Suggested Live strong program and patient to visit the cancer resource center.  · Referral to Nutritionist for further recommendations.   · Referred patient to OB/GYN today for further evaluation.     6.  Anxiety: Patient has significant anxiety, has  been referred to [facility].  In the past she has tried Zoloft Wellbutrin and Seroquel and did not have good response to any of these medications.  · Patient is being monitored by Alexa Trinh.    PLAN:  1. Continue tamoxifen 20 mg daily.  Refilled today.  Tolerating very well  2. Advised patient to exercise, will see if she is eligible for the live strong program and she will follow-up with the cancer resource center.  Referred patient to  survivorship clinic  3. Given that she has CHEK 2  mutation, she could likely alternate mammogram with MRI of the breast for surveillance every 6 months with mammogram, recent MRI in May 2019.    4. She has a follow-up with Dr. Trena Greene on February 19, 2020 with a Diagnostic Mammogram scheduled on the same day.  5. Follow up with [facility] in 6 months with labs.    I, Yessenia Morales, acted as scribe for Tia Trevizo MD  in documenting the service or procedure. To the best of my knowledge, I recorded what was dictated by MD Tia Gonzalez MD  02/04/20       Cc: Dr. Trena Hyatt, VICENTE

## 2020-02-04 ENCOUNTER — LAB (OUTPATIENT)
Dept: LAB | Facility: HOSPITAL | Age: 50
End: 2020-02-04

## 2020-02-04 ENCOUNTER — OFFICE VISIT (OUTPATIENT)
Dept: ONCOLOGY | Facility: CLINIC | Age: 50
End: 2020-02-04

## 2020-02-04 VITALS
SYSTOLIC BLOOD PRESSURE: 130 MMHG | DIASTOLIC BLOOD PRESSURE: 87 MMHG | WEIGHT: 289.6 LBS | BODY MASS INDEX: 46.54 KG/M2 | HEIGHT: 66 IN | OXYGEN SATURATION: 99 % | HEART RATE: 80 BPM | RESPIRATION RATE: 16 BRPM | TEMPERATURE: 98.9 F

## 2020-02-04 DIAGNOSIS — Z17.0 MALIGNANT NEOPLASM OF CENTRAL PORTION OF RIGHT BREAST IN FEMALE, ESTROGEN RECEPTOR POSITIVE (HCC): ICD-10-CM

## 2020-02-04 DIAGNOSIS — E28.2 PCOS (POLYCYSTIC OVARIAN SYNDROME): ICD-10-CM

## 2020-02-04 DIAGNOSIS — F41.9 ANXIETY: ICD-10-CM

## 2020-02-04 DIAGNOSIS — C50.111 MALIGNANT NEOPLASM OF CENTRAL PORTION OF RIGHT BREAST IN FEMALE, ESTROGEN RECEPTOR POSITIVE (HCC): ICD-10-CM

## 2020-02-04 DIAGNOSIS — E53.8 VITAMIN B12 DEFICIENCY: ICD-10-CM

## 2020-02-04 DIAGNOSIS — Z17.0 MALIGNANT NEOPLASM OF CENTRAL PORTION OF RIGHT BREAST IN FEMALE, ESTROGEN RECEPTOR POSITIVE (HCC): Primary | ICD-10-CM

## 2020-02-04 DIAGNOSIS — Z80.3 FAMILY HISTORY OF BREAST CANCER: ICD-10-CM

## 2020-02-04 DIAGNOSIS — C50.111 MALIGNANT NEOPLASM OF CENTRAL PORTION OF RIGHT BREAST IN FEMALE, ESTROGEN RECEPTOR POSITIVE (HCC): Primary | ICD-10-CM

## 2020-02-04 LAB
ALBUMIN SERPL-MCNC: 4 G/DL (ref 3.5–5.2)
ALBUMIN/GLOB SERPL: 1.5 G/DL (ref 1.1–2.4)
ALP SERPL-CCNC: 63 U/L (ref 38–116)
ALT SERPL W P-5'-P-CCNC: 25 U/L (ref 0–33)
ANION GAP SERPL CALCULATED.3IONS-SCNC: 11.4 MMOL/L (ref 5–15)
AST SERPL-CCNC: 17 U/L (ref 0–32)
BASOPHILS # BLD AUTO: 0.06 10*3/MM3 (ref 0–0.2)
BASOPHILS NFR BLD AUTO: 0.9 % (ref 0–1.5)
BILIRUB SERPL-MCNC: 0.5 MG/DL (ref 0.2–1.2)
BUN BLD-MCNC: 9 MG/DL (ref 6–20)
BUN/CREAT SERPL: 12.3 (ref 7.3–30)
CALCIUM SPEC-SCNC: 9.4 MG/DL (ref 8.5–10.2)
CHLORIDE SERPL-SCNC: 100 MMOL/L (ref 98–107)
CO2 SERPL-SCNC: 27.6 MMOL/L (ref 22–29)
CREAT BLD-MCNC: 0.73 MG/DL (ref 0.6–1.1)
DEPRECATED RDW RBC AUTO: 41.5 FL (ref 37–54)
EOSINOPHIL # BLD AUTO: 0.15 10*3/MM3 (ref 0–0.4)
EOSINOPHIL NFR BLD AUTO: 2.2 % (ref 0.3–6.2)
ERYTHROCYTE [DISTWIDTH] IN BLOOD BY AUTOMATED COUNT: 13 % (ref 12.3–15.4)
GFR SERPL CREATININE-BSD FRML MDRD: 85 ML/MIN/1.73
GLOBULIN UR ELPH-MCNC: 2.6 GM/DL (ref 1.8–3.5)
GLUCOSE BLD-MCNC: 128 MG/DL (ref 74–124)
HCT VFR BLD AUTO: 39.4 % (ref 34–46.6)
HGB BLD-MCNC: 12.7 G/DL (ref 12–15.9)
IMM GRANULOCYTES # BLD AUTO: 0.02 10*3/MM3 (ref 0–0.05)
IMM GRANULOCYTES NFR BLD AUTO: 0.3 % (ref 0–0.5)
LYMPHOCYTES # BLD AUTO: 1.89 10*3/MM3 (ref 0.7–3.1)
LYMPHOCYTES NFR BLD AUTO: 27.8 % (ref 19.6–45.3)
MCH RBC QN AUTO: 28.3 PG (ref 26.6–33)
MCHC RBC AUTO-ENTMCNC: 32.2 G/DL (ref 31.5–35.7)
MCV RBC AUTO: 87.9 FL (ref 79–97)
MONOCYTES # BLD AUTO: 0.42 10*3/MM3 (ref 0.1–0.9)
MONOCYTES NFR BLD AUTO: 6.2 % (ref 5–12)
NEUTROPHILS # BLD AUTO: 4.27 10*3/MM3 (ref 1.7–7)
NEUTROPHILS NFR BLD AUTO: 62.6 % (ref 42.7–76)
NRBC BLD AUTO-RTO: 0 /100 WBC (ref 0–0.2)
PLATELET # BLD AUTO: 245 10*3/MM3 (ref 140–450)
PMV BLD AUTO: 9.4 FL (ref 6–12)
POTASSIUM BLD-SCNC: 3.8 MMOL/L (ref 3.5–4.7)
PROT SERPL-MCNC: 6.6 G/DL (ref 6.3–8)
RBC # BLD AUTO: 4.48 10*6/MM3 (ref 3.77–5.28)
SODIUM BLD-SCNC: 139 MMOL/L (ref 134–145)
WBC NRBC COR # BLD: 6.81 10*3/MM3 (ref 3.4–10.8)

## 2020-02-04 PROCEDURE — 85025 COMPLETE CBC W/AUTO DIFF WBC: CPT

## 2020-02-04 PROCEDURE — 80053 COMPREHEN METABOLIC PANEL: CPT

## 2020-02-04 PROCEDURE — 36415 COLL VENOUS BLD VENIPUNCTURE: CPT

## 2020-02-04 PROCEDURE — 99214 OFFICE O/P EST MOD 30 MIN: CPT | Performed by: INTERNAL MEDICINE

## 2020-02-04 RX ORDER — TAMOXIFEN CITRATE 20 MG/1
20 TABLET ORAL DAILY
Qty: 30 TABLET | Refills: 5 | Status: SHIPPED | OUTPATIENT
Start: 2020-02-04 | End: 2020-11-06 | Stop reason: ALTCHOICE

## 2020-02-04 RX ORDER — TAMOXIFEN CITRATE 20 MG/1
20 TABLET ORAL DAILY
Qty: 90 TABLET | Refills: 2 | Status: SHIPPED | OUTPATIENT
Start: 2020-02-04 | End: 2020-02-04 | Stop reason: SDUPTHER

## 2020-02-10 ENCOUNTER — OFFICE VISIT (OUTPATIENT)
Dept: PSYCHIATRY | Facility: HOSPITAL | Age: 50
End: 2020-02-10

## 2020-02-10 DIAGNOSIS — F41.9 ANXIETY: ICD-10-CM

## 2020-02-10 DIAGNOSIS — F33.1 MODERATE EPISODE OF RECURRENT MAJOR DEPRESSIVE DISORDER (HCC): Primary | ICD-10-CM

## 2020-02-10 PROCEDURE — 90853 GROUP PSYCHOTHERAPY: CPT | Performed by: NURSE PRACTITIONER

## 2020-02-10 PROCEDURE — 99212 OFFICE O/P EST SF 10 MIN: CPT | Performed by: NURSE PRACTITIONER

## 2020-02-10 RX ORDER — PRAZOSIN HYDROCHLORIDE 1 MG/1
3 CAPSULE ORAL NIGHTLY
Qty: 90 CAPSULE | Refills: 1 | Status: SHIPPED | OUTPATIENT
Start: 2020-02-10 | End: 2020-03-27

## 2020-02-10 RX ORDER — DESVENLAFAXINE 100 MG/1
100 TABLET, EXTENDED RELEASE ORAL DAILY
Qty: 30 TABLET | Refills: 2 | Status: SHIPPED | OUTPATIENT
Start: 2020-02-10 | End: 2020-05-07 | Stop reason: SDUPTHER

## 2020-02-10 RX ORDER — METHYLPHENIDATE HYDROCHLORIDE 20 MG/1
20 CAPSULE, EXTENDED RELEASE ORAL DAILY
Qty: 30 CAPSULE | Refills: 0 | Status: SHIPPED | OUTPATIENT
Start: 2020-02-10 | End: 2020-02-10 | Stop reason: SDUPTHER

## 2020-02-10 RX ORDER — METHYLPHENIDATE HYDROCHLORIDE 20 MG/1
20 CAPSULE, EXTENDED RELEASE ORAL DAILY
Qty: 30 CAPSULE | Refills: 0 | Status: SHIPPED | OUTPATIENT
Start: 2020-02-10 | End: 2020-03-09 | Stop reason: SDUPTHER

## 2020-02-10 RX ORDER — LORAZEPAM 0.5 MG/1
0.5 TABLET ORAL EVERY 8 HOURS PRN
Qty: 45 TABLET | Refills: 0 | Status: SHIPPED | OUTPATIENT
Start: 2020-02-10 | End: 2020-03-09 | Stop reason: SDUPTHER

## 2020-02-10 NOTE — PROGRESS NOTES
Subjective  Patient ID: Emilie Soto is a 49 y.o.. female seen in regularly scheduled group session.     Group Therapy  Group topics explored including new normal development and acceptance, management of acute stress, recognition of personal impact on those they care about, discussion of intimacy and sexual dysfunction, exploration of feelings of uncertainty, feeling less than, early and late effects of treatment, and success in survivorship.    Counseling provided regarding pharmacological and nonpharmacological interventions for mood, anxiety, and sleep.  Discussed stages of grief, implications of uncertainty, personal health and wellness, communication techniques, recognition of shared stress of others as well as importance of self-care, exploration of sexual impact and intimacy, and survivorship.    Explored with group upcoming offering of cancer University.  Discussed with group controlled substances and importance of taking as directed, diversion risk management.    Medications Management  Patient continues in survivorship of breast cancer diagnosis.  Discusses being forced out of family home due to those they rent from making decision to sell the house. Pt reports increase in depression and anxiety symptoms alongside management of uncertainty. Describes increase in anxiety, insomnia, and nightmares related to this.  Medications reviewed; has not had ativan. Continues on Pristiq with feelings of continued benefit, recognizing situational impact of new living situation.  Explored benefit of Ativan for as needed management; will refill.  Additionally, will add prazosin for nightmares.  Patient continues to report benefit to Ritalin, finding increased ability to participate with decreased feelings of fatigue. Denies recognizing negative impact on anxiety. Will continue.    PHQ9 Total Score: 14  NAIMA 7 Total Score: 21    Diagnoses and all orders for this visit:    Moderate episode of recurrent major depressive  disorder (CMS/HCC)  -     methylphenidate LA (RITALIN LA) 20 MG 24 hr capsule; Take 1 capsule by mouth Daily    Anxiety  -     methylphenidate LA (RITALIN LA) 20 MG 24 hr capsule; Take 1 capsule by mouth Daily  -     LORazepam (ATIVAN) 0.5 MG tablet; Take 1 tablet by mouth Every 8 (Eight) Hours As Needed for Anxiety. for anxiety    Other orders  -     desvenlafaxine (PRISTIQ) 100 MG 24 hr tablet; Take 1 tablet by mouth Daily.  -     prazosin (MINIPRESS) 1 MG capsule; Take 3 capsules by mouth Every Night.

## 2020-02-19 ENCOUNTER — APPOINTMENT (OUTPATIENT)
Dept: PHYSICAL THERAPY | Facility: HOSPITAL | Age: 50
End: 2020-02-19

## 2020-03-06 ENCOUNTER — APPOINTMENT (OUTPATIENT)
Dept: WOMENS IMAGING | Facility: HOSPITAL | Age: 50
End: 2020-03-06

## 2020-03-06 ENCOUNTER — OFFICE VISIT (OUTPATIENT)
Dept: INTERNAL MEDICINE | Facility: CLINIC | Age: 50
End: 2020-03-06

## 2020-03-06 VITALS
HEIGHT: 66 IN | TEMPERATURE: 98.5 F | OXYGEN SATURATION: 99 % | BODY MASS INDEX: 47.09 KG/M2 | WEIGHT: 293 LBS | DIASTOLIC BLOOD PRESSURE: 72 MMHG | HEART RATE: 98 BPM | SYSTOLIC BLOOD PRESSURE: 142 MMHG

## 2020-03-06 DIAGNOSIS — N30.00 ACUTE CYSTITIS WITHOUT HEMATURIA: Primary | ICD-10-CM

## 2020-03-06 DIAGNOSIS — R30.0 DYSURIA: ICD-10-CM

## 2020-03-06 DIAGNOSIS — J06.9 ACUTE URI: ICD-10-CM

## 2020-03-06 LAB
BACTERIA UR QL AUTO: ABNORMAL /HPF
BILIRUB UR QL STRIP: NEGATIVE
CLARITY UR: ABNORMAL
COLOR UR: YELLOW
GLUCOSE UR STRIP-MCNC: NEGATIVE MG/DL
HGB UR QL STRIP.AUTO: ABNORMAL
HYALINE CASTS UR QL AUTO: ABNORMAL /LPF
KETONES UR QL STRIP: NEGATIVE
LEUKOCYTE ESTERASE UR QL STRIP.AUTO: ABNORMAL
MUCOUS THREADS URNS QL MICRO: ABNORMAL /HPF
NITRITE UR QL STRIP: NEGATIVE
PH UR STRIP.AUTO: 5.5 [PH] (ref 5–8)
PROT UR QL STRIP: ABNORMAL
RBC # UR: ABNORMAL /HPF
REF LAB TEST METHOD: ABNORMAL
SP GR UR STRIP: 1.02 (ref 1–1.03)
SQUAMOUS #/AREA URNS HPF: ABNORMAL /HPF
UROBILINOGEN UR QL STRIP: ABNORMAL
WBC UR QL AUTO: ABNORMAL /HPF

## 2020-03-06 PROCEDURE — 87086 URINE CULTURE/COLONY COUNT: CPT | Performed by: NURSE PRACTITIONER

## 2020-03-06 PROCEDURE — G0279 TOMOSYNTHESIS, MAMMO: HCPCS | Performed by: RADIOLOGY

## 2020-03-06 PROCEDURE — 87186 SC STD MICRODIL/AGAR DIL: CPT | Performed by: NURSE PRACTITIONER

## 2020-03-06 PROCEDURE — 99214 OFFICE O/P EST MOD 30 MIN: CPT | Performed by: NURSE PRACTITIONER

## 2020-03-06 PROCEDURE — 87088 URINE BACTERIA CULTURE: CPT | Performed by: NURSE PRACTITIONER

## 2020-03-06 PROCEDURE — 81001 URINALYSIS AUTO W/SCOPE: CPT | Performed by: NURSE PRACTITIONER

## 2020-03-06 PROCEDURE — 77062 BREAST TOMOSYNTHESIS BI: CPT | Performed by: RADIOLOGY

## 2020-03-06 PROCEDURE — 77066 DX MAMMO INCL CAD BI: CPT | Performed by: RADIOLOGY

## 2020-03-06 RX ORDER — GUAIFENESIN 600 MG/1
600 TABLET, EXTENDED RELEASE ORAL EVERY 12 HOURS SCHEDULED
Qty: 14 TABLET
Start: 2020-03-06 | End: 2020-03-13

## 2020-03-06 RX ORDER — AMOXICILLIN 875 MG/1
875 TABLET, COATED ORAL EVERY 12 HOURS SCHEDULED
Qty: 14 TABLET | Refills: 0 | Status: SHIPPED | OUTPATIENT
Start: 2020-03-06 | End: 2020-03-13

## 2020-03-08 DIAGNOSIS — N30.00 ACUTE CYSTITIS WITHOUT HEMATURIA: Primary | ICD-10-CM

## 2020-03-08 PROBLEM — Z12.11 SCREENING FOR COLON CANCER: Status: RESOLVED | Noted: 2019-10-29 | Resolved: 2020-03-08

## 2020-03-08 LAB — BACTERIA SPEC AEROBE CULT: ABNORMAL

## 2020-03-08 RX ORDER — NITROFURANTOIN 25; 75 MG/1; MG/1
100 CAPSULE ORAL EVERY 12 HOURS SCHEDULED
Qty: 14 CAPSULE | Refills: 0 | Status: SHIPPED | OUTPATIENT
Start: 2020-03-08 | End: 2020-03-15

## 2020-03-08 NOTE — PROGRESS NOTES
Jaspal Soto is a 49 y.o. female who presents due to respiratory symptoms and urinary symptoms.    URI    This is a new problem. The current episode started in the past 7 days (sx began lulu 1 week ago). The problem has been unchanged. There has been no fever. Associated symptoms include congestion, coughing, dysuria, ear pain, rhinorrhea and a sore throat. Pertinent negatives include no abdominal pain, chest pain, diarrhea, headaches, nausea, neck pain, sneezing, vomiting or wheezing. She has tried antihistamine for the symptoms. The treatment provided mild relief.   Urinary Tract Infection    This is a new problem. The current episode started in the past 7 days. The problem occurs every urination. The problem has been gradually worsening. The quality of the pain is described as aching and burning. The pain is mild. There has been no fever. Associated symptoms include frequency, hesitancy and urgency. Pertinent negatives include no chills, hematuria, nausea or vomiting.        The following portions of the patient's history were reviewed and updated as appropriate: allergies, current medications, past social history and problem list.    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 May begin 1 tablet afterward., Disp: 30 tablet, Rfl: 0  •  desvenlafaxine (PRISTIQ) 100 MG 24 hr tablet, Take 1 tablet by mouth Daily., Disp: 30 tablet, Rfl: 2  •  ibuprofen (ADVIL,MOTRIN) 800 MG tablet, Take 1 tablet by mouth Every 6 (Six) Hours As Needed for Mild Pain  or Moderate Pain ., Disp: 45 tablet, Rfl: 0  •  lisinopril-hydrochlorothiazide (PRINZIDE,ZESTORETIC) 20-25 MG per tablet, TAKE 1 TABLET BY MOUTH DAILY, Disp: 30 tablet, Rfl: 5  •  LORazepam (ATIVAN) 0.5 MG tablet, Take 1 tablet by mouth Every 8 (Eight) Hours As Needed for Anxiety. for anxiety, Disp: 45 tablet, Rfl: 0  •  methylphenidate (RITALIN) 10 MG tablet, Take 1 tablet by mouth 2 (Two) Times a Day., Disp: 60 tablet, Rfl: 0  •  methylphenidate LA (RITALIN LA) 20 MG 24 hr capsule, Take 1 capsule by mouth Daily, Disp: 30 capsule, Rfl: 0  •  ondansetron (ZOFRAN) 4 MG tablet, Take 1 tablet by mouth 4 (Four) Times a Day As Needed for Nausea or Vomiting., Disp: 10 tablet, Rfl: 0  •  prazosin (MINIPRESS) 1 MG capsule, Take 3 capsules by mouth Every Night., Disp: 90 capsule, Rfl: 1  •  tamoxifen (NOLVADEX) 20 MG chemo tablet, Take 1 tablet by mouth Daily., Disp: 30 tablet, Rfl: 5  •  vitamin B-12 (CYANOCOBALAMIN) 1000 MCG tablet, Take 1,000 mcg by mouth Daily., Disp: , Rfl:   •  amoxicillin (AMOXIL) 875 MG tablet, Take 1 tablet by mouth Every 12 (Twelve) Hours for 7 days., Disp: 14 tablet, Rfl: 0  •  guaiFENesin (MUCINEX) 600 MG 12 hr tablet, Take 1 tablet by mouth Every 12 (Twelve) Hours for 7 days., Disp: 14 tablet, Rfl:   •  nitrofurantoin, macrocrystal-monohydrate, (MACROBID) 100 MG capsule, Take 1 capsule by mouth Every 12 (Twelve) Hours for 7 days., Disp: 14 capsule, Rfl: 0    Allergies   Allergen Reactions   • Citalopram Rash   • Flexeril [Cyclobenzaprine] Unknown - High Severity     MUSCLES TENSE-OPPOSITE OF WHAT IT IS INTENDED TO DO-ELEVATES B/P   • Doxycycline Rash     Blisters on hands   • Keflex [Cephalexin] Rash   • Sulfa  "Antibiotics Rash     NAUSEA/VOMITING/FEVER   • Vilazodone Hcl Hives, Itching and Rash       Review of Systems   Constitutional: Negative for appetite change, chills, fatigue and fever.   HENT: Positive for congestion, ear pain, postnasal drip, rhinorrhea, sinus pressure and sore throat. Negative for ear discharge, facial swelling, sneezing and tinnitus.    Respiratory: Positive for cough. Negative for chest tightness, shortness of breath and wheezing.    Cardiovascular: Negative for chest pain, palpitations and leg swelling.   Gastrointestinal: Negative for abdominal pain, diarrhea, nausea and vomiting.   Genitourinary: Positive for dysuria, frequency, hesitancy and urgency. Negative for hematuria.   Musculoskeletal: Negative for neck pain and neck stiffness.   Neurological: Negative for headaches.   Hematological: Negative for adenopathy.       Objective   Vitals:    03/06/20 1102   BP: 142/72   BP Location: Left arm   Patient Position: Sitting   Cuff Size: Adult   Pulse: 98   Temp: 98.5 °F (36.9 °C)   TempSrc: Oral   SpO2: 99%   Weight: 136 kg (299 lb 12.8 oz)   Height: 167.6 cm (65.98\")     Body mass index is 48.42 kg/m².  Physical Exam   Constitutional: She appears well-developed and well-nourished. She is cooperative. She does not have a sickly appearance. She does not appear ill.   HENT:   Head: Normocephalic.   Right Ear: Hearing and external ear normal. No drainage, swelling or tenderness. Tympanic membrane is bulging. Tympanic membrane is not erythematous. No middle ear effusion. No decreased hearing is noted.   Left Ear: Hearing and external ear normal. No drainage, swelling or tenderness. Tympanic membrane is bulging. Tympanic membrane is not erythematous.  No middle ear effusion. No decreased hearing is noted.   Nose: Nose normal. No mucosal edema, rhinorrhea, sinus tenderness or nasal deformity. Right sinus exhibits no maxillary sinus tenderness and no frontal sinus tenderness. Left sinus exhibits no " maxillary sinus tenderness and no frontal sinus tenderness.   Mouth/Throat: Mucous membranes are normal. Posterior oropharyngeal erythema present.   Eyes: Conjunctivae and lids are normal.   Neck: Trachea normal.   Cardiovascular: Regular rhythm, normal heart sounds and normal pulses.   No murmur heard.  Pulmonary/Chest: Breath sounds normal. No respiratory distress. She has no decreased breath sounds. She has no wheezes. She has no rhonchi. She has no rales.   Abdominal: Soft. There is no tenderness.   Lymphadenopathy:     She has no cervical adenopathy.   Neurological: She is alert.   Skin: Skin is warm, dry and intact.   Nursing note and vitals reviewed.      Assessment/Plan   Emilie was seen today for uri and urinary tract infection.    Diagnoses and all orders for this visit:    Acute cystitis without hematuria  -     Urine Culture - Urine, Urine, Clean Catch; Future  -     Urine Culture - Urine, Urine, Clean Catch    Dysuria  -     Cancel: Urinalysis With Microscopic If Indicated (No Culture) - Urine, Clean Catch  -     Urinalysis With Microscopic If Indicated (No Culture) - Urine, Clean Catch; Future  -     Urinalysis With Microscopic If Indicated (No Culture) - Urine, Clean Catch  -     Urinalysis, Microscopic Only - Urine, Clean Catch; Future  -     Urinalysis, Microscopic Only - Urine, Clean Catch  -     Urine Culture - Urine, Urine, Clean Catch; Future  -     Urine Culture - Urine, Urine, Clean Catch    Acute URI  -     amoxicillin (AMOXIL) 875 MG tablet; Take 1 tablet by mouth Every 12 (Twelve) Hours for 7 days.  -     guaiFENesin (MUCINEX) 600 MG 12 hr tablet; Take 1 tablet by mouth Every 12 (Twelve) Hours for 7 days.    1&2. UA +WBCs, bacteria-will start antibiotic and increase clear fluids. Will await results of culture.  3. Sx are suggestive of viral process, continue Mucinex. She will be on Amoxil for UTI, continue to monitor.    Addendum:  3/8: Urine culture +E coli but resistant to Amoxil, pt  notified (left message and send message through [employer]) to d/c Amoxil and begin Macrobid (sent to [facility]). RTC if sx persist/worsen.

## 2020-03-09 ENCOUNTER — OFFICE VISIT (OUTPATIENT)
Dept: SURGERY | Facility: CLINIC | Age: 50
End: 2020-03-09

## 2020-03-09 ENCOUNTER — OFFICE VISIT (OUTPATIENT)
Dept: PSYCHIATRY | Facility: HOSPITAL | Age: 50
End: 2020-03-09

## 2020-03-09 VITALS
HEIGHT: 67 IN | OXYGEN SATURATION: 97 % | HEART RATE: 90 BPM | BODY MASS INDEX: 45.99 KG/M2 | SYSTOLIC BLOOD PRESSURE: 132 MMHG | WEIGHT: 293 LBS | DIASTOLIC BLOOD PRESSURE: 85 MMHG

## 2020-03-09 DIAGNOSIS — F41.9 ANXIETY: ICD-10-CM

## 2020-03-09 DIAGNOSIS — Z17.0 MALIGNANT NEOPLASM OF CENTRAL PORTION OF RIGHT BREAST IN FEMALE, ESTROGEN RECEPTOR POSITIVE (HCC): Primary | ICD-10-CM

## 2020-03-09 DIAGNOSIS — Z15.89 MONOALLELIC MUTATION OF CHEK2 GENE IN FEMALE PATIENT: ICD-10-CM

## 2020-03-09 DIAGNOSIS — Z15.01 MONOALLELIC MUTATION OF CHEK2 GENE IN FEMALE PATIENT: ICD-10-CM

## 2020-03-09 DIAGNOSIS — F33.1 MODERATE EPISODE OF RECURRENT MAJOR DEPRESSIVE DISORDER (HCC): ICD-10-CM

## 2020-03-09 DIAGNOSIS — Z15.09 MONOALLELIC MUTATION OF CHEK2 GENE IN FEMALE PATIENT: ICD-10-CM

## 2020-03-09 DIAGNOSIS — Z15.02 MONOALLELIC MUTATION OF CHEK2 GENE IN FEMALE PATIENT: ICD-10-CM

## 2020-03-09 DIAGNOSIS — C50.111 MALIGNANT NEOPLASM OF CENTRAL PORTION OF RIGHT BREAST IN FEMALE, ESTROGEN RECEPTOR POSITIVE (HCC): Primary | ICD-10-CM

## 2020-03-09 PROCEDURE — 99213 OFFICE O/P EST LOW 20 MIN: CPT | Performed by: SURGERY

## 2020-03-09 PROCEDURE — 99211 OFF/OP EST MAY X REQ PHY/QHP: CPT | Performed by: NURSE PRACTITIONER

## 2020-03-09 PROCEDURE — 90853 GROUP PSYCHOTHERAPY: CPT | Performed by: NURSE PRACTITIONER

## 2020-03-09 RX ORDER — METHYLPHENIDATE HYDROCHLORIDE 20 MG/1
20 CAPSULE, EXTENDED RELEASE ORAL DAILY
Qty: 30 CAPSULE | Refills: 0 | Status: SHIPPED | OUTPATIENT
Start: 2020-03-09 | End: 2020-04-21 | Stop reason: SDUPTHER

## 2020-03-09 RX ORDER — LORAZEPAM 0.5 MG/1
0.5 TABLET ORAL EVERY 8 HOURS PRN
Qty: 45 TABLET | Refills: 0 | Status: SHIPPED | OUTPATIENT
Start: 2020-03-09 | End: 2022-07-15 | Stop reason: SDUPTHER

## 2020-03-09 NOTE — PROGRESS NOTES
"Subjective  Patient ID: Emilie Soto is a 49 y.o.. female seen in regularly scheduled group session.    Group Therapy  Group topics explored including sx of fatigue, challenges meeting demands of daily life, acceptance of new normal, frustrations with feelings of demoralization and uncomfortable sx of \"why me?\" Evaluated responses to previously discussed interventions including assistance with sexual health, communication strategies, focus on self care, and goals for mood improvement. Additionally evaluated caregiver demands, limitations in abilities to change others, and acceptance of new normal in self and others.     Counseling provided regarding discussion of personal boundaries and limitations, explorations of self forgiveness and dianne, recognition of ability, short and long term effects of treatment, and curative factors of group including instillation of hope, universality, and altruism.    Medications Management  Patient continues in survivorship of breast cancer diagnosis.  Pt describes enjoyment of time with significant other this weekend, recognizing ability to walk to and through [employer], appreciating ability to be active and enjoy time with significant other. Continues to recognize limitations in energy, and resultant fatigue. . Reports having tarrot cards read at [employer] which was enlightening and comforting. Felt it complimented the effort she has put into adjusting to and coping with new normal. Feels she is coping better with new normal, despite continued increase in levels of PHQ 9 and NAIMA 7. Describes continued nervousness, although not leading to panic. Continued discussion related to move from home; states this has been stressful, although remains hopeful and finds she is looking forward to new living space. Recognies emotions of excitement and stress, specifically related to the unknown. Medications reviewed and renewed as appropriate. Pt continues with benefit to ritalin and " pristiq, with PRN  ativan. Has not initaited prazosin- has not needed.    PHQ9 Total Score: 13  NAIMA 7 Total Score: 16    Diagnoses and all orders for this visit:    Anxiety  -     LORazepam (ATIVAN) 0.5 MG tablet; Take 1 tablet by mouth Every 8 (Eight) Hours As Needed for Anxiety. for anxiety  -     methylphenidate LA (RITALIN LA) 20 MG 24 hr capsule; Take 1 capsule by mouth Daily    Moderate episode of recurrent major depressive disorder (CMS/HCC)  -     methylphenidate LA (RITALIN LA) 20 MG 24 hr capsule; Take 1 capsule by mouth Daily

## 2020-03-09 NOTE — PROGRESS NOTES
[facility]     Referring Provider: Mel Cline MD     Chief complaint: Routine followup breast cancer     HPI:   5/14/19:   Ms. Emilie Soto is a 49 yo woman, seen at the request of Dr. Mel Cline, for a new diagnosis of right breast cancer. This was initially detected as an imaging abnormality. Her work-up is detailed in the oncologic history below. She denies any breast lumps, pain, skin changes, or nipple discharge. She has a past history of a benign left breast stereotactic biopsy in 2014. 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 She has a family history of breast cancer in a paternal aunt and paternal cousin (unknown ages at diagnosis). She denies any family history of ovarian cancer.      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 She denies any new complaints.      6/7/19:  She underwent right central partial mastectomy & SLNB on 5/28/19. See surgery & pathology details below in oncologic history. She is complaining of soreness at her axillary incision, however says that her breast feels fine. She also is complaining of generalized anxiety, which has been worse since her diagnosis.      9/13/19:  She returns today for scheduled follow-up. I have reviewed the interval records since her last visit. She completed radiation on 8/14/19. She had issues with fatigue during treatment, however this is been slowly improving. She started tamoxifen about a month ago and has been tolerating this well. She has been seen by the genetics clinic for her CHEK2 mutation. She is being followed by PT/LE clinic. On 8/14/19 her L-Dex bioimpedance score was slightly elevated, however this was reevaluated today and back down to baseline. She is working with PT on increasing her right upper extremity strength, because she has not been using her right arm very much since surgery. She denies any new  complaints.    3/9/20, Interval History:  She returns today for scheduled follow-up. She remains on tamoxifen and is still tolerating this well. She last saw PT on 11/11/19 and her bioimpedance score was within normal limits. She underwent MMG prior to her appointment which was benign (see imaging report details below). She is complaining of some occasional pain near her right axillary scar.      Oncology/Hematology History    1/7/14, Screening MMG ([facility]):   There is a stable small focal asymmetry seen in the left breast at 12:00-1:00. In the right breast, no masses, significant calcifications or other abnormalities are seen.   BI-RADS 0: Incomplete.    2/4/14, Left Diagnostic MMG & Left US ([facility]):   MMG:  On the present examination, there is an isodense focal asymmetry measuring 15 mm with indistinct margins in the left breast at 12:00-1:00 located 10 cm from the nipple. Focal asymmetric density has become more defined.   US:  There is no evidence of any solid mass or abnormal cystic elements. Stereotactic biopsy is recommended.   BI-RADS 4A: Suspicious.    2/11/14, Left Breast, 12-1:00, Stereotactic Biopsy ([facility]):   Fibroadenomatoid type stromal changes, mild columnar cell hyperplasia, mild fibrocystic changes. Negative for atypia or malignancy.   -Concordant.  Recommend 6 month follow-up mammogram and ultrasound.        Malignant neoplasm of central portion of right breast in female, estrogen receptor positive (CMS/HCC)    4/17/2019 Initial Diagnosis     Malignant neoplasm of central portion of right breast in female, estrogen receptor positive (CMS/HCC)      4/18/2019 Imaging     Screening MMG ([facility]):   There are scattered areas of fibroglandular density. There is a small, oval mass measuring 10 mm with indistinct margins seen in the sub-areolar region of the right breast. Note is made of a biopsy clip in the left breast as evidence of a previous surgical procedure. In the left breast, no suspicious masses,  significant calcifications or other abnormalities are seen.   BI-RADS 0: Incomplete.        4/29/2019 Imaging     Right Diagnostic MMG with [facility] & Right US ([facility]):   MMG:  On the present examination, there is a new high density, round mass measuring 10 mm with indistinct margins in the anterior one-third sub-areolar region of the right breast located 2 cm from the nipple.   US:   1. Ultrasound is suggestive of a round solid mass with indistinct margins measuring 9 mm. Internal echotexture is homogeneous and hypoechoic relative adipose tissue.   2. There is an abnormal axillary lymph node in the right axilla. There are 2 adjacent lymph nodes in the right axilla with mildly prominent cortices. The cortex has been measured at 3.1 mm, although no significant focal thickening is seen. The left axilla was evaluated for comparison, and the nodes on the right are of greater cortical thickness.   BI-RADS 4C: Suspicious.      5/6/2019 Biopsy     Right breast & Right axillary lymph node, US-guided biopsy ([facility]):    1. Right Subareolar, core biopsy:    Invasive High Grade Mammary Carcinoma (Invasive Ductal Carcinoma, Grade III). Tubule Score 3, Nuclear Grade Score 3, and Mitotic Score 2 for A Total Score of 8. Neoplasm is Present in Four of Eight Biopsies and Measures 4 mm in Greatest Dimension.    ER+ (82.35%, moderate)  TN+ (92.68%, strong)  Her2 negative (IHC 1+)  Ki-67 14.81%    2. Right axilla, core biopsy:  Benign Lymph Node, Negative for Metastatic Carcinoma.      5/9/2019 Imaging     Bilateral Breast MRI ([facility]):  Within the retroareolar region of the right breast located slightly medial to the plane of the nipple at the 2:30 position on the order of 1.8 cm from the nipple there is an irregular enhancing mass that measures 1.0 cm in the superior-inferior dimension, 1.0 cm in anterior to posterior dimension and 1.0 cm in the medial to lateral dimension. A metallic clip is seen within the mass. This represents the  biopsy-proven site malignancy.     No other areas of abnormal enhancement or morphology are seen within the right breast. I see no evidence for abnormal right breast skin, nipple or chest wall enhancement and there is no evidence for right axillary adenopathy.     There are no areas of abnormal enhancement or morphology in the left breast. I see no evidence for abnormal left breast skin, nipple or chest wall enhancement and there is no evidence for left axillary adenopathy.  BI-RADS 6: Known malignancy.      5/14/2019 Genetic Testing     [employer] Breast Cancer STAT Panel (9 genes):    CHEK2 mutation      5/28/2019 Surgery     Right central partial mastectomy (with removal of the nipple-areola-complex) and sentinel lymph node biopsy    1.  Right Axilla, [place] Lymph Node #1 (hot, blue, count 628):                A.  One lymph node with fatty infiltration and focal florid foreign body giant cell response consistent with clip                     Placement.               B.  No metastatic carcinoma identified by routine staining (0/1).     2.  Right Axilla, Non-[place] Lymph Node:                A.  Benign fibrofatty tissue with focal minimal fat necrosis.                B.  No intact lymph node tissue identified.      3.  Right Central Partial Mastectomy:                A.  Invasive mammary carcinoma of no special type (ductal carcinoma).                            1.  Invasive carcinoma measures 10 mm x 8 mm x 8 mm.                            2.  Overall [place] grade II (glandular score = 2, nuclear score = 2, mitotic score = 2).                 B.  No associated ductal carcinoma in situ component by IHC staining (see comment).                   C.  Margins are negative for invasive carcinoma.  Invasive carcinoma measures at least 15 mm from                     the closest (Posterior) margin of excision.                     All other margins measure at least 20 mm from invasive carcinoma including:                              Anterior margin = 20 mm.                            Superior margin = 20 mm.                            Inferior margin = 53 mm.                            Medial Margin = 50 mm.                            Lateral margin = 60 mm.                   D.  No lymphovascular space invasion identified.                E.  No pagetoid involvement of skin nor nipple by carcinoma identified.               F.  Focal stromal fibrosis (scar) organizing fat necrosis and foreign giant cell reaction noted consistent with                     previous needled biopsy site.                G.  Nonneoplastic breast tissue with fibrocystic change, focal adenosis, organizing fat necrosis.                H.  Microcalcifications are identified within benign ductal tissue.                I.   Previously reported biomarkers: Estrogen receptor: positive (82.35% moderate), Progesterone receptor:                    positive (92.68%, strong), HER2/Amy: negative (IHC score1+) and Ki-67=14.8%                     (not reviewed).                   Pathologic Stage: pT1b, (sn)N0 (G2).                 See synoptic for tumor details.     4.  Additional Lateral and Superior Margins:                A.  No in situ nor infiltrating carcinoma identified.               B.  New superior margin is negative for malignancy by an additional 25 mm.     Oncotype DX Score: 6      7/11/2019 - 8/14/2019 Radiation     Radiation OncologyTreatment Course:  Emilie Soto received 5000 cGy in 25 fractions to the right breast.      8/17/2019 -  Hormonal Therapy     Tamoxifen      3/6/2020 Imaging     Bilateral Diagnostic MMG with Clarence ([facility]):  Scattered areas of fibroglandular density.   There is a new area of skin thickening, a new area of trabecular thickening and a new postsurgical scar seen in the anterior region of the right breast. Finding is in an area of prior lumpectomy. Findings are consistent with postsurgical and post radiation therapy changes.  Metallic surgical clips noted at the lumpectomy site and at the axilla. No suspicious masses or microcalcifications are identified.  In the left breast, no suspicious masses, significant calcifications or other abnormalities are seen.  BI-RADS 2: Benign.         Review of Systems:  See interval history.       Medications:    Current Outpatient Medications:   •  amoxicillin (AMOXIL) 875 MG tablet, Take 1 tablet by mouth Every 12 (Twelve) Hours for 7 days., Disp: 14 tablet, Rfl: 0  •  cholecalciferol (VITAMIN D3) 1000 units tablet, Take 2,000 Units by mouth Daily., Disp: , Rfl:   •  colchicine 0.6 MG tablet, 2 tablets at first sign of gout flare, then 1 tablet 1 hour later, max 3 tablets over 1 hour period. May begin 1 tablet afterward., Disp: 30 tablet, Rfl: 0  •  desvenlafaxine (PRISTIQ) 100 MG 24 hr tablet, Take 1 tablet by mouth Daily., Disp: 30 tablet, Rfl: 2  •  guaiFENesin (MUCINEX) 600 MG 12 hr tablet, Take 1 tablet by mouth Every 12 (Twelve) Hours for 7 days., Disp: 14 tablet, Rfl:   •  ibuprofen (ADVIL,MOTRIN) 800 MG tablet, Take 1 tablet by mouth Every 6 (Six) Hours As Needed for Mild Pain  or Moderate Pain ., Disp: 45 tablet, Rfl: 0  •  lisinopril-hydrochlorothiazide (PRINZIDE,ZESTORETIC) 20-25 MG per tablet, TAKE 1 TABLET BY MOUTH DAILY, Disp: 30 tablet, Rfl: 5  •  LORazepam (ATIVAN) 0.5 MG tablet, Take 1 tablet by mouth Every 8 (Eight) Hours As Needed for Anxiety. for anxiety, Disp: 45 tablet, Rfl: 0  •  methylphenidate (RITALIN) 10 MG tablet, Take 1 tablet by mouth 2 (Two) Times a Day., Disp: 60 tablet, Rfl: 0  •  methylphenidate LA (RITALIN LA) 20 MG 24 hr capsule, Take 1 capsule by mouth Daily, Disp: 30 capsule, Rfl: 0  •  nitrofurantoin, macrocrystal-monohydrate, (MACROBID) 100 MG capsule, Take 1 capsule by mouth Every 12 (Twelve) Hours for 7 days., Disp: 14 capsule, Rfl: 0  •  ondansetron (ZOFRAN) 4 MG tablet, Take 1 tablet by mouth 4 (Four) Times a Day As Needed for Nausea or Vomiting., Disp: 10  tablet, Rfl: 0  •  prazosin (MINIPRESS) 1 MG capsule, Take 3 capsules by mouth Every Night., Disp: 90 capsule, Rfl: 1  •  tamoxifen (NOLVADEX) 20 MG chemo tablet, Take 1 tablet by mouth Daily., Disp: 30 tablet, Rfl: 5  •  vitamin B-12 (CYANOCOBALAMIN) 1000 MCG tablet, Take 1,000 mcg by mouth Daily., Disp: , Rfl:       Allergies   Allergen Reactions   • Citalopram Rash   • Flexeril [Cyclobenzaprine] Unknown - High Severity     MUSCLES TENSE-OPPOSITE OF WHAT IT IS INTENDED TO DO-ELEVATES B/P   • Doxycycline Rash     Blisters on hands   • Keflex [Cephalexin] Rash   • Sulfa Antibiotics Rash     NAUSEA/VOMITING/FEVER   • Vilazodone Hcl Hives, Itching and Rash       Family History   Problem Relation Age of Onset   • Hypertension Mother    • Arthritis Mother    • Kidney cancer Mother    • Depression Mother         Not Dr azalia hogan go   • Heart disease Father    • Hernia Father    • Hypertension Father    • No Known Problems Brother    • Breast cancer Paternal Aunt    • Breast cancer Paternal Cousin    • Drug abuse Paternal Uncle         life long   • Drug abuse Paternal Uncle         life long   • Breast cancer Paternal Grandmother    • Osteoporosis Maternal Grandmother    • Malig Hyperthermia Neg Hx        PHYSICAL EXAMINATION:   Vitals:    03/09/20 1444   BP: 132/85   Pulse: 90   SpO2: 97%     ECOG 0 - Asymptomatic  General: NAD, well appearing  Psych: a&o x 3, normal mood and affect  Eyes: EOMI, no scleral icterus  ENMT: neck supple without masses or thyromegaly, mucus membranes moist  MSK: normal gait, normal ROM in bilateral shoulders  Lymph nodes: Well-healed right axillary scar, with mild firm scar tissue medially; no cervical, supraclavicular or axillary lymphadenopathy  Breast:   Right: There is moderate hyperpigmentation of the entire breast and axilla with a well-healed central scar. Scar is soft. No masses.  Left: No visible abnormalities on inspection while seated, with arms raised or hands on hips. No  masses, skin changes, or nipple abnormalities.      I have independently reviewed her imaging and here are my findings:   Expected postsurgical and postradiation changes.      Assessment:  49 y.o. F with a diagnosis of right breast cancer: intermediate grade, invasive ductal carcinoma, ER/[place]+, Her2 negative. She is sp right central partial mastectomy & SLNB on 5/28/19, pT1bN0, anatomic stage IA, prognostic stage IA. She completed radiation on 8/14/19. She is currently on tamoxifen.  -She has a pathogenic CHEK2 mutation and has a higher than average risk of a subsequent breast cancer, however declined prophylactic mastectomy.    Plan:  -Scar massage techniques reviewed.  -Continue follow-up with Dr. Trevizo.  -Continue follow-up with PT/LE clinic.  -F/u in 9/2020 with MRI.  -She was instructed to call sooner with any questions, concerns or changes on BSE.    Trena Greene MD      CC:  [facility]  VICENTE Elliott

## 2020-03-11 ENCOUNTER — DOCUMENTATION (OUTPATIENT)
Dept: PSYCHIATRY | Facility: HOSPITAL | Age: 50
End: 2020-03-11

## 2020-03-23 ENCOUNTER — DOCUMENTATION (OUTPATIENT)
Dept: PHYSICAL THERAPY | Facility: HOSPITAL | Age: 50
End: 2020-03-23

## 2020-03-23 DIAGNOSIS — Z48.89 AFTERCARE FOLLOWING SURGERY: ICD-10-CM

## 2020-03-23 DIAGNOSIS — Z15.09 MONOALLELIC MUTATION OF CHEK2 GENE IN FEMALE PATIENT: ICD-10-CM

## 2020-03-23 DIAGNOSIS — Z15.01 MONOALLELIC MUTATION OF CHEK2 GENE IN FEMALE PATIENT: ICD-10-CM

## 2020-03-23 DIAGNOSIS — M25.511 ACUTE PAIN OF RIGHT SHOULDER: ICD-10-CM

## 2020-03-23 DIAGNOSIS — L90.5 SCAR TISSUE: ICD-10-CM

## 2020-03-23 DIAGNOSIS — Z91.89 AT RISK FOR LYMPHEDEMA: ICD-10-CM

## 2020-03-23 DIAGNOSIS — Z90.11 STATUS POST PARTIAL MASTECTOMY OF RIGHT BREAST: ICD-10-CM

## 2020-03-23 DIAGNOSIS — C50.111 MALIGNANT NEOPLASM OF CENTRAL PORTION OF RIGHT BREAST IN FEMALE, ESTROGEN RECEPTOR POSITIVE (HCC): Primary | ICD-10-CM

## 2020-03-23 DIAGNOSIS — Z15.02 MONOALLELIC MUTATION OF CHEK2 GENE IN FEMALE PATIENT: ICD-10-CM

## 2020-03-23 DIAGNOSIS — N64.4 MASTODYNIA: ICD-10-CM

## 2020-03-23 DIAGNOSIS — Z15.89 MONOALLELIC MUTATION OF CHEK2 GENE IN FEMALE PATIENT: ICD-10-CM

## 2020-03-23 DIAGNOSIS — Z17.0 MALIGNANT NEOPLASM OF CENTRAL PORTION OF RIGHT BREAST IN FEMALE, ESTROGEN RECEPTOR POSITIVE (HCC): Primary | ICD-10-CM

## 2020-03-23 NOTE — THERAPY DISCHARGE NOTE
Outpatient Physical Therapy Discharge Summary         Patient Name: Emilie Soto  : 1970  MRN: [id number]    Today's Date: 3/23/2020    Visit Dx:    ICD-10-CM ICD-9-CM   1. Malignant neoplasm of central portion of right breast in female, estrogen receptor positive (CMS/HCC) C50.111 174.1    Z17.0 V86.0   2. Monoallelic mutation of CHEK2 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 Z15.01 V84.01    Z15.89 V84.09    Z15.09 V84.89    Z15.02 V84.02   3. Status post partial mastectomy of right breast Z90.11 V45.71   4. At risk for lymphedema Z91.89 V49.89   5. Aftercare following surgery Z48.89 V58.89   6. Scar tissue L90.5 709.2   7. Acute pain of right shoulder M25.511 719.41   8. Mastodynia N64.4 611.71           OP PT Discharge Summary  Date of Discharge: 20  Reason for Discharge: Non-compliant  Outcomes Achieved: Patient able to partially acheive established goals  Discharge Destination: Home with home program, Outpatient services  Discharge Instructions/Additional Comments: Patient canceled f/u appointment in 2020 and has not rescheduled. If indicated to continue PT, MD to please write new orders. Thank you.       Helena Saunders, PT  3/23/2020

## 2020-03-26 ENCOUNTER — TELEPHONE (OUTPATIENT)
Dept: SURGERY | Facility: CLINIC | Age: 50
End: 2020-03-26

## 2020-03-26 NOTE — TELEPHONE ENCOUNTER
Breast MRI is scheduled on 9/3/2020 arrive @ 11:15 am for an 11:45am MRI.    F/u appointment with Dr. Greene is scheduled on 9/10/20 @ 10:00am.    Called and spoke to patient, patient expressed verbal understanding of appointment time.    Sent patient a reminder letter in the mail.

## 2020-03-27 ENCOUNTER — OFFICE VISIT (OUTPATIENT)
Dept: INTERNAL MEDICINE | Facility: CLINIC | Age: 50
End: 2020-03-27

## 2020-03-27 VITALS
BODY MASS INDEX: 45.99 KG/M2 | SYSTOLIC BLOOD PRESSURE: 124 MMHG | HEART RATE: 92 BPM | DIASTOLIC BLOOD PRESSURE: 80 MMHG | OXYGEN SATURATION: 99 % | HEIGHT: 67 IN | WEIGHT: 293 LBS

## 2020-03-27 DIAGNOSIS — Z11.59 SCREENING FOR VIRAL DISEASE: ICD-10-CM

## 2020-03-27 DIAGNOSIS — F41.9 ANXIETY: ICD-10-CM

## 2020-03-27 DIAGNOSIS — E78.2 MIXED HYPERLIPIDEMIA: ICD-10-CM

## 2020-03-27 DIAGNOSIS — Z00.00 PE (PHYSICAL EXAM), ANNUAL: Primary | ICD-10-CM

## 2020-03-27 DIAGNOSIS — R73.09 ELEVATED GLUCOSE: ICD-10-CM

## 2020-03-27 DIAGNOSIS — I10 ESSENTIAL HYPERTENSION: ICD-10-CM

## 2020-03-27 DIAGNOSIS — Z12.4 PAP SMEAR FOR CERVICAL CANCER SCREENING: ICD-10-CM

## 2020-03-27 DIAGNOSIS — Z23 NEED FOR TDAP VACCINATION: ICD-10-CM

## 2020-03-27 LAB
ALBUMIN SERPL-MCNC: 3.9 G/DL (ref 3.5–5.2)
ALBUMIN/GLOB SERPL: 1.4 G/DL
ALP SERPL-CCNC: 58 U/L (ref 39–117)
ALT SERPL-CCNC: 23 U/L (ref 1–33)
APPEARANCE UR: CLEAR
AST SERPL-CCNC: 14 U/L (ref 1–32)
BACTERIA #/AREA URNS HPF: ABNORMAL /HPF
BASOPHILS # BLD AUTO: 0.07 10*3/MM3 (ref 0–0.2)
BASOPHILS NFR BLD AUTO: 1 % (ref 0–1.5)
BILIRUB SERPL-MCNC: 0.5 MG/DL (ref 0.2–1.2)
BILIRUB UR QL STRIP: NEGATIVE
BUN SERPL-MCNC: 12 MG/DL (ref 6–20)
BUN/CREAT SERPL: 17.6 (ref 7–25)
CALCIUM SERPL-MCNC: 9.9 MG/DL (ref 8.6–10.5)
CASTS URNS MICRO: ABNORMAL
CHLORIDE SERPL-SCNC: 101 MMOL/L (ref 98–107)
CHOLEST SERPL-MCNC: 172 MG/DL (ref 0–200)
CO2 SERPL-SCNC: 26.9 MMOL/L (ref 22–29)
COLOR UR: YELLOW
CREAT SERPL-MCNC: 0.68 MG/DL (ref 0.57–1)
EOSINOPHIL # BLD AUTO: 0.24 10*3/MM3 (ref 0–0.4)
EOSINOPHIL NFR BLD AUTO: 3.3 % (ref 0.3–6.2)
EPI CELLS #/AREA URNS HPF: ABNORMAL /HPF
ERYTHROCYTE [DISTWIDTH] IN BLOOD BY AUTOMATED COUNT: 13.5 % (ref 12.3–15.4)
GLOBULIN SER CALC-MCNC: 2.7 GM/DL
GLUCOSE SERPL-MCNC: 126 MG/DL (ref 65–99)
GLUCOSE UR QL: NEGATIVE
HBA1C MFR BLD: 6.1 % (ref 4.8–5.6)
HCT VFR BLD AUTO: 35.5 % (ref 34–46.6)
HDLC SERPL-MCNC: 47 MG/DL (ref 40–60)
HGB BLD-MCNC: 12.2 G/DL (ref 12–15.9)
HGB UR QL STRIP: ABNORMAL
IMM GRANULOCYTES # BLD AUTO: 0.03 10*3/MM3 (ref 0–0.05)
IMM GRANULOCYTES NFR BLD AUTO: 0.4 % (ref 0–0.5)
KETONES UR QL STRIP: NEGATIVE
LDLC SERPL CALC-MCNC: 65 MG/DL (ref 0–100)
LEUKOCYTE ESTERASE UR QL STRIP: ABNORMAL
LYMPHOCYTES # BLD AUTO: 1.91 10*3/MM3 (ref 0.7–3.1)
LYMPHOCYTES NFR BLD AUTO: 26.1 % (ref 19.6–45.3)
MCH RBC QN AUTO: 28.8 PG (ref 26.6–33)
MCHC RBC AUTO-ENTMCNC: 34.4 G/DL (ref 31.5–35.7)
MCV RBC AUTO: 83.7 FL (ref 79–97)
MONOCYTES # BLD AUTO: 0.38 10*3/MM3 (ref 0.1–0.9)
MONOCYTES NFR BLD AUTO: 5.2 % (ref 5–12)
NEUTROPHILS # BLD AUTO: 4.68 10*3/MM3 (ref 1.7–7)
NEUTROPHILS NFR BLD AUTO: 64 % (ref 42.7–76)
NITRITE UR QL STRIP: NEGATIVE
NRBC BLD AUTO-RTO: 0 /100 WBC (ref 0–0.2)
PH UR STRIP: 5.5 [PH] (ref 5–8)
PLATELET # BLD AUTO: 284 10*3/MM3 (ref 140–450)
POTASSIUM SERPL-SCNC: 4.2 MMOL/L (ref 3.5–5.2)
PROT SERPL-MCNC: 6.6 G/DL (ref 6–8.5)
PROT UR QL STRIP: ABNORMAL
RBC # BLD AUTO: 4.24 10*6/MM3 (ref 3.77–5.28)
RBC #/AREA URNS HPF: ABNORMAL /HPF
SODIUM SERPL-SCNC: 138 MMOL/L (ref 136–145)
SP GR UR: 1.03 (ref 1–1.03)
TRIGL SERPL-MCNC: 301 MG/DL (ref 0–150)
TSH SERPL DL<=0.005 MIU/L-ACNC: 1.5 UIU/ML (ref 0.27–4.2)
UROBILINOGEN UR STRIP-MCNC: ABNORMAL MG/DL
VLDLC SERPL CALC-MCNC: 60.2 MG/DL
WBC # BLD AUTO: 7.31 10*3/MM3 (ref 3.4–10.8)
WBC #/AREA URNS HPF: ABNORMAL /HPF

## 2020-03-27 PROCEDURE — 99396 PREV VISIT EST AGE 40-64: CPT | Performed by: NURSE PRACTITIONER

## 2020-03-27 PROCEDURE — 90471 IMMUNIZATION ADMIN: CPT | Performed by: NURSE PRACTITIONER

## 2020-03-27 PROCEDURE — 36415 COLL VENOUS BLD VENIPUNCTURE: CPT | Performed by: NURSE PRACTITIONER

## 2020-03-27 PROCEDURE — 90715 TDAP VACCINE 7 YRS/> IM: CPT | Performed by: NURSE PRACTITIONER

## 2020-03-27 NOTE — PROGRESS NOTES
Jaspal Soto is a 49 y.o. female who presents for a physical exam.    History of Present Illness     The following portions of the patient's history were reviewed and updated as appropriate: allergies, current medications, past social history and problem list.    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 May begin 1 tablet afterward., Disp: 30 tablet, Rfl: 0  •  desvenlafaxine (PRISTIQ) 100 MG 24 hr tablet, Take 1 tablet by mouth Daily., Disp: 30 tablet, Rfl: 2  •  lisinopril-hydrochlorothiazide (PRINZIDE,ZESTORETIC) 20-25 MG per tablet, TAKE 1 TABLET BY MOUTH DAILY, Disp: 30 tablet, Rfl: 5  •  LORazepam (ATIVAN) 0.5 MG tablet, Take 1 tablet by mouth Every 8 (Eight) Hours As Needed for Anxiety. for anxiety, Disp: 45 tablet, Rfl: 0  •  methylphenidate (RITALIN) 10 MG tablet, Take 1 tablet by mouth 2 (Two) Times a Day., Disp: 60 tablet, Rfl: 0  •  methylphenidate LA (RITALIN LA) 20 MG 24 hr capsule, Take 1 capsule by mouth Daily, Disp: 30 capsule, Rfl: 0  •  tamoxifen (NOLVADEX) 20 MG chemo tablet, Take 1 tablet by mouth Daily., Disp: 30 tablet, Rfl: 5  •  vitamin B-12 (CYANOCOBALAMIN) 1000 MCG tablet, Take 1,000 mcg by mouth Daily., Disp: , Rfl:     Allergies    Allergen Reactions   • Citalopram Rash   • Flexeril [Cyclobenzaprine] Unknown - High Severity     MUSCLES TENSE-OPPOSITE OF WHAT IT IS INTENDED TO DO-ELEVATES B/P   • Doxycycline Rash     Blisters on hands   • Keflex [Cephalexin] Rash   • Sulfa Antibiotics Rash     NAUSEA/VOMITING/FEVER   • Vilazodone Hcl Hives, Itching and Rash       Review of Systems   Constitutional: Positive for appetite change (intermittent episodes of decreased appetite) and fatigue (improved with Ritalin). Negative for activity change, chills, diaphoresis, fever and unexpected weight change.   HENT: Negative for congestion, dental problem, drooling, ear discharge, ear pain, facial swelling, hearing loss, mouth sores, nosebleeds, postnasal drip, rhinorrhea, sinus pressure, sore throat, tinnitus and trouble swallowing.    Eyes: Negative for photophobia, pain, discharge, redness, itching and visual disturbance.   Respiratory: Negative for apnea, cough, choking, chest tightness, shortness of breath and wheezing.    Cardiovascular: Negative for chest pain, palpitations and leg swelling.        No orthopnea, PND, MACDONALD   Gastrointestinal: Negative for abdominal pain, blood in stool, constipation, diarrhea, nausea and vomiting.        States bowel movements have changed since starting Tamoxifen, denies rectal bleeding; colonoscopy done 2019   Endocrine: Negative for cold intolerance, heat intolerance, polydipsia and polyuria.   Genitourinary: Negative for decreased urine volume, dysuria, enuresis, flank pain, frequency, hematuria and urgency.        Recently treated for UTI which has since resolved  +irregular menses   Musculoskeletal: Negative for arthralgias, back pain, gait problem, joint swelling, myalgias, neck pain and neck stiffness.   Skin: Negative for color change and rash.        No hair changes, no nail changes   Allergic/Immunologic: Negative for environmental allergies, food allergies and immunocompromised state.   Neurological:  "Positive for light-headedness (intermittently, occurs when late taking Pristiq) and headaches (c/o increased frequency of HAs which she attributes to increased stress). Negative for dizziness, tremors, seizures, syncope, speech difficulty, weakness and numbness.   Hematological: Negative for adenopathy. Does not bruise/bleed easily.   Psychiatric/Behavioral: Negative for agitation, confusion, decreased concentration, dysphoric mood, sleep disturbance and suicidal ideas. The patient is not nervous/anxious.        Objective   Vitals:    03/27/20 0949   BP: 124/80   BP Location: Left arm   Patient Position: Sitting   Cuff Size: Adult   Pulse: 92   SpO2: 99%   Weight: 136 kg (299 lb)   Height: 168.9 cm (66.5\")     Body mass index is 47.54 kg/m².  Physical Exam   Constitutional: She is oriented to person, place, and time. She appears well-developed and well-nourished. No distress.   HENT:   Right Ear: Hearing, tympanic membrane, external ear and ear canal normal.   Left Ear: Hearing, tympanic membrane, external ear and ear canal normal.   Nose: Right sinus exhibits no maxillary sinus tenderness and no frontal sinus tenderness. Left sinus exhibits no maxillary sinus tenderness and no frontal sinus tenderness.   Eyes: Pupils are equal, round, and reactive to light. Conjunctivae, EOM and lids are normal.   Neck: Trachea normal and phonation normal. Neck supple. Normal carotid pulses and no JVD present. Carotid bruit is not present. No thyroid mass and no thyromegaly present.   Cardiovascular: Normal rate, regular rhythm, S1 normal and S2 normal. PMI is not displaced. Exam reveals no gallop and no friction rub.   No murmur heard.  Pulses:       Carotid pulses are 2+ on the right side, and 2+ on the left side.       Radial pulses are 2+ on the right side, and 2+ on the left side.        Dorsalis pedis pulses are 2+ on the right side, and 2+ on the left side.   Pulmonary/Chest: Effort normal and breath sounds normal. She has " no wheezes. She has no rhonchi. She has no rales. Chest wall is not dull to percussion. Right breast exhibits skin change (surgical scar right breast with hyperpigmentation). Right breast exhibits no inverted nipple, no mass, no nipple discharge and no tenderness. Left breast exhibits no inverted nipple, no mass, no nipple discharge, no skin change and no tenderness.   Abdominal: Soft. Normal appearance, normal aorta and bowel sounds are normal. She exhibits no abdominal bruit and no mass. There is no hepatosplenomegaly. There is no tenderness.   Musculoskeletal: Normal range of motion. She exhibits no edema or tenderness.   Lymphadenopathy:     She has no cervical adenopathy.        Right: No supraclavicular adenopathy present.        Left: No supraclavicular adenopathy present.   Neurological: She is alert and oriented to person, place, and time. She has normal strength and normal reflexes. No cranial nerve deficit or sensory deficit. Coordination normal.   Skin: Skin is warm and dry. No rash noted.   Psychiatric: She has a normal mood and affect. Her speech is normal and behavior is normal. Judgment and thought content normal. Cognition and memory are normal. She is attentive.   Nursing note and vitals reviewed.      Assessment/Plan   Emilie was seen today for annual exam, hypertension and hyperlipidemia.    Diagnoses and all orders for this visit:    PE (physical exam), annual  -     CBC Auto Differential  -     Comprehensive Metabolic Panel  -     Lipid Panel  -     TSH  -     Urinalysis With Microscopic If Indicated (No Culture) - Urine, Clean Catch    Essential hypertension    Mixed hyperlipidemia    Elevated glucose  -     Hemoglobin A1c    Anxiety    Need for Tdap vaccination  -     Tdap Vaccine Greater Than or Equal To 6yo IM    Screening for viral disease  -     Hepatitis C Antibody    Pap smear for cervical cancer screening  -     Ambulatory Referral to Gynecology    Risk assessment:  She has a personal  hx of breast cancer with CHEK2 gene mutation, followed with annual mammograms (last 3/2020).  She c/o feeling lightheaded whenever she forgets her Pristiq at the regular time, sx most likely withdrawal sx.   She is working with psychiatry for mgmt of anxiety and depression, improved on current medications.  Discussed importance of taking medication consistently.  She has a hx of glucose intolerance, discussed benefits of diet and exercise with the goal of weight loss.    Prevention:  She will schedule a pap smear with gynecology.  Mammogram is current.   Tdap is administered today. She has received her annual flu vaccine.

## 2020-03-27 NOTE — PATIENT INSTRUCTIONS

## 2020-03-30 DIAGNOSIS — R31.9 HEMATURIA, UNSPECIFIED TYPE: Primary | ICD-10-CM

## 2020-04-01 LAB
HCV AB S/CO SERPL IA: <0.1 S/CO RATIO (ref 0–0.9)
WRITTEN AUTHORIZATION: NORMAL

## 2020-04-02 LAB — REF LAB TEST METHOD: NORMAL

## 2020-04-28 DIAGNOSIS — I10 BENIGN ESSENTIAL HTN: ICD-10-CM

## 2020-04-28 RX ORDER — LISINOPRIL AND HYDROCHLOROTHIAZIDE 25; 20 MG/1; MG/1
1 TABLET ORAL DAILY
Qty: 30 TABLET | Refills: 5 | Status: SHIPPED | OUTPATIENT
Start: 2020-04-28 | End: 2020-10-21

## 2020-04-30 ENCOUNTER — PATIENT MESSAGE (OUTPATIENT)
Dept: INTERNAL MEDICINE | Facility: CLINIC | Age: 50
End: 2020-04-30

## 2020-04-30 ENCOUNTER — TELEMEDICINE (OUTPATIENT)
Dept: INTERNAL MEDICINE | Facility: CLINIC | Age: 50
End: 2020-04-30

## 2020-04-30 DIAGNOSIS — T23.129A: Primary | ICD-10-CM

## 2020-04-30 PROCEDURE — 99212 OFFICE O/P EST SF 10 MIN: CPT | Performed by: NURSE PRACTITIONER
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
## 2020-04-30 NOTE — PATIENT INSTRUCTIONS
Burn Care, Adult  A burn is an injury to the skin or the tissues under the skin. There are three types of burns:  · First degree. These burns may cause the skin to be red and slightly swollen.  · Second degree. These burns are very painful and cause the skin to be very red. The skin may also leak fluid, look shiny, and develop blisters.  · Third degree. These burns cause permanent damage. They either turn the skin white or black and make it look charred, dry, and leathery.  Taking care of your burn properly can help to prevent pain and infection. It can also help the burn to heal more quickly.  What are the risks?  Complications from burns include:  · Damage to the skin.  · Reduced blood flow near the injury.  · Dead tissue.  · Scarring.  · Problems with movement, if the burn happened near a joint or on the hands or feet.  Severe burns can lead to problems that affect the whole body, such as:  · Fluid loss.  · Less blood circulating in the body.  · Inability to maintain a normal core body temperature (thermoregulation).  · Infection.  · Shock.  · Problems breathing.  How to care for a first-degree burn  Right after a burn:  · Rinse or soak the burn under cool water until the pain stops. Do not put ice on your burn. This can cause more damage.  · Lightly cover the burn with a sterile cloth (dressing).  Burn care  · Follow instructions from your health care provider about:  ? How to clean and take care of the burn.  ? When to change and remove the dressing.  · Check your burn every day for signs of infection. Check for:  ? More redness, swelling, or pain.  ? Warmth.  ? Pus or a bad smell.  Medicine  · Take over-the-counter and prescription medicines only as told by your health care provider.  · If you were prescribed antibiotic medicine, take or apply it as told by your health care provider. Do not stop using the antibiotic even if your condition improves.  General instructions  · To prevent infection, do not put  butter, oil, or other home remedies on your burn.  · Do not rub your burn, even when you are cleaning it.  · Protect your burn from the sun.  How to care for a second-degree burn  Right after a burn:  · Rinse or soak the burn under cool water. Do this for several minutes. Do not put ice on your burn. This can cause more damage.  · Lightly cover the burn with a sterile cloth (dressing).  Burn care  · Raise (elevate) the injured area above the level of your heart while sitting or lying down.  · Follow instructions from your health care provider about:  ? How to clean and take care of the burn.  ? When to change and remove the dressing.  · Check your burn every day for signs of infection. Check for:  ? More redness, swelling, or pain.  ? Warmth.  ? Pus or a bad smell.  Medicine    · Take over-the-counter and prescription medicines only as told by your health care provider.  · If you were prescribed antibiotic medicine, take or apply it as told by your health care provider. Do not stop using the antibiotic even if your condition improves.  General instructions  · To prevent infection:  ? Do not put butter, oil, or other home remedies on the burn.  ? Do not scratch or pick at the burn.  ? Do not break any blisters.  ? Do not peel skin.  · Do not rub your burn, even when you are cleaning it.  · Protect your burn from the sun.  How to care for a third-degree burn  Right after a burn:  · Lightly cover the burn with gauze.  · Seek immediate medical attention.  Burn care  · Raise (elevate) the injured area above the level of your heart while sitting or lying down.  · Drink enough fluid to keep your urine clear or pale yellow.  · Rest as told by your health care provider. Do not participate in sports or other physical activities until your health care provider approves.  · Follow instructions from your health care provider about:  ? How to clean and take care of the burn.  ? When to change and remove the dressing.  · Check  your burn every day for signs of infection. Check for:  ? More redness, swelling, or pain.  ? Warmth.  ? Pus or a bad smell.  Medicine  · Take over-the-counter and prescription medicines only as told by your health care provider.  · If you were prescribed antibiotic medicine, take or apply it as told by your health care provider. Do not stop using the antibiotic even if your condition improves.  General instructions  · To prevent infection:  ? Do not put butter, oil, or other home remedies on the burn.  ? Do not scratch or pick at the burn.  ? Do not break any blisters.  ? Do not peel skin.  ? Do not rub your burn, even when you are cleaning it.  · Protect your burn from the sun.  · Keep all follow-up visits as told by your health care provider. This is important.  Contact a health care provider if:  · Your condition does not improve.  · Your condition gets worse.  · You have a fever.  · Your burn changes in appearance or develops black or red spots.  · Your burn feels warm to the touch.  · Your pain is not controlled with medicine.  Get help right away if:  · You have redness, swelling, or pain at the site of the burn.  · You have fluid, blood, or pus coming from your burn.  · You have red streaks near the burn.  · You have severe pain.  This information is not intended to replace advice given to you by your health care provider. Make sure you discuss any questions you have with your health care provider.  Document Released: 12/18/2006 Document Revised: 07/09/2017 Document Reviewed: 06/06/2017  [employer] Patient Education © 2020 [employer]
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
## 2020-05-28 ENCOUNTER — OFFICE VISIT (OUTPATIENT)
Dept: PSYCHIATRY | Facility: HOSPITAL | Age: 50
End: 2020-05-28

## 2020-05-28 DIAGNOSIS — F33.1 MODERATE EPISODE OF RECURRENT MAJOR DEPRESSIVE DISORDER (HCC): Primary | ICD-10-CM

## 2020-05-28 DIAGNOSIS — F41.1 GENERALIZED ANXIETY DISORDER: ICD-10-CM

## 2020-05-28 DIAGNOSIS — R53.0 NEOPLASTIC MALIGNANT RELATED FATIGUE: ICD-10-CM

## 2020-05-28 DIAGNOSIS — F43.10 POST TRAUMATIC STRESS DISORDER (PTSD): ICD-10-CM

## 2020-05-28 PROCEDURE — 99214 OFFICE O/P EST MOD 30 MIN: CPT | Performed by: NURSE PRACTITIONER

## 2020-05-28 NOTE — PROGRESS NOTES
[facility]  Pt provided consent for Video Session via [email or web address]: Risk Reduction, Current Health Crisis    Subjective  Patient ID: Emilie Soto is a 49 y.o. female has been called today from the [facility] clinic in lieu of in person visit.    Pt noted to be alert, oriented, and engaged in conversation. Affect and mood euthymic.  Pt reports improvement in mood. Finds this to be related to improved life situation with decrease in external stressors. Is progressing toward having contract regarding new living situation. Recognizes this has been especially helpful on sense of hopefulness.  Pt describes feeling as though medication is too strong, based on recognition of brain zaps if she misses medication by even one hour. Recognizes this is especially impactful on the weekend or when she has a more challenging day with adjusted schedule or other priorities.   Pt continues with generally negative attributions toward medication mgmt with AD therapy. Does find Ritalin is helpful.   Pt recognizes variant energy levels. Finds every good day is followed by a bad day. Denies significant activity level. Is working to box up home, anticipating move, and moving things into pod.  Is working to understand importance of developing new schedule, although finds ambivalence related to lack of wanting to find new successful pattern and then be required to move back to schedule when allowed to return to work.    Medications Reviewed  Pt continues on Pristiq 100 mg daily  Ritalin    Diagnoses and all orders for this visit:    Moderate episode of recurrent major depressive disorder (CMS/HCC)    Neoplastic malignant related fatigue    Post traumatic stress disorder (PTSD)    Generalized anxiety disorder        Plan of Care  Pt continues with some benefit to regimen of Ritalin and add Pristiq for mood and anxiety management.  Patient continues with negative attributions to antidepressant therapy, specifically Pristiq.  Despite  this, agrees now is likely not the time to discontinue. Counseling provided to pt regarding short half life of medication and importance of strict adherence to schedule. Reviewed setting alarm with consistent scheduling for next two weeks and will continue to assess. Continues to find significant benefit to Ritalin; will continue. Counseling provided regarding ambivilence in daily pattern. Explored benefit of routine and exploration of reward/ pleasure based activities. FU scheduled in 2 weeks at pt request.    Total time spent on phone with patient: 27 minutes.   20 minutes spent in supportive counseling surrounding issues of ambivalence, medication education, behavioral activation, and physical activity.

## 2020-06-16 DIAGNOSIS — H10.9 CONJUNCTIVITIS OF RIGHT EYE, UNSPECIFIED CONJUNCTIVITIS TYPE: Primary | ICD-10-CM

## 2020-06-16 RX ORDER — MOXIFLOXACIN 5 MG/ML
1 SOLUTION/ DROPS OPHTHALMIC 3 TIMES DAILY
Qty: 3 ML | Refills: 0 | Status: SHIPPED | OUTPATIENT
Start: 2020-06-16 | End: 2020-11-02

## 2020-07-20 ENCOUNTER — OFFICE VISIT (OUTPATIENT)
Dept: OBSTETRICS AND GYNECOLOGY | Age: 50
End: 2020-07-20

## 2020-07-20 VITALS
BODY MASS INDEX: 47.09 KG/M2 | WEIGHT: 293 LBS | DIASTOLIC BLOOD PRESSURE: 70 MMHG | HEIGHT: 66 IN | SYSTOLIC BLOOD PRESSURE: 116 MMHG

## 2020-07-20 DIAGNOSIS — E28.2 PCOS (POLYCYSTIC OVARIAN SYNDROME): ICD-10-CM

## 2020-07-20 DIAGNOSIS — Z15.02 MONOALLELIC MUTATION OF CHEK2 GENE IN FEMALE PATIENT: ICD-10-CM

## 2020-07-20 DIAGNOSIS — Z15.01 MONOALLELIC MUTATION OF CHEK2 GENE IN FEMALE PATIENT: ICD-10-CM

## 2020-07-20 DIAGNOSIS — Z15.89 MONOALLELIC MUTATION OF CHEK2 GENE IN FEMALE PATIENT: ICD-10-CM

## 2020-07-20 DIAGNOSIS — Z15.09 MONOALLELIC MUTATION OF CHEK2 GENE IN FEMALE PATIENT: ICD-10-CM

## 2020-07-20 DIAGNOSIS — Z11.51 ENCOUNTER FOR SCREENING FOR HUMAN PAPILLOMAVIRUS (HPV): ICD-10-CM

## 2020-07-20 DIAGNOSIS — Z01.419 ENCOUNTER FOR GYNECOLOGICAL EXAMINATION: Primary | ICD-10-CM

## 2020-07-20 PROCEDURE — 99386 PREV VISIT NEW AGE 40-64: CPT | Performed by: OBSTETRICS & GYNECOLOGY

## 2020-07-20 NOTE — PROGRESS NOTES
.  Subjective     Chief Complaint   Patient presents with   • Gynecologic Exam     NP  AE  HX PAP 14-, MG 3/6/20, COLON        History of Present Illness    Emilie Soto is a 50 y.o.  who presents for annual exam.  She has a history of PCOS and previously had sporadic menses. She reports she is having monthly flow since starting tamoxifen. She reports flow last 4 to 5 days and is consistent with normal menses.   She has been treated for breast cancer and had a partial mastectomy and is on tamoxifen.   She has a female partner. She denies abnormal paps and believes her last pap was about .  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         Current contraception: n/a  History of abnormal Pap smear: no  Received Gardasil immunization: no  Perform regular self breast exam: yes - reg  Family history of uterine or ovarian cancer: no  Family History of colon cancer: no  Family history of breast cancer: yes - multiple family members    Mammogram: up to date.  Colonoscopy: up to date.  DEXA: not indicated.    Exercise: not active  Calcium/Vitamin D: adequate intake    The following portions of the patient's history were reviewed and updated as appropriate: allergies, current medications, past family history, past medical history, past social history, past surgical history and problem list.    Review of Systems    Review of Systems   Constitutional: Negative for fatigue. neg for fever  Respiratory: Negative for shortness of breath.    Gastrointestinal: Negative for abdominal pain.   Genitourinary: Negative for dysuria. negative for recent irregular or excessive  "vaginal bleeding  Neurological: Negative for headaches.   Psychiatric/Behavioral: Negative for dysphoric mood.         Objective   Physical Exam    /70   Ht 167.6 cm (66\")   Wt (!) 144 kg (318 lb)   LMP 06/20/2020 (Approximate)   Breastfeeding No   BMI 51.33 kg/m²   General:   alert and no distress   Heart: regular rate and rhythm   Lungs: clear to auscultation bilaterally   Breast: Inspection reveals healed scars on right breast from surgery; right nipple is absent. Right breast is without masses, retractions or axillary adenopathy. Left breast is without masses, retractions, nipple discharge or axillary adenopathy   Neck: Supple, no thyromegaly   Abdomen: Soft, nontender    No guarding or rebound   Pelvis: External genitalia: normal general appearance  Urinary system: urethral meatus normal  Vaginal: normal mucosa without prolapse or lesions  Cervix: normal appearance  Adnexa: no tenderness; exam limited by habitus  Uterus: nontender; further evaluation limited by habitus   Extremities: Extremities normal, atraumatic, no cyanosis or edema   Neurologic: Alert and oriented   Psychiatric: Normal affect, judgement, and mood     Assessment/Plan   Emilie was seen today for gynecologic exam.    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  Breast self exam technique reviewed and patient encouraged to perform self-exam monthly.  Discussed healthy lifestyle modifications.  Recommended 30 minutes of aerobic exercise five times per week.  Discussed calcium needs to prevent osteoporosis.    Pt with long hx of PCOS and oligomenorrhea. She reports she briefly used ocp's but not for many years. She is having regular menses now " that started within the last year. Recommend embx. Pt did not want to proceed today but will consider. Reviewed significant risk of endometrial hyperplasia/cancer related to her history and increased risk associated with tamoxifen. Pt verbally notes understanding and will consider. Recommend she return for u/s for further evaluation due to limited pelvic exam. Pt will consider embx at that time.    Pt was evaluated with genetic testing and carries a mutation in the Chek gene. Reviewed genetic counseling and she has an increased risk of breast and colon cancer. She has colonoscopy last year. She declined mastectomy and is planning increased surveillance with breast MRI scheduled in September. She is followed by Dr. Greene.

## 2020-07-23 LAB
CYTOLOGIST CVX/VAG CYTO: NORMAL
CYTOLOGY CVX/VAG DOC CYTO: NORMAL
CYTOLOGY CVX/VAG DOC THIN PREP: NORMAL
DX ICD CODE: NORMAL
HIV 1 & 2 AB SER-IMP: NORMAL
HPV I/H RISK 4 DNA CVX QL PROBE+SIG AMP: NEGATIVE
OTHER STN SPEC: NORMAL
STAT OF ADQ CVX/VAG CYTO-IMP: NORMAL

## 2020-07-24 ENCOUNTER — TELEPHONE (OUTPATIENT)
Dept: OBSTETRICS AND GYNECOLOGY | Age: 50
End: 2020-07-24

## 2020-07-24 NOTE — TELEPHONE ENCOUNTER
----- Message from Yesenia Rahman MD sent at 7/24/2020 10:46 AM EDT -----  Please call pt, her pap and hpv are negative/normal

## 2020-07-28 ENCOUNTER — LAB (OUTPATIENT)
Dept: LAB | Facility: HOSPITAL | Age: 50
End: 2020-07-28

## 2020-07-28 ENCOUNTER — OFFICE VISIT (OUTPATIENT)
Dept: ONCOLOGY | Facility: CLINIC | Age: 50
End: 2020-07-28

## 2020-07-28 VITALS
HEIGHT: 66 IN | HEART RATE: 95 BPM | DIASTOLIC BLOOD PRESSURE: 81 MMHG | WEIGHT: 293 LBS | TEMPERATURE: 98.2 F | OXYGEN SATURATION: 96 % | SYSTOLIC BLOOD PRESSURE: 124 MMHG | RESPIRATION RATE: 16 BRPM | BODY MASS INDEX: 47.09 KG/M2

## 2020-07-28 DIAGNOSIS — C50.111 MALIGNANT NEOPLASM OF CENTRAL PORTION OF RIGHT BREAST IN FEMALE, ESTROGEN RECEPTOR POSITIVE (HCC): Primary | ICD-10-CM

## 2020-07-28 DIAGNOSIS — Z17.0 MALIGNANT NEOPLASM OF CENTRAL PORTION OF RIGHT BREAST IN FEMALE, ESTROGEN RECEPTOR POSITIVE (HCC): Primary | ICD-10-CM

## 2020-07-28 DIAGNOSIS — C50.111 MALIGNANT NEOPLASM OF CENTRAL PORTION OF RIGHT BREAST IN FEMALE, ESTROGEN RECEPTOR POSITIVE (HCC): ICD-10-CM

## 2020-07-28 DIAGNOSIS — Z17.0 MALIGNANT NEOPLASM OF CENTRAL PORTION OF RIGHT BREAST IN FEMALE, ESTROGEN RECEPTOR POSITIVE (HCC): ICD-10-CM

## 2020-07-28 LAB
ALBUMIN SERPL-MCNC: 4.1 G/DL (ref 3.5–5.2)
ALBUMIN/GLOB SERPL: 1.4 G/DL (ref 1.1–2.4)
ALP SERPL-CCNC: 58 U/L (ref 38–116)
ALT SERPL W P-5'-P-CCNC: 37 U/L (ref 0–33)
ANION GAP SERPL CALCULATED.3IONS-SCNC: 13.4 MMOL/L (ref 5–15)
AST SERPL-CCNC: 34 U/L (ref 0–32)
BASOPHILS # BLD AUTO: 0.05 10*3/MM3 (ref 0–0.2)
BASOPHILS NFR BLD AUTO: 0.6 % (ref 0–1.5)
BILIRUB SERPL-MCNC: 0.3 MG/DL (ref 0.2–1.2)
BUN SERPL-MCNC: 8 MG/DL (ref 6–20)
BUN/CREAT SERPL: 11.6 (ref 7.3–30)
CALCIUM SPEC-SCNC: 9.9 MG/DL (ref 8.5–10.2)
CHLORIDE SERPL-SCNC: 100 MMOL/L (ref 98–107)
CO2 SERPL-SCNC: 23.6 MMOL/L (ref 22–29)
CREAT SERPL-MCNC: 0.69 MG/DL (ref 0.6–1.1)
DEPRECATED RDW RBC AUTO: 41 FL (ref 37–54)
EOSINOPHIL # BLD AUTO: 0.18 10*3/MM3 (ref 0–0.4)
EOSINOPHIL NFR BLD AUTO: 2 % (ref 0.3–6.2)
ERYTHROCYTE [DISTWIDTH] IN BLOOD BY AUTOMATED COUNT: 13.1 % (ref 12.3–15.4)
GFR SERPL CREATININE-BSD FRML MDRD: 90 ML/MIN/1.73
GLOBULIN UR ELPH-MCNC: 3 GM/DL (ref 1.8–3.5)
GLUCOSE SERPL-MCNC: 135 MG/DL (ref 74–124)
HCT VFR BLD AUTO: 38.4 % (ref 34–46.6)
HGB BLD-MCNC: 12.9 G/DL (ref 12–15.9)
IMM GRANULOCYTES # BLD AUTO: 0.06 10*3/MM3 (ref 0–0.05)
IMM GRANULOCYTES NFR BLD AUTO: 0.7 % (ref 0–0.5)
LYMPHOCYTES # BLD AUTO: 2.37 10*3/MM3 (ref 0.7–3.1)
LYMPHOCYTES NFR BLD AUTO: 26.7 % (ref 19.6–45.3)
MCH RBC QN AUTO: 29.1 PG (ref 26.6–33)
MCHC RBC AUTO-ENTMCNC: 33.6 G/DL (ref 31.5–35.7)
MCV RBC AUTO: 86.5 FL (ref 79–97)
MONOCYTES # BLD AUTO: 0.62 10*3/MM3 (ref 0.1–0.9)
MONOCYTES NFR BLD AUTO: 7 % (ref 5–12)
NEUTROPHILS NFR BLD AUTO: 5.59 10*3/MM3 (ref 1.7–7)
NEUTROPHILS NFR BLD AUTO: 63 % (ref 42.7–76)
NRBC BLD AUTO-RTO: 0 /100 WBC (ref 0–0.2)
PLATELET # BLD AUTO: 243 10*3/MM3 (ref 140–450)
PMV BLD AUTO: 9.3 FL (ref 6–12)
POTASSIUM SERPL-SCNC: 3.7 MMOL/L (ref 3.5–4.7)
PROT SERPL-MCNC: 7.1 G/DL (ref 6.3–8)
RBC # BLD AUTO: 4.44 10*6/MM3 (ref 3.77–5.28)
SODIUM SERPL-SCNC: 137 MMOL/L (ref 134–145)
WBC # BLD AUTO: 8.87 10*3/MM3 (ref 3.4–10.8)

## 2020-07-28 PROCEDURE — 80053 COMPREHEN METABOLIC PANEL: CPT

## 2020-07-28 PROCEDURE — 99213 OFFICE O/P EST LOW 20 MIN: CPT | Performed by: NURSE PRACTITIONER

## 2020-07-28 PROCEDURE — 85025 COMPLETE CBC W/AUTO DIFF WBC: CPT

## 2020-07-28 PROCEDURE — 36415 COLL VENOUS BLD VENIPUNCTURE: CPT

## 2020-07-28 NOTE — PROGRESS NOTES
Subjective     REASON FOR CONSULTATION:1. High grade, invasive ductal carcinoma of right breast, ER/LA+, Her2 negative; clinical T1bN0, anatomic stage IA, prognostic stage IA  · May 28, 2019 right lumpectomy with removal of nipple areolar complex and right sentinel lymph node biopsy  -Central 2:00, invasive ductal carcinoma, grade 2, Elvin score of 6, tumor size is 10 mm, no DCIS, one lymph node negative, good margins    2.  Oncotype DX score of 6, low risk, no chemo necessary    3.  June 28, 2019: Started tamoxifen    4.  Family history of breast cancer, CHEK2  positive    HISTORY OF PRESENT ILLNESS: The patient is a 50-year-old female with the above-mentioned history who returns today for routine follow-up, continuing on tamoxifen.  She feels like, overall she is tolerating this well.  She denies issues with hot flashes.  She has struggled recently with more panic attacks.  She is planning on reaching out to [facility] Trinh to further discuss.  Patient states that she has been under a lot of stress lately, and is moving.    Ms. Soto just had her annual pelvic exam by Dr. Rahman as well as a breast exam on 7/20/2020.  She continues to have regular menstrual periods.  She is supposed to have an endometrial biopsy tomorrow by Dr. Rahman.    Patient also had a mammogram in March of this year, which was negative.  She denies any new palpable breast masses or nodularity, no nipple discharge or bleeding.      Oncologic history:   The patient is a 50 y.o. year old female who is here for an opinion about the above issue.    Patient is a 49-year old female who presents today as a new patient for consultation on her newly diagnosed high grade, invasive ductal carcinoma of right breast.   Patient had seen Dr. Greene on May 14, 2019.    She underwent a screening mammogram but did not appreciate any mass herself and denied any breast pain or nipple discharge.    Patient is accompanied with her wife Trena.  They have  been together for 19 years and  for the last 2 years.    April 18, 2019: Screening mammogram showed a 10 mm small oval mass in the subareolar region of the right breast.  Left breast was negative.  Spot compression and ultrasound recommended.  Of the right breast.    April 29, 2019: Right diagnostic mammogram showed a high density round mass 10 mm with indistinct margins in the anterior one third subareolar region of the right breast located to centimeters from the nipple.    April 29, 2019: Ultrasound suggested a solid mass with indistinct margins 9 mm and subareolar region.  There is abnormal axillary node in the right axilla.  There are 2 adjacent lymph nodes in the right axilla with mildly prominent cortices.  The left axilla was evaluated for comparison and the nodes on the right are of greater cortical thickness.      Ultrasound-guided biopsy of the breast mass and right axillary lymph node was suggested.     5/9/19:  US guided biopsy sent to [facility] lab  showing solid mass in the sub-areorolar region of right breast revealing invasive high-grade mammary carcinoma, grade 3. Tubule score 3, nuclear grade 3, and mitotic score 2 for a total score of 8. Neoplasm is present in 4/8 biopsies and measures 4 mm in greatest dimension.   Biopsy performed on 05/09/2019 of prominent lymph node of right axilla was benign- negative for metastatic carcinoma.  ER: 82.35%,  positive  OR: 92.68%, positive  HER-2/latoya: 1+ negative  Ki-67 14.81%       05/09/2019 MRI of bilateral breast:   IMPRESSION:  1. Biopsy-proven malignancy in the right breast in the anterior  one-third approximately 1.8 cm from the nipple at the 2:30 position with  an internal metallic clip. The mass measures on the order of 1 cm in  greatest dimension. No other suspicious findings are seen within the  right breast and there is no evidence for right axillary or internal  mammary chain adenopathy. The patient appears to be a candidate for  breast  conservation therapy.  2. There are no findings suspicious for malignancy in the left breast.  BI-RADS Category 6: Known biopsy-proven malignancy.    5/14/19: Patient was seen by Dr. Greene who felt that she was a good candidate for lumpectomy given the size of the lesion related to her breast size.  Because of superficial subareolar location she would require removal of the nipple areolar complex in order to make sure she has negative margins.  Patient preferred breast conservation with central lumpectomy.  This included the removal of the nipple areolar complex.      Also obtained INVITAE  genetic test given her family history of malignancy.    Estrogen receptor: positive (82.35% moderate),   Progesterone receptor:  positive (92.68%, strong)   HER2/Amy: negative (IHC score1+) and Ki-67=14.8%  Pathologic Stage: pT1b, (sn)N0 (G2).    Synoptic Report :  TUMOR  Tumor Site: Invasive Carcinoma Central  Clock Position of Tumor Site 2 o'clock  3 o'clock  Histologic Type Invasive carcinoma of no special type (ductal, not otherwise  specified)  Glandular (Acinar) / Tubular Differentiation Score 2  Nuclear Pleomorphism Score 2  Mitotic Rate Score 2 (4-7 mitoses per mm2)  Number of Mitoses per 10 High-Power Fields 15 mitotic figures  Diameter of Microscope Field in Millimeters (mm) 0.55 Millimeters (mm)  Overall Grade Grade 2 (scores of 6 or 7)  Tumor Size Greatest dimension of largest invasive focus in Millimeters (mm): 10  Millimeters (mm)    MARGINS  Invasive Carcinoma Margins Uninvolved by invasive carcinoma    LYMPH NODES  Regional Lymph Nodes Uninvolved by tumor cells  Number of Lymph Nodes Examined 1  Number of [place] Nodes Examined 1    Oncotype DX testing ordered but pending.    Patient has a family history of paternal grandmother with breast cancer, paternal aunt with breast cancer, cousin on father's side dying of breast cancer at age 43.  History of ovarian or prostate cancer or uterine cancer.  Mother had  kidney cancer status post surgery    INVITAE testing positive for CHEK2- Patient is following up with  in July.     Patient is now scheduled for counseling with genetic and also with radiation oncology next week.      PAST MEDICAL HISTORY:  1. Hypertension- Under control with medications  2. Pre-diabetes- Diet control  3. Obesity  4. PCOS   5. Anxiety- Worse since cancer diagnosis. Taking Gabapentin since yesterday which helped with her symptoms. Followed by [facility] Trinh.   6. No hx of CVA or CAD.     FAMILY HISTORY:  Paternal cousin with breast cancer diagnosed at 43 and . Paternal aunt with breast cancer s/p mastectomy; paternal grandmother with breast cancer s/p lumpectomy.   No other family history of ovarian cancer or prostrate cancer  Mother had kidney cancer s/p surgery.     OB-GYN:  Menarche- 9 years old  Menopause- n/a  Pregnancies- none  Post menopausal HRT- N/A  Hx of birth control pills- None    SOCIAL HISTORY:  She works at [employer].  She is  to her wife for the last 2 years but lived with her for 19.  Years.  Patient's wife is a schoolteacher.  Patient does not smoke.  She does not drink.  Allergies reviewed with the patient today.      Past Medical History:   Diagnosis Date   • Anxiety    • Anxiety and depression    • Cancer (CMS/HCC)     RIGHT BREAST   • Cancer (CMS/HCC)     PAROTID GLAND   • Depression 1988   • Headache    • History of kidney stones    • Hyperlipidemia    • Hypertension    • Irritable bowel syndrome    • Lumbago    • Mood disorder (CMS/HCC)    • Obesity life   • PONV (postoperative nausea and vomiting)    • S/P radiation therapy     PAROTID GLAND CANCER   • Sleep apnea     C-PAP IN USE   • Vitamin D deficiency         Past Surgical History:   Procedure Laterality Date   • APPENDECTOMY      removed when removing dermoid on right ovary   • BREAST BIOPSY Right 2019   • BREAST LUMPECTOMY Right    • BREAST LUMPECTOMY WITH SENTINEL NODE BIOPSY Right  5/28/2019    Procedure: Right central partial mastectomy (with removal of the nipple-areola-complex) and sentinel lymph node biopsy;  Surgeon: Trena Greene MD;  Location: [facility] MAIN OR;  Service: General   • CHOLECYSTECTOMY     • COLONOSCOPY N/A 12/5/2019    Procedure: COLONOSCOPY TO CECUM;  Surgeon: Pedrito Fulton Jr., MD;  Location: [facility] ENDOSCOPY;  Service: General   • DERMOID CYST  EXCISION  1989   • HERNIA REPAIR  2006    umbilical   • OVARY SURGERY      dermoid removed   • SALIVARY GLAND SURGERY  1993    due to cancer   • TONSILLECTOMY  01/01/1979    I was 9 years old        Current Outpatient Medications on File Prior to Visit   Medication Sig Dispense Refill   • cholecalciferol (VITAMIN D3) 1000 units tablet Take 2,000 Units by mouth Daily.     • colchicine 0.6 MG tablet 2 tablets at first sign of gout flare, then 1 tablet 1 hour later, max 3 tablets over 1 hour period. May begin 1 tablet afterward. 30 tablet 0   • desvenlafaxine (PRISTIQ) 100 MG 24 hr tablet Take 1 tablet by mouth Daily. 90 tablet 0   • lisinopril-hydrochlorothiazide (PRINZIDE,ZESTORETIC) 20-25 MG per tablet TAKE 1 TABLET BY MOUTH DAILY 30 tablet 5   • LORazepam (ATIVAN) 0.5 MG tablet Take 1 tablet by mouth Every 8 (Eight) Hours As Needed for Anxiety. for anxiety 45 tablet 0   • methylphenidate LA (Ritalin LA) 20 MG 24 hr capsule Take 1 capsule by mouth Daily 30 capsule 0   • moxifloxacin (VIGAMOX) 0.5 % ophthalmic solution Administer 0.05 mL to the right eye 3 (Three) Times a Day. 3 mL 0   • tamoxifen (NOLVADEX) 20 MG chemo tablet Take 1 tablet by mouth Daily. 30 tablet 5   • vitamin B-12 (CYANOCOBALAMIN) 1000 MCG tablet Take 1,000 mcg by mouth Daily.       No current facility-administered medications on file prior to visit.         ALLERGIES:    Allergies   Allergen Reactions   • Citalopram Rash   • Flexeril [Cyclobenzaprine] Unknown - High Severity     MUSCLES TENSE-OPPOSITE OF WHAT IT IS INTENDED TO DO-ELEVATES B/P   •  Doxycycline Rash     Blisters on hands   • Keflex [Cephalexin] Rash   • Sulfa Antibiotics Rash     NAUSEA/VOMITING/FEVER   • Vilazodone Hcl Hives, Itching and Rash        Social History     Socioeconomic History   • Marital status:      Spouse name: Trena Montenegro   • Number of children: 0   • Years of education: Not on file   • Highest education level: Not on file   Occupational History     Employer: [employer]     Comment: 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    Tobacco Use   • Smoking status: Never Smoker   • Smokeless tobacco: Never Used   • Tobacco comment: Parents smoked.  I have never smoked   Substance and Sexual Activity   • Alcohol use: Yes     Frequency: Monthly or less     Drinks per session: 1 or 2     Comment: Only once or twice a year.  Mixed Drinks   • Drug use: No   • Sexual activity: Yes     Partners: Female     Birth control/protection: None        Family History   Problem Relation Age of Onset   • Hypertension Mother    • Arthritis Mother    • Kidney cancer Mother    • Depression Mother         Not Dr tommy- ed[place] go   • Heart disease Father    • Hernia Father    • Hypertension Father    • No Known Problems Brother    • Breast cancer Paternal Aunt    • Breast cancer Paternal Cousin    • Drug abuse Paternal Uncle         life long   • Drug abuse Paternal Uncle         life long   • Breast cancer Paternal Grandmother    • Osteoporosis Maternal Grandmother    • Lung cancer Paternal Grandfather    • Malig Hyperthermia Neg Hx         Review of Systems   Constitutional: Positive for fatigue. Negative for activity change, appetite change, chills and fever.   HENT: Negative for mouth sores, nosebleeds and trouble swallowing.    Respiratory: Negative for cough and shortness of breath.    Cardiovascular: Negative for chest pain and leg swelling.   Gastrointestinal: Negative for abdominal pain, constipation, diarrhea, nausea and vomiting.   Genitourinary: Negative  "for difficulty urinating.   Skin: Negative for rash.   Neurological: Negative for dizziness, weakness and numbness.   Hematological: Negative for adenopathy. Does not bruise/bleed easily.   Psychiatric/Behavioral: Negative for sleep disturbance. The patient is nervous/anxious.         Objective     Vitals:    07/28/20 1434   BP: 124/81   Pulse: 95   Resp: 16   Temp: 98.2 °F (36.8 °C)   TempSrc: Temporal   SpO2: 96%   Weight: (!) 144 kg (318 lb 1.6 oz)   Height: 167.6 cm (65.98\")   PainSc: 0-No pain      Current Status 2/4/2020   ECOG score 0       Physical Exam   Constitutional: She is oriented to person, place, and time. She appears well-developed and well-nourished.   HENT:   Head: Normocephalic and atraumatic.   Nose: Nose normal.   Mouth/Throat: Oropharynx is clear and moist and mucous membranes are normal. No oropharyngeal exudate.   Eyes: Pupils are equal, round, and reactive to light.   Neck: Normal range of motion. Neck supple.   Cardiovascular: Normal rate, regular rhythm and normal heart sounds.   Pulmonary/Chest: Effort normal and breath sounds normal. No respiratory distress. She has no wheezes. She has no rhonchi. She has no rales.   Abdominal: Soft. Normal appearance and bowel sounds are normal. She exhibits no distension. There is no hepatosplenomegaly. There is no tenderness.   Musculoskeletal: Normal range of motion. She exhibits no edema.   Lymphadenopathy:     She has no cervical adenopathy.        Right: No supraclavicular adenopathy present.        Left: No supraclavicular adenopathy present.   Neurological: She is alert and oriented to person, place, and time.   Skin: Skin is warm and dry.   Psychiatric: She has a normal mood and affect. Her behavior is normal.   Nursing note and vitals reviewed.    Patient screened positive for depression based on a PHQ-9 score of 9 on 7/28/2020. Follow-up recommendations include: continue to follow with supportive oncology services clinic..    RECENT " LABS:  Hematology WBC   Date Value Ref Range Status   07/28/2020 8.87 3.40 - 10.80 10*3/mm3 Final   03/27/2020 7.31 3.40 - 10.80 10*3/mm3 Final     RBC   Date Value Ref Range Status   07/28/2020 4.44 3.77 - 5.28 10*6/mm3 Final   03/27/2020 4.24 3.77 - 5.28 10*6/mm3 Final     Hemoglobin   Date Value Ref Range Status   07/28/2020 12.9 12.0 - 15.9 g/dL Final     Hematocrit   Date Value Ref Range Status   07/28/2020 38.4 34.0 - 46.6 % Final     Platelets   Date Value Ref Range Status   07/28/2020 243 140 - 450 10*3/mm3 Final          Assessment/Plan      1. High grade, invasive ductal carcinoma of right breast, ER/TX+, Her2 negative; clinical T1bN0, anatomic stage IA, prognostic stage IA:   · May 6, 2019 ultrasound-guided biopsy of the right breast mass,  - grade 3, invasive mammary carcinoma, Elvin score of 8, biopsy of right axillary lymph node negative  ER 82.35%, TX 92.68%, HER-2/latoya 1+ , Ki-67 14.8%    · May 28, 2019 right lumpectomy with removal of nipple areolar complex and right sentinel lymph node biopsy  -Central 2:00, invasive ductal carcinoma, grade 2, Elvin score of 6, tumor size is 10 mm, no DCIS, one lymph node negative, good margins  · Oncotype DX score of 6, low risk disease, been not give chemotherapy    · I discussed the pathology findings with the patient today including ER/TX+, Her2 negative status of her cancer.  I discussed about consideration of chemotherapy if Oncotype DX testing falls in a high risk category.  Patient is not  keen on receiving chemotherapy.  I further discussed in length with patient on the 3 different endocrine therapies tamoxifen/aromatase inhibitors/Evista. Discussed Tamoxifen is for pre-menopausal women and other two are for post-menopausal women. Since she is pre-menopausal, Tamoxifen will be her best and first option. I have detailed the side effects of tamoxifen including depression, hot flashes, rare chance for uterine cancer, weight gain, blood clots, DVT,  PE, hair thinning, rare chance for thrombocytopenia, cataracts.  Aromatase inhibitors can cause arthralgias, hot flashes, decrease in bone density, rare chance for diarrhea, also discussed about hot flashes with it.  Evista is approved for osteopenia and can also be used for prophylaxis with fewer side effects except hot flashes and rare chance for cataracts but it can improve bone density.     · We will plan on starting tamoxifen given premenopausal for 2 to 3 years followed by Arimidex once postmenopausal.  · 2020, patient continues on tamoxifen and is tolerating this well.  She will have a follow-up MRI of the breast in September.        2. Family history of cancer: Paternal cousin with breast cancer diagnosed at 43 and . Paternal aunt with breast cancer s/p mastectomy; paternal grandmother with breast cancer s/p lumpectomy.   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  ·  She has an appointment with  in early July.   · We will plan on alternating MRI of breast with mammogram alternating.  · Discussed in breast cancer conference today and Dr. Greene and we agreed that best option is to make an MRI of breast with mammogram every 6 months.    3. Mild leukocytosis: 2019 CBC mild elevation of WBC at 11.29. Will monitor.   · Today, 2020 WBC 8.87.    4. Vitamin B-12 deficiency: B12 level 270, advised patient to take over the counter B-12 thousand micrograms orally daily.     5. PCOS, Complicated with obesity: Patient attempted to undergo bariatric surgery in order to reduce weight and further help with her PCOS. This was stalled due to her recent breast cancer diagnosis. In past patient has been able to lose 85 lbs with diet modifications.   · Advised patient to exercise 5 times per week with 20 minutes of aerobics, total of 40 minutes/day .   · Suggested Live [facility] program and patient to visit the cancer  resource center.  · Referral to Nutritionist for further recommendations.   · Referred patient to OB/GYN today for further evaluation.  · Continues to follow closely with Dr. Lexii Rahman with recent pelvic exam, planning to have endometrial biopsy tomorrow.    6.  Anxiety: Patient has significant anxiety, has been referred to [facility] Trinh.  In the past she has tried Zoloft Wellbutrin and Seroquel and did not have good response to any of these medications.  · Patient is being monitored by Alexa Trinh.  · 7/28/2020 reports more issues with panic attacks recently.  The patient is planning on contacting Alexa Trinh, and I will notify her as well.    PLAN:  1. Continue tamoxifen 20 mg daily.  2. Continue to follow with gynecology.  3. Patient scheduled for breast MRI in September 2020.  4. Return for follow-up visit with Dr. Trevizo in 6 months with repeat CBC and CMP.  5. Continue to follow with supportive oncology services clinic, Alexa Trinh.  6. We discussed calling/returning sooner should she open to new concerns or problems prior to her next visit.    7. Plan to continue alternating an MRI of the breast as well as an mammogram of the breast every 6 months due to the patient's CHEK2 mutation.       Cc: VICENTE Scales Dr.

## 2020-07-29 ENCOUNTER — OFFICE VISIT (OUTPATIENT)
Dept: OBSTETRICS AND GYNECOLOGY | Age: 50
End: 2020-07-29

## 2020-07-29 ENCOUNTER — PROCEDURE VISIT (OUTPATIENT)
Dept: OBSTETRICS AND GYNECOLOGY | Age: 50
End: 2020-07-29

## 2020-07-29 VITALS
HEIGHT: 66 IN | BODY MASS INDEX: 47.09 KG/M2 | SYSTOLIC BLOOD PRESSURE: 120 MMHG | DIASTOLIC BLOOD PRESSURE: 78 MMHG | WEIGHT: 293 LBS

## 2020-07-29 DIAGNOSIS — N83.201 BILATERAL OVARIAN CYSTS: ICD-10-CM

## 2020-07-29 DIAGNOSIS — N83.202 BILATERAL OVARIAN CYSTS: ICD-10-CM

## 2020-07-29 DIAGNOSIS — N93.9 ABNORMAL UTERINE BLEEDING: ICD-10-CM

## 2020-07-29 DIAGNOSIS — Z01.812 PRE-PROCEDURE LAB EXAM: ICD-10-CM

## 2020-07-29 DIAGNOSIS — E28.2 PCOS (POLYCYSTIC OVARIAN SYNDROME): Primary | ICD-10-CM

## 2020-07-29 DIAGNOSIS — N91.5 OLIGOMENORRHEA, UNSPECIFIED TYPE: ICD-10-CM

## 2020-07-29 PROCEDURE — 58100 BIOPSY OF UTERUS LINING: CPT | Performed by: OBSTETRICS & GYNECOLOGY

## 2020-07-29 PROCEDURE — 99213 OFFICE O/P EST LOW 20 MIN: CPT | Performed by: OBSTETRICS & GYNECOLOGY

## 2020-07-29 PROCEDURE — 81025 URINE PREGNANCY TEST: CPT | Performed by: OBSTETRICS & GYNECOLOGY

## 2020-07-29 NOTE — PROGRESS NOTES
"Chief complaint:abnormal bleeding    HPI  Emilie Soto is a 50 y.o. female presents for u/s evaluation. Pt has limited pelvic exam due to habitus and long history of oligomenorrhea. She reports more regular bleeding after starting tamoxifen.         The following portions of the patient's history were reviewed and updated as appropriate: allergies, current medications, past family history, past medical history, past social history, past surgical history and problem list.    Review of Systems  Pertinent items are noted in HPI.    /78   Ht 167.6 cm (66\")   Wt (!) 143 kg (315 lb 9.6 oz)   BMI 50.94 kg/m²         Physical Exam   Constitutional: She is oriented to person, place, and time. She appears well-developed and well-nourished.   Pulmonary/Chest: Effort normal.   Neurological: She is alert and oriented to person, place, and time.   Psychiatric: She has a normal mood and affect. Her behavior is normal.     tv u/s: uterus with limited imaging but no obvious abnormalities noted. Endometrium 7.66 mm. Right ovary is complex with mixed echogenicity and possible septum. Left ovary with cystic appearance. It is adjacent to right ovary. It is less well-defined. No free fluid or adnexal masses noted.       Emilie was seen today for procedure.    Diagnoses and all orders for this visit:    PCOS (polycystic ovarian syndrome)    Oligomenorrhea, unspecified type    Pre-procedure lab exam  -     POC Pregnancy, Urine    Bilateral ovarian cysts  -     ; Future  -     Ambulatory Referral to Gynecologic Oncology    Abnormal uterine bleeding  -     Reference Histopathology        .Endometrial Biopsy Procedure Note    Pre-operative Diagnosis: abnormal bleeding    Post-operative Diagnosis: same    Indications: abnormal uterine bleeding    Procedure Details    Urine pregnancy test was done today and result was negative.  The risks (including infection, bleeding, pain, and uterine perforation) and benefits of the " procedure were explained to the patient and verbal and written informed consent was obtained.        The patient was placed in the dorsal lithotomy position.  A speculum inserted in the vagina, and the cervix prepped with povidone iodine X 3.      A sharp tenaculum was applied to the anterior lip of the cervix for stabilization.  A Pipelle was used to sound the uterus to a depth of 10 cm and sample the endometrium using sterile technique.  Sample was sent for pathologic examination.    Condition:  Stable    Complications:  None  Patient tolerated the procedure well without complications.    Plan:    The patient was advised to call for any fever or for prolonged or severe pain or bleeding.     Pt with risk factors for endometrial hyperplasia due to prolonged oligomenorrhea, increased BMI and tamoxifen use. Will call with embx results. Bilateral cystic lesions noted in both ovaries. Left ovary is not well-defined with imaging but right ovary appears complex. Recommend  and GYN Onc consultation due to appearance of ovaries and pt agrees

## 2020-07-31 ENCOUNTER — TELEMEDICINE (OUTPATIENT)
Dept: INTERNAL MEDICINE | Facility: CLINIC | Age: 50
End: 2020-07-31

## 2020-07-31 ENCOUNTER — TELEPHONE (OUTPATIENT)
Dept: OBSTETRICS AND GYNECOLOGY | Age: 50
End: 2020-07-31

## 2020-07-31 DIAGNOSIS — E78.2 MIXED HYPERLIPIDEMIA: ICD-10-CM

## 2020-07-31 DIAGNOSIS — F41.9 ANXIETY: ICD-10-CM

## 2020-07-31 DIAGNOSIS — N83.201 BILATERAL OVARIAN CYSTS: ICD-10-CM

## 2020-07-31 DIAGNOSIS — I10 BENIGN ESSENTIAL HTN: Primary | ICD-10-CM

## 2020-07-31 DIAGNOSIS — E28.2 PCOS (POLYCYSTIC OVARIAN SYNDROME): ICD-10-CM

## 2020-07-31 DIAGNOSIS — N83.202 BILATERAL OVARIAN CYSTS: ICD-10-CM

## 2020-07-31 PROCEDURE — 99213 OFFICE O/P EST LOW 20 MIN: CPT | Performed by: NURSE PRACTITIONER

## 2020-07-31 NOTE — TELEPHONE ENCOUNTER
Called pt and advised lab did not have specimen to add on . She will need to return for lab draw. She understands and will return for draw.

## 2020-08-02 LAB
DX ICD CODE: NORMAL
PATH REPORT.FINAL DX SPEC: NORMAL
PATH REPORT.GROSS SPEC: NORMAL
PATH REPORT.RELEVANT HX SPEC: NORMAL
PATH REPORT.SITE OF ORIGIN SPEC: NORMAL
PATHOLOGIST NAME: NORMAL
PAYMENT PROCEDURE: NORMAL

## 2020-08-03 ENCOUNTER — RESULTS ENCOUNTER (OUTPATIENT)
Dept: OBSTETRICS AND GYNECOLOGY | Age: 50
End: 2020-08-03

## 2020-08-03 DIAGNOSIS — N83.202 BILATERAL OVARIAN CYSTS: ICD-10-CM

## 2020-08-03 DIAGNOSIS — N83.201 BILATERAL OVARIAN CYSTS: ICD-10-CM

## 2020-08-12 ENCOUNTER — TELEPHONE (OUTPATIENT)
Dept: OBSTETRICS AND GYNECOLOGY | Age: 50
End: 2020-08-12

## 2020-08-12 NOTE — TELEPHONE ENCOUNTER
Called pt to discuss her results and follow-up of her recent imaging. No answer. Left message of call.

## 2020-08-27 ENCOUNTER — OFFICE VISIT (OUTPATIENT)
Dept: INTERNAL MEDICINE | Facility: CLINIC | Age: 50
End: 2020-08-27

## 2020-08-27 VITALS — HEIGHT: 66 IN | BODY MASS INDEX: 47.09 KG/M2 | TEMPERATURE: 99.8 F | WEIGHT: 293 LBS

## 2020-08-27 DIAGNOSIS — N83.201 BILATERAL OVARIAN CYSTS: Primary | ICD-10-CM

## 2020-08-27 DIAGNOSIS — Z15.09 MONOALLELIC MUTATION OF CHEK2 GENE IN FEMALE PATIENT: ICD-10-CM

## 2020-08-27 DIAGNOSIS — Z15.02 MONOALLELIC MUTATION OF CHEK2 GENE IN FEMALE PATIENT: ICD-10-CM

## 2020-08-27 DIAGNOSIS — Z15.89 MONOALLELIC MUTATION OF CHEK2 GENE IN FEMALE PATIENT: ICD-10-CM

## 2020-08-27 DIAGNOSIS — Z85.3 PERSONAL HISTORY OF BREAST CANCER: ICD-10-CM

## 2020-08-27 DIAGNOSIS — N83.202 BILATERAL OVARIAN CYSTS: Primary | ICD-10-CM

## 2020-08-27 DIAGNOSIS — E28.2 PCOS (POLYCYSTIC OVARIAN SYNDROME): ICD-10-CM

## 2020-08-27 DIAGNOSIS — Z15.01 MONOALLELIC MUTATION OF CHEK2 GENE IN FEMALE PATIENT: ICD-10-CM

## 2020-08-27 PROCEDURE — 99443 PR PHYS/QHP TELEPHONE EVALUATION 21-30 MIN: CPT | Performed by: NURSE PRACTITIONER

## 2020-08-29 ENCOUNTER — DOCUMENTATION (OUTPATIENT)
Dept: ONCOLOGY | Facility: CLINIC | Age: 50
End: 2020-08-29

## 2020-08-31 ENCOUNTER — TELEPHONE (OUTPATIENT)
Dept: OBSTETRICS AND GYNECOLOGY | Age: 50
End: 2020-08-31

## 2020-09-03 ENCOUNTER — HOSPITAL ENCOUNTER (OUTPATIENT)
Dept: MRI IMAGING | Facility: HOSPITAL | Age: 50
Discharge: HOME | End: 2020-09-03
Admitting: SURGERY

## 2020-09-03 DIAGNOSIS — Z15.89 MONOALLELIC MUTATION OF CHEK2 GENE IN FEMALE PATIENT: ICD-10-CM

## 2020-09-03 DIAGNOSIS — Z15.09 MONOALLELIC MUTATION OF CHEK2 GENE IN FEMALE PATIENT: ICD-10-CM

## 2020-09-03 DIAGNOSIS — Z15.02 MONOALLELIC MUTATION OF CHEK2 GENE IN FEMALE PATIENT: ICD-10-CM

## 2020-09-03 DIAGNOSIS — Z15.01 MONOALLELIC MUTATION OF CHEK2 GENE IN FEMALE PATIENT: ICD-10-CM

## 2020-09-03 LAB — CREAT BLDA-MCNC: 0.6 MG/DL (ref 0.6–1.3)

## 2020-09-03 PROCEDURE — 25010000002 GADOTERATE MEGLUMINE 10 MMOL/20ML SOLUTION: Performed by: SURGERY

## 2020-09-03 PROCEDURE — A9575 INJ GADOTERATE MEGLUMI 0.1ML: HCPCS | Performed by: SURGERY

## 2020-09-03 PROCEDURE — 82565 ASSAY OF CREATININE: CPT

## 2020-09-03 PROCEDURE — 77049 MRI BREAST C-+ W/CAD BI: CPT

## 2020-09-03 RX ORDER — GADOTERATE MEGLUMINE 376.9 MG/ML
20 INJECTION INTRAVENOUS
Status: COMPLETED | OUTPATIENT
Start: 2020-09-03 | End: 2020-09-03

## 2020-09-03 RX ADMIN — GADOTERATE MEGLUMINE 20 ML: 376.9 INJECTION, SOLUTION INTRAVENOUS at 12:57

## 2020-09-08 ENCOUNTER — TELEPHONE (OUTPATIENT)
Dept: INTERNAL MEDICINE | Facility: CLINIC | Age: 50
End: 2020-09-08

## 2020-09-08 NOTE — TELEPHONE ENCOUNTER
PT IS CALLING IN STATING THAT SHE HAD SURGERY ON Friday AND IS CALLING IN TO SCHEDULE A HOSPITAL FOLLOW UP APPOINTMENT WITH PARKER GUADALUPE.      PT CALL BACK  [phone]

## 2020-09-09 ENCOUNTER — TELEPHONE (OUTPATIENT)
Dept: PSYCHIATRY | Facility: HOSPITAL | Age: 50
End: 2020-09-09

## 2020-09-09 NOTE — TELEPHONE ENCOUNTER
Supportive Oncology Services    Supportive call returned to patient regarding challenges s/p hysterectomy. Reviews pain in shoulder, seemingly residual to gas used for laparoscopic surgery.   Continues to work from home. Acknowledges ability to participate in work demands, although states she is not doing well.  Patient is noted to be tearful, dysthymic.  Pt continues with counseling on a weekly basis.  Continues on Pristiq and Ritalin as written.  Pt acknowledges reduced sense of freddy, sadness, and feels completely flat. States this has been going on approximately 1 week, specifically since surgery. Pt reports sleep to be disrupted at night. Finds this is complicated by pain and pain medication. Patient denies thoughts of suicide or self-harm.  Reports sadness and fear related to current affective flatness.  States she has shared this with her wife, Trena, who is supportive.    Explored with patient residual impact of anesthesia, pain medications, and recent surgery.  Encouraged patient to follow-up with therapist and primary care as scheduled.  We will continue current medications as written and follow-up with formal telehealth visit in 2 weeks.

## 2020-09-10 RX ORDER — DESVENLAFAXINE 100 MG/1
100 TABLET, EXTENDED RELEASE ORAL DAILY
Qty: 90 TABLET | Refills: 0 | Status: SHIPPED | OUTPATIENT
Start: 2020-09-10 | End: 2020-11-09 | Stop reason: DRUGHIGH

## 2020-09-14 ENCOUNTER — OFFICE VISIT (OUTPATIENT)
Dept: INTERNAL MEDICINE | Facility: CLINIC | Age: 50
End: 2020-09-14

## 2020-09-14 VITALS
TEMPERATURE: 98.7 F | HEART RATE: 87 BPM | WEIGHT: 293 LBS | DIASTOLIC BLOOD PRESSURE: 78 MMHG | HEIGHT: 66 IN | OXYGEN SATURATION: 99 % | SYSTOLIC BLOOD PRESSURE: 118 MMHG | BODY MASS INDEX: 47.09 KG/M2

## 2020-09-14 DIAGNOSIS — Z23 NEED FOR IMMUNIZATION AGAINST INFLUENZA: ICD-10-CM

## 2020-09-14 DIAGNOSIS — M25.511 ACUTE PAIN OF RIGHT SHOULDER: ICD-10-CM

## 2020-09-14 DIAGNOSIS — Z15.01 MONOALLELIC MUTATION OF CHEK2 GENE IN FEMALE PATIENT: ICD-10-CM

## 2020-09-14 DIAGNOSIS — Z15.09 MONOALLELIC MUTATION OF CHEK2 GENE IN FEMALE PATIENT: ICD-10-CM

## 2020-09-14 DIAGNOSIS — N83.201 BILATERAL OVARIAN CYSTS: Primary | ICD-10-CM

## 2020-09-14 DIAGNOSIS — G47.00 INSOMNIA, UNSPECIFIED TYPE: ICD-10-CM

## 2020-09-14 DIAGNOSIS — Z15.02 MONOALLELIC MUTATION OF CHEK2 GENE IN FEMALE PATIENT: ICD-10-CM

## 2020-09-14 DIAGNOSIS — F41.9 ANXIETY: ICD-10-CM

## 2020-09-14 DIAGNOSIS — N83.202 BILATERAL OVARIAN CYSTS: Primary | ICD-10-CM

## 2020-09-14 DIAGNOSIS — Z15.89 MONOALLELIC MUTATION OF CHEK2 GENE IN FEMALE PATIENT: ICD-10-CM

## 2020-09-14 PROCEDURE — 90686 IIV4 VACC NO PRSV 0.5 ML IM: CPT | Performed by: NURSE PRACTITIONER

## 2020-09-14 PROCEDURE — 99214 OFFICE O/P EST MOD 30 MIN: CPT | Performed by: NURSE PRACTITIONER

## 2020-09-14 PROCEDURE — 90471 IMMUNIZATION ADMIN: CPT | Performed by: NURSE PRACTITIONER

## 2020-09-14 RX ORDER — IBUPROFEN 600 MG/1
600 TABLET ORAL
COMMUNITY
Start: 2020-09-04 | End: 2020-11-02

## 2020-09-14 RX ORDER — PROMETHAZINE HYDROCHLORIDE 25 MG/1
12.5-25 TABLET ORAL
COMMUNITY
Start: 2020-09-04 | End: 2020-11-02

## 2020-09-14 RX ORDER — SIMETHICONE 125 MG
125 TABLET,CHEWABLE ORAL
COMMUNITY
Start: 2020-09-08 | End: 2020-12-16

## 2020-09-14 RX ORDER — DOCUSATE SODIUM 100 MG/1
100 CAPSULE, LIQUID FILLED ORAL
COMMUNITY
Start: 2020-09-04 | End: 2020-11-06

## 2020-09-14 RX ORDER — HYDROCODONE BITARTRATE AND ACETAMINOPHEN 5; 325 MG/1; MG/1
1-2 TABLET ORAL
COMMUNITY
Start: 2020-09-04 | End: 2020-11-02

## 2020-09-18 ENCOUNTER — OFFICE VISIT (OUTPATIENT)
Dept: PSYCHIATRY | Facility: HOSPITAL | Age: 50
End: 2020-09-18

## 2020-09-18 DIAGNOSIS — F33.1 MODERATE EPISODE OF RECURRENT MAJOR DEPRESSIVE DISORDER (HCC): Primary | ICD-10-CM

## 2020-09-18 DIAGNOSIS — F41.1 GENERALIZED ANXIETY DISORDER: ICD-10-CM

## 2020-09-18 DIAGNOSIS — R53.0 NEOPLASTIC MALIGNANT RELATED FATIGUE: ICD-10-CM

## 2020-09-18 PROCEDURE — 99213 OFFICE O/P EST LOW 20 MIN: CPT | Performed by: NURSE PRACTITIONER

## 2020-09-18 RX ORDER — METHYLPHENIDATE HYDROCHLORIDE 20 MG/1
20 CAPSULE, EXTENDED RELEASE ORAL DAILY
Qty: 30 CAPSULE | Refills: 0 | Status: SHIPPED | OUTPATIENT
Start: 2020-09-18 | End: 2020-11-06 | Stop reason: ALTCHOICE

## 2020-09-18 NOTE — PROGRESS NOTES
[facility]  Phone visit- pandemic. Video attempted. Unable to conduct due to poor quality which negatively impacted communication during session. Maciej would not connect.    Subjective  Patient ID: Emilie Soto is a 50 y.o. female has been called today from the [facility] in lieu of in person visit.    Pt noted to be alert, oriented, and engaged in conversation. Affect and mood euthymic.  Pt describes to be feeling a little better. Recognizes increased comfort, reduced anxiety and improved sleep with length of time from surgery. Fortunately, shoulder pain has almost completely resolved.   Pt discusses recent challenges with sleep, described as being nonrestorative, restless.  Scheduled reported as being difficult to initiate, appx 12-1 AM, briefly up at 7 am, then back to sleep until 9 AM. Acknowledges very vivid dreams, worsening since surgery. Is laying down once for appx one hour during day.  Pt reports recent nights with attempts to use ativan. Initially used ativan 0.25 mg q hs with ease of initiation, although with continued impact of dreams on feelings of restlessness.  Increased this up to 1 mg with very limited benefit. States she discussed above interventions with therapist, who suggested guided imagery, relaxing music, and meditation.  Patient states she did this last night and achieved full, restful, restorative nigh'ts rest.  Patient acknowledges reduction in anxiety with follow up surgical visit. Feels her emotions are normalizing and she is feeling more hopeful.  Pt does feel somewhat frazzles and disorganized. Has been on stimulant since prior to surgery and asks whether she can restart.    Medications Reviewed:  Ritalin explored; pt has not been taking this due to surgery.  Pristiq 100 mg daily    Diagnoses and all orders for this visit:    Moderate episode of recurrent major depressive disorder (CMS/HCC)  -     methylphenidate LA (Ritalin LA) 20 MG 24 hr capsule; Take 1 capsule by  mouth Daily    Generalized anxiety disorder    Neoplastic malignant related fatigue    Plan of Care  Patient with improved management of anxiety and mood symptoms.  Explored continued dedication to therapy, including patient attributions regarding therapy versus medication involvement.  Will continue Ativan on as-needed basis only.  Reviewed agreement with CBT for insomnia, and have reiterated these concepts with patient.  Will continue Pristiq and have given patient permission to restart Ritalin, as she is not on any pain medications.  We will plan for transition to group setting with follow-up via Zoom in 4 weeks.    Total time spent on phone with patient: 22 minutes.   17 minutes spent in supportive counseling surrounding issues of exploration of attributions, CBT for insomnia, medication education, and recognition of progress.

## 2020-09-21 NOTE — PROGRESS NOTES
Jaspal Soto is a 50 y.o. female who presents for f/u regarding recent hospitalization for hysterectomy. She c/o difficulty sleeping at night with increased anxiety. She also c/o right shoulder pain.    She underwent a complete hysterectomy September 4 due to ovarian cysts with previous history of breast cancer.  She has overall done well since the surgery.  She does complain of right shoulder pain which is gradually improving.  She denies recent injury.  No paresthesias of upper extremities.  She does note increased anxiety and has not been sleeping.  She does take Pristiq daily.  She reports very vivid dreams and very restless sleep.      Insomnia  Associated symptoms include arthralgias. Pertinent negatives include no abdominal pain, chest pain, chills, congestion, coughing, fatigue, fever, headaches, joint swelling, nausea, sore throat, vomiting or weakness.   Anxiety  Symptoms include decreased concentration, insomnia and nervous/anxious behavior. Patient reports no chest pain, nausea or palpitations.            The following portions of the patient's history were reviewed and updated as appropriate: allergies, current medications, past social history and problem list.    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 May begin 1 tablet afterward., Disp: 30 tablet, Rfl: 0  •  desvenlafaxine (PRISTIQ) 100 MG 24 hr tablet, Take 1 tablet by mouth Daily., Disp: 90 tablet, Rfl: 0  •  docusate sodium (COLACE) 100 MG capsule, Take 100 mg by mouth., Disp: , Rfl:   •  HYDROcodone-acetaminophen (NORCO) 5-325 MG per tablet, Take 1-2 tablets by mouth., Disp: , Rfl:   •  ibuprofen (ADVIL,MOTRIN) 600 MG tablet, Take 600 mg by mouth., Disp: , Rfl:   •  lisinopril-hydrochlorothiazide (PRINZIDE,ZESTORETIC) 20-25 MG per tablet, TAKE 1 TABLET BY MOUTH DAILY, Disp: 30 tablet, Rfl: 5  •  LORazepam (ATIVAN) 0.5 MG tablet, Take 1 tablet by mouth Every 8 (Eight) Hours As Needed for Anxiety. for anxiety, Disp: 45 tablet, Rfl: 0  •  moxifloxacin (VIGAMOX) 0.5 % ophthalmic solution, Administer 0.05 mL to the right eye 3 (Three) Times a Day., Disp: 3 mL, Rfl: 0  •  promethazine (PHENERGAN) 25 MG tablet, Take 12.5-25 mg by mouth., Disp: , Rfl:   •  simethicone (MYLICON) 125 MG chewable tablet, Chew 125 mg., Disp: , Rfl:   •  tamoxifen (NOLVADEX) 20 MG chemo tablet, Take 1 tablet by mouth Daily., Disp: 30 tablet, Rfl: 5  •  vitamin B-12 (CYANOCOBALAMIN) 1000 MCG tablet, Take 1,000 mcg by mouth Daily., Disp: , Rfl:   •  methylphenidate LA (Ritalin LA) 20 MG 24 hr capsule, Take 1 capsule by mouth Daily, Disp: 30 capsule, Rfl: 0    Allergies   Allergen Reactions   • Citalopram Rash   • Flexeril [Cyclobenzaprine] Unknown - High Severity     MUSCLES TENSE-OPPOSITE OF WHAT IT IS INTENDED TO DO-ELEVATES B/P   • Doxycycline Rash     Blisters on hands   • Keflex [Cephalexin] Rash   • Sulfa Antibiotics Rash     NAUSEA/VOMITING/FEVER   • Vilazodone Hcl Hives, Itching and Rash       Review of Systems   Constitutional: Negative for chills, fatigue, fever and unexpected weight change.   HENT: Negative for congestion, ear pain, postnasal drip, sinus pressure, sore throat and trouble swallowing.    Eyes: Negative for  "visual disturbance.   Respiratory: Negative for cough, chest tightness and wheezing.    Cardiovascular: Negative for chest pain, palpitations and leg swelling.   Gastrointestinal: Negative for abdominal pain, blood in stool, nausea and vomiting.   Genitourinary: Negative for dysuria, frequency and urgency.   Musculoskeletal: Positive for arthralgias. Negative for joint swelling.   Skin: Negative for color change.   Neurological: Negative for syncope, weakness and headaches.   Hematological: Does not bruise/bleed easily.   Psychiatric/Behavioral: Positive for decreased concentration, dysphoric mood and sleep disturbance. The patient is nervous/anxious and has insomnia.        Objective   Vitals:    09/14/20 1111   BP: 118/78   BP Location: Left arm   Patient Position: Sitting   Cuff Size: Adult   Pulse: 87   Temp: 98.7 °F (37.1 °C)   TempSrc: Oral   SpO2: 99%   Weight: (!) 142 kg (313 lb)   Height: 167.6 cm (66\")     Body mass index is 50.52 kg/m².  Physical Exam  Constitutional:       Appearance: She is well-developed. She is not ill-appearing.   HENT:      Head: Normocephalic.      Right Ear: Hearing, tympanic membrane and external ear normal. No decreased hearing noted. No drainage, swelling or tenderness. No middle ear effusion. No mastoid tenderness. Tympanic membrane is not injected, scarred, erythematous or bulging. Tympanic membrane has normal mobility.      Left Ear: Hearing, tympanic membrane and external ear normal.      Nose: Nose normal. No nasal deformity, mucosal edema or rhinorrhea.      Right Sinus: No maxillary sinus tenderness or frontal sinus tenderness.      Left Sinus: No maxillary sinus tenderness or frontal sinus tenderness.      Mouth/Throat:      Dentition: Normal dentition.   Eyes:      General: Lids are normal.         Right eye: No discharge.         Left eye: No discharge.      Conjunctiva/sclera: Conjunctivae normal.      Right eye: No exudate.     Left eye: No exudate.  Neck:      " Musculoskeletal: Normal range of motion. No edema.      Thyroid: No thyroid mass or thyromegaly.      Vascular: No carotid bruit.      Trachea: Trachea normal.   Cardiovascular:      Rate and Rhythm: Regular rhythm.      Pulses: Normal pulses.      Heart sounds: Normal heart sounds. No murmur.   Pulmonary:      Effort: No respiratory distress.      Breath sounds: Normal breath sounds. No decreased breath sounds, wheezing, rhonchi or rales.   Abdominal:      General: Bowel sounds are normal.      Palpations: Abdomen is soft.      Tenderness: There is generalized abdominal tenderness. There is no guarding.   Musculoskeletal:      Right shoulder: She exhibits normal range of motion and no tenderness.   Lymphadenopathy:      Head:      Right side of head: No submental, submandibular, tonsillar, preauricular, posterior auricular or occipital adenopathy.      Left side of head: No submental, submandibular, tonsillar, preauricular, posterior auricular or occipital adenopathy.   Skin:     General: Skin is warm and dry.      Nails: There is no clubbing.     Neurological:      Mental Status: She is alert.   Psychiatric:         Behavior: Behavior is cooperative.         Assessment/Plan   Emilie was seen today for follow-up, insomnia, anxiety and shoulder pain.    Diagnoses and all orders for this visit:    Bilateral ovarian cysts    Monoallelic mutation of CHEK2 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    Insomnia, unspecified type    Anxiety    Acute pain of right shoulder    Need for immunization against influenza  -     FluLaval Quad >6 Months ([id number])    1&2-Ovarian cysts with CHEK2 mutation, s/p hysterectomy-she is doing well and is not taking any pain medications.  3&4-Insomnia and anxiety- We discussed taking Ativan at night temporarily for improved sleep. She has an appt with psychiatry on Friday.  5. Shoulder pain-sx gradually improving since surgery, continue to monitor.    Current outpatient and discharge medications have been  reconciled for the patient.  Reviewed by: VICENTE Valencia

## 2020-10-05 ENCOUNTER — APPOINTMENT (OUTPATIENT)
Dept: LAB | Facility: HOSPITAL | Age: 50
End: 2020-10-05

## 2020-10-21 DIAGNOSIS — I10 BENIGN ESSENTIAL HTN: ICD-10-CM

## 2020-10-21 RX ORDER — LISINOPRIL AND HYDROCHLOROTHIAZIDE 25; 20 MG/1; MG/1
1 TABLET ORAL DAILY
Qty: 90 TABLET | Refills: 1 | Status: SHIPPED | OUTPATIENT
Start: 2020-10-21 | End: 2020-11-23

## 2020-11-02 ENCOUNTER — OFFICE VISIT (OUTPATIENT)
Dept: ONCOLOGY | Facility: CLINIC | Age: 50
End: 2020-11-02

## 2020-11-02 ENCOUNTER — LAB (OUTPATIENT)
Dept: LAB | Facility: HOSPITAL | Age: 50
End: 2020-11-02

## 2020-11-02 VITALS
BODY MASS INDEX: 47.09 KG/M2 | SYSTOLIC BLOOD PRESSURE: 170 MMHG | WEIGHT: 293 LBS | OXYGEN SATURATION: 98 % | DIASTOLIC BLOOD PRESSURE: 94 MMHG | TEMPERATURE: 97.3 F | RESPIRATION RATE: 18 BRPM | HEIGHT: 66 IN | HEART RATE: 89 BPM

## 2020-11-02 DIAGNOSIS — Z15.01 MONOALLELIC MUTATION OF CHEK2 GENE IN FEMALE PATIENT: ICD-10-CM

## 2020-11-02 DIAGNOSIS — C50.111 MALIGNANT NEOPLASM OF CENTRAL PORTION OF RIGHT BREAST IN FEMALE, ESTROGEN RECEPTOR POSITIVE (HCC): Primary | ICD-10-CM

## 2020-11-02 DIAGNOSIS — Z80.3 FAMILY HISTORY OF BREAST CANCER: ICD-10-CM

## 2020-11-02 DIAGNOSIS — Z90.79 HISTORY OF TOTAL ABDOMINAL HYSTERECTOMY AND BILATERAL SALPINGO-OOPHORECTOMY: ICD-10-CM

## 2020-11-02 DIAGNOSIS — E55.9 VITAMIN D DEFICIENCY: ICD-10-CM

## 2020-11-02 DIAGNOSIS — Z15.89 MONOALLELIC MUTATION OF CHEK2 GENE IN FEMALE PATIENT: ICD-10-CM

## 2020-11-02 DIAGNOSIS — I10 ESSENTIAL HYPERTENSION: ICD-10-CM

## 2020-11-02 DIAGNOSIS — Z90.710 HISTORY OF TOTAL ABDOMINAL HYSTERECTOMY AND BILATERAL SALPINGO-OOPHORECTOMY: ICD-10-CM

## 2020-11-02 DIAGNOSIS — Z15.09 MONOALLELIC MUTATION OF CHEK2 GENE IN FEMALE PATIENT: ICD-10-CM

## 2020-11-02 DIAGNOSIS — Z15.02 MONOALLELIC MUTATION OF CHEK2 GENE IN FEMALE PATIENT: ICD-10-CM

## 2020-11-02 DIAGNOSIS — Z90.722 HISTORY OF TOTAL ABDOMINAL HYSTERECTOMY AND BILATERAL SALPINGO-OOPHORECTOMY: ICD-10-CM

## 2020-11-02 DIAGNOSIS — E55.9 VITAMIN D DEFICIENCY: Primary | ICD-10-CM

## 2020-11-02 DIAGNOSIS — Z17.0 MALIGNANT NEOPLASM OF CENTRAL PORTION OF RIGHT BREAST IN FEMALE, ESTROGEN RECEPTOR POSITIVE (HCC): Primary | ICD-10-CM

## 2020-11-02 LAB
25(OH)D3 SERPL-MCNC: 19 NG/ML (ref 30–100)
ALBUMIN SERPL-MCNC: 4.1 G/DL (ref 3.5–5.2)
ALBUMIN/GLOB SERPL: 1.4 G/DL (ref 1.1–2.4)
ALP SERPL-CCNC: 74 U/L (ref 38–116)
ALT SERPL W P-5'-P-CCNC: 35 U/L (ref 0–33)
ANION GAP SERPL CALCULATED.3IONS-SCNC: 12.8 MMOL/L (ref 5–15)
AST SERPL-CCNC: 39 U/L (ref 0–32)
BASOPHILS # BLD AUTO: 0.06 10*3/MM3 (ref 0–0.2)
BASOPHILS NFR BLD AUTO: 0.7 % (ref 0–1.5)
BILIRUB SERPL-MCNC: 0.4 MG/DL (ref 0.2–1.2)
BUN SERPL-MCNC: 10 MG/DL (ref 6–20)
BUN/CREAT SERPL: 14.5 (ref 7.3–30)
CALCIUM SPEC-SCNC: 10.1 MG/DL (ref 8.5–10.2)
CHLORIDE SERPL-SCNC: 98 MMOL/L (ref 98–107)
CO2 SERPL-SCNC: 27.2 MMOL/L (ref 22–29)
CREAT SERPL-MCNC: 0.69 MG/DL (ref 0.6–1.1)
DEPRECATED RDW RBC AUTO: 39.6 FL (ref 37–54)
EOSINOPHIL # BLD AUTO: 0.27 10*3/MM3 (ref 0–0.4)
EOSINOPHIL NFR BLD AUTO: 3.4 % (ref 0.3–6.2)
ERYTHROCYTE [DISTWIDTH] IN BLOOD BY AUTOMATED COUNT: 12.8 % (ref 12.3–15.4)
GFR SERPL CREATININE-BSD FRML MDRD: 90 ML/MIN/1.73
GLOBULIN UR ELPH-MCNC: 3 GM/DL (ref 1.8–3.5)
GLUCOSE SERPL-MCNC: 219 MG/DL (ref 74–124)
HCT VFR BLD AUTO: 38.6 % (ref 34–46.6)
HGB BLD-MCNC: 12.4 G/DL (ref 12–15.9)
IMM GRANULOCYTES # BLD AUTO: 0.03 10*3/MM3 (ref 0–0.05)
IMM GRANULOCYTES NFR BLD AUTO: 0.4 % (ref 0–0.5)
LYMPHOCYTES # BLD AUTO: 2.26 10*3/MM3 (ref 0.7–3.1)
LYMPHOCYTES NFR BLD AUTO: 28.1 % (ref 19.6–45.3)
MCH RBC QN AUTO: 27.6 PG (ref 26.6–33)
MCHC RBC AUTO-ENTMCNC: 32.1 G/DL (ref 31.5–35.7)
MCV RBC AUTO: 86 FL (ref 79–97)
MONOCYTES # BLD AUTO: 0.44 10*3/MM3 (ref 0.1–0.9)
MONOCYTES NFR BLD AUTO: 5.5 % (ref 5–12)
NEUTROPHILS NFR BLD AUTO: 4.99 10*3/MM3 (ref 1.7–7)
NEUTROPHILS NFR BLD AUTO: 61.9 % (ref 42.7–76)
NRBC BLD AUTO-RTO: 0 /100 WBC (ref 0–0.2)
PLATELET # BLD AUTO: 241 10*3/MM3 (ref 140–450)
PMV BLD AUTO: 9 FL (ref 6–12)
POTASSIUM SERPL-SCNC: 3.9 MMOL/L (ref 3.5–4.7)
PROT SERPL-MCNC: 7.1 G/DL (ref 6.3–8)
RBC # BLD AUTO: 4.49 10*6/MM3 (ref 3.77–5.28)
SODIUM SERPL-SCNC: 138 MMOL/L (ref 134–145)
WBC # BLD AUTO: 8.05 10*3/MM3 (ref 3.4–10.8)

## 2020-11-02 PROCEDURE — 85025 COMPLETE CBC W/AUTO DIFF WBC: CPT

## 2020-11-02 PROCEDURE — 99215 OFFICE O/P EST HI 40 MIN: CPT | Performed by: INTERNAL MEDICINE

## 2020-11-02 PROCEDURE — 36415 COLL VENOUS BLD VENIPUNCTURE: CPT

## 2020-11-02 PROCEDURE — 80053 COMPREHEN METABOLIC PANEL: CPT

## 2020-11-02 PROCEDURE — 82306 VITAMIN D 25 HYDROXY: CPT | Performed by: INTERNAL MEDICINE

## 2020-11-02 RX ORDER — ANASTROZOLE 1 MG/1
1 TABLET ORAL DAILY
Qty: 30 TABLET | Refills: 5 | Status: SHIPPED | OUTPATIENT
Start: 2020-11-02 | End: 2020-12-02

## 2020-11-02 NOTE — PROGRESS NOTES
Subjective     REASON FOR CONSULTATION:1. High grade, invasive ductal carcinoma of right breast, ER/SC+, Her2 negative; clinical T1bN0, anatomic stage IA, prognostic stage IA  · May 28, 2019 right lumpectomy with removal of nipple areolar complex and right sentinel lymph node biopsy  -Central 2:00, invasive ductal carcinoma, grade 2, Elvin score of 6, tumor size is 10 mm, no DCIS, one lymph node negative, good margins    2.  Oncotype DX score of 6, low risk, no chemo necessary    3.  June 28, 2019: Started tamoxifen    4.  Family history of breast cancer, CHEK2  Positive    5.  S/p hysterectomy with bilateral salpingo-oophorectomy September 4, 2020 by Dr. Nery Neumann.  Patient had bilateral ovarian cysts.  Pathology was consistent with complex atypical endometrial hyperplasia, endometrial polyp otherwise unremarkable.  Acute on chronic cervicitis with squamous metaplasia.  Right ovary with hemorrhagic corpus luteum cyst and left ovary also with corpus luteum cyst pelvic peritoneal biopsy negative for malignancy omental biopsy negative for malignancy.    HISTORY OF PRESENT ILLNESS: The patient is a 50-year-old female with the above-mentioned history who returns today for routine follow-up, continuing on tamoxifen.  She underwent bilateral salpingo-oophorectomy with hysterectomy on September 4, 2020 by Dr. Nery Neumann and the pathology was benign.  However patient is having significant hot flashes after she underwent surgery.  She also had crying spells after surgery.  Currently she continues to tamoxifen.  I told her that since she is postmenopausal the best option is to switch her to aromatase inhibitor anastrozole.    I did explain to the her the side effects of anastrozole of hot flashes, joint pains, decreasing bone density.  She will require DEXA scan as well.    I reviewed in length the pathology from Dr. Marifer Neumann's office    Patient had MRI of the breast September 3, 2020 and negative    Oncologic  history:   The patient is a 50 y.o. year old female who is here for an opinion about the above issue.    Patient is a 49-year old female who presents today as a new patient for consultation on her newly diagnosed high grade, invasive ductal carcinoma of right breast.   Patient had seen Dr. Greene on May 14, 2019.    She underwent a screening mammogram but did not appreciate any mass herself and denied any breast pain or nipple discharge.    Patient is accompanied with her wife Trena.  They have been together for 19 years and  for the last 2 years.    April 18, 2019: Screening mammogram showed a 10 mm small oval mass in the subareolar region of the right breast.  Left breast was negative.  Spot compression and ultrasound recommended.  Of the right breast.    April 29, 2019: Right diagnostic mammogram showed a high density round mass 10 mm with indistinct margins in the anterior one third subareolar region of the right breast located to centimeters from the nipple.    April 29, 2019: Ultrasound suggested a solid mass with indistinct margins 9 mm and subareolar region.  There is abnormal axillary node in the right axilla.  There are 2 adjacent lymph nodes in the right axilla with mildly prominent cortices.  The left axilla was evaluated for comparison and the nodes on the right are of greater cortical thickness.      Ultrasound-guided biopsy of the breast mass and right axillary lymph node was suggested.     5/9/19:  US guided biopsy sent to [facility] lab  showing solid mass in the sub-areorolar region of right breast revealing invasive high-grade mammary carcinoma, grade 3. Tubule score 3, nuclear grade 3, and mitotic score 2 for a total score of 8. Neoplasm is present in 4/8 biopsies and measures 4 mm in greatest dimension.   Biopsy performed on 05/09/2019 of prominent lymph node of right axilla was benign- negative for metastatic carcinoma.  ER: 82.35%,  positive  MI: 92.68%, positive  HER-2/latoya: 1+  negative  Ki-67 14.81%       05/09/2019 MRI of bilateral breast:   IMPRESSION:  1. Biopsy-proven malignancy in the right breast in the anterior  one-third approximately 1.8 cm from the nipple at the 2:30 position with  an internal metallic clip. The mass measures on the order of 1 cm in  greatest dimension. No other suspicious findings are seen within the  right breast and there is no evidence for right axillary or internal  mammary chain adenopathy. The patient appears to be a candidate for  breast conservation therapy.  2. There are no findings suspicious for malignancy in the left breast.  BI-RADS Category 6: Known biopsy-proven malignancy.    5/14/19: Patient was seen by Dr. Greene who felt that she was a good candidate for lumpectomy given the size of the lesion related to her breast size.  Because of superficial subareolar location she would require removal of the nipple areolar complex in order to make sure she has negative margins.  Patient preferred breast conservation with central lumpectomy.  This included the removal of the nipple areolar complex.      Also obtained INVITAE  genetic test given her family history of malignancy.    Estrogen receptor: positive (82.35% moderate),   Progesterone receptor:  positive (92.68%, strong)   HER2/Amy: negative (IHC score1+) and Ki-67=14.8%  Pathologic Stage: pT1b, (sn)N0 (G2).    Synoptic Report :  TUMOR  Tumor Site: Invasive Carcinoma Central  Clock Position of Tumor Site 2 o'clock  3 o'clock  Histologic Type Invasive carcinoma of no special type (ductal, not otherwise  specified)  Glandular (Acinar) / Tubular Differentiation Score 2  Nuclear Pleomorphism Score 2  Mitotic Rate Score 2 (4-7 mitoses per mm2)  Number of Mitoses per 10 High-Power Fields 15 mitotic figures  Diameter of Microscope Field in Millimeters (mm) 0.55 Millimeters (mm)  Overall Grade Grade 2 (scores of 6 or 7)  Tumor Size Greatest dimension of largest invasive focus in Millimeters (mm):  10  Millimeters (mm)    MARGINS  Invasive Carcinoma Margins Uninvolved by invasive carcinoma    LYMPH NODES  Regional Lymph Nodes Uninvolved by tumor cells  Number of Lymph Nodes Examined 1  Number of [place] Nodes Examined 1    Oncotype DX testing ordered but pending.    Patient has a family history of paternal grandmother with breast cancer, paternal aunt with breast cancer, cousin on father's side dying of breast cancer at age 43.  History of ovarian or prostate cancer or uterine cancer.  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     Patient is now scheduled for counseling with genetic and also with radiation oncology next week.  Tamoxifen was started    2020: Patient underwent hysterectomy with bilateral salpingo-oophorectomy, pathology is benign    2020: We will switch to aromatase inhibitor Arimidex    PAST MEDICAL HISTORY:  1. Hypertension- Under control with medications  2. Pre-diabetes- Diet control  3. Obesity  4. PCOS   5. Anxiety- Worse since cancer diagnosis. Taking Gabapentin since yesterday which helped with her symptoms. Followed by [facility].   6. No hx of CVA or CAD.     FAMILY HISTORY:  Paternal cousin with breast cancer diagnosed at 43 and . Paternal aunt with breast cancer s/p mastectomy; paternal grandmother with breast cancer s/p lumpectomy.   No other family history of ovarian cancer or prostrate cancer  Mother had kidney cancer s/p surgery.     OB-GYN:  Menarche- 9 years old  Menopause- n/a  Pregnancies- none  Post menopausal HRT- N/A  Hx of birth control pills- None    SOCIAL HISTORY:  She works at [employer].  She is  to her wife for the last 2 years but lived with her for 19.  Years.  Patient's wife is a schoolteacher.  Patient does not smoke.  She does not drink.  Allergies reviewed with the patient today.      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   • Sleep apnea     C-PAP IN USE   • Vitamin D deficiency         Past Surgical History:   Procedure Laterality Date   • APPENDECTOMY      removed when removing dermoid on right ovary   • BREAST BIOPSY Right 2019   • BREAST LUMPECTOMY Right    • BREAST LUMPECTOMY WITH SENTINEL NODE BIOPSY Right 5/28/2019    Procedure: Right central partial mastectomy (with removal of the nipple-areola-complex) and sentinel lymph node biopsy;  Surgeon: Trena Greene MD;  Location: [facility] MAIN OR;  Service: General   • CHOLECYSTECTOMY     • COLONOSCOPY N/A 12/5/2019    Procedure: COLONOSCOPY TO CECUM;  Surgeon: Pedrito Fulton Jr., MD;  Location: [facility] ENDOSCOPY;  Service: General   • DERMOID CYST  EXCISION  1989   • HERNIA REPAIR  2006    umbilical   • HYSTERECTOMY Bilateral 09/04/2020    Dr. Neumann, due to ovarian cysts with previous hx breast cancer   • OVARY SURGERY      dermoid removed   • SALIVARY GLAND SURGERY  1993    due to cancer   • TONSILLECTOMY  01/01/1979    I was 9 years old        Current Outpatient Medications on File Prior to Visit   Medication Sig Dispense Refill   • colchicine 0.6 MG tablet 2 tablets at first sign of gout flare, then 1 tablet 1 hour later, max 3 tablets over 1 hour period. May begin 1 tablet afterward. 30 tablet 0   • desvenlafaxine (PRISTIQ) 100 MG 24 hr tablet Take 1 tablet by mouth Daily. 90 tablet 0   • docusate sodium (COLACE) 100 MG capsule Take 100 mg by mouth.     • lisinopril-hydrochlorothiazide (PRINZIDE,ZESTORETIC) 20-25 MG per tablet TAKE 1 TABLET BY MOUTH DAILY 90 tablet 1   •  LORazepam (ATIVAN) 0.5 MG tablet Take 1 tablet by mouth Every 8 (Eight) Hours As Needed for Anxiety. for anxiety 45 tablet 0   • methylphenidate LA (Ritalin LA) 20 MG 24 hr capsule Take 1 capsule by mouth Daily 30 capsule 0   • simethicone (MYLICON) 125 MG chewable tablet Chew 125 mg.     • tamoxifen (NOLVADEX) 20 MG chemo tablet Take 1 tablet by mouth Daily. 30 tablet 5   • vitamin B-12 (CYANOCOBALAMIN) 1000 MCG tablet Take 1,000 mcg by mouth Daily.     • cholecalciferol (VITAMIN D3) 1000 units tablet Take 2,000 Units by mouth Daily.     • HYDROcodone-acetaminophen (NORCO) 5-325 MG per tablet Take 1-2 tablets by mouth.     • ibuprofen (ADVIL,MOTRIN) 600 MG tablet Take 600 mg by mouth.     • moxifloxacin (VIGAMOX) 0.5 % ophthalmic solution Administer 0.05 mL to the right eye 3 (Three) Times a Day. 3 mL 0   • promethazine (PHENERGAN) 25 MG tablet Take 12.5-25 mg by mouth.       No current facility-administered medications on file prior to visit.         ALLERGIES:    Allergies   Allergen Reactions   • Citalopram Rash   • Flexeril [Cyclobenzaprine] Unknown - High Severity     MUSCLES TENSE-OPPOSITE OF WHAT IT IS INTENDED TO DO-ELEVATES B/P   • Doxycycline Rash     Blisters on hands   • Keflex [Cephalexin] Rash   • Sulfa Antibiotics Rash     NAUSEA/VOMITING/FEVER   • Vilazodone Hcl Hives, Itching and Rash        Social History     Socioeconomic History   • Marital status:      Spouse name: Trena Montenegro   • Number of children: 0   • Years of education: Not on file   • Highest education level: Not on file   Occupational History     Employer: [employer]     Comment: 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    Tobacco Use   • Smoking status: Never Smoker   • Smokeless tobacco: Never Used   • Tobacco comment: Parents smoked.  I have never smoked   Substance and Sexual Activity   • Alcohol use: Yes     Frequency: Monthly or less     Drinks per session: 1 or 2     Comment: Only once or  twice a year.  Mixed Drinks   • Drug use: No   • Sexual activity: Yes     Partners: Female     Birth control/protection: None        Family History   Problem Relation Age of Onset   • Hypertension Mother    • Arthritis Mother    • Kidney cancer Mother    • Depression Mother         Not Dr tommy- edison go   • Heart disease Father    • Hernia Father    • Hypertension Father    • No Known Problems Brother    • Breast cancer Paternal Aunt    • Breast cancer Paternal Cousin    • Drug abuse Paternal Uncle         life long   • Drug abuse Paternal Uncle         life long   • Breast cancer Paternal Grandmother    • Osteoporosis Maternal Grandmother    • Lung cancer Paternal Grandfather    • Malig Hyperthermia Neg Hx         Review of Systems   Constitutional: Positive for appetite change (Poor appetite 11/02/20) and fatigue (11/02/20-Unchanged). Negative for activity change, chills, diaphoresis, fever and unexpected weight change.   HENT: Negative for hearing loss, mouth sores, nosebleeds, sore throat and trouble swallowing.    Respiratory: Negative for cough, chest tightness, shortness of breath and wheezing.    Cardiovascular: Negative for chest pain, palpitations and leg swelling.   Gastrointestinal: Negative for abdominal distention, abdominal pain, constipation, diarrhea, nausea and vomiting.   Genitourinary: Negative for difficulty urinating, dysuria, frequency, hematuria and urgency.   Musculoskeletal: Negative for joint swelling.        No muscle weakness.   Skin: Negative for rash and wound.   Neurological: Negative for dizziness, seizures, syncope, speech difficulty, weakness, numbness and headaches.   Hematological: Negative for adenopathy. Does not bruise/bleed easily.   Psychiatric/Behavioral: Positive for sleep disturbance (11/02/20-Unchanged). Negative for behavioral problems, confusion and suicidal ideas. The patient is nervous/anxious (11/02/20-Unchanged).    All other systems reviewed and are  "negative.   I have reviewed and confirmed the accuracy of the ROS as documented by the MA/LPN/RN Tia Trevizo MD        Objective     Vitals:    11/02/20 1012   BP: 170/94   Pulse: 89   Resp: 18   Temp: 97.3 °F (36.3 °C)   TempSrc: Skin   SpO2: 98%   Weight: (!) 146 kg (321 lb 3.2 oz)   Height: 167.6 cm (65.98\")   PainSc: 0-No pain      Current Status 11/2/2020   ECOG score 0     Repeat blood pressure was 150 x 80    Patient screened positive for depression based on a PHQ-9 score of 9 on 7/28/2020. Follow-up recommendations include: PCP managing depression.           CONSTITUTIONAL:  Vital signs reviewed.    No distress, looks comfortable.  EYES:  Conjunctiva and lids unremarkable.  Extraocular eye movements intact.  EARS,NOSE,MOUTH,THROAT:  Ears and nose appear unremarkable.  Lips, teeth, gums appear unremarkable.  RESPIRATORY:  Normal respiratory effort.    CARDIOVASCULAR: Regular rate rhythm, no murmur  BREAST: Right breast: S/p right lumpectomy, no skin changes, no evidence of breast mass, no nipple discharge, no evidence of any right axillary adenopathy or right supraclavicular adenopathy  Left breast: No evidence of any skin changes, no evidence of any left breast mass and no evidence of left nipple discharge as well as no left axillary adenopathy or left supraclavicular adenopathy.  No significant lower extremity edema.  SKIN: No wounds.  No rashes.  MUSCULOSKELETAL/EXTREMITIES: No clubbing or cyanosis.  No apparent unilateral weakness.  NEURO: CN 2-12 appear intact. No focal neurological deficits noted.  PSYCHIATRIC:  Normal judgment and insight.  Normal mood and affect.  Patient screened positive for depression based on a PHQ-9 score of 9 on 7/28/2020. Follow-up recommendations include: continue to follow with supportive oncology services clinic..    RECENT LABS:  Hematology WBC   Date Value Ref Range Status   11/02/2020 8.05 3.40 - 10.80 10*3/mm3 Final   09/04/2020 8.52 4.5 - 11.0 10*3/uL Final     RBC "   Date Value Ref Range Status   11/02/2020 4.49 3.77 - 5.28 10*6/mm3 Final   09/04/2020 4.51 4.0 - 5.2 10*6/uL Final     Hemoglobin   Date Value Ref Range Status   11/02/2020 12.4 12.0 - 15.9 g/dL Final   09/04/2020 12.9 12.0 - 16.0 g/dL Final     Hematocrit   Date Value Ref Range Status   11/02/2020 38.6 34.0 - 46.6 % Final   09/04/2020 38.4 36.0 - 46.0 % Final     Platelets   Date Value Ref Range Status   11/02/2020 241 140 - 450 10*3/mm3 Final   09/04/2020 266 140 - 440 10*3/uL Final      BILATERAL BREAST MRI WITHOUT AND WITH CONTRAST 09/03/20    IMPRESSION:  There are no findings suspicious for malignancy in either breast.  Routine follow-up imaging is recommended.     BI-RADS Category 2: Benign finding.         Assessment/Plan      1. High grade, invasive ductal carcinoma of right breast, ER/[place]+, Her2 negative; clinical T1bN0, anatomic stage IA, prognostic stage IA:   · May 6, 2019 ultrasound-guided biopsy of the right breast mass,  - grade 3, invasive mammary carcinoma, Elvin score of 8, biopsy of right axillary lymph node negative  ER 82.35%, [place] 92.68%, HER-2/latoya 1+ , Ki-67 14.8%    · May 28, 2019 right lumpectomy with removal of nipple areolar complex and right sentinel lymph node biopsy  -Central 2:00, invasive ductal carcinoma, grade 2, Elvin score of 6, tumor size is 10 mm, no DCIS, one lymph node negative, good margins  · Oncotype DX score of 6, low risk disease, been not give chemotherapy    · I discussed the pathology findings with the patient today including ER/WY+, Her2 negative status of her cancer.  I discussed about consideration of chemotherapy if Oncotype DX testing falls in a high risk category.  Patient is not  keen on receiving chemotherapy.  I further discussed in length with patient on the 3 different endocrine therapies tamoxifen/aromatase inhibitors/Evista. Discussed Tamoxifen is for pre-menopausal women and other two are for post-menopausal women. Since she is pre-menopausal,  Tamoxifen will be her best and first option. I have detailed the side effects of tamoxifen including depression, hot flashes, rare chance for uterine cancer, weight gain, blood clots, DVT, PE, hair thinning, rare chance for thrombocytopenia, cataracts.  Aromatase inhibitors can cause arthralgias, hot flashes, decrease in bone density, rare chance for diarrhea, also discussed about hot flashes with it.  Evista is approved for osteopenia and can also be used for prophylaxis with fewer side effects except hot flashes and rare chance for cataracts but it can improve bone density.     · We will plan on starting tamoxifen given premenopausal for 2 to 3 years followed by Arimidex once postmenopausal.  · 2020, patient continues on tamoxifen and is tolerating this well.  She will have a follow-up MRI of the breast in September.  · September 3, 2020 MRI breast negative  · 2020 s/p hysterectomy with bilateral subfinger oophorectomy, pathology benign  · We will switch patient to aromatase inhibitor since she is postmenopausal now with Arimidex        2. Family history of cancer: Paternal cousin with breast cancer diagnosed at 43 and . Paternal aunt with breast cancer s/p mastectomy; paternal grandmother with breast cancer s/p lumpectomy.   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  ·  She has an appointment with  in early July.   · We will plan on alternating MRI of breast with mammogram alternating.  · Discussed in breast cancer conference today and Dr. Greene and we agreed that best option is to make an MRI of breast with mammogram every 6 months.    3. Mild leukocytosis: 2019 CBC mild elevation of WBC at 11.29. Will monitor.   · Today, 2020 WBC 8.87.    4. Vitamin B-12 deficiency: B12 level 270, advised patient to take over the counter B-12 thousand micrograms orally daily.   · Patient to take  calcium 600 mg twice a day and vitamin D3 1000 international units daily  · We will check 25-hydroxy vitamin D levels today    5. PCOS, Complicated with obesity: Patient attempted to undergo bariatric surgery in order to reduce weight and further help with her PCOS. This was stalled due to her recent breast cancer diagnosis. In past patient has been able to lose 85 lbs with diet modifications.   · Advised patient to exercise 5 times per week with 20 minutes of aerobics, total of 40 minutes/day .   · Suggested [facility] program and patient to visit the cancer resource center.  · Referral to Nutritionist for further recommendations.   · Referred patient to OB/GYN today for further evaluation.  · Continues to follow closely with Dr. Lexii Rahman with recent pelvic exam, planning to have endometrial biopsy tomorrow.  · S/p hysterectomy with bilateral salpingo-oophorectomy September 4, 2020.  Pathology benign    6.  Anxiety: Patient has significant anxiety, has been referred to [facility].  In the past she has tried Zoloft Wellbutrin and Seroquel and did not have good response to any of these medications.  · Patient is being monitored by Alexa Trinh.  · 7/28/2020 reports more issues with panic attacks recently.  The patient is planning on contacting [facility] Trinh, and I will notify her as well.    7.  Hot flashes severe and also had mood swings following hysterectomy and salpingo-oophorectomy.    8.  Hypertension, repeat check give a blood pressure of 150 x 80      PLAN:  1. Discontinue tamoxifen  2. Will plan to start aromatase inhibitor Arimidex in 3 weeks from now  3. Patient has severe hot flashes but is on antidepressant Pristiq and cannot take Effexor.  She cannot take gabapentin as it causes her reaction  4. Will obtain DEXA scan  5. We reviewed the report of the hysterectomy and oophorectomy and pathology is benign  6. Plan to continue alternating an MRI of the breast as well as an mammogram of the  breast every 6 months due to the patient's CHEK2 mutation.  7. Follow-up in 2 months with me with DEXA scan prior and labs  8. For her depression she continues with Alexa Trinh from the supportive oncology services and is also managed by her primary care physician  9. For her new onset hypertension I rechecked the blood pressure and it was normal      I spent 35 total minutes, face-to-face, caring for Emilie today. >50%of this time involved counseling and/or coordination of care as documented within this note.     Cc: Dr. Trena Hyatt, APRN

## 2020-11-06 ENCOUNTER — TELEMEDICINE (OUTPATIENT)
Dept: INTERNAL MEDICINE | Facility: CLINIC | Age: 50
End: 2020-11-06

## 2020-11-06 DIAGNOSIS — F41.9 ANXIETY: ICD-10-CM

## 2020-11-06 DIAGNOSIS — I10 BENIGN ESSENTIAL HTN: ICD-10-CM

## 2020-11-06 DIAGNOSIS — E74.39 GLUCOSE INTOLERANCE: ICD-10-CM

## 2020-11-06 DIAGNOSIS — I10 BENIGN ESSENTIAL HTN: Primary | ICD-10-CM

## 2020-11-06 PROCEDURE — 99213 OFFICE O/P EST LOW 20 MIN: CPT | Performed by: NURSE PRACTITIONER

## 2020-11-06 RX ORDER — AMLODIPINE BESYLATE 5 MG/1
5 TABLET ORAL DAILY
Qty: 90 TABLET | Refills: 0 | Status: SHIPPED | OUTPATIENT
Start: 2020-11-06 | End: 2020-12-16 | Stop reason: SDUPTHER

## 2020-11-06 RX ORDER — AMLODIPINE BESYLATE 5 MG/1
5 TABLET ORAL DAILY
Qty: 30 TABLET | Refills: 1 | Status: SHIPPED | OUTPATIENT
Start: 2020-11-06 | End: 2020-11-06

## 2020-11-06 RX ORDER — METFORMIN HYDROCHLORIDE 500 MG/1
500 TABLET, EXTENDED RELEASE ORAL
Qty: 90 TABLET | Refills: 1 | Status: SHIPPED | OUTPATIENT
Start: 2020-11-06 | End: 2020-11-11

## 2020-11-06 NOTE — PROGRESS NOTES
Subjective   Emilie Soto is a 50 y.o. female who presents for a video visit due to elevated blood pressure.    Her BP was elevated Monday at oncology (170/94), has noticed persistently elevated readings since then: 184/104, 204/114. Denies chest pain or shortness of breath. She has gained lulu 23# since her hysterectomy. She also c/o severe hot flashes, recently changed from Tamoxifen to Arimidex.  She is managed on Pristiq for anxiety. She reports persistent anxiety and has concerns if medication is raising her blood pressure (did not take it yesterday and BP was somewhat better).  She has a history of glucose intolerance and her glucose was 219 which she states was nonfasting.  She has monitored her glucose at home and readings have consistently been greater than 140.       The following portions of the patient's history were reviewed and updated as appropriate: allergies, current medications, past social history and problem list.    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 May begin 1 tablet afterward., Disp: 30 tablet, Rfl: 0  •  desvenlafaxine  (PRISTIQ) 100 MG 24 hr tablet, Take 1 tablet by mouth Daily., Disp: 90 tablet, Rfl: 0  •  lisinopril-hydrochlorothiazide (PRINZIDE,ZESTORETIC) 20-25 MG per tablet, TAKE 1 TABLET BY MOUTH DAILY, Disp: 90 tablet, Rfl: 1  •  LORazepam (ATIVAN) 0.5 MG tablet, Take 1 tablet by mouth Every 8 (Eight) Hours As Needed for Anxiety. for anxiety, Disp: 45 tablet, Rfl: 0  •  metFORMIN ER (GLUCOPHAGE-XR) 500 MG 24 hr tablet, Take 1 tablet by mouth Daily With Breakfast., Disp: 90 tablet, Rfl: 1  •  simethicone (MYLICON) 125 MG chewable tablet, Chew 125 mg., Disp: , Rfl:   •  vitamin B-12 (CYANOCOBALAMIN) 1000 MCG tablet, Take 1,000 mcg by mouth Daily., Disp: , Rfl:   •  amLODIPine (NORVASC) 5 MG tablet, TAKE 1 TABLET BY MOUTH DAILY, Disp: 90 tablet, Rfl: 0    Allergies   Allergen Reactions   • Citalopram Rash   • Flexeril [Cyclobenzaprine] Unknown - High Severity     MUSCLES TENSE-OPPOSITE OF WHAT IT IS INTENDED TO DO-ELEVATES B/P   • Doxycycline Rash     Blisters on hands   • Keflex [Cephalexin] Rash   • Sulfa Antibiotics Rash     NAUSEA/VOMITING/FEVER   • Vilazodone Hcl Hives, Itching and Rash       Review of Systems   Constitutional: Negative for chills, fatigue, fever and unexpected weight change.   HENT: Negative for congestion, ear pain, postnasal drip, sinus pressure, sore throat and trouble swallowing.    Eyes: Negative for visual disturbance.   Respiratory: Negative for cough, chest tightness and wheezing.    Cardiovascular: Negative for chest pain, palpitations and leg swelling.   Gastrointestinal: Negative for abdominal pain, blood in stool, nausea and vomiting.   Endocrine: Positive for heat intolerance (frequent hot flashes).   Genitourinary: Negative for dysuria, frequency and urgency.   Musculoskeletal: Negative for arthralgias and joint swelling.   Skin: Negative for color change.   Neurological: Negative for syncope, weakness and headaches.   Hematological: Does not bruise/bleed easily.   Psychiatric/Behavioral:  Positive for decreased concentration and dysphoric mood. The patient is nervous/anxious.        Objective   There were no vitals filed for this visit.  There is no height or weight on file to calculate BMI.  Physical Exam  Constitutional:       Appearance: She is well-developed.   HENT:      Head: Normocephalic and atraumatic.   Eyes:      General:         Right eye: No discharge.         Left eye: No discharge.      Conjunctiva/sclera: Conjunctivae normal.   Pulmonary:      Effort: Pulmonary effort is normal.   Neurological:      Mental Status: She is alert and oriented to person, place, and time.   Psychiatric:         Behavior: Behavior normal.         Thought Content: Thought content normal.         Judgment: Judgment normal.         Assessment/Plan   Diagnoses and all orders for this visit:    1. Benign essential HTN (Primary)  -     Discontinue: amLODIPine (NORVASC) 5 MG tablet; Take 1 tablet by mouth Daily.  Dispense: 30 tablet; Refill: 1    2. Anxiety    3. Glucose intolerance  -     metFORMIN ER (GLUCOPHAGE-XR) 500 MG 24 hr tablet; Take 1 tablet by mouth Daily With Breakfast.  Dispense: 90 tablet; Refill: 1    1. HTN-Home BP readings are elevated, she will continue Lisinopril-HCTZ and add Amlodipine for better BP control. Encouraged weight loss.  2. Anxiety-currently managed on Pristiq which she has been on for several months without recent dose changes. I do not think this is the culprit in her elevated blood pressure but she will f/u with psychiatry on Monday to discuss.  3. Glucose intolerance-recent glucose readings are actually in the diabetic range.  She will start metformin and continue to monitor glucose.  We also discussed following a low-carb, low sugar diet which has been successful for her in the past.    History and medical judgement obtained from phone conversation with patient. No physical examination was performed. Approximately 11 minutes spent in phone conversation with patient.

## 2020-11-09 ENCOUNTER — OFFICE VISIT (OUTPATIENT)
Dept: PSYCHIATRY | Facility: HOSPITAL | Age: 50
End: 2020-11-09

## 2020-11-09 DIAGNOSIS — F41.1 GENERALIZED ANXIETY DISORDER: ICD-10-CM

## 2020-11-09 DIAGNOSIS — F33.1 MODERATE EPISODE OF RECURRENT MAJOR DEPRESSIVE DISORDER (HCC): Primary | ICD-10-CM

## 2020-11-09 PROCEDURE — 99215 OFFICE O/P EST HI 40 MIN: CPT | Performed by: NURSE PRACTITIONER

## 2020-11-09 RX ORDER — DESVENLAFAXINE SUCCINATE 50 MG/1
50 TABLET, EXTENDED RELEASE ORAL DAILY
Qty: 30 TABLET | Refills: 1 | Status: SHIPPED | OUTPATIENT
Start: 2020-11-09 | End: 2020-11-23 | Stop reason: SINTOL

## 2020-11-09 NOTE — PROGRESS NOTES
[facility]  Video visit-pandemic. Pt consented to session conducted through [employer] video.  You have chosen to receive care through a telehealth visit.  Do you consent to use a video/audio connection for your medical care today? YES    Subjective  Patient ID: Emilie Soto is a 50 y.o. female has been seen via [employer] video from the [facility] clinic in lieu of in person visit.    Pt noted to be alert, oriented, and engaged in conversation. Affect and mood down, dysthymic. Denies feeling hopeless.  Pt reports recent adjustment off of tamoxifen; hot flashes have been horrible and states her body just feels 'wonky.'  Blood pressure was very elevated with last med onc visit; pt questions whether this could be related to Pristiq. Continues to explore negative attributions toward this agent and strong desire to wean off.   In regard to mood, pt continues to report having both up and down days; describes sense of demoralization as family and wife are noticing she doesn't feel good. Finds mood is down due to pandemic, and she does not feel medications can assist this. Continues to see therapist, Leda, on a weekly basis via telehealth. Appreciates close connection and finds this to be helpful. Pt continues to acknowledges goals of participation in activity and meditation, although acknowledges challenges following through.  Finds depression prevents her from engagement at times. Believes journaling would be helpful and plans to use this to document response to body and medication changes.  Patient continues to acknowledge mental disorganization; states she has not yet restarted Ritalin since discontinuing with surgery.  Pt does acknowledge challenges falling asleep at night some nights, assisted by meditation.  Generally continuous and restorative after this.  Pt reports challenges with overeating. Finds weight gain of 22 pounds; is working to dedicate self to low carb diet.  Describes feeling much better  when she has a more consistent diet.  Acknowledges her blood sugars are more under control with dedication to this as well.    Medications Reviewed:  Pristiq 100 mg daily  Ativan 0.5 mg PRN anxiety    Diagnoses and all orders for this visit:    1. Moderate episode of recurrent major depressive disorder (CMS/HCC) (Primary)    2. Generalized anxiety disorder    Other orders  -     desvenlafaxine (Pristiq) 50 MG 24 hr tablet; Take 1 tablet by mouth Daily.  Dispense: 30 tablet; Refill: 1    Plan of Care  Pt continues with challenges accepting medication management for symptoms of depression and anxiety.  Explored with patient risks and benefits of coming off of current medication regimen.  Reviewed discontinuation symptoms related to Pristiq and risk for disruption in any current benefit.  Patient continues to desire to come off of Pristiq.  Will reduce to every other day dosing 100 mg / 50 mg.  We will do this for 2 weeks and follow-up for assessment.  Encouraged patient to break Ativan in half and use twice daily during this time to assist with discontinuation symptoms.  Continued counseling provided regarding concrete goals of therapy.  I have discussed my concern regarding symptoms of depression.  Reviewed importance of behavioral activation to include physical activity, note taking alongside medication adjustment, and goals of 5-minute distracted meditation daily.  Will follow via telehealth in 2 weeks.    Total time spent face to face via video with patient: 45 minutes.   37 minutes spent in supportive counseling surrounding issues of reintroduction to activity through graded tasks, balancing avoidance with approach , benefits of obtaining appropraite BMI, benefits of exercise, behavioral activation, medication education, anticipatory anxiety, exploration of QOL goals, effective communication strategies and positive and negative coping strategies.

## 2020-11-11 DIAGNOSIS — E74.39 GLUCOSE INTOLERANCE: Primary | ICD-10-CM

## 2020-11-11 DIAGNOSIS — E74.39 GLUCOSE INTOLERANCE: ICD-10-CM

## 2020-11-20 ENCOUNTER — OFFICE VISIT (OUTPATIENT)
Dept: SURGERY | Facility: CLINIC | Age: 50
End: 2020-11-20

## 2020-11-20 VITALS
BODY MASS INDEX: 47.09 KG/M2 | HEART RATE: 88 BPM | SYSTOLIC BLOOD PRESSURE: 140 MMHG | HEIGHT: 66 IN | RESPIRATION RATE: 16 BRPM | WEIGHT: 293 LBS | DIASTOLIC BLOOD PRESSURE: 82 MMHG | OXYGEN SATURATION: 98 %

## 2020-11-20 DIAGNOSIS — C50.111 MALIGNANT NEOPLASM OF CENTRAL PORTION OF RIGHT BREAST IN FEMALE, ESTROGEN RECEPTOR POSITIVE (HCC): Primary | ICD-10-CM

## 2020-11-20 DIAGNOSIS — Z17.0 MALIGNANT NEOPLASM OF CENTRAL PORTION OF RIGHT BREAST IN FEMALE, ESTROGEN RECEPTOR POSITIVE (HCC): Primary | ICD-10-CM

## 2020-11-20 DIAGNOSIS — Z12.31 ENCOUNTER FOR SCREENING MAMMOGRAM FOR MALIGNANT NEOPLASM OF BREAST: ICD-10-CM

## 2020-11-20 PROCEDURE — 99213 OFFICE O/P EST LOW 20 MIN: CPT | Performed by: SURGERY

## 2020-11-20 RX ORDER — CHOLECALCIFEROL (VITAMIN D3) 1250 MCG
CAPSULE ORAL
COMMUNITY
Start: 2020-11-02 | End: 2020-11-23 | Stop reason: SDUPTHER

## 2020-11-20 NOTE — PROGRESS NOTES
[facility]     Referring Provider: Mel Cline MD     Chief complaint: Routine followup breast cancer     HPI:   5/14/19:   Ms. Emilie Soto is a 47 yo woman, seen at the request of Dr. Mel Cline, for a new diagnosis of right breast cancer. This was initially detected as an imaging abnormality. Her work-up is detailed in the oncologic history below. She denies any breast lumps, pain, skin changes, or nipple discharge. She has a past history of a benign left breast stereotactic biopsy in 2014. 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 She has a family history of breast cancer in a paternal aunt and paternal cousin (unknown ages at diagnosis). She denies any family history of ovarian cancer.      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 She denies any new complaints.      6/7/19:  She underwent right central partial mastectomy & SLNB on 5/28/19. See surgery & pathology details below in oncologic history. She is complaining of soreness at her axillary incision, however says that her breast feels fine. She also is complaining of generalized anxiety, which has been worse since her diagnosis.      9/13/19:  She returns today for scheduled follow-up. I have reviewed the interval records since her last visit. She completed radiation on 8/14/19. She had issues with fatigue during treatment, however this is been slowly improving. She started tamoxifen about a month ago and has been tolerating this well. She has been seen by the genetics clinic for her CHEK2 mutation. She is being followed by PT/LE clinic. On 8/14/19 her L-Dex bioimpedance score was slightly elevated, however this was reevaluated today and back down to baseline. She is working with PT on increasing her right upper extremity strength, because she has not been using her right arm very much since surgery. She denies any new  complaints.     3/9/20:  She returns today for scheduled follow-up. She remains on tamoxifen and is still tolerating this well. She last saw PT on 11/11/19 and her bioimpedance score was within normal limits. She underwent MMG prior to her appointment which was benign (see imaging report details below). She is complaining of some occasional pain near her right axillary scar.     11/20/20, Interval History:  She underwent a breast MRI in 9/2020 which was benign (see report details below). Her follow-up with me was initially delayed because she underwent TRH/BSO with Dr. Neumann on 9/4/20 due to ovarian cysts and her history of a CHEK2 mutation. Final pathology showed atypical endometrial hyperplasia. Immediately after the hysterectomy she began having worsening hot flashes. She held her tamoxifen earlier this month and plans on starting Arimidex in a couple of weeks. She has not seen PT again since her last visit with me, however says she does not feel like she is having any current issues. She denies any right arm swelling. She denies any new breast related complaints.       Oncology/Hematology History Overview Note   1/7/14, Screening MMG ([facility]):   There is a stable small focal asymmetry seen in the left breast at 12:00-1:00. In the right breast, no masses, significant calcifications or other abnormalities are seen.   BI-RADS 0: Incomplete.    2/4/14, Left Diagnostic MMG & Left US ([facility]):   MMG:  On the present examination, there is an isodense focal asymmetry measuring 15 mm with indistinct margins in the left breast at 12:00-1:00 located 10 cm from the nipple. Focal asymmetric density has become more defined.   US:  There is no evidence of any solid mass or abnormal cystic elements. Stereotactic biopsy is recommended.   BI-RADS 4A: Suspicious.    2/11/14, Left Breast, 12-1:00, Stereotactic Biopsy ([facility]):   Fibroadenomatoid type stromal changes, mild columnar cell hyperplasia, mild fibrocystic changes. Negative for  atypia or malignancy.   -Concordant.  Recommend 6 month follow-up mammogram and ultrasound.     Malignant neoplasm of central portion of right breast in female, estrogen receptor positive (CMS/HCC)   4/17/2019 Initial Diagnosis    Malignant neoplasm of central portion of right breast in female, estrogen receptor positive (CMS/HCC)     4/18/2019 Imaging    Screening MMG ([facility]):   There are scattered areas of fibroglandular density. There is a small, oval mass measuring 10 mm with indistinct margins seen in the sub-areolar region of the right breast. Note is made of a biopsy clip in the left breast as evidence of a previous surgical procedure. In the left breast, no suspicious masses, significant calcifications or other abnormalities are seen.   BI-RADS 0: Incomplete.       4/29/2019 Imaging    Right Diagnostic MMG with Clarence & Right US ([facility]):   MMG:  On the present examination, there is a new high density, round mass measuring 10 mm with indistinct margins in the anterior one-third sub-areolar region of the right breast located 2 cm from the nipple.   US:   1. Ultrasound is suggestive of a round solid mass with indistinct margins measuring 9 mm. Internal echotexture is homogeneous and hypoechoic relative adipose tissue.   2. There is an abnormal axillary lymph node in the right axilla. There are 2 adjacent lymph nodes in the right axilla with mildly prominent cortices. The cortex has been measured at 3.1 mm, although no significant focal thickening is seen. The left axilla was evaluated for comparison, and the nodes on the right are of greater cortical thickness.   BI-RADS 4C: Suspicious.     5/6/2019 Biopsy    Right breast & Right axillary lymph node, US-guided biopsy ([facility]):    1. Right Subareolar, core biopsy:    Invasive High Grade Mammary Carcinoma (Invasive Ductal Carcinoma, Grade III). Tubule Score 3, Nuclear Grade Score 3, and Mitotic Score 2 for A Total Score of 8. Neoplasm is Present in Four of Eight  Biopsies and Measures 4 mm in Greatest Dimension.    ER+ (82.35%, moderate)  SD+ (92.68%, strong)  Her2 negative (IHC 1+)  Ki-67 14.81%    2. Right axilla, core biopsy:  Benign Lymph Node, Negative for Metastatic Carcinoma.     5/9/2019 Imaging    Bilateral Breast MRI ([facility]):  Within the retroareolar region of the right breast located slightly medial to the plane of the nipple at the 2:30 position on the order of 1.8 cm from the nipple there is an irregular enhancing mass that measures 1.0 cm in the superior-inferior dimension, 1.0 cm in anterior to posterior dimension and 1.0 cm in the medial to lateral dimension. A metallic clip is seen within the mass. This represents the biopsy-proven site malignancy.     No other areas of abnormal enhancement or morphology are seen within the right breast. I see no evidence for abnormal right breast skin, nipple or chest wall enhancement and there is no evidence for right axillary adenopathy.     There are no areas of abnormal enhancement or morphology in the left breast. I see no evidence for abnormal left breast skin, nipple or chest wall enhancement and there is no evidence for left axillary adenopathy.  BI-RADS 6: Known malignancy.     5/14/2019 Genetic Testing    [employer] Breast Cancer STAT Panel (9 genes):    CHEK2 mutation     5/28/2019 Surgery    Right central partial mastectomy (with removal of the nipple-areola-complex) and sentinel lymph node biopsy    1.  Right Axilla, [place] Lymph Node #1 (hot, blue, count [id number]):                A.  One lymph node with fatty infiltration and focal florid foreign body giant cell response consistent with clip                     Placement.               B.  No metastatic carcinoma identified by routine staining (0/1).     2.  Right Axilla, Non-[place] Lymph Node:                A.  Benign fibrofatty tissue with focal minimal fat necrosis.                B.  No intact lymph node tissue identified.      3.  Right Central Partial  Mastectomy:                A.  Invasive mammary carcinoma of no special type (ductal carcinoma).                            1.  Invasive carcinoma measures 10 mm x 8 mm x 8 mm.                            2.  Overall Elvin grade II (glandular score = 2, nuclear score = 2, mitotic score = 2).                 B.  No associated ductal carcinoma in situ component by IHC staining (see comment).                   C.  Margins are negative for invasive carcinoma.  Invasive carcinoma measures at least 15 mm from                     the closest (Posterior) margin of excision.                     All other margins measure at least 20 mm from invasive carcinoma including:                             Anterior margin = 20 mm.                            Superior margin = 20 mm.                            Inferior margin = 53 mm.                            Medial Margin = 50 mm.                            Lateral margin = 60 mm.                   D.  No lymphovascular space invasion identified.                E.  No pagetoid involvement of skin nor nipple by carcinoma identified.               F.  Focal stromal fibrosis (scar) organizing fat necrosis and foreign giant cell reaction noted consistent with                     previous needled biopsy site.                G.  Nonneoplastic breast tissue with fibrocystic change, focal adenosis, organizing fat necrosis.                H.  Microcalcifications are identified within benign ductal tissue.                I.   Previously reported biomarkers: Estrogen receptor: positive (82.35% moderate), Progesterone receptor:                    positive (92.68%, strong), HER2/Amy: negative (IHC score1+) and Ki-67=14.8%                     (not reviewed).                   Pathologic Stage: pT1b, (sn)N0 (G2).                 See synoptic for tumor details.     4.  Additional Lateral and Superior Margins:                A.  No in situ nor infiltrating carcinoma identified.               B.   New superior margin is negative for malignancy by an additional 25 mm.     Oncotype DX Score: 6     7/11/2019 - 8/14/2019 Radiation    Radiation OncologyTreatment Course:  Emilie Soto received 5000 cGy in 25 fractions to the right breast.     8/17/2019 - 11/2/2020 Hormonal Therapy    Tamoxifen     3/6/2020 Imaging    Bilateral Diagnostic MMG with Clarence ([facility]):  Scattered areas of fibroglandular density.   There is a new area of skin thickening, a new area of trabecular thickening and a new postsurgical scar seen in the anterior region of the right breast. Finding is in an area of prior lumpectomy. Findings are consistent with postsurgical and post radiation therapy changes. Metallic surgical clips noted at the lumpectomy site and at the axilla. No suspicious masses or microcalcifications are identified.  In the left breast, no suspicious masses, significant calcifications or other abnormalities are seen.  BI-RADS 2: Benign.     9/3/2020 Imaging    Bilateral Breast MRI ([facility]):  The parenchyma of both breasts is largely fatty-replaced. Minimal background parenchymal enhancement is noted. There is evidence of prior right partial mastectomy with mild anterior right breast architectural distortion and comparatively smaller right breast volume relative to the left breast. Additionally, the right nipple appears to have been surgically removed. Otherwise, no areas of abnormal morphology or enhancement are seen in either breast. I see no evidence for abnormal skin, chest wall or left nipple enhancement and there is no evidence for axillary or internal mammary chain adenopathy.   BI-RADS 2: Benign.     9/4/2020 Surgery    Total robotic hysterectomy and bilateral salpingo-oophorectomy, Dr. Nery Neumann     12/1/2020 -  Hormonal Therapy    Arimidex         Review of Systems:  See interval history.       Medications:    Current Outpatient Medications:   •  amLODIPine (NORVASC) 5 MG tablet, TAKE 1 TABLET BY MOUTH DAILY, Disp:  90 tablet, Rfl: 0  •  anastrozole (Arimidex) 1 MG tablet, Take 1 tablet by mouth Daily for 30 days., Disp: 30 tablet, Rfl: 5  •  Cholecalciferol (Vitamin D3) 1.25 MG (19141 UT) capsule, TK ONE C PO ONCE A WEEK, Disp: , Rfl:   •  Cholecalciferol 1.25 MG (11523 UT) tablet, Take 50,000 Units by mouth 1 (One) Time Per Week., Disp: 4 tablet, Rfl: 4  •  colchicine 0.6 MG tablet, 2 tablets at first sign of gout flare, then 1 tablet 1 hour later, max 3 tablets over 1 hour period. May begin 1 tablet afterward., Disp: 30 tablet, Rfl: 0  •  desvenlafaxine (Pristiq) 50 MG 24 hr tablet, Take 1 tablet by mouth Daily., Disp: 30 tablet, Rfl: 1  •  lisinopril-hydrochlorothiazide (PRINZIDE,ZESTORETIC) 20-25 MG per tablet, TAKE 1 TABLET BY MOUTH DAILY, Disp: 90 tablet, Rfl: 1  •  LORazepam (ATIVAN) 0.5 MG tablet, Take 1 tablet by mouth Every 8 (Eight) Hours As Needed for Anxiety. for anxiety, Disp: 45 tablet, Rfl: 0  •  metFORMIN (GLUCOPHAGE) 500 MG tablet, Take 1 tablet by mouth Daily With Breakfast., Disp: 30 tablet, Rfl: 0  •  simethicone (MYLICON) 125 MG chewable tablet, Chew 125 mg., Disp: , Rfl:   •  vitamin B-12 (CYANOCOBALAMIN) 1000 MCG tablet, Take 1,000 mcg by mouth Daily., Disp: , Rfl:       Allergies   Allergen Reactions   • Citalopram Rash   • Flexeril [Cyclobenzaprine] Unknown - High Severity     MUSCLES TENSE-OPPOSITE OF WHAT IT IS INTENDED TO DO-ELEVATES B/P   • Doxycycline Rash     Blisters on hands   • Keflex [Cephalexin] Rash   • Sulfa Antibiotics Rash     NAUSEA/VOMITING/FEVER   • Vilazodone Hcl Hives, Itching and Rash       Family History   Problem Relation Age of Onset   • Hypertension Mother    • Arthritis Mother    • Kidney cancer Mother    • Depression Mother         Not Dr tommy- [place] go   • Heart disease Father    • Hernia Father    • Hypertension Father    • No Known Problems Brother    • Breast cancer Paternal Aunt    • Breast cancer Paternal Cousin    • Drug abuse Paternal Uncle         life long   •  Drug abuse Paternal Uncle         life long   • Breast cancer Paternal Grandmother    • Osteoporosis Maternal Grandmother    • Lung cancer Paternal Grandfather    • Malig Hyperthermia Neg Hx        PHYSICAL EXAMINATION:   Vitals:    11/20/20 0839   BP: 140/82   Pulse: 88   Resp: 16   SpO2: 98%     ECOG 0 - Asymptomatic  General: NAD, well appearing, obese  Psych: a&o x 3, normal mood and affect  Eyes: EOMI, no scleral icterus  ENMT: neck supple without masses or thyromegaly, mucus membranes moist  MSK: normal gait, normal ROM in bilateral shoulders  Lymph nodes: Well-healed right axillary scar with some surrounding firm scar tissue; no cervical, supraclavicular or axillary lymphadenopathy  Breast:   Right: The right breast is smaller and uplifted versus the left. There is moderate hyperpigmentation of the entire breast and axilla. Well-healed, soft, central scar with absence of the NAC. No masses.  Left: No visible abnormalities on inspection while seated, with arms raised or hands on hips. No masses, skin changes, or nipple abnormalities.      I have independently reviewed her imaging and here are my findings:   Expected right breast postsurgical changes. No suspicious findings in either breast on MRI.      Assessment:  50 y.o. F with a diagnosis of right breast cancer: intermediate grade, invasive ductal carcinoma, ER/OK+, Her2 negative. She is sp right central partial mastectomy & SLNB on 5/28/19, pT1bN0, anatomic stage IA, prognostic stage IA. She completed radiation on 8/14/19. She took tamoxifen from 8/2019 to 10/2020. She underwent TRH/BSO on 9/4/20 and will be starting Arimidex soon.  -She has a pathogenic CHEK2 mutation and has a higher than average risk of a subsequent breast cancer, however declined prophylactic mastectomy. She is currently undergoing high risk screening, alternating MMG & MRI.    Plan:  -Continue follow-up with Dr. Trevizo.  -F/u in 3/2021 with MMG with NP.   -She was instructed to call  sooner with any questions, concerns or changes on BSE.    Trena Greene MD      CC:  [facility]  VICENTE Elliott

## 2020-11-23 ENCOUNTER — OFFICE VISIT (OUTPATIENT)
Dept: PSYCHIATRY | Facility: HOSPITAL | Age: 50
End: 2020-11-23

## 2020-11-23 ENCOUNTER — OFFICE VISIT (OUTPATIENT)
Dept: INTERNAL MEDICINE | Facility: CLINIC | Age: 50
End: 2020-11-23

## 2020-11-23 VITALS
SYSTOLIC BLOOD PRESSURE: 120 MMHG | TEMPERATURE: 98.6 F | OXYGEN SATURATION: 99 % | BODY MASS INDEX: 47.09 KG/M2 | WEIGHT: 293 LBS | HEART RATE: 109 BPM | HEIGHT: 66 IN | DIASTOLIC BLOOD PRESSURE: 78 MMHG

## 2020-11-23 DIAGNOSIS — I10 BENIGN ESSENTIAL HTN: Primary | ICD-10-CM

## 2020-11-23 DIAGNOSIS — F41.1 GENERALIZED ANXIETY DISORDER: ICD-10-CM

## 2020-11-23 DIAGNOSIS — F33.1 MODERATE EPISODE OF RECURRENT MAJOR DEPRESSIVE DISORDER (HCC): Primary | ICD-10-CM

## 2020-11-23 DIAGNOSIS — E55.9 VITAMIN D DEFICIENCY: ICD-10-CM

## 2020-11-23 DIAGNOSIS — E74.39 GLUCOSE INTOLERANCE: ICD-10-CM

## 2020-11-23 DIAGNOSIS — E53.8 VITAMIN B12 DEFICIENCY: ICD-10-CM

## 2020-11-23 PROBLEM — Z79.899 CONTROLLED SUBSTANCE AGREEMENT SIGNED: Status: ACTIVE | Noted: 2020-11-23

## 2020-11-23 PROCEDURE — 99213 OFFICE O/P EST LOW 20 MIN: CPT | Performed by: NURSE PRACTITIONER

## 2020-11-23 PROCEDURE — 99214 OFFICE O/P EST MOD 30 MIN: CPT | Performed by: NURSE PRACTITIONER

## 2020-11-23 RX ORDER — LISINOPRIL 20 MG/1
20 TABLET ORAL DAILY
Qty: 90 TABLET | Refills: 1 | Status: SHIPPED | OUTPATIENT
Start: 2020-11-23 | End: 2020-12-16 | Stop reason: DRUGHIGH

## 2020-11-23 RX ORDER — TRIAMTERENE AND HYDROCHLOROTHIAZIDE 37.5; 25 MG/1; MG/1
1 CAPSULE ORAL EVERY MORNING
Qty: 90 CAPSULE | Refills: 1 | Status: SHIPPED | OUTPATIENT
Start: 2020-11-23 | End: 2020-12-16 | Stop reason: SINTOL

## 2020-11-23 NOTE — PROGRESS NOTES
Supportive Oncology Services  Video visit-pandemic. Pt consented to session conducted through G-CON video.  You have chosen to receive care through a telehealth visit.  Do you consent to use a video/audio connection for your medical care today? YES    Subjective  Patient ID: Emilie Soto is a 50 y.o. female has been seen via Doximity video from the [facility] clinic in lieu of in person visit.    Pt noted to be alert, oriented, and engaged in conversation. Affect and mood euthymic.  Pt reviews attempt at reduction of Pristiq with SE related to brain zap/ 'pulling;' pt reviews looking at the stars outside, describing to be hearing voices- none she could make out. Finds coming off the medication has been especially challenging, having all day symptoms. States this precipitated decision to discontinue medication completely, struggling significantly with withdrawal effects for several days, and being sx free for past 3 days. Acknowledges she is completely off Pristiq. Voices have stopped; states she has not had these before, and this was an isolated occurrence.   Pt reports appreciation of being off of medication. Describes feeling like herself. Acknowledges this is not med related, but rather a result of everything she has been going through. Pt reports increase in desire toward engagement, although recognizes come increase in OCD type fixations since discontinuation. Desires to continue without psychotropic medications for time being.  Sleep described as being appropriate on most nights. Occasionally will stay up too late, although this is unusual. Generally restorative.     Medications Reviewed:  Pristiq (no longer taking)    Diagnoses and all orders for this visit:    1. Moderate episode of recurrent major depressive disorder (CMS/HCC) (Primary)    2. Generalized anxiety disorder    Plan of Care  Pt with reportedly improved sx of mood and anxiety disruption since discontinuation of Pristiq. Will remain off at this  time. Reviewed with patient my concerns regarding long term benefit and potential for resurgence of depressive/ anxiety sx. Pt voices understanding. Will continue with regular follow up for time being in group setting; follow up arranged via Zoom in 4 weeks.    Total time spent face to face via video with patient: 15 minutes.   10 minutes spent in supportive counseling surrounding issues of reintroduction to activity through graded tasks, recognition and allowance of need for rest, importance of continued follow up , benefits of peer support , benefits of exercise, medication education, anticipatory anxiety, exploration of QOL goals and effective communication strategies.

## 2020-11-25 NOTE — PROGRESS NOTES
"Jaspal Soto is a 50 y.o. female who presents for f/u regarding HTN, Vitamin D deficiency and glucose intolerance.    Her BP has improved but has remained mildly elevated despite add Amlodipine 5mg daily (BP consistently >140/90) and continuing Lisinopril-HCTZ. She does feel better, has discontinued Pristiq (tapered rapidly and then stopped on Thursday). She is no longer taking Ritalin either. She reports decreased anxiety with \"feeling like herself again.\" She continues with counseling and group support.  She is tolerating Metformin, does note more frequent stools but states sx are tolerable.  She was restarted on weekly Vitamin D which she is tolerating well.  Hot flashes have also improved.         The following portions of the patient's history were reviewed and updated as appropriate: allergies, current medications, past social history and problem list.    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   • Sleep apnea     C-PAP IN USE   • Vitamin D deficiency          Current Outpatient Medications:   •  amLODIPine (NORVASC) 5 MG tablet, TAKE 1 TABLET BY MOUTH DAILY, Disp: 90 tablet, Rfl: 0  •  anastrozole (Arimidex) 1 MG tablet, Take 1 tablet by mouth Daily for 30 days., Disp: 30 tablet, Rfl: 5  •  Cholecalciferol 1.25 MG (52696 UT) tablet, Take 50,000 Units by mouth 1 (One) Time Per Week., Disp: 4 tablet, Rfl: 4  •  colchicine 0.6 MG tablet, 2 tablets at first sign of gout flare, then 1 tablet 1 hour later, max 3 tablets over 1 hour period. May begin 1 tablet afterward., Disp: 30 tablet, " Rfl: 0  •  LORazepam (ATIVAN) 0.5 MG tablet, Take 1 tablet by mouth Every 8 (Eight) Hours As Needed for Anxiety. for anxiety, Disp: 45 tablet, Rfl: 0  •  metFORMIN (GLUCOPHAGE) 500 MG tablet, Take 1 tablet by mouth Daily With Breakfast., Disp: 30 tablet, Rfl: 0  •  simethicone (MYLICON) 125 MG chewable tablet, Chew 125 mg., Disp: , Rfl:   •  vitamin B-12 (CYANOCOBALAMIN) 1000 MCG tablet, Take 1,000 mcg by mouth Daily., Disp: , Rfl:   •  lisinopril (PRINIVIL,ZESTRIL) 20 MG tablet, Take 1 tablet by mouth Daily., Disp: 90 tablet, Rfl: 1  •  triamterene-hydrochlorothiazide (Dyazide) 37.5-25 MG per capsule, Take 1 capsule by mouth Every Morning., Disp: 90 capsule, Rfl: 1    Allergies   Allergen Reactions   • Citalopram Rash   • Flexeril [Cyclobenzaprine] Unknown - High Severity     MUSCLES TENSE-OPPOSITE OF WHAT IT IS INTENDED TO DO-ELEVATES B/P   • Doxycycline Rash     Blisters on hands   • Keflex [Cephalexin] Rash   • Sulfa Antibiotics Rash     NAUSEA/VOMITING/FEVER   • Vilazodone Hcl Hives, Itching and Rash       Review of Systems   Constitutional: Positive for fatigue. Negative for chills, fever and unexpected weight change.   HENT: Positive for congestion and postnasal drip. Negative for ear pain, sinus pressure, sore throat and trouble swallowing.    Eyes: Negative for visual disturbance.   Respiratory: Negative for cough, chest tightness and wheezing.    Cardiovascular: Negative for chest pain, palpitations and leg swelling.   Gastrointestinal: Negative for abdominal pain, blood in stool, nausea and vomiting.   Genitourinary: Negative for dysuria, frequency and urgency.   Musculoskeletal: Negative for arthralgias and joint swelling.   Skin: Negative for color change.   Neurological: Negative for syncope, weakness and headaches.   Hematological: Does not bruise/bleed easily.   Psychiatric/Behavioral: Positive for dysphoric mood. The patient is nervous/anxious.        Objective   Vitals:    11/23/20 1515   BP:  "120/78   BP Location: Left arm   Patient Position: Sitting   Cuff Size: Adult   Pulse: 109   Temp: 98.6 °F (37 °C)   TempSrc: Oral   SpO2: 99%   Weight: (!) 146 kg (322 lb)   Height: 167.6 cm (66\")     Body mass index is 51.97 kg/m².  Physical Exam  Constitutional:       Appearance: She is well-developed. She is not ill-appearing.   HENT:      Head: Normocephalic.      Right Ear: Hearing, tympanic membrane and external ear normal.      Left Ear: Hearing, tympanic membrane and external ear normal.      Nose: Nose normal. No nasal deformity, mucosal edema or rhinorrhea.      Right Sinus: No maxillary sinus tenderness or frontal sinus tenderness.      Left Sinus: No maxillary sinus tenderness or frontal sinus tenderness.      Mouth/Throat:      Dentition: Normal dentition.   Eyes:      General: Lids are normal.         Right eye: No discharge.         Left eye: No discharge.      Conjunctiva/sclera: Conjunctivae normal.      Right eye: No exudate.     Left eye: No exudate.  Neck:      Musculoskeletal: Normal range of motion. No edema.      Thyroid: No thyroid mass or thyromegaly.      Vascular: No carotid bruit.      Trachea: Trachea normal.   Cardiovascular:      Rate and Rhythm: Regular rhythm.      Pulses: Normal pulses.      Heart sounds: Normal heart sounds. No murmur.   Pulmonary:      Effort: No respiratory distress.      Breath sounds: Normal breath sounds. No decreased breath sounds, wheezing, rhonchi or rales.   Abdominal:      General: Bowel sounds are normal.      Palpations: Abdomen is soft.      Tenderness: There is no abdominal tenderness.   Lymphadenopathy:      Head:      Right side of head: No submental, submandibular, tonsillar, preauricular, posterior auricular or occipital adenopathy.      Left side of head: No submental, submandibular, tonsillar, preauricular, posterior auricular or occipital adenopathy.   Skin:     General: Skin is warm and dry.      Nails: There is no clubbing.     Neurological: "      Mental Status: She is alert.   Psychiatric:         Behavior: Behavior is cooperative.         Assessment/Plan   Diagnoses and all orders for this visit:    1. Benign essential HTN (Primary)  -     Lipid Panel; Future  -     Comprehensive Metabolic Panel; Future  -     lisinopril (PRINIVIL,ZESTRIL) 20 MG tablet; Take 1 tablet by mouth Daily.  Dispense: 90 tablet; Refill: 1  -     triamterene-hydrochlorothiazide (Dyazide) 37.5-25 MG per capsule; Take 1 capsule by mouth Every Morning.  Dispense: 90 capsule; Refill: 1    2. Vitamin D deficiency  -     Vitamin D 25 Hydroxy; Future    3. Glucose intolerance  -     Hemoglobin A1c; Future    4. Vitamin B12 deficiency  -     Vitamin B12; Future    1. HTN-BP remains elevated, add Triam-HCTZ for better BP control and continue Amlodipine & Lisinopril. She is going to begin a low-carb diet with the goal of weight loss which will be very helpful in BP control.  2. Vit D deficiency-recheck levels next month, will most likely need to stay on weekly supplement.  3. Glucose intolerance-overall tolerating Metformin well, recheck A1c.  4. B12 deficiency-taking OTC supplement, recheck level

## 2020-11-30 ENCOUNTER — TELEPHONE (OUTPATIENT)
Dept: SURGERY | Facility: CLINIC | Age: 50
End: 2020-11-30

## 2020-11-30 NOTE — TELEPHONE ENCOUNTER
MMG is scheduled on 3/8/2021 @ 11:15am.    F/u appointment with Shaye Dowling NP is scheduled on 3/16/2021 @ 2:00pm.    Called and spoke to patient, patient expressed v/u of appointment times.    Sent patient a reminder letter in the mail.

## 2020-12-01 ENCOUNTER — RESULTS ENCOUNTER (OUTPATIENT)
Dept: INTERNAL MEDICINE | Facility: CLINIC | Age: 50
End: 2020-12-01

## 2020-12-01 DIAGNOSIS — E74.39 GLUCOSE INTOLERANCE: ICD-10-CM

## 2020-12-01 DIAGNOSIS — E53.8 VITAMIN B12 DEFICIENCY: ICD-10-CM

## 2020-12-01 DIAGNOSIS — E55.9 VITAMIN D DEFICIENCY: ICD-10-CM

## 2020-12-01 DIAGNOSIS — I10 BENIGN ESSENTIAL HTN: ICD-10-CM

## 2020-12-15 ENCOUNTER — OFFICE VISIT (OUTPATIENT)
Dept: PSYCHIATRY | Facility: HOSPITAL | Age: 50
End: 2020-12-15

## 2020-12-15 DIAGNOSIS — F33.1 MAJOR DEPRESSIVE DISORDER, RECURRENT EPISODE, MODERATE (HCC): ICD-10-CM

## 2020-12-15 DIAGNOSIS — F41.1 GENERALIZED ANXIETY DISORDER: Primary | ICD-10-CM

## 2020-12-15 LAB
25(OH)D3+25(OH)D2 SERPL-MCNC: 31.2 NG/ML (ref 30–100)
ALBUMIN SERPL-MCNC: 3.9 G/DL (ref 3.5–5.2)
ALBUMIN/GLOB SERPL: 1.3 G/DL
ALP SERPL-CCNC: 94 U/L (ref 39–117)
ALT SERPL-CCNC: 50 U/L (ref 1–33)
AST SERPL-CCNC: 48 U/L (ref 1–32)
BILIRUB SERPL-MCNC: 0.4 MG/DL (ref 0–1.2)
BUN SERPL-MCNC: 8 MG/DL (ref 6–20)
BUN/CREAT SERPL: 11.8 (ref 7–25)
CALCIUM SERPL-MCNC: 9.8 MG/DL (ref 8.6–10.5)
CHLORIDE SERPL-SCNC: 97 MMOL/L (ref 98–107)
CHOLEST SERPL-MCNC: 166 MG/DL (ref 0–200)
CO2 SERPL-SCNC: 29 MMOL/L (ref 22–29)
CREAT SERPL-MCNC: 0.68 MG/DL (ref 0.57–1)
GLOBULIN SER CALC-MCNC: 2.9 GM/DL
GLUCOSE SERPL-MCNC: 176 MG/DL (ref 65–99)
HBA1C MFR BLD: 7.12 % (ref 4.8–5.6)
HDLC SERPL-MCNC: 49 MG/DL (ref 40–60)
LDLC SERPL CALC-MCNC: 73 MG/DL (ref 0–100)
POTASSIUM SERPL-SCNC: 4.5 MMOL/L (ref 3.5–5.2)
PROT SERPL-MCNC: 6.8 G/DL (ref 6–8.5)
SODIUM SERPL-SCNC: 138 MMOL/L (ref 136–145)
TRIGL SERPL-MCNC: 271 MG/DL (ref 0–150)
VIT B12 SERPL-MCNC: 388 PG/ML (ref 211–946)
VLDLC SERPL CALC-MCNC: 44 MG/DL (ref 5–40)

## 2020-12-15 PROCEDURE — 90853 GROUP PSYCHOTHERAPY: CPT | Performed by: NURSE PRACTITIONER

## 2020-12-15 NOTE — PROGRESS NOTES
Subjective  Patient ID: Emilie Soto is a 50 y.o.. female seen in regularly scheduled group session.  Group participants have consented to group conducted via video through Zoom.    Total Group Time, Face to Face via Zoom: 70 minutes  Total Group Participants: 3, plus facilitation per VICENTE Magana    Group Therapy  Group topics explored including challenges managing social isolation and impact of pandemic on mood and anxiety management.  Reviewed disrupted sleep, challenges managing reduced sense of schedule, familial expectations and demands, and lack of life outside the home.  Explored challenges in survivorship including management of survivor's guilt, exploration of grief surrounding holidays, new normal development, and impact of cancer on mood and anxiety.  Shared personal struggles, helpful interventions, and benefits of prayer and meditation, as well as distraction, on mental health.    Counseling provided regarding prioritization of sleep schedule, exploration of self defined routine, effective communication strategies, recognition of important people in relationships, review of goals and limitations, as well as creative strategies for managing hurdles.  Explored curative factors of group to include universality, sharing of information, and instillation of hope.  Group members able to use personal strengths to assist with shared challenges.  Continued benefit expressed to socialization allowed in group setting.    Medications Management  Pt noted to be alert, oriented, and engaged in conversation. Affect and mood euthymic.   Pt continues in survivorship of breast cancer, followed by partial mastectomy and radiation.  Pt reviews recently discontinuing Pristiq, with anxiety improved since being off medication. Sleep remains disrupted at times, although remains dedicated to waking for work at scheduled time.  Patient generally describes to be doing well off of medications. Current medication regimen,  inclusive of infrequent as needed Ativan reviewed and continued as appropriate.  Patient continues with dedication to meditation, and denies additional target symptoms at this time.    Diagnoses and all orders for this visit:    1. Generalized anxiety disorder (Primary)    2. Major depressive disorder, recurrent episode, moderate (CMS/HCC)

## 2020-12-16 ENCOUNTER — OFFICE VISIT (OUTPATIENT)
Dept: INTERNAL MEDICINE | Facility: CLINIC | Age: 50
End: 2020-12-16

## 2020-12-16 VITALS
SYSTOLIC BLOOD PRESSURE: 148 MMHG | TEMPERATURE: 98.4 F | WEIGHT: 293 LBS | OXYGEN SATURATION: 98 % | BODY MASS INDEX: 57.52 KG/M2 | HEART RATE: 94 BPM | DIASTOLIC BLOOD PRESSURE: 94 MMHG | HEIGHT: 60 IN

## 2020-12-16 DIAGNOSIS — F41.9 ANXIETY: ICD-10-CM

## 2020-12-16 DIAGNOSIS — Z85.3 HISTORY OF BREAST CANCER: ICD-10-CM

## 2020-12-16 DIAGNOSIS — I10 BENIGN ESSENTIAL HTN: Primary | ICD-10-CM

## 2020-12-16 DIAGNOSIS — E74.39 GLUCOSE INTOLERANCE: ICD-10-CM

## 2020-12-16 PROCEDURE — 99214 OFFICE O/P EST MOD 30 MIN: CPT | Performed by: NURSE PRACTITIONER

## 2020-12-16 RX ORDER — LISINOPRIL AND HYDROCHLOROTHIAZIDE 25; 20 MG/1; MG/1
1 TABLET ORAL DAILY
Qty: 90 TABLET | Refills: 2 | Status: CANCELLED | OUTPATIENT
Start: 2020-12-16

## 2020-12-16 RX ORDER — HYDROCHLOROTHIAZIDE 25 MG/1
25 TABLET ORAL DAILY
Qty: 90 TABLET | Refills: 1 | Status: SHIPPED | OUTPATIENT
Start: 2020-12-16 | End: 2021-03-08 | Stop reason: SINTOL

## 2020-12-16 RX ORDER — LISINOPRIL 20 MG/1
20 TABLET ORAL DAILY
Qty: 90 TABLET | Refills: 2 | Status: CANCELLED | OUTPATIENT
Start: 2020-12-16

## 2020-12-16 RX ORDER — LISINOPRIL AND HYDROCHLOROTHIAZIDE 25; 20 MG/1; MG/1
1 TABLET ORAL DAILY
COMMUNITY
End: 2020-12-16 | Stop reason: DRUGHIGH

## 2020-12-16 RX ORDER — AMLODIPINE BESYLATE 5 MG/1
5 TABLET ORAL DAILY
Qty: 90 TABLET | Refills: 2 | Status: SHIPPED | OUTPATIENT
Start: 2020-12-16 | End: 2021-09-29

## 2020-12-16 RX ORDER — LISINOPRIL 40 MG/1
40 TABLET ORAL DAILY
Qty: 90 TABLET | Refills: 1 | Status: SHIPPED | OUTPATIENT
Start: 2020-12-16 | End: 2021-03-30

## 2020-12-27 NOTE — PROGRESS NOTES
Subjective   Emilie Soto is a 50 y.o. female who presents for f/u regarding HTN, glucose intolerance and anxiety.    She is tolerating Metformin, recent A1c was elevated at 7.12. She is motivated to begin a low-sugar, low-carb diet with the goal of weight loss.  Her BP has improved with current medications, tolerating medication well.  She is doing well without Pristiq. She is returning to her normal activities and notes improved motivation to improve and make needed changes.    Hypertension  This is a chronic problem. The current episode started more than 1 year ago. The problem has been rapidly worsening since onset. The problem is resistant. Associated symptoms include anxiety, malaise/fatigue and sweats. Pertinent negatives include no blurred vision, chest pain, headaches, neck pain, orthopnea, palpitations, peripheral edema, PND or shortness of breath. There are no associated agents to hypertension. Risk factors for coronary artery disease include diabetes mellitus, dyslipidemia, family history, obesity, post-menopausal state, sedentary lifestyle, smoking/tobacco exposure and stress. Compliance problems include diet, exercise and medication side effects.    Anxiety  Symptoms include nervous/anxious behavior (improved). Patient reports no chest pain, nausea, palpitations or shortness of breath.            The following portions of the patient's history were reviewed and updated as appropriate: allergies, current medications, past social history and problem list.    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   • Depression 01/01/1988   • Headache    • History of kidney stones    • Hyperlipidemia    • Hypertension    • Irritable bowel syndrome    • Lumbago    • Mood disorder (CMS/[facility])    • Obesity life   • PONV (postoperative nausea and vomiting)    • S/P radiation therapy 1993    PAROTID GLAND  CANCER   • Sleep apnea     C-PAP IN USE   • Vitamin D deficiency          Current Outpatient Medications:   •  amLODIPine (NORVASC) 5 MG tablet, Take 1 tablet by mouth Daily., Disp: 90 tablet, Rfl: 2  •  Cholecalciferol 1.25 MG (45847 UT) tablet, Take 50,000 Units by mouth 1 (One) Time Per Week., Disp: 4 tablet, Rfl: 4  •  LORazepam (ATIVAN) 0.5 MG tablet, Take 1 tablet by mouth Every 8 (Eight) Hours As Needed for Anxiety. for anxiety, Disp: 45 tablet, Rfl: 0  •  metFORMIN (GLUCOPHAGE) 500 MG tablet, Take 1 tablet by mouth Daily With Breakfast., Disp: 90 tablet, Rfl: 1  •  vitamin B-12 (CYANOCOBALAMIN) 1000 MCG tablet, Take 1,000 mcg by mouth Daily., Disp: , Rfl:   •  Calcium 600-400 MG-UNIT chewable tablet, Chew 2 tablets Daily., Disp: 60 tablet, Rfl: 5  •  hydroCHLOROthiazide (HYDRODIURIL) 25 MG tablet, Take 1 tablet by mouth Daily., Disp: 90 tablet, Rfl: 1  •  lisinopril (PRINIVIL,ZESTRIL) 40 MG tablet, Take 1 tablet by mouth Daily., Disp: 90 tablet, Rfl: 1    Allergies   Allergen Reactions   • Citalopram Rash   • Flexeril [Cyclobenzaprine] Unknown - High Severity     MUSCLES TENSE-OPPOSITE OF WHAT IT IS INTENDED TO DO-ELEVATES B/P   • Doxycycline Rash     Blisters on hands   • Keflex [Cephalexin] Rash   • Sulfa Antibiotics Rash     NAUSEA/VOMITING/FEVER   • Vilazodone Hcl Hives, Itching and Rash       Review of Systems   Constitutional: Positive for malaise/fatigue. Negative for chills, fatigue, fever and unexpected weight change.   HENT: Negative for congestion, ear pain, postnasal drip, sinus pressure, sore throat and trouble swallowing.    Eyes: Negative for blurred vision and visual disturbance.   Respiratory: Negative for cough, chest tightness, shortness of breath and wheezing.    Cardiovascular: Negative for chest pain, palpitations, orthopnea, leg swelling and PND.   Gastrointestinal: Positive for diarrhea (c/o intermittent loose stools since starting Metformin, tolerable). Negative for abdominal pain,  "blood in stool, nausea and vomiting.   Genitourinary: Negative for dysuria, frequency and urgency.   Musculoskeletal: Negative for arthralgias, joint swelling and neck pain.   Skin: Negative for color change.   Neurological: Negative for syncope, weakness and headaches.   Hematological: Does not bruise/bleed easily.   Psychiatric/Behavioral: The patient is nervous/anxious (improved).        Objective   Vitals:    12/16/20 1038 12/16/20 1148   BP: 160/100 148/94   BP Location: Left arm Left arm   Patient Position: Sitting Sitting   Cuff Size: Adult Large Adult   Pulse: 94    Temp: 98.4 °F (36.9 °C)    TempSrc: Oral    SpO2: 98%    Weight: (!) 147 kg (325 lb)    Height: 152.4 cm (60\")      Body mass index is 63.47 kg/m².  Physical Exam  Constitutional:       Appearance: She is well-developed. She is not ill-appearing.   HENT:      Head: Normocephalic.      Right Ear: Hearing, tympanic membrane and external ear normal.      Left Ear: Hearing, tympanic membrane and external ear normal.      Nose: Nose normal. No nasal deformity, mucosal edema or rhinorrhea.      Right Sinus: No maxillary sinus tenderness or frontal sinus tenderness.      Left Sinus: No maxillary sinus tenderness or frontal sinus tenderness.      Mouth/Throat:      Dentition: Normal dentition.   Eyes:      General: Lids are normal.         Right eye: No discharge.         Left eye: No discharge.      Conjunctiva/sclera: Conjunctivae normal.      Right eye: No exudate.     Left eye: No exudate.  Neck:      Musculoskeletal: Normal range of motion. No edema.      Thyroid: No thyroid mass or thyromegaly.      Vascular: No carotid bruit.      Trachea: Trachea normal.   Cardiovascular:      Rate and Rhythm: Regular rhythm.      Pulses: Normal pulses.      Heart sounds: Normal heart sounds. No murmur.   Pulmonary:      Effort: No respiratory distress.      Breath sounds: Normal breath sounds. No decreased breath sounds, wheezing, rhonchi or rales.   Abdominal: "      General: Bowel sounds are normal.      Palpations: Abdomen is soft.      Tenderness: There is no abdominal tenderness.   Lymphadenopathy:      Head:      Right side of head: No submental, submandibular, tonsillar, preauricular, posterior auricular or occipital adenopathy.      Left side of head: No submental, submandibular, tonsillar, preauricular, posterior auricular or occipital adenopathy.   Skin:     General: Skin is warm and dry.      Nails: There is no clubbing.     Neurological:      Mental Status: She is alert.   Psychiatric:         Behavior: Behavior is cooperative.         Assessment/Plan   Diagnoses and all orders for this visit:    1. Benign essential HTN (Primary)  -     amLODIPine (NORVASC) 5 MG tablet; Take 1 tablet by mouth Daily.  Dispense: 90 tablet; Refill: 2  -     lisinopril (PRINIVIL,ZESTRIL) 40 MG tablet; Take 1 tablet by mouth Daily.  Dispense: 90 tablet; Refill: 1  -     hydroCHLOROthiazide (HYDRODIURIL) 25 MG tablet; Take 1 tablet by mouth Daily.  Dispense: 90 tablet; Refill: 1    2. Glucose intolerance  -     metFORMIN (GLUCOPHAGE) 500 MG tablet; Take 1 tablet by mouth Daily With Breakfast.  Dispense: 90 tablet; Refill: 1    3. Anxiety    4. History of breast cancer  -     Calcium 600-400 MG-UNIT chewable tablet; Chew 2 tablets Daily.  Dispense: 60 tablet; Refill: 5    Other orders  -     Cancel: lisinopril-hydrochlorothiazide (PRINZIDE,ZESTORETIC) 20-25 MG per tablet; Take 1 tablet by mouth Daily.  Dispense: 90 tablet; Refill: 2  -     Cancel: lisinopril (PRINIVIL,ZESTRIL) 20 MG tablet; Take 1 tablet by mouth Daily.  Dispense: 90 tablet; Refill: 2    1. HTN-BP is mildly elevated, improved with repeat. Start Amlodipine and continue to monitor.  2. Glucose intolerance-discussed low-carb, low-sugar diet along with the goal of weight loss.  3. Anxiety-stable without Pristiq, attending group therapy. She is taking Ativan sparingly for breakthrough symptoms.  4. Hx breast cancer-followed  by oncology.

## 2021-01-12 ENCOUNTER — OFFICE VISIT (OUTPATIENT)
Dept: PSYCHIATRY | Facility: HOSPITAL | Age: 51
End: 2021-01-12

## 2021-01-12 DIAGNOSIS — F41.1 GENERALIZED ANXIETY DISORDER: Primary | ICD-10-CM

## 2021-01-12 DIAGNOSIS — F33.1 MAJOR DEPRESSIVE DISORDER, RECURRENT EPISODE, MODERATE (HCC): ICD-10-CM

## 2021-01-12 PROCEDURE — 99212 OFFICE O/P EST SF 10 MIN: CPT | Performed by: NURSE PRACTITIONER

## 2021-01-12 PROCEDURE — 90853 GROUP PSYCHOTHERAPY: CPT | Performed by: NURSE PRACTITIONER

## 2021-01-12 NOTE — PROGRESS NOTES
Subjective  Patient ID: Emilie Soto is a 50 y.o.. female seen in regularly scheduled group session.  Group participants have consented to group conducted via video through Zoom.    Total Group Time, Face to Face via Zoom: 50 minutes  Total Group Participants: 2, plus facilitation per VICENTE Magana    Group Therapy  Group topics explored including challenges of more sedentary lifestyle, reduced stamina, frustration with fatigue and body changes since cancer experience and forced menopause.  Explored issues in survivorship, challenges accepting new normal, and negative attributions toward medications. Reviewed isolation in COVID and adjusted life experiences.    Motivational interviewing utilized to explore motivating factors of personal health, dx of diabetes, and goals of minimizing medication regimen. Explored useful and useless worry, exploration of risk reduction associated with healthy BMI attainment and exercise, and exploration of risks associated with substance use. Reviewed curative factors of group, specifically to include universality, altruism, and information sharing.    Medications Management  Medication discussion and management conducted per VICENTE Magana  Pt continues in survivorship of breast cancer.    Pt noted to be alert, oriented, and engaged in conversation. Affect and mood euthymic.  CC: Depression, fatigue, new dx of diabetes with goals of weight reduction  HPI: Pt reports continued fatigue and frustration with weight gain; has dedicated self to low carb, intermittent fasting, and feels this has contributed to improvement in energy. Has lost 11 pounds. Reports dedication to manageable diet changes. Mood and anxiety positively effected by dedication to self care.  Pt reports new dx of diabetes. Acknowledges chronic challenges with weight loss, although has never been dx with diabetes before. Finds this is motivating her toward health and wellness with focus on diet and  exercise.   Psychological ROS: positive for - anxiety and depression  Current medication regimen reviewed; pt is not on any medications at this time. Pt reports interest in reviewing [facility] report related to wellbutrin. I have reviewed this with patient to include being in the red category indicating need for low doses and elevated risk for side effects. Will remain off therapy at this time. Pt continues with dedicated counseling.    Diagnoses and all orders for this visit:    1. Generalized anxiety disorder (Primary)    2. Major depressive disorder, recurrent episode, moderate (CMS/HCC)

## 2021-01-13 ENCOUNTER — HOSPITAL ENCOUNTER (OUTPATIENT)
Dept: BONE DENSITY | Facility: HOSPITAL | Age: 51
Discharge: HOME | End: 2021-01-13
Admitting: INTERNAL MEDICINE

## 2021-01-13 DIAGNOSIS — Z17.0 MALIGNANT NEOPLASM OF CENTRAL PORTION OF RIGHT BREAST IN FEMALE, ESTROGEN RECEPTOR POSITIVE (HCC): ICD-10-CM

## 2021-01-13 DIAGNOSIS — C50.111 MALIGNANT NEOPLASM OF CENTRAL PORTION OF RIGHT BREAST IN FEMALE, ESTROGEN RECEPTOR POSITIVE (HCC): ICD-10-CM

## 2021-01-13 PROCEDURE — 77080 DXA BONE DENSITY AXIAL: CPT

## 2021-01-19 ENCOUNTER — OFFICE VISIT (OUTPATIENT)
Dept: ONCOLOGY | Facility: CLINIC | Age: 51
End: 2021-01-19

## 2021-01-19 ENCOUNTER — LAB (OUTPATIENT)
Dept: LAB | Facility: HOSPITAL | Age: 51
End: 2021-01-19

## 2021-01-19 VITALS
BODY MASS INDEX: 57.52 KG/M2 | TEMPERATURE: 97.3 F | RESPIRATION RATE: 18 BRPM | HEART RATE: 84 BPM | HEIGHT: 60 IN | OXYGEN SATURATION: 99 % | WEIGHT: 293 LBS | DIASTOLIC BLOOD PRESSURE: 78 MMHG | SYSTOLIC BLOOD PRESSURE: 123 MMHG

## 2021-01-19 DIAGNOSIS — C50.111 MALIGNANT NEOPLASM OF CENTRAL PORTION OF RIGHT BREAST IN FEMALE, ESTROGEN RECEPTOR POSITIVE (HCC): ICD-10-CM

## 2021-01-19 DIAGNOSIS — C50.111 MALIGNANT NEOPLASM OF CENTRAL PORTION OF RIGHT BREAST IN FEMALE, ESTROGEN RECEPTOR POSITIVE (HCC): Primary | ICD-10-CM

## 2021-01-19 DIAGNOSIS — Z17.0 MALIGNANT NEOPLASM OF CENTRAL PORTION OF RIGHT BREAST IN FEMALE, ESTROGEN RECEPTOR POSITIVE (HCC): ICD-10-CM

## 2021-01-19 DIAGNOSIS — E28.2 PCOS (POLYCYSTIC OVARIAN SYNDROME): ICD-10-CM

## 2021-01-19 DIAGNOSIS — Z17.0 MALIGNANT NEOPLASM OF CENTRAL PORTION OF RIGHT BREAST IN FEMALE, ESTROGEN RECEPTOR POSITIVE (HCC): Primary | ICD-10-CM

## 2021-01-19 DIAGNOSIS — E55.9 VITAMIN D DEFICIENCY: ICD-10-CM

## 2021-01-19 LAB
ALBUMIN SERPL-MCNC: 4.3 G/DL (ref 3.5–5.2)
ALBUMIN/GLOB SERPL: 1.3 G/DL (ref 1.1–2.4)
ALP SERPL-CCNC: 77 U/L (ref 38–116)
ALT SERPL W P-5'-P-CCNC: 77 U/L (ref 0–33)
ANION GAP SERPL CALCULATED.3IONS-SCNC: 15.1 MMOL/L (ref 5–15)
AST SERPL-CCNC: 90 U/L (ref 0–32)
BASOPHILS # BLD AUTO: 0.08 10*3/MM3 (ref 0–0.2)
BASOPHILS NFR BLD AUTO: 1.2 % (ref 0–1.5)
BILIRUB SERPL-MCNC: 0.7 MG/DL (ref 0.2–1.2)
BUN SERPL-MCNC: 10 MG/DL (ref 6–20)
BUN/CREAT SERPL: 13.3 (ref 7.3–30)
CALCIUM SPEC-SCNC: 10.9 MG/DL (ref 8.5–10.2)
CHLORIDE SERPL-SCNC: 99 MMOL/L (ref 98–107)
CO2 SERPL-SCNC: 23.9 MMOL/L (ref 22–29)
CREAT SERPL-MCNC: 0.75 MG/DL (ref 0.6–1.1)
DEPRECATED RDW RBC AUTO: 41.5 FL (ref 37–54)
EOSINOPHIL # BLD AUTO: 0.57 10*3/MM3 (ref 0–0.4)
EOSINOPHIL NFR BLD AUTO: 8.4 % (ref 0.3–6.2)
ERYTHROCYTE [DISTWIDTH] IN BLOOD BY AUTOMATED COUNT: 13.6 % (ref 12.3–15.4)
GFR SERPL CREATININE-BSD FRML MDRD: 82 ML/MIN/1.73
GLOBULIN UR ELPH-MCNC: 3.2 GM/DL (ref 1.8–3.5)
GLUCOSE SERPL-MCNC: 143 MG/DL (ref 74–124)
HCT VFR BLD AUTO: 37.7 % (ref 34–46.6)
HGB BLD-MCNC: 12.6 G/DL (ref 12–15.9)
IMM GRANULOCYTES # BLD AUTO: 0.03 10*3/MM3 (ref 0–0.05)
IMM GRANULOCYTES NFR BLD AUTO: 0.4 % (ref 0–0.5)
LYMPHOCYTES # BLD AUTO: 2.29 10*3/MM3 (ref 0.7–3.1)
LYMPHOCYTES NFR BLD AUTO: 33.8 % (ref 19.6–45.3)
MCH RBC QN AUTO: 28.3 PG (ref 26.6–33)
MCHC RBC AUTO-ENTMCNC: 33.4 G/DL (ref 31.5–35.7)
MCV RBC AUTO: 84.7 FL (ref 79–97)
MONOCYTES # BLD AUTO: 0.38 10*3/MM3 (ref 0.1–0.9)
MONOCYTES NFR BLD AUTO: 5.6 % (ref 5–12)
NEUTROPHILS NFR BLD AUTO: 3.43 10*3/MM3 (ref 1.7–7)
NEUTROPHILS NFR BLD AUTO: 50.6 % (ref 42.7–76)
NRBC BLD AUTO-RTO: 0 /100 WBC (ref 0–0.2)
PLATELET # BLD AUTO: 258 10*3/MM3 (ref 140–450)
PMV BLD AUTO: 9.4 FL (ref 6–12)
POTASSIUM SERPL-SCNC: 3.8 MMOL/L (ref 3.5–4.7)
PROT SERPL-MCNC: 7.5 G/DL (ref 6.3–8)
RBC # BLD AUTO: 4.45 10*6/MM3 (ref 3.77–5.28)
SODIUM SERPL-SCNC: 138 MMOL/L (ref 134–145)
WBC # BLD AUTO: 6.78 10*3/MM3 (ref 3.4–10.8)

## 2021-01-19 PROCEDURE — 85025 COMPLETE CBC W/AUTO DIFF WBC: CPT

## 2021-01-19 PROCEDURE — 99213 OFFICE O/P EST LOW 20 MIN: CPT | Performed by: INTERNAL MEDICINE

## 2021-01-19 PROCEDURE — 80053 COMPREHEN METABOLIC PANEL: CPT

## 2021-01-19 PROCEDURE — 36415 COLL VENOUS BLD VENIPUNCTURE: CPT

## 2021-01-19 RX ORDER — ANASTROZOLE 1 MG/1
1 TABLET ORAL DAILY
Qty: 90 TABLET | Refills: 3 | Status: SHIPPED | OUTPATIENT
Start: 2021-01-19 | End: 2021-03-09 | Stop reason: SDUPTHER

## 2021-01-19 NOTE — PROGRESS NOTES
Subjective     REASON FOR CONSULTATION:1. High grade, invasive ductal carcinoma of right breast, ER/AL+, Her2 negative; clinical T1bN0, anatomic stage IA, prognostic stage IA  · May 28, 2019 right lumpectomy with removal of nipple areolar complex and right sentinel lymph node biopsy  -Central 2:00, invasive ductal carcinoma, grade 2, Elvin score of 6, tumor size is 10 mm, no DCIS, one lymph node negative, good margins    2.  Oncotype DX score of 6, low risk, no chemo necessary    3.  June 28, 2019: Started tamoxifen    4.  Family history of breast cancer, CHEK2  Positive    5.  S/p hysterectomy with bilateral salpingo-oophorectomy September 4, 2020 by Dr. Nery Neumann.  Patient had bilateral ovarian cysts.  Pathology was consistent with complex atypical endometrial hyperplasia, endometrial polyp otherwise unremarkable.  Acute on chronic cervicitis with squamous metaplasia.  Right ovary with hemorrhagic corpus luteum cyst and left ovary also with corpus luteum cyst pelvic peritoneal biopsy negative for malignancy omental biopsy negative for malignancy.    Patient switched to aromatase inhibitor from tamoxifen.    HISTORY OF PRESENT ILLNESS: The patient is a 50-year-old female with the above-mentioned history who returns today for routine follow-up, continuing on tamoxifen.  She underwent bilateral salpingo-oophorectomy with hysterectomy on September 4, 2020 by Dr. Nery Neumann and the pathology was benign.  However patient is having significant hot flashes after she underwent surgery.  She also had crying spells after surgery.  Currently she continues to tamoxifen.  I told her that since she is postmenopausal the best option is to switch her to aromatase inhibitor anastrozole.    I did explain to the her the side effects of anastrozole of hot flashes, joint pains, decreasing bone density.  She will require DEXA scan as well.    I reviewed in length the pathology from Dr. Marifer Neumann's office    Patient had MRI of  the breast September 3, 2020 and negative    Interval history: Patient doing well without complaints.  Tolerating aromatase inhibitor    Oncologic history:   The patient is a 50 y.o. year old female who is here for an opinion about the above issue.    Patient is a 49-year old female who presents today as a new patient for consultation on her newly diagnosed high grade, invasive ductal carcinoma of right breast.   Patient had seen Dr. Greene on May 14, 2019.    She underwent a screening mammogram but did not appreciate any mass herself and denied any breast pain or nipple discharge.    Patient is accompanied with her wife Trena.  They have been together for 19 years and  for the last 2 years.    April 18, 2019: Screening mammogram showed a 10 mm small oval mass in the subareolar region of the right breast.  Left breast was negative.  Spot compression and ultrasound recommended.  Of the right breast.    April 29, 2019: Right diagnostic mammogram showed a high density round mass 10 mm with indistinct margins in the anterior one third subareolar region of the right breast located to centimeters from the nipple.    April 29, 2019: Ultrasound suggested a solid mass with indistinct margins 9 mm and subareolar region.  There is abnormal axillary node in the right axilla.  There are 2 adjacent lymph nodes in the right axilla with mildly prominent cortices.  The left axilla was evaluated for comparison and the nodes on the right are of greater cortical thickness.      Ultrasound-guided biopsy of the breast mass and right axillary lymph node was suggested.     5/9/19:  US guided biopsy sent to [facility] lab  showing solid mass in the sub-areorolar region of right breast revealing invasive high-grade mammary carcinoma, grade 3. Tubule score 3, nuclear grade 3, and mitotic score 2 for a total score of 8. Neoplasm is present in 4/8 biopsies and measures 4 mm in greatest dimension.   Biopsy performed on 05/09/2019 of  prominent lymph node of right axilla was benign- negative for metastatic carcinoma.  ER: 82.35%,  positive  [place]: 92.68%, positive  HER-2/latoya: 1+ negative  Ki-67 14.81%       05/09/2019 MRI of bilateral breast:   IMPRESSION:  1. Biopsy-proven malignancy in the right breast in the anterior  one-third approximately 1.8 cm from the nipple at the 2:30 position with  an internal metallic clip. The mass measures on the order of 1 cm in  greatest dimension. No other suspicious findings are seen within the  right breast and there is no evidence for right axillary or internal  mammary chain adenopathy. The patient appears to be a candidate for  breast conservation therapy.  2. There are no findings suspicious for malignancy in the left breast.  BI-RADS Category 6: Known biopsy-proven malignancy.    5/14/19: Patient was seen by Dr. Greene who felt that she was a good candidate for lumpectomy given the size of the lesion related to her breast size.  Because of superficial subareolar location she would require removal of the nipple areolar complex in order to make sure she has negative margins.  Patient preferred breast conservation with central lumpectomy.  This included the removal of the nipple areolar complex.      Also obtained INVITAE  genetic test given her family history of malignancy.    Estrogen receptor: positive (82.35% moderate),   Progesterone receptor:  positive (92.68%, strong)   HER2/Latoya: negative (IHC score1+) and Ki-67=14.8%  Pathologic Stage: pT1b, (sn)N0 (G2).    Synoptic Report :  TUMOR  Tumor Site: Invasive Carcinoma Central  Clock Position of Tumor Site 2 o'clock  3 o'clock  Histologic Type Invasive carcinoma of no special type (ductal, not otherwise  specified)  Glandular (Acinar) / Tubular Differentiation Score 2  Nuclear Pleomorphism Score 2  Mitotic Rate Score 2 (4-7 mitoses per mm2)  Number of Mitoses per 10 High-Power Fields 15 mitotic figures  Diameter of Microscope Field in Millimeters (mm) 0.55  Millimeters (mm)  Overall Grade Grade 2 (scores of 6 or 7)  Tumor Size Greatest dimension of largest invasive focus in Millimeters (mm): 10  Millimeters (mm)    MARGINS  Invasive Carcinoma Margins Uninvolved by invasive carcinoma    LYMPH NODES  Regional Lymph Nodes Uninvolved by tumor cells  Number of Lymph Nodes Examined 1  Number of [place] Nodes Examined 1    Oncotype DX testing ordered but pending.    Patient has a family history of paternal grandmother with breast cancer, paternal aunt with breast cancer, cousin on father's side dying of breast cancer at age 43.  History of ovarian or prostate cancer or uterine cancer.  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     Patient is now scheduled for counseling with genetic and also with radiation oncology next week.  Tamoxifen was started    2020: Patient underwent hysterectomy with bilateral salpingo-oophorectomy, pathology is benign    2020: We will switch to aromatase inhibitor Arimidex    PAST MEDICAL HISTORY:  1. Hypertension- Under control with medications  2. Pre-diabetes- Diet control  3. Obesity  4. PCOS   5. Anxiety- Worse since cancer diagnosis. Taking Gabapentin since yesterday which helped with her symptoms. Followed by Alexa Trinh.   6. No hx of CVA or CAD.     FAMILY HISTORY:  Paternal cousin with breast cancer diagnosed at 43 and . Paternal aunt with breast cancer s/p mastectomy; paternal grandmother with breast cancer s/p lumpectomy.   No other family history of ovarian cancer or prostrate cancer  Mother had kidney cancer s/p surgery.     OB-GYN:  Menarche- 9 years old  Menopause- n/a  Pregnancies- none  Post menopausal HRT- N/A  Hx of birth control pills- None    SOCIAL HISTORY:  She works at [employer].  She is  to her wife for the last 2 years but lived with her for 19.  Years.  Patient's wife is a schoolteacher.  Patient  does not smoke.  She does not drink.  Allergies reviewed with the patient today.      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   • Sleep apnea     C-PAP IN USE   • Vitamin D deficiency         Past Surgical History:   Procedure Laterality Date   • APPENDECTOMY      removed when removing dermoid on right ovary   • BREAST BIOPSY Right 2019   • BREAST LUMPECTOMY Right    • BREAST LUMPECTOMY WITH SENTINEL NODE BIOPSY Right 5/28/2019    Procedure: Right central partial mastectomy (with removal of the nipple-areola-complex) and sentinel lymph node biopsy;  Surgeon: Trena Greene MD;  Location: [facility] MAIN OR;  Service: General   • CHOLECYSTECTOMY     • COLONOSCOPY N/A 12/5/2019    Procedure: COLONOSCOPY TO CECUM;  Surgeon: Pedrito Fulton Jr., MD;  Location: [facility] ENDOSCOPY;  Service: General   • DERMOID CYST  EXCISION  1989   • HERNIA REPAIR  2006    umbilical   • HYSTERECTOMY Bilateral 09/04/2020    Dr. Neumann, due to ovarian cysts with previous hx breast cancer   • OVARY SURGERY      dermoid removed   • SALIVARY GLAND SURGERY  1993    due to cancer   • TONSILLECTOMY  01/01/1979    I was 9 years old        Current Outpatient Medications on File Prior to Visit   Medication Sig Dispense Refill   • amLODIPine (NORVASC) 5 MG tablet Take 1 tablet by mouth Daily. 90 tablet 2   • Calcium 600-400 MG-UNIT chewable tablet Chew 2 tablets Daily. 60 tablet 5   • hydroCHLOROthiazide (HYDRODIURIL) 25 MG tablet Take 1 tablet by mouth Daily. 90 tablet 1   • lisinopril (PRINIVIL,ZESTRIL) 40 MG tablet Take 1 tablet by  mouth Daily. 90 tablet 1   • LORazepam (ATIVAN) 0.5 MG tablet Take 1 tablet by mouth Every 8 (Eight) Hours As Needed for Anxiety. for anxiety 45 tablet 0   • metFORMIN (GLUCOPHAGE) 500 MG tablet Take 1 tablet by mouth Daily With Breakfast. 90 tablet 1   • vitamin B-12 (CYANOCOBALAMIN) 1000 MCG tablet Take 1,000 mcg by mouth Daily.     • [DISCONTINUED] Cholecalciferol 1.25 MG (93046 UT) tablet Take 50,000 Units by mouth 1 (One) Time Per Week. 4 tablet 4     No current facility-administered medications on file prior to visit.         ALLERGIES:    Allergies   Allergen Reactions   • Citalopram Rash   • Flexeril [Cyclobenzaprine] Unknown - High Severity     MUSCLES TENSE-OPPOSITE OF WHAT IT IS INTENDED TO DO-ELEVATES B/P   • Doxycycline Rash     Blisters on hands   • Keflex [Cephalexin] Rash   • Sulfa Antibiotics Rash     NAUSEA/VOMITING/FEVER   • Vilazodone Hcl Hives, Itching and Rash        Social History     Socioeconomic History   • Marital status:      Spouse name: Trean Montenegro   • Number of children: 0   • Years of education: Not on file   • Highest education level: Not on file   Occupational History     Employer: [employer]     Comment: 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    Tobacco Use   • Smoking status: Never Smoker   • Smokeless tobacco: Never Used   • Tobacco comment: Parents smoked.  I have never smoked   Substance and Sexual Activity   • Alcohol use: Yes     Frequency: Monthly or less     Drinks per session: 1 or 2     Comment: Only once or twice a year.  Mixed Drinks   • Drug use: No   • Sexual activity: Yes     Partners: Female     Birth control/protection: None        Family History   Problem Relation Age of Onset   • Hypertension Mother    • Arthritis Mother    • Kidney cancer Mother    • Depression Mother         Not Dr elton onofre   • Heart disease Father    • Hernia Father    • Hypertension Father    • No Known Problems Brother    • Breast cancer  "Paternal Aunt    • Breast cancer Paternal Cousin    • Drug abuse Paternal Uncle         life long   • Drug abuse Paternal Uncle         life long   • Breast cancer Paternal Grandmother    • Osteoporosis Maternal Grandmother    • Lung cancer Paternal Grandfather    • Malig Hyperthermia Neg Hx         Review of Systems   Constitutional: Positive for appetite change (Poor appetite 01/19/21-Improved) and fatigue (01/19/21-Unchanged). Negative for activity change, chills, diaphoresis, fever and unexpected weight change.   HENT: Negative for hearing loss, mouth sores, nosebleeds, sore throat and trouble swallowing.    Respiratory: Negative for cough, chest tightness, shortness of breath and wheezing.    Cardiovascular: Negative for chest pain, palpitations and leg swelling.   Gastrointestinal: Negative for abdominal distention, abdominal pain, constipation, diarrhea, nausea and vomiting.   Genitourinary: Negative for difficulty urinating, dysuria, frequency, hematuria and urgency.   Musculoskeletal: Negative for joint swelling.        No muscle weakness.   Skin: Negative for rash and wound.   Neurological: Negative for dizziness, seizures, syncope, speech difficulty, weakness, numbness and headaches.   Hematological: Negative for adenopathy. Does not bruise/bleed easily.   Psychiatric/Behavioral: Positive for sleep disturbance (01/19/21-Unchanged). Negative for behavioral problems, confusion and suicidal ideas. The patient is nervous/anxious (01/19/21-Unchanged).    All other systems reviewed and are negative.   I have reviewed and confirmed the accuracy of the ROS as documented by the MA/MARLENA/JAZIEL Trevizo MD  I have reviewed and confirmed the accuracy of the ROS as documented by the MA/MARLENA/JAZIEL Trevizo MD          Objective     Vitals:    01/19/21 1448   BP: 123/78   Pulse: 84   Resp: 18   Temp: 97.3 °F (36.3 °C)   TempSrc: Temporal   SpO2: 99%   Weight: (!) 144 kg (316 lb 8 oz)   Height: 152.4 cm (60\") "   PainSc: 0-No pain      Current Status 1/19/2021   ECOG score 0     Repeat blood pressure was 150 x 80  Physical exam    CONSTITUTIONAL:  Vital signs reviewed.    No distress, looks comfortable.  RESPIRATORY:  Normal respiratory effort.    CARDIOVASCULAR: Regular rate rhythm, no murmur  BREAST: Right breast: S/p right lumpectomy, no skin changes, no evidence of breast mass, no nipple discharge, no evidence of any right axillary adenopathy or right supraclavicular adenopathy  Left breast: No evidence of any skin changes, no evidence of any left breast mass and no evidence of left nipple discharge as well as no left axillary adenopathy or left supraclavicular adenopathy.  No significant lower extremity edema.  SKIN: No wounds.  No rashes.  MUSCULOSKELETAL/EXTREMITIES: No clubbing or cyanosis.  No apparent unilateral weakness.  NEURO: CN 2-12 appear intact. No focal neurological deficits noted.  PSYCHIATRIC:  Normal judgment and insight.  Normal mood and affect.  I have reexamined the patient and the results are consistent with the previously documented exam. Tia Trevizo MD         RECENT LABS:  Hematology WBC   Date Value Ref Range Status   01/19/2021 6.78 3.40 - 10.80 10*3/mm3 Final   09/04/2020 8.52 4.5 - 11.0 10*3/uL Final     RBC   Date Value Ref Range Status   01/19/2021 4.45 3.77 - 5.28 10*6/mm3 Final   09/04/2020 4.51 4.0 - 5.2 10*6/uL Final     Hemoglobin   Date Value Ref Range Status   01/19/2021 12.6 12.0 - 15.9 g/dL Final   09/04/2020 12.9 12.0 - 16.0 g/dL Final     Hematocrit   Date Value Ref Range Status   01/19/2021 37.7 34.0 - 46.6 % Final   09/04/2020 38.4 36.0 - 46.0 % Final     Platelets   Date Value Ref Range Status   01/19/2021 258 140 - 450 10*3/mm3 Final   09/04/2020 266 140 - 440 10*3/uL Final      BONE MINERAL DENSITOMETRY 01/13/21    IMPRESSION:  Normal      BILATERAL BREAST MRI WITHOUT AND WITH CONTRAST 09/03/20    IMPRESSION:  There are no findings suspicious for malignancy in either  breast.  Routine follow-up imaging is recommended.     BI-RADS Category 2: Benign finding.         Assessment/Plan      1. High grade, invasive ductal carcinoma of right breast, ER/SC+, Her2 negative; clinical T1bN0, anatomic stage IA, prognostic stage IA:   · May 6, 2019 ultrasound-guided biopsy of the right breast mass,  - grade 3, invasive mammary carcinoma, Elvin score of 8, biopsy of right axillary lymph node negative  ER 82.35%, SC 92.68%, HER-2/latoya 1+ , Ki-67 14.8%    · May 28, 2019 right lumpectomy with removal of nipple areolar complex and right sentinel lymph node biopsy  -Central 2:00, invasive ductal carcinoma, grade 2, Crescent score of 6, tumor size is 10 mm, no DCIS, one lymph node negative, good margins  · Oncotype DX score of 6, low risk disease, been not give chemotherapy    · I discussed the pathology findings with the patient today including ER/SC+, Her2 negative status of her cancer.  I discussed about consideration of chemotherapy if Oncotype DX testing falls in a high risk category.  Patient is not  keen on receiving chemotherapy.  I further discussed in length with patient on the 3 different endocrine therapies tamoxifen/aromatase inhibitors/Evista. Discussed Tamoxifen is for pre-menopausal women and other two are for post-menopausal women. Since she is pre-menopausal, Tamoxifen will be her best and first option. I have detailed the side effects of tamoxifen including depression, hot flashes, rare chance for uterine cancer, weight gain, blood clots, DVT, PE, hair thinning, rare chance for thrombocytopenia, cataracts.  Aromatase inhibitors can cause arthralgias, hot flashes, decrease in bone density, rare chance for diarrhea, also discussed about hot flashes with it.  Evista is approved for osteopenia and can also be used for prophylaxis with fewer side effects except hot flashes and rare chance for cataracts but it can improve bone density.     · We will plan on starting tamoxifen  given premenopausal for 2 to 3 years followed by Arimidex once postmenopausal.  · 2020, patient continues on tamoxifen and is tolerating this well.  She will have a follow-up MRI of the breast in September.  · September 3, 2020 MRI breast negative  · 2020 s/p hysterectomy with bilateral subfinger oophorectomy, pathology benign  · We will switch patient to aromatase inhibitor since she is postmenopausal now with Arimidex        2. Family history of cancer: Paternal cousin with breast cancer diagnosed at 43 and . Paternal aunt with breast cancer s/p mastectomy; paternal grandmother with breast cancer s/p lumpectomy.   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  ·  She has an appointment with  in early July.   · We will plan on alternating MRI of breast with mammogram alternating.  · Discussed in breast cancer conference today and Dr. Greene and we agreed that best option is to make an MRI of breast with mammogram every 6 months.    3. Mild leukocytosis: 2019 CBC mild elevation of WBC at 11.29. Will monitor.   · Today, 2020 WBC 8.87.    4. Vitamin B-12 deficiency: B12 level 270, advised patient to take over the counter B-12 thousand micrograms orally daily.   · Patient to take calcium 600 mg twice a day and vitamin D3 1000 international units daily  · We will check 25-hydroxy vitamin D levels today    5. PCOS, Complicated with obesity: Patient attempted to undergo bariatric surgery in order to reduce weight and further help with her PCOS. This was stalled due to her recent breast cancer diagnosis. In past patient has been able to lose 85 lbs with diet modifications.   · Advised patient to exercise 5 times per week with 20 minutes of aerobics, total of 40 minutes/day .   · Suggested Live [facility] program and patient to visit the cancer resource center.  · Referral to Nutritionist for further  recommendations.   · Referred patient to OB/GYN today for further evaluation.  · Continues to follow closely with Dr. Lexii Rahman with recent pelvic exam, planning to have endometrial biopsy tomorrow.  · S/p hysterectomy with bilateral salpingo-oophorectomy September 4, 2020.  Pathology benign  · Doing well    6.  Anxiety: Patient has significant anxiety, has been referred to Alexa Trinh.  In the past she has tried Zoloft Wellbutrin and Seroquel and did not have good response to any of these medications.  · Patient is being monitored by Alexa Trinh.  · 7/28/2020 reports more issues with panic attacks recently.  The patient is planning on contacting Alexa Trinh, and I will notify her as well.    7.  Hot flashes severe and also had mood swings following hysterectomy and salpingo-oophorectomy.    8.  Hypertension, repeat check give a blood pressure of 150 x 80      PLAN:  1. Continue Arimidex  2. Plan to continue alternating an MRI of the breast as well as an mammogram of the breast every 6 months due to the patient's CHEK2 mutation.  3. Follow-up in 2 months with me with DEXA scan prior and labs  4. For her depression she continues with Alexa Trinh from the supportive oncology services and is also managed by her primary care physician  5. For her new onset hypertension I rechecked the blood pressure and it was normal  6. Follow-up with me for up 4 months with labs           Cc: Dr. Trena Hyatt, APRN

## 2021-01-21 ENCOUNTER — TELEPHONE (OUTPATIENT)
Dept: ONCOLOGY | Facility: CLINIC | Age: 51
End: 2021-01-21

## 2021-01-21 DIAGNOSIS — E83.52 SERUM CALCIUM ELEVATED: ICD-10-CM

## 2021-01-21 DIAGNOSIS — Z17.0 MALIGNANT NEOPLASM OF CENTRAL PORTION OF RIGHT BREAST IN FEMALE, ESTROGEN RECEPTOR POSITIVE (HCC): Primary | ICD-10-CM

## 2021-01-21 DIAGNOSIS — C50.111 MALIGNANT NEOPLASM OF CENTRAL PORTION OF RIGHT BREAST IN FEMALE, ESTROGEN RECEPTOR POSITIVE (HCC): Primary | ICD-10-CM

## 2021-01-21 DIAGNOSIS — R74.8 ELEVATED LIVER ENZYMES: ICD-10-CM

## 2021-01-21 NOTE — TELEPHONE ENCOUNTER
Call to Ms. Carrizales to let her know Dr. Trevizo was concerned that her calcium was elevated and her liver enzymes were more elevated when her labs were recently checked.  Instructed her to stop taking her calcium supplement and Dr. Trevizo wanted her to return in one month for labs and nurse review.  Patient verbalized understanding.  Message to scheduling to set up appointment in one month.

## 2021-02-09 ENCOUNTER — OFFICE VISIT (OUTPATIENT)
Dept: PSYCHIATRY | Facility: HOSPITAL | Age: 51
End: 2021-02-09

## 2021-02-09 DIAGNOSIS — F41.1 GENERALIZED ANXIETY DISORDER: Primary | ICD-10-CM

## 2021-02-09 DIAGNOSIS — F33.1 MAJOR DEPRESSIVE DISORDER, RECURRENT EPISODE, MODERATE (HCC): ICD-10-CM

## 2021-02-09 DIAGNOSIS — F43.10 POST TRAUMATIC STRESS DISORDER (PTSD): ICD-10-CM

## 2021-02-09 DIAGNOSIS — R53.0 NEOPLASTIC MALIGNANT RELATED FATIGUE: ICD-10-CM

## 2021-02-09 PROCEDURE — 90853 GROUP PSYCHOTHERAPY: CPT | Performed by: NURSE PRACTITIONER

## 2021-02-09 PROCEDURE — 99212 OFFICE O/P EST SF 10 MIN: CPT | Performed by: NURSE PRACTITIONER

## 2021-02-09 NOTE — PROGRESS NOTES
Subjective  Patient ID: Emilie SAUNDERS is a 50 y.o.. female seen in regularly scheduled group session.  Group participants have consented to group conducted via video through Zoom.    Total Group Time, Face to Face via Zoom: 45 minutes  Total Group Participants: 4, plus facilitation per VICENTE Magana     Group Therapy  Group topics explored including introductions and support of others in shared new normal experience, recognition of both silver linings and frustrations in pandemic, review of personal struggles and nonpharmacological means of finding peace and relaxation, review of community support and available opportunities, shared vulnerabilities specifically regarding things kept from rest of the world and find helpful to explore in common space, explored celebrations both related to cancer and personal victories, reviewed fears and anxieties with distance from cancer and reduced surveillance, and explored significant residual related to adjustment from prior self.     Counseling provided regarding acknowledgment of self-care needs, curative factors of group, healing associated with helping others, personal boundaries and limit setting, sleep hygiene techniques, medication education, fatigue management strategies, current life demands, adjusted normal.  Explored community offerings, specifically reiterating benefits of [employer], [employer] through [employer], and upcoming cancer [employer].     Patient response to group:  Emilie shares openly about current weight loss goals for actionable cancer risk reduction; shared strategies with group, and appreciates general conversation.  Somewhat more quiet than normal.     Medications Management  VICENTE Magana present for medication discussion and management.  Pt continues in survivorship of breast cancer, dx in 2019.    Constitutional Exam: Pt is alert, oriented, and engaged in conversation; affect and mood somewhat subdued.  CC: Weight gain  HPI: Pt  reports dedication to adjusted food choices with weight loss, working toward goals of improving long term health and risk reduction from cancer perspective. Currently down 20 pounds. Pt reports receiving first dose of COVID vaccine with significant chills, arthralgias/ myalgias, fortunately beginning to feel better. Uncertainty regarding school plan/ work from home persists, although generally coping well at this time. Remains off medications and continues to find this to be best management of sx. Sleep is appropriate.  Psychological ROS: negative for - sleep disturbances  Current medication regimen, inclusive of PRN ativan reviewed and continued as appropriate.     Diagnoses and all orders for this visit:    1. Generalized anxiety disorder (Primary)    2. Major depressive disorder, recurrent episode, moderate (CMS/HCC)    3. Neoplastic malignant related fatigue    4. Post traumatic stress disorder (PTSD)

## 2021-02-15 ENCOUNTER — TELEPHONE (OUTPATIENT)
Dept: ONCOLOGY | Facility: CLINIC | Age: 51
End: 2021-02-15

## 2021-02-15 NOTE — TELEPHONE ENCOUNTER
----- Message from Shaye Buckner sent at 2/15/2021 12:50 PM EST -----  Pt wants to reschedule her appt for tomorrow.

## 2021-02-16 ENCOUNTER — APPOINTMENT (OUTPATIENT)
Dept: LAB | Facility: HOSPITAL | Age: 51
End: 2021-02-16

## 2021-02-16 ENCOUNTER — APPOINTMENT (OUTPATIENT)
Dept: ONCOLOGY | Facility: HOSPITAL | Age: 51
End: 2021-02-16

## 2021-02-22 ENCOUNTER — TELEPHONE (OUTPATIENT)
Dept: ONCOLOGY | Facility: CLINIC | Age: 51
End: 2021-02-22

## 2021-02-22 ENCOUNTER — APPOINTMENT (OUTPATIENT)
Dept: ONCOLOGY | Facility: HOSPITAL | Age: 51
End: 2021-02-22

## 2021-02-22 ENCOUNTER — APPOINTMENT (OUTPATIENT)
Dept: LAB | Facility: HOSPITAL | Age: 51
End: 2021-02-22

## 2021-02-26 ENCOUNTER — CLINICAL SUPPORT (OUTPATIENT)
Dept: ONCOLOGY | Facility: HOSPITAL | Age: 51
End: 2021-02-26

## 2021-02-26 ENCOUNTER — LAB (OUTPATIENT)
Dept: LAB | Facility: HOSPITAL | Age: 51
End: 2021-02-26

## 2021-02-26 DIAGNOSIS — E83.52 SERUM CALCIUM ELEVATED: ICD-10-CM

## 2021-02-26 DIAGNOSIS — Z17.0 MALIGNANT NEOPLASM OF CENTRAL PORTION OF RIGHT BREAST IN FEMALE, ESTROGEN RECEPTOR POSITIVE (HCC): ICD-10-CM

## 2021-02-26 DIAGNOSIS — Z17.0 MALIGNANT NEOPLASM OF CENTRAL PORTION OF RIGHT BREAST IN FEMALE, ESTROGEN RECEPTOR POSITIVE (HCC): Primary | ICD-10-CM

## 2021-02-26 DIAGNOSIS — C50.111 MALIGNANT NEOPLASM OF CENTRAL PORTION OF RIGHT BREAST IN FEMALE, ESTROGEN RECEPTOR POSITIVE (HCC): Primary | ICD-10-CM

## 2021-02-26 DIAGNOSIS — C50.111 MALIGNANT NEOPLASM OF CENTRAL PORTION OF RIGHT BREAST IN FEMALE, ESTROGEN RECEPTOR POSITIVE (HCC): ICD-10-CM

## 2021-02-26 DIAGNOSIS — R74.8 ELEVATED LIVER ENZYMES: ICD-10-CM

## 2021-02-26 DIAGNOSIS — E55.9 VITAMIN D DEFICIENCY: ICD-10-CM

## 2021-02-26 LAB
ALBUMIN SERPL-MCNC: 4.6 G/DL (ref 3.5–5.2)
ALBUMIN/GLOB SERPL: 1.4 G/DL (ref 1.1–2.4)
ALP SERPL-CCNC: 89 U/L (ref 38–116)
ALT SERPL W P-5'-P-CCNC: 70 U/L (ref 0–33)
ANION GAP SERPL CALCULATED.3IONS-SCNC: 13.5 MMOL/L (ref 5–15)
AST SERPL-CCNC: 63 U/L (ref 0–32)
BASOPHILS # BLD AUTO: 0.06 10*3/MM3 (ref 0–0.2)
BASOPHILS NFR BLD AUTO: 0.7 % (ref 0–1.5)
BILIRUB SERPL-MCNC: 1 MG/DL (ref 0.2–1.2)
BUN SERPL-MCNC: 7 MG/DL (ref 6–20)
BUN/CREAT SERPL: 9.5 (ref 7.3–30)
CALCIUM SPEC-SCNC: 11 MG/DL (ref 8.5–10.2)
CHLORIDE SERPL-SCNC: 99 MMOL/L (ref 98–107)
CO2 SERPL-SCNC: 26.5 MMOL/L (ref 22–29)
CREAT SERPL-MCNC: 0.74 MG/DL (ref 0.6–1.1)
DEPRECATED RDW RBC AUTO: 40.2 FL (ref 37–54)
EOSINOPHIL # BLD AUTO: 0.3 10*3/MM3 (ref 0–0.4)
EOSINOPHIL NFR BLD AUTO: 3.6 % (ref 0.3–6.2)
ERYTHROCYTE [DISTWIDTH] IN BLOOD BY AUTOMATED COUNT: 13.1 % (ref 12.3–15.4)
GFR SERPL CREATININE-BSD FRML MDRD: 83 ML/MIN/1.73
GLOBULIN UR ELPH-MCNC: 3.3 GM/DL (ref 1.8–3.5)
GLUCOSE SERPL-MCNC: 124 MG/DL (ref 74–124)
HCT VFR BLD AUTO: 39.3 % (ref 34–46.6)
HGB BLD-MCNC: 13.2 G/DL (ref 12–15.9)
IMM GRANULOCYTES # BLD AUTO: 0.06 10*3/MM3 (ref 0–0.05)
IMM GRANULOCYTES NFR BLD AUTO: 0.7 % (ref 0–0.5)
LYMPHOCYTES # BLD AUTO: 2.44 10*3/MM3 (ref 0.7–3.1)
LYMPHOCYTES NFR BLD AUTO: 29.2 % (ref 19.6–45.3)
MCH RBC QN AUTO: 28.4 PG (ref 26.6–33)
MCHC RBC AUTO-ENTMCNC: 33.6 G/DL (ref 31.5–35.7)
MCV RBC AUTO: 84.5 FL (ref 79–97)
MONOCYTES # BLD AUTO: 0.46 10*3/MM3 (ref 0.1–0.9)
MONOCYTES NFR BLD AUTO: 5.5 % (ref 5–12)
NEUTROPHILS NFR BLD AUTO: 5.04 10*3/MM3 (ref 1.7–7)
NEUTROPHILS NFR BLD AUTO: 60.3 % (ref 42.7–76)
NRBC BLD AUTO-RTO: 0 /100 WBC (ref 0–0.2)
PLATELET # BLD AUTO: 276 10*3/MM3 (ref 140–450)
PMV BLD AUTO: 8.8 FL (ref 6–12)
POTASSIUM SERPL-SCNC: 4 MMOL/L (ref 3.5–4.7)
PROT SERPL-MCNC: 7.9 G/DL (ref 6.3–8)
RBC # BLD AUTO: 4.65 10*6/MM3 (ref 3.77–5.28)
SODIUM SERPL-SCNC: 139 MMOL/L (ref 134–145)
WBC # BLD AUTO: 8.36 10*3/MM3 (ref 3.4–10.8)

## 2021-02-26 PROCEDURE — 80053 COMPREHEN METABOLIC PANEL: CPT

## 2021-02-26 PROCEDURE — 36415 COLL VENOUS BLD VENIPUNCTURE: CPT

## 2021-02-26 PROCEDURE — G0463 HOSPITAL OUTPT CLINIC VISIT: HCPCS

## 2021-02-26 PROCEDURE — 85025 COMPLETE CBC W/AUTO DIFF WBC: CPT

## 2021-02-26 NOTE — NURSING NOTE
Pt here for labs and RN review. CBC reviewed with pt. Counts are stable at this time. Pt stated she is feeling pretty good. CMP reviewed with pt. Calcium elevated to 11 and LFTs have decreased since last visit. Pt confirmed dc'ing calcium supplement and vitamin D. Reviewed labs with Trena WHATLEY. Per Trena WHATLEY pt should come back in 1 week for stat CMP, NP visit, and possible IV fluids. Told pt and she v/u. Also told pt to make sure she is drinking plenty of fluids as well. Copy of labs given to pt and walked pt to scheduling desk to schedule appt for next week.     Lab Results   Component Value Date    WBC 8.36 02/26/2021    HGB 13.2 02/26/2021    HCT 39.3 02/26/2021    MCV 84.5 02/26/2021     02/26/2021     Lab Results   Component Value Date    GLUCOSE 124 02/26/2021    BUN 7 02/26/2021    CREATININE 0.74 02/26/2021    EGFRIFNONA 83 02/26/2021    EGFRIFAFRI 111 12/14/2020    BCR 9.5 02/26/2021    K 4.0 02/26/2021    CO2 26.5 02/26/2021    CALCIUM 11.0 (C) 02/26/2021    PROTENTOTREF 6.8 12/14/2020    ALBUMIN 4.60 02/26/2021    LABIL2 1.3 12/14/2020    AST 63 (H) 02/26/2021    ALT 70 (H) 02/26/2021

## 2021-03-04 ENCOUNTER — TELEPHONE (OUTPATIENT)
Dept: ONCOLOGY | Facility: CLINIC | Age: 51
End: 2021-03-04

## 2021-03-04 NOTE — PROGRESS NOTES
Subjective     REASON FOR CONSULTATION:1. High grade, invasive ductal carcinoma of right breast, ER/SC+, Her2 negative; clinical T1bN0, anatomic stage IA, prognostic stage IA  · May 28, 2019 right lumpectomy with removal of nipple areolar complex and right sentinel lymph node biopsy  -Central 2:00, invasive ductal carcinoma, grade 2, Elvin score of 6, tumor size is 10 mm, no DCIS, one lymph node negative, good margins    2.  Oncotype DX score of 6, low risk, no chemo necessary    3.  June 28, 2019: Started tamoxifen    4.  Family history of breast cancer, CHEK2  Positive    5.  S/p hysterectomy with bilateral salpingo-oophorectomy September 4, 2020 by Dr. Nery Neumann.  Patient had bilateral ovarian cysts.  Pathology was consistent with complex atypical endometrial hyperplasia, endometrial polyp otherwise unremarkable.  Acute on chronic cervicitis with squamous metaplasia.  Right ovary with hemorrhagic corpus luteum cyst and left ovary also with corpus luteum cyst pelvic peritoneal biopsy negative for malignancy omental biopsy negative for malignancy.    Patient switched to aromatase inhibitor from tamoxifen.    HISTORY OF PRESENT ILLNESS: The patient is a 50-year-old female with the above-mentioned history who returns today for routine follow-up, continuing on tamoxifen.  She underwent bilateral salpingo-oophorectomy with hysterectomy on September 4, 2020 by Dr. Nery Neumann and the pathology was benign.  However patient is having significant hot flashes after she underwent surgery.  She also had crying spells after surgery.  Currently she continues to tamoxifen.  I told her that since she is postmenopausal the best option is to switch her to aromatase inhibitor anastrozole.    I did explain to the her the side effects of anastrozole of hot flashes, joint pains, decreasing bone density.  She will require DEXA scan as well.    I reviewed in length the pathology from Dr. Marifer Neumann's office    Patient had MRI of  the breast September 3, 2020 and negative    Interval history:   Patient called into the office on 3/4/2021 reporting concern regarding the Arimidex with regards to her elevated calcium and liver enzymes.  The patient was wishing to go back on tamoxifen.  Daylin Ordoñez RN discussed this with Dr. Trevizo and the plan was for the patient to hold Arimidex and hydrochlorothiazide for 1 month.  She comes in today down 19 pounds since her visit in mid January.  Patient states she has been doing intermittent fasting with eating in window from 3 PM to approximately 8 PM.  She typically eats only 1 meal however.  She is also been drinking about 32 ounces of water per day but states she feels like she is increase this is having a hard time doing so.  She is eating anywhere from 200 to 700 eriberto/day.  She states she started her restriction in mid December and tightened it down further mid January.  She denies taking calcium supplements.  She has held her vitamin D since last week under our instruction.  She did skip her hydrochlorothiazide a few days this week but took it yesterday she was having some edema.  She has not held her Arimidex as of yet.    Oncologic history:   The patient is a 50 y.o. year old female who is here for an opinion about the above issue.    Patient is a 49-year old female who presents today as a new patient for consultation on her newly diagnosed high grade, invasive ductal carcinoma of right breast.   Patient had seen Dr. Greene on May 14, 2019.    She underwent a screening mammogram but did not appreciate any mass herself and denied any breast pain or nipple discharge.    Patient is accompanied with her wife Trena.  They have been together for 19 years and  for the last 2 years.    April 18, 2019: Screening mammogram showed a 10 mm small oval mass in the subareolar region of the right breast.  Left breast was negative.  Spot compression and ultrasound recommended.  Of the right  breast.    April 29, 2019: Right diagnostic mammogram showed a high density round mass 10 mm with indistinct margins in the anterior one third subareolar region of the right breast located to centimeters from the nipple.    April 29, 2019: Ultrasound suggested a solid mass with indistinct margins 9 mm and subareolar region.  There is abnormal axillary node in the right axilla.  There are 2 adjacent lymph nodes in the right axilla with mildly prominent cortices.  The left axilla was evaluated for comparison and the nodes on the right are of greater cortical thickness.      Ultrasound-guided biopsy of the breast mass and right axillary lymph node was suggested.     5/9/19:  US guided biopsy sent to [facility] lab  showing solid mass in the sub-areorolar region of right breast revealing invasive high-grade mammary carcinoma, grade 3. Tubule score 3, nuclear grade 3, and mitotic score 2 for a total score of 8. Neoplasm is present in 4/8 biopsies and measures 4 mm in greatest dimension.   Biopsy performed on 05/09/2019 of prominent lymph node of right axilla was benign- negative for metastatic carcinoma.  ER: 82.35%,  positive  NH: 92.68%, positive  HER-2/latoya: 1+ negative  Ki-67 14.81%       05/09/2019 MRI of bilateral breast:   IMPRESSION:  1. Biopsy-proven malignancy in the right breast in the anterior  one-third approximately 1.8 cm from the nipple at the 2:30 position with  an internal metallic clip. The mass measures on the order of 1 cm in  greatest dimension. No other suspicious findings are seen within the  right breast and there is no evidence for right axillary or internal  mammary chain adenopathy. The patient appears to be a candidate for  breast conservation therapy.  2. There are no findings suspicious for malignancy in the left breast.  BI-RADS Category 6: Known biopsy-proven malignancy.    5/14/19: Patient was seen by Dr. Greene who felt that she was a good candidate for lumpectomy given the size of the lesion  related to her breast size.  Because of superficial subareolar location she would require removal of the nipple areolar complex in order to make sure she has negative margins.  Patient preferred breast conservation with central lumpectomy.  This included the removal of the nipple areolar complex.      Also obtained INVITAE  genetic test given her family history of malignancy.    Estrogen receptor: positive (82.35% moderate),   Progesterone receptor:  positive (92.68%, strong)   HER2/Amy: negative (IHC score1+) and Ki-67=14.8%  Pathologic Stage: pT1b, (sn)N0 (G2).    Synoptic Report :  TUMOR  Tumor Site: Invasive Carcinoma Central  Clock Position of Tumor Site 2 o'clock  3 o'clock  Histologic Type Invasive carcinoma of no special type (ductal, not otherwise  specified)  Glandular (Acinar) / Tubular Differentiation Score 2  Nuclear Pleomorphism Score 2  Mitotic Rate Score 2 (4-7 mitoses per mm2)  Number of Mitoses per 10 High-Power Fields 15 mitotic figures  Diameter of Microscope Field in Millimeters (mm) 0.55 Millimeters (mm)  Overall Grade Grade 2 (scores of 6 or 7)  Tumor Size Greatest dimension of largest invasive focus in Millimeters (mm): 10  Millimeters (mm)    MARGINS  Invasive Carcinoma Margins Uninvolved by invasive carcinoma    LYMPH NODES  Regional Lymph Nodes Uninvolved by tumor cells  Number of Lymph Nodes Examined 1  Number of [place] Nodes Examined 1    Oncotype DX testing ordered but pending.    Patient has a family history of paternal grandmother with breast cancer, paternal aunt with breast cancer, cousin on father's side dying of breast cancer at age 43.  History of ovarian or prostate cancer or uterine cancer.  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     Patient is now scheduled for counseling with genetic and also with radiation oncology next week.  Tamoxifen was started    September 2, 2020: Patient  underwent hysterectomy with bilateral salpingo-oophorectomy, pathology is benign    2020: We will switch to aromatase inhibitor Arimidex    PAST MEDICAL HISTORY:  1. Hypertension- Under control with medications  2. Pre-diabetes- Diet control  3. Obesity  4. PCOS   5. Anxiety- Worse since cancer diagnosis. Taking Gabapentin since yesterday which helped with her symptoms. Followed by Alexa Trinh.   6. No hx of CVA or CAD.     FAMILY HISTORY:  Paternal cousin with breast cancer diagnosed at 43 and . Paternal aunt with breast cancer s/p mastectomy; paternal grandmother with breast cancer s/p lumpectomy.   No other family history of ovarian cancer or prostrate cancer  Mother had kidney cancer s/p surgery.     OB-GYN:  Menarche- 9 years old  Menopause- n/a  Pregnancies- none  Post menopausal HRT- N/A  Hx of birth control pills- None    SOCIAL HISTORY:  She works at [employer].  She is  to her wife for the last 2 years but lived with her for 19.  Years.  Patient's wife is a schoolteacher.  Patient does not smoke.  She does not drink.  Allergies reviewed with the patient today.      Past Medical History:   Diagnosis Date   • Anxiety    • Anxiety and depression    • Cancer (CMS/HCC)     RIGHT BREAST   • Cancer (CMS/HCC)     PAROTID GLAND   • Depression 1988   • Headache    • History of kidney stones    • Hyperlipidemia    • Hypertension    • Irritable bowel syndrome    • Lumbago    • Mood disorder (CMS/HCC)    • Obesity life   • PONV (postoperative nausea and vomiting)    • S/P radiation therapy     PAROTID GLAND CANCER   • Sleep apnea     C-PAP IN USE   • Vitamin D deficiency         Past Surgical History:   Procedure Laterality Date   • APPENDECTOMY      removed when removing dermoid on right ovary   • BREAST BIOPSY Right    • BREAST LUMPECTOMY Right    • BREAST LUMPECTOMY WITH SENTINEL NODE BIOPSY Right 2019    Procedure: Right central partial mastectomy (with removal of the  nipple-areola-complex) and sentinel lymph node biopsy;  Surgeon: Trena Greene MD;  Location: [facility] MAIN OR;  Service: General   • CHOLECYSTECTOMY     • COLONOSCOPY N/A 12/5/2019    Procedure: COLONOSCOPY TO CECUM;  Surgeon: Pedrito Fulton Jr., MD;  Location: [facility] ENDOSCOPY;  Service: General   • DERMOID CYST  EXCISION  1989   • HERNIA REPAIR  2006    umbilical   • HYSTERECTOMY Bilateral 09/04/2020    Dr. Neumann, due to ovarian cysts with previous hx breast cancer   • OVARY SURGERY      dermoid removed   • SALIVARY GLAND SURGERY  1993    due to cancer   • TONSILLECTOMY  01/01/1979    I was 9 years old        Current Outpatient Medications on File Prior to Visit   Medication Sig Dispense Refill   • amLODIPine (NORVASC) 5 MG tablet Take 1 tablet by mouth Daily. 90 tablet 2   • anastrozole (Arimidex) 1 MG tablet Take 1 tablet by mouth Daily for 90 days. 90 tablet 3   • Cholecalciferol 1.25 MG (49007 UT) tablet Take 50,000 Units by mouth 1 (One) Time Per Week. 4 tablet 4   • hydroCHLOROthiazide (HYDRODIURIL) 25 MG tablet Take 1 tablet by mouth Daily. 90 tablet 1   • lisinopril (PRINIVIL,ZESTRIL) 40 MG tablet Take 1 tablet by mouth Daily. 90 tablet 1   • LORazepam (ATIVAN) 0.5 MG tablet Take 1 tablet by mouth Every 8 (Eight) Hours As Needed for Anxiety. for anxiety 45 tablet 0   • metFORMIN (GLUCOPHAGE) 500 MG tablet Take 1 tablet by mouth Daily With Breakfast. 90 tablet 1   • vitamin B-12 (CYANOCOBALAMIN) 1000 MCG tablet Take 1,000 mcg by mouth Daily.     • [DISCONTINUED] Calcium 600-400 MG-UNIT chewable tablet Chew 2 tablets Daily. 60 tablet 5     No current facility-administered medications on file prior to visit.         ALLERGIES:    Allergies   Allergen Reactions   • Citalopram Rash   • Flexeril [Cyclobenzaprine] Unknown - High Severity     MUSCLES TENSE-OPPOSITE OF WHAT IT IS INTENDED TO DO-ELEVATES B/P   • Doxycycline Rash     Blisters on hands   • Keflex [Cephalexin] Rash   • Sulfa Antibiotics Rash      NAUSEA/VOMITING/FEVER   • Vilazodone Hcl Hives, Itching and Rash        Social History     Socioeconomic History   • Marital status:      Spouse name: Trena Montenegro   • Number of children: 0   • Years of education: Not on file   • Highest education level: Not on file   Occupational History     Employer: [employer]     Comment: 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    Tobacco Use   • Smoking status: Never Smoker   • Smokeless tobacco: Never Used   • Tobacco comment: Parents smoked.  I have never smoked   Substance and Sexual Activity   • Alcohol use: Yes     Frequency: Monthly or less     Drinks per session: 1 or 2     Comment: Only once or twice a year.  Mixed Drinks   • Drug use: No   • Sexual activity: Yes     Partners: Female     Birth control/protection: None        Family History   Problem Relation Age of Onset   • Hypertension Mother    • Arthritis Mother    • Kidney cancer Mother    • Depression Mother         Not Dr tommy- edison onofre   • Heart disease Father    • Hernia Father    • Hypertension Father    • No Known Problems Brother    • Breast cancer Paternal Aunt    • Breast cancer Paternal Cousin    • Drug abuse Paternal Uncle         life long   • Drug abuse Paternal Uncle         life long   • Breast cancer Paternal Grandmother    • Osteoporosis Maternal Grandmother    • Lung cancer Paternal Grandfather    • Malig Hyperthermia Neg Hx         Review of Systems   Constitutional: Negative for activity change, appetite change, chills, diaphoresis, fatigue, fever and unexpected weight change (intended weight loss, see HPI).   HENT: Negative for hearing loss, mouth sores, nosebleeds, sore throat and trouble swallowing.    Respiratory: Negative for cough, chest tightness, shortness of breath and wheezing.    Cardiovascular: Negative for chest pain, palpitations and leg swelling.   Gastrointestinal: Negative for abdominal distention, abdominal pain, constipation, diarrhea,  "nausea and vomiting.   Genitourinary: Negative for difficulty urinating, dysuria, frequency, hematuria and urgency.   Musculoskeletal: Negative for joint swelling.        No muscle weakness.   Skin: Negative for rash and wound.   Neurological: Negative for dizziness, seizures, syncope, speech difficulty, weakness, numbness and headaches.   Hematological: Negative for adenopathy. Does not bruise/bleed easily.   Psychiatric/Behavioral: Negative for behavioral problems, confusion and suicidal ideas. The patient is nervous/anxious.    All other systems reviewed and are negative.   I have reviewed and confirmed the accuracy of the ROS as documented by the MA/LPN/RN VICENTE Clark          Objective     Vitals:    03/05/21 1123   BP: 137/80   Pulse: 83   Resp: 16   Temp: 96.9 °F (36.1 °C)   TempSrc: Temporal   SpO2: 99%   Weight: 135 kg (297 lb 4.8 oz)  Comment: pt is aware of wt loss   Height: 152.4 cm (60\")   PainSc: 0-No pain      Current Status 3/5/2021   ECOG score 0     PHYSICAL EXAM    CONSTITUTIONAL:  Vital signs reviewed.    No distress, looks comfortable.  RESPIRATORY:  Normal respiratory effort.    CARDIOVASCULAR: Regular rate rhythm, no murmur. No significant lower extremity edema.  SKIN: No wounds.  No rashes.  MUSCULOSKELETAL/EXTREMITIES: No clubbing or cyanosis.  No apparent unilateral weakness.  NEURO: No focal neurological deficits noted.  PSYCHIATRIC:  Normal judgment and insight.  Normal mood and affect.  I have reexamined the patient and the results are consistent with the previously documented exam. VICENTE Clark         RECENT LABS:  Hematology WBC   Date Value Ref Range Status   03/05/2021 9.31 3.40 - 10.80 10*3/mm3 Final   09/04/2020 8.52 4.5 - 11.0 10*3/uL Final     RBC   Date Value Ref Range Status   03/05/2021 4.97 3.77 - 5.28 10*6/mm3 Final   09/04/2020 4.51 4.0 - 5.2 10*6/uL Final     Hemoglobin   Date Value Ref Range Status   03/05/2021 14.1 12.0 - 15.9 g/dL Final   09/04/2020 12.9 " 12.0 - 16.0 g/dL Final     Hematocrit   Date Value Ref Range Status   03/05/2021 41.0 34.0 - 46.6 % Final   09/04/2020 38.4 36.0 - 46.0 % Final     Platelets   Date Value Ref Range Status   03/05/2021 287 140 - 450 10*3/mm3 Final   09/04/2020 266 140 - 440 10*3/uL Final      BONE MINERAL DENSITOMETRY 01/13/21    IMPRESSION:  Normal      BILATERAL BREAST MRI WITHOUT AND WITH CONTRAST 09/03/20    IMPRESSION:  There are no findings suspicious for malignancy in either breast.  Routine follow-up imaging is recommended.     BI-RADS Category 2: Benign finding.         Assessment/Plan      1. High grade, invasive ductal carcinoma of right breast, ER/AK+, Her2 negative; clinical T1bN0, anatomic stage IA, prognostic stage IA:   · May 6, 2019 ultrasound-guided biopsy of the right breast mass,  - grade 3, invasive mammary carcinoma, Saranac score of 8, biopsy of right axillary lymph node negative  ER 82.35%, AK 92.68%, HER-2/latoya 1+ , Ki-67 14.8%    · May 28, 2019 right lumpectomy with removal of nipple areolar complex and right sentinel lymph node biopsy  -Central 2:00, invasive ductal carcinoma, grade 2, Saranac score of 6, tumor size is 10 mm, no DCIS, one lymph node negative, good margins  · Oncotype DX score of 6, low risk disease, been not give chemotherapy    · I discussed the pathology findings with the patient today including ER/AK+, Her2 negative status of her cancer.  I discussed about consideration of chemotherapy if Oncotype DX testing falls in a high risk category.  Patient is not  keen on receiving chemotherapy.  I further discussed in length with patient on the 3 different endocrine therapies tamoxifen/aromatase inhibitors/Evista. Discussed Tamoxifen is for pre-menopausal women and other two are for post-menopausal women. Since she is pre-menopausal, Tamoxifen will be her best and first option. I have detailed the side effects of tamoxifen including depression, hot flashes, rare chance for uterine cancer,  "weight gain, blood clots, DVT, PE, hair thinning, rare chance for thrombocytopenia, cataracts.  Aromatase inhibitors can cause arthralgias, hot flashes, decrease in bone density, rare chance for diarrhea, also discussed about hot flashes with it.  Evista is approved for osteopenia and can also be used for prophylaxis with fewer side effects except hot flashes and rare chance for cataracts but it can improve bone density.     · We will plan on starting tamoxifen given premenopausal for 2 to 3 years followed by Arimidex once postmenopausal.  · 7/28/2020, patient continues on tamoxifen and is tolerating this well.  She will have a follow-up MRI of the breast in September.  · September 3, 2020 MRI breast negative  · September 4, 2020 s/p hysterectomy with bilateral subfinger oophorectomy, pathology benign  · We will switch patient to aromatase inhibitor since she is postmenopausal now with Arimidex, started 12/20/2020.  · Patient comes in on 3/5/2021 for follow-up continuing on Arimidex.  She called into the office yesterday reporting concern is her calcium and liver enzymes have been elevated and she was afraid this was related to Arimidex.  This was discussed with Dr. Trevizo by Daylin Ordoñez RN and they instructed the patient to hold her Arimidex as well as her hydrochlorothiazide.  Patient returns today stating that she has held her hydrochlorothiazide a few days this week but took it last night due to some edema.  She has not however held her Arimidex.  Patient denies any new areas of pain, headaches, low energy.  She has had significant weight loss however this has been intended.  The patient is doing intermittent fasting with strict calorie restriction, taking anywhere from 175 to 700 eriberto/day with 1 \"cheat\" day.  Patient is drinking 32 ounces of water daily we discussed she needs to increase her fluid intake.  We also discussed that less than 500 eriberto/day would not be advisable even though she is having results " currently this may not be sustainable.  This is all reviewed with Dr. Trevizo today and we do not feel that this change in her labs is related to the Arimidex.  The patient was instructed to continue this medication as she is otherwise tolerating it well with minimal arthralgias.  We have however asked her to hold her hydrochlorothiazide and follow-up with primary care regarding this.  Patient has had some lower extremity edema with holding a few days of the hydrochlorothiazide and they can offer an alternative.  We will have the patient follow-up in 2 months with Dr. Anderson him at which time we will repeat labs.  If her liver enzymes once again increase we will perform ultrasound of the liver.  If the calcium once again increases we would consider CT scans.        2. Family history of cancer: Paternal cousin with breast cancer diagnosed at 43 and . Paternal aunt with breast cancer s/p mastectomy; paternal grandmother with breast cancer s/p lumpectomy.   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  ·  She has an appointment with  in early July.   · We will plan on alternating MRI of breast with mammogram alternating.  · Discussed in breast cancer conference today and Dr. Greene and we agreed that best option is to make an MRI of breast with mammogram every 6 months.  · [facility]    3.  Vitamin B-12 deficiency: B12 level 270, advised patient to take over the counter B-12 thousand micrograms orally daily.   · Calcium and vitamin D currently on hold.    4. PCOS, Complicated with obesity: Patient attempted to undergo bariatric surgery in order to reduce weight and further help with her PCOS. This was stalled due to her recent breast cancer diagnosis. In past patient has been able to lose 85 lbs with diet modifications.   · Advised patient to exercise 5 times per week  with 20 minutes of aerobics, total of 40 minutes/day .   · Suggested Live strong program and patient to visit the cancer resource center.  · Referral to Nutritionist for further recommendations.   · Referred patient to OB/GYN today for further evaluation.  · Continues to follow closely with Dr. Lexii Rahman with recent pelvic exam, planning to have endometrial biopsy tomorrow.  · S/p hysterectomy with bilateral salpingo-oophorectomy September 4, 2020.  Pathology benign  · Doing well    5.  Anxiety: Patient has significant anxiety, has been referred to Alexa Trinh.  In the past she has tried Zoloft Wellbutrin and Seroquel and did not have good response to any of these medications.  · Patient is being monitored by Alexa Trinh.    6.  Hot flashes severe and also had mood swings following hysterectomy and salpingo-oophorectomy.    7.  Hypertension.  Blood pressure control today.      PLAN:  1. Continue Arimidex  2. Hold hydrochlorothiazide  3. Add ionized calcium PTH added to today's labs.  4. Plan to continue alternating an MRI of the breast as well as an mammogram of the breast every 6 months due to the patient's CHEK2 mutation.  Patient is due for mammogram within the next week and has follow-up already scheduled with the breast surgery nurse practitioner.  5. For her depression she continues with Alexa Trinh from the [facility] oncology services and is also managed by her primary care physician  6. Patient to discuss blood pressure management and a different diuretic with PCP.  7. Follow-up with Dr. Trevizo in 2 months with labs           Cc: Dr. Trena Hyatt, VICENTE

## 2021-03-04 NOTE — TELEPHONE ENCOUNTER
Call to Ms. Carrizales, in response to message sent after reviewing with Dr. Trevizo.  Instructed patient to hold Arimidex for one month and to decrease calcium supplement, but patient states she has not been taking the calcium supplement.  Also instructed her to hold Hydrochlorathiazide, as Dr. Trevizo recommended to hold, as it can increase liver enzymes, but also instructed her to check with her PCP to see about exchanging that for something else and let them know as well. Discussed that patient to have visit as scheduled on 3/5/21 with NP, Sapphire, and that extra labs have been ordered to be done at visit, and Sapphire will communicate to Dr. Trevizo at her visit on 3/5/21.  Patient verbalized understanding.  Call to Sapphire ZHANG to update her.  DL

## 2021-03-05 ENCOUNTER — APPOINTMENT (OUTPATIENT)
Dept: ONCOLOGY | Facility: HOSPITAL | Age: 51
End: 2021-03-05

## 2021-03-05 ENCOUNTER — LAB (OUTPATIENT)
Dept: LAB | Facility: HOSPITAL | Age: 51
End: 2021-03-05

## 2021-03-05 ENCOUNTER — OFFICE VISIT (OUTPATIENT)
Dept: ONCOLOGY | Facility: CLINIC | Age: 51
End: 2021-03-05

## 2021-03-05 VITALS
RESPIRATION RATE: 16 BRPM | SYSTOLIC BLOOD PRESSURE: 137 MMHG | HEIGHT: 60 IN | OXYGEN SATURATION: 99 % | HEART RATE: 83 BPM | WEIGHT: 293 LBS | DIASTOLIC BLOOD PRESSURE: 80 MMHG | TEMPERATURE: 96.9 F | BODY MASS INDEX: 57.52 KG/M2

## 2021-03-05 DIAGNOSIS — E83.52 SERUM CALCIUM ELEVATED: ICD-10-CM

## 2021-03-05 DIAGNOSIS — R74.8 ELEVATED LIVER ENZYMES: ICD-10-CM

## 2021-03-05 DIAGNOSIS — C50.111 MALIGNANT NEOPLASM OF CENTRAL PORTION OF RIGHT BREAST IN FEMALE, ESTROGEN RECEPTOR POSITIVE (HCC): Primary | ICD-10-CM

## 2021-03-05 DIAGNOSIS — Z17.0 MALIGNANT NEOPLASM OF CENTRAL PORTION OF RIGHT BREAST IN FEMALE, ESTROGEN RECEPTOR POSITIVE (HCC): Primary | ICD-10-CM

## 2021-03-05 LAB
ALBUMIN SERPL-MCNC: 4.3 G/DL (ref 3.5–5.2)
ALBUMIN/GLOB SERPL: 1.2 G/DL (ref 1.1–2.4)
ALP SERPL-CCNC: 93 U/L (ref 38–116)
ALT SERPL W P-5'-P-CCNC: 55 U/L (ref 0–33)
ANION GAP SERPL CALCULATED.3IONS-SCNC: 15.8 MMOL/L (ref 5–15)
AST SERPL-CCNC: 55 U/L (ref 0–32)
BASOPHILS # BLD AUTO: 0.08 10*3/MM3 (ref 0–0.2)
BASOPHILS NFR BLD AUTO: 0.9 % (ref 0–1.5)
BILIRUB SERPL-MCNC: 1 MG/DL (ref 0.2–1.2)
BUN SERPL-MCNC: 11 MG/DL (ref 6–20)
BUN/CREAT SERPL: 16.7 (ref 7.3–30)
CALCIUM SPEC-SCNC: 10.7 MG/DL (ref 8.5–10.2)
CHLORIDE SERPL-SCNC: 99 MMOL/L (ref 98–107)
CO2 SERPL-SCNC: 22.2 MMOL/L (ref 22–29)
CREAT SERPL-MCNC: 0.66 MG/DL (ref 0.6–1.1)
DEPRECATED RDW RBC AUTO: 38.5 FL (ref 37–54)
EOSINOPHIL # BLD AUTO: 0.24 10*3/MM3 (ref 0–0.4)
EOSINOPHIL NFR BLD AUTO: 2.6 % (ref 0.3–6.2)
ERYTHROCYTE [DISTWIDTH] IN BLOOD BY AUTOMATED COUNT: 13 % (ref 12.3–15.4)
GFR SERPL CREATININE-BSD FRML MDRD: 95 ML/MIN/1.73
GLOBULIN UR ELPH-MCNC: 3.5 GM/DL (ref 1.8–3.5)
GLUCOSE SERPL-MCNC: 131 MG/DL (ref 74–124)
HCT VFR BLD AUTO: 41 % (ref 34–46.6)
HGB BLD-MCNC: 14.1 G/DL (ref 12–15.9)
IMM GRANULOCYTES # BLD AUTO: 0.08 10*3/MM3 (ref 0–0.05)
IMM GRANULOCYTES NFR BLD AUTO: 0.9 % (ref 0–0.5)
LYMPHOCYTES # BLD AUTO: 2.3 10*3/MM3 (ref 0.7–3.1)
LYMPHOCYTES NFR BLD AUTO: 24.7 % (ref 19.6–45.3)
MCH RBC QN AUTO: 28.4 PG (ref 26.6–33)
MCHC RBC AUTO-ENTMCNC: 34.4 G/DL (ref 31.5–35.7)
MCV RBC AUTO: 82.5 FL (ref 79–97)
MONOCYTES # BLD AUTO: 0.5 10*3/MM3 (ref 0.1–0.9)
MONOCYTES NFR BLD AUTO: 5.4 % (ref 5–12)
NEUTROPHILS NFR BLD AUTO: 6.11 10*3/MM3 (ref 1.7–7)
NEUTROPHILS NFR BLD AUTO: 65.5 % (ref 42.7–76)
NRBC BLD AUTO-RTO: 0 /100 WBC (ref 0–0.2)
PLATELET # BLD AUTO: 287 10*3/MM3 (ref 140–450)
PMV BLD AUTO: 9.5 FL (ref 6–12)
POTASSIUM SERPL-SCNC: 4.5 MMOL/L (ref 3.5–4.7)
PROT SERPL-MCNC: 7.8 G/DL (ref 6.3–8)
RBC # BLD AUTO: 4.97 10*6/MM3 (ref 3.77–5.28)
SODIUM SERPL-SCNC: 137 MMOL/L (ref 134–145)
WBC # BLD AUTO: 9.31 10*3/MM3 (ref 3.4–10.8)

## 2021-03-05 PROCEDURE — 99214 OFFICE O/P EST MOD 30 MIN: CPT | Performed by: NURSE PRACTITIONER

## 2021-03-05 PROCEDURE — 36415 COLL VENOUS BLD VENIPUNCTURE: CPT

## 2021-03-05 PROCEDURE — 85025 COMPLETE CBC W/AUTO DIFF WBC: CPT

## 2021-03-05 PROCEDURE — 80053 COMPREHEN METABOLIC PANEL: CPT

## 2021-03-08 ENCOUNTER — OFFICE VISIT (OUTPATIENT)
Dept: INTERNAL MEDICINE | Facility: CLINIC | Age: 51
End: 2021-03-08

## 2021-03-08 ENCOUNTER — LAB (OUTPATIENT)
Dept: LAB | Facility: HOSPITAL | Age: 51
End: 2021-03-08

## 2021-03-08 ENCOUNTER — APPOINTMENT (OUTPATIENT)
Dept: WOMENS IMAGING | Facility: HOSPITAL | Age: 51
End: 2021-03-08

## 2021-03-08 VITALS
BODY MASS INDEX: 57.52 KG/M2 | TEMPERATURE: 98.2 F | HEIGHT: 60 IN | WEIGHT: 293 LBS | SYSTOLIC BLOOD PRESSURE: 132 MMHG | OXYGEN SATURATION: 98 % | DIASTOLIC BLOOD PRESSURE: 80 MMHG | HEART RATE: 80 BPM

## 2021-03-08 DIAGNOSIS — R60.0 LOWER EXTREMITY EDEMA: ICD-10-CM

## 2021-03-08 DIAGNOSIS — E66.01 MORBIDLY OBESE (HCC): ICD-10-CM

## 2021-03-08 DIAGNOSIS — I10 BENIGN ESSENTIAL HTN: Primary | ICD-10-CM

## 2021-03-08 PROBLEM — E53.8 VITAMIN B12 DEFICIENCY: Chronic | Status: ACTIVE | Noted: 2020-02-03

## 2021-03-08 PROBLEM — Z80.3 FAMILY HISTORY OF BREAST CANCER: Chronic | Status: ACTIVE | Noted: 2019-05-14

## 2021-03-08 PROBLEM — E28.2 PCOS (POLYCYSTIC OVARIAN SYNDROME): Chronic | Status: ACTIVE | Noted: 2020-02-03

## 2021-03-08 PROBLEM — Z15.09 MONOALLELIC MUTATION OF CHEK2 GENE IN FEMALE PATIENT: Chronic | Status: ACTIVE | Noted: 2019-05-27

## 2021-03-08 PROBLEM — Z15.89 MONOALLELIC MUTATION OF CHEK2 GENE IN FEMALE PATIENT: Chronic | Status: ACTIVE | Noted: 2019-05-27

## 2021-03-08 PROBLEM — Z15.02 MONOALLELIC MUTATION OF CHEK2 GENE IN FEMALE PATIENT: Chronic | Status: ACTIVE | Noted: 2019-05-27

## 2021-03-08 PROBLEM — Z15.01 MONOALLELIC MUTATION OF CHEK2 GENE IN FEMALE PATIENT: Chronic | Status: ACTIVE | Noted: 2019-05-27

## 2021-03-08 LAB
CA-I BLD-MCNC: 5.6 MG/DL (ref 4.6–5.4)
CA-I SERPL ISE-MCNC: 1.39 MMOL/L (ref 1.15–1.35)
PTH-INTACT SERPL-MCNC: 37.4 PG/ML (ref 15–65)

## 2021-03-08 PROCEDURE — 82330 ASSAY OF CALCIUM: CPT | Performed by: NURSE PRACTITIONER

## 2021-03-08 PROCEDURE — 77063 BREAST TOMOSYNTHESIS BI: CPT | Performed by: RADIOLOGY

## 2021-03-08 PROCEDURE — 99214 OFFICE O/P EST MOD 30 MIN: CPT | Performed by: NURSE PRACTITIONER

## 2021-03-08 PROCEDURE — 77067 SCR MAMMO BI INCL CAD: CPT | Performed by: RADIOLOGY

## 2021-03-08 PROCEDURE — 36415 COLL VENOUS BLD VENIPUNCTURE: CPT | Performed by: NURSE PRACTITIONER

## 2021-03-08 PROCEDURE — 83970 ASSAY OF PARATHORMONE: CPT | Performed by: NURSE PRACTITIONER

## 2021-03-08 RX ORDER — FUROSEMIDE 20 MG/1
10 TABLET ORAL DAILY
Qty: 30 TABLET | Refills: 2 | Status: SHIPPED | OUTPATIENT
Start: 2021-03-08 | End: 2021-03-30

## 2021-03-08 NOTE — PROGRESS NOTES
"Chief Complaint  Hypertension and Elevated calcium    Subjective          Emilie SAUNDERS presents to [facility] for f/u regarding HTN and elevated calcium.    She has had elevated calcium (10.7) with recent labs. She is currently managed on HCTZ for BP and lower extremity edema. She has tried to d/c medication but experienced increased swelling. Denies dyspnea.  She has also lost weight since 12/2020, she is doing intermittent fasting but is following a reduced calories diet (400-500 calories).      Objective   Vital Signs:   /80 (BP Location: Left arm, Patient Position: Sitting, Cuff Size: Adult)   Pulse 80   Temp 98.2 °F (36.8 °C) (Temporal)   Ht 152.4 cm (60\")   Wt (!) 137 kg (303 lb)   SpO2 98%   BMI 59.18 kg/m²     Physical Exam  Constitutional:       Appearance: She is well-developed. She is not ill-appearing.   HENT:      Head: Normocephalic.      Right Ear: Hearing, tympanic membrane and external ear normal.      Left Ear: Hearing, tympanic membrane and external ear normal.      Nose: Nose normal. No nasal deformity, mucosal edema or rhinorrhea.      Right Sinus: No maxillary sinus tenderness or frontal sinus tenderness.      Left Sinus: No maxillary sinus tenderness or frontal sinus tenderness.      Mouth/Throat:      Dentition: Normal dentition.   Eyes:      General: Lids are normal.         Right eye: No discharge.         Left eye: No discharge.      Conjunctiva/sclera: Conjunctivae normal.      Right eye: No exudate.     Left eye: No exudate.  Neck:      Thyroid: No thyroid mass or thyromegaly.      Vascular: No carotid bruit.      Trachea: Trachea normal.   Cardiovascular:      Rate and Rhythm: Regular rhythm.      Pulses: Normal pulses.      Heart sounds: Normal heart sounds. No murmur.   Pulmonary:      Effort: No respiratory distress.      Breath sounds: Normal breath sounds. No decreased breath sounds, wheezing, rhonchi or rales.   Musculoskeletal:      " Cervical back: Normal range of motion. No edema.   Lymphadenopathy:      Head:      Right side of head: No submental, submandibular, tonsillar, preauricular, posterior auricular or occipital adenopathy.      Left side of head: No submental, submandibular, tonsillar, preauricular, posterior auricular or occipital adenopathy.   Skin:     General: Skin is warm and dry.      Nails: There is no clubbing.   Neurological:      Mental Status: She is alert.   Psychiatric:         Behavior: Behavior is cooperative.        Result Review :   The following data was reviewed by: VICENTE Ruth on 03/08/2021:  Common labs    Common Labsle 1/19/21 1/19/21 2/26/21 2/26/21 3/5/21 3/5/21    1444 1444 1515 1515 1057 1057   Glucose  143 (A)  124  131 (A)   BUN  10  7  11   Creatinine  0.75  0.74  0.66   eGFR Non  Am  82  83  95   Sodium  138  139  137   Potassium  3.8  4.0  4.5   Chloride  99  99  99   Calcium  10.9 (A)  11.0 (A)  10.7 (A)   Albumin  4.30  4.60  4.30   Total Bilirubin  0.7  1.0  1.0   Alkaline Phosphatase  77  89  93   AST (SGOT)  90 (A)  63 (A)  55 (A)   ALT (SGPT)  77 (A)  70 (A)  55 (A)   WBC 6.78  8.36  9.31    Hemoglobin 12.6  13.2  14.1    Hematocrit 37.7  39.3  41.0    Platelets 258  276  287    (A) Abnormal value       Comments are available for some flowsheets but are not being displayed.                     Assessment and Plan    Diagnoses and all orders for this visit:    1. Benign essential HTN (Primary)  Assessment & Plan:  BP is well-controlled on current regimen, will monitor with d/c of HCTZ and starting Lasix instead    Orders:  -     furosemide (LASIX) 20 MG tablet; Take 0.5 tablets by mouth Daily.  Dispense: 30 tablet; Refill: 2    2. Lower extremity edema  Assessment & Plan:  She will d/c HCTZ due to hypercalcemia, start Lasix 20mg daily and monitor lower extremity edema      3. Morbidly obese (CMS/HCC)  Assessment & Plan:  She is losing weight but I am concerned about her significant  reduced calorie intake. Discussed 5:2 which allows for 2 days/week for restricted calories. However, I do not recommend this daily.        Follow Up   Return if symptoms worsen or fail to improve, for Next scheduled follow up.  Patient was given instructions and counseling regarding her condition or for health maintenance advice. Please see specific information pulled into the AVS if appropriate.

## 2021-03-09 RX ORDER — ANASTROZOLE 1 MG/1
1 TABLET ORAL DAILY
Qty: 90 TABLET | Refills: 0 | Status: SHIPPED | OUTPATIENT
Start: 2021-03-09 | End: 2021-06-07

## 2021-03-09 NOTE — TELEPHONE ENCOUNTER
Caller: mercedes    Relationship to patient: self    Best call back number: [phone]     Pt has no med left. Med is anastrozole (Arimidex) 1 MG tablet. [facility] is [facility].

## 2021-03-09 NOTE — ASSESSMENT & PLAN NOTE
BP is well-controlled on current regimen, will monitor with d/c of HCTZ and starting Lasix instead

## 2021-03-09 NOTE — ASSESSMENT & PLAN NOTE
She is losing weight but I am concerned about her significant reduced calorie intake. Discussed 5:2 which allows for 2 days/week for restricted calories. However, I do not recommend this daily.

## 2021-03-16 ENCOUNTER — OFFICE VISIT (OUTPATIENT)
Dept: SURGERY | Facility: CLINIC | Age: 51
End: 2021-03-16

## 2021-03-16 VITALS
HEIGHT: 60 IN | DIASTOLIC BLOOD PRESSURE: 86 MMHG | WEIGHT: 293 LBS | BODY MASS INDEX: 57.52 KG/M2 | HEART RATE: 74 BPM | TEMPERATURE: 97 F | SYSTOLIC BLOOD PRESSURE: 132 MMHG | OXYGEN SATURATION: 99 %

## 2021-03-16 DIAGNOSIS — C50.111 MALIGNANT NEOPLASM OF CENTRAL PORTION OF RIGHT BREAST IN FEMALE, ESTROGEN RECEPTOR POSITIVE (HCC): ICD-10-CM

## 2021-03-16 DIAGNOSIS — Z17.0 MALIGNANT NEOPLASM OF CENTRAL PORTION OF RIGHT BREAST IN FEMALE, ESTROGEN RECEPTOR POSITIVE (HCC): ICD-10-CM

## 2021-03-16 DIAGNOSIS — Z15.01 MONOALLELIC MUTATION OF CHEK2 GENE IN FEMALE PATIENT: Chronic | ICD-10-CM

## 2021-03-16 DIAGNOSIS — Z15.89 MONOALLELIC MUTATION OF CHEK2 GENE IN FEMALE PATIENT: Chronic | ICD-10-CM

## 2021-03-16 DIAGNOSIS — Z15.09 MONOALLELIC MUTATION OF CHEK2 GENE IN FEMALE PATIENT: Chronic | ICD-10-CM

## 2021-03-16 DIAGNOSIS — Z80.3 FAMILY HISTORY OF BREAST CANCER: Chronic | ICD-10-CM

## 2021-03-16 DIAGNOSIS — E11.9 TYPE 2 DIABETES MELLITUS WITHOUT COMPLICATION, WITHOUT LONG-TERM CURRENT USE OF INSULIN (HCC): ICD-10-CM

## 2021-03-16 DIAGNOSIS — Z85.3 ENCOUNTER FOR FOLLOW-UP SURVEILLANCE OF BREAST CANCER: Primary | ICD-10-CM

## 2021-03-16 DIAGNOSIS — I10 BENIGN ESSENTIAL HTN: Primary | ICD-10-CM

## 2021-03-16 DIAGNOSIS — Z91.89 INCREASED RISK OF BREAST CANCER: ICD-10-CM

## 2021-03-16 DIAGNOSIS — E53.8 VITAMIN B12 DEFICIENCY: ICD-10-CM

## 2021-03-16 DIAGNOSIS — Z15.02 MONOALLELIC MUTATION OF CHEK2 GENE IN FEMALE PATIENT: Chronic | ICD-10-CM

## 2021-03-16 DIAGNOSIS — Z08 ENCOUNTER FOR FOLLOW-UP SURVEILLANCE OF BREAST CANCER: Primary | ICD-10-CM

## 2021-03-16 DIAGNOSIS — E66.01 MORBIDLY OBESE (HCC): Chronic | ICD-10-CM

## 2021-03-16 DIAGNOSIS — E55.9 VITAMIN D DEFICIENCY: ICD-10-CM

## 2021-03-16 DIAGNOSIS — E78.2 MIXED HYPERLIPIDEMIA: Primary | ICD-10-CM

## 2021-03-16 PROCEDURE — 99213 OFFICE O/P EST LOW 20 MIN: CPT | Performed by: NURSE PRACTITIONER

## 2021-03-16 NOTE — PROGRESS NOTES
[facility]     Referring Provider: No ref. provider found     Chief complaint: Routine followup breast cancer     HPI:   5/14/19:   Ms. Emilie Soto is a 47 yo woman, seen at the request of Dr. Mel Cline, for a new diagnosis of right breast cancer. This was initially detected as an imaging abnormality. Her work-up is detailed in the oncologic history below. She denies any breast lumps, pain, skin changes, or nipple discharge. She has a past history of a benign left breast stereotactic biopsy in 2014. 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 She has a family history of breast cancer in a paternal aunt and paternal cousin (unknown ages at diagnosis). She denies any family history of ovarian cancer.      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 She denies any new complaints.      6/7/19:  She underwent right central partial mastectomy & SLNB on 5/28/19. See surgery & pathology details below in oncologic history. She is complaining of soreness at her axillary incision, however says that her breast feels fine. She also is complaining of generalized anxiety, which has been worse since her diagnosis.      9/13/19:  She returns today for scheduled follow-up. I have reviewed the interval records since her last visit. She completed radiation on 8/14/19. She had issues with fatigue during treatment, however this is been slowly improving. She started tamoxifen about a month ago and has been tolerating this well. She has been seen by the genetics clinic for her CHEK2 mutation. She is being followed by PT/LE clinic. On 8/14/19 her L-Dex bioimpedance score was slightly elevated, however this was reevaluated today and back down to baseline. She is working with PT on increasing her right upper extremity strength, because she has not been using her right arm very much since surgery. She denies any new  complaints.     3/9/20:  She returns today for scheduled follow-up. She remains on tamoxifen and is still tolerating this well. She last saw PT on 11/11/19 and her bioimpedance score was within normal limits. She underwent MMG prior to her appointment which was benign (see imaging report details below). She is complaining of some occasional pain near her right axillary scar.     11/20/20:  She underwent a breast MRI in 9/2020 which was benign (see report details below). Her follow-up with me was initially delayed because she underwent TRH/BSO with Dr. Neumann on 9/4/20 due to ovarian cysts and her history of a CHEK2 mutation. Final pathology showed atypical endometrial hyperplasia. Immediately after the hysterectomy she began having worsening hot flashes. She held her tamoxifen earlier this month and plans on starting Arimidex in a couple of weeks. She has not seen PT again since her last visit with me, however says she does not feel like she is having any current issues. She denies any right arm swelling. She denies any new breast related complaints.    Interval History:  She returns today for follow up with no breast complaints or health changes.  Screening with tomosynthesis was completed at [facility] on 3/8/2021:  BiRads 2     Oncology/Hematology History Overview Note   1/7/14, Screening MMG ([facility]):   There is a stable small focal asymmetry seen in the left breast at 12:00-1:00. In the right breast, no masses, significant calcifications or other abnormalities are seen.   BI-RADS 0: Incomplete.    2/4/14, Left Diagnostic MMG & Left US ([facility]):   MMG:  On the present examination, there is an isodense focal asymmetry measuring 15 mm with indistinct margins in the left breast at 12:00-1:00 located 10 cm from the nipple. Focal asymmetric density has become more defined.   US:  There is no evidence of any solid mass or abnormal cystic elements. Stereotactic biopsy is recommended.   BI-RADS 4A: Suspicious.    2/11/14, Left  Breast, 12-1:00, Stereotactic Biopsy ([facility]):   Fibroadenomatoid type stromal changes, mild columnar cell hyperplasia, mild fibrocystic changes. Negative for atypia or malignancy.   -Concordant.  Recommend 6 month follow-up mammogram and ultrasound.     Malignant neoplasm of central portion of right breast in female, estrogen receptor positive (CMS/HCC)   4/17/2019 Initial Diagnosis    Malignant neoplasm of central portion of right breast in female, estrogen receptor positive (CMS/HCC)     4/18/2019 Imaging    Screening MMG ([facility]):   There are scattered areas of fibroglandular density. There is a small, oval mass measuring 10 mm with indistinct margins seen in the sub-areolar region of the right breast. Note is made of a biopsy clip in the left breast as evidence of a previous surgical procedure. In the left breast, no suspicious masses, significant calcifications or other abnormalities are seen.   BI-RADS 0: Incomplete.       4/29/2019 Imaging    Right Diagnostic MMG with [facility] & Right US ([facility]):   MMG:  On the present examination, there is a new high density, round mass measuring 10 mm with indistinct margins in the anterior one-third sub-areolar region of the right breast located 2 cm from the nipple.   US:   1. Ultrasound is suggestive of a round solid mass with indistinct margins measuring 9 mm. Internal echotexture is homogeneous and hypoechoic relative adipose tissue.   2. There is an abnormal axillary lymph node in the right axilla. There are 2 adjacent lymph nodes in the right axilla with mildly prominent cortices. The cortex has been measured at 3.1 mm, although no significant focal thickening is seen. The left axilla was evaluated for comparison, and the nodes on the right are of greater cortical thickness.   BI-RADS 4C: Suspicious.     5/6/2019 Biopsy    Right breast & Right axillary lymph node, US-guided biopsy ([facility]):    1. Right Subareolar, core biopsy:    Invasive High Grade Mammary Carcinoma  (Invasive Ductal Carcinoma, Grade III). Tubule Score 3, Nuclear Grade Score 3, and Mitotic Score 2 for A Total Score of 8. Neoplasm is Present in Four of Eight Biopsies and Measures 4 mm in Greatest Dimension.    ER+ (82.35%, moderate)  AZ+ (92.68%, strong)  Her2 negative (IHC 1+)  Ki-67 14.81%    2. Right axilla, core biopsy:  Benign Lymph Node, Negative for Metastatic Carcinoma.     5/9/2019 Imaging    Bilateral Breast MRI ([facility]):  Within the retroareolar region of the right breast located slightly medial to the plane of the nipple at the 2:30 position on the order of 1.8 cm from the nipple there is an irregular enhancing mass that measures 1.0 cm in the superior-inferior dimension, 1.0 cm in anterior to posterior dimension and 1.0 cm in the medial to lateral dimension. A metallic clip is seen within the mass. This represents the biopsy-proven site malignancy.     No other areas of abnormal enhancement or morphology are seen within the right breast. I see no evidence for abnormal right breast skin, nipple or chest wall enhancement and there is no evidence for right axillary adenopathy.     There are no areas of abnormal enhancement or morphology in the left breast. I see no evidence for abnormal left breast skin, nipple or chest wall enhancement and there is no evidence for left axillary adenopathy.  BI-RADS 6: Known malignancy.     5/14/2019 Genetic Testing    [employer] Breast Cancer STAT Panel (9 genes):    CHEK2 mutation     5/28/2019 Surgery    Right central partial mastectomy (with removal of the nipple-areola-complex) and sentinel lymph node biopsy    1.  Right Axilla, [place] Lymph Node #1 (hot, blue, count 628):                A.  One lymph node with fatty infiltration and focal florid foreign body giant cell response consistent with clip                     Placement.               B.  No metastatic carcinoma identified by routine staining (0/1).     2.  Right Axilla, Non-[place] Lymph Node:                 A.  Benign fibrofatty tissue with focal minimal fat necrosis.                B.  No intact lymph node tissue identified.      3.  Right Central Partial Mastectomy:                A.  Invasive mammary carcinoma of no special type (ductal carcinoma).                            1.  Invasive carcinoma measures 10 mm x 8 mm x 8 mm.                            2.  Overall Elvin grade II (glandular score = 2, nuclear score = 2, mitotic score = 2).                 B.  No associated ductal carcinoma in situ component by IHC staining (see comment).                   C.  Margins are negative for invasive carcinoma.  Invasive carcinoma measures at least 15 mm from                     the closest (Posterior) margin of excision.                     All other margins measure at least 20 mm from invasive carcinoma including:                             Anterior margin = 20 mm.                            Superior margin = 20 mm.                            Inferior margin = 53 mm.                            Medial Margin = 50 mm.                            Lateral margin = 60 mm.                   D.  No lymphovascular space invasion identified.                E.  No pagetoid involvement of skin nor nipple by carcinoma identified.               F.  Focal stromal fibrosis (scar) organizing fat necrosis and foreign giant cell reaction noted consistent with                     previous needled biopsy site.                G.  Nonneoplastic breast tissue with fibrocystic change, focal adenosis, organizing fat necrosis.                H.  Microcalcifications are identified within benign ductal tissue.                I.   Previously reported biomarkers: Estrogen receptor: positive (82.35% moderate), Progesterone receptor:                    positive (92.68%, strong), HER2/Amy: negative (IHC score1+) and Ki-67=14.8%                     (not reviewed).                   Pathologic Stage: pT1b, (sn)N0 (G2).                 See synoptic  for tumor details.     4.  Additional Lateral and Superior Margins:                A.  No in situ nor infiltrating carcinoma identified.               B.  New superior margin is negative for malignancy by an additional 25 mm.     Oncotype DX Score: 6     7/11/2019 - 8/14/2019 Radiation    Radiation OncologyTreatment Course:  Emilie Soto received 5000 cGy in 25 fractions to the right breast.     8/17/2019 - 11/2/2020 Hormonal Therapy    Tamoxifen     3/6/2020 Imaging    Bilateral Diagnostic MMG with Clarence ([facility]):  Scattered areas of fibroglandular density.   There is a new area of skin thickening, a new area of trabecular thickening and a new postsurgical scar seen in the anterior region of the right breast. Finding is in an area of prior lumpectomy. Findings are consistent with postsurgical and post radiation therapy changes. Metallic surgical clips noted at the lumpectomy site and at the axilla. No suspicious masses or microcalcifications are identified.  In the left breast, no suspicious masses, significant calcifications or other abnormalities are seen.  BI-RADS 2: Benign.     9/3/2020 Imaging    Bilateral Breast MRI ([facility]):  The parenchyma of both breasts is largely fatty-replaced. Minimal background parenchymal enhancement is noted. There is evidence of prior right partial mastectomy with mild anterior right breast architectural distortion and comparatively smaller right breast volume relative to the left breast. Additionally, the right nipple appears to have been surgically removed. Otherwise, no areas of abnormal morphology or enhancement are seen in either breast. I see no evidence for abnormal skin, chest wall or left nipple enhancement and there is no evidence for axillary or internal mammary chain adenopathy.   BI-RADS 2: Benign.     9/4/2020 Surgery    Total robotic hysterectomy and bilateral salpingo-oophorectomy, Dr. Nery Neumann     12/1/2020 -  Hormonal Therapy    Arimidex     3/8/2021 Imaging     3/8/2021:  Screening mammogram with tomosynthesis at [facility]   Scattered areas of fibroglandular density.  Right breast postoperative changes again seen.  There are no suspicious masses, significant calcifications, or other abnormality seen in either breast.  There is a biopsy clip in the left breast.  Impression:  There is no mammographic evidence of malignancy.  Screening mammogram 1 year is recommended.  BI-RADS Category 2         Review of Systems:  See interval history.       Medications:    Current Outpatient Medications:   •  amLODIPine (NORVASC) 5 MG tablet, Take 1 tablet by mouth Daily., Disp: 90 tablet, Rfl: 2  •  anastrozole (Arimidex) 1 MG tablet, Take 1 tablet by mouth Daily for 90 days., Disp: 90 tablet, Rfl: 0  •  Cholecalciferol 1.25 MG (43169 UT) tablet, Take 50,000 Units by mouth 1 (One) Time Per Week., Disp: 4 tablet, Rfl: 4  •  furosemide (LASIX) 20 MG tablet, Take 0.5 tablets by mouth Daily., Disp: 30 tablet, Rfl: 2  •  lisinopril (PRINIVIL,ZESTRIL) 40 MG tablet, Take 1 tablet by mouth Daily., Disp: 90 tablet, Rfl: 1  •  LORazepam (ATIVAN) 0.5 MG tablet, Take 1 tablet by mouth Every 8 (Eight) Hours As Needed for Anxiety. for anxiety, Disp: 45 tablet, Rfl: 0  •  metFORMIN (GLUCOPHAGE) 500 MG tablet, Take 1 tablet by mouth Daily With Breakfast., Disp: 90 tablet, Rfl: 1  •  vitamin B-12 (CYANOCOBALAMIN) 1000 MCG tablet, Take 1,000 mcg by mouth Daily., Disp: , Rfl:       Allergies   Allergen Reactions   • Citalopram Rash   • Flexeril [Cyclobenzaprine] Unknown - High Severity     MUSCLES TENSE-OPPOSITE OF WHAT IT IS INTENDED TO DO-ELEVATES B/P   • Doxycycline Rash     Blisters on hands   • Keflex [Cephalexin] Rash   • Sulfa Antibiotics Rash     NAUSEA/VOMITING/FEVER   • Vilazodone Hcl Hives, Itching and Rash       Family History   Problem Relation Age of Onset   • Hypertension Mother    • Arthritis Mother    • Kidney cancer Mother    • Depression Mother         Not Dr sanders- [facility] go   • Heart disease  Father    • Hernia Father    • Hypertension Father    • No Known Problems Brother    • Breast cancer Paternal Aunt    • Breast cancer Paternal Cousin    • Drug abuse Paternal Uncle         life long   • Drug abuse Paternal Uncle         life long   • Breast cancer Paternal Grandmother    • Osteoporosis Maternal Grandmother    • Lung cancer Paternal Grandfather    • Malig Hyperthermia Neg Hx        PHYSICAL EXAMINATION:   Vitals:    03/16/21 1349   BP: 132/86   Pulse: 74   Temp: 97 °F (36.1 °C)   SpO2: 99%     ECOG 0 - Asymptomatic  General: NAD, well appearing, obese  Psych: a&o x 3, normal mood and affect  Eyes: EOMI, no scleral icterus  ENMT: neck supple without masses or thyromegaly, mucus membranes moist  MSK: normal gait, normal ROM in bilateral shoulders  Lymph nodes: Well-healed right axillary scar with some surrounding firm scar tissue; no cervical, supraclavicular or axillary lymphadenopathy  Breast:   Right: The right breast is smaller and uplifted versus the left. There is moderate hyperpigmentation of the entire breast and axilla. Well-healed, soft, central scar with absence of the NAC. No masses.  Left: No visible abnormalities on inspection while seated, with arms raised or hands on hips. No masses, skin changes, or nipple abnormalities.    Imaging:  3/8/2021:  Screening mammogram with tomosynthesis at [facility]   Scattered areas of fibroglandular density.  Right breast postoperative changes again seen.  There are no suspicious masses, significant calcifications, or other abnormality seen in either breast.  There is a biopsy clip in the left breast.  Impression:  There is no mammographic evidence of malignancy.  Screening mammogram 1 year is recommended.  BI-RADS Category 2    Assessment:   1)  50 y.o. F with a diagnosis of right breast cancer: intermediate grade, invasive ductal carcinoma, ER/ID+, Her2 negative. She is sp right central partial mastectomy & SLNB on 5/28/19, pT1bN0, anatomic stage IA,  prognostic stage IA. She completed radiation on 8/14/19. She took tamoxifen from 8/2019 to 10/2020. She underwent TRH/BSO on 9/4/20 and has started armidex  2)  Increased risk breast cancer, she has a pathogenic CHEK2 mutation and has a higher than average risk of a subsequent breast cancer, however declined prophylactic mastectomy. She is currently undergoing high risk screening, alternating MMG & MRI.  3) Family history of breast cancer  4)  Morbid obesity, BMI 59    Discussion:  Dr Greene has discussed with her the significance of pathologenic CHEK 2 mutation.  She is at increased risk for development of breast cancer.  We discussed management options for individuals who are at increased risk (>20% lifetime risk):  1) High risk screening - Annual mammogram and annual breast MRI, alternating one test every 6 months, biannual clinical exam and monthly self breast exam. She meets criteria for high risk screening.  2) Chemoprevention with Tamoxifen, Raloxifene or Exemestane. These may reduce risk up to 50%. I reviewed that these particular medications are not without risks and the risk/benefit ratio must be considered carefully. She is currently taking arimidex  3) Risk reducing surgery such as prophylactic mastectomy  which may reduce risk by 90-95%. We discussed that this is a relatively radical strategy and is generally reserved for individuals with a known genetic mutation predisposing them to an increased risk (~50% risk) of breast cancer. She has declined prophylactic mastectomy.  4) I discussed the importance of exercise and weight management as part of a risk reducing strategy, since increased BMI is associated with an increased risk of breast cancer. This is especially true in her case, as I expect her risk would decrease as she lost weight.    She is interested in increased surveillance with mammogram alternating with MRI.    Plan:  -Continue follow-up with Dr. Trevizo.  -F/u in 9/2021 with MRI followed by  exam  -She was instructed to call sooner with any questions, concerns or changes on BSE.    VICENTE Smith      CC:  [facility]  VICENTE Elliott

## 2021-03-17 ENCOUNTER — TELEPHONE (OUTPATIENT)
Dept: SURGERY | Facility: CLINIC | Age: 51
End: 2021-03-17

## 2021-03-17 LAB
25(OH)D3+25(OH)D2 SERPL-MCNC: 34.8 NG/ML (ref 30–100)
ALBUMIN SERPL-MCNC: 4.2 G/DL (ref 3.5–5.2)
ALBUMIN/GLOB SERPL: 1.6 G/DL
ALP SERPL-CCNC: 85 U/L (ref 39–117)
ALT SERPL-CCNC: 49 U/L (ref 1–33)
AST SERPL-CCNC: 38 U/L (ref 1–32)
BILIRUB SERPL-MCNC: 0.9 MG/DL (ref 0–1.2)
BUN SERPL-MCNC: 8 MG/DL (ref 6–20)
BUN/CREAT SERPL: 10.8 (ref 7–25)
CALCIUM SERPL-MCNC: 10.3 MG/DL (ref 8.6–10.5)
CHLORIDE SERPL-SCNC: 101 MMOL/L (ref 98–107)
CHOLEST SERPL-MCNC: 173 MG/DL (ref 0–200)
CO2 SERPL-SCNC: 29.1 MMOL/L (ref 22–29)
CREAT SERPL-MCNC: 0.74 MG/DL (ref 0.57–1)
GLOBULIN SER CALC-MCNC: 2.6 GM/DL
GLUCOSE SERPL-MCNC: 112 MG/DL (ref 65–99)
HBA1C MFR BLD: 5.7 % (ref 4.8–5.6)
HDLC SERPL-MCNC: 38 MG/DL (ref 40–60)
LDLC SERPL CALC-MCNC: 92 MG/DL (ref 0–100)
POTASSIUM SERPL-SCNC: 4.1 MMOL/L (ref 3.5–5.2)
PROT SERPL-MCNC: 6.8 G/DL (ref 6–8.5)
SODIUM SERPL-SCNC: 138 MMOL/L (ref 136–145)
TRIGL SERPL-MCNC: 256 MG/DL (ref 0–150)
VLDLC SERPL CALC-MCNC: 43 MG/DL (ref 5–40)

## 2021-03-17 NOTE — TELEPHONE ENCOUNTER
Breast MRI is scheduled on 9/13/2021 @ 12:45pm, patient arrival 12:15pm.    F/u appointment with Shaye Dowling NP is scheduled on 9/20/2021 @ 11:30am.    Called and spoke to patient, patient expressed v/u of appointment time.    Sent patient a reminder letter in the mail.

## 2021-03-30 ENCOUNTER — OFFICE VISIT (OUTPATIENT)
Dept: INTERNAL MEDICINE | Facility: CLINIC | Age: 51
End: 2021-03-30

## 2021-03-30 VITALS
DIASTOLIC BLOOD PRESSURE: 88 MMHG | BODY MASS INDEX: 47.09 KG/M2 | OXYGEN SATURATION: 99 % | HEART RATE: 75 BPM | TEMPERATURE: 98 F | SYSTOLIC BLOOD PRESSURE: 152 MMHG | WEIGHT: 293 LBS | HEIGHT: 66 IN

## 2021-03-30 DIAGNOSIS — E78.00 PURE HYPERCHOLESTEROLEMIA: Chronic | ICD-10-CM

## 2021-03-30 DIAGNOSIS — E66.01 MORBIDLY OBESE (HCC): Chronic | ICD-10-CM

## 2021-03-30 DIAGNOSIS — E55.9 VITAMIN D DEFICIENCY: Chronic | ICD-10-CM

## 2021-03-30 DIAGNOSIS — B35.1 ONYCHOMYCOSIS: ICD-10-CM

## 2021-03-30 DIAGNOSIS — I10 BENIGN ESSENTIAL HTN: ICD-10-CM

## 2021-03-30 DIAGNOSIS — Z00.00 PHYSICAL EXAM: Primary | ICD-10-CM

## 2021-03-30 DIAGNOSIS — E53.8 VITAMIN B12 DEFICIENCY: Chronic | ICD-10-CM

## 2021-03-30 DIAGNOSIS — E28.2 PCOS (POLYCYSTIC OVARIAN SYNDROME): Chronic | ICD-10-CM

## 2021-03-30 DIAGNOSIS — E11.9 TYPE 2 DIABETES MELLITUS WITHOUT COMPLICATION, WITHOUT LONG-TERM CURRENT USE OF INSULIN (HCC): ICD-10-CM

## 2021-03-30 PROCEDURE — 99396 PREV VISIT EST AGE 40-64: CPT | Performed by: NURSE PRACTITIONER

## 2021-03-30 RX ORDER — METFORMIN HYDROCHLORIDE 500 MG/1
500 TABLET, EXTENDED RELEASE ORAL
Qty: 90 TABLET | Refills: 1 | Status: SHIPPED | OUTPATIENT
Start: 2021-03-30 | End: 2022-02-28

## 2021-03-30 RX ORDER — LISINOPRIL AND HYDROCHLOROTHIAZIDE 25; 20 MG/1; MG/1
1 TABLET ORAL DAILY
Qty: 90 TABLET | Refills: 1 | Status: SHIPPED | OUTPATIENT
Start: 2021-03-30 | End: 2021-06-28 | Stop reason: SINTOL

## 2021-03-30 RX ORDER — TERBINAFINE HYDROCHLORIDE 250 MG/1
250 TABLET ORAL DAILY
Qty: 90 TABLET | Refills: 0 | Status: SHIPPED | OUTPATIENT
Start: 2021-03-30 | End: 2021-09-28

## 2021-03-30 NOTE — PROGRESS NOTES
Subjective   Emilie SAUNDERS is a 50 y.o. female who presents for a physical exam.    History of Present Illness     The following portions of the patient's history were reviewed and updated as appropriate: allergies, current medications, past social history and problem list.    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 for anxiety, Disp: 45 tablet, Rfl: 0  •  vitamin B-12 (CYANOCOBALAMIN) 1000 MCG tablet, Take 1,000 mcg by mouth Daily., Disp: , Rfl:   •  lisinopril-hydrochlorothiazide (PRINZIDE,ZESTORETIC) 20-25 MG per tablet, Take 1 tablet by mouth Daily., Disp: 90 tablet, Rfl: 1  •  metFORMIN ER (GLUCOPHAGE-XR) 500 MG 24 hr tablet, Take 1 tablet by mouth Daily With Breakfast., Disp: 90 tablet, Rfl: 1  •  terbinafine (LamISIL) 250 MG tablet, Take 1 tablet by mouth Daily., Disp: 90 tablet, Rfl: 0    Allergies   Allergen Reactions   • Citalopram Rash   • Flexeril [Cyclobenzaprine] Unknown - High Severity     MUSCLES TENSE-OPPOSITE OF WHAT IT IS INTENDED TO DO-ELEVATES B/P   • Doxycycline  Rash     Blisters on hands   • Keflex [Cephalexin] Rash   • Sulfa Antibiotics Rash     NAUSEA/VOMITING/FEVER   • Vilazodone Hcl Hives, Itching and Rash       Review of Systems   Constitutional: Negative for activity change, appetite change, chills, diaphoresis, fatigue, fever and unexpected weight change.   HENT: Positive for congestion, postnasal drip and sinus pressure. Negative for dental problem, drooling, ear discharge, ear pain, facial swelling, hearing loss, mouth sores, nosebleeds, rhinorrhea, sore throat, tinnitus and trouble swallowing.    Eyes: Positive for visual disturbance (wears glasses, no vision changes). Negative for photophobia, pain, discharge, redness and itching.   Respiratory: Negative for apnea, cough, choking, chest tightness, shortness of breath and wheezing.    Cardiovascular: Positive for leg swelling. Negative for chest pain and palpitations.        No orthopnea, PND, MACDONALD   Gastrointestinal: Positive for diarrhea. Negative for abdominal pain, blood in stool, constipation, nausea and vomiting.   Endocrine: Negative for cold intolerance, heat intolerance, polydipsia and polyuria.   Genitourinary: Negative for decreased urine volume, dysuria, enuresis, flank pain, frequency, hematuria and urgency.   Musculoskeletal: Negative for arthralgias, back pain, gait problem, joint swelling, myalgias, neck pain and neck stiffness.   Skin: Negative for color change and rash.        No hair changes, no nail changes   Allergic/Immunologic: Negative for environmental allergies, food allergies and immunocompromised state.   Neurological: Negative for dizziness, tremors, seizures, syncope, speech difficulty, weakness, light-headedness, numbness and headaches.   Hematological: Negative for adenopathy. Does not bruise/bleed easily.   Psychiatric/Behavioral: Negative for agitation, confusion, decreased concentration, dysphoric mood, sleep disturbance and suicidal ideas. The patient is not nervous/anxious.   "      Objective   Vitals:    03/30/21 0951 03/30/21 1019   BP: 154/90 152/88   BP Location: Left arm Left arm   Patient Position: Sitting Sitting   Cuff Size: Adult Adult   Pulse: 75    Temp: 98 °F (36.7 °C)    TempSrc: Temporal    SpO2: 99%    Weight: (!) 138 kg (304 lb)    Height: 167.6 cm (66\")      Body mass index is 49.07 kg/m².  Physical Exam  Constitutional:       General: She is not in acute distress.     Appearance: Normal appearance. She is not diaphoretic.   HENT:      Head: Normocephalic and atraumatic.      Right Ear: Tympanic membrane, ear canal and external ear normal.      Left Ear: Tympanic membrane, ear canal and external ear normal.      Nose: Nose normal. No rhinorrhea.      Mouth/Throat:      Mouth: Mucous membranes are moist.      Pharynx: Oropharynx is clear.   Eyes:      General:         Right eye: No discharge.         Left eye: No discharge.      Conjunctiva/sclera: Conjunctivae normal.   Cardiovascular:      Rate and Rhythm: Normal rate and regular rhythm.      Pulses: Normal pulses.      Heart sounds: Normal heart sounds.   Pulmonary:      Effort: Pulmonary effort is normal.      Breath sounds: Normal breath sounds.   Chest:      Breasts:         Right: Normal. No mass, skin change or tenderness.         Left: Normal. No mass, nipple discharge, skin change or tenderness.      Comments: Surgical scar right breast  Abdominal:      General: Bowel sounds are normal.      Tenderness: There is no abdominal tenderness.   Musculoskeletal:         General: No swelling or tenderness.      Cervical back: Normal range of motion.   Feet:      Right foot:      Toenail Condition: Fungal disease present.     Left foot:      Toenail Condition: Fungal disease present.  Skin:     General: Skin is warm and dry.   Neurological:      General: No focal deficit present.      Mental Status: She is alert and oriented to person, place, and time.   Psychiatric:         Mood and Affect: Mood normal.         Behavior: " Behavior normal.         Judgment: Judgment normal.         Assessment/Plan   Diagnoses and all orders for this visit:    1. Physical exam (Primary)    2. Benign essential HTN  -     lisinopril-hydrochlorothiazide (PRINZIDE,ZESTORETIC) 20-25 MG per tablet; Take 1 tablet by mouth Daily.  Dispense: 90 tablet; Refill: 1  -     metFORMIN ER (GLUCOPHAGE-XR) 500 MG 24 hr tablet; Take 1 tablet by mouth Daily With Breakfast.  Dispense: 90 tablet; Refill: 1  -     Basic Metabolic Panel; Future    3. Type 2 diabetes mellitus without complication, without long-term current use of insulin (CMS/[facility])  Assessment & Plan:  A1c improved to 5.7; continue low-carb, low-sugar diet and repeat in 3 months (may consider d/c of Metformin)      4. Onychomycosis  -     terbinafine (LamISIL) 250 MG tablet; Take 1 tablet by mouth Daily.  Dispense: 90 tablet; Refill: 0    5. Pure hypercholesterolemia  Assessment & Plan:  She is not currently on statin therapy, check lipid panel today      6. Vitamin B12 deficiency  Assessment & Plan:  Managed with daily B12 supplement      7. PCOS (polycystic ovarian syndrome)  Assessment & Plan:  Currently on Metformin and is working on weight loss      8. Morbidly obese (CMS/[facility])  Assessment & Plan:  She is doing intermittent fasting and following a low-carb, low-sugar diet which has been helpful      9. Vitamin D deficiency  Assessment & Plan:  Recent level 34, continue weekly supplement      Risk assessment:  She has a family hx and personal history of breast cancer, monitored every 6 months with MRI of breast.   Her  Body mass index is 49.07 kg/m². - She is losing weight by doing intermittent fasting and following a low-carb, low-sugar diet.    Prevention:  She has received her annual flu vaccine. Tdap is current.  Colonoscopy is due in 2029.   Discussed Shingrix vaccine, she will check with pharmacy regarding coverage and availability.    Discussed healthy lifestyle choices such as maintaining a balanced  diet low in carbohydrates and limiting caffeine and alcohol intake.  Recommended routine exercise for bone strength and cardiovascular health.

## 2021-03-31 PROBLEM — E11.9 TYPE 2 DIABETES MELLITUS WITHOUT COMPLICATION, WITHOUT LONG-TERM CURRENT USE OF INSULIN: Status: ACTIVE | Noted: 2021-03-31

## 2021-03-31 PROBLEM — E11.9 TYPE 2 DIABETES MELLITUS WITHOUT COMPLICATION, WITHOUT LONG-TERM CURRENT USE OF INSULIN: Chronic | Status: ACTIVE | Noted: 2021-03-31

## 2021-03-31 PROBLEM — B35.1 ONYCHOMYCOSIS: Status: ACTIVE | Noted: 2021-03-31

## 2021-03-31 NOTE — ASSESSMENT & PLAN NOTE
A1c improved to 5.7; continue low-carb, low-sugar diet and repeat in 3 months (may consider d/c of Metformin)

## 2021-04-05 RX ORDER — TAMOXIFEN CITRATE 20 MG/1
20 TABLET ORAL DAILY
Qty: 90 TABLET | Refills: 2 | Status: SHIPPED | OUTPATIENT
Start: 2021-04-05 | End: 2021-06-28

## 2021-04-27 ENCOUNTER — TELEPHONE (OUTPATIENT)
Dept: INTERNAL MEDICINE | Facility: CLINIC | Age: 51
End: 2021-04-27

## 2021-04-27 NOTE — TELEPHONE ENCOUNTER
Caller: Emilie SAUNDERS    Relationship to patient: Self    Best call back number: [phone]    Patient is needing:       PATIENT HAS LABS SCHEDULED FOR 9:30 TODAY 04/27/21, WAS UNABLE TO WT TO THE OFFICE.    PLEASE HAVE SOMEONE CALL PATIENT TO RESCHEDULE AS SHE WILL NOT BE PRESENT FOR THIS TODAY.     [phone]

## 2021-05-18 ENCOUNTER — APPOINTMENT (OUTPATIENT)
Dept: LAB | Facility: HOSPITAL | Age: 51
End: 2021-05-18

## 2021-05-27 ENCOUNTER — APPOINTMENT (OUTPATIENT)
Dept: LAB | Facility: HOSPITAL | Age: 51
End: 2021-05-27

## 2021-05-28 ENCOUNTER — LAB (OUTPATIENT)
Dept: LAB | Facility: HOSPITAL | Age: 51
End: 2021-05-28

## 2021-05-28 ENCOUNTER — OFFICE VISIT (OUTPATIENT)
Dept: ONCOLOGY | Facility: CLINIC | Age: 51
End: 2021-05-28

## 2021-05-28 VITALS
OXYGEN SATURATION: 100 % | DIASTOLIC BLOOD PRESSURE: 69 MMHG | TEMPERATURE: 96.4 F | HEART RATE: 76 BPM | HEIGHT: 66 IN | SYSTOLIC BLOOD PRESSURE: 129 MMHG | BODY MASS INDEX: 46.94 KG/M2 | WEIGHT: 292.1 LBS | RESPIRATION RATE: 20 BRPM

## 2021-05-28 DIAGNOSIS — Z17.0 MALIGNANT NEOPLASM OF CENTRAL PORTION OF RIGHT BREAST IN FEMALE, ESTROGEN RECEPTOR POSITIVE (HCC): Primary | ICD-10-CM

## 2021-05-28 DIAGNOSIS — Z15.89 MONOALLELIC MUTATION OF CHEK2 GENE IN FEMALE PATIENT: ICD-10-CM

## 2021-05-28 DIAGNOSIS — Z17.0 MALIGNANT NEOPLASM OF CENTRAL PORTION OF RIGHT BREAST IN FEMALE, ESTROGEN RECEPTOR POSITIVE (HCC): ICD-10-CM

## 2021-05-28 DIAGNOSIS — Z15.09 MONOALLELIC MUTATION OF CHEK2 GENE IN FEMALE PATIENT: ICD-10-CM

## 2021-05-28 DIAGNOSIS — Z15.02 MONOALLELIC MUTATION OF CHEK2 GENE IN FEMALE PATIENT: ICD-10-CM

## 2021-05-28 DIAGNOSIS — Z15.01 MONOALLELIC MUTATION OF CHEK2 GENE IN FEMALE PATIENT: ICD-10-CM

## 2021-05-28 DIAGNOSIS — C50.111 MALIGNANT NEOPLASM OF CENTRAL PORTION OF RIGHT BREAST IN FEMALE, ESTROGEN RECEPTOR POSITIVE (HCC): ICD-10-CM

## 2021-05-28 DIAGNOSIS — E55.9 VITAMIN D DEFICIENCY: ICD-10-CM

## 2021-05-28 DIAGNOSIS — C50.111 MALIGNANT NEOPLASM OF CENTRAL PORTION OF RIGHT BREAST IN FEMALE, ESTROGEN RECEPTOR POSITIVE (HCC): Primary | ICD-10-CM

## 2021-05-28 DIAGNOSIS — E83.52 SERUM CALCIUM ELEVATED: ICD-10-CM

## 2021-05-28 LAB
ALBUMIN SERPL-MCNC: 4.5 G/DL (ref 3.5–5.2)
ALBUMIN/GLOB SERPL: 1.3 G/DL (ref 1.1–2.4)
ALP SERPL-CCNC: 122 U/L (ref 38–116)
ALT SERPL W P-5'-P-CCNC: 29 U/L (ref 0–33)
ANION GAP SERPL CALCULATED.3IONS-SCNC: 12.1 MMOL/L (ref 5–15)
AST SERPL-CCNC: 17 U/L (ref 0–32)
BASOPHILS # BLD AUTO: 0.06 10*3/MM3 (ref 0–0.2)
BASOPHILS NFR BLD AUTO: 0.6 % (ref 0–1.5)
BILIRUB SERPL-MCNC: 0.5 MG/DL (ref 0.2–1.2)
BUN SERPL-MCNC: 13 MG/DL (ref 6–20)
BUN/CREAT SERPL: 17.8 (ref 7.3–30)
CALCIUM SPEC-SCNC: 10.6 MG/DL (ref 8.5–10.2)
CHLORIDE SERPL-SCNC: 97 MMOL/L (ref 98–107)
CO2 SERPL-SCNC: 27.9 MMOL/L (ref 22–29)
CREAT SERPL-MCNC: 0.73 MG/DL (ref 0.6–1.1)
DEPRECATED RDW RBC AUTO: 39.1 FL (ref 37–54)
EOSINOPHIL # BLD AUTO: 0.28 10*3/MM3 (ref 0–0.4)
EOSINOPHIL NFR BLD AUTO: 2.7 % (ref 0.3–6.2)
ERYTHROCYTE [DISTWIDTH] IN BLOOD BY AUTOMATED COUNT: 13.1 % (ref 12.3–15.4)
GFR SERPL CREATININE-BSD FRML MDRD: 84 ML/MIN/1.73
GLOBULIN UR ELPH-MCNC: 3.4 GM/DL (ref 1.8–3.5)
GLUCOSE SERPL-MCNC: 138 MG/DL (ref 74–124)
HCT VFR BLD AUTO: 40.5 % (ref 34–46.6)
HGB BLD-MCNC: 14 G/DL (ref 12–15.9)
IMM GRANULOCYTES # BLD AUTO: 0.06 10*3/MM3 (ref 0–0.05)
IMM GRANULOCYTES NFR BLD AUTO: 0.6 % (ref 0–0.5)
LYMPHOCYTES # BLD AUTO: 2.46 10*3/MM3 (ref 0.7–3.1)
LYMPHOCYTES NFR BLD AUTO: 23.9 % (ref 19.6–45.3)
MCH RBC QN AUTO: 28.5 PG (ref 26.6–33)
MCHC RBC AUTO-ENTMCNC: 34.6 G/DL (ref 31.5–35.7)
MCV RBC AUTO: 82.3 FL (ref 79–97)
MONOCYTES # BLD AUTO: 0.48 10*3/MM3 (ref 0.1–0.9)
MONOCYTES NFR BLD AUTO: 4.7 % (ref 5–12)
NEUTROPHILS NFR BLD AUTO: 6.95 10*3/MM3 (ref 1.7–7)
NEUTROPHILS NFR BLD AUTO: 67.5 % (ref 42.7–76)
NRBC BLD AUTO-RTO: 0 /100 WBC (ref 0–0.2)
PLATELET # BLD AUTO: 305 10*3/MM3 (ref 140–450)
PMV BLD AUTO: 9.4 FL (ref 6–12)
POTASSIUM SERPL-SCNC: 4.2 MMOL/L (ref 3.5–4.7)
PROT SERPL-MCNC: 7.9 G/DL (ref 6.3–8)
RBC # BLD AUTO: 4.92 10*6/MM3 (ref 3.77–5.28)
SODIUM SERPL-SCNC: 137 MMOL/L (ref 134–145)
WBC # BLD AUTO: 10.29 10*3/MM3 (ref 3.4–10.8)

## 2021-05-28 PROCEDURE — 36415 COLL VENOUS BLD VENIPUNCTURE: CPT

## 2021-05-28 PROCEDURE — 99214 OFFICE O/P EST MOD 30 MIN: CPT | Performed by: NURSE PRACTITIONER

## 2021-05-28 PROCEDURE — 80053 COMPREHEN METABOLIC PANEL: CPT

## 2021-05-28 PROCEDURE — 85025 COMPLETE CBC W/AUTO DIFF WBC: CPT

## 2021-05-28 NOTE — PROGRESS NOTES
Subjective     REASON FOR CONSULTATION:1. High grade, invasive ductal carcinoma of right breast, ER/DC+, Her2 negative; clinical T1bN0, anatomic stage IA, prognostic stage IA  · May 28, 2019 right lumpectomy with removal of nipple areolar complex and right sentinel lymph node biopsy  -Central 2:00, invasive ductal carcinoma, grade 2, Elvin score of 6, tumor size is 10 mm, no DCIS, one lymph node negative, good margins    2.  Oncotype DX score of 6, low risk, no chemo necessary    3.  June 28, 2019: Started tamoxifen    4.  Family history of breast cancer, CHEK2  Positive    5.  S/p hysterectomy with bilateral salpingo-oophorectomy September 4, 2020 by Dr. Nery Neumann.  Patient had bilateral ovarian cysts.  Pathology was consistent with complex atypical endometrial hyperplasia, endometrial polyp otherwise unremarkable.  Acute on chronic cervicitis with squamous metaplasia.  Right ovary with hemorrhagic corpus luteum cyst and left ovary also with corpus luteum cyst pelvic peritoneal biopsy negative for malignancy omental biopsy negative for malignancy.    Patient switched to aromatase inhibitor from tamoxifen.    HISTORY OF PRESENT ILLNESS: The patient is a 50-year-old female with the above-mentioned history who returns today for routine follow-up, continuing on tamoxifen.  She underwent bilateral salpingo-oophorectomy with hysterectomy on September 4, 2020 by Dr. Nery Neumann and the pathology was benign.  However patient is having significant hot flashes after she underwent surgery.  She also had crying spells after surgery.  Currently she continues to tamoxifen.  I told her that since she is postmenopausal the best option is to switch her to aromatase inhibitor anastrozole.    I did explain to the her the side effects of anastrozole of hot flashes, joint pains, decreasing bone density.  She will require DEXA scan as well.    I reviewed in length the pathology from Dr. Marifer Neumann's office    Patient had MRI of  the breast September 3, 2020 and negative    Patient called into the office on 3/4/2021 reporting concern regarding the Arimidex with regards to her elevated calcium and liver enzymes.  The patient was wishing to go back on tamoxifen.  Daylin Ordoñez RN discussed this with Dr. Trevizo and the plan was for the patient to hold Arimidex and hydrochlorothiazide for 1 month.    Interval history:   The patient returns today for follow up.  She was last seen with hypercalcemia and elevated LFTs.  She was advised to hold her HCTZ.  She was evaluated by her PCP where her calcium had normalized and LFTs improved with holding HCTZ.  Her PCP then discontinued HCTZ and started the patient on Lasix for hypertension.  However, the patients BP was not adequately controlled on Lasix.  The patient was restarted on HCTZ/Lisinopril, in hopes that the combination form would not cause hypercalcemia and elevation in liver enzymes.      Today, she reports feeling well today.  She continues taking Arimidex daily.  She continues intermittent fasting Sunday-Friday, taking off Saturday as her cheat day.  She still has occasional hot flashes, but these have significantly improved.  She denies lower extremity edema.  She denies arthralgias or myalgias.  She had her last mammogram 3/8/2021 and is scheduled for MRI breast in September.  She has no new complaints today.    Oncologic history:   The patient is a 50 y.o. year old female who is here for an opinion about the above issue.    Patient is a 49-year old female who presents today as a new patient for consultation on her newly diagnosed high grade, invasive ductal carcinoma of right breast.   Patient had seen Dr. Greene on May 14, 2019.    She underwent a screening mammogram but did not appreciate any mass herself and denied any breast pain or nipple discharge.    Patient is accompanied with her wife Trena.  They have been together for 19 years and  for the last 2 years.    April  18, 2019: Screening mammogram showed a 10 mm small oval mass in the subareolar region of the right breast.  Left breast was negative.  Spot compression and ultrasound recommended.  Of the right breast.    April 29, 2019: Right diagnostic mammogram showed a high density round mass 10 mm with indistinct margins in the anterior one third subareolar region of the right breast located to centimeters from the nipple.    April 29, 2019: Ultrasound suggested a solid mass with indistinct margins 9 mm and subareolar region.  There is abnormal axillary node in the right axilla.  There are 2 adjacent lymph nodes in the right axilla with mildly prominent cortices.  The left axilla was evaluated for comparison and the nodes on the right are of greater cortical thickness.      Ultrasound-guided biopsy of the breast mass and right axillary lymph node was suggested.     5/9/19:  US guided biopsy sent to [facility]  showing solid mass in the sub-areorolar region of right breast revealing invasive high-grade mammary carcinoma, grade 3. Tubule score 3, nuclear grade 3, and mitotic score 2 for a total score of 8. Neoplasm is present in 4/8 biopsies and measures 4 mm in greatest dimension.   Biopsy performed on 05/09/2019 of prominent lymph node of right axilla was benign- negative for metastatic carcinoma.  ER: 82.35%,  positive  CO: 92.68%, positive  HER-2/latoya: 1+ negative  Ki-67 14.81%       05/09/2019 MRI of bilateral breast:   IMPRESSION:  1. Biopsy-proven malignancy in the right breast in the anterior  one-third approximately 1.8 cm from the nipple at the 2:30 position with  an internal metallic clip. The mass measures on the order of 1 cm in  greatest dimension. No other suspicious findings are seen within the  right breast and there is no evidence for right axillary or internal  mammary chain adenopathy. The patient appears to be a candidate for  breast conservation therapy.  2. There are no findings suspicious for malignancy in the  left breast.  BI-RADS Category 6: Known biopsy-proven malignancy.    5/14/19: Patient was seen by Dr. Greene who felt that she was a good candidate for lumpectomy given the size of the lesion related to her breast size.  Because of superficial subareolar location she would require removal of the nipple areolar complex in order to make sure she has negative margins.  Patient preferred breast conservation with central lumpectomy.  This included the removal of the nipple areolar complex.      Also obtained INVITAE  genetic test given her family history of malignancy.    Estrogen receptor: positive (82.35% moderate),   Progesterone receptor:  positive (92.68%, strong)   HER2/Amy: negative (IHC score1+) and Ki-67=14.8%  Pathologic Stage: pT1b, (sn)N0 (G2).    Synoptic Report :  TUMOR  Tumor Site: Invasive Carcinoma Central  Clock Position of Tumor Site 2 o'clock  3 o'clock  Histologic Type Invasive carcinoma of no special type (ductal, not otherwise  specified)  Glandular (Acinar) / Tubular Differentiation Score 2  Nuclear Pleomorphism Score 2  Mitotic Rate Score 2 (4-7 mitoses per mm2)  Number of Mitoses per 10 High-Power Fields 15 mitotic figures  Diameter of Microscope Field in Millimeters (mm) 0.55 Millimeters (mm)  Overall Grade Grade 2 (scores of 6 or 7)  Tumor Size Greatest dimension of largest invasive focus in Millimeters (mm): 10  Millimeters (mm)    MARGINS  Invasive Carcinoma Margins Uninvolved by invasive carcinoma    LYMPH NODES  Regional Lymph Nodes Uninvolved by tumor cells  Number of Lymph Nodes Examined 1  Number of [place] Nodes Examined 1    Oncotype DX testing ordered but pending.    Patient has a family history of paternal grandmother with breast cancer, paternal aunt with breast cancer, cousin on father's side dying of breast cancer at age 43.  History of ovarian or prostate cancer or uterine cancer.  Mother had kidney cancer status post surgery    INVITAE testing positive for CHEK2- Patient is  following up with  in July.     Patient is now scheduled for counseling with genetic and also with radiation oncology next week.  Tamoxifen was started    2020: Patient underwent hysterectomy with bilateral salpingo-oophorectomy, pathology is benign    2020: We will switch to aromatase inhibitor Arimidex    PAST MEDICAL HISTORY:  1. Hypertension- Under control with medications  2. Pre-diabetes- Diet control  3. Obesity  4. PCOS   5. Anxiety- Worse since cancer diagnosis. Taking Gabapentin since yesterday which helped with her symptoms. Followed by Alexa Trinh.   6. No hx of CVA or CAD.     FAMILY HISTORY:  Paternal cousin with breast cancer diagnosed at 43 and . Paternal aunt with breast cancer s/p mastectomy; paternal grandmother with breast cancer s/p lumpectomy.   No other family history of ovarian cancer or prostrate cancer  Mother had kidney cancer s/p surgery.     OB-GYN:  Menarche- 9 years old  Menopause- n/a  Pregnancies- none  Post menopausal HRT- N/A  Hx of birth control pills- None    SOCIAL HISTORY:  She works at [employer].  She is  to her wife for the last 2 years but lived with her for 19.  Years.  Patient's wife is a schoolteacher.  Patient does not smoke.  She does not drink.  Allergies reviewed with the patient today.      Past Medical History:   Diagnosis Date   • Anxiety    • Anxiety and depression    • Cancer (CMS/HCC)     RIGHT BREAST   • Cancer (CMS/HCC)     PAROTID GLAND   • Depression 1988   • Headache    • History of kidney stones    • Hyperlipidemia    • Hypertension    • Irritable bowel syndrome    • Lumbago    • Mood disorder (CMS/HCC)    • Obesity life   • PONV (postoperative nausea and vomiting)    • S/P radiation therapy     PAROTID GLAND CANCER   • Sleep apnea     C-PAP IN USE   • Vitamin D deficiency         Past Surgical History:   Procedure Laterality Date   • APPENDECTOMY      removed when removing dermoid on right ovary    • BREAST BIOPSY Right 2019   • BREAST LUMPECTOMY Right    • BREAST LUMPECTOMY WITH SENTINEL NODE BIOPSY Right 5/28/2019    Procedure: Right central partial mastectomy (with removal of the nipple-areola-complex) and sentinel lymph node biopsy;  Surgeon: Trena Greene MD;  Location: [facility] MAIN OR;  Service: General   • CHOLECYSTECTOMY     • COLONOSCOPY N/A 12/5/2019    Procedure: COLONOSCOPY TO CECUM;  Surgeon: Pedrito Fulton Jr., MD;  Location: [facility] ENDOSCOPY;  Service: General   • DERMOID CYST  EXCISION  1989   • HERNIA REPAIR  2006    umbilical   • HYSTERECTOMY Bilateral 09/04/2020    Dr. Neumann, due to ovarian cysts with previous hx breast cancer   • OVARY SURGERY      dermoid removed   • SALIVARY GLAND SURGERY  1993    due to cancer   • TONSILLECTOMY  01/01/1979    I was 9 years old        Current Outpatient Medications on File Prior to Visit   Medication Sig Dispense Refill   • amLODIPine (NORVASC) 5 MG tablet Take 1 tablet by mouth Daily. 90 tablet 2   • anastrozole (Arimidex) 1 MG tablet Take 1 tablet by mouth Daily for 90 days. 90 tablet 0   • Cholecalciferol 1.25 MG (11389 UT) tablet Take 50,000 Units by mouth 1 (One) Time Per Week. 4 tablet 4   • lisinopril-hydrochlorothiazide (PRINZIDE,ZESTORETIC) 20-25 MG per tablet Take 1 tablet by mouth Daily. 90 tablet 1   • LORazepam (ATIVAN) 0.5 MG tablet Take 1 tablet by mouth Every 8 (Eight) Hours As Needed for Anxiety. for anxiety 45 tablet 0   • metFORMIN ER (GLUCOPHAGE-XR) 500 MG 24 hr tablet Take 1 tablet by mouth Daily With Breakfast. 90 tablet 1   • tamoxifen (NOLVADEX) 20 MG chemo tablet TAKE 1 TABLET BY MOUTH DAILY 90 tablet 2   • terbinafine (LamISIL) 250 MG tablet Take 1 tablet by mouth Daily. 90 tablet 0   • vitamin B-12 (CYANOCOBALAMIN) 1000 MCG tablet Take 1,000 mcg by mouth Daily.       No current facility-administered medications on file prior to visit.        ALLERGIES:    Allergies   Allergen Reactions   • Citalopram Rash   • Flexeril  [Cyclobenzaprine] Unknown - High Severity     MUSCLES TENSE-OPPOSITE OF WHAT IT IS INTENDED TO DO-ELEVATES B/P   • Doxycycline Rash     Blisters on hands   • Keflex [Cephalexin] Rash   • Sulfa Antibiotics Rash     NAUSEA/VOMITING/FEVER   • Vilazodone Hcl Hives, Itching and Rash        Social History     Socioeconomic History   • Marital status:      Spouse name: Trena Montenegro   • Number of children: 0   • Years of education: Not on file   • Highest education level: Not on file   Tobacco Use   • Smoking status: Never Smoker   • Smokeless tobacco: Never Used   • Tobacco comment: Parents smoked.  I have never smoked   Substance and Sexual Activity   • Alcohol use: Yes     Comment: Only once or twice a year.  Mixed Drinks   • Drug use: No   • Sexual activity: Yes     Partners: Female     Birth control/protection: None        Family History   Problem Relation Age of Onset   • Hypertension Mother    • Arthritis Mother    • Kidney cancer Mother    • Depression Mother         Not Dr tommy- lexit go   • Heart disease Father    • Hernia Father    • Hypertension Father    • No Known Problems Brother    • Breast cancer Paternal Aunt    • Breast cancer Paternal Cousin    • Drug abuse Paternal Uncle         life long   • Drug abuse Paternal Uncle         life long   • Breast cancer Paternal Grandmother    • Osteoporosis Maternal Grandmother    • Lung cancer Paternal Grandfather    • Malig Hyperthermia Neg Hx         Review of Systems   Constitutional: Negative for activity change, appetite change, chills, diaphoresis, fatigue, fever and unexpected weight change (intended weight loss, see HPI).   HENT: Negative for hearing loss, mouth sores, nosebleeds, sore throat and trouble swallowing.    Respiratory: Negative for cough, chest tightness, shortness of breath and wheezing.    Cardiovascular: Negative for chest pain, palpitations and leg swelling.   Gastrointestinal: Negative for abdominal distention, abdominal pain,  "constipation, diarrhea, nausea and vomiting.   Genitourinary: Negative for difficulty urinating, dysuria, frequency, hematuria and urgency.   Musculoskeletal: Negative for joint swelling.        No muscle weakness.   Skin: Negative for rash and wound.   Neurological: Negative for dizziness, seizures, syncope, speech difficulty, weakness, numbness and headaches.   Hematological: Negative for adenopathy. Does not bruise/bleed easily.   Psychiatric/Behavioral: Negative for behavioral problems, confusion and suicidal ideas. The patient is nervous/anxious.    All other systems reviewed and are negative.  ROS reviewed and updated on 05/28/2021    Objective     Vitals:    05/28/21 1046   BP: 129/69   Pulse: 76   Resp: 20   Temp: 96.4 °F (35.8 °C)   TempSrc: Temporal   SpO2: 100%   Weight: 132 kg (292 lb 1.6 oz)   Height: 167.6 cm (65.98\")   PainSc: 0-No pain      Current Status 5/28/2021   ECOG score 0     PHYSICAL EXAM    CONSTITUTIONAL:  Vital signs reviewed.    No distress, looks comfortable.  RESPIRATORY:  Normal respiratory effort.    CARDIOVASCULAR: Regular rate rhythm, no murmur. No significant lower extremity edema.  SKIN: No wounds.  No rashes.  MUSCULOSKELETAL/EXTREMITIES: No clubbing or cyanosis.  No apparent unilateral weakness.  NEURO: No focal neurological deficits noted.  PSYCHIATRIC:  Normal judgment and insight.  Normal mood and affect.  Physical exam reviewed and updated on 05/28/2021    RECENT LABS:  Hematology WBC   Date Value Ref Range Status   05/28/2021 10.29 3.40 - 10.80 10*3/mm3 Final   09/04/2020 8.52 4.5 - 11.0 10*3/uL Final     RBC   Date Value Ref Range Status   05/28/2021 4.92 3.77 - 5.28 10*6/mm3 Final   09/04/2020 4.51 4.0 - 5.2 10*6/uL Final     Hemoglobin   Date Value Ref Range Status   05/28/2021 14.0 12.0 - 15.9 g/dL Final   09/04/2020 12.9 12.0 - 16.0 g/dL Final     Hematocrit   Date Value Ref Range Status   05/28/2021 40.5 34.0 - 46.6 % Final   09/04/2020 38.4 36.0 - 46.0 % Final "     Platelets   Date Value Ref Range Status   05/28/2021 305 140 - 450 10*3/mm3 Final   09/04/2020 266 140 - 440 10*3/uL Final      BONE MINERAL DENSITOMETRY 01/13/21    IMPRESSION:  Normal      BILATERAL BREAST MRI WITHOUT AND WITH CONTRAST 09/03/20    IMPRESSION:  There are no findings suspicious for malignancy in either breast.  Routine follow-up imaging is recommended.     BI-RADS Category 2: Benign finding.         Assessment/Plan      1. High grade, invasive ductal carcinoma of right breast, ER/SC+, Her2 negative; clinical T1bN0, anatomic stage IA, prognostic stage IA:   · May 6, 2019 ultrasound-guided biopsy of the right breast mass,  - grade 3, invasive mammary carcinoma, Columbus score of 8, biopsy of right axillary lymph node negative  ER 82.35%, SC 92.68%, HER-2/latoya 1+ , Ki-67 14.8%    · May 28, 2019 right lumpectomy with removal of nipple areolar complex and right sentinel lymph node biopsy  -Central 2:00, invasive ductal carcinoma, grade 2, Columbus score of 6, tumor size is 10 mm, no DCIS, one lymph node negative, good margins  · Oncotype DX score of 6, low risk disease, been not give chemotherapy    · I discussed the pathology findings with the patient today including ER/SC+, Her2 negative status of her cancer.  I discussed about consideration of chemotherapy if Oncotype DX testing falls in a high risk category.  Patient is not  keen on receiving chemotherapy.  I further discussed in length with patient on the 3 different endocrine therapies tamoxifen/aromatase inhibitors/Evista. Discussed Tamoxifen is for pre-menopausal women and other two are for post-menopausal women. Since she is pre-menopausal, Tamoxifen will be her best and first option. I have detailed the side effects of tamoxifen including depression, hot flashes, rare chance for uterine cancer, weight gain, blood clots, DVT, PE, hair thinning, rare chance for thrombocytopenia, cataracts.  Aromatase inhibitors can cause arthralgias, hot  "flashes, decrease in bone density, rare chance for diarrhea, also discussed about hot flashes with it.  Evista is approved for osteopenia and can also be used for prophylaxis with fewer side effects except hot flashes and rare chance for cataracts but it can improve bone density.     · We will plan on starting tamoxifen given premenopausal for 2 to 3 years followed by Arimidex once postmenopausal.  · 7/28/2020, patient continues on tamoxifen and is tolerating this well.  She will have a follow-up MRI of the breast in September.  · September 3, 2020 MRI breast negative  · September 4, 2020 s/p hysterectomy with bilateral subfinger oophorectomy, pathology benign  · We will switch patient to aromatase inhibitor since she is postmenopausal now with Arimidex, started 12/20/2020.  · Patient comes in on 3/5/2021 for follow-up continuing on Arimidex.  She called into the office yesterday reporting concern is her calcium and liver enzymes have been elevated and she was afraid this was related to Arimidex.  This was discussed with Dr. Trevizo by Daylin Ordoñez RN and they instructed the patient to hold her Arimidex as well as her hydrochlorothiazide.  Patient returns today stating that she has held her hydrochlorothiazide a few days this week but took it last night due to some edema.  She has not however held her Arimidex.  Patient denies any new areas of pain, headaches, low energy.  She has had significant weight loss however this has been intended.  The patient is doing intermittent fasting with strict calorie restriction, taking anywhere from 175 to 700 eriberto/day with 1 \"cheat\" day.  Patient is drinking 32 ounces of water daily we discussed she needs to increase her fluid intake.  We also discussed that less than 500 eriberto/day would not be advisable even though she is having results currently this may not be sustainable.  This is all reviewed with Dr. Trevizo today and we do not feel that this change in her labs is related " to the Arimidex.  The patient was instructed to continue this medication as she is otherwise tolerating it well with minimal arthralgias.  We have however asked her to hold her hydrochlorothiazide and follow-up with primary care regarding this.  Patient has had some lower extremity edema with holding a few days of the hydrochlorothiazide and they can offer an alternative.  We will have the patient follow-up in 2 months with Dr. Anderson him at which time we will repeat labs.  If her liver enzymes once again increase we will perform ultrasound of the liver.  If the calcium once again increases we would consider CT scans.  · 2021, the patient returns today for follow up, continuing on Arimidex.  Since her last appointment she has been evaluated by her PCP who first discontinued HCTZ and started Lasix due to hypercalcemia and elevated liver function studies.  While off HCTZ the patients calcium normalized and her liver function studies started to improve.  Her blood pressure was not controlled on Lasix, so the patient was then placed on HCTZ/lisinopril and Lasix was discontinued.  Today, her liver function studies are normal besides her alkaline phosphatase which is elevated at 122.  Her calcium is elevated at 10.6.  Discussed with the patient and recommended the patient undergo bone scan.  At this time, the patient would like to follow with her PCP and make more adjustments to her blood pressure medication to see if this improves her calcium.  She will call the clinic before her next follow up if she decides to proceed with bone scans.      2. Family history of cancer: Paternal cousin with breast cancer diagnosed at 43 and . Paternal aunt with breast cancer s/p mastectomy; paternal grandmother with breast cancer s/p lumpectomy.   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  ·  She has an appointment with  in early  July.   · We will plan on alternating MRI of breast with mammogram alternating.  · Discussed in breast cancer conference today and Dr. Greene and we agreed that best option is to make an MRI of breast with mammogram every 6 months.  · [facility]    3.  Vitamin B-12 deficiency: B12 level 270, advised patient to take over the counter B-12 thousand micrograms orally daily.   · Calcium and vitamin D currently on hold.    4. PCOS, Complicated with obesity: Patient attempted to undergo bariatric surgery in order to reduce weight and further help with her PCOS. This was stalled due to her recent breast cancer diagnosis. In past patient has been able to lose 85 lbs with diet modifications.   · Advised patient to exercise 5 times per week with 20 minutes of aerobics, total of 40 minutes/day .   · Suggested Live [facility] program and patient to visit the cancer resource center.  · Referral to Nutritionist for further recommendations.   · Referred patient to OB/GYN today for further evaluation.  · Continues to follow closely with Dr. Lexii Rahman with recent pelvic exam, planning to have endometrial biopsy tomorrow.  · S/p hysterectomy with bilateral salpingo-oophorectomy September 4, 2020.  Pathology benign  · Doing well    5.  Anxiety: Patient has significant anxiety, has been referred to Alexa Trinh.  In the past she has tried Zoloft Wellbutrin and Seroquel and did not have good response to any of these medications.  · Patient is being monitored by Alexa Trinh.    6.  Hot flashes severe and also had mood swings following hysterectomy and salpingo-oophorectomy.  · 5/28/2021, hot flashes have significantly improved.  She does still experience occasional hot flashes.    7.  Hypertension.  Blood pressure control today.    8. Hypercalcemia  · The patient been evaluated by her PCP who first discontinued HCTZ and started Lasix due to hypercalcemia and elevated liver function  studies.  While off HCTZ the patients calcium normalized and her liver function studies started to improve.  Her blood pressure was not controlled on Lasix, so the patient was then placed on HCTZ/lisinopril and Lasix was discontinued.   · Calcium today elevated at 10.6, the patient wishes to follow with her PCP and adjust her blood pressure medication further to see if this normalizes her calcium before proceeding with any scans.      PLAN:  1. Continue Arimidex  2. Recommended bone scan- patient prefers to hold off on this and adjust blood pressure medicine first to see if this corrects her hypercalcemia.  3. Patient to discuss blood pressure management and a different diuretic with PCP.  4. Plan to continue alternating an MRI of the breast as well as a mammogram of the breast every 6 months due to the patient's CHEK2 mutation.  Last Mammogram 3/8/2021, scheduled for MRI of the breast in September 2021.  5. For her depression she continues with Alexa Trinh from the supportive oncology services and is also managed by her primary care physician  6. Follow-up with Dr. Trevizo in 2 months with labs    The patient is on high risk medication that requires close monitoring for toxicity.    VICENTE Arteaga  05/28/2021         Cc: VICENTE Scales Dr.

## 2021-06-28 ENCOUNTER — OFFICE VISIT (OUTPATIENT)
Dept: INTERNAL MEDICINE | Facility: CLINIC | Age: 51
End: 2021-06-28

## 2021-06-28 VITALS
HEIGHT: 66 IN | TEMPERATURE: 98.6 F | OXYGEN SATURATION: 98 % | DIASTOLIC BLOOD PRESSURE: 92 MMHG | BODY MASS INDEX: 47.09 KG/M2 | WEIGHT: 293 LBS | SYSTOLIC BLOOD PRESSURE: 150 MMHG | HEART RATE: 73 BPM

## 2021-06-28 DIAGNOSIS — E78.00 PURE HYPERCHOLESTEROLEMIA: Chronic | ICD-10-CM

## 2021-06-28 DIAGNOSIS — Z17.0 MALIGNANT NEOPLASM OF CENTRAL PORTION OF RIGHT BREAST IN FEMALE, ESTROGEN RECEPTOR POSITIVE (HCC): ICD-10-CM

## 2021-06-28 DIAGNOSIS — I10 BENIGN ESSENTIAL HTN: Primary | Chronic | ICD-10-CM

## 2021-06-28 DIAGNOSIS — M79.671 RIGHT FOOT PAIN: ICD-10-CM

## 2021-06-28 DIAGNOSIS — R39.11 URINARY HESITANCY: ICD-10-CM

## 2021-06-28 DIAGNOSIS — E55.9 VITAMIN D DEFICIENCY: Chronic | ICD-10-CM

## 2021-06-28 DIAGNOSIS — C50.111 MALIGNANT NEOPLASM OF CENTRAL PORTION OF RIGHT BREAST IN FEMALE, ESTROGEN RECEPTOR POSITIVE (HCC): ICD-10-CM

## 2021-06-28 DIAGNOSIS — E11.9 TYPE 2 DIABETES MELLITUS WITHOUT COMPLICATION, WITHOUT LONG-TERM CURRENT USE OF INSULIN (HCC): Chronic | ICD-10-CM

## 2021-06-28 PROCEDURE — 99214 OFFICE O/P EST MOD 30 MIN: CPT | Performed by: NURSE PRACTITIONER

## 2021-06-28 RX ORDER — ANASTROZOLE 1 MG/1
1 TABLET ORAL DAILY
Qty: 90 TABLET | Refills: 1
Start: 2021-06-28 | End: 2022-03-18 | Stop reason: SDUPTHER

## 2021-06-28 RX ORDER — LISINOPRIL 40 MG/1
40 TABLET ORAL DAILY
Qty: 90 TABLET | Refills: 1
Start: 2021-06-28 | End: 2021-11-22 | Stop reason: SDUPTHER

## 2021-06-28 NOTE — PROGRESS NOTES
"Chief Complaint  Hypertension, Hyperlipidemia, Diabetes, and Elevated calcium    Subjective          Emilie SAUNDERS presents to [facility] for f/u regarding DM2, HTN and elevated calcium.    She has discontinued lisinopril-HCTZ due to elevated calcium and is now currently managed on lisinopril 40 mg daily.  She has tolerated medication well, unsure what home blood pressure readings have been.  She has been out of amlodipine since Friday.  She continues on Metformin and has maintained her recent weight loss.  She is trying to follow a low-carb diet but is considering going back to the keto diet.  She did experience significant right foot pain several weeks ago after an evening of dancing.  Symptoms have gradually improved since then.      Objective   Vital Signs:   /92 (BP Location: Left arm, Patient Position: Sitting, Cuff Size: Adult)   Pulse 73   Temp 98.6 °F (37 °C) (Temporal)   Ht 167.6 cm (65.98\")   Wt (!) 137 kg (302 lb)   SpO2 98%   BMI 48.77 kg/m²     Physical Exam  Constitutional:       Appearance: She is well-developed. She is not ill-appearing.   HENT:      Head: Normocephalic.      Right Ear: Hearing, tympanic membrane and external ear normal.      Left Ear: Hearing, tympanic membrane and external ear normal.      Nose: Nose normal. No nasal deformity, mucosal edema or rhinorrhea.      Right Sinus: No maxillary sinus tenderness or frontal sinus tenderness.      Left Sinus: No maxillary sinus tenderness or frontal sinus tenderness.      Mouth/Throat:      Dentition: Normal dentition.   Eyes:      General: Lids are normal.         Right eye: No discharge.         Left eye: No discharge.      Conjunctiva/sclera: Conjunctivae normal.      Right eye: No exudate.     Left eye: No exudate.     Pupils: Pupils are equal, round, and reactive to light.   Neck:      Thyroid: No thyroid mass or thyromegaly.      Vascular: No carotid bruit.      Trachea: Trachea normal. "   Cardiovascular:      Rate and Rhythm: Regular rhythm.      Pulses: Normal pulses.      Heart sounds: Normal heart sounds. No murmur heard.     Pulmonary:      Effort: No respiratory distress.      Breath sounds: Normal breath sounds. No decreased breath sounds, wheezing, rhonchi or rales.   Abdominal:      General: Bowel sounds are normal.      Palpations: Abdomen is soft.      Tenderness: There is no abdominal tenderness.   Musculoskeletal:      Cervical back: Normal range of motion. No edema.   Lymphadenopathy:      Head:      Right side of head: No submental, submandibular, tonsillar, preauricular, posterior auricular or occipital adenopathy.      Left side of head: No submental, submandibular, tonsillar, preauricular, posterior auricular or occipital adenopathy.   Skin:     General: Skin is warm and dry.      Nails: There is no clubbing.   Neurological:      Mental Status: She is alert.   Psychiatric:         Behavior: Behavior is cooperative.        Result Review :   The following data was reviewed by: VICENTE Ruth on 06/28/2021:  Common labs    Common Labsle 3/16/21 3/16/21 3/16/21 5/28/21 5/28/21 6/28/21 6/28/21 6/28/21 6/28/21    1341 1341 1341 1037 1037 1117 1117 1117 1117   Glucose     138 (A)       Glucose  112 (A)     117 (A)     BUN  8   13  12     Creatinine  0.74   0.73  0.74     eGFR Non  Am  83   84  83     eGFR  Am  101     100     Sodium  138   137  140     Potassium  4.1   4.2  4.5     Chloride  101   97 (A)  103     Calcium  10.3   10.6 (A)  10.1     Total Protein  6.8     6.8     Albumin  4.20   4.50  4.40     Total Bilirubin  0.9   0.5  0.5     Alkaline Phosphatase  85   122 (A)  103     AST (SGOT)  38 (A)   17  14     ALT (SGPT)  49 (A)   29  21     WBC    10.29        Hemoglobin    14.0        Hematocrit    40.5        Platelets    305        Total Cholesterol   173      201 (A)   Triglycerides   256 (A)      174 (A)   HDL Cholesterol   38 (A)      48   LDL  Cholesterol    92      122 (A)   Hemoglobin A1C 5.70 (A)     5.70 (A)      Uric Acid        7.8 (A)    (A) Abnormal value       Comments are available for some flowsheets but are not being displayed.           Data reviewed: Consultant notes oncology          Assessment and Plan    Diagnoses and all orders for this visit:    1. Benign essential HTN (Primary)  Assessment & Plan:  Blood pressure is elevated in the office today but she has been out of amlodipine since Friday.  She will monitor her blood pressure at home and call if readings are consistently >140/90.    Orders:  -     lisinopril (PRINIVIL,ZESTRIL) 40 MG tablet; Take 1 tablet by mouth Daily.  Dispense: 90 tablet; Refill: 1    2. Pure hypercholesterolemia  Assessment & Plan:  She tries to follow a low-fat, low-cholesterol diet-recheck lipid panel.      3. Type 2 diabetes mellitus without complication, without long-term current use of insulin (CMS/[facility])  Assessment & Plan:  She is tolerating metformin, recheck hemoglobin A1c today.    Orders:  -     Hemoglobin A1c  -     Comprehensive Metabolic Panel  -     Cancel: Lipid Panel    4. Right foot pain  Comments:  Check uric acid due to recent episode of right foot pain and swelling  Orders:  -     Uric Acid    5. Malignant neoplasm of central portion of right breast in female, estrogen receptor positive (CMS/HCC)  Assessment & Plan:  Followed by oncology, tolerating anastrozole daily    Orders:  -     anastrozole (Arimidex) 1 MG tablet; Take 1 tablet by mouth Daily.  Dispense: 90 tablet; Refill: 1    6. Urinary hesitancy  Comments:  Check urine today to rule out an infection  Orders:  -     Urinalysis With Microscopic If Indicated (No Culture) - Urine, Clean Catch    7. Vitamin D deficiency  Assessment & Plan:  She has been off her vitamin D supplement for the past several weeks due to her elevated calcium, recheck today.    Orders:  -     Cancel: Vitamin D 25 Hydroxy    Other orders  -     Lipid Panel  -      Vitamin D 25 Hydroxy  -     Microscopic Examination -      Follow Up   Return in about 3 months (around 9/28/2021).  Patient was given instructions and counseling regarding her condition or for health maintenance advice. Please see specific information pulled into the AVS if appropriate.

## 2021-06-29 LAB
25(OH)D3+25(OH)D2 SERPL-MCNC: 21.8 NG/ML (ref 30–100)
ALBUMIN SERPL-MCNC: 4.4 G/DL (ref 3.5–5.2)
ALBUMIN/GLOB SERPL: 1.8 G/DL
ALP SERPL-CCNC: 103 U/L (ref 39–117)
ALT SERPL-CCNC: 21 U/L (ref 1–33)
APPEARANCE UR: CLEAR
AST SERPL-CCNC: 14 U/L (ref 1–32)
BACTERIA #/AREA URNS HPF: ABNORMAL /HPF
BILIRUB SERPL-MCNC: 0.5 MG/DL (ref 0–1.2)
BILIRUB UR QL STRIP: NEGATIVE
BUN SERPL-MCNC: 12 MG/DL (ref 6–20)
BUN/CREAT SERPL: 16.2 (ref 7–25)
CALCIUM SERPL-MCNC: 10.1 MG/DL (ref 8.6–10.5)
CHLORIDE SERPL-SCNC: 103 MMOL/L (ref 98–107)
CHOLEST SERPL-MCNC: 201 MG/DL (ref 0–200)
CO2 SERPL-SCNC: 26.6 MMOL/L (ref 22–29)
COLOR UR: YELLOW
CREAT SERPL-MCNC: 0.74 MG/DL (ref 0.57–1)
EPI CELLS #/AREA URNS HPF: ABNORMAL /HPF
GLOBULIN SER CALC-MCNC: 2.4 GM/DL
GLUCOSE SERPL-MCNC: 117 MG/DL (ref 65–99)
GLUCOSE UR QL: NEGATIVE
HBA1C MFR BLD: 5.7 % (ref 4.8–5.6)
HDLC SERPL-MCNC: 48 MG/DL (ref 40–60)
HGB UR QL STRIP: NEGATIVE
KETONES UR QL STRIP: NEGATIVE
LDLC SERPL CALC-MCNC: 122 MG/DL (ref 0–100)
LEUKOCYTE ESTERASE UR QL STRIP: ABNORMAL
NITRITE UR QL STRIP: NEGATIVE
PH UR STRIP: 6 [PH] (ref 5–8)
POTASSIUM SERPL-SCNC: 4.5 MMOL/L (ref 3.5–5.2)
PROT SERPL-MCNC: 6.8 G/DL (ref 6–8.5)
PROT UR QL STRIP: ABNORMAL
RBC #/AREA URNS HPF: ABNORMAL /HPF
SODIUM SERPL-SCNC: 140 MMOL/L (ref 136–145)
SP GR UR: 1.02 (ref 1–1.03)
TRIGL SERPL-MCNC: 174 MG/DL (ref 0–150)
URATE SERPL-MCNC: 7.8 MG/DL (ref 2.4–5.7)
UROBILINOGEN UR STRIP-MCNC: ABNORMAL MG/DL
VLDLC SERPL CALC-MCNC: 31 MG/DL (ref 5–40)
WBC #/AREA URNS HPF: ABNORMAL /HPF

## 2021-06-29 NOTE — ASSESSMENT & PLAN NOTE
Blood pressure is elevated in the office today but she has been out of amlodipine since Friday.  She will monitor her blood pressure at home and call if readings are consistently >140/90.

## 2021-06-29 NOTE — ASSESSMENT & PLAN NOTE
She has been off her vitamin D supplement for the past several weeks due to her elevated calcium, recheck today.

## 2021-06-30 RX ORDER — ALLOPURINOL 100 MG/1
100 TABLET ORAL DAILY
Qty: 90 TABLET | Refills: 1 | Status: SHIPPED | OUTPATIENT
Start: 2021-06-30 | End: 2021-09-29 | Stop reason: DRUGHIGH

## 2021-06-30 RX ORDER — NITROFURANTOIN 25; 75 MG/1; MG/1
100 CAPSULE ORAL 2 TIMES DAILY
Qty: 10 CAPSULE | Refills: 0 | Status: SHIPPED | OUTPATIENT
Start: 2021-06-30 | End: 2021-07-27

## 2021-06-30 NOTE — PROGRESS NOTES
Please notify patient her recent labs showed an elevated uric acid and ask her to begin allopurinol 100 mg daily for lowering.  She also needs to use take Macrobid 100 mg twice daily for 5 days #10 with no refills for an acute UTI.  Return to clinic if symptoms persist or worsen.

## 2021-07-20 ENCOUNTER — PATIENT MESSAGE (OUTPATIENT)
Dept: INTERNAL MEDICINE | Facility: CLINIC | Age: 51
End: 2021-07-20

## 2021-07-21 NOTE — TELEPHONE ENCOUNTER
From: Emilie SAUNDERS  To: Pinky NYA Hyatt, APRN  Sent: 7/20/2021 10:49 PM EDT  Subject: Non-Urgent Medical Question    Good Morning Pinky,    So I got this letter (pasted below) on [email or web address]- Could you explain what it means? How long does said swelling last? Are they just figuring this out? Thanks in advance :-) Have a great day! :  Dear Ms. SAUNDERS,  Our records indicate that you are now due for your annual breast imaging exam after 9/3/2021.   Current research conducted on the administration of both COVID-19 vaccines currently authorized by the US Food and Drug Administration (FDA): Moderna and Pfizer-BioNTech have shown higher rates of axillary adenopathy or swollen axillary lymph nodes... (there is more but wouldn't fit- Maybe you can view the letter on [email or web address]?)

## 2021-07-27 ENCOUNTER — TELEPHONE (OUTPATIENT)
Dept: ONCOLOGY | Facility: CLINIC | Age: 51
End: 2021-07-27

## 2021-07-27 ENCOUNTER — OFFICE VISIT (OUTPATIENT)
Dept: ONCOLOGY | Facility: CLINIC | Age: 51
End: 2021-07-27

## 2021-07-27 ENCOUNTER — LAB (OUTPATIENT)
Dept: LAB | Facility: HOSPITAL | Age: 51
End: 2021-07-27

## 2021-07-27 VITALS
OXYGEN SATURATION: 98 % | HEIGHT: 66 IN | RESPIRATION RATE: 18 BRPM | HEART RATE: 71 BPM | WEIGHT: 293 LBS | DIASTOLIC BLOOD PRESSURE: 89 MMHG | TEMPERATURE: 97.8 F | BODY MASS INDEX: 47.09 KG/M2 | SYSTOLIC BLOOD PRESSURE: 148 MMHG

## 2021-07-27 DIAGNOSIS — C50.111 MALIGNANT NEOPLASM OF CENTRAL PORTION OF RIGHT BREAST IN FEMALE, ESTROGEN RECEPTOR POSITIVE (HCC): Primary | ICD-10-CM

## 2021-07-27 DIAGNOSIS — E53.8 VITAMIN B12 DEFICIENCY: ICD-10-CM

## 2021-07-27 DIAGNOSIS — Z17.0 MALIGNANT NEOPLASM OF CENTRAL PORTION OF RIGHT BREAST IN FEMALE, ESTROGEN RECEPTOR POSITIVE (HCC): Primary | ICD-10-CM

## 2021-07-27 DIAGNOSIS — E55.9 VITAMIN D DEFICIENCY: ICD-10-CM

## 2021-07-27 LAB
ALBUMIN SERPL-MCNC: 4.3 G/DL (ref 3.5–5.2)
ALBUMIN/GLOB SERPL: 1.4 G/DL (ref 1.1–2.4)
ALP SERPL-CCNC: 107 U/L (ref 38–116)
ALT SERPL W P-5'-P-CCNC: 23 U/L (ref 0–33)
ANION GAP SERPL CALCULATED.3IONS-SCNC: 10.8 MMOL/L (ref 5–15)
AST SERPL-CCNC: 17 U/L (ref 0–32)
BASOPHILS # BLD AUTO: 0.06 10*3/MM3 (ref 0–0.2)
BASOPHILS NFR BLD AUTO: 0.7 % (ref 0–1.5)
BILIRUB SERPL-MCNC: 0.5 MG/DL (ref 0.2–1.2)
BUN SERPL-MCNC: 12 MG/DL (ref 6–20)
BUN/CREAT SERPL: 15.6 (ref 7.3–30)
CALCIUM SPEC-SCNC: 10.5 MG/DL (ref 8.5–10.2)
CHLORIDE SERPL-SCNC: 102 MMOL/L (ref 98–107)
CO2 SERPL-SCNC: 27.2 MMOL/L (ref 22–29)
CREAT SERPL-MCNC: 0.77 MG/DL (ref 0.6–1.1)
DEPRECATED RDW RBC AUTO: 39.8 FL (ref 37–54)
EOSINOPHIL # BLD AUTO: 0.28 10*3/MM3 (ref 0–0.4)
EOSINOPHIL NFR BLD AUTO: 3.4 % (ref 0.3–6.2)
ERYTHROCYTE [DISTWIDTH] IN BLOOD BY AUTOMATED COUNT: 13.5 % (ref 12.3–15.4)
GFR SERPL CREATININE-BSD FRML MDRD: 79 ML/MIN/1.73
GLOBULIN UR ELPH-MCNC: 3 GM/DL (ref 1.8–3.5)
GLUCOSE SERPL-MCNC: 128 MG/DL (ref 74–124)
HCT VFR BLD AUTO: 38.6 % (ref 34–46.6)
HGB BLD-MCNC: 12.5 G/DL (ref 12–15.9)
IMM GRANULOCYTES # BLD AUTO: 0.03 10*3/MM3 (ref 0–0.05)
IMM GRANULOCYTES NFR BLD AUTO: 0.4 % (ref 0–0.5)
LYMPHOCYTES # BLD AUTO: 2.02 10*3/MM3 (ref 0.7–3.1)
LYMPHOCYTES NFR BLD AUTO: 24.2 % (ref 19.6–45.3)
MCH RBC QN AUTO: 26.7 PG (ref 26.6–33)
MCHC RBC AUTO-ENTMCNC: 32.4 G/DL (ref 31.5–35.7)
MCV RBC AUTO: 82.3 FL (ref 79–97)
MONOCYTES # BLD AUTO: 0.47 10*3/MM3 (ref 0.1–0.9)
MONOCYTES NFR BLD AUTO: 5.6 % (ref 5–12)
NEUTROPHILS NFR BLD AUTO: 5.47 10*3/MM3 (ref 1.7–7)
NEUTROPHILS NFR BLD AUTO: 65.7 % (ref 42.7–76)
NRBC BLD AUTO-RTO: 0 /100 WBC (ref 0–0.2)
PLATELET # BLD AUTO: 293 10*3/MM3 (ref 140–450)
PMV BLD AUTO: 9.3 FL (ref 6–12)
POTASSIUM SERPL-SCNC: 4.1 MMOL/L (ref 3.5–4.7)
PROT SERPL-MCNC: 7.3 G/DL (ref 6.3–8)
RBC # BLD AUTO: 4.69 10*6/MM3 (ref 3.77–5.28)
SODIUM SERPL-SCNC: 140 MMOL/L (ref 134–145)
WBC # BLD AUTO: 8.33 10*3/MM3 (ref 3.4–10.8)

## 2021-07-27 PROCEDURE — 36415 COLL VENOUS BLD VENIPUNCTURE: CPT

## 2021-07-27 PROCEDURE — 80053 COMPREHEN METABOLIC PANEL: CPT

## 2021-07-27 PROCEDURE — 85025 COMPLETE CBC W/AUTO DIFF WBC: CPT

## 2021-07-27 PROCEDURE — 99214 OFFICE O/P EST MOD 30 MIN: CPT | Performed by: INTERNAL MEDICINE

## 2021-07-27 NOTE — TELEPHONE ENCOUNTER
Call to Ms. Carrizales to let her know that her calcium level was elevated today, and Dr. Trevizo had some instructions for her.  Let her know to stop taking the calcium supplement and decrease dairy intake.  Offered to refer her to endocrinology now or that she can wait and see what her labs are at next visit per Dr. Trevizo.  Patient states she prefers to wait until next visit.

## 2021-09-02 ENCOUNTER — TELEMEDICINE (OUTPATIENT)
Dept: INTERNAL MEDICINE | Facility: CLINIC | Age: 51
End: 2021-09-02

## 2021-09-02 DIAGNOSIS — R60.0 LOWER EXTREMITY EDEMA: Chronic | ICD-10-CM

## 2021-09-02 DIAGNOSIS — M10.372 ACUTE GOUT DUE TO RENAL IMPAIRMENT INVOLVING TOE OF LEFT FOOT: Primary | ICD-10-CM

## 2021-09-02 DIAGNOSIS — I10 BENIGN ESSENTIAL HTN: ICD-10-CM

## 2021-09-02 PROCEDURE — 99213 OFFICE O/P EST LOW 20 MIN: CPT | Performed by: NURSE PRACTITIONER

## 2021-09-02 RX ORDER — POTASSIUM CHLORIDE 600 MG/1
8 TABLET, FILM COATED, EXTENDED RELEASE ORAL DAILY
Qty: 90 TABLET | Refills: 0 | Status: SHIPPED | OUTPATIENT
Start: 2021-09-02 | End: 2022-12-12

## 2021-09-02 RX ORDER — POTASSIUM CHLORIDE 600 MG/1
8 TABLET, FILM COATED, EXTENDED RELEASE ORAL DAILY
Qty: 30 TABLET | Refills: 0 | Status: SHIPPED | OUTPATIENT
Start: 2021-09-02 | End: 2021-09-02

## 2021-09-02 RX ORDER — FUROSEMIDE 20 MG/1
20 TABLET ORAL DAILY
Qty: 90 TABLET | Refills: 1 | Status: SHIPPED | OUTPATIENT
Start: 2021-09-02 | End: 2022-04-07

## 2021-09-02 RX ORDER — METHYLPREDNISOLONE 4 MG/1
TABLET ORAL
Qty: 21 TABLET | Refills: 0 | Status: SHIPPED | OUTPATIENT
Start: 2021-09-02 | End: 2021-09-08

## 2021-09-02 NOTE — ASSESSMENT & PLAN NOTE
Will treat acute inflammation with Medrol Dose Garth but recheck uric acid at next appointment, will most likely need to increase Allopurinol dose but will hold off for now due to acute symptoms.

## 2021-09-02 NOTE — ASSESSMENT & PLAN NOTE
She may increase Lasix to 40mg qd x5 days during acute flares, will add K+ supplement during higher doses. Recheck kidney function and potassium at next appointment.

## 2021-09-02 NOTE — PROGRESS NOTES
Chief Complaint  Edema and Gout    Subjective          Emilie SAUNDERS presents to [facility] due to lower extremity edema and recent gout exacerbation.    She was discontinued on HCTZ due to elevated calcium which improved. She was instead placed on Lasix 10 mg daily which she is tolerating well. However, she c/o increased swelling over the past couple of weeks (has moved recently with increased weightbearing activity). She increase Lasix dose with mild improvement in sx.  She also c/o swelling in her right great toe with redness and excruciating pain. She also c/o pain in the left leg without injury. She was started on Allopurinol 6/28 due to elevated uric acid (7.8).        Objective   Vital Signs:   There were no vitals taken for this visit.    Physical Exam  Constitutional:       Appearance: She is well-developed.   HENT:      Head: Normocephalic and atraumatic.   Eyes:      General:         Right eye: No discharge.         Left eye: No discharge.      Conjunctiva/sclera: Conjunctivae normal.   Pulmonary:      Effort: Pulmonary effort is normal.   Neurological:      Mental Status: She is alert and oriented to person, place, and time.   Psychiatric:         Behavior: Behavior normal.         Thought Content: Thought content normal.         Judgment: Judgment normal.        Result Review :   The following data was reviewed by: VICENTE Ruth on 09/02/2021:  Common labs    Common Labsle 5/28/21 5/28/21 6/28/21 6/28/21 6/28/21 6/28/21 7/27/21 7/27/21    1037 1037 1117 1117 1117 1117 0825 0825   Glucose  138 (A)      128 (A)   Glucose    117 (A)       BUN  13  12    12   Creatinine  0.73  0.74    0.77   eGFR Non  Am  84  83    79   eGFR African Am    100       Sodium  137  140    140   Potassium  4.2  4.5    4.1   Chloride  97 (A)  103    102   Calcium  10.6 (A)  10.1    10.5 (A)   Total Protein    6.8       Albumin  4.50  4.40    4.30   Total Bilirubin  0.5  0.5     0.5   Alkaline Phosphatase  122 (A)  103    107   AST (SGOT)  17  14    17   ALT (SGPT)  29  21    23   WBC 10.29      8.33    Hemoglobin 14.0      12.5    Hematocrit 40.5      38.6    Platelets 305      293    Total Cholesterol      201 (A)     Triglycerides      174 (A)     HDL Cholesterol      48     LDL Cholesterol       122 (A)     Hemoglobin A1C   5.70 (A)        Uric Acid     7.8 (A)      (A) Abnormal value       Comments are available for some flowsheets but are not being displayed.                     Assessment and Plan    Diagnoses and all orders for this visit:    1. Acute gout due to renal impairment involving toe of left foot (Primary)  Assessment & Plan:  Will treat acute inflammation with Medrol Dose Garth but recheck uric acid at next appointment, will most likely need to increase Allopurinol dose but will hold off for now due to acute symptoms.    Orders:  -     methylPREDNISolone (Medrol) 4 MG dose pack; follow package directions  Dispense: 21 tablet; Refill: 0    2. Lower extremity edema  Assessment & Plan:  She may increase Lasix to 40mg qd x5 days during acute flares, will add K+ supplement during higher doses. Recheck kidney function and potassium at next appointment.    Orders:  -     Discontinue: potassium chloride (Klor-Con) 8 MEQ CR tablet; Take 1 tablet by mouth Daily.  Dispense: 30 tablet; Refill: 0    3. Benign essential HTN  Assessment & Plan:  Continue Lisinopril for BP control, continue to monitor and re-evaluate at f/u appt.    Orders:  -     furosemide (LASIX) 20 MG tablet; Take 1 tablet by mouth Daily.  Dispense: 90 tablet; Refill: 1    History and medical judgement obtained from video conversation with patient. No hands-on physical examination was performed. Approximately  minutes 15 spent in video conversation with patient and chart review.      Follow Up   Return if symptoms worsen or fail to improve, for Next scheduled follow up.  Patient was given instructions and counseling  regarding her condition or for health maintenance advice. Please see specific information pulled into the AVS if appropriate.

## 2021-09-02 NOTE — PROGRESS NOTES
Subjective   Emilie SAUNDERS is a 51 y.o. female.         History of Present Illness     The following portions of the patient's history were reviewed and updated as appropriate: allergies, current medications, past social history and problem list.    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   • Sleep apnea     C-PAP IN USE   • Vitamin D deficiency          Current Outpatient Medications:   •  allopurinol (Zyloprim) 100 MG tablet, Take 1 tablet by mouth Daily., Disp: 90 tablet, Rfl: 1  •  amLODIPine (NORVASC) 5 MG tablet, Take 1 tablet by mouth Daily., Disp: 90 tablet, Rfl: 2  •  anastrozole (Arimidex) 1 MG tablet, Take 1 tablet by mouth Daily., Disp: 90 tablet, Rfl: 1  •  Cholecalciferol 1.25 MG (30022 UT) tablet, Take 50,000 Units by mouth 1 (One) Time Per Week., Disp: 4 tablet, Rfl: 4  •  lisinopril (PRINIVIL,ZESTRIL) 40 MG tablet, Take 1 tablet by mouth Daily., Disp: 90 tablet, Rfl: 1  •  LORazepam (ATIVAN) 0.5 MG tablet, Take 1 tablet by mouth Every 8 (Eight) Hours As Needed for Anxiety. for anxiety, Disp: 45 tablet, Rfl: 0  •  metFORMIN ER (GLUCOPHAGE-XR) 500 MG 24 hr tablet, Take 1 tablet by mouth Daily With Breakfast., Disp: 90 tablet, Rfl: 1  •  terbinafine (LamISIL) 250 MG tablet, Take 1 tablet by mouth Daily., Disp: 90 tablet, Rfl: 0  •  vitamin B-12 (CYANOCOBALAMIN) 1000 MCG tablet, Take 1,000 mcg by mouth Daily., Disp: , Rfl:     Allergies   Allergen Reactions   • Citalopram Rash   • Flexeril [Cyclobenzaprine] Unknown - High Severity     MUSCLES TENSE-OPPOSITE OF WHAT IT IS INTENDED TO  DO-ELEVATES B/P   • Doxycycline Rash     Blisters on hands   • Keflex [Cephalexin] Rash   • Sulfa Antibiotics Rash     NAUSEA/VOMITING/FEVER   • Vilazodone Hcl Hives, Itching and Rash       Review of Systems    Objective   There were no vitals filed for this visit.  There is no height or weight on file to calculate BMI.  Physical Exam    Assessment/Plan   There are no diagnoses linked to this encounter.

## 2021-09-13 ENCOUNTER — TELEMEDICINE (OUTPATIENT)
Dept: INTERNAL MEDICINE | Facility: CLINIC | Age: 51
End: 2021-09-13

## 2021-09-13 ENCOUNTER — HOSPITAL ENCOUNTER (OUTPATIENT)
Dept: MRI IMAGING | Facility: HOSPITAL | Age: 51
End: 2021-09-13

## 2021-09-13 ENCOUNTER — LAB (OUTPATIENT)
Dept: INTERNAL MEDICINE | Facility: CLINIC | Age: 51
End: 2021-09-13

## 2021-09-13 DIAGNOSIS — J06.9 VIRAL URI: ICD-10-CM

## 2021-09-13 DIAGNOSIS — Z20.822 SUSPECTED COVID-19 VIRUS INFECTION: Primary | ICD-10-CM

## 2021-09-13 DIAGNOSIS — Z20.822 SUSPECTED COVID-19 VIRUS INFECTION: ICD-10-CM

## 2021-09-13 PROCEDURE — 99213 OFFICE O/P EST LOW 20 MIN: CPT | Performed by: NURSE PRACTITIONER

## 2021-09-13 NOTE — PROGRESS NOTES
Subjective   Emilie SAUNDERS is a 51 y.o. female.         History of Present Illness     The following portions of the patient's history were reviewed and updated as appropriate: allergies, current medications, past social history and problem list.    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   • Sleep apnea     C-PAP IN USE   • Vitamin D deficiency          Current Outpatient Medications:   •  allopurinol (Zyloprim) 100 MG tablet, Take 1 tablet by mouth Daily., Disp: 90 tablet, Rfl: 1  •  amLODIPine (NORVASC) 5 MG tablet, Take 1 tablet by mouth Daily., Disp: 90 tablet, Rfl: 2  •  anastrozole (Arimidex) 1 MG tablet, Take 1 tablet by mouth Daily., Disp: 90 tablet, Rfl: 1  •  Cholecalciferol 1.25 MG (59123 UT) tablet, Take 50,000 Units by mouth 1 (One) Time Per Week., Disp: 4 tablet, Rfl: 4  •  furosemide (LASIX) 20 MG tablet, Take 1 tablet by mouth Daily., Disp: 90 tablet, Rfl: 1  •  lisinopril (PRINIVIL,ZESTRIL) 40 MG tablet, Take 1 tablet by mouth Daily., Disp: 90 tablet, Rfl: 1  •  LORazepam (ATIVAN) 0.5 MG tablet, Take 1 tablet by mouth Every 8 (Eight) Hours As Needed for Anxiety. for anxiety, Disp: 45 tablet, Rfl: 0  •  metFORMIN ER (GLUCOPHAGE-XR) 500 MG 24 hr tablet, Take 1 tablet by mouth Daily With Breakfast., Disp: 90 tablet, Rfl: 1  •  potassium chloride (KLOR-CON) 8 MEQ CR tablet, TAKE 1 TABLET BY MOUTH DAILY, Disp: 90 tablet, Rfl: 0  •  terbinafine (LamISIL) 250 MG tablet, Take 1 tablet by mouth Daily., Disp: 90 tablet, Rfl: 0  •  vitamin B-12 (CYANOCOBALAMIN) 1000 MCG tablet, Take 1,000 mcg  by mouth Daily., Disp: , Rfl:     Allergies   Allergen Reactions   • Citalopram Rash   • Flexeril [Cyclobenzaprine] Unknown - High Severity     MUSCLES TENSE-OPPOSITE OF WHAT IT IS INTENDED TO DO-ELEVATES B/P   • Doxycycline Rash     Blisters on hands   • Keflex [Cephalexin] Rash   • Sulfa Antibiotics Rash     NAUSEA/VOMITING/FEVER   • Vilazodone Hcl Hives, Itching and Rash       Review of Systems    Objective   There were no vitals filed for this visit.  There is no height or weight on file to calculate BMI.  Physical Exam    Assessment/Plan   There are no diagnoses linked to this encounter.

## 2021-09-14 LAB
LABCORP SARS-COV-2, NAA 2 DAY TAT: NORMAL
SARS-COV-2 RNA RESP QL NAA+PROBE: NOT DETECTED

## 2021-09-19 RX ORDER — GUAIFENESIN 600 MG/1
600 TABLET, EXTENDED RELEASE ORAL EVERY 12 HOURS SCHEDULED
Qty: 14 TABLET
Start: 2021-09-19 | End: 2021-09-26

## 2021-09-19 NOTE — PROGRESS NOTES
Chief Complaint  URI    Jaspal SAUNDERS presents to [facility] who presents for a video visit due to increased congestion and drainage.    She presents due to increased sinus congestion and drainage since last Wednesday. She c/o chills, unsure if she has a fever. She denies known exposure to COVID-19, wife has similar sx but tested negative on Friday.  She does c/o a cough which is mainly dry and not productive.      Objective   Vital Signs:   There were no vitals taken for this visit.    Physical Exam  Constitutional:       Appearance: She is well-developed.   HENT:      Head: Normocephalic and atraumatic.   Eyes:      General:         Right eye: No discharge.         Left eye: No discharge.      Conjunctiva/sclera: Conjunctivae normal.   Pulmonary:      Effort: Pulmonary effort is normal.   Neurological:      Mental Status: She is alert and oriented to person, place, and time.   Psychiatric:         Behavior: Behavior normal.         Thought Content: Thought content normal.         Judgment: Judgment normal.        Result Review :   The following data was reviewed by: VICENTE Ruth on 09/13/2021:  Common labs    Common Labsle 5/28/21 5/28/21 6/28/21 6/28/21 6/28/21 6/28/21 7/27/21 7/27/21    1037 1037 1117 1117 1117 1117 0825 0825   Glucose  138 (A)      128 (A)   Glucose    117 (A)       BUN  13  12    12   Creatinine  0.73  0.74    0.77   eGFR Non  Am  84  83    79   eGFR African Am    100       Sodium  137  140    140   Potassium  4.2  4.5    4.1   Chloride  97 (A)  103    102   Calcium  10.6 (A)  10.1    10.5 (A)   Total Protein    6.8       Albumin  4.50  4.40    4.30   Total Bilirubin  0.5  0.5    0.5   Alkaline Phosphatase  122 (A)  103    107   AST (SGOT)  17  14    17   ALT (SGPT)  29  21    23   WBC 10.29      8.33    Hemoglobin 14.0      12.5    Hematocrit 40.5      38.6    Platelets 305      293    Total Cholesterol      201 (A)      Triglycerides      174 (A)     HDL Cholesterol      48     LDL Cholesterol       122 (A)     Hemoglobin A1C   5.70 (A)        Uric Acid     7.8 (A)      (A) Abnormal value       Comments are available for some flowsheets but are not being displayed.                     Assessment and Plan    Diagnoses and all orders for this visit:    1. Suspected COVID-19 virus infection (Primary)  -     COVID-19,[facility] ROUTINE, NP/OP SWAB IN TRANSPORT MEDIA OR ESWAB 72 HR TAT - Swab, Oropharynx; Future    2. Viral URI  -     guaiFENesin (Mucinex) 600 MG 12 hr tablet; Take 1 tablet by mouth Every 12 (Twelve) Hours for 7 days.  Dispense: 14 tablet    Sx appear more viral in nature, will obtain COVID-19 test to further evaluate. She will begin Dayquil and Nyquil for increased congestion and continue to monitor.  She will come into parking lot this afternoon for screening, advised to self quarantine until results are received.    History and medical judgement obtained from video conversation with patient. No hands-on physical examination was performed. Approximately 15 minutes spent in video conversation with patient and in chart review.      Follow Up   No follow-ups on file.  Patient was given instructions and counseling regarding her condition or for health maintenance advice. Please see specific information pulled into the AVS if appropriate.

## 2021-09-28 ENCOUNTER — OFFICE VISIT (OUTPATIENT)
Dept: INTERNAL MEDICINE | Facility: CLINIC | Age: 51
End: 2021-09-28

## 2021-09-28 VITALS
WEIGHT: 293 LBS | HEART RATE: 74 BPM | BODY MASS INDEX: 48.82 KG/M2 | RESPIRATION RATE: 16 BRPM | SYSTOLIC BLOOD PRESSURE: 123 MMHG | TEMPERATURE: 98 F | HEIGHT: 65 IN | OXYGEN SATURATION: 100 % | DIASTOLIC BLOOD PRESSURE: 62 MMHG

## 2021-09-28 DIAGNOSIS — E11.9 TYPE 2 DIABETES MELLITUS WITHOUT COMPLICATION, WITHOUT LONG-TERM CURRENT USE OF INSULIN (HCC): Chronic | ICD-10-CM

## 2021-09-28 DIAGNOSIS — E79.0 HYPERURICEMIA: Primary | ICD-10-CM

## 2021-09-28 DIAGNOSIS — R60.0 LOWER EXTREMITY EDEMA: Chronic | ICD-10-CM

## 2021-09-28 DIAGNOSIS — I10 BENIGN ESSENTIAL HTN: Chronic | ICD-10-CM

## 2021-09-28 PROCEDURE — 99214 OFFICE O/P EST MOD 30 MIN: CPT | Performed by: NURSE PRACTITIONER

## 2021-09-28 NOTE — PROGRESS NOTES
"Chief Complaint  Hypertension (3 month follow up ), Hyperlipidemia, and PCOS    Subjective          Emilie SAUNDERS presents to [facility] for f/u regarding HTN, PCOS and hyperuricemia.    She continues to c/o upper respiratory congestion and drainage but overall reports feeling better, COVID-19 test was negative.  She has eliminated sugar from her diet and has tried to reduce the carb intake, weight has remained stable. She is tolerating Metformin well, A1c was 5.7 with last labs.  She is now taking Lasix PRN for lower extremity edema (calcium increased with HCTZ), takes lulu 3 times per week with good results.        Objective   Vital Signs:   /62 (BP Location: Left arm, Patient Position: Sitting, Cuff Size: Adult)   Pulse 74   Temp 98 °F (36.7 °C) (Temporal)   Resp 16   Ht 165.1 cm (65\")   Wt 136 kg (299 lb 9.6 oz)   SpO2 100%   BMI 49.86 kg/m²     Physical Exam  Constitutional:       Appearance: She is well-developed. She is not ill-appearing.   HENT:      Head: Normocephalic.      Right Ear: Hearing, tympanic membrane and external ear normal.      Left Ear: Hearing, tympanic membrane and external ear normal.      Nose: Nose normal. No nasal deformity, mucosal edema or rhinorrhea.      Right Sinus: No maxillary sinus tenderness or frontal sinus tenderness.      Left Sinus: No maxillary sinus tenderness or frontal sinus tenderness.      Mouth/Throat:      Dentition: Normal dentition.   Eyes:      General: Lids are normal.         Right eye: No discharge.         Left eye: No discharge.      Conjunctiva/sclera: Conjunctivae normal.      Right eye: No exudate.     Left eye: No exudate.  Neck:      Thyroid: No thyroid mass or thyromegaly.      Vascular: No carotid bruit.      Trachea: Trachea normal.   Cardiovascular:      Rate and Rhythm: Regular rhythm.      Pulses: Normal pulses.           Dorsalis pedis pulses are 2+ on the right side and 2+ on the left side.        " Posterior tibial pulses are 2+ on the right side and 2+ on the left side.      Heart sounds: Normal heart sounds. No murmur heard.     Pulmonary:      Effort: No respiratory distress.      Breath sounds: Normal breath sounds. No decreased breath sounds, wheezing, rhonchi or rales.   Abdominal:      General: Bowel sounds are normal.      Palpations: Abdomen is soft.      Tenderness: There is no abdominal tenderness.   Musculoskeletal:      Cervical back: Normal range of motion. No edema.      Right foot: Normal range of motion.      Left foot: Normal range of motion.   Feet:      Right foot:      Protective Sensation: 10 sites tested. 10 sites sensed.      Skin integrity: Skin integrity normal. No ulcer, blister or skin breakdown.      Toenail Condition: Right toenails are normal.      Left foot:      Protective Sensation: 10 sites tested. 10 sites sensed.      Skin integrity: Skin integrity normal. No ulcer, blister or skin breakdown.      Toenail Condition: Left toenails are normal.      Comments: Diabetic Foot Exam Performed and Monofilament Test Performed    Lymphadenopathy:      Head:      Right side of head: No submental, submandibular, tonsillar, preauricular, posterior auricular or occipital adenopathy.      Left side of head: No submental, submandibular, tonsillar, preauricular, posterior auricular or occipital adenopathy.   Skin:     General: Skin is warm and dry.      Nails: There is no clubbing.   Neurological:      Mental Status: She is alert.   Psychiatric:         Behavior: Behavior is cooperative.        Result Review :   The following data was reviewed by: VICENTE Ruth on 09/28/2021:  Common labs    Common Labsle 5/28/21 5/28/21 6/28/21 6/28/21 6/28/21 6/28/21 7/27/21 7/27/21    1037 1037 1117 1117 1117 1117 0825 0825   Glucose  138 (A)      128 (A)   Glucose    117 (A)       BUN  13  12    12   Creatinine  0.73  0.74    0.77   eGFR Non  Am  84  83    79   eGFR  Am    100        Sodium  137  140    140   Potassium  4.2  4.5    4.1   Chloride  97 (A)  103    102   Calcium  10.6 (A)  10.1    10.5 (A)   Total Protein    6.8       Albumin  4.50  4.40    4.30   Total Bilirubin  0.5  0.5    0.5   Alkaline Phosphatase  122 (A)  103    107   AST (SGOT)  17  14    17   ALT (SGPT)  29  21    23   WBC 10.29      8.33    Hemoglobin 14.0      12.5    Hematocrit 40.5      38.6    Platelets 305      293    Total Cholesterol      201 (A)     Triglycerides      174 (A)     HDL Cholesterol      48     LDL Cholesterol       122 (A)     Hemoglobin A1C   5.70 (A)        Uric Acid     7.8 (A)      (A) Abnormal value       Comments are available for some flowsheets but are not being displayed.                     Assessment and Plan    Diagnoses and all orders for this visit:    1. Hyperuricemia (Primary)  Assessment & Plan:  Uric acid 7.8 with 6/2021 labs-started on Allopurinol, recheck uric acid today.    Orders:  -     Uric Acid    2. Type 2 diabetes mellitus without complication, without long-term current use of insulin (CMS/[facility])  Assessment & Plan:  She is tolerating Metformin without GI side effects, recheck A1c today. She will continue low-carb, low-sugar diet with the goal of continued weight loss.    Orders:  -     Hemoglobin A1c  -     Microalbumin / Creatinine Urine Ratio - Urine, Clean Catch    3. Benign essential HTN  Assessment & Plan:  BP ha remained stable on Lisinopril and Amlodipine which she will continue along with a low sodium diet.      Orders:  -     Comprehensive Metabolic Panel  -     T4, Free  -     TSH    4. Lower extremity edema  Assessment & Plan:  Improved with Lasix PRN (takes lulu 3 times per week)        Follow Up   Return in about 3 months (around 12/28/2021).  Patient was given instructions and counseling regarding her condition or for health maintenance advice. Please see specific information pulled into the AVS if appropriate.

## 2021-09-29 DIAGNOSIS — I10 BENIGN ESSENTIAL HTN: ICD-10-CM

## 2021-09-29 PROBLEM — M10.372 ACUTE GOUT DUE TO RENAL IMPAIRMENT INVOLVING TOE OF LEFT FOOT: Status: RESOLVED | Noted: 2021-09-02 | Resolved: 2021-09-29

## 2021-09-29 PROBLEM — E79.0 HYPERURICEMIA: Chronic | Status: ACTIVE | Noted: 2021-09-29

## 2021-09-29 PROBLEM — E79.0 HYPERURICEMIA: Status: ACTIVE | Noted: 2021-09-29

## 2021-09-29 LAB
ALBUMIN SERPL-MCNC: 4.5 G/DL (ref 3.5–5.2)
ALBUMIN/CREAT UR: 309 MG/G CREAT (ref 0–29)
ALBUMIN/GLOB SERPL: 2 G/DL
ALP SERPL-CCNC: 97 U/L (ref 39–117)
ALT SERPL-CCNC: 30 U/L (ref 1–33)
AST SERPL-CCNC: 19 U/L (ref 1–32)
BILIRUB SERPL-MCNC: 0.4 MG/DL (ref 0–1.2)
BUN SERPL-MCNC: 10 MG/DL (ref 6–20)
BUN/CREAT SERPL: 14.7 (ref 7–25)
CALCIUM SERPL-MCNC: 10.2 MG/DL (ref 8.6–10.5)
CHLORIDE SERPL-SCNC: 99 MMOL/L (ref 98–107)
CO2 SERPL-SCNC: 28.8 MMOL/L (ref 22–29)
CREAT SERPL-MCNC: 0.68 MG/DL (ref 0.57–1)
CREAT UR-MCNC: 60.5 MG/DL
GLOBULIN SER CALC-MCNC: 2.2 GM/DL
GLUCOSE SERPL-MCNC: 132 MG/DL (ref 65–99)
HBA1C MFR BLD: 6.4 % (ref 4.8–5.6)
MICROALBUMIN UR-MCNC: 186.7 UG/ML
POTASSIUM SERPL-SCNC: 4.8 MMOL/L (ref 3.5–5.2)
PROT SERPL-MCNC: 6.7 G/DL (ref 6–8.5)
SODIUM SERPL-SCNC: 138 MMOL/L (ref 136–145)
T4 FREE SERPL-MCNC: 1.22 NG/DL (ref 0.93–1.7)
TSH SERPL DL<=0.005 MIU/L-ACNC: 1.51 UIU/ML (ref 0.27–4.2)
URATE SERPL-MCNC: 8 MG/DL (ref 2.4–5.7)

## 2021-09-29 RX ORDER — AMLODIPINE BESYLATE 5 MG/1
5 TABLET ORAL DAILY
Qty: 90 TABLET | Refills: 2 | Status: SHIPPED | OUTPATIENT
Start: 2021-09-29 | End: 2022-07-13

## 2021-09-29 RX ORDER — ALLOPURINOL 300 MG/1
300 TABLET ORAL DAILY
Qty: 90 TABLET | Refills: 1 | Status: SHIPPED | OUTPATIENT
Start: 2021-09-29 | End: 2022-04-18

## 2021-09-29 NOTE — ASSESSMENT & PLAN NOTE
She is tolerating Metformin without GI side effects, recheck A1c today. She will continue low-carb, low-sugar diet with the goal of continued weight loss.

## 2021-09-29 NOTE — ASSESSMENT & PLAN NOTE
BP ha remained stable on Lisinopril and Amlodipine which she will continue along with a low sodium diet.

## 2021-10-05 ENCOUNTER — HOSPITAL ENCOUNTER (OUTPATIENT)
Dept: MRI IMAGING | Facility: HOSPITAL | Age: 51
Discharge: HOME | End: 2021-10-05
Admitting: NURSE PRACTITIONER

## 2021-10-05 DIAGNOSIS — Z85.3 ENCOUNTER FOR FOLLOW-UP SURVEILLANCE OF BREAST CANCER: ICD-10-CM

## 2021-10-05 DIAGNOSIS — C50.111 MALIGNANT NEOPLASM OF CENTRAL PORTION OF RIGHT BREAST IN FEMALE, ESTROGEN RECEPTOR POSITIVE (HCC): ICD-10-CM

## 2021-10-05 DIAGNOSIS — Z15.01 MONOALLELIC MUTATION OF CHEK2 GENE IN FEMALE PATIENT: Chronic | ICD-10-CM

## 2021-10-05 DIAGNOSIS — Z08 ENCOUNTER FOR FOLLOW-UP SURVEILLANCE OF BREAST CANCER: ICD-10-CM

## 2021-10-05 DIAGNOSIS — Z80.3 FAMILY HISTORY OF BREAST CANCER: Chronic | ICD-10-CM

## 2021-10-05 DIAGNOSIS — Z15.02 MONOALLELIC MUTATION OF CHEK2 GENE IN FEMALE PATIENT: Chronic | ICD-10-CM

## 2021-10-05 DIAGNOSIS — Z17.0 MALIGNANT NEOPLASM OF CENTRAL PORTION OF RIGHT BREAST IN FEMALE, ESTROGEN RECEPTOR POSITIVE (HCC): ICD-10-CM

## 2021-10-05 DIAGNOSIS — Z15.89 MONOALLELIC MUTATION OF CHEK2 GENE IN FEMALE PATIENT: Chronic | ICD-10-CM

## 2021-10-05 DIAGNOSIS — Z15.09 MONOALLELIC MUTATION OF CHEK2 GENE IN FEMALE PATIENT: Chronic | ICD-10-CM

## 2021-10-05 LAB — CREAT BLDA-MCNC: 0.7 MG/DL (ref 0.6–1.3)

## 2021-10-05 PROCEDURE — A9577 INJ MULTIHANCE: HCPCS | Performed by: NURSE PRACTITIONER

## 2021-10-05 PROCEDURE — 0 GADOBENATE DIMEGLUMINE 529 MG/ML SOLUTION: Performed by: NURSE PRACTITIONER

## 2021-10-05 PROCEDURE — 82565 ASSAY OF CREATININE: CPT

## 2021-10-05 PROCEDURE — 77049 MRI BREAST C-+ W/CAD BI: CPT

## 2021-10-05 RX ADMIN — GADOBENATE DIMEGLUMINE 20 ML: 529 INJECTION, SOLUTION INTRAVENOUS at 16:54

## 2021-10-11 ENCOUNTER — TELEPHONE (OUTPATIENT)
Dept: SURGERY | Facility: CLINIC | Age: 51
End: 2021-10-11

## 2021-10-11 NOTE — PROGRESS NOTES
Please contact patient and let her know that her MRI is stable. I will discuss with her at her next appointment in 12/21. thanks

## 2021-10-11 NOTE — TELEPHONE ENCOUNTER
Patient is aware.     ----- Message from VICENTE Smith sent at 10/11/2021  6:25 AM EDT -----  Please contact patient and let her know that her MRI is stable. I will discuss with her at her next appointment in 12/21. thanks

## 2021-11-19 PROBLEM — R73.03 PREDIABETES: Status: ACTIVE | Noted: 2021-11-19

## 2021-11-19 PROBLEM — F41.8 ANXIOUS DEPRESSION: Status: ACTIVE | Noted: 2021-11-19

## 2021-11-19 PROBLEM — E66.813 CLASS 3 SEVERE OBESITY WITH BODY MASS INDEX (BMI) OF 45.0 TO 49.9 IN ADULT: Status: ACTIVE | Noted: 2021-03-08

## 2021-11-19 PROBLEM — M25.473 ANKLE EDEMA: Status: ACTIVE | Noted: 2021-03-08

## 2021-11-19 PROBLEM — M10.9 GOUT: Status: ACTIVE | Noted: 2021-11-19

## 2021-11-19 PROBLEM — G47.33 OSA (OBSTRUCTIVE SLEEP APNEA): Status: ACTIVE | Noted: 2021-11-19

## 2021-11-19 PROBLEM — N39.3 URINARY, INCONTINENCE, STRESS FEMALE: Status: ACTIVE | Noted: 2021-11-19

## 2021-11-22 ENCOUNTER — CONSULT (OUTPATIENT)
Dept: BARIATRICS/WEIGHT MGMT | Facility: CLINIC | Age: 51
End: 2021-11-22

## 2021-11-22 VITALS
TEMPERATURE: 97.6 F | SYSTOLIC BLOOD PRESSURE: 141 MMHG | RESPIRATION RATE: 18 BRPM | HEIGHT: 64 IN | DIASTOLIC BLOOD PRESSURE: 82 MMHG | HEART RATE: 104 BPM | BODY MASS INDEX: 50.02 KG/M2 | WEIGHT: 293 LBS

## 2021-11-22 DIAGNOSIS — M25.473 ANKLE EDEMA: ICD-10-CM

## 2021-11-22 DIAGNOSIS — I10 BENIGN ESSENTIAL HTN: Chronic | ICD-10-CM

## 2021-11-22 DIAGNOSIS — F41.8 ANXIOUS DEPRESSION: ICD-10-CM

## 2021-11-22 DIAGNOSIS — R10.13 DYSPEPSIA: ICD-10-CM

## 2021-11-22 DIAGNOSIS — E11.9 TYPE 2 DIABETES MELLITUS WITHOUT COMPLICATION, WITHOUT LONG-TERM CURRENT USE OF INSULIN (HCC): Chronic | ICD-10-CM

## 2021-11-22 DIAGNOSIS — M10.9 GOUT, UNSPECIFIED CAUSE, UNSPECIFIED CHRONICITY, UNSPECIFIED SITE: ICD-10-CM

## 2021-11-22 DIAGNOSIS — G47.33 OSA (OBSTRUCTIVE SLEEP APNEA): ICD-10-CM

## 2021-11-22 DIAGNOSIS — R73.03 PREDIABETES: ICD-10-CM

## 2021-11-22 DIAGNOSIS — E78.00 PURE HYPERCHOLESTEROLEMIA: Chronic | ICD-10-CM

## 2021-11-22 DIAGNOSIS — E28.2 PCOS (POLYCYSTIC OVARIAN SYNDROME): Chronic | ICD-10-CM

## 2021-11-22 DIAGNOSIS — E55.9 VITAMIN D DEFICIENCY: Chronic | ICD-10-CM

## 2021-11-22 PROBLEM — Z79.899 CONTROLLED SUBSTANCE AGREEMENT SIGNED: Status: RESOLVED | Noted: 2020-11-23 | Resolved: 2021-11-22

## 2021-11-22 PROBLEM — E53.8 VITAMIN B12 DEFICIENCY: Chronic | Status: RESOLVED | Noted: 2020-02-03 | Resolved: 2021-11-22

## 2021-11-22 PROBLEM — E66.01 CLASS 3 SEVERE OBESITY WITH BODY MASS INDEX (BMI) OF 45.0 TO 49.9 IN ADULT: Status: RESOLVED | Noted: 2021-03-08 | Resolved: 2021-11-22

## 2021-11-22 PROBLEM — C50.111 MALIGNANT NEOPLASM OF CENTRAL PORTION OF RIGHT BREAST IN FEMALE, ESTROGEN RECEPTOR POSITIVE (HCC): Status: RESOLVED | Noted: 2019-05-14 | Resolved: 2021-11-22

## 2021-11-22 PROBLEM — Z17.0 MALIGNANT NEOPLASM OF CENTRAL PORTION OF RIGHT BREAST IN FEMALE, ESTROGEN RECEPTOR POSITIVE (HCC): Status: RESOLVED | Noted: 2019-05-14 | Resolved: 2021-11-22

## 2021-11-22 PROBLEM — Z80.3 FAMILY HISTORY OF BREAST CANCER: Chronic | Status: RESOLVED | Noted: 2019-05-14 | Resolved: 2021-11-22

## 2021-11-22 PROBLEM — E66.813 CLASS 3 SEVERE OBESITY WITH BODY MASS INDEX (BMI) OF 45.0 TO 49.9 IN ADULT: Status: RESOLVED | Noted: 2021-03-08 | Resolved: 2021-11-22

## 2021-11-22 PROBLEM — B35.1 ONYCHOMYCOSIS: Status: RESOLVED | Noted: 2021-03-31 | Resolved: 2021-11-22

## 2021-11-22 PROCEDURE — 99215 OFFICE O/P EST HI 40 MIN: CPT | Performed by: NURSE PRACTITIONER

## 2021-11-22 RX ORDER — LISINOPRIL 40 MG/1
40 TABLET ORAL DAILY
Qty: 90 TABLET | Refills: 3 | Status: SHIPPED | OUTPATIENT
Start: 2021-11-22 | End: 2022-12-02 | Stop reason: SDUPTHER

## 2021-11-22 NOTE — PROGRESS NOTES
[facility]  [address] [facility] [address]  [facility] [address]  [phone]  [address] [id number] [phone]  Dept: [phone]  11/22/2021      Emilie SAUNDERS.  [id number]  [id number]  1970  female      Chief Complaint of weight gain; unable to maintain weight loss    History of Present Illness:   Emilie is a 51 y.o. female who presents today for evaluation, education and consultation regarding weight loss surgery. The patient is interested in the sleeve gastrectomy vs RNY     Diet History:Emilie has been overweight for at least 40 years, has been 35 pounds or more overweight for at least 40 years, has been 100 pounds or more overweight for 30 or more years and started dieting at age 11.  The most weight Emilie lost was 60 pounds on a low carb diet and maintained the weight loss for 3-4 months. Emilie describes her eating habits as snacking without portion control, eating a lot of sweets, drinking coffee and soda. Emilie SAUNDERS has tried Atkins, Weight Watchers, Fasting, reduced calorie, Ketogenic or high fat and intermittent fasting among others with success of losing up to 60 pounds, but in each instance regained the weight.     See dietician documentation for complete history.    Bariatric Surgery Evaluation: The patient is being seen for an initial visit for bariatric surgery evaluation and education.     Bariatric Co-morbidities:  sleep apnea, hypertension, dyslipidemia, edema, polycystic ovarian disease, depression, mental health disease and urinary stress incontinence    Patient Active Problem List   Diagnosis   • Anxiety   • Benign essential HTN   • Vitamin D deficiency   • Hyperlipidemia   • Lumbago   • Malignant neoplasm of central portion of right breast in female, estrogen receptor positive (HCC)   • Family history of breast cancer   • Monoallelic mutation of CHEK2 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   • Vitamin  B12 deficiency   • PCOS (polycystic ovarian syndrome)   • History of total abdominal hysterectomy and bilateral salpingo-oophorectomy   • Controlled substance agreement signed   • Ankle edema   • Increased risk of breast cancer   • Type 2 diabetes mellitus without complication, without long-term current use of insulin (HCC)   • Onychomycosis   • Hyperuricemia   • MARIBELL (obstructive sleep apnea)   • Urinary, incontinence, stress female   • Anxious depression   • Prediabetes   • Gout   • Body mass index (BMI) of 50-59.9 in adult (HCC)       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   • Depression 01/01/1988   • Headache    • History of kidney stones    • Hyperlipidemia    • Hypertension    • Irritable bowel syndrome    • Lumbago    • Mood disorder (HCC)    • Obesity life   • PONV (postoperative nausea and vomiting)    • 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   • Sleep apnea     C-PAP IN USE   • Vitamin D deficiency        Past Surgical History:   Procedure Laterality Date   • APPENDECTOMY      removed when removing dermoid on right ovary   • BREAST BIOPSY Right 2019   • BREAST LUMPECTOMY Right    • BREAST LUMPECTOMY WITH SENTINEL NODE BIOPSY Right 5/28/2019    Procedure: Right central partial mastectomy (with removal of the nipple-areola-complex) and sentinel lymph node biopsy;  Surgeon: Trena Greene MD;  Location: [facility] MAIN OR;  Service: General   • COLONOSCOPY N/A 12/5/2019    Procedure: COLONOSCOPY TO CECUM;  Surgeon: Pedrito Fulton Jr., MD;  Location: [facility] ENDOSCOPY;  Service: General   • DERMOID CYST  EXCISION  1989   • HERNIA REPAIR  2006    umbilical   • HYSTERECTOMY Bilateral 09/04/2020    Dr. Neumann, due to ovarian cysts with previous hx breast cancer   • LAPAROSCOPIC CHOLECYSTECTOMY     • OVARY SURGERY      dermoid removed   • SALIVARY GLAND SURGERY  1993    due to cancer   •  TONSILLECTOMY  01/01/1979    I was 9 years old       Allergies   Allergen Reactions   • Citalopram Rash   • Flexeril [Cyclobenzaprine] Unknown - High Severity     MUSCLES TENSE-OPPOSITE OF WHAT IT IS INTENDED TO DO-ELEVATES B/P   • Doxycycline Rash     Blisters on hands   • Keflex [Cephalexin] Rash   • Sulfa Antibiotics Rash     NAUSEA/VOMITING/FEVER   • Vilazodone Hcl Hives, Itching and Rash         Current Outpatient Medications:   •  allopurinol (Zyloprim) 300 MG tablet, Take 1 tablet by mouth Daily., Disp: 90 tablet, Rfl: 1  •  amLODIPine (NORVASC) 5 MG tablet, TAKE 1 TABLET BY MOUTH DAILY, Disp: 90 tablet, Rfl: 2  •  anastrozole (Arimidex) 1 MG tablet, Take 1 tablet by mouth Daily., Disp: 90 tablet, Rfl: 1  •  Cholecalciferol 1.25 MG (19741 UT) tablet, Take 50,000 Units by mouth 1 (One) Time Per Week., Disp: 4 tablet, Rfl: 4  •  furosemide (LASIX) 20 MG tablet, Take 1 tablet by mouth Daily., Disp: 90 tablet, Rfl: 1  •  lisinopril (PRINIVIL,ZESTRIL) 40 MG tablet, Take 1 tablet by mouth Daily., Disp: 90 tablet, Rfl: 3  •  LORazepam (ATIVAN) 0.5 MG tablet, Take 1 tablet by mouth Every 8 (Eight) Hours As Needed for Anxiety. for anxiety, Disp: 45 tablet, Rfl: 0  •  metFORMIN ER (GLUCOPHAGE-XR) 500 MG 24 hr tablet, Take 1 tablet by mouth Daily With Breakfast., Disp: 90 tablet, Rfl: 1  •  potassium chloride (KLOR-CON) 8 MEQ CR tablet, TAKE 1 TABLET BY MOUTH DAILY, Disp: 90 tablet, Rfl: 0  •  vitamin B-12 (CYANOCOBALAMIN) 1000 MCG tablet, Take 1,000 mcg by mouth Daily., Disp: , Rfl:     Social History     Socioeconomic History   • Marital status:      Spouse name: Trena Montenegro   • Number of children: 0   Tobacco Use   • Smoking status: Never Smoker   • Smokeless tobacco: Never Used   • Tobacco comment: Parents smoked.  I have never smoked   Vaping Use   • Vaping Use: Never used   Substance and Sexual Activity   • Alcohol use: Not Currently     Comment: OCCASIONALLY   • Drug use: Never   • Sexual activity:  Yes     Partners: Female     Birth control/protection: None       Family History   Problem Relation Age of Onset   • Hypertension Mother    • Arthritis Mother    • Kidney cancer Mother    • Depression Mother         Not Dr diagnosed- wont go   • Heart disease Father    • Hernia Father    • Hypertension Father    • No Known Problems Brother    • Breast cancer Paternal Aunt    • Breast cancer Paternal Cousin    • Drug abuse Paternal Uncle         life long   • Drug abuse Paternal Uncle         life long   • Breast cancer Paternal Grandmother    • Osteoporosis Maternal Grandmother    • Lung cancer Paternal Grandfather    • Malig Hyperthermia Neg Hx          Review of Systems:  Review of Systems   Constitutional: Positive for fatigue.   HENT: Negative.    Respiratory: Negative.    Cardiovascular: Positive for leg swelling. Chest pain: ankle edema if not taking her water pill.   Gastrointestinal: Negative.    Endocrine: Negative.    Genitourinary: Negative.         Urinary stress incontinence    Musculoskeletal: Positive for back pain.   Skin: Negative.    Neurological: Negative.    Psychiatric/Behavioral: Positive for sleep disturbance.       Physical Exam:  Vital Signs:  Weight: 135 kg (298 lb)   Body mass index is 50.88 kg/m².  Temp: 97.6 °F (36.4 °C)   Heart Rate: 104   BP: 141/82     Physical Exam  Vitals and nursing note reviewed.   Constitutional:       Appearance: She is well-developed.   HENT:      Head: Normocephalic and atraumatic.   Eyes:      Pupils: Pupils are equal, round, and reactive to light.   Cardiovascular:      Rate and Rhythm: Normal rate and regular rhythm.   Pulmonary:      Effort: Pulmonary effort is normal. No respiratory distress.      Breath sounds: Normal breath sounds. No wheezing.   Abdominal:      General: Bowel sounds are normal. There is no distension.      Palpations: Abdomen is soft.      Tenderness: There is no abdominal tenderness.   Musculoskeletal:         General: Normal range of  motion.      Cervical back: Normal range of motion.   Skin:     General: Skin is warm and dry.   Neurological:      Mental Status: She is alert and oriented to person, place, and time.   Psychiatric:         Behavior: Behavior normal.            Assessment:         Emilie SAUNDERS is a 51 y.o. year old female with medically complicated severe obesity. Weight: 135 kg (298 lb), Body mass index is 50.88 kg/m². and weight related problems including sleep apnea, hypertension, dyslipidemia, GERD, previous DVT, depression and mental health disease.    I explained in detail, potential surgical options of interest to the patient including the RNY gastric bypass, sleeve gastrectomy, and gastric band while considering the patient's medical history. At this time, the patient expressed interest in the Laparoscopic RNY Bypass and Laparoscopic Sleeve  All of those procedures can be performed laparoscopically but there is a chance to convert to open if any technical challenges or complications do occur.  Bariatric surgery is not cosmetic surgery but rather a tool to help a patient make a life-long commitment lifestyle changes including diet, exercise, behavior changes, and taking supplemental vitamins and minerals.    Due to the patient's BMI, history, and co-morbidities related to potential surgical complications were evaluated. Due to Emilie SAUNDERS's risk factors female will obtain the following prior to being scheduled for surgical intervention:    A letter of medical support as well as a history and physical must be obtained from her primary care provider. The patient was given a copy of a sample form, that will suffice as their letter to take to their primary are provider.    A referral for pre-operative psychological evaluation was ordered for the patient to evaluate candidacy as well as provide mental health support, should it be warranted before or after surgery.    Most recent lab work including CBC, CMP, TSH that  "were WNL. Last Hba1c dated 9/28/21 was 6.4 which was improved from the diabetic range to prediabetic range.  and  EKG, CXR, EGD with biopsy and psychology clearancewere ordered at this time. These will be drawn and patient will be notified with results. Patient will complete new, pre-operative radiology prior to being scheduled for surgery.       A pre-operative diagnostic esophagogastroduodenoscopy with biopsy for evaluation will be ordered and scheduled for this patient. The risks and benefits of the procedure were discussed with the patient in detail and all questions were answered.  Possibility of perforation, bleeding, aspiration, anoxic brain injury, respiratory and/or cardiac arrest and death were discussed.   She received handouts regarding, all questions were answered.     Emilie SAUNDERS verbalized understanding related to COVID-19 pre-procedure testing policies and has consented to a preoperative test 48-72 hours before She's scheduled EGD and bariatric surgical procedure.     The risks, benefits, alternatives, and potential complications of all of the gastric sleeve, \"RNY gastric bypass explained in detail including, but not limited to death, anesthesia and medication adverse effect/DVT, pulmonary embolism, trocar site/incisional hernia, wound infection, abdominal infection, bleeding, failure to lose weight or gain weight and change in body image, metabolic complications with calcium, thiamine, vitamin B12, folate, iron, and anemia.    The patient was advised to start a high protein, low fat and low carbohydrate diet  start routine exercise including but not limited to 150 minutes per week. The patient received a resistance band along with a handout of exercises. The patient was given individualized information by our dietician along with handouts.     The patient was given information regarding the MONICA educational video. MONICA is an internet based educational video which explains the sourgical " procedure and answers basic questions regarding the procedure. The patient was provided with instructions and a password to watch the video.    The patient understands the surgical procedures and the different surgical options that are available.  She understands the lifestyle changes that would be required after surgery and has agreed to participate in a pre-operative and postoperative weight management program.  She also expressed understanding of possible risks, had several questions answered and desires to proceed.    I think she is a good candidate for this surgery, and is interested in a sleeve gastrectomy or RNY gastric bypass    Encounter Diagnoses   Name Primary?   • Body mass index (BMI) of 50-59.9 in adult ([facility]) Yes   • Benign essential HTN    • Type 2 diabetes mellitus without complication, without long-term current use of insulin ([facility])    • PCOS (polycystic ovarian syndrome)    • Pure hypercholesterolemia    • Prediabetes    • Anxious depression    • Ankle edema    • MARIBELL (obstructive sleep apnea)        Plan:    Patient will be evaluated by a bariatric dietician psychologist before undergoing a multidisciplinary review of her candidacy. We discussed weight loss requirements prior to surgery and rationale, as well as other program requirements to ensure the safest approach to surgery. We spent time discussing different surgical procedures and plan of care throughout their lifespan to ensure long term success in achieving and maintaining a healthier weight. Patient will proceed with preoperative lab work, radiology, and endoscopy after obtaining agreed upon specialty, clearances, sleep study, and letter of support from her primary care provider.    Total time spent during this encounter today was 65 minutes and includes preparing for the visit, reviewing tests, performing a medically appropriate examination and/or evaluations, counseling and educating the patient/family/caregiver, ordering medications,  tests, or procedures, documenting information in the medical record, care coordination    Carolynn Mesa, APRN  11/22/2021

## 2021-11-23 ENCOUNTER — PATIENT ROUNDING (BHMG ONLY) (OUTPATIENT)
Dept: BARIATRICS/WEIGHT MGMT | Facility: CLINIC | Age: 51
End: 2021-11-23

## 2021-11-23 NOTE — PROGRESS NOTES
November 23, 2021    Hello, may I speak with Emilie SAUNDERS?    My name is Tim       I am  [facility]  [address] [facility] [address] [address] [phone].    Before we get started may I verify your date of birth? 1970    I am calling to officially welcome you to our practice and ask about your recent visit. Is this a good time to talk? yes    Tell me about your visit with us. What things went well?  Everything went well the office staff was nice. The Nurse Practitioner explained everything well.        We're always looking for ways to make our patients' experiences even better. Do you have recommendations on ways we may improve?  no    Overall were you satisfied with your first visit to our practice? yes       I appreciate you taking the time to speak with me today. Is there anything else I can do for you? no      Thank you, and have a great day.

## 2021-11-24 ENCOUNTER — TELEMEDICINE (OUTPATIENT)
Dept: INTERNAL MEDICINE | Facility: CLINIC | Age: 51
End: 2021-11-24

## 2021-11-24 DIAGNOSIS — E66.01 CLASS 3 SEVERE OBESITY WITH SERIOUS COMORBIDITY AND BODY MASS INDEX (BMI) OF 50.0 TO 59.9 IN ADULT, UNSPECIFIED OBESITY TYPE (HCC): Primary | ICD-10-CM

## 2021-11-24 DIAGNOSIS — E28.2 PCOS (POLYCYSTIC OVARIAN SYNDROME): Chronic | ICD-10-CM

## 2021-11-24 DIAGNOSIS — R73.03 PREDIABETES: Chronic | ICD-10-CM

## 2021-11-24 PROCEDURE — 99213 OFFICE O/P EST LOW 20 MIN: CPT | Performed by: NURSE PRACTITIONER

## 2021-11-24 NOTE — PROGRESS NOTES
Subjective   Emilie SAUNDERS is a 51 y.o. female.         History of Present Illness     The following portions of the patient's history were reviewed and updated as appropriate: allergies, current medications, past social history and problem list.    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   • Depression 01/01/1988   • Family history of breast cancer 5/14/2019   • Headache    • History of kidney stones    • Hyperlipidemia    • Hypertension    • Irritable bowel syndrome    • Lumbago    • Mood disorder (HCC)    • Obesity life   • PONV (postoperative nausea and vomiting)    • 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   • Sleep apnea     C-PAP IN USE   • Vitamin D deficiency          Current Outpatient Medications:   •  allopurinol (Zyloprim) 300 MG tablet, Take 1 tablet by mouth Daily., Disp: 90 tablet, Rfl: 1  •  amLODIPine (NORVASC) 5 MG tablet, TAKE 1 TABLET BY MOUTH DAILY, Disp: 90 tablet, Rfl: 2  •  anastrozole (Arimidex) 1 MG tablet, Take 1 tablet by mouth Daily., Disp: 90 tablet, Rfl: 1  •  furosemide (LASIX) 20 MG tablet, Take 1 tablet by mouth Daily., Disp: 90 tablet, Rfl: 1  •  lisinopril (PRINIVIL,ZESTRIL) 40 MG tablet, Take 1 tablet by mouth Daily., Disp: 90 tablet, Rfl: 3  •  LORazepam (ATIVAN) 0.5 MG tablet, Take 1 tablet by mouth Every 8 (Eight) Hours As Needed for Anxiety. for anxiety, Disp: 45 tablet, Rfl: 0  •  metFORMIN ER (GLUCOPHAGE-XR) 500 MG 24 hr tablet, Take 1 tablet by mouth Daily With Breakfast., Disp: 90 tablet, Rfl: 1  •  potassium chloride (KLOR-CON) 8 MEQ CR tablet, TAKE 1 TABLET BY MOUTH DAILY, Disp: 90 tablet, Rfl: 0    Allergies   Allergen Reactions   • Citalopram Rash   • Flexeril [Cyclobenzaprine] Unknown - High Severity     MUSCLES TENSE-OPPOSITE OF WHAT IT IS INTENDED TO DO-ELEVATES B/P   • Doxycycline Rash     Blisters on hands   • Keflex [Cephalexin] Rash    • Sulfa Antibiotics Rash     NAUSEA/VOMITING/FEVER   • Vilazodone Hcl Hives, Itching and Rash       Review of Systems    Objective   There were no vitals filed for this visit.  There is no height or weight on file to calculate BMI.  Physical Exam    Assessment/Plan   There are no diagnoses linked to this encounter.

## 2021-11-26 ENCOUNTER — TELEPHONE (OUTPATIENT)
Dept: BARIATRICS/WEIGHT MGMT | Facility: CLINIC | Age: 51
End: 2021-11-26

## 2021-11-26 PROBLEM — E66.813 CLASS 3 SEVERE OBESITY WITH SERIOUS COMORBIDITY AND BODY MASS INDEX (BMI) OF 50.0 TO 59.9 IN ADULT: Chronic | Status: ACTIVE | Noted: 2021-03-08

## 2021-11-26 PROBLEM — R73.03 PREDIABETES: Chronic | Status: ACTIVE | Noted: 2021-11-19

## 2021-11-26 NOTE — PROGRESS NOTES
Chief Complaint  Obesity    Subjective          Emilie SAUNDERS presents to [facility] for f/u regarding obesity and weight gain.    She has tried to lose weight multiple throughout her lifetime without success; has tried many programs including intermittent fasting, low-carb, low-fat and Weight Watchers. She has lost several pounds through these attempts but has been unable to sustain the weight loss.  She has tried to increase her activity level as well including walking, jogging and participating in a boot camp.  She did see a dietitian in 2019 when diagnosed with breast cancer.  She is interested in pursuing bariatric surgery to help with weight loss (BMI 50.88).      Objective   Vital Signs:   There were no vitals taken for this visit.    Physical Exam  Constitutional:       Appearance: She is well-developed.   HENT:      Head: Normocephalic and atraumatic.   Eyes:      General:         Right eye: No discharge.         Left eye: No discharge.      Conjunctiva/sclera: Conjunctivae normal.   Pulmonary:      Effort: Pulmonary effort is normal.   Neurological:      Mental Status: She is alert and oriented to person, place, and time.   Psychiatric:         Behavior: Behavior normal.         Thought Content: Thought content normal.         Judgment: Judgment normal.        Result Review :   The following data was reviewed by: VICENTE Ruth on 11/24/2021:  Common labs    Common Labsle 7/27/21 7/27/21 9/28/21 9/28/21 9/28/21 9/28/21 10/5/21    0825 0825 1114 1114 1114 1114    Glucose  128 (A)   132 (A)     BUN  12   10     Creatinine  0.77   0.68  0.70   eGFR Non  Am  79   91     eGFR African Am     111     Sodium  140   138     Potassium  4.1   4.8     Chloride  102   99     Calcium  10.5 (A)   10.2     Total Protein     6.7     Albumin  4.30   4.50     Total Bilirubin  0.5   0.4     Alkaline Phosphatase  107   97     AST (SGOT)  17   19     ALT (SGPT)  23   30     WBC  8.33         Hemoglobin 12.5         Hematocrit 38.6         Platelets 293         Hemoglobin A1C    6.40 (A)      Microalbumin, Urine      186.7    Uric Acid   8.0 (A)       (A) Abnormal value       Comments are available for some flowsheets but are not being displayed.                     Assessment and Plan    Diagnoses and all orders for this visit:    1. Class 3 severe obesity with serious comorbidity and body mass index (BMI) of 50.0 to 59.9 in adult, unspecified obesity type (HCC) (Primary)  Assessment & Plan:  She has made many attempts at weight loss including intermittent fasting, low-carb and weight watchers as well as exercising regularly. Her overall health would improve greatly with weight loss and I encourage her to pursue bariatric surgery.      2. Prediabetes  Assessment & Plan:  We discussed that weight loss would practically eliminate elevated glucose readings, currently managed on Metformin      3. PCOS (polycystic ovarian syndrome)  Assessment & Plan:  She has been managed on Metformin in the past, management would improve with weight loss.    History and medical judgement obtained from video conversation with patient. No hands-on physical examination was performed. Approximately 15 minutes spent in video conversation with patient and in chart review.      Follow Up   No follow-ups on file.  Patient was given instructions and counseling regarding her condition or for health maintenance advice. Please see specific information pulled into the AVS if appropriate.

## 2021-11-26 NOTE — ASSESSMENT & PLAN NOTE
We discussed that weight loss would practically eliminate elevated glucose readings, currently managed on Metformin

## 2021-11-26 NOTE — ASSESSMENT & PLAN NOTE
She has made many attempts at weight loss including intermittent fasting, low-carb and weight watchers as well as exercising regularly. Her overall health would improve greatly with weight loss and I encourage her to pursue bariatric surgery.

## 2021-12-02 ENCOUNTER — APPOINTMENT (OUTPATIENT)
Dept: BARIATRICS/WEIGHT MGMT | Facility: CLINIC | Age: 51
End: 2021-12-02

## 2021-12-03 NOTE — TELEPHONE ENCOUNTER
JAQUI is scheduled @ [facility] on 2/10/2019 @ 10:30am.    F/u appointment with Dr. Greene is scheduled on 2/19/2019 @ 10:00am.    Called and spoke to patient, patient expressed verbal understanding of appointment times.    Sent patient a reminder letter in the mail.  
yes

## 2021-12-07 ENCOUNTER — TELEPHONE (OUTPATIENT)
Dept: SURGERY | Facility: CLINIC | Age: 51
End: 2021-12-07

## 2021-12-07 PROBLEM — C50.911 BREAST CANCER, STAGE 1, ESTROGEN RECEPTOR POSITIVE, RIGHT: Status: ACTIVE | Noted: 2021-12-07

## 2021-12-07 PROBLEM — Z17.0 BREAST CANCER, STAGE 1, ESTROGEN RECEPTOR POSITIVE, RIGHT: Status: ACTIVE | Noted: 2021-12-07

## 2021-12-07 NOTE — TELEPHONE ENCOUNTER
Pt called to cancel appt at 1 pm today 12/7 with Shaye due to potentially covid related symptoms.    MEB

## 2021-12-07 NOTE — TELEPHONE ENCOUNTER
NEREIDA for call back to r/s appt.     ----- Message from Sarah Anton sent at 12/7/2021  9:52 AM EST -----  Patient needs rescheduled and added to the cancellation/wait list as well. Patient scheduled to see Shaye today 12/7 at 1 pm. Patient woke up with fever, chills, a headache and such today and while she hopes and assumes it is just the weather she would rather not risk coming in today. Documenting call and cancelling appt.

## 2021-12-10 ENCOUNTER — TELEPHONE (OUTPATIENT)
Dept: SURGERY | Facility: CLINIC | Age: 51
End: 2021-12-10

## 2021-12-10 NOTE — TELEPHONE ENCOUNTER
Patient has been r/s and added to the cancellation list.     ----- Message from Sarah Anton sent at 12/7/2021  9:52 AM EST -----  Patient needs rescheduled and added to the cancellation/wait list as well. Patient scheduled to see Shaye today 12/7 at 1 pm. Patient woke up with fever, chills, a headache and such today and while she hopes and assumes it is just the weather she would rather not risk coming in today. Documenting call and cancelling appt.

## 2021-12-20 ENCOUNTER — TRANSCRIBE ORDERS (OUTPATIENT)
Dept: BARIATRICS/WEIGHT MGMT | Facility: CLINIC | Age: 51
End: 2021-12-20

## 2021-12-20 DIAGNOSIS — Z01.818 OTHER SPECIFIED PRE-OPERATIVE EXAMINATION: Primary | ICD-10-CM

## 2021-12-27 ENCOUNTER — OFFICE VISIT (OUTPATIENT)
Dept: PSYCHIATRY | Facility: HOSPITAL | Age: 51
End: 2021-12-27

## 2021-12-27 ENCOUNTER — LAB (OUTPATIENT)
Dept: LAB | Facility: HOSPITAL | Age: 51
End: 2021-12-27

## 2021-12-27 DIAGNOSIS — F41.1 GENERALIZED ANXIETY DISORDER: Primary | ICD-10-CM

## 2021-12-27 DIAGNOSIS — F33.0 MILD EPISODE OF RECURRENT MAJOR DEPRESSIVE DISORDER: ICD-10-CM

## 2021-12-27 DIAGNOSIS — E66.01 CLASS 3 SEVERE OBESITY WITHOUT SERIOUS COMORBIDITY WITH BODY MASS INDEX (BMI) OF 50.0 TO 59.9 IN ADULT, UNSPECIFIED OBESITY TYPE: ICD-10-CM

## 2021-12-27 DIAGNOSIS — Z01.818 OTHER SPECIFIED PRE-OPERATIVE EXAMINATION: ICD-10-CM

## 2021-12-27 LAB — SARS-COV-2 ORF1AB RESP QL NAA+PROBE: NOT DETECTED

## 2021-12-27 PROCEDURE — U0004 COV-19 TEST NON-CDC HGH THRU: HCPCS

## 2021-12-27 PROCEDURE — 99215 OFFICE O/P EST HI 40 MIN: CPT | Performed by: NURSE PRACTITIONER

## 2021-12-27 PROCEDURE — C9803 HOPD COVID-19 SPEC COLLECT: HCPCS

## 2021-12-28 ENCOUNTER — TELEMEDICINE (OUTPATIENT)
Dept: INTERNAL MEDICINE | Facility: CLINIC | Age: 51
End: 2021-12-28

## 2021-12-28 ENCOUNTER — ANESTHESIA EVENT (OUTPATIENT)
Dept: GASTROENTEROLOGY | Facility: HOSPITAL | Age: 51
End: 2021-12-28

## 2021-12-28 ENCOUNTER — ANESTHESIA (OUTPATIENT)
Dept: GASTROENTEROLOGY | Facility: HOSPITAL | Age: 51
End: 2021-12-28

## 2021-12-28 ENCOUNTER — HOSPITAL ENCOUNTER (OUTPATIENT)
Facility: HOSPITAL | Age: 51
Setting detail: HOSPITAL OUTPATIENT SURGERY
Discharge: HOME | End: 2021-12-28
Attending: SURGERY | Admitting: SURGERY

## 2021-12-28 VITALS
SYSTOLIC BLOOD PRESSURE: 166 MMHG | DIASTOLIC BLOOD PRESSURE: 93 MMHG | RESPIRATION RATE: 16 BRPM | OXYGEN SATURATION: 96 % | HEART RATE: 70 BPM | HEIGHT: 66 IN | BODY MASS INDEX: 47.09 KG/M2 | WEIGHT: 293 LBS

## 2021-12-28 DIAGNOSIS — R10.13 DYSPEPSIA: ICD-10-CM

## 2021-12-28 DIAGNOSIS — I10 BENIGN ESSENTIAL HTN: Primary | Chronic | ICD-10-CM

## 2021-12-28 DIAGNOSIS — E66.01 CLASS 3 SEVERE OBESITY WITH SERIOUS COMORBIDITY AND BODY MASS INDEX (BMI) OF 50.0 TO 59.9 IN ADULT, UNSPECIFIED OBESITY TYPE (HCC): Chronic | ICD-10-CM

## 2021-12-28 DIAGNOSIS — E78.00 PURE HYPERCHOLESTEROLEMIA: Chronic | ICD-10-CM

## 2021-12-28 DIAGNOSIS — R73.03 PREDIABETES: Chronic | ICD-10-CM

## 2021-12-28 PROBLEM — K31.7 GASTRIC POLYPS: Status: ACTIVE | Noted: 2021-12-28

## 2021-12-28 LAB — GLUCOSE BLDC GLUCOMTR-MCNC: 140 MG/DL (ref 70–130)

## 2021-12-28 PROCEDURE — 87081 CULTURE SCREEN ONLY: CPT | Performed by: SURGERY

## 2021-12-28 PROCEDURE — 43239 EGD BIOPSY SINGLE/MULTIPLE: CPT | Performed by: SURGERY

## 2021-12-28 PROCEDURE — 82962 GLUCOSE BLOOD TEST: CPT

## 2021-12-28 PROCEDURE — 88305 TISSUE EXAM BY PATHOLOGIST: CPT | Performed by: SURGERY

## 2021-12-28 PROCEDURE — 99214 OFFICE O/P EST MOD 30 MIN: CPT | Performed by: NURSE PRACTITIONER

## 2021-12-28 PROCEDURE — 25010000002 PROPOFOL 10 MG/ML EMULSION: Performed by: ANESTHESIOLOGY

## 2021-12-28 RX ORDER — SODIUM CHLORIDE, SODIUM LACTATE, POTASSIUM CHLORIDE, CALCIUM CHLORIDE 600; 310; 30; 20 MG/100ML; MG/100ML; MG/100ML; MG/100ML
30 INJECTION, SOLUTION INTRAVENOUS CONTINUOUS PRN
Status: DISCONTINUED | OUTPATIENT
Start: 2021-12-28 | End: 2021-12-28 | Stop reason: HOSPADM

## 2021-12-28 RX ORDER — LIDOCAINE HYDROCHLORIDE 20 MG/ML
INJECTION, SOLUTION INFILTRATION; PERINEURAL AS NEEDED
Status: DISCONTINUED | OUTPATIENT
Start: 2021-12-28 | End: 2021-12-28 | Stop reason: SURG

## 2021-12-28 RX ORDER — PROPOFOL 10 MG/ML
VIAL (ML) INTRAVENOUS AS NEEDED
Status: DISCONTINUED | OUTPATIENT
Start: 2021-12-28 | End: 2021-12-28 | Stop reason: SURG

## 2021-12-28 RX ORDER — PROPOFOL 10 MG/ML
VIAL (ML) INTRAVENOUS CONTINUOUS PRN
Status: DISCONTINUED | OUTPATIENT
Start: 2021-12-28 | End: 2021-12-28 | Stop reason: SURG

## 2021-12-28 RX ADMIN — SODIUM CHLORIDE, POTASSIUM CHLORIDE, SODIUM LACTATE AND CALCIUM CHLORIDE 30 ML/HR: 600; 310; 30; 20 INJECTION, SOLUTION INTRAVENOUS at 08:03

## 2021-12-28 RX ADMIN — Medication 200 MCG/KG/MIN: at 09:04

## 2021-12-28 RX ADMIN — METOPROLOL TARTRATE 1 MG: 5 INJECTION, SOLUTION INTRAVENOUS at 09:07

## 2021-12-28 RX ADMIN — PROPOFOL 200 MG: 10 INJECTION, EMULSION INTRAVENOUS at 09:04

## 2021-12-28 RX ADMIN — LIDOCAINE HYDROCHLORIDE 60 MG: 20 INJECTION, SOLUTION INFILTRATION; PERINEURAL at 09:04

## 2021-12-28 NOTE — OP NOTE
Surgeon: Delfino Rivera Jr., M.D.    Preoperative Diagnosis: Dyspepsia    Postoperative Diagnosis: #1 gastritis #2 gastric body polyps    Procedure Performed: Transoral esophagogastroduodenoscopy with forcep polypectomies and gastric antral biopsies    Indications: 51-year-old female who presents for preop evaluation.  Patient does not take H2 blocker PPI on regular basis    Procedure:     The procedure, indications, preparation and potential complications were explained to the patient, who indicated understanding and signed the corresponding consent forms.  The patient was identified, taken to the endoscopy suite, and placed on the left side down decubitus position.  The patient underwent a MAC anesthesia and was appropriately monitored through the case by the anesthesia personnel with continuous pulse oximetry, blood pressure, and cardiac monitoring.  A bite block was placed.  After adequate IV sedation and using a transoral technique a lubed flexible endoscope was placed in the hypopharynx and advanced to the second portion of the duodenum without difficulty. The scope was then withdrawn back into the antrum of the stomach.  Cold forcep biopsies of the antrum were taken to rule out Helicobacter pylori.  The scope was retroflexed noting the body, fundus and cardia.  The scope was then withdrawn back into the esophagus after decompressing the stomach.  The Z line was noted and GE junction measured from the incisors.  The scope was then completely withdrawn.  The patient tolerated the procedure well and left the endoscopy suite in stable condition.  The findings are listed below.    Duodenum: Unremarkable  Antrum: Patchy erythema  Body/Fundus: Several small polyps measuring up to 3 to 4 mm in diameter.  The larger ones were removed with cold biopsy forceps throughout pathology rule out dysplasia.  No active bleeding noted  Cardia: Unremarkable  Esophagus: Z-line regular, no erosive esophagitis    Recommendations:      Await biopsy results and follow-up in the office

## 2021-12-28 NOTE — ANESTHESIA POSTPROCEDURE EVALUATION
Patient: Emilie SAUNDERS    Procedure Summary     Date: 12/28/21 Room / Location:  [facility]    Anesthesia Start: 0858 Anesthesia Stop: 0914    Procedure: ESOPHAGOGASTRODUODENOSCOPY WITH BIOPSY (N/A Esophagus) Diagnosis:       Dyspepsia      (Dyspepsia [R10.13])    Surgeons: Delfino Rivera Jr., MD Provider: Nicolas Selby MD    Anesthesia Type: MAC ASA Status: 3          Anesthesia Type: MAC    Vitals  No vitals data found for the desired time range.          Post Anesthesia Care and Evaluation    Patient location during evaluation: PHASE II  Patient participation: complete - patient participated  Level of consciousness: awake and alert  Pain management: adequate  Airway patency: patent  Anesthetic complications: No anesthetic complications  PONV Status: none  Cardiovascular status: acceptable and hemodynamically stable  Respiratory status: acceptable, nonlabored ventilation and spontaneous ventilation  Hydration status: acceptable

## 2021-12-28 NOTE — H&P
Patient Care Team:  Pinky Hyatt APRN as PCP - General (Internal Medicine)  Chanel Carballo MD as Consulting Physician (Radiation Oncology)  Tia Trevizo MD as Consulting Physician (Hematology and Oncology)  Trena Greene MD as Referring Physician (Breast Surgery)    Chief complaint Heartburn and in need of preoperative clearance prior to surgery    Subjective     Patient is a 51 y.o. female who is a patient of ours and has undergone our extensive initial evaluation for bariatric surgery and needs to proceed with upper endoscopy for preoperative clearance prior to proceeding with surgery.  Please see the initial history and physical for further detailed information.      Review of Systems   Pertinent items are noted in HPI and no changes since last visit.    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   • Depression 01/01/1988   • Family history of breast cancer 5/14/2019   • Headache    • History of kidney stones    • Hyperlipidemia    • Hypertension    • Irritable bowel syndrome    • Lumbago    • Mood disorder (HCC)    • Obesity life   • PONV (postoperative nausea and vomiting)    • 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   • Sleep apnea     C-PAP IN USE   • Vitamin D deficiency      Past Surgical History:   Procedure Laterality Date   • APPENDECTOMY  1989    removed when removing dermoid on right ovary   • BREAST BIOPSY Right 2019   • BREAST LUMPECTOMY Right    • BREAST LUMPECTOMY WITH SENTINEL NODE BIOPSY Right 5/28/2019    Procedure: Right central partial mastectomy (with removal of the nipple-areola-complex) and sentinel lymph node biopsy;  Surgeon: Trena Greene MD;  Location: [facility] MAIN OR;  Service: General   • COLONOSCOPY N/A 12/5/2019    Procedure: COLONOSCOPY TO CECUM;  Surgeon: Pedrito Fulton Jr., MD;  Location: [facility] ENDOSCOPY;  Service: General   •  DERMOID CYST  EXCISION  1989   • HERNIA REPAIR  2006    umbilical   • HYSTERECTOMY Bilateral 09/04/2020    Dr. Neumann, due to ovarian cysts with previous hx breast cancer   • LAPAROSCOPIC CHOLECYSTECTOMY     • SALIVARY GLAND SURGERY  1993    due to cancer   • TONSILLECTOMY  01/01/1979    I was 9 years old     Family History   Problem Relation Age of Onset   • Hypertension Mother    • Arthritis Mother    • Kidney cancer Mother    • Depression Mother         Not Dr elton onofre   • Heart disease Father    • Hernia Father    • Hypertension Father    • No Known Problems Brother    • Breast cancer Paternal Aunt    • Breast cancer Paternal Cousin    • Drug abuse Paternal Uncle         life long   • Drug abuse Paternal Uncle         life long   • Breast cancer Paternal Grandmother    • Osteoporosis Maternal Grandmother    • Lung cancer Paternal Grandfather    • Malig Hyperthermia Neg Hx      Social History     Tobacco Use   • Smoking status: Never Smoker   • Smokeless tobacco: Never Used   • Tobacco comment: Parents smoked.  I have never smoked   Vaping Use   • Vaping Use: Never used   Substance Use Topics   • Alcohol use: Not Currently     Comment: OCCASIONALLY   • Drug use: Never     Medications Prior to Admission   Medication Sig Dispense Refill Last Dose   • allopurinol (Zyloprim) 300 MG tablet Take 1 tablet by mouth Daily. 90 tablet 1    • amLODIPine (NORVASC) 5 MG tablet TAKE 1 TABLET BY MOUTH DAILY 90 tablet 2    • anastrozole (Arimidex) 1 MG tablet Take 1 tablet by mouth Daily. 90 tablet 1    • furosemide (LASIX) 20 MG tablet Take 1 tablet by mouth Daily. 90 tablet 1    • lisinopril (PRINIVIL,ZESTRIL) 40 MG tablet Take 1 tablet by mouth Daily. 90 tablet 3    • LORazepam (ATIVAN) 0.5 MG tablet Take 1 tablet by mouth Every 8 (Eight) Hours As Needed for Anxiety. for anxiety 45 tablet 0    • metFORMIN ER (GLUCOPHAGE-XR) 500 MG 24 hr tablet Take 1 tablet by mouth Daily With Breakfast. 90 tablet 1    • potassium  chloride (KLOR-CON) 8 MEQ CR tablet TAKE 1 TABLET BY MOUTH DAILY 90 tablet 0      Allergies:  Citalopram, Flexeril [cyclobenzaprine], Doxycycline, Keflex [cephalexin], Sulfa antibiotics, and Vilazodone hcl    Vital Signs  See PreOp record            Physical Exam:   Heart: RR  Lungs: CTA B  Abd: soft and NT/ND  Ext: no clubbing, cyanosis    Results Review:    I have reviewed the patient's clinical results      Dyspepsia      The risks and benefits of the procedure were discussed with the patient in detail and all questions were answered.  Possibility of perforation, bleeding, aspiration, anoxic brain injury, respiratory and/or cardiac arrest and death were discussed.  Consent will be signed and witnessed..     Delfino Rivera MD  12/28/21  07:44 EST  Time: Approximately 15 minutes was spent with the patient.  All of the patients questions were answered.

## 2021-12-28 NOTE — ANESTHESIA PREPROCEDURE EVALUATION
Anesthesia Evaluation     Patient summary reviewed and Nursing notes reviewed   history of anesthetic complications: PONV               Airway   Mallampati: III  TM distance: <3 FB  Neck ROM: full  Possible difficult intubation and Large neck circumference  Dental - normal exam     Pulmonary - normal exam   (+) sleep apnea on CPAP,   Cardiovascular - normal exam    ECG reviewed    (+) hypertension 2 medications or greater, hyperlipidemia,       Neuro/Psych  (+) headaches, psychiatric history Anxiety and Depression,     GI/Hepatic/Renal/Endo    (+) obesity, morbid obesity,  renal disease stones,     Musculoskeletal     Abdominal   (+) obese,    Substance History      OB/GYN      Comment: PCOS      Other      history of cancer      Other Comment: Hx breast CA/parotid tumor                Anesthesia Plan    ASA 3     MAC     intravenous induction     Anesthetic plan, all risks, benefits, and alternatives have been provided, discussed and informed consent has been obtained with: patient.

## 2021-12-29 DIAGNOSIS — I10 BENIGN ESSENTIAL HTN: ICD-10-CM

## 2021-12-29 LAB
LAB AP CASE REPORT: NORMAL
PATH REPORT.FINAL DX SPEC: NORMAL
PATH REPORT.GROSS SPEC: NORMAL
UREASE TISS QL: NEGATIVE

## 2021-12-29 RX ORDER — LISINOPRIL AND HYDROCHLOROTHIAZIDE 25; 20 MG/1; MG/1
1 TABLET ORAL DAILY
Qty: 90 TABLET | Refills: 1 | Status: SHIPPED | OUTPATIENT
Start: 2021-12-29 | End: 2022-06-21

## 2021-12-31 NOTE — ASSESSMENT & PLAN NOTE
A1c was 6.4 with September 28 labs.  She would benefit from weight loss with an improved glucose control.  She is undergoing evaluation for possible gastric sleeve to aid in weight loss.

## 2021-12-31 NOTE — PROGRESS NOTES
Subjective   Emilie SAUNDERS is a 51 y.o. female.         History of Present Illness     The following portions of the patient's history were reviewed and updated as appropriate: allergies, current medications, past social history and problem list.    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   • Sleep apnea     C-PAP IN USE   • Vitamin D deficiency          Current Outpatient Medications:   •  allopurinol (Zyloprim) 300 MG tablet, Take 1 tablet by mouth Daily., Disp: 90 tablet, Rfl: 1  •  amLODIPine (NORVASC) 5 MG tablet, TAKE 1 TABLET BY MOUTH DAILY, Disp: 90 tablet, Rfl: 2  •  anastrozole (Arimidex) 1 MG tablet, Take 1 tablet by mouth Daily., Disp: 90 tablet, Rfl: 1  •  furosemide (LASIX) 20 MG tablet, Take 1 tablet by mouth Daily., Disp: 90 tablet, Rfl: 1  •  lisinopril (PRINIVIL,ZESTRIL) 40 MG tablet, Take 1 tablet by mouth Daily., Disp: 90 tablet, Rfl: 3  •  lisinopril-hydrochlorothiazide (PRINZIDE,ZESTORETIC) 20-25 MG per tablet, TAKE 1 TABLET BY MOUTH DAILY, Disp: 90 tablet, Rfl: 1  •  LORazepam (ATIVAN) 0.5 MG tablet, Take 1 tablet by mouth Every 8 (Eight) Hours As Needed for Anxiety. for anxiety, Disp: 45 tablet, Rfl: 0  •  metFORMIN ER (GLUCOPHAGE-XR) 500 MG 24 hr tablet, Take 1 tablet by mouth Daily With Breakfast., Disp: 90 tablet, Rfl: 1  •  potassium chloride (KLOR-CON) 8 MEQ CR tablet, TAKE 1 TABLET BY MOUTH DAILY, Disp: 90 tablet, Rfl: 0    Allergies   Allergen Reactions   • Citalopram Rash   • Flexeril  [Cyclobenzaprine] Unknown - High Severity     MUSCLES TENSE-OPPOSITE OF WHAT IT IS INTENDED TO DO-ELEVATES B/P   • Doxycycline Rash     Blisters on hands   • Keflex [Cephalexin] Rash   • Sulfa Antibiotics Rash     NAUSEA/VOMITING/FEVER   • Vilazodone Hcl Hives, Itching and Rash       Review of Systems    Objective   There were no vitals filed for this visit.  There is no height or weight on file to calculate BMI.  Physical Exam    Assessment/Plan   There are no diagnoses linked to this encounter.

## 2021-12-31 NOTE — ASSESSMENT & PLAN NOTE
She has tried multiple lifestyle changes with the goal of weight loss with difficulty.  She is now undergoing evaluation for gastric sleeve.

## 2021-12-31 NOTE — ASSESSMENT & PLAN NOTE
She is no longer taking lisinopril-HCTZ due to elevated calcium.  Her blood pressure was elevated today prior to her EGD but she had not taken medication.  She will monitor her blood pressure for the next couple weeks and Tizanidine with those readings.

## 2021-12-31 NOTE — ASSESSMENT & PLAN NOTE
LDL increased to 128 with September 28 labs.  She will continue a low-fat, low-cholesterol diet and is working towards weight loss which should be helpful.

## 2021-12-31 NOTE — PROGRESS NOTES
Chief Complaint  Hypertension, Hyperlipidemia, and Prediabetes    Subjective          Emilie SAUNDERS presents to [facility] for f/u regarding HTN, hyperlipidemia and prediabetes.    She had an EGD this morning for evaluation for possible gastric sleeve which she tolerated well, c/o lethargy this afternoon but is otherwise feeling well.  Her BP was elevated but she had not taken medication (recently stopped HCTZ due to elevated cholesterol), unsure what home readings have been. She reports feeling well without chest pain and/or shortness of breath.        Objective   Vital Signs:   There were no vitals taken for this visit.    Physical Exam  Constitutional:       Appearance: She is well-developed.   HENT:      Head: Normocephalic and atraumatic.   Eyes:      General:         Right eye: No discharge.         Left eye: No discharge.      Conjunctiva/sclera: Conjunctivae normal.   Pulmonary:      Effort: Pulmonary effort is normal.   Neurological:      Mental Status: She is alert and oriented to person, place, and time.   Psychiatric:         Behavior: Behavior normal.         Thought Content: Thought content normal.         Judgment: Judgment normal.        Result Review :   The following data was reviewed by: VICENTE Ruth on 12/28/2021:  Common labs    Common Labsle 7/27/21 7/27/21 9/28/21 9/28/21 9/28/21 9/28/21 10/5/21    0825 0825 1114 1114 1114 1114    Glucose  128 (A)   132 (A)     BUN  12   10     Creatinine  0.77   0.68  0.70   eGFR Non  Am  79   91     eGFR African Am     111     Sodium  140   138     Potassium  4.1   4.8     Chloride  102   99     Calcium  10.5 (A)   10.2     Total Protein     6.7     Albumin  4.30   4.50     Total Bilirubin  0.5   0.4     Alkaline Phosphatase  107   97     AST (SGOT)  17   19     ALT (SGPT)  23   30     WBC 8.33         Hemoglobin 12.5         Hematocrit 38.6         Platelets 293         Hemoglobin A1C    6.40 (A)       Microalbumin, Urine      186.7    Uric Acid   8.0 (A)       (A) Abnormal value       Comments are available for some flowsheets but are not being displayed.                     Assessment and Plan    Diagnoses and all orders for this visit:    1. Benign essential HTN (Primary)  Assessment & Plan:  She is no longer taking lisinopril-HCTZ due to elevated calcium.  Her blood pressure was elevated today prior to her EGD but she had not taken medication.  She will monitor her blood pressure for the next couple weeks and Tizanidine with those readings.      2. Pure hypercholesterolemia  Assessment & Plan:  LDL increased to 128 with September 28 labs.  She will continue a low-fat, low-cholesterol diet and is working towards weight loss which should be helpful.      3. Prediabetes  Assessment & Plan:  A1c was 6.4 with September 28 labs.  She would benefit from weight loss with an improved glucose control.  She is undergoing evaluation for possible gastric sleeve to aid in weight loss.      4. Class 3 severe obesity with serious comorbidity and body mass index (BMI) of 50.0 to 59.9 in adult, unspecified obesity type (HCC)  Assessment & Plan:  She has tried multiple lifestyle changes with the goal of weight loss with difficulty.  She is now undergoing evaluation for gastric sleeve.      History and medical judgement obtained from video conversation with patient. No hands-on physical examination was performed. Approximately 16 minutes spent in video conversation with patient and in chart review.    You have chosen to receive care through a telehealth visit.  Do you consent to use a video/audio connection for your medical care today? Yes        Follow Up   No follow-ups on file.  Patient was given instructions and counseling regarding her condition or for health maintenance advice. Please see specific information pulled into the AVS if appropriate.

## 2022-01-04 RX ORDER — ALLOPURINOL 100 MG/1
100 TABLET ORAL DAILY
Qty: 90 TABLET | Refills: 1 | Status: SHIPPED | OUTPATIENT
Start: 2022-01-04 | End: 2022-06-21

## 2022-01-11 ENCOUNTER — TELEPHONE (OUTPATIENT)
Dept: ONCOLOGY | Facility: CLINIC | Age: 52
End: 2022-01-11

## 2022-01-11 ENCOUNTER — APPOINTMENT (OUTPATIENT)
Dept: LAB | Facility: HOSPITAL | Age: 52
End: 2022-01-11

## 2022-01-11 NOTE — TELEPHONE ENCOUNTER
Caller: Emilie SAUNDERS    Relationship to patient: Self    Best call back number: [phone]    Chief complaint: NOT FEELING WELL TODAY NEEDING TO R/ S    Type of visit:02/03//21    Requested date: 02/03/21 AROUND 1PM IF POSSIBLE     If rescheduling, when is the original appointment: 11/11/22    Additional notes:    UNABLE TO RE- SCHEDULE WITHIN TIME FRAME FOR HUB

## 2022-01-20 DIAGNOSIS — Z20.822 SUSPECTED COVID-19 VIRUS INFECTION: Primary | ICD-10-CM

## 2022-01-21 ENCOUNTER — TELEMEDICINE (OUTPATIENT)
Dept: INTERNAL MEDICINE | Facility: CLINIC | Age: 52
End: 2022-01-21

## 2022-01-21 DIAGNOSIS — J06.9 VIRAL URI: Primary | ICD-10-CM

## 2022-01-21 DIAGNOSIS — Z20.822 SUSPECTED COVID-19 VIRUS INFECTION: ICD-10-CM

## 2022-01-21 LAB
LABCORP SARS-COV-2, NAA 2 DAY TAT: NORMAL
SARS-COV-2 RNA RESP QL NAA+PROBE: NOT DETECTED

## 2022-01-21 PROCEDURE — 99213 OFFICE O/P EST LOW 20 MIN: CPT | Performed by: NURSE PRACTITIONER

## 2022-01-21 NOTE — PROGRESS NOTES
Subjective   Emilie SAUNDERS is a 51 y.o. female.         History of Present Illness     The following portions of the patient's history were reviewed and updated as appropriate: allergies, current medications, past social history and problem list.    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   • Sleep apnea     C-PAP IN USE   • Vitamin D deficiency          Current Outpatient Medications:   •  allopurinol (ZYLOPRIM) 100 MG tablet, TAKE 1 TABLET BY MOUTH DAILY, Disp: 90 tablet, Rfl: 1  •  allopurinol (Zyloprim) 300 MG tablet, Take 1 tablet by mouth Daily., Disp: 90 tablet, Rfl: 1  •  amLODIPine (NORVASC) 5 MG tablet, TAKE 1 TABLET BY MOUTH DAILY, Disp: 90 tablet, Rfl: 2  •  anastrozole (Arimidex) 1 MG tablet, Take 1 tablet by mouth Daily., Disp: 90 tablet, Rfl: 1  •  furosemide (LASIX) 20 MG tablet, Take 1 tablet by mouth Daily., Disp: 90 tablet, Rfl: 1  •  lisinopril (PRINIVIL,ZESTRIL) 40 MG tablet, Take 1 tablet by mouth Daily., Disp: 90 tablet, Rfl: 3  •  lisinopril-hydrochlorothiazide (PRINZIDE,ZESTORETIC) 20-25 MG per tablet, TAKE 1 TABLET BY MOUTH DAILY, Disp: 90 tablet, Rfl: 1  •  LORazepam (ATIVAN) 0.5 MG tablet, Take 1 tablet by mouth Every 8 (Eight) Hours As Needed for Anxiety. for anxiety, Disp: 45 tablet, Rfl: 0  •  metFORMIN ER (GLUCOPHAGE-XR) 500 MG 24 hr tablet, Take 1 tablet by mouth Daily With Breakfast., Disp: 90 tablet, Rfl: 1  •  potassium chloride (KLOR-CON) 8 MEQ CR tablet, TAKE 1 TABLET BY MOUTH DAILY,  Disp: 90 tablet, Rfl: 0    Allergies   Allergen Reactions   • Citalopram Rash   • Flexeril [Cyclobenzaprine] Unknown - High Severity     MUSCLES TENSE-OPPOSITE OF WHAT IT IS INTENDED TO DO-ELEVATES B/P   • Doxycycline Rash     Blisters on hands   • Keflex [Cephalexin] Rash   • Sulfa Antibiotics Rash     NAUSEA/VOMITING/FEVER   • Vilazodone Hcl Hives, Itching and Rash       Review of Systems    Objective   There were no vitals filed for this visit.  There is no height or weight on file to calculate BMI.  Physical Exam    Assessment/Plan   There are no diagnoses linked to this encounter.

## 2022-01-24 RX ORDER — GUAIFENESIN 600 MG/1
600 TABLET, EXTENDED RELEASE ORAL EVERY 12 HOURS SCHEDULED
Qty: 14 TABLET
Start: 2022-01-24 | End: 2022-01-31

## 2022-01-24 NOTE — PROGRESS NOTES
"Chief Complaint  URI    Subjective          Emilie SAUNDERS presents to [facility] due to respiratory symptoms.    You have chosen to receive care through a telehealth visit.  Do you consent to use a video/audio connection for your medical care today? Yes    She c/o increased congestion and drainage which began 1/13, throat sore (\"felt like glass\"). Her wife tested positive for COVID-19 so she self quarantined, home COVID-19 test on 1/14 was positive. She states her sx have gradually improved, no longer has elevated temperature. She c/o loose stools but denies abdominal pain. She has since had a COVID-19 test at work which was negative.      Objective   Vital Signs:   There were no vitals taken for this visit.    Physical Exam  Constitutional:       Appearance: She is well-developed.   HENT:      Head: Normocephalic and atraumatic.   Eyes:      General:         Right eye: No discharge.         Left eye: No discharge.      Conjunctiva/sclera: Conjunctivae normal.   Pulmonary:      Effort: Pulmonary effort is normal.   Neurological:      Mental Status: She is alert and oriented to person, place, and time.   Psychiatric:         Behavior: Behavior normal.         Thought Content: Thought content normal.         Judgment: Judgment normal.        Result Review :   The following data was reviewed by: VICENTE Ruht on 01/21/2022:  Common labs    Common Labsle 7/27/21 7/27/21 9/28/21 9/28/21 9/28/21 9/28/21 10/5/21    0825 0825 1114 1114 1114 1114    Glucose  128 (A)   132 (A)     BUN  12   10     Creatinine  0.77   0.68  0.70   eGFR Non  Am  79   91     eGFR African Am     111     Sodium  140   138     Potassium  4.1   4.8     Chloride  102   99     Calcium  10.5 (A)   10.2     Total Protein     6.7     Albumin  4.30   4.50     Total Bilirubin  0.5   0.4     Alkaline Phosphatase  107   97     AST (SGOT)  17   19     ALT (SGPT)  23   30     WBC 8.33         Hemoglobin 12.5    "      Hematocrit 38.6         Platelets 293         Hemoglobin A1C    6.40 (A)      Microalbumin, Urine      186.7    Uric Acid   8.0 (A)       (A) Abnormal value       Comments are available for some flowsheets but are not being displayed.                     Assessment and Plan    Diagnoses and all orders for this visit:    1. Viral URI (Primary)  -     guaiFENesin (Mucinex) 600 MG 12 hr tablet; Take 1 tablet by mouth Every 12 (Twelve) Hours for 7 days.  Dispense: 14 tablet    2. Suspected COVID-19 virus infection    She has had exposure to wife with COVID and had one positive test along with being symptomatic. However, test at work was negative. Repeat COVID-19 test through our office is pending. She will be released to return to work on Monday, will follow results of test.    History and medical judgement obtained from video conversation with patient. No hands-on physical examination was performed. Approximately  15 minutes spent in video conversation with patient and in chart review.      Follow Up   Return if symptoms worsen or fail to improve, for Next scheduled follow up.  Patient was given instructions and counseling regarding her condition or for health maintenance advice. Please see specific information pulled into the AVS if appropriate.

## 2022-01-25 ENCOUNTER — APPOINTMENT (OUTPATIENT)
Dept: LAB | Facility: HOSPITAL | Age: 52
End: 2022-01-25

## 2022-02-01 ENCOUNTER — TELEPHONE (OUTPATIENT)
Dept: SURGERY | Facility: CLINIC | Age: 52
End: 2022-02-01

## 2022-02-01 NOTE — TELEPHONE ENCOUNTER
NEREIDA for pt to call back so we can move up her appt with Shaye Dowling. Appt opened up on Monday 02/07/22 at 9:00.    ALLISON

## 2022-02-08 ENCOUNTER — TELEPHONE (OUTPATIENT)
Dept: SURGERY | Facility: CLINIC | Age: 52
End: 2022-02-08

## 2022-02-08 NOTE — TELEPHONE ENCOUNTER
Left pt a message to move up her appt with Shaye Dowling, APRN-pt is on cancellation list. 2 attempt to move.    CMA

## 2022-02-14 ENCOUNTER — TELEPHONE (OUTPATIENT)
Dept: SURGERY | Facility: CLINIC | Age: 52
End: 2022-02-14

## 2022-02-26 DIAGNOSIS — I10 BENIGN ESSENTIAL HTN: ICD-10-CM

## 2022-02-28 RX ORDER — METFORMIN HYDROCHLORIDE 500 MG/1
500 TABLET, EXTENDED RELEASE ORAL
Qty: 90 TABLET | Refills: 1 | Status: SHIPPED | OUTPATIENT
Start: 2022-02-28 | End: 2022-09-15

## 2022-03-18 DIAGNOSIS — Z17.0 MALIGNANT NEOPLASM OF CENTRAL PORTION OF RIGHT BREAST IN FEMALE, ESTROGEN RECEPTOR POSITIVE: ICD-10-CM

## 2022-03-18 DIAGNOSIS — C50.111 MALIGNANT NEOPLASM OF CENTRAL PORTION OF RIGHT BREAST IN FEMALE, ESTROGEN RECEPTOR POSITIVE: ICD-10-CM

## 2022-03-18 RX ORDER — ANASTROZOLE 1 MG/1
1 TABLET ORAL DAILY
Qty: 90 TABLET | Refills: 1 | Status: SHIPPED | OUTPATIENT
Start: 2022-03-18 | End: 2022-09-20 | Stop reason: SDUPTHER

## 2022-03-18 RX ORDER — ANASTROZOLE 1 MG/1
1 TABLET ORAL DAILY
Qty: 90 TABLET | Refills: 1
Start: 2022-03-18 | End: 2022-03-18

## 2022-04-07 DIAGNOSIS — I10 BENIGN ESSENTIAL HTN: ICD-10-CM

## 2022-04-07 RX ORDER — FUROSEMIDE 20 MG/1
20 TABLET ORAL DAILY
Qty: 90 TABLET | Refills: 1 | Status: SHIPPED | OUTPATIENT
Start: 2022-04-07 | End: 2022-09-15

## 2022-04-18 RX ORDER — ALLOPURINOL 300 MG/1
300 TABLET ORAL DAILY
Qty: 90 TABLET | Refills: 1 | Status: SHIPPED | OUTPATIENT
Start: 2022-04-18 | End: 2022-09-15 | Stop reason: SDDI

## 2022-05-24 ENCOUNTER — TELEPHONE (OUTPATIENT)
Dept: SURGERY | Facility: CLINIC | Age: 52
End: 2022-05-24

## 2022-05-24 ENCOUNTER — TELEPHONE (OUTPATIENT)
Dept: ONCOLOGY | Facility: CLINIC | Age: 52
End: 2022-05-24

## 2022-05-24 NOTE — TELEPHONE ENCOUNTER
Patient called in to get her MGM r/s before her June appt with Shaye. It is scheduled for 6/10/22 @ 7:00 [facility].

## 2022-05-24 NOTE — TELEPHONE ENCOUNTER
Caller: Emilie SAUNDERS    Relationship: Self    Best call back number: [phone]    What is the best time to reach you: ANYTIME    Who are you requesting to speak with (clinical staff, provider,  specific staff member): SCHEDULING    What was the call regarding: PT CALLING TO R/S HER MISSED LAB AND F/U FROM 1/25/22. SHE WOULD LIKE A 730 AM APPT IF POSSIBLE.     PLEASE CALL TO R/S.     Do you require a callback: YES

## 2022-05-25 ENCOUNTER — TRANSCRIBE ORDERS (OUTPATIENT)
Dept: ADMINISTRATIVE | Facility: HOSPITAL | Age: 52
End: 2022-05-25

## 2022-05-25 ENCOUNTER — HOSPITAL ENCOUNTER (OUTPATIENT)
Dept: CARDIOLOGY | Facility: HOSPITAL | Age: 52
Discharge: HOME | End: 2022-05-25

## 2022-05-25 ENCOUNTER — HOSPITAL ENCOUNTER (OUTPATIENT)
Dept: GENERAL RADIOLOGY | Facility: HOSPITAL | Age: 52
Discharge: HOME | End: 2022-05-25

## 2022-05-25 DIAGNOSIS — R10.13 DYSPEPSIA: ICD-10-CM

## 2022-05-25 DIAGNOSIS — E28.2 PCOS (POLYCYSTIC OVARIAN SYNDROME): Chronic | ICD-10-CM

## 2022-05-25 DIAGNOSIS — I10 BENIGN ESSENTIAL HTN: Chronic | ICD-10-CM

## 2022-05-25 DIAGNOSIS — E11.9 DIABETES MELLITUS WITHOUT COMPLICATION: ICD-10-CM

## 2022-05-25 DIAGNOSIS — M25.473 ANKLE EDEMA: ICD-10-CM

## 2022-05-25 DIAGNOSIS — R73.03 PREDIABETES: ICD-10-CM

## 2022-05-25 DIAGNOSIS — E78.00 PURE HYPERCHOLESTEROLEMIA: Chronic | ICD-10-CM

## 2022-05-25 DIAGNOSIS — Z01.818 PRE-OP TESTING: ICD-10-CM

## 2022-05-25 DIAGNOSIS — M10.9 GOUT, UNSPECIFIED CAUSE, UNSPECIFIED CHRONICITY, UNSPECIFIED SITE: ICD-10-CM

## 2022-05-25 DIAGNOSIS — E55.9 VITAMIN D DEFICIENCY: Chronic | ICD-10-CM

## 2022-05-25 DIAGNOSIS — E11.9 TYPE 2 DIABETES MELLITUS WITHOUT COMPLICATION, WITHOUT LONG-TERM CURRENT USE OF INSULIN: Chronic | ICD-10-CM

## 2022-05-25 DIAGNOSIS — F41.8 ANXIOUS DEPRESSION: ICD-10-CM

## 2022-05-25 DIAGNOSIS — Z01.818 PRE-OP TESTING: Primary | ICD-10-CM

## 2022-05-25 DIAGNOSIS — G47.33 OSA (OBSTRUCTIVE SLEEP APNEA): ICD-10-CM

## 2022-05-25 LAB — QT INTERVAL: 378 MS

## 2022-05-25 PROCEDURE — 93005 ELECTROCARDIOGRAM TRACING: CPT | Performed by: NURSE PRACTITIONER

## 2022-05-25 PROCEDURE — 71046 X-RAY EXAM CHEST 2 VIEWS: CPT

## 2022-05-25 PROCEDURE — 93010 ELECTROCARDIOGRAM REPORT: CPT | Performed by: INTERNAL MEDICINE

## 2022-05-26 ENCOUNTER — TELEPHONE (OUTPATIENT)
Dept: BARIATRICS/WEIGHT MGMT | Facility: CLINIC | Age: 52
End: 2022-05-26

## 2022-05-26 NOTE — TELEPHONE ENCOUNTER
Inform about results EKG & chest x-ray  ----- Message from VICENTE Ward sent at 5/26/2022  1:44 PM EDT -----  AS

## 2022-06-10 ENCOUNTER — APPOINTMENT (OUTPATIENT)
Dept: WOMENS IMAGING | Facility: HOSPITAL | Age: 52
End: 2022-06-10

## 2022-06-10 PROCEDURE — 77067 SCR MAMMO BI INCL CAD: CPT | Performed by: RADIOLOGY

## 2022-06-10 PROCEDURE — 77063 BREAST TOMOSYNTHESIS BI: CPT | Performed by: RADIOLOGY

## 2022-06-20 NOTE — PROGRESS NOTES
Oncology Social Work  [facility] - [facility]    OSW met with patient today for continued supportive counseling.  Patient presents with sad, depressed mood and anxious affect.   Patient is s/p lumpectomy -  She reports that her margins came back clean.  She is hopeful that she will not have to do any chemotherapy.  Patient is planning on XRT tx's.  She has upcoming appt's with MED ON[facility] and RAD [facility].  Patient hopes to return to work on 7/14/19.  Patient with financial distress due to not bringing home a paycheck while on FMLA.  Patient reports that her sleep has been normal, appetite has been no existent.   Patient reports that her anxiety has become more debilitating and effecting her everyday functioning.  She is having more frequent panic attacks that come without warning;  She states that nothing is triggering them, they just come.  She knows several coping techniques such as deep breathing, shifting her thoughts and focusing on something else ( distraction) but nothing seems to help.  Patient states that it can send her into a fight or flight response.  She states that she has had anxiety in the past but this is much worse.   Describes being on edge and extremely irritable.     Intervention: Active and reflective listening, CBT introduction and will refer to VICENTE Magana for new medication Eval. -  Will explain to Alexa that patient has been on Zoloft, Wellbutrin and Seroquel in the past and did not have a good response from any of these meds.  Discussed her strong fear of reoccurrence and patient is wondering how she is going to live through always wondering if it is going to return.     Discussed financial distress -  Provided patient with application to [facility] [address] and gave patient $20.00 I gas cards to ease a little burden.  Explained how the application process works and will assist with obtaining MD signatures and scanning to org is needed.    OSW spoke to Alexa  VICENTE Anne and she will get on her schedule next week     OSW will cont to see for emotional needs along with practical assistance.  Next Appt. 6/18/2019     JANICE Lezama, CSW, OSW-C   Estlander Flap (Upper To Lower Lip) Text: The defect of the lower lip was assessed and measured.  Given the location and size of the defect, an Estlander flap was deemed most appropriate.  Using a sterile surgical marker, an appropriate Estlander flap was measured and drawn on the upper lip. Local anesthesia was then infiltrated. A scalpel was then used to incise the lateral aspect of the flap, through skin, muscle and mucosa, leaving the flap pedicled medially.  The flap was then rotated and positioned to fill the lower lip defect.  The flap was then sutured into place with a three layer technique, closing the orbicularis oris muscle layer with subcutaneous buried sutures, followed by a mucosal layer and an epidermal layer.

## 2022-06-21 ENCOUNTER — OFFICE VISIT (OUTPATIENT)
Dept: SURGERY | Facility: CLINIC | Age: 52
End: 2022-06-21

## 2022-06-21 ENCOUNTER — TELEPHONE (OUTPATIENT)
Dept: SURGERY | Facility: CLINIC | Age: 52
End: 2022-06-21

## 2022-06-21 VITALS
HEART RATE: 87 BPM | SYSTOLIC BLOOD PRESSURE: 135 MMHG | DIASTOLIC BLOOD PRESSURE: 74 MMHG | HEIGHT: 66 IN | BODY MASS INDEX: 47.09 KG/M2 | WEIGHT: 293 LBS

## 2022-06-21 DIAGNOSIS — E66.01 CLASS 3 SEVERE OBESITY WITH SERIOUS COMORBIDITY AND BODY MASS INDEX (BMI) OF 50.0 TO 59.9 IN ADULT, UNSPECIFIED OBESITY TYPE: Chronic | ICD-10-CM

## 2022-06-21 DIAGNOSIS — Z91.89 INCREASED RISK OF BREAST CANCER: ICD-10-CM

## 2022-06-21 DIAGNOSIS — Z15.02 MONOALLELIC MUTATION OF CHEK2 GENE IN FEMALE PATIENT: Chronic | ICD-10-CM

## 2022-06-21 DIAGNOSIS — Z15.01 MONOALLELIC MUTATION OF CHEK2 GENE IN FEMALE PATIENT: Chronic | ICD-10-CM

## 2022-06-21 DIAGNOSIS — Z17.0 MALIGNANT NEOPLASM OF CENTRAL PORTION OF RIGHT BREAST IN FEMALE, ESTROGEN RECEPTOR POSITIVE: Primary | ICD-10-CM

## 2022-06-21 DIAGNOSIS — Z15.09 MONOALLELIC MUTATION OF CHEK2 GENE IN FEMALE PATIENT: Chronic | ICD-10-CM

## 2022-06-21 DIAGNOSIS — C50.111 MALIGNANT NEOPLASM OF CENTRAL PORTION OF RIGHT BREAST IN FEMALE, ESTROGEN RECEPTOR POSITIVE: Primary | ICD-10-CM

## 2022-06-21 DIAGNOSIS — Z15.89 MONOALLELIC MUTATION OF CHEK2 GENE IN FEMALE PATIENT: Chronic | ICD-10-CM

## 2022-06-21 PROCEDURE — 99213 OFFICE O/P EST LOW 20 MIN: CPT | Performed by: NURSE PRACTITIONER

## 2022-06-21 NOTE — PROGRESS NOTES
[facility]     Referring Provider: No ref. provider found     Chief complaint: Routine followup breast cancer     HPI:   5/14/19: seen by Dr Greene  MsYoel Soot is a 49 yo woman, seen at the request of Dr. Mel Cline, for a new diagnosis of right breast cancer. This was initially detected as an imaging abnormality. Her work-up is detailed in the oncologic history below. She denies any breast lumps, pain, skin changes, or nipple discharge. She has a past history of a benign left breast stereotactic biopsy in 2014. 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 She has a family history of breast cancer in a paternal aunt and paternal cousin (unknown ages at diagnosis). She denies any family history of ovarian cancer.      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 She denies any new complaints.      6/7/19:  She underwent right central partial mastectomy & SLNB on 5/28/19. See surgery & pathology details below in oncologic history. She is complaining of soreness at her axillary incision, however says that her breast feels fine. She also is complaining of generalized anxiety, which has been worse since her diagnosis.      9/13/19:  She returns today for scheduled follow-up. I have reviewed the interval records since her last visit. She completed radiation on 8/14/19. She had issues with fatigue during treatment, however this is been slowly improving. She started tamoxifen about a month ago and has been tolerating this well. She has been seen by the genetics clinic for her CHEK2 mutation. She is being followed by PT/LE clinic. On 8/14/19 her L-Dex bioimpedance score was slightly elevated, however this was reevaluated today and back down to baseline. She is working with PT on increasing her right upper extremity strength, because she has not been using her right arm very much since surgery. She denies  any new complaints.     3/9/20:  She returns today for scheduled follow-up. She remains on tamoxifen and is still tolerating this well. She last saw PT on 11/11/19 and her bioimpedance score was within normal limits. She underwent MMG prior to her appointment which was benign (see imaging report details below). She is complaining of some occasional pain near her right axillary scar.     11/20/20:  Follow up with Dr Greene  She underwent a breast MRI in 9/2020 which was benign (see report details below). Her follow-up with me was initially delayed because she underwent TRH/BSO with Dr. Neumann on 9/4/20 due to ovarian cysts and her history of a CHEK2 mutation. Final pathology showed atypical endometrial hyperplasia. Immediately after the hysterectomy she began having worsening hot flashes. She held her tamoxifen earlier this month and plans on starting Arimidex in a couple of weeks. She has not seen PT again since her last visit with me, however says she does not feel like she is having any current issues. She denies any right arm swelling. She denies any new breast related complaints.    3/16/2021:  She returns today for follow up with no breast complaints or health changes.  Screening with tomosynthesis was completed at [facility] on 3/8/2021:  BiRads 2    6/21/22, Interval History:   She returns today for follow up with no breast concerns, she had covid in 1/22 which delayed follow up.  She is planning gastric sleeve in near future now that she is feeling better.    She is contemplating gastric sleeve placement, currently seen in Bariatric clinic.    Her last clinic visit with Dr Trevizo was in 7/21:  Patient switched to aromatase inhibitor from tamoxifen.  November 2, 2020: Started Arimidex    Breast MRI in 10/21 was BiRads 2.  (see full report below)    Screening mammogram with tomosynthesis on 6/10/22 was stable, BiRads 2.  (see full report below)        Oncology/Hematology History Overview Note   1/7/14, Screening  MMG ([facility]):   There is a stable small focal asymmetry seen in the left breast at 12:00-1:00. In the right breast, no masses, significant calcifications or other abnormalities are seen.   BI-RADS 0: Incomplete.    2/4/14, Left Diagnostic MMG & Left US ([facility]):   MMG:  On the present examination, there is an isodense focal asymmetry measuring 15 mm with indistinct margins in the left breast at 12:00-1:00 located 10 cm from the nipple. Focal asymmetric density has become more defined.   US:  There is no evidence of any solid mass or abnormal cystic elements. Stereotactic biopsy is recommended.   BI-RADS 4A: Suspicious.    2/11/14, Left Breast, 12-1:00, Stereotactic Biopsy ([facility]):   Fibroadenomatoid type stromal changes, mild columnar cell hyperplasia, mild fibrocystic changes. Negative for atypia or malignancy.   -Concordant.  Recommend 6 month follow-up mammogram and ultrasound.     Malignant neoplasm of central portion of right breast in female, estrogen receptor positive (HCC)   4/17/2019 Initial Diagnosis    Malignant neoplasm of central portion of right breast in female, estrogen receptor positive (CMS/HCC)     4/18/2019 Imaging    Screening MMG ([facility]):   There are scattered areas of fibroglandular density. There is a small, oval mass measuring 10 mm with indistinct margins seen in the sub-areolar region of the right breast. Note is made of a biopsy clip in the left breast as evidence of a previous surgical procedure. In the left breast, no suspicious masses, significant calcifications or other abnormalities are seen.   BI-RADS 0: Incomplete.       4/29/2019 Imaging    Right Diagnostic MMG with [facility] & Right US ([facility]):   MMG:  On the present examination, there is a new high density, round mass measuring 10 mm with indistinct margins in the anterior one-third sub-areolar region of the right breast located 2 cm from the nipple.   US:   1. Ultrasound is suggestive of a round solid mass with indistinct margins  measuring 9 mm. Internal echotexture is homogeneous and hypoechoic relative adipose tissue.   2. There is an abnormal axillary lymph node in the right axilla. There are 2 adjacent lymph nodes in the right axilla with mildly prominent cortices. The cortex has been measured at 3.1 mm, although no significant focal thickening is seen. The left axilla was evaluated for comparison, and the nodes on the right are of greater cortical thickness.   BI-RADS 4C: Suspicious.     5/6/2019 Biopsy    Right breast & Right axillary lymph node, US-guided biopsy ([facility]):    1. Right Subareolar, core biopsy:    Invasive High Grade Mammary Carcinoma (Invasive Ductal Carcinoma, Grade III). Tubule Score 3, Nuclear Grade Score 3, and Mitotic Score 2 for A Total Score of 8. Neoplasm is Present in Four of Eight Biopsies and Measures 4 mm in Greatest Dimension.    ER+ (82.35%, moderate)  DE+ (92.68%, strong)  Her2 negative (IHC 1+)  Ki-67 14.81%    2. Right axilla, core biopsy:  Benign Lymph Node, Negative for Metastatic Carcinoma.     5/9/2019 Imaging    Bilateral Breast MRI ([facility]):  Within the retroareolar region of the right breast located slightly medial to the plane of the nipple at the 2:30 position on the order of 1.8 cm from the nipple there is an irregular enhancing mass that measures 1.0 cm in the superior-inferior dimension, 1.0 cm in anterior to posterior dimension and 1.0 cm in the medial to lateral dimension. A metallic clip is seen within the mass. This represents the biopsy-proven site malignancy.     No other areas of abnormal enhancement or morphology are seen within the right breast. I see no evidence for abnormal right breast skin, nipple or chest wall enhancement and there is no evidence for right axillary adenopathy.     There are no areas of abnormal enhancement or morphology in the left breast. I see no evidence for abnormal left breast skin, nipple or chest wall enhancement and there is no evidence for left axillary  adenopathy.  BI-RADS 6: Known malignancy.     5/14/2019 Genetic Testing    [employer] Breast Cancer STAT Panel (9 genes):    CHEK2 mutation     5/28/2019 Surgery    Right central partial mastectomy (with removal of the nipple-areola-complex) and sentinel lymph node biopsy    1.  Right Axilla, [place] Lymph Node #1 (hot, blue, count 628):                A.  One lymph node with fatty infiltration and focal florid foreign body giant cell response consistent with clip                     Placement.               B.  No metastatic carcinoma identified by routine staining (0/1).     2.  Right Axilla, Non-[place] Lymph Node:                A.  Benign fibrofatty tissue with focal minimal fat necrosis.                B.  No intact lymph node tissue identified.      3.  Right Central Partial Mastectomy:                A.  Invasive mammary carcinoma of no special type (ductal carcinoma).                            1.  Invasive carcinoma measures 10 mm x 8 mm x 8 mm.                            2.  Overall Chancellor grade II (glandular score = 2, nuclear score = 2, mitotic score = 2).                 B.  No associated ductal carcinoma in situ component by IHC staining (see comment).                   C.  Margins are negative for invasive carcinoma.  Invasive carcinoma measures at least 15 mm from                     the closest (Posterior) margin of excision.                     All other margins measure at least 20 mm from invasive carcinoma including:                             Anterior margin = 20 mm.                            Superior margin = 20 mm.                            Inferior margin = 53 mm.                            Medial Margin = 50 mm.                            Lateral margin = 60 mm.                   D.  No lymphovascular space invasion identified.                E.  No pagetoid involvement of skin nor nipple by carcinoma identified.               F.  Focal stromal fibrosis (scar) organizing fat necrosis  and foreign giant cell reaction noted consistent with                     previous needled biopsy site.                G.  Nonneoplastic breast tissue with fibrocystic change, focal adenosis, organizing fat necrosis.                H.  Microcalcifications are identified within benign ductal tissue.                I.   Previously reported biomarkers: Estrogen receptor: positive (82.35% moderate), Progesterone receptor:                    positive (92.68%, strong), HER2/Amy: negative (IHC score1+) and Ki-67=14.8%                     (not reviewed).                   Pathologic Stage: pT1b, (sn)N0 (G2).                 See synoptic for tumor details.     4.  Additional Lateral and Superior Margins:                A.  No in situ nor infiltrating carcinoma identified.               B.  New superior margin is negative for malignancy by an additional 25 mm.     Oncotype DX Score: 6     7/11/2019 - 8/14/2019 Radiation    Radiation OncologyTreatment Course:  Emilie Soto received 5000 cGy in 25 fractions to the right breast.     8/17/2019 - 11/2/2020 Hormonal Therapy    Tamoxifen     3/6/2020 Imaging    Bilateral Diagnostic MMG with [facility] ([facility]):  Scattered areas of fibroglandular density.   There is a new area of skin thickening, a new area of trabecular thickening and a new postsurgical scar seen in the anterior region of the right breast. Finding is in an area of prior lumpectomy. Findings are consistent with postsurgical and post radiation therapy changes. Metallic surgical clips noted at the lumpectomy site and at the axilla. No suspicious masses or microcalcifications are identified.  In the left breast, no suspicious masses, significant calcifications or other abnormalities are seen.  BI-RADS 2: Benign.     9/3/2020 Imaging    Bilateral Breast MRI ([facility]):  The parenchyma of both breasts is largely fatty-replaced. Minimal background parenchymal enhancement is noted. There is evidence of prior right partial mastectomy  with mild anterior right breast architectural distortion and comparatively smaller right breast volume relative to the left breast. Additionally, the right nipple appears to have been surgically removed. Otherwise, no areas of abnormal morphology or enhancement are seen in either breast. I see no evidence for abnormal skin, chest wall or left nipple enhancement and there is no evidence for axillary or internal mammary chain adenopathy.   BI-RADS 2: Benign.     9/4/2020 Surgery    Total robotic hysterectomy and bilateral salpingo-oophorectomy, Dr. Nery Neumann     12/1/2020 -  Hormonal Therapy    Arimidex     3/8/2021 Imaging    3/8/2021:  Screening mammogram with tomosynthesis at [facility]   Scattered areas of fibroglandular density.  Right breast postoperative changes again seen.  There are no suspicious masses, significant calcifications, or other abnormality seen in either breast.  There is a biopsy clip in the left breast.  Impression:  There is no mammographic evidence of malignancy.  Screening mammogram 1 year is recommended.  BI-RADS Category 2     10/5/2021 Imaging    Breast MRI at [facility]  FINDINGS:The parenchyma of both breasts is largely fatty-replaced.  Minimal background parenchymal enhancement of both breasts is noted.There is evidence of prior right partial mastectomy that appears stable.The right nipple has been surgically removed. Otherwise, no areas of abnormal morphology or enhancement are seen in either breast. I see no evidence for abnormal skin, nipple or chest wall enhancement and there is no evidence for axillary or internal mammary chain adenopathy.   IMPRESSION:  There are no findings suspicious for malignancy in either breast. Routine follow-up imaging is recommended.   BI-RADS category  2: Benign..     6/10/2022 Imaging    [facility]  Scattered areas of fibroglandular density.  Right breast postoperative changes again seen.  There  are no suspicious masses, significant calcifications, or other abnormality seen in either breast.  There is a biopsy clip in the left breast.  Impression:  There is no mammographic evidence of malignancy.  Screening mammogram 1 year is recommended.  BI-RADS Category 2         Review of Systems:  See interval history.       Medications:    Current Outpatient Medications:   •  allopurinol (ZYLOPRIM) 300 MG tablet, TAKE 1 TABLET BY MOUTH DAILY, Disp: 90 tablet, Rfl: 1  •  amLODIPine (NORVASC) 5 MG tablet, TAKE 1 TABLET BY MOUTH DAILY, Disp: 90 tablet, Rfl: 2  •  anastrozole (Arimidex) 1 MG tablet, Take 1 tablet by mouth Daily., Disp: 90 tablet, Rfl: 1  •  furosemide (LASIX) 20 MG tablet, TAKE 1 TABLET BY MOUTH DAILY, Disp: 90 tablet, Rfl: 1  •  lisinopril (PRINIVIL,ZESTRIL) 40 MG tablet, Take 1 tablet by mouth Daily., Disp: 90 tablet, Rfl: 3  •  LORazepam (ATIVAN) 0.5 MG tablet, Take 1 tablet by mouth Every 8 (Eight) Hours As Needed for Anxiety. for anxiety, Disp: 45 tablet, Rfl: 0  •  metFORMIN ER (GLUCOPHAGE-XR) 500 MG 24 hr tablet, TAKE 1 TABLET BY MOUTH DAILY WITH BREAKFAST, Disp: 90 tablet, Rfl: 1  •  potassium chloride (KLOR-CON) 8 MEQ CR tablet, TAKE 1 TABLET BY MOUTH DAILY, Disp: 90 tablet, Rfl: 0      Allergies   Allergen Reactions   • Citalopram Rash   • Flexeril [Cyclobenzaprine] Unknown - High Severity     MUSCLES TENSE-OPPOSITE OF WHAT IT IS INTENDED TO DO-ELEVATES B/P   • Doxycycline Rash     Blisters on hands   • Keflex [Cephalexin] Rash   • Sulfa Antibiotics Rash     NAUSEA/VOMITING/FEVER   • Vilazodone Hcl Hives, Itching and Rash       Family History   Problem Relation Age of Onset   • Hypertension Mother    • Arthritis Mother    • Kidney cancer Mother    • Depression Mother         Not Dr tommy- [place] go   • Heart disease Father    • Hernia Father    • Hypertension Father    • No Known Problems Brother    • Breast cancer Paternal Aunt    • Breast cancer Paternal Cousin    • Drug abuse Paternal  "Uncle         life long   • Drug abuse Paternal Uncle         life long   • Breast cancer Paternal Grandmother    • Osteoporosis Maternal Grandmother    • Lung cancer Paternal Grandfather    • Malig Hyperthermia Neg Hx        PHYSICAL EXAMINATION:   Vitals:    06/21/22 1237   BP: 135/74   Pulse: 87      /74 (BP Location: Left arm)   Pulse 87   Ht 167.6 cm (66\")   Wt (!) 138 kg (304 lb)   BMI 49.07 kg/m²     I reviewed physical exam, no changes noted    ECOG 0 - Asymptomatic  General: NAD, well appearing, obese  Psych: a&o x 3, normal mood and affect  Eyes: EOMI, no scleral icterus  ENMT: neck supple without masses or thyromegaly, mucus membranes moist  MSK: normal gait, normal ROM in bilateral shoulders  Lymph nodes: Well-healed right axillary scar with some surrounding firm scar tissue; no cervical, supraclavicular or axillary lymphadenopathy  Breast:   Right: The right breast is smaller and uplifted versus the left. There is moderate hyperpigmentation of the entire breast and axilla. Well-healed, soft, central scar with absence of the NAC. No masses.  Left: No visible abnormalities on inspection while seated, with arms raised or hands on hips. No masses, skin changes, or nipple abnormalities.      Assessment:   1)  52 y.o. F with a diagnosis of right breast cancer: intermediate grade, invasive ductal carcinoma, ER/GA+, Her2 negative. She is sp right central partial mastectomy & SLNB on 5/28/19, pT1bN0, anatomic stage IA, prognostic stage IA. She completed radiation on 8/14/19. She took tamoxifen from 8/2019 to 10/2020. She underwent TRH/BSO on 9/4/20 and has started armidex  2)  Increased risk breast cancer, she has a pathogenic CHEK2 mutation and has a higher than average risk of a subsequent breast cancer, however declined prophylactic mastectomy. She is currently undergoing high risk screening, alternating MMG & MRI.  3) Family history of breast cancer  4)  Morbid obesity, BMI 59    Discussion:  " Jc has discussed with her the significance of pathologenic CHEK 2 mutation.  She is at increased risk for development of breast cancer.  We discussed management options for individuals who are at increased risk (>20% lifetime risk):  1) High risk screening - Annual mammogram and annual breast MRI, alternating one test every 6 months, biannual clinical exam and monthly self breast exam. She meets criteria for high risk screening.  2) Chemoprevention with Tamoxifen, Raloxifene or Exemestane. These may reduce risk up to 50%. I reviewed that these particular medications are not without risks and the risk/benefit ratio must be considered carefully. She is currently taking arimidex  3) Risk reducing surgery such as prophylactic mastectomy  which may reduce risk by 90-95%. We discussed that this is a relatively radical strategy and is generally reserved for individuals with a known genetic mutation predisposing them to an increased risk (~50% risk) of breast cancer. She has declined prophylactic mastectomy.  4) I discussed the importance of exercise and weight management as part of a risk reducing strategy, since increased BMI is associated with an increased risk of breast cancer. This is especially true in her case, as I expect her risk would decrease as she lost weight.    She is interested in increased surveillance with mammogram alternating with MRI.    She is planning gastric sleeve placement in the near future.    Plan:  -Continue follow-up with Dr. Trevizo.  - follow up with bariatric clinic  -breast MRI in 6 months at [facility] followed by exam  -She was instructed to call sooner with any questions, concerns or changes on BSE.    VICENTE Smith      CC:  [facility]  VICENTE Elliott

## 2022-06-24 ENCOUNTER — APPOINTMENT (OUTPATIENT)
Dept: LAB | Facility: HOSPITAL | Age: 52
End: 2022-06-24

## 2022-07-13 DIAGNOSIS — I10 BENIGN ESSENTIAL HTN: ICD-10-CM

## 2022-07-13 RX ORDER — AMLODIPINE BESYLATE 5 MG/1
5 TABLET ORAL DAILY
Qty: 90 TABLET | Refills: 2 | Status: SHIPPED | OUTPATIENT
Start: 2022-07-13 | End: 2022-09-15

## 2022-07-14 ENCOUNTER — OFFICE VISIT (OUTPATIENT)
Dept: ONCOLOGY | Facility: CLINIC | Age: 52
End: 2022-07-14

## 2022-07-14 ENCOUNTER — TELEPHONE (OUTPATIENT)
Dept: ONCOLOGY | Facility: CLINIC | Age: 52
End: 2022-07-14

## 2022-07-14 ENCOUNTER — LAB (OUTPATIENT)
Dept: LAB | Facility: HOSPITAL | Age: 52
End: 2022-07-14

## 2022-07-14 VITALS
RESPIRATION RATE: 16 BRPM | TEMPERATURE: 97.1 F | OXYGEN SATURATION: 97 % | DIASTOLIC BLOOD PRESSURE: 87 MMHG | SYSTOLIC BLOOD PRESSURE: 157 MMHG | WEIGHT: 293 LBS | HEIGHT: 66 IN | HEART RATE: 83 BPM | BODY MASS INDEX: 47.09 KG/M2

## 2022-07-14 DIAGNOSIS — C50.111 MALIGNANT NEOPLASM OF CENTRAL PORTION OF RIGHT BREAST IN FEMALE, ESTROGEN RECEPTOR POSITIVE: Primary | ICD-10-CM

## 2022-07-14 DIAGNOSIS — C50.111 MALIGNANT NEOPLASM OF CENTRAL PORTION OF RIGHT BREAST IN FEMALE, ESTROGEN RECEPTOR POSITIVE: ICD-10-CM

## 2022-07-14 DIAGNOSIS — Z17.0 MALIGNANT NEOPLASM OF CENTRAL PORTION OF RIGHT BREAST IN FEMALE, ESTROGEN RECEPTOR POSITIVE: Primary | ICD-10-CM

## 2022-07-14 DIAGNOSIS — E83.52 SERUM CALCIUM ELEVATED: ICD-10-CM

## 2022-07-14 DIAGNOSIS — Z17.0 MALIGNANT NEOPLASM OF CENTRAL PORTION OF RIGHT BREAST IN FEMALE, ESTROGEN RECEPTOR POSITIVE: ICD-10-CM

## 2022-07-14 LAB
ALBUMIN SERPL-MCNC: 4.8 G/DL (ref 3.5–5.2)
ALBUMIN/GLOB SERPL: 1.6 G/DL (ref 1.1–2.4)
ALP SERPL-CCNC: 119 U/L (ref 38–116)
ALT SERPL W P-5'-P-CCNC: 50 U/L (ref 0–33)
ANION GAP SERPL CALCULATED.3IONS-SCNC: 15.1 MMOL/L (ref 5–15)
AST SERPL-CCNC: 34 U/L (ref 0–32)
BASOPHILS # BLD AUTO: 0.09 10*3/MM3 (ref 0–0.2)
BASOPHILS NFR BLD AUTO: 0.9 % (ref 0–1.5)
BILIRUB SERPL-MCNC: 0.8 MG/DL (ref 0.2–1.2)
BUN SERPL-MCNC: 12 MG/DL (ref 6–20)
BUN/CREAT SERPL: 16.2 (ref 7.3–30)
CALCIUM SPEC-SCNC: 10.6 MG/DL (ref 8.5–10.2)
CHLORIDE SERPL-SCNC: 99 MMOL/L (ref 98–107)
CO2 SERPL-SCNC: 23.9 MMOL/L (ref 22–29)
CREAT SERPL-MCNC: 0.74 MG/DL (ref 0.6–1.1)
DEPRECATED RDW RBC AUTO: 38.9 FL (ref 37–54)
EGFRCR SERPLBLD CKD-EPI 2021: 97.5 ML/MIN/1.73
EOSINOPHIL # BLD AUTO: 0.27 10*3/MM3 (ref 0–0.4)
EOSINOPHIL NFR BLD AUTO: 2.8 % (ref 0.3–6.2)
ERYTHROCYTE [DISTWIDTH] IN BLOOD BY AUTOMATED COUNT: 13 % (ref 12.3–15.4)
GLOBULIN UR ELPH-MCNC: 3 GM/DL (ref 1.8–3.5)
GLUCOSE SERPL-MCNC: 119 MG/DL (ref 74–124)
HCT VFR BLD AUTO: 40.6 % (ref 34–46.6)
HGB BLD-MCNC: 13.8 G/DL (ref 12–15.9)
IMM GRANULOCYTES # BLD AUTO: 0.04 10*3/MM3 (ref 0–0.05)
IMM GRANULOCYTES NFR BLD AUTO: 0.4 % (ref 0–0.5)
LYMPHOCYTES # BLD AUTO: 2.82 10*3/MM3 (ref 0.7–3.1)
LYMPHOCYTES NFR BLD AUTO: 29.1 % (ref 19.6–45.3)
MCH RBC QN AUTO: 28.2 PG (ref 26.6–33)
MCHC RBC AUTO-ENTMCNC: 34 G/DL (ref 31.5–35.7)
MCV RBC AUTO: 83 FL (ref 79–97)
MONOCYTES # BLD AUTO: 0.55 10*3/MM3 (ref 0.1–0.9)
MONOCYTES NFR BLD AUTO: 5.7 % (ref 5–12)
NEUTROPHILS NFR BLD AUTO: 5.92 10*3/MM3 (ref 1.7–7)
NEUTROPHILS NFR BLD AUTO: 61.1 % (ref 42.7–76)
NRBC BLD AUTO-RTO: 0 /100 WBC (ref 0–0.2)
PLATELET # BLD AUTO: 277 10*3/MM3 (ref 140–450)
PMV BLD AUTO: 9.2 FL (ref 6–12)
POTASSIUM SERPL-SCNC: 4 MMOL/L (ref 3.5–4.7)
PROT SERPL-MCNC: 7.8 G/DL (ref 6.3–8)
RBC # BLD AUTO: 4.89 10*6/MM3 (ref 3.77–5.28)
SODIUM SERPL-SCNC: 138 MMOL/L (ref 134–145)
WBC NRBC COR # BLD: 9.69 10*3/MM3 (ref 3.4–10.8)

## 2022-07-14 PROCEDURE — 99214 OFFICE O/P EST MOD 30 MIN: CPT | Performed by: INTERNAL MEDICINE

## 2022-07-14 PROCEDURE — 80053 COMPREHEN METABOLIC PANEL: CPT

## 2022-07-14 PROCEDURE — 85025 COMPLETE CBC W/AUTO DIFF WBC: CPT

## 2022-07-14 PROCEDURE — 36415 COLL VENOUS BLD VENIPUNCTURE: CPT

## 2022-07-14 NOTE — PROGRESS NOTES
Subjective     REASON FOR CONSULTATION:1. High grade, invasive ductal carcinoma of right breast, ER/CO+, Her2 negative; clinical T1bN0, anatomic stage IA, prognostic stage IA  · May 28, 2019 right lumpectomy with removal of nipple areolar complex and right sentinel lymph node biopsy  -Central 2:00, invasive ductal carcinoma, grade 2, Elvin score of 6, tumor size is 10 mm, no DCIS, one lymph node negative, good margins    2.  Oncotype DX score of 6, low risk, no chemo necessary    3.  June 28, 2019: Started tamoxifen    4.  Family history of breast cancer, CHEK2  Positive  · Patient's cousin had breast cancer at age 40 and her aunt had breast cancer her paternal aunt  · CHEK2 usually is associated with 15 to 40% risk of breast cancer  · Will need MRI breast alternating with mammogram after age 40    5.  S/p hysterectomy with bilateral salpingo-oophorectomy September 4, 2020 by Dr. Nery Neumann.  Patient had bilateral ovarian cysts.  Pathology was consistent with complex atypical endometrial hyperplasia, endometrial polyp otherwise unremarkable.  Acute on chronic cervicitis with squamous metaplasia.  Right ovary with hemorrhagic corpus luteum cyst and left ovary also with corpus luteum cyst pelvic peritoneal biopsy negative for malignancy omental biopsy negative for malignancy.    · Patient switched to aromatase inhibitor from tamoxifen.  · November 2, 2020: Started Arimidex    HISTORY OF PRESENT ILLNESS:   Patient is a 51-year-old female with history of T1 be N0 invasive ductal carcinoma ER/CO positive HER-2 negative.  She also has CHEK2 mutation.  She underwent hysterectomy with bilateral salpingo-oophorectomy which was done by Dr. Nery Neumann.  Initially was started on tamoxifen but after surgery and bilateral oophorectomy she has been started on Arimidex.  She has mild hot flashes but does not have any joint pains.  She has been on Arimidex since September 2020.  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  She has been vaccinated.  She is scheduled for MRI of the breast in September 2020 following which she will see Danny ZHANG .     Patient's bone density is normal.  She had elevated calcium secondary to hydrochlorothiazide and that has been discontinued.  Her calcium was down to 10.1 last time.  She is taking vitamin D supplements and her vitamin D level was low at 21 and have asked her to take 2000 units daily.  Given that hydrochlorothiazide has been discontinued and her calcium normalized she can take 600 mg of calcium once daily.  Encouraged her to take increased fluids only because she had a remote history of kidney stone.    Interval history: Patient is tolerating Arimidex.  No hot flashes or arthralgias.    Oncologic history:   The patient is a 52 y.o. year old female who is here for an opinion about the above issue.    Patient is a 49-year old female who presents today as a new patient for consultation on her newly diagnosed high grade, invasive ductal carcinoma of right breast.   Patient had seen Dr. Greene on May 14, 2019.    She underwent a screening mammogram but did not appreciate any mass herself and denied any breast pain or nipple discharge.    Patient is accompanied with her wife Trena.  They have been together for 19 years and  for the last 2 years.    April 18, 2019: Screening mammogram showed a 10 mm small oval mass in the subareolar region of the right breast.  Left breast was negative.  Spot compression and ultrasound recommended.  Of the right breast.    April 29, 2019: Right diagnostic mammogram showed a high density round mass 10 mm with indistinct margins in the anterior one third subareolar region of the right breast located to centimeters from the nipple.    April 29, 2019: Ultrasound suggested a solid mass with indistinct margins 9 mm and subareolar region.  There is abnormal axillary node in the right axilla.  There are 2 adjacent lymph nodes in the  right axilla with mildly prominent cortices.  The left axilla was evaluated for comparison and the nodes on the right are of greater cortical thickness.      Ultrasound-guided biopsy of the breast mass and right axillary lymph node was suggested.     5/9/19:  US guided biopsy sent to [facility] lab  showing solid mass in the sub-areorolar region of right breast revealing invasive high-grade mammary carcinoma, grade 3. Tubule score 3, nuclear grade 3, and mitotic score 2 for a total score of 8. Neoplasm is present in 4/8 biopsies and measures 4 mm in greatest dimension.   Biopsy performed on 05/09/2019 of prominent lymph node of right axilla was benign- negative for metastatic carcinoma.  ER: 82.35%,  positive  CA: 92.68%, positive  HER-2/latoya: 1+ negative  Ki-67 14.81%       05/09/2019 MRI of bilateral breast:   IMPRESSION:  1. Biopsy-proven malignancy in the right breast in the anterior  one-third approximately 1.8 cm from the nipple at the 2:30 position with  an internal metallic clip. The mass measures on the order of 1 cm in  greatest dimension. No other suspicious findings are seen within the  right breast and there is no evidence for right axillary or internal  mammary chain adenopathy. The patient appears to be a candidate for  breast conservation therapy.  2. There are no findings suspicious for malignancy in the left breast.  BI-RADS Category 6: Known biopsy-proven malignancy.    5/14/19: Patient was seen by Dr. Greene who felt that she was a good candidate for lumpectomy given the size of the lesion related to her breast size.  Because of superficial subareolar location she would require removal of the nipple areolar complex in order to make sure she has negative margins.  Patient preferred breast conservation with central lumpectomy.  This included the removal of the nipple areolar complex.      Also obtained INVITAE  genetic test given her family history of malignancy.    Estrogen receptor: positive (82.35%  moderate),   Progesterone receptor:  positive (92.68%, strong)   HER2/Amy: negative (IHC score1+) and Ki-67=14.8%  Pathologic Stage: pT1b, (sn)N0 (G2).    Synoptic Report :  TUMOR  Tumor Site: Invasive Carcinoma Central  Clock Position of Tumor Site 2 o'clock  3 o'clock  Histologic Type Invasive carcinoma of no special type (ductal, not otherwise  specified)  Glandular (Acinar) / Tubular Differentiation Score 2  Nuclear Pleomorphism Score 2  Mitotic Rate Score 2 (4-7 mitoses per mm2)  Number of Mitoses per 10 High-Power Fields 15 mitotic figures  Diameter of Microscope Field in Millimeters (mm) 0.55 Millimeters (mm)  Overall Grade Grade 2 (scores of 6 or 7)  Tumor Size Greatest dimension of largest invasive focus in Millimeters (mm): 10  Millimeters (mm)    MARGINS  Invasive Carcinoma Margins Uninvolved by invasive carcinoma    LYMPH NODES  Regional Lymph Nodes Uninvolved by tumor cells  Number of Lymph Nodes Examined 1  Number of [place] Nodes Examined 1    Oncotype DX testing ordered but pending.    Patient has a family history of paternal grandmother with breast cancer, paternal aunt with breast cancer, cousin on father's side dying of breast cancer at age 43.  History of ovarian or prostate cancer or uterine cancer.  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     Patient is now scheduled for counseling with genetic and also with radiation oncology next week.  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   • Depression 01/01/1988   • Diabetes mellitus (HCC)     pre diabetic   • Family history of breast cancer  5/14/2019   • Headache    • History of kidney stones    • Hyperlipidemia    • Hypertension    • Irritable bowel syndrome    • Lumbago    • Mood disorder (HCC)    • Obesity life   • PONV (postoperative nausea and vomiting)    • 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   • Sleep apnea     C-PAP IN USE   • Vitamin D deficiency         Past Surgical History:   Procedure Laterality Date   • APPENDECTOMY  1989    removed when removing dermoid on right ovary   • BREAST BIOPSY Right 2019   • BREAST LUMPECTOMY Right    • BREAST LUMPECTOMY WITH SENTINEL NODE BIOPSY Right 5/28/2019    Procedure: Right central partial mastectomy (with removal of the nipple-areola-complex) and sentinel lymph node biopsy;  Surgeon: Trena Greene MD;  Location: [facility] MAIN OR;  Service: General   • COLONOSCOPY N/A 12/5/2019    Procedure: COLONOSCOPY TO CECUM;  Surgeon: Pedrito Fulton Jr., MD;  Location: [facility] ENDOSCOPY;  Service: General   • DERMOID CYST  EXCISION  1989   • ENDOSCOPY N/A 12/28/2021    Procedure: ESOPHAGOGASTRODUODENOSCOPY WITH BIOPSY;  Surgeon: Delfino Rivera Jr., MD;  Location: [facility] ENDOSCOPY;  Service: General;  Laterality: N/A;  PRE- DYSPEPSIA  POST-- GASTRITIS, GASTRIC POLYPS   • HERNIA REPAIR  2006    umbilical   • HYSTERECTOMY Bilateral 09/04/2020    Dr. Neumann, due to ovarian cysts with previous hx breast cancer   • LAPAROSCOPIC CHOLECYSTECTOMY     • SALIVARY GLAND SURGERY  1993    due to cancer   • TONSILLECTOMY  01/01/1979    I was 9 years old        Current Outpatient Medications on File Prior to Visit   Medication Sig Dispense Refill   • allopurinol (ZYLOPRIM) 300 MG tablet TAKE 1 TABLET BY MOUTH DAILY 90 tablet 1   • amLODIPine (NORVASC) 5 MG tablet TAKE 1 TABLET BY MOUTH DAILY 90 tablet 2   • anastrozole (Arimidex) 1 MG tablet Take 1 tablet by mouth Daily. 90 tablet 1   • furosemide (LASIX) 20 MG tablet TAKE 1 TABLET BY MOUTH DAILY 90 tablet 1   • lisinopril (PRINIVIL,ZESTRIL) 40 MG tablet  Take 1 tablet by mouth Daily. 90 tablet 3   • LORazepam (ATIVAN) 0.5 MG tablet Take 1 tablet by mouth Every 8 (Eight) Hours As Needed for Anxiety. for anxiety 45 tablet 0   • metFORMIN ER (GLUCOPHAGE-XR) 500 MG 24 hr tablet TAKE 1 TABLET BY MOUTH DAILY WITH BREAKFAST 90 tablet 1   • potassium chloride (KLOR-CON) 8 MEQ CR tablet TAKE 1 TABLET BY MOUTH DAILY 90 tablet 0     No current facility-administered medications on file prior to visit.        ALLERGIES:    Allergies   Allergen Reactions   • Citalopram Rash   • Flexeril [Cyclobenzaprine] Unknown - High Severity     MUSCLES TENSE-OPPOSITE OF WHAT IT IS INTENDED TO DO-ELEVATES B/P   • Doxycycline Rash     Blisters on hands   • Keflex [Cephalexin] Rash   • Sulfa Antibiotics Rash     NAUSEA/VOMITING/FEVER   • Vilazodone Hcl Hives, Itching and Rash        Social History     Socioeconomic History   • Marital status:      Spouse name: Trena Montenegro   • Number of children: 0   Tobacco Use   • Smoking status: Never Smoker   • Smokeless tobacco: Never Used   • Tobacco comment: Parents smoked.  I have never smoked   Vaping Use   • Vaping Use: Never used   Substance and Sexual Activity   • Alcohol use: Not Currently     Comment: OCCASIONALLY   • Drug use: Never   • Sexual activity: Yes     Partners: Female     Birth control/protection: None, Surgical        Family History   Problem Relation Age of Onset   • Hypertension Mother    • Arthritis Mother    • Kidney cancer Mother    • Depression Mother         Not Dr tommy- edison onofre   • Heart disease Father    • Hernia Father    • Hypertension Father    • No Known Problems Brother    • Breast cancer Paternal Aunt    • Breast cancer Paternal Cousin    • Drug abuse Paternal Uncle         life long   • Drug abuse Paternal Uncle         life long   • Breast cancer Paternal Grandmother    • Osteoporosis Maternal Grandmother    • Lung cancer Paternal Grandfather    • Malig Hyperthermia Neg Hx     PAST MEDICAL  HISTORY:  1. Hypertension- Under control with medications  2. Pre-diabetes- Diet control  3. Obesity  4. PCOS   5. Anxiety- Worse since cancer diagnosis. Taking Gabapentin since yesterday which helped with her symptoms. Followed by Alexa Trinh.   6. No hx of CVA or CAD.     FAMILY HISTORY:  Paternal cousin with breast cancer diagnosed at 43 and . Paternal aunt with breast cancer s/p mastectomy; paternal grandmother with breast cancer s/p lumpectomy.   No other family history of ovarian cancer or prostrate cancer  Mother had kidney cancer s/p surgery.     OB-GYN:  Menarche- 9 years old  Menopause- n/a  Pregnancies- none  Post menopausal HRT- N/A  Hx of birth control pills- None    SOCIAL HISTORY:  She works at [employer].  She is  to her wife for the last 2 years but lived with her for 19.  Years.  Patient's wife is a schoolteacher.  Patient does not smoke.  She does not drink.  Allergies reviewed with the patient today.      Review of Systems   Constitutional: Negative for activity change, appetite change, chills, diaphoresis, fatigue, fever and unexpected weight change (intended weight loss, see HPI).   HENT: Negative for hearing loss, mouth sores, nosebleeds, sore throat and trouble swallowing.    Respiratory: Negative for cough, chest tightness, shortness of breath and wheezing.    Cardiovascular: Negative for chest pain, palpitations and leg swelling.   Gastrointestinal: Negative for abdominal distention, abdominal pain, constipation, diarrhea, nausea and vomiting.   Genitourinary: Negative for difficulty urinating, dysuria, frequency, hematuria and urgency.   Musculoskeletal: Negative for joint swelling.        No muscle weakness.   Skin: Negative for rash and wound.   Neurological: Negative for dizziness, seizures, syncope, speech difficulty, weakness, numbness and headaches.   Hematological: Negative for adenopathy. Does not bruise/bleed easily.   Psychiatric/Behavioral: Negative for behavioral  "problems, confusion and suicidal ideas. The patient is nervous/anxious.    All other systems reviewed and are negative.    Patient has minimal hot flashes no arthralgias    Objective     Vitals:    07/14/22 1438   BP: 157/87   Pulse: 83   Resp: 16   Temp: 97.1 °F (36.2 °C)   TempSrc: Temporal   SpO2: 97%   Weight: (!) 141 kg (310 lb 12.8 oz)   Height: 167.6 cm (65.98\")   PainSc: 0-No pain      Current Status 7/14/2022   ECOG score 0     PHYSICAL EXAM      CONSTITUTIONAL:  Vital signs reviewed.  Alert and oriented x3  No distress, looks comfortable.  EYES:  Conjunctivae and lids unremarkable.  Extraocular eye movements intact.  HEENT: No evidence of lymphadenopathy, no thyromegaly  BREAST: Right breast: No skin changes, no evidence of breast mass, no nipple discharge, no evidence of any right axillary adenopathy or right supraclavicular adenopathy  Left breast: No evidence of any skin changes, no evidence of any left breast mass and no evidence of left nipple discharge as well as no left axillary adenopathy or left supraclavicular adenopathy.  RESPIRATORY:  Normal respiratory effort.  No rales  or wheezing, clear.   CARDIOVASCULAR:  Regular rate and rhythm, no murmur  No significant lower extremity edema.  Abdomen: Soft nontender positive bowel sounds no hepatosplenomegaly  SKIN: No wounds.  No rashes.  MUSCULOSKELETAL/EXTREMITIES: No clubbing or cyanosis.  No apparent unilateral weakness.  NEURO: CN 2-12 appear intact. No focal neurological deficits noted.  PSYCHIATRIC:  Normal judgment and insight.  Normal mood and affect.    I have examined the patient and its unchanged as of today    RECENT LABS:  Hematology WBC   Date Value Ref Range Status   07/14/2022 9.69 3.40 - 10.80 10*3/mm3 Final   09/04/2020 8.52 4.5 - 11.0 10*3/uL Final     RBC   Date Value Ref Range Status   07/14/2022 4.89 3.77 - 5.28 10*6/mm3 Final   09/04/2020 4.51 4.0 - 5.2 10*6/uL Final     Hemoglobin   Date Value Ref Range Status   07/14/2022 13.8 " 12.0 - 15.9 g/dL Final   2020 12.9 12.0 - 16.0 g/dL Final     Hematocrit   Date Value Ref Range Status   2022 40.6 34.0 - 46.6 % Final   2020 38.4 36.0 - 46.0 % Final     Platelets   Date Value Ref Range Status   2022 277 140 - 450 10*3/mm3 Final   2020 266 140 - 440 10*3/uL Final            Assessment & Plan      1. High grade, invasive ductal carcinoma of right breast, ER/IA+, Her2 negative; clinical T1bN0, anatomic stage IA, prognostic stage IA:   · May 6, 2019 ultrasound-guided biopsy of the right breast mass,  - grade 3, invasive mammary carcinoma, Elvin score of 8, biopsy of right axillary lymph node negative  ER 82.35%, IA 92.68%, HER-2/latoya 1+ , Ki-67 14.8%    · May 28, 2019 right lumpectomy with removal of nipple areolar complex and right sentinel lymph node biopsy  -Central 2:00, invasive ductal carcinoma, grade 2, [place] score of 6, tumor size is 10 mm, no DCIS, one lymph node negative, good margins  · Oncotype DX score of 6, low risk disease, been not give chemotherapy  · We will plan on starting tamoxifen given premenopausal for 2 to 3 years followed by Arimidex once postmenopausal.  · 2020, patient continues on tamoxifen and is tolerating this well.  She will have a follow-up MRI of the breast in September.  · September 3, 2020 MRI breast negative  · 2020 s/p hysterectomy with bilateral subfinger oophorectomy, pathology benignWe will switch patient to aromatase inhibitor since she is postmenopausal now with Arimidex, started 2020  · 2022 continue Arimidex tolerating well      2. Family history of cancer: Paternal cousin with breast cancer diagnosed at 43 and . Paternal aunt with breast cancer s/p mastectomy; paternal grandmother with breast cancer s/p lumpectomy.   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  ·  She has an appointment with   in early July.   · We will plan on alternating MRI of breast with mammogram alternating.  · Discussed in breast cancer conference today and Dr. Greene and we agreed that best option is to make an MRI of breast with mammogram every 6 months.  Given CHEK2 mutation patient is to alternate MRI with mammogram  ·     3.  Vitamin B-12 deficiency: B12 level 270, advised patient to take over the counter B-12 thousand micrograms orally daily.   · Encourage patient to take vitamin B12 1000 mcg twice weekly.    4. PCOS, Complicated with obesity: Patient attempted to undergo bariatric surgery in order to reduce weight and further help with her PCOS. This was stalled due to her recent breast cancer diagnosis. In past patient has been able to lose 85 lbs with diet modifications.   · Advised patient to exercise 5 times per week with 20 minutes of aerobics, total of 40 minutes/day .   · Suggested Live [facility] program and patient to visit the cancer resource center.  · Referral to Nutritionist for further recommendations.   · Referred patient to OB/GYN today for further evaluation.  · Continues to follow closely with Dr. Lexii Rahman with recent pelvic exam, planning to have endometrial biopsy tomorrow.  · S/p hysterectomy with bilateral salpingo-oophorectomy September 4, 2020.  Pathology benign  · Doing well    5.  Anxiety: Patient has significant anxiety, has been referred to Alexa Trinh.  In the past she has tried Zoloft Wellbutrin and Seroquel and did not have good response to any of these medications.  · Patient is being monitored by Alexa Trinh.  · Improved    6.  Hot flashes severe and also had mood swings following hysterectomy and salpingo-oophorectomy.  · 5/28/2021, hot flashes have significantly improved.  She does still experience occasional hot flashes.  · Resolved    7.  Hypertension.  Blood pressure control today.    8. Hypercalcemia  · The patient been evaluated by her PCP who first discontinued HCTZ  and started Lasix due to hypercalcemia and elevated liver function studies.  While off HCTZ the patients calcium normalized and her liver function studies started to improve.  Her blood pressure was not controlled on Lasix, so the patient was then placed on HCTZ/lisinopril and Lasix was discontinued.   · Calcium today elevated at 10.6, the patient wishes to follow with her PCP and adjust her blood pressure medication further to see if this normalizes her calcium before proceeding with any scans.  · Related to hydrochlorothiazide and has been discontinued.  Calcium 10.1  · Calcium increased to 10.5 again today.  PTH intact was 37.4.  Ionized calcium mildly elevated.  Does not appear to be hyperparathyroidism.  · Normalized calcium    9.  Bone density DEXA scan January 13, 2021 normal      PLAN:  1. Continue Arimidex  2. Continue vitamin D 3 2000 international units once daily   3. Increase exercise and patient is eating low carbohydrate diet  4. Plan to continue alternating an MRI of the breast as well as a mammogram of the breast every 6 months due to the patient's CHEK2 mutation.    5. Due for bone density January 2023  6. Next MRI due in October 2022.  Reviewed mammogram from Mary 10, 2022 which is negative  7. Follow-up with me in 6 months with labs    The patient is on high risk medication that requires close monitoring for toxicity.    Tia Trevizo MD           Cc: Dr. Trena Hyatt, VICENTE Myles

## 2022-07-14 NOTE — TELEPHONE ENCOUNTER
Call to let Ms. Carrizales know that her calcium level was elevated and Dr. Trevizo wanted to let her know to cut down on calcium supplements.  Patient states she is not taking calcium right now, but it may be in her multivitamin.  Just encouraged her to watch calcium intake in her diet as well.  Patient verbalized understanding and thanks for the call.

## 2022-07-15 ENCOUNTER — OFFICE VISIT (OUTPATIENT)
Dept: INTERNAL MEDICINE | Facility: CLINIC | Age: 52
End: 2022-07-15

## 2022-07-15 VITALS
WEIGHT: 293 LBS | BODY MASS INDEX: 47.09 KG/M2 | TEMPERATURE: 97.1 F | SYSTOLIC BLOOD PRESSURE: 148 MMHG | HEART RATE: 96 BPM | HEIGHT: 66 IN | OXYGEN SATURATION: 97 % | DIASTOLIC BLOOD PRESSURE: 94 MMHG

## 2022-07-15 DIAGNOSIS — I10 BENIGN ESSENTIAL HTN: Chronic | ICD-10-CM

## 2022-07-15 DIAGNOSIS — E66.01 CLASS 3 SEVERE OBESITY WITH SERIOUS COMORBIDITY AND BODY MASS INDEX (BMI) OF 50.0 TO 59.9 IN ADULT, UNSPECIFIED OBESITY TYPE: Chronic | ICD-10-CM

## 2022-07-15 DIAGNOSIS — Z00.00 PHYSICAL EXAM: Primary | ICD-10-CM

## 2022-07-15 DIAGNOSIS — E78.00 PURE HYPERCHOLESTEROLEMIA: Chronic | ICD-10-CM

## 2022-07-15 DIAGNOSIS — E55.9 VITAMIN D DEFICIENCY: Chronic | ICD-10-CM

## 2022-07-15 DIAGNOSIS — R73.03 PREDIABETES: Chronic | ICD-10-CM

## 2022-07-15 DIAGNOSIS — F41.9 ANXIETY: ICD-10-CM

## 2022-07-15 PROCEDURE — 99396 PREV VISIT EST AGE 40-64: CPT | Performed by: NURSE PRACTITIONER

## 2022-07-15 RX ORDER — LORAZEPAM 0.5 MG/1
0.5 TABLET ORAL EVERY 8 HOURS PRN
Qty: 30 TABLET | Refills: 0 | Status: SHIPPED | OUTPATIENT
Start: 2022-07-15

## 2022-07-15 NOTE — PROGRESS NOTES
Subjective   Emilie SAUNDERS is a 52 y.o. female who is here for a physical exam.    She notes increased anxiety over the past several months, has increased stress at work with changes at home. She has seen oncology psychiatry in the past and did not tolerate SSRIs/SRNIs well. She notes intermittent panic attacks and was given Lorazepam which has been helpful.     The following portions of the patient's history were reviewed and updated as appropriate: allergies, current medications, past social history and problem list.    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   • Sleep apnea     C-PAP IN USE   • Vitamin D deficiency          Current Outpatient Medications:   •  allopurinol (ZYLOPRIM) 300 MG tablet, TAKE 1 TABLET BY MOUTH DAILY, Disp: 90 tablet, Rfl: 1  •  amLODIPine (NORVASC) 5 MG tablet, TAKE 1 TABLET BY MOUTH DAILY, Disp: 90 tablet, Rfl: 2  •  anastrozole (Arimidex) 1 MG tablet, Take 1 tablet by mouth Daily., Disp: 90 tablet, Rfl: 1  •  furosemide (LASIX) 20 MG tablet, TAKE 1 TABLET BY MOUTH DAILY, Disp: 90 tablet, Rfl: 1  •  lisinopril (PRINIVIL,ZESTRIL) 40 MG tablet, Take 1 tablet by mouth Daily., Disp: 90 tablet, Rfl: 3  •  LORazepam (ATIVAN) 0.5 MG tablet, Take 1 tablet by mouth Every 8 (Eight) Hours As Needed for Anxiety. for anxiety, Disp: 30 tablet, Rfl: 0  •  metFORMIN ER (GLUCOPHAGE-XR) 500 MG 24 hr tablet, TAKE 1 TABLET BY MOUTH DAILY WITH BREAKFAST, Disp: 90 tablet, Rfl: 1  •  potassium chloride (KLOR-CON) 8  MEQ CR tablet, TAKE 1 TABLET BY MOUTH DAILY, Disp: 90 tablet, Rfl: 0    Allergies   Allergen Reactions   • Citalopram Rash   • Flexeril [Cyclobenzaprine] Unknown - High Severity     MUSCLES TENSE-OPPOSITE OF WHAT IT IS INTENDED TO DO-ELEVATES B/P   • Doxycycline Rash     Blisters on hands   • Keflex [Cephalexin] Rash   • Sulfa Antibiotics Rash     NAUSEA/VOMITING/FEVER   • Vilazodone Hcl Hives, Itching and Rash       Review of Systems   Constitutional: Positive for fatigue. Negative for activity change, appetite change, chills, diaphoresis, fever and unexpected weight change.   HENT: Positive for congestion and postnasal drip. Negative for dental problem, drooling, ear discharge, ear pain, facial swelling, hearing loss, mouth sores, nosebleeds, rhinorrhea, sinus pressure, sore throat, tinnitus and trouble swallowing.    Eyes: Negative for photophobia, pain, discharge, redness, itching and visual disturbance.   Respiratory: Negative for apnea, cough, choking, chest tightness, shortness of breath and wheezing.    Cardiovascular: Negative for chest pain, palpitations and leg swelling.        No orthopnea, PND, MACDONALD   Gastrointestinal: Negative for abdominal pain, blood in stool, constipation, diarrhea, nausea and vomiting.   Endocrine: Negative for cold intolerance, heat intolerance, polydipsia and polyuria.   Genitourinary: Negative for decreased urine volume, dysuria, enuresis, flank pain, frequency, hematuria and urgency.   Musculoskeletal: Negative for arthralgias, back pain, gait problem, joint swelling, myalgias, neck pain and neck stiffness.   Skin: Negative for color change and rash.        No hair changes, no nail changes   Allergic/Immunologic: Negative for environmental allergies, food allergies and immunocompromised state.   Neurological: Negative for dizziness, tremors, seizures, syncope, speech difficulty, weakness, light-headedness, numbness and headaches.   Hematological: Negative for adenopathy. Does  "not bruise/bleed easily.   Psychiatric/Behavioral: Positive for decreased concentration and dysphoric mood. Negative for agitation, confusion, sleep disturbance and suicidal ideas. The patient is nervous/anxious.        Objective   Vitals:    07/15/22 1422   BP: 148/94   BP Location: Left arm   Patient Position: Sitting   Cuff Size: Large Adult   Pulse: 96   Temp: 97.1 °F (36.2 °C)   TempSrc: Temporal   SpO2: 97%   Weight: (!) 140 kg (309 lb 6.4 oz)   Height: 167.6 cm (65.98\")     Physical Exam  Constitutional:       General: She is not in acute distress.     Appearance: Normal appearance. She is not diaphoretic.   HENT:      Head: Normocephalic and atraumatic.      Right Ear: Tympanic membrane, ear canal and external ear normal.      Left Ear: Tympanic membrane, ear canal and external ear normal.      Nose: Nose normal. No rhinorrhea.      Mouth/Throat:      Mouth: Mucous membranes are moist.      Pharynx: Oropharynx is clear.   Eyes:      General:         Right eye: No discharge.         Left eye: No discharge.      Conjunctiva/sclera: Conjunctivae normal.   Cardiovascular:      Rate and Rhythm: Normal rate and regular rhythm.      Pulses: Normal pulses.      Heart sounds: Normal heart sounds.   Pulmonary:      Effort: Pulmonary effort is normal.      Breath sounds: Normal breath sounds.   Chest:   Breasts:      Right: Normal. No mass, nipple discharge, skin change or tenderness.      Left: Normal. No mass, nipple discharge, skin change or tenderness.        Comments: Surgical scar right breast  Abdominal:      General: Bowel sounds are normal.      Tenderness: There is no abdominal tenderness.   Musculoskeletal:         General: No swelling or tenderness.      Cervical back: Normal range of motion.   Skin:     General: Skin is warm and dry.   Neurological:      General: No focal deficit present.      Mental Status: She is alert and oriented to person, place, and time.   Psychiatric:         Mood and Affect: Mood " normal.         Behavior: Behavior normal.         Judgment: Judgment normal.         Assessment & Plan   Diagnoses and all orders for this visit:    1. Physical exam (Primary)  -     Lipid Panel    2. Prediabetes  Assessment & Plan:  She is losing weight and following a low-carb, low sugar diet; recheck A1c today.    Orders:  -     Hemoglobin A1c    3. Pure hypercholesterolemia  Assessment & Plan:  She is trying to modify lifestyle to help with weight loss, recheck lipid panel.    Orders:  -     Lipid Panel    4. Benign essential HTN  Assessment & Plan:  BP is elevated in the office today despite taking Lisinopril daily. She notes increased stress which may be contributing. She will monitor BP at home and call if readings are consistently >140/90.      5. Vitamin D deficiency  Assessment & Plan:  She was previously managed on a weekly vitamin D supplement-recheck level today.    Orders:  -     Vitamin D 25 Hydroxy    6. Anxiety  Assessment & Plan:  She has increased anxiety over the past several months with frequent panic attacks.  She has done well with lorazepam on an as needed basis in the past.  She will continue Lorazepam and we will monitor closely.  Consider daily medication if there is a frequent need for lorazepam.    Orders:  -     LORazepam (ATIVAN) 0.5 MG tablet; Take 1 tablet by mouth Every 8 (Eight) Hours As Needed for Anxiety. for anxiety  Dispense: 30 tablet; Refill: 0    7. Class 3 severe obesity with serious comorbidity and body mass index (BMI) of 50.0 to 59.9 in adult, unspecified obesity type (HCC)  Assessment & Plan:  Patient's (Body mass index is 49.97 kg/m².) indicates that they are morbidly obese (BMI > 40 or > 35 with obesity - related health condition) with health conditions that include hypertension and dyslipidemias . Weight is improving with lifestyle modifications. BMI is is above average; BMI management plan is completed. We discussed portion control, increasing exercise and  consulting a Bariatric surgeon.   She is working with Dr. Rivera's office and is planning gastric sleeve surgery.      Other orders  -     Vitamin D 25 Hydroxy    Risk assessment:  She has a family hx (mother and father) of HTN-BP has been well-controlled with Lisinopril (elevate din office today).  Her Body mass index is 49.97 kg/m². - She is actively losing weight and is pursuing gastric sleeve as well.    Prevention:  Health Maintenance   Topic Date Due   • Hepatitis B (1 of 3 - Risk 3-dose series) Never done   • ZOSTER VACCINE (1 of 2) Never done   • Pneumococcal Vaccine 0-64 (2 - PCV) 10/01/2020   • COVID-19 Vaccine (4 - Booster) 03/20/2022   • ANNUAL PHYSICAL  03/31/2022   • URINE MICROALBUMIN  09/28/2022   • DIABETIC FOOT EXAM  09/29/2022   • INFLUENZA VACCINE  10/01/2022   • HEMOGLOBIN A1C  01/22/2023   • MAMMOGRAM  06/10/2023   • LIPID PANEL  07/22/2023   • PAP SMEAR  09/04/2023   • COLORECTAL CANCER SCREENING  12/05/2029   • TDAP/TD VACCINES (2 - Td or Tdap) 03/27/2030   • HEPATITIS C SCREENING  Completed   • DIABETIC EYE EXAM  Discontinued       Discussed healthy lifestyle choices such as maintaining a balanced diet low in carbohydrates and limiting caffeine and alcohol intake.  Recommended routine exercise for bone strength and cardiovascular health.       Patient has been erroneously marked as diabetic. Based on the available clinical information, she does not have diabetes and should therefore be excluded from diabetic health maintenance and quality measures for the remainder of the reporting period.

## 2022-07-23 PROBLEM — Z17.0 MALIGNANT NEOPLASM OF CENTRAL PORTION OF RIGHT BREAST IN FEMALE, ESTROGEN RECEPTOR POSITIVE: Chronic | Status: ACTIVE | Noted: 2019-05-14

## 2022-07-23 PROBLEM — N39.3 URINARY, INCONTINENCE, STRESS FEMALE: Chronic | Status: ACTIVE | Noted: 2021-11-19

## 2022-07-23 PROBLEM — C50.111 MALIGNANT NEOPLASM OF CENTRAL PORTION OF RIGHT BREAST IN FEMALE, ESTROGEN RECEPTOR POSITIVE: Chronic | Status: ACTIVE | Noted: 2019-05-14

## 2022-07-23 LAB
25(OH)D3+25(OH)D2 SERPL-MCNC: 18.7 NG/ML (ref 30–100)
CHOLEST SERPL-MCNC: 234 MG/DL (ref 100–199)
HBA1C MFR BLD: 6.3 % (ref 4.8–5.6)
HDLC SERPL-MCNC: 46 MG/DL
LDLC SERPL CALC-MCNC: 118 MG/DL (ref 0–99)
TRIGL SERPL-MCNC: 398 MG/DL (ref 0–149)
VLDLC SERPL CALC-MCNC: 70 MG/DL (ref 5–40)

## 2022-07-24 NOTE — ASSESSMENT & PLAN NOTE
BP is elevated in the office today despite taking Lisinopril daily. She notes increased stress which may be contributing. She will monitor BP at home and call if readings are consistently >140/90.

## 2022-07-24 NOTE — ASSESSMENT & PLAN NOTE
She has increased anxiety over the past several months with frequent panic attacks.  She has done well with lorazepam on an as needed basis in the past.  She will continue Lorazepam and we will monitor closely.  Consider daily medication if there is a frequent need for lorazepam.

## 2022-07-24 NOTE — ASSESSMENT & PLAN NOTE
Patient's (Body mass index is 49.97 kg/m².) indicates that they are morbidly obese (BMI > 40 or > 35 with obesity - related health condition) with health conditions that include hypertension and dyslipidemias . Weight is improving with lifestyle modifications. BMI is is above average; BMI management plan is completed. We discussed portion control, increasing exercise and consulting a Bariatric surgeon.   She is working with Dr. Rivera's office and is planning gastric sleeve surgery.

## 2022-07-25 DIAGNOSIS — E55.9 VITAMIN D DEFICIENCY: Primary | ICD-10-CM

## 2022-07-25 RX ORDER — ERGOCALCIFEROL 1.25 MG/1
50000 CAPSULE ORAL WEEKLY
Qty: 4 CAPSULE | Refills: 2 | Status: SHIPPED | OUTPATIENT
Start: 2022-07-25 | End: 2022-12-12 | Stop reason: SDUPTHER

## 2022-07-27 NOTE — PROGRESS NOTES
Patient had transvaginal ultrasound by Dr. Lexii Rahman and was found to have ovarian cyst.  She was referred to Dr. Vance and is going to have surgery hysterectomy with bilateral salpingo-oophorectomy on September 4, 2020.    We will plan to see her first week of October to check on the results of her surgery.    Tia Trevizo MD   Render Risk Assessment In Note?: no Additional Notes: Patient returning in a couple of weeks for treatment. Patient declines having any procedures done today as she is visiting her daughter. Detail Level: Simple

## 2022-08-05 ENCOUNTER — PREP FOR SURGERY (OUTPATIENT)
Dept: OTHER | Facility: HOSPITAL | Age: 52
End: 2022-08-05

## 2022-08-05 DIAGNOSIS — E66.01 OBESITY, CLASS III, BMI 40-49.9 (MORBID OBESITY): Primary | ICD-10-CM

## 2022-08-05 PROBLEM — E66.813 OBESITY, CLASS III, BMI 40-49.9 (MORBID OBESITY): Status: ACTIVE | Noted: 2022-08-05

## 2022-08-05 RX ORDER — PROMETHAZINE HYDROCHLORIDE 12.5 MG/1
12.5 TABLET ORAL ONCE AS NEEDED
Status: CANCELLED | OUTPATIENT
Start: 2022-09-12

## 2022-08-05 RX ORDER — SODIUM CHLORIDE, SODIUM LACTATE, POTASSIUM CHLORIDE, CALCIUM CHLORIDE 600; 310; 30; 20 MG/100ML; MG/100ML; MG/100ML; MG/100ML
100 INJECTION, SOLUTION INTRAVENOUS CONTINUOUS
Status: CANCELLED | OUTPATIENT
Start: 2022-09-12

## 2022-08-05 RX ORDER — SODIUM CHLORIDE 0.9 % (FLUSH) 0.9 %
3-10 SYRINGE (ML) INJECTION AS NEEDED
Status: CANCELLED | OUTPATIENT
Start: 2022-09-12

## 2022-08-05 RX ORDER — GABAPENTIN 250 MG/5ML
300 SOLUTION ORAL ONCE
Status: CANCELLED | OUTPATIENT
Start: 2022-09-12 | End: 2022-08-05

## 2022-08-05 RX ORDER — CHLORHEXIDINE GLUCONATE 0.12 MG/ML
15 RINSE ORAL SEE ADMIN INSTRUCTIONS
Status: CANCELLED | OUTPATIENT
Start: 2022-09-12

## 2022-08-05 RX ORDER — PROMETHAZINE HYDROCHLORIDE 25 MG/1
25 SUPPOSITORY RECTAL ONCE AS NEEDED
Status: CANCELLED | OUTPATIENT
Start: 2022-09-12

## 2022-08-05 RX ORDER — SODIUM CHLORIDE 0.9 % (FLUSH) 0.9 %
3 SYRINGE (ML) INJECTION EVERY 12 HOURS SCHEDULED
Status: CANCELLED | OUTPATIENT
Start: 2022-08-05

## 2022-08-05 RX ORDER — METOCLOPRAMIDE HYDROCHLORIDE 5 MG/ML
10 INJECTION INTRAMUSCULAR; INTRAVENOUS ONCE
Status: CANCELLED | OUTPATIENT
Start: 2022-09-12 | End: 2022-08-05

## 2022-08-05 RX ORDER — PANTOPRAZOLE SODIUM 40 MG/10ML
40 INJECTION, POWDER, LYOPHILIZED, FOR SOLUTION INTRAVENOUS ONCE
Status: CANCELLED | OUTPATIENT
Start: 2022-09-12 | End: 2022-08-05

## 2022-08-05 RX ORDER — SCOLOPAMINE TRANSDERMAL SYSTEM 1 MG/1
1 PATCH, EXTENDED RELEASE TRANSDERMAL CONTINUOUS
Status: CANCELLED | OUTPATIENT
Start: 2022-09-12 | End: 2022-09-15

## 2022-08-08 ENCOUNTER — TELEMEDICINE (OUTPATIENT)
Dept: INTERNAL MEDICINE | Facility: CLINIC | Age: 52
End: 2022-08-08

## 2022-08-08 DIAGNOSIS — U07.1 COVID-19 VIRUS INFECTION: Primary | ICD-10-CM

## 2022-08-08 PROCEDURE — 99213 OFFICE O/P EST LOW 20 MIN: CPT | Performed by: FAMILY MEDICINE

## 2022-08-08 NOTE — PROGRESS NOTES
"Chief Complaint  Cough exposed to COVID testing positive for COVID with symptoms x3 days.    Subjective        Emilie SAUNDERS presents to [facility]  Patient with 2 relatives tested positive for COVID and had COVID.  Now she became symptomatic on Saturday today is Monday so for 3 days she is gotten cough fever chills fevers 103 oxygen has been adequate no respiratory distress.  She is vaccinated x3 in 2021 for COVID-19.    She is to get an official test for work tomorrow we will have him started on paxlovid and she is agreeable to this.  Medication list is checked and reviewed for paxlovid eligibility.    You have chosen to receive care through a telehealth visit.  Do you consent to use a video/audio connection for your medical care today? Yes      Objective   Vital Signs:  There were no vitals taken for this visit.  Estimated body mass index is 49.97 kg/m² as calculated from the following:    Height as of 7/15/22: 167.6 cm (65.98\").    Weight as of 7/15/22: 140 kg (309 lb 6.4 oz).          Physical Exam  Constitutional:       Appearance: She is obese. She is ill-appearing.   HENT:      Head: Normocephalic.      Nose: Congestion present.   Pulmonary:      Effort: Pulmonary effort is normal.   Neurological:      General: No focal deficit present.   Psychiatric:         Mood and Affect: Mood normal.        Result Review :                Assessment and Plan   Diagnoses and all orders for this visit:    1. COVID-19 virus infection (Primary)  Assessment & Plan:  Precautions given I will prescribe paxlovid as discussed with the patient.  She will call back tomorrow for letter for quarantine for work after official test as required by her work.      Other orders  -     Nirmatrelvir & Ritonavir (PAXLOVID) 20 x 150 MG & 10 x 100MG tablet therapy pack tablet; Take 3 tablets by mouth 2 (Two) Times a Day for 5 days.  Dispense: 30 tablet; Refill: 0           Follow Up   Return if symptoms worsen " or fail to improve.  Patient was given instructions and counseling regarding her condition or for health maintenance advice. Please see specific information pulled into the AVS if appropriate.

## 2022-08-08 NOTE — ASSESSMENT & PLAN NOTE
Precautions given I will prescribe paxlovid as discussed with the patient.  She will call back tomorrow for letter for quarantine for work after official test as required by her work.

## 2022-08-25 ENCOUNTER — LAB (OUTPATIENT)
Dept: LAB | Facility: HOSPITAL | Age: 52
End: 2022-08-25

## 2022-08-25 ENCOUNTER — CONSULT (OUTPATIENT)
Dept: BARIATRICS/WEIGHT MGMT | Facility: CLINIC | Age: 52
End: 2022-08-25

## 2022-08-25 VITALS
BODY MASS INDEX: 50.02 KG/M2 | WEIGHT: 293 LBS | HEART RATE: 83 BPM | SYSTOLIC BLOOD PRESSURE: 171 MMHG | DIASTOLIC BLOOD PRESSURE: 91 MMHG | HEIGHT: 64 IN | TEMPERATURE: 98 F

## 2022-08-25 DIAGNOSIS — K31.7 GASTRIC POLYPS: ICD-10-CM

## 2022-08-25 DIAGNOSIS — G47.33 OSA (OBSTRUCTIVE SLEEP APNEA): ICD-10-CM

## 2022-08-25 DIAGNOSIS — R73.03 PREDIABETES: Chronic | ICD-10-CM

## 2022-08-25 DIAGNOSIS — E66.01 CLASS 3 SEVERE OBESITY WITH SERIOUS COMORBIDITY AND BODY MASS INDEX (BMI) OF 50.0 TO 59.9 IN ADULT, UNSPECIFIED OBESITY TYPE: Primary | Chronic | ICD-10-CM

## 2022-08-25 DIAGNOSIS — E66.01 OBESITY, CLASS III, BMI 40-49.9 (MORBID OBESITY): ICD-10-CM

## 2022-08-25 DIAGNOSIS — E28.2 PCOS (POLYCYSTIC OVARIAN SYNDROME): Chronic | ICD-10-CM

## 2022-08-25 DIAGNOSIS — I10 BENIGN ESSENTIAL HTN: Chronic | ICD-10-CM

## 2022-08-25 DIAGNOSIS — F41.9 ANXIETY: Chronic | ICD-10-CM

## 2022-08-25 PROBLEM — E66.813 OBESITY, CLASS III, BMI 40-49.9 (MORBID OBESITY): Status: RESOLVED | Noted: 2022-08-05 | Resolved: 2022-08-25

## 2022-08-25 LAB
ALBUMIN SERPL-MCNC: 4.1 G/DL (ref 3.5–5.2)
ALBUMIN/GLOB SERPL: 1.7 G/DL
ALP SERPL-CCNC: 97 U/L (ref 39–117)
ALT SERPL W P-5'-P-CCNC: 57 U/L (ref 1–33)
ANION GAP SERPL CALCULATED.3IONS-SCNC: 15 MMOL/L (ref 5–15)
AST SERPL-CCNC: 45 U/L (ref 1–32)
BILIRUB SERPL-MCNC: 0.9 MG/DL (ref 0–1.2)
BUN SERPL-MCNC: 10 MG/DL (ref 6–20)
BUN/CREAT SERPL: 18.5 (ref 7–25)
CALCIUM SPEC-SCNC: 10.2 MG/DL (ref 8.6–10.5)
CHLORIDE SERPL-SCNC: 101 MMOL/L (ref 98–107)
CO2 SERPL-SCNC: 18 MMOL/L (ref 22–29)
CREAT SERPL-MCNC: 0.54 MG/DL (ref 0.57–1)
DEPRECATED RDW RBC AUTO: 42.6 FL (ref 37–54)
EGFRCR SERPLBLD CKD-EPI 2021: 110.9 ML/MIN/1.73
ERYTHROCYTE [DISTWIDTH] IN BLOOD BY AUTOMATED COUNT: 14.4 % (ref 12.3–15.4)
GLOBULIN UR ELPH-MCNC: 2.4 GM/DL
GLUCOSE SERPL-MCNC: 113 MG/DL (ref 65–99)
HCT VFR BLD AUTO: 37.5 % (ref 34–46.6)
HGB BLD-MCNC: 12.9 G/DL (ref 12–15.9)
MCH RBC QN AUTO: 28.5 PG (ref 26.6–33)
MCHC RBC AUTO-ENTMCNC: 34.4 G/DL (ref 31.5–35.7)
MCV RBC AUTO: 82.8 FL (ref 79–97)
PLATELET # BLD AUTO: 264 10*3/MM3 (ref 140–450)
PMV BLD AUTO: 9.7 FL (ref 6–12)
POTASSIUM SERPL-SCNC: 4 MMOL/L (ref 3.5–5.2)
PROT SERPL-MCNC: 6.5 G/DL (ref 6–8.5)
RBC # BLD AUTO: 4.53 10*6/MM3 (ref 3.77–5.28)
SODIUM SERPL-SCNC: 134 MMOL/L (ref 136–145)
WBC NRBC COR # BLD: 7.92 10*3/MM3 (ref 3.4–10.8)

## 2022-08-25 PROCEDURE — 99215 OFFICE O/P EST HI 40 MIN: CPT | Performed by: SURGERY

## 2022-08-25 PROCEDURE — 36415 COLL VENOUS BLD VENIPUNCTURE: CPT

## 2022-08-25 PROCEDURE — 80053 COMPREHEN METABOLIC PANEL: CPT

## 2022-08-25 PROCEDURE — 85027 COMPLETE CBC AUTOMATED: CPT

## 2022-08-25 RX ORDER — CYANOCOBALAMIN/FOLIC AC/VIT B6 1-2.5-25MG
TABLET ORAL
Qty: 90 TABLET | OUTPATIENT
Start: 2022-08-25

## 2022-08-25 RX ORDER — ENOXAPARIN SODIUM 100 MG/ML
40 INJECTION SUBCUTANEOUS EVERY 12 HOURS SCHEDULED
Qty: 11.2 ML | Refills: 0 | Status: SHIPPED | OUTPATIENT
Start: 2022-08-25 | End: 2022-09-08

## 2022-08-25 NOTE — H&P
Bariatric Consult:  Referred by iPnky Hyatt APRN    Emilie SAUNDERS is here today for consult on No chief complaint on file.      History of Present Illness:     Emilie SAUNDERS is a 52 y.o. female with morbid obesity with co-morbidities including sleep apnea, diabetes, hypertension, dyslipidemia, osteoarthritis, back pain, knee pain and GERD who presents for surgical consultation for the above procedure. Emilie has completed the initial intake visit and has been examined by our nurse practitioner, dietician, psychologist and underwent the extensive educational teaching process under the guidance of our bariatric coordinator and myself. Emilie also has seen or if has not yet will see the educational video MONICA on the surgical procedure if available. Emilie attended today more educational teaching from our bariatric coordinator and myself. Emilie has had an extensive medical workup including a visit with their primary care physician, EKG, chest radiograph, blood work, EGD or UGI and possibly further testing. These have been reviewed by me and discussed with the patient. Emilie is now ready to proceed with surgery. Emilie presently denies nausea, vomiting, fever, chills, chest pain, shortness of air, melena, hematochezia, hemetemesis, dysuria, frequency, hematuria.       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   •  Class 3 severe obesity with serious comorbidity and body mass index (BMI) of 50.0 to 59.9 in adult, unspecified obesity type (HCC) Yes   • Prediabetes    • PCOS (polycystic ovarian syndrome)    • Benign essential HTN    • MARIBELL (obstructive sleep apnea) using CPAP    • Anxiety    • Gastric polyps        Past Surgical History:   Procedure Laterality Date   • APPENDECTOMY  1989    removed when removing dermoid on right ovary   • BREAST BIOPSY Right 2019   • BREAST LUMPECTOMY Right    • BREAST LUMPECTOMY WITH SENTINEL NODE BIOPSY Right 5/28/2019    Procedure: Right central partial mastectomy (with removal of the nipple-areola-complex) and sentinel lymph node biopsy;  Surgeon: Trena Greene MD;  Location: [facility] MAIN OR;  Service: General   • COLONOSCOPY N/A 12/5/2019    Procedure: COLONOSCOPY TO CECUM;  Surgeon: Pedrito Fulton Jr., MD;  Location: [facility] ENDOSCOPY;  Service: General   • DERMOID CYST  EXCISION  1989   • ENDOSCOPY N/A 12/28/2021    Procedure: ESOPHAGOGASTRODUODENOSCOPY WITH BIOPSY;  Surgeon: Delfino Rivera Jr., MD;  Location: [facility] ENDOSCOPY;  Service: General;  Laterality: N/A;  PRE- DYSPEPSIA  POST-- GASTRITIS, GASTRIC POLYPS   • HERNIA REPAIR  2006    umbilical   • HYSTERECTOMY Bilateral 09/04/2020    Dr. Neumann, due to ovarian cysts with previous hx breast cancer   • LAPAROSCOPIC CHOLECYSTECTOMY     • SALIVARY GLAND SURGERY  1993    due to cancer   • TONSILLECTOMY  01/01/1979    I was 9 years old       Patient Active Problem List   Diagnosis   • Anxiety   • Benign essential HTN   • Vitamin D deficiency   • Hyperlipidemia   • Lumbago   • Malignant neoplasm of central portion of right breast in female, estrogen receptor positive (HCC)   • Monoallelic mutation of CHEK2 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   • PCOS (polycystic ovarian syndrome)   • History of total abdominal hysterectomy and bilateral salpingo-oophorectomy   • Class 3 severe obesity with serious comorbidity and body mass index (BMI) of 50.0  to 59.9 in adult (HCC)   • Ankle edema   • Increased risk of breast cancer   • Hyperuricemia   • MARIBELL (obstructive sleep apnea) using CPAP   • Urinary, incontinence, stress female   • Anxious depression   • Prediabetes   • Dyspepsia   • Gastric polyps   • COVID-19 virus infection       Allergies   Allergen Reactions   • Citalopram Rash   • Flexeril [Cyclobenzaprine] Unknown - High Severity     MUSCLES TENSE-OPPOSITE OF WHAT IT IS INTENDED TO DO-ELEVATES B/P   • Doxycycline Rash     Blisters on hands   • Keflex [Cephalexin] Rash   • Sulfa Antibiotics Rash     NAUSEA/VOMITING/FEVER   • Vilazodone Hcl Hives, Itching and Rash         Current Outpatient Medications:   •  allopurinol (ZYLOPRIM) 300 MG tablet, TAKE 1 TABLET BY MOUTH DAILY, Disp: 90 tablet, Rfl: 1  •  amLODIPine (NORVASC) 5 MG tablet, TAKE 1 TABLET BY MOUTH DAILY, Disp: 90 tablet, Rfl: 2  •  anastrozole (Arimidex) 1 MG tablet, Take 1 tablet by mouth Daily., Disp: 90 tablet, Rfl: 1  •  furosemide (LASIX) 20 MG tablet, TAKE 1 TABLET BY MOUTH DAILY, Disp: 90 tablet, Rfl: 1  •  lisinopril (PRINIVIL,ZESTRIL) 40 MG tablet, Take 1 tablet by mouth Daily., Disp: 90 tablet, Rfl: 3  •  LORazepam (ATIVAN) 0.5 MG tablet, Take 1 tablet by mouth Every 8 (Eight) Hours As Needed for Anxiety. for anxiety, Disp: 30 tablet, Rfl: 0  •  metFORMIN ER (GLUCOPHAGE-XR) 500 MG 24 hr tablet, TAKE 1 TABLET BY MOUTH DAILY WITH BREAKFAST, Disp: 90 tablet, Rfl: 1  •  potassium chloride (KLOR-CON) 8 MEQ CR tablet, TAKE 1 TABLET BY MOUTH DAILY, Disp: 90 tablet, Rfl: 0  •  vitamin D (ERGOCALCIFEROL) 1.25 MG (05712 UT) capsule capsule, Take 1 capsule by mouth 1 (One) Time Per Week., Disp: 4 capsule, Rfl: 2  •  Enoxaparin Sodium (LOVENOX) 40 MG/0.4ML solution prefilled syringe syringe, Inject 0.4 mL under the skin into the appropriate area as directed Every 12 (Twelve) Hours for 14 days. Start after surgery unless instructed otherwise, Disp: 11.2 mL, Rfl: 0  •  folic acid-vit B6-vit B12  (FOLBEE) 2.5-25-1 MG tablet tablet, Take 1 tablet by mouth Daily., Disp: 40 tablet, Rfl: 0    Social History     Socioeconomic History   • Marital status:      Spouse name: Trena Montenegro   • Number of children: 0   Tobacco Use   • Smoking status: Never Smoker   • Smokeless tobacco: Never Used   • Tobacco comment: Parents smoked.  I have never smoked   Vaping Use   • Vaping Use: Never used   Substance and Sexual Activity   • Alcohol use: Not Currently     Comment: OCCASIONALLY   • Drug use: Never   • Sexual activity: Yes     Partners: Female     Birth control/protection: None, Surgical       Family History   Problem Relation Age of Onset   • Hypertension Mother    • Arthritis Mother    • Kidney cancer Mother    • Depression Mother         Not Dr sanders- edison go   • Heart disease Father    • Hernia Father    • Hypertension Father    • No Known Problems Brother    • Breast cancer Paternal Aunt    • Breast cancer Paternal Cousin    • Drug abuse Paternal Uncle         life long   • Drug abuse Paternal Uncle         life long   • Breast cancer Paternal Grandmother    • Osteoporosis Maternal Grandmother    • Lung cancer Paternal Grandfather    • Malig Hyperthermia Neg Hx        Review of Systems:  Review of Systems   Constitutional: Positive for fatigue.   Musculoskeletal: Positive for arthralgias and back pain.   All other systems reviewed and are negative.        Physical Exam:    Vital Signs:  Weight: (!) 140 kg (307 lb 9.6 oz)   Body mass index is 52.52 kg/m².  Temp: 98 °F (36.7 °C)   Heart Rate: 83   BP: 171/91       Physical Exam  Vitals reviewed.   HENT:      Head: Normocephalic and atraumatic.      Mouth/Throat:      Mouth: Mucous membranes are moist.      Pharynx: Oropharynx is clear.   Eyes:      General: No scleral icterus.     Extraocular Movements: Extraocular movements intact.      Conjunctiva/sclera: Conjunctivae normal.      Pupils: Pupils are equal, round, and reactive to light.   Neck:       Thyroid: No thyromegaly.   Cardiovascular:      Rate and Rhythm: Normal rate.   Pulmonary:      Effort: Pulmonary effort is normal. No respiratory distress.      Breath sounds: Normal breath sounds. No stridor. No wheezing or rhonchi.   Abdominal:      General: Bowel sounds are normal.      Palpations: Abdomen is soft.      Tenderness: There is no abdominal tenderness. There is no right CVA tenderness, left CVA tenderness, guarding or rebound.      Hernia: No hernia is present.   Musculoskeletal:         General: Normal range of motion.      Cervical back: Normal range of motion and neck supple.   Lymphadenopathy:      Cervical: No cervical adenopathy.   Skin:     General: Skin is warm and dry.      Findings: No erythema.   Neurological:      Mental Status: She is alert and oriented to person, place, and time.   Psychiatric:         Mood and Affect: Mood normal.         Behavior: Behavior normal.         Thought Content: Thought content normal.         Judgment: Judgment normal.           Assessment:    Emilie SAUNDERS is a 52 y.o. year old female with medically complicated severe obesity with a BMI of Body mass index is 52.52 kg/m². and multiple co-morbidities listed in the encounter diagnosis.    I think she is an appropriate candidate for this surgery, and is ready to proceed.      Plan/Discussion/Summary:  No hiatal hernia per me.  No PPI.  H. pylori negative.  Polyps benign    The patient has returned to the office for a surgical consultation and has requested to proceed with gastric sleeve.  I have had the opportunity to obtain a history, examine the patient and review the patient's chart. The procedure could be done laparoscopically and or robotically.    The patient understands that surgery is a tool and that weight loss is not guaranteed but only seen in the context of appropriate use, regular follow up, exercise and making appropriate food choices.     I personally discussed the potential complications  of the laparoscopic gastric sleeve with this patient.  The patient is well aware of potential complications of the surgery that include but not limited to bleeding, infections, deep vein thrombosis, pulmonary embolism, pulmonary complications such as pneumonia, cardiac event, hernias, small bowel obstruction, damage to the spleen or other organs, bowel injury, disfiguring scars, failure to lose weight, need for additional surgery, conversion to an open procedure and death.  The patient is also aware of complications which apply in particular to the gastric sleeve and can include but not limited to the leakage of gastric contents at the staple line, the development of an intra-abdominal abscess, gastroesophageal reflux disease, Joe's esophagus, ulcers, vitamin/mineral deficiencies, strictures, and the possibility of converting this procedure to a Cecilia-en-Y gastric bypass. The patient also understands the possibility of requiring an acid reducer medication for the rest of their life.    The risks, benefits, potential complications and alternative therapies were discussed at great length as outlined in our extensive consent forms, online consent and educational teaching processes.    The patient has confirmed the participation in the programs extensive educational activities.    All questions and concerns were answered to patient's satisfaction.  The patient now wishes to proceed with surgery.     The patient has agreed to a postoperative course of anitcoagulant therapy.      I instructed patient that the surgery could be laparoscopically and/or robotically.    I instructed patient to start on a H2 blocker or proton pump inhibitor if not already on one of these medications.    I explained in detail the procedures that are in the consent.  All of these procedures have a chance to convert to open if any technical challenges or complications do occur.  Bariatric surgery is not cosmetic surgery but rather a tool to  help a patient make a life-long commitment lifestyle change including diet, exercise, behavior changes, and taking supplemental vitamins and minerals.    Problems after surgery may require more operations to correct them.    The risks, benefits, alternatives, and potential complications of all of the procedures were explained in detail including, but not limited to death, anesthesia and medication adverse effect, deep venous thrombosis, pulmonary embolism, trocar site/incisional hernia, wound infection, abdominal infection, bleeding, failure to lose weight, gain weight, a change in body image, metabolic complications with vitamin deficiences and anemia.    Weight loss expectations were discussed with the patient in detail. The weight loss operations most commonly performed are the sleeve gastrectomy and the Cecilia-en-Y gastric bypass. These operations result in weight losses up to approximately 25-35% of initial body weight 12 to 24 months after surgery with the gastric bypass usually the higher percent of weight loss but depends on patient using the tool.    For the gastric bypass and loop duodenal switch (SATYA-S) the risks include but not limited to the following early complications:  Anastomotic leak/peritonitis, Cecilia/Alimentary/biliopancreatic limb obstruction, severe & minor wound infection/seroma, and nausea/vomiting.  Late complications can include but are not limited to malnutrition, vitamin deficiencies, frequent loose stools,  stomal stenosis, marginal ulcer, bowel obstruction, intussusception, internal, and incisional hernia.    Regarding the gastric sleeve, there is higher risk of dysphagia and reflux leading to possible Joe's esophagus compared to a gastric bypass, as well as risk of internal visceral/organ injury, splenectomy, bleeding, infection, leak (which could require further intervention possible conversion to Cecilia-en-Y gastric bypass), stenosis and possibility of regaining weight.    Emilie  was counseled regarding diagnostic results, instructions for management, risk factor reductions, prognosis, patient and family education, impressions, risks and benefits of treatment options and importance of compliance with treatment. Total time of the encounter was over 45 minutes counseling the patient regarding the procedure as above and reviewing as well as ordering labs, medications and the procedure.  The chart was also reviewed prior to seeing the patient reviewing previous testing, studies and labs.    Emilie understands the surgical procedures and the different surgical options that are available.  She understands the lifestyle changes that are required after surgery and has agreed to follow the guidelines outlined in the weight management program.  She also expressed understanding of the risks involved and had all of female questions answered and desires to proceed.      Delfino Rivera MD  8/25/2022

## 2022-08-25 NOTE — PATIENT INSTRUCTIONS
Bariatric Manual    You were provided a manual specific to the procedure that you have chosen.  Please refer to that with any questions or call the office at [phone]

## 2022-09-09 ENCOUNTER — PRE-ADMISSION TESTING (OUTPATIENT)
Dept: PREADMISSION TESTING | Facility: HOSPITAL | Age: 52
End: 2022-09-09

## 2022-09-09 VITALS
DIASTOLIC BLOOD PRESSURE: 89 MMHG | HEART RATE: 82 BPM | TEMPERATURE: 98.8 F | SYSTOLIC BLOOD PRESSURE: 156 MMHG | BODY MASS INDEX: 49.65 KG/M2 | HEIGHT: 66 IN | OXYGEN SATURATION: 99 % | RESPIRATION RATE: 16 BRPM

## 2022-09-09 RX ORDER — OMEPRAZOLE 20 MG/1
20 CAPSULE, DELAYED RELEASE ORAL EVERY MORNING
COMMUNITY
End: 2022-12-12

## 2022-09-09 RX ORDER — DIPHENOXYLATE HYDROCHLORIDE AND ATROPINE SULFATE 2.5; .025 MG/1; MG/1
1 TABLET ORAL DAILY
COMMUNITY

## 2022-09-09 RX ORDER — ACETAMINOPHEN 10 MG/ML
1000 INJECTION, SOLUTION INTRAVENOUS ONCE
Status: CANCELLED | OUTPATIENT
Start: 2022-09-12

## 2022-09-12 ENCOUNTER — ANESTHESIA EVENT (OUTPATIENT)
Dept: PERIOP | Facility: HOSPITAL | Age: 52
End: 2022-09-12

## 2022-09-12 ENCOUNTER — HOSPITAL ENCOUNTER (INPATIENT)
Facility: HOSPITAL | Age: 52
LOS: 1 days | Discharge: HOME | End: 2022-09-13
Attending: SURGERY | Admitting: SURGERY

## 2022-09-12 ENCOUNTER — ANESTHESIA (OUTPATIENT)
Dept: PERIOP | Facility: HOSPITAL | Age: 52
End: 2022-09-12

## 2022-09-12 DIAGNOSIS — E66.01 OBESITY, CLASS III, BMI 40-49.9 (MORBID OBESITY): ICD-10-CM

## 2022-09-12 PROBLEM — E66.813 OBESITY, CLASS III, BMI 40-49.9 (MORBID OBESITY): Status: RESOLVED | Noted: 2022-09-12 | Resolved: 2022-09-12

## 2022-09-12 PROBLEM — E66.813 OBESITY, CLASS III, BMI 40-49.9 (MORBID OBESITY): Status: ACTIVE | Noted: 2022-09-12

## 2022-09-12 PROCEDURE — 25010000002 AMISULPRIDE (ANTIEMETIC) 5 MG/2ML SOLUTION: Performed by: SURGERY

## 2022-09-12 PROCEDURE — 8E0W4CZ ROBOTIC ASSISTED PROCEDURE OF TRUNK REGION, PERCUTANEOUS ENDOSCOPIC APPROACH: ICD-10-PCS | Performed by: SURGERY

## 2022-09-12 PROCEDURE — 25010000002 FENTANYL CITRATE (PF) 50 MCG/ML SOLUTION: Performed by: NURSE ANESTHETIST, CERTIFIED REGISTERED

## 2022-09-12 PROCEDURE — 25010000002 VANCOMYCIN 10 G RECONSTITUTED SOLUTION: Performed by: SURGERY

## 2022-09-12 PROCEDURE — 25010000002 ONDANSETRON PER 1 MG: Performed by: SURGERY

## 2022-09-12 PROCEDURE — 88307 TISSUE EXAM BY PATHOLOGIST: CPT | Performed by: SURGERY

## 2022-09-12 PROCEDURE — 25010000002 ROPIVACAINE PER 1 MG: Performed by: SURGERY

## 2022-09-12 PROCEDURE — 25010000002 METOCLOPRAMIDE PER 10 MG: Performed by: SURGERY

## 2022-09-12 PROCEDURE — 25010000002 CLONIDINE PER 1 MG: Performed by: SURGERY

## 2022-09-12 PROCEDURE — 25010000002 KETOROLAC TROMETHAMINE PER 15 MG: Performed by: SURGERY

## 2022-09-12 PROCEDURE — 88342 IMHCHEM/IMCYTCHM 1ST ANTB: CPT | Performed by: SURGERY

## 2022-09-12 PROCEDURE — 43775 LAP SLEEVE GASTRECTOMY: CPT | Performed by: NURSE PRACTITIONER

## 2022-09-12 PROCEDURE — C9399 UNCLASSIFIED DRUGS OR BIOLOG: HCPCS | Performed by: SURGERY

## 2022-09-12 PROCEDURE — 25010000002 MAGNESIUM SULFATE PER 500 MG OF MAGNESIUM: Performed by: NURSE ANESTHETIST, CERTIFIED REGISTERED

## 2022-09-12 PROCEDURE — 25010000002 ENOXAPARIN PER 10 MG: Performed by: SURGERY

## 2022-09-12 PROCEDURE — 43775 LAP SLEEVE GASTRECTOMY: CPT | Performed by: SURGERY

## 2022-09-12 PROCEDURE — 25010000002 EPINEPHRINE 1 MG/ML SOLUTION 30 ML VIAL: Performed by: SURGERY

## 2022-09-12 PROCEDURE — 25010000002 DEXAMETHASONE PER 1 MG: Performed by: NURSE ANESTHETIST, CERTIFIED REGISTERED

## 2022-09-12 PROCEDURE — 25010000002 PHENYLEPHRINE 10 MG/ML SOLUTION: Performed by: NURSE ANESTHETIST, CERTIFIED REGISTERED

## 2022-09-12 PROCEDURE — 0DB64Z3 EXCISION OF STOMACH, PERCUTANEOUS ENDOSCOPIC APPROACH, VERTICAL: ICD-10-PCS | Performed by: SURGERY

## 2022-09-12 PROCEDURE — 25010000002 PROPOFOL 10 MG/ML EMULSION: Performed by: NURSE ANESTHETIST, CERTIFIED REGISTERED

## 2022-09-12 PROCEDURE — 25010000002 ONDANSETRON PER 1 MG: Performed by: NURSE ANESTHETIST, CERTIFIED REGISTERED

## 2022-09-12 DEVICE — IMPLANTABLE DEVICE
Type: IMPLANTABLE DEVICE | Site: STOMACH | Status: FUNCTIONAL
Brand: TITAN SGS STANDARD GASTRIC STAPLER

## 2022-09-12 DEVICE — SEALANT WND FIBRIN TISSEEL PREFIL/SYR/PRIMAFZ 4ML: Type: IMPLANTABLE DEVICE | Site: STOMACH | Status: FUNCTIONAL

## 2022-09-12 RX ORDER — GABAPENTIN 300 MG/1
300 CAPSULE ORAL EVERY 8 HOURS
Status: DISCONTINUED | OUTPATIENT
Start: 2022-09-12 | End: 2022-09-13 | Stop reason: HOSPADM

## 2022-09-12 RX ORDER — ACETAMINOPHEN 500 MG
1000 TABLET ORAL EVERY 6 HOURS
Status: DISCONTINUED | OUTPATIENT
Start: 2022-09-12 | End: 2022-09-13 | Stop reason: HOSPADM

## 2022-09-12 RX ORDER — SODIUM CHLORIDE, SODIUM LACTATE, POTASSIUM CHLORIDE, CALCIUM CHLORIDE 600; 310; 30; 20 MG/100ML; MG/100ML; MG/100ML; MG/100ML
100 INJECTION, SOLUTION INTRAVENOUS CONTINUOUS
Status: DISCONTINUED | OUTPATIENT
Start: 2022-09-12 | End: 2022-09-12

## 2022-09-12 RX ORDER — ENOXAPARIN SODIUM 100 MG/ML
40 INJECTION SUBCUTANEOUS ONCE
Status: COMPLETED | OUTPATIENT
Start: 2022-09-12 | End: 2022-09-12

## 2022-09-12 RX ORDER — PROMETHAZINE HYDROCHLORIDE 25 MG/1
25 TABLET ORAL ONCE AS NEEDED
Status: DISCONTINUED | OUTPATIENT
Start: 2022-09-12 | End: 2022-09-12 | Stop reason: HOSPADM

## 2022-09-12 RX ORDER — PANTOPRAZOLE SODIUM 40 MG/10ML
40 INJECTION, POWDER, LYOPHILIZED, FOR SOLUTION INTRAVENOUS ONCE
Status: COMPLETED | OUTPATIENT
Start: 2022-09-12 | End: 2022-09-12

## 2022-09-12 RX ORDER — FLUMAZENIL 0.1 MG/ML
0.2 INJECTION INTRAVENOUS AS NEEDED
Status: DISCONTINUED | OUTPATIENT
Start: 2022-09-12 | End: 2022-09-12 | Stop reason: HOSPADM

## 2022-09-12 RX ORDER — PROCHLORPERAZINE EDISYLATE 5 MG/ML
10 INJECTION INTRAMUSCULAR; INTRAVENOUS EVERY 6 HOURS PRN
Status: DISCONTINUED | OUTPATIENT
Start: 2022-09-12 | End: 2022-09-13 | Stop reason: HOSPADM

## 2022-09-12 RX ORDER — PROMETHAZINE HYDROCHLORIDE 12.5 MG/1
12.5 TABLET ORAL EVERY 4 HOURS PRN
Status: DISCONTINUED | OUTPATIENT
Start: 2022-09-12 | End: 2022-09-13 | Stop reason: HOSPADM

## 2022-09-12 RX ORDER — FENTANYL CITRATE 50 UG/ML
50 INJECTION, SOLUTION INTRAMUSCULAR; INTRAVENOUS
Status: DISCONTINUED | OUTPATIENT
Start: 2022-09-12 | End: 2022-09-12 | Stop reason: HOSPADM

## 2022-09-12 RX ORDER — PROMETHAZINE HYDROCHLORIDE 25 MG/1
25 SUPPOSITORY RECTAL ONCE AS NEEDED
Status: DISCONTINUED | OUTPATIENT
Start: 2022-09-12 | End: 2022-09-12 | Stop reason: HOSPADM

## 2022-09-12 RX ORDER — MAGNESIUM SULFATE HEPTAHYDRATE 500 MG/ML
INJECTION, SOLUTION INTRAMUSCULAR; INTRAVENOUS AS NEEDED
Status: DISCONTINUED | OUTPATIENT
Start: 2022-09-12 | End: 2022-09-12 | Stop reason: SURG

## 2022-09-12 RX ORDER — NALOXONE HCL 0.4 MG/ML
0.1 VIAL (ML) INJECTION
Status: DISCONTINUED | OUTPATIENT
Start: 2022-09-12 | End: 2022-09-13 | Stop reason: HOSPADM

## 2022-09-12 RX ORDER — EPHEDRINE SULFATE 50 MG/ML
5 INJECTION, SOLUTION INTRAVENOUS ONCE AS NEEDED
Status: DISCONTINUED | OUTPATIENT
Start: 2022-09-12 | End: 2022-09-12 | Stop reason: HOSPADM

## 2022-09-12 RX ORDER — DIPHENHYDRAMINE HCL 25 MG
25 CAPSULE ORAL
Status: DISCONTINUED | OUTPATIENT
Start: 2022-09-12 | End: 2022-09-12 | Stop reason: HOSPADM

## 2022-09-12 RX ORDER — LIDOCAINE HYDROCHLORIDE 10 MG/ML
0.5 INJECTION, SOLUTION EPIDURAL; INFILTRATION; INTRACAUDAL; PERINEURAL ONCE AS NEEDED
Status: COMPLETED | OUTPATIENT
Start: 2022-09-12 | End: 2022-09-12

## 2022-09-12 RX ORDER — FENTANYL CITRATE 50 UG/ML
INJECTION, SOLUTION INTRAMUSCULAR; INTRAVENOUS AS NEEDED
Status: DISCONTINUED | OUTPATIENT
Start: 2022-09-12 | End: 2022-09-12 | Stop reason: SURG

## 2022-09-12 RX ORDER — HYDROMORPHONE HYDROCHLORIDE 1 MG/ML
0.5 INJECTION, SOLUTION INTRAMUSCULAR; INTRAVENOUS; SUBCUTANEOUS
Status: DISCONTINUED | OUTPATIENT
Start: 2022-09-12 | End: 2022-09-12 | Stop reason: HOSPADM

## 2022-09-12 RX ORDER — LABETALOL HYDROCHLORIDE 5 MG/ML
5 INJECTION, SOLUTION INTRAVENOUS
Status: DISCONTINUED | OUTPATIENT
Start: 2022-09-12 | End: 2022-09-12 | Stop reason: HOSPADM

## 2022-09-12 RX ORDER — SCOLOPAMINE TRANSDERMAL SYSTEM 1 MG/1
1 PATCH, EXTENDED RELEASE TRANSDERMAL CONTINUOUS
Status: DISCONTINUED | OUTPATIENT
Start: 2022-09-12 | End: 2022-09-13 | Stop reason: HOSPADM

## 2022-09-12 RX ORDER — GLYCOPYRROLATE 0.2 MG/ML
INJECTION INTRAMUSCULAR; INTRAVENOUS AS NEEDED
Status: DISCONTINUED | OUTPATIENT
Start: 2022-09-12 | End: 2022-09-12 | Stop reason: SURG

## 2022-09-12 RX ORDER — SODIUM CHLORIDE 0.9 % (FLUSH) 0.9 %
3-10 SYRINGE (ML) INJECTION AS NEEDED
Status: DISCONTINUED | OUTPATIENT
Start: 2022-09-12 | End: 2022-09-12 | Stop reason: HOSPADM

## 2022-09-12 RX ORDER — SODIUM CHLORIDE, SODIUM LACTATE, POTASSIUM CHLORIDE, AND CALCIUM CHLORIDE .6; .31; .03; .02 G/100ML; G/100ML; G/100ML; G/100ML
IRRIGANT IRRIGATION AS NEEDED
Status: DISCONTINUED | OUTPATIENT
Start: 2022-09-12 | End: 2022-09-12 | Stop reason: HOSPADM

## 2022-09-12 RX ORDER — PROPOFOL 10 MG/ML
VIAL (ML) INTRAVENOUS AS NEEDED
Status: DISCONTINUED | OUTPATIENT
Start: 2022-09-12 | End: 2022-09-12 | Stop reason: SURG

## 2022-09-12 RX ORDER — ONDANSETRON 2 MG/ML
INJECTION INTRAMUSCULAR; INTRAVENOUS AS NEEDED
Status: DISCONTINUED | OUTPATIENT
Start: 2022-09-12 | End: 2022-09-12 | Stop reason: SURG

## 2022-09-12 RX ORDER — CHLORHEXIDINE GLUCONATE 0.12 MG/ML
15 RINSE ORAL SEE ADMIN INSTRUCTIONS
Status: COMPLETED | OUTPATIENT
Start: 2022-09-12 | End: 2022-09-12

## 2022-09-12 RX ORDER — ONDANSETRON 4 MG/1
4 TABLET, FILM COATED ORAL EVERY 4 HOURS PRN
Status: DISCONTINUED | OUTPATIENT
Start: 2022-09-12 | End: 2022-09-13 | Stop reason: HOSPADM

## 2022-09-12 RX ORDER — SODIUM CHLORIDE 0.9 % (FLUSH) 0.9 %
3 SYRINGE (ML) INJECTION EVERY 12 HOURS SCHEDULED
Status: DISCONTINUED | OUTPATIENT
Start: 2022-09-12 | End: 2022-09-12 | Stop reason: HOSPADM

## 2022-09-12 RX ORDER — LISINOPRIL 40 MG/1
40 TABLET ORAL EVERY MORNING
Status: DISCONTINUED | OUTPATIENT
Start: 2022-09-13 | End: 2022-09-13 | Stop reason: HOSPADM

## 2022-09-12 RX ORDER — MIRTAZAPINE 15 MG/1
15 TABLET, ORALLY DISINTEGRATING ORAL NIGHTLY
Status: DISCONTINUED | OUTPATIENT
Start: 2022-09-12 | End: 2022-09-13 | Stop reason: HOSPADM

## 2022-09-12 RX ORDER — ACETAMINOPHEN 160 MG/5ML
975 SOLUTION ORAL EVERY 6 HOURS
Status: DISCONTINUED | OUTPATIENT
Start: 2022-09-12 | End: 2022-09-13 | Stop reason: HOSPADM

## 2022-09-12 RX ORDER — HYDROMORPHONE HYDROCHLORIDE 2 MG/1
2 TABLET ORAL EVERY 4 HOURS PRN
Status: DISCONTINUED | OUTPATIENT
Start: 2022-09-12 | End: 2022-09-13 | Stop reason: HOSPADM

## 2022-09-12 RX ORDER — KETAMINE HYDROCHLORIDE 10 MG/ML
INJECTION INTRAMUSCULAR; INTRAVENOUS AS NEEDED
Status: DISCONTINUED | OUTPATIENT
Start: 2022-09-12 | End: 2022-09-12 | Stop reason: SURG

## 2022-09-12 RX ORDER — ONDANSETRON 4 MG/1
4 TABLET, ORALLY DISINTEGRATING ORAL EVERY 4 HOURS PRN
Status: DISCONTINUED | OUTPATIENT
Start: 2022-09-12 | End: 2022-09-13 | Stop reason: HOSPADM

## 2022-09-12 RX ORDER — ACETAMINOPHEN 10 MG/ML
INJECTION, SOLUTION INTRAVENOUS AS NEEDED
Status: DISCONTINUED | OUTPATIENT
Start: 2022-09-12 | End: 2022-09-12 | Stop reason: SURG

## 2022-09-12 RX ORDER — SODIUM CHLORIDE, SODIUM LACTATE, POTASSIUM CHLORIDE, CALCIUM CHLORIDE 600; 310; 30; 20 MG/100ML; MG/100ML; MG/100ML; MG/100ML
9 INJECTION, SOLUTION INTRAVENOUS CONTINUOUS
Status: DISCONTINUED | OUTPATIENT
Start: 2022-09-12 | End: 2022-09-12

## 2022-09-12 RX ORDER — PHENYLEPHRINE HYDROCHLORIDE 10 MG/ML
INJECTION INTRAVENOUS AS NEEDED
Status: DISCONTINUED | OUTPATIENT
Start: 2022-09-12 | End: 2022-09-12 | Stop reason: SURG

## 2022-09-12 RX ORDER — DIPHENHYDRAMINE HYDROCHLORIDE 50 MG/ML
12.5 INJECTION INTRAMUSCULAR; INTRAVENOUS
Status: DISCONTINUED | OUTPATIENT
Start: 2022-09-12 | End: 2022-09-12 | Stop reason: HOSPADM

## 2022-09-12 RX ORDER — HYDROCODONE BITARTRATE AND ACETAMINOPHEN 7.5; 325 MG/1; MG/1
1 TABLET ORAL ONCE AS NEEDED
Status: DISCONTINUED | OUTPATIENT
Start: 2022-09-12 | End: 2022-09-12 | Stop reason: HOSPADM

## 2022-09-12 RX ORDER — FAMOTIDINE 10 MG/ML
20 INJECTION, SOLUTION INTRAVENOUS EVERY 12 HOURS SCHEDULED
Status: DISCONTINUED | OUTPATIENT
Start: 2022-09-12 | End: 2022-09-13 | Stop reason: HOSPADM

## 2022-09-12 RX ORDER — METOCLOPRAMIDE HYDROCHLORIDE 5 MG/ML
10 INJECTION INTRAMUSCULAR; INTRAVENOUS EVERY 6 HOURS
Status: DISCONTINUED | OUTPATIENT
Start: 2022-09-12 | End: 2022-09-13 | Stop reason: HOSPADM

## 2022-09-12 RX ORDER — OXYCODONE AND ACETAMINOPHEN 7.5; 325 MG/1; MG/1
1 TABLET ORAL EVERY 4 HOURS PRN
Status: DISCONTINUED | OUTPATIENT
Start: 2022-09-12 | End: 2022-09-12 | Stop reason: HOSPADM

## 2022-09-12 RX ORDER — LIDOCAINE HYDROCHLORIDE 20 MG/ML
INJECTION, SOLUTION INFILTRATION; PERINEURAL AS NEEDED
Status: DISCONTINUED | OUTPATIENT
Start: 2022-09-12 | End: 2022-09-12 | Stop reason: SURG

## 2022-09-12 RX ORDER — ROCURONIUM BROMIDE 10 MG/ML
INJECTION, SOLUTION INTRAVENOUS AS NEEDED
Status: DISCONTINUED | OUTPATIENT
Start: 2022-09-12 | End: 2022-09-12 | Stop reason: SURG

## 2022-09-12 RX ORDER — ALBUTEROL SULFATE 2.5 MG/3ML
2.5 SOLUTION RESPIRATORY (INHALATION) EVERY 4 HOURS PRN
Status: DISCONTINUED | OUTPATIENT
Start: 2022-09-12 | End: 2022-09-13 | Stop reason: HOSPADM

## 2022-09-12 RX ORDER — HYDROMORPHONE HYDROCHLORIDE 1 MG/ML
0.5 INJECTION, SOLUTION INTRAMUSCULAR; INTRAVENOUS; SUBCUTANEOUS
Status: DISCONTINUED | OUTPATIENT
Start: 2022-09-12 | End: 2022-09-13 | Stop reason: HOSPADM

## 2022-09-12 RX ORDER — AMLODIPINE BESYLATE 5 MG/1
5 TABLET ORAL EVERY MORNING
Status: DISCONTINUED | OUTPATIENT
Start: 2022-09-13 | End: 2022-09-13 | Stop reason: HOSPADM

## 2022-09-12 RX ORDER — NALOXONE HCL 0.4 MG/ML
0.2 VIAL (ML) INJECTION AS NEEDED
Status: DISCONTINUED | OUTPATIENT
Start: 2022-09-12 | End: 2022-09-12 | Stop reason: HOSPADM

## 2022-09-12 RX ORDER — CYANOCOBALAMIN 1000 UG/ML
1000 INJECTION, SOLUTION INTRAMUSCULAR; SUBCUTANEOUS ONCE
Status: COMPLETED | OUTPATIENT
Start: 2022-09-13 | End: 2022-09-13

## 2022-09-12 RX ORDER — ONDANSETRON 2 MG/ML
4 INJECTION INTRAMUSCULAR; INTRAVENOUS EVERY 4 HOURS PRN
Status: DISCONTINUED | OUTPATIENT
Start: 2022-09-12 | End: 2022-09-13 | Stop reason: HOSPADM

## 2022-09-12 RX ORDER — ENOXAPARIN SODIUM 100 MG/ML
40 INJECTION SUBCUTANEOUS ONCE
Status: COMPLETED | OUTPATIENT
Start: 2022-09-13 | End: 2022-09-13

## 2022-09-12 RX ORDER — DIPHENHYDRAMINE HYDROCHLORIDE 50 MG/ML
25 INJECTION INTRAMUSCULAR; INTRAVENOUS EVERY 4 HOURS PRN
Status: DISCONTINUED | OUTPATIENT
Start: 2022-09-12 | End: 2022-09-13 | Stop reason: HOSPADM

## 2022-09-12 RX ORDER — PROMETHAZINE HYDROCHLORIDE 12.5 MG/1
12.5 SUPPOSITORY RECTAL EVERY 4 HOURS PRN
Status: DISCONTINUED | OUTPATIENT
Start: 2022-09-12 | End: 2022-09-13 | Stop reason: HOSPADM

## 2022-09-12 RX ORDER — MAGNESIUM HYDROXIDE 1200 MG/15ML
LIQUID ORAL AS NEEDED
Status: DISCONTINUED | OUTPATIENT
Start: 2022-09-12 | End: 2022-09-12 | Stop reason: HOSPADM

## 2022-09-12 RX ORDER — HYDRALAZINE HYDROCHLORIDE 20 MG/ML
5 INJECTION INTRAMUSCULAR; INTRAVENOUS
Status: DISCONTINUED | OUTPATIENT
Start: 2022-09-12 | End: 2022-09-12 | Stop reason: HOSPADM

## 2022-09-12 RX ORDER — DEXAMETHASONE SODIUM PHOSPHATE 10 MG/ML
INJECTION INTRAMUSCULAR; INTRAVENOUS AS NEEDED
Status: DISCONTINUED | OUTPATIENT
Start: 2022-09-12 | End: 2022-09-12 | Stop reason: SURG

## 2022-09-12 RX ORDER — MIDAZOLAM HYDROCHLORIDE 1 MG/ML
1 INJECTION INTRAMUSCULAR; INTRAVENOUS
Status: DISCONTINUED | OUTPATIENT
Start: 2022-09-12 | End: 2022-09-12 | Stop reason: HOSPADM

## 2022-09-12 RX ORDER — SODIUM CHLORIDE, SODIUM LACTATE, POTASSIUM CHLORIDE, CALCIUM CHLORIDE 600; 310; 30; 20 MG/100ML; MG/100ML; MG/100ML; MG/100ML
150 INJECTION, SOLUTION INTRAVENOUS CONTINUOUS
Status: DISCONTINUED | OUTPATIENT
Start: 2022-09-12 | End: 2022-09-13 | Stop reason: HOSPADM

## 2022-09-12 RX ORDER — GABAPENTIN 250 MG/5ML
300 SOLUTION ORAL EVERY 8 HOURS
Status: DISCONTINUED | OUTPATIENT
Start: 2022-09-12 | End: 2022-09-13 | Stop reason: HOSPADM

## 2022-09-12 RX ORDER — ONDANSETRON 2 MG/ML
4 INJECTION INTRAMUSCULAR; INTRAVENOUS ONCE AS NEEDED
Status: DISCONTINUED | OUTPATIENT
Start: 2022-09-12 | End: 2022-09-12 | Stop reason: HOSPADM

## 2022-09-12 RX ORDER — IBUPROFEN 600 MG/1
600 TABLET ORAL ONCE AS NEEDED
Status: DISCONTINUED | OUTPATIENT
Start: 2022-09-12 | End: 2022-09-12 | Stop reason: HOSPADM

## 2022-09-12 RX ORDER — METOCLOPRAMIDE HYDROCHLORIDE 5 MG/ML
10 INJECTION INTRAMUSCULAR; INTRAVENOUS ONCE
Status: COMPLETED | OUTPATIENT
Start: 2022-09-12 | End: 2022-09-12

## 2022-09-12 RX ORDER — GABAPENTIN 250 MG/5ML
300 SOLUTION ORAL ONCE
Status: COMPLETED | OUTPATIENT
Start: 2022-09-12 | End: 2022-09-12

## 2022-09-12 RX ORDER — LORAZEPAM 1 MG/1
1 TABLET ORAL EVERY 12 HOURS PRN
Status: DISCONTINUED | OUTPATIENT
Start: 2022-09-12 | End: 2022-09-13 | Stop reason: HOSPADM

## 2022-09-12 RX ORDER — HYDRALAZINE HYDROCHLORIDE 20 MG/ML
10 INJECTION INTRAMUSCULAR; INTRAVENOUS
Status: DISCONTINUED | OUTPATIENT
Start: 2022-09-12 | End: 2022-09-13 | Stop reason: HOSPADM

## 2022-09-12 RX ADMIN — METOCLOPRAMIDE 10 MG: 5 INJECTION, SOLUTION INTRAMUSCULAR; INTRAVENOUS at 09:03

## 2022-09-12 RX ADMIN — GABAPENTIN 300 MG: 250 SOLUTION ORAL at 09:08

## 2022-09-12 RX ADMIN — LIDOCAINE HYDROCHLORIDE 100 MG: 20 INJECTION, SOLUTION INFILTRATION; PERINEURAL at 11:48

## 2022-09-12 RX ADMIN — HYOSCYAMINE SULFATE 125 MCG: 0.12 TABLET, ORALLY DISINTEGRATING ORAL at 17:34

## 2022-09-12 RX ADMIN — AMISULPRIDE 10 MG: 2.5 INJECTION, SOLUTION INTRAVENOUS at 13:45

## 2022-09-12 RX ADMIN — SCOPALAMINE 1 PATCH: 1 PATCH, EXTENDED RELEASE TRANSDERMAL at 08:39

## 2022-09-12 RX ADMIN — ACETAMINOPHEN 1000 MG: 10 INJECTION, SOLUTION INTRAVENOUS at 12:34

## 2022-09-12 RX ADMIN — MAGNESIUM SULFATE HEPTAHYDRATE 2 G: 500 INJECTION, SOLUTION INTRAMUSCULAR; INTRAVENOUS at 12:02

## 2022-09-12 RX ADMIN — LIDOCAINE HYDROCHLORIDE 0.5 ML: 10 INJECTION, SOLUTION EPIDURAL; INFILTRATION; INTRACAUDAL; PERINEURAL at 09:00

## 2022-09-12 RX ADMIN — MIRTAZAPINE 15 MG: 15 TABLET, ORALLY DISINTEGRATING ORAL at 22:35

## 2022-09-12 RX ADMIN — DEXAMETHASONE SODIUM PHOSPHATE 8 MG: 10 INJECTION INTRAMUSCULAR; INTRAVENOUS at 12:04

## 2022-09-12 RX ADMIN — PHENYLEPHRINE HYDROCHLORIDE 100 MCG: 10 INJECTION, SOLUTION INTRAVENOUS at 12:38

## 2022-09-12 RX ADMIN — SUGAMMADEX 300 MG: 100 INJECTION, SOLUTION INTRAVENOUS at 12:40

## 2022-09-12 RX ADMIN — ONDANSETRON 4 MG: 2 INJECTION INTRAMUSCULAR; INTRAVENOUS at 17:12

## 2022-09-12 RX ADMIN — METOCLOPRAMIDE HYDROCHLORIDE 10 MG: 5 INJECTION INTRAMUSCULAR; INTRAVENOUS at 21:27

## 2022-09-12 RX ADMIN — KETAMINE HYDROCHLORIDE 20 MG: 10 INJECTION INTRAMUSCULAR; INTRAVENOUS at 11:48

## 2022-09-12 RX ADMIN — FENTANYL CITRATE 50 MCG: 50 INJECTION INTRAMUSCULAR; INTRAVENOUS at 11:48

## 2022-09-12 RX ADMIN — SODIUM CHLORIDE, POTASSIUM CHLORIDE, SODIUM LACTATE AND CALCIUM CHLORIDE 500 ML: 600; 310; 30; 20 INJECTION, SOLUTION INTRAVENOUS at 09:02

## 2022-09-12 RX ADMIN — ACETAMINOPHEN 1000 MG: 500 TABLET, FILM COATED ORAL at 18:40

## 2022-09-12 RX ADMIN — GLYCOPYRROLATE 0.2 MG: 0.2 INJECTION INTRAMUSCULAR; INTRAVENOUS at 12:08

## 2022-09-12 RX ADMIN — PANTOPRAZOLE SODIUM 40 MG: 40 INJECTION, POWDER, FOR SOLUTION INTRAVENOUS at 09:03

## 2022-09-12 RX ADMIN — GABAPENTIN 300 MG: 300 CAPSULE ORAL at 18:40

## 2022-09-12 RX ADMIN — KETAMINE HYDROCHLORIDE 10 MG: 10 INJECTION INTRAMUSCULAR; INTRAVENOUS at 11:59

## 2022-09-12 RX ADMIN — FAMOTIDINE 20 MG: 10 INJECTION INTRAVENOUS at 21:27

## 2022-09-12 RX ADMIN — CHLORHEXIDINE GLUCONATE 15 ML: 1.2 SOLUTION ORAL at 09:09

## 2022-09-12 RX ADMIN — PROPOFOL 150 MG: 10 INJECTION, EMULSION INTRAVENOUS at 11:48

## 2022-09-12 RX ADMIN — ROCURONIUM BROMIDE 50 MG: 50 INJECTION INTRAVENOUS at 11:49

## 2022-09-12 RX ADMIN — SODIUM CHLORIDE, POTASSIUM CHLORIDE, SODIUM LACTATE AND CALCIUM CHLORIDE: 600; 310; 30; 20 INJECTION, SOLUTION INTRAVENOUS at 12:24

## 2022-09-12 RX ADMIN — ROCURONIUM BROMIDE 10 MG: 50 INJECTION INTRAVENOUS at 12:31

## 2022-09-12 RX ADMIN — METOCLOPRAMIDE HYDROCHLORIDE 10 MG: 5 INJECTION INTRAMUSCULAR; INTRAVENOUS at 17:12

## 2022-09-12 RX ADMIN — SODIUM CHLORIDE, POTASSIUM CHLORIDE, SODIUM LACTATE AND CALCIUM CHLORIDE 150 ML/HR: 600; 310; 30; 20 INJECTION, SOLUTION INTRAVENOUS at 19:25

## 2022-09-12 RX ADMIN — SODIUM CHLORIDE, POTASSIUM CHLORIDE, SODIUM LACTATE AND CALCIUM CHLORIDE: 600; 310; 30; 20 INJECTION, SOLUTION INTRAVENOUS at 11:40

## 2022-09-12 RX ADMIN — FENTANYL CITRATE 50 MCG: 50 INJECTION INTRAMUSCULAR; INTRAVENOUS at 13:07

## 2022-09-12 RX ADMIN — ONDANSETRON 4 MG: 2 INJECTION INTRAMUSCULAR; INTRAVENOUS at 12:37

## 2022-09-12 RX ADMIN — VANCOMYCIN HYDROCHLORIDE 2000 MG: 10 INJECTION, POWDER, LYOPHILIZED, FOR SOLUTION INTRAVENOUS at 09:52

## 2022-09-12 RX ADMIN — PHENYLEPHRINE HYDROCHLORIDE 100 MCG: 10 INJECTION, SOLUTION INTRAVENOUS at 12:35

## 2022-09-12 RX ADMIN — PROPOFOL 50 MG: 10 INJECTION, EMULSION INTRAVENOUS at 11:49

## 2022-09-12 RX ADMIN — ACETAMINOPHEN 1000 MG: 500 TABLET, FILM COATED ORAL at 22:36

## 2022-09-12 RX ADMIN — FENTANYL CITRATE 50 MCG: 50 INJECTION INTRAMUSCULAR; INTRAVENOUS at 11:52

## 2022-09-12 RX ADMIN — ONDANSETRON 4 MG: 2 INJECTION INTRAMUSCULAR; INTRAVENOUS at 21:33

## 2022-09-12 RX ADMIN — ENOXAPARIN SODIUM 40 MG: 100 INJECTION SUBCUTANEOUS at 13:06

## 2022-09-12 RX ADMIN — PROPOFOL 150 MCG/KG/MIN: 10 INJECTION, EMULSION INTRAVENOUS at 11:52

## 2022-09-12 RX ADMIN — AZTREONAM 2 G: 2 INJECTION, POWDER, LYOPHILIZED, FOR SOLUTION INTRAMUSCULAR; INTRAVENOUS at 11:06

## 2022-09-12 NOTE — ANESTHESIA PREPROCEDURE EVALUATION
Anesthesia Evaluation     Patient summary reviewed and Nursing notes reviewed   history of anesthetic complications: PONV  NPO Solid Status: > 8 hours  NPO Liquid Status: > 2 hours           Airway   Mallampati: III  TM distance: >3 FB  Neck ROM: full  Possible difficult intubation  Dental - normal exam     Pulmonary - normal exam   (+) sleep apnea on CPAP,   (-) COPD, asthma, not a smoker, lung cancer  Cardiovascular - normal exam  Exercise tolerance: good (4-7 METS)    ECG reviewed  Rhythm: regular  Rate: normal    (+) hypertension well controlled 2 medications or greater, hyperlipidemia,   (-) valvular problems/murmurs, past MI, CAD, dysrhythmias, angina, CHF, orthopnea, cardiac stents, CABG, pericardial effusion      Neuro/Psych  (+) headaches, psychiatric history Anxiety and Depression,    (-) seizures, TIA, CVA  GI/Hepatic/Renal/Endo    (+) morbid obesity, GERD well controlled,  diabetes mellitus type 2 well controlled,   (-) PUD, hepatitis, liver disease, no renal disease, GI bleed    Musculoskeletal (-) negative ROS    Abdominal   (+) obese,     Abdomen: soft.   Substance History - negative use     OB/GYN          Other - negative ROS                       Anesthesia Plan    ASA 3     general     intravenous induction     Anesthetic plan, risks, benefits, and alternatives have been provided, discussed and informed consent has been obtained with: patient.    Plan discussed with CRNA.        CODE STATUS:

## 2022-09-12 NOTE — ANESTHESIA PROCEDURE NOTES
Airway  Urgency: elective    Date/Time: 9/12/2022 11:51 AM  Airway not difficult    General Information and Staff    Patient location during procedure: OR  CRNA/CAA: Helena Grijalva CRNA    Indications and Patient Condition    Preoxygenated: yes  Mask difficulty assessment: 2 - vent by mask + OA or adjuvant +/- NMBA    Final Airway Details  Final airway type: endotracheal airway      Successful airway: ETT  Cuffed: yes   Successful intubation technique: video laryngoscopy  Endotracheal tube insertion site: oral  Blade: CMAC  Blade size: D  ETT size (mm): 7.0  Cormack-Lehane Classification: grade I - full view of glottis  Placement verified by: chest auscultation and capnometry   Measured from: teeth  ETT/EBT  to teeth (cm): 21  Number of attempts at approach: 1  Assessment: lips, teeth, and gum same as pre-op and atraumatic intubation    Additional Comments  Teeth, tongue, lips, and gums in preop condition. VSS throughout. Easy mask w/airway. CMAC used for poor mouth opening/suspected difficult airway.

## 2022-09-12 NOTE — ANESTHESIA POSTPROCEDURE EVALUATION
Patient: Emilie SAUNDERS    Procedure Summary     Date: 09/12/22 Room / Location:  [facility]  [facility]  [facility]    Anesthesia Start: 1140 Anesthesia Stop: 1254    Procedure: GASTRIC SLEEVE LAPAROSCOPIC OR ROBOTIC (N/A Abdomen) Diagnosis:       Obesity, Class III, BMI 40-49.9 (morbid obesity) ([facility])      (Obesity, Class III, BMI 40-49.9 (morbid obesity) ([facility]) [E66.01])    Surgeons: Delfino Rivera Jr., MD Provider: Abilio Abbott MD    Anesthesia Type: general ASA Status: 3          Anesthesia Type: general    Vitals  Vitals Value Taken Time   /85 09/12/22 1315   Temp 36.4 °C (97.6 °F) 09/12/22 1250   Pulse 58 09/12/22 1331   Resp 16 09/12/22 1315   SpO2 96 % 09/12/22 1331   Vitals shown include unvalidated device data.        Post Anesthesia Care and Evaluation    Patient location during evaluation: bedside  Patient participation: complete - patient participated  Level of consciousness: awake and alert  Pain score: 0  Pain management: adequate    Airway patency: patent  Anesthetic complications: No anesthetic complications    Cardiovascular status: acceptable  Respiratory status: acceptable  Hydration status: acceptable    Comments: /85   Pulse 62   Temp 36.4 °C (97.6 °F) (Oral)   Resp 16   LMP 06/25/2020   SpO2 98%

## 2022-09-12 NOTE — OP NOTE
PREOPERATIVE DIAGNOSIS:  Morbid obesity with multiple comorbidities as referenced in the most recent history and physical.    POSTOPERATIVE DIAGNOSIS:  Morbid obesity with multiple comorbidities as referenced in the most recent history and physical.    PROCEDURES PERFORMED:  1.  Robotic assisted laparoscopic sleeve gastrectomy with Titan Stapler # [id number]  2.  Tisseel application    SURGEON:  CORINA Walls Jr., FACS    ASSISTANT: IJEOMA Elias      Surgery assisted and facilitated by a certified physician assistant, who directly resulted in a decreased operative time, anesthetic time, wound exposure, and possibly of an operative wound infection, thereby decreasing patient morbidity and ultimately total expenditures.  The surgical assistant assisted in placement of trochars, take down of the gastrocolic omentum, short gastric vessels and dissection at the angle of His.  Also assisted in retraction of the stomach during stapling so as not to kink the gastric sleeve.  Also assisted in removing of the gastric specimen, closure of the fascial defect as well as closure of the skin incisions.    ANESTHESIA:  General endotracheal and TAP block with Ropivacaine mixture    ESTIMATED BLOOD LOSS:   Less than 25 mL unless dictated below.    FLUIDS:  Crystalloids.    SPECIMENS:  Gastric remnant    DRAINS:  None.    COUNTS:  Correct.    COMPLICATIONS:  None.    INDICATIONS:  This patient with morbid obesity and associated comorbidities presents for elective laparoscopic,robotic, possible open sleeve gastrectomy.  The patient has received medical clearance to proceed.  The patient has undergone our extensive educational process and consent process and wishes to proceed.    DESCRIPTION OF PROCEDURE:  The patient was brought to the operating room and placed supine upon the operating room table. SCD hose were placed.  The patient underwent uneventful general endotracheal anesthesia per the anesthesiology staff. The  abdomen was prepped with ChloraPrep and draped in the usual sterile fashion. Anesthesia staff passed a 38-Kyrgyz bougie into the stomach to decompress the stomach and then was pulled back to the esophagus.      An 8 mm transverse incision was made just to the left of midline.  The peritoneal cavity was entered under direct camera visualization using a 5  mm 0° laparoscope and an Optiview trocar.  The abdomen was then insufflated to a pressure of 15 mmHg with CO2 gas.  Exploratory laparoscopy revealed no evidence of injury from the entrance technique and no significant abnormalities unless addendum dictated below.  An angled laparoscope was then used.  The patient was placed in reverse Trendelenburg position.  Under direct camera visualization two 8 mm trocar was placed in the left lateral midabdominal position.  A 12 mm trocar was placed in the right abdomen.  A Sadie retractor was placed through an epigastric incision and used to elevate the left lobe of the liver.      Next the 30 degree 8 mm scope was brought into the 8 mm port just to the left of the umbilicus after the corresponding trocar was docked to the da Jean Marie robot.  Targeting then took place and then the rest of the trochars were docked to the robot and angled into position.  Instruments were brought in through the trochars in place for laparoscopic view.       I then took control of the surgical console. At this point, approximately penitentiary along the greater curvature, the gastrocolic omentum was divided with the Vessel Sealer and this proceeded superiorly to the angle of His taking down the short gastric vessels.  All posterior attachments of the lesser sac and posterior aspect of the stomach to the pancreas were taken down as well.      Dissection then proceeded medially taking down the greater curvature with the vessel sealer to just proximal to the pylorus.  The 38-Kyrgyz bougie was passed back down into the stomach to aid in sizing the sleeve.        The right 12 mm intuitive trocar was removed and replaced with the 19 mm trocar.  The stomach was marked with indelible ink 3 cm at the incisura and approximately 4 to 5 cm from the pylorus.  The Titan stapler was advanced into the abdomen and placed into position and used to create the gastric sleeve.  The stapler was then removed after firing.    Insufflation of the stomach under water was performed and there was no evidence of leak.    Tisseel was then sprayed on the staple line.    The specimen was removed through the 19 mm port site.  The liver retractor was removed. The fascia at the 19 mm trocar site incision that was used for extraction was closed with a single 0 Vicryl suture in a figure-of-eight fashion placed under direct laparoscopic camera visualization with a suture passer and tying the knot extracorporeally.  The fascia in the area was infiltrated with local anesthesia. All incisions were then infiltrated with local anesthetic. The remaining trocars were removed under direct camera visualization with no bleeding noted from their sites.  The abdomen was desufflated of gas. The skin in each incision was closed using 4-0 antibiotic impregnated Monocryl in a subcuticular fashion followed by Dermabond.  The patient tolerated the procedure well without complication and was taken to the recovery room in stable condition.  All sponge, needle and instrument counts were correct.     The hiatus was checked for a hernia and no hernia was detected

## 2022-09-13 ENCOUNTER — READMISSION MANAGEMENT (OUTPATIENT)
Dept: CALL CENTER | Facility: HOSPITAL | Age: 52
End: 2022-09-13

## 2022-09-13 VITALS
SYSTOLIC BLOOD PRESSURE: 126 MMHG | OXYGEN SATURATION: 94 % | HEART RATE: 56 BPM | WEIGHT: 293 LBS | TEMPERATURE: 98.1 F | DIASTOLIC BLOOD PRESSURE: 80 MMHG | RESPIRATION RATE: 16 BRPM | BODY MASS INDEX: 47.09 KG/M2 | HEIGHT: 66 IN

## 2022-09-13 LAB
ALBUMIN SERPL-MCNC: 3.9 G/DL (ref 3.5–5.2)
ALBUMIN/GLOB SERPL: 1.7 G/DL
ALP SERPL-CCNC: 89 U/L (ref 39–117)
ALT SERPL W P-5'-P-CCNC: 49 U/L (ref 1–33)
ANION GAP SERPL CALCULATED.3IONS-SCNC: 14 MMOL/L (ref 5–15)
AST SERPL-CCNC: 31 U/L (ref 1–32)
BASOPHILS # BLD AUTO: 0.02 10*3/MM3 (ref 0–0.2)
BASOPHILS NFR BLD AUTO: 0.2 % (ref 0–1.5)
BILIRUB SERPL-MCNC: 0.5 MG/DL (ref 0–1.2)
BUN SERPL-MCNC: 8 MG/DL (ref 6–20)
BUN/CREAT SERPL: 10.5 (ref 7–25)
CALCIUM SPEC-SCNC: 9.8 MG/DL (ref 8.6–10.5)
CHLORIDE SERPL-SCNC: 106 MMOL/L (ref 98–107)
CO2 SERPL-SCNC: 22 MMOL/L (ref 22–29)
CREAT SERPL-MCNC: 0.76 MG/DL (ref 0.57–1)
DEPRECATED RDW RBC AUTO: 47.1 FL (ref 37–54)
EGFRCR SERPLBLD CKD-EPI 2021: 94.4 ML/MIN/1.73
EOSINOPHIL # BLD AUTO: 0 10*3/MM3 (ref 0–0.4)
EOSINOPHIL NFR BLD AUTO: 0 % (ref 0.3–6.2)
ERYTHROCYTE [DISTWIDTH] IN BLOOD BY AUTOMATED COUNT: 15.5 % (ref 12.3–15.4)
GLOBULIN UR ELPH-MCNC: 2.3 GM/DL
GLUCOSE SERPL-MCNC: 131 MG/DL (ref 65–99)
HCT VFR BLD AUTO: 38.8 % (ref 34–46.6)
HGB BLD-MCNC: 13.2 G/DL (ref 12–15.9)
IMM GRANULOCYTES # BLD AUTO: 0.06 10*3/MM3 (ref 0–0.05)
IMM GRANULOCYTES NFR BLD AUTO: 0.5 % (ref 0–0.5)
LYMPHOCYTES # BLD AUTO: 1.58 10*3/MM3 (ref 0.7–3.1)
LYMPHOCYTES NFR BLD AUTO: 13.9 % (ref 19.6–45.3)
MAGNESIUM SERPL-MCNC: 2.5 MG/DL (ref 1.6–2.6)
MCH RBC QN AUTO: 28.3 PG (ref 26.6–33)
MCHC RBC AUTO-ENTMCNC: 34 G/DL (ref 31.5–35.7)
MCV RBC AUTO: 83.3 FL (ref 79–97)
MONOCYTES # BLD AUTO: 0.82 10*3/MM3 (ref 0.1–0.9)
MONOCYTES NFR BLD AUTO: 7.2 % (ref 5–12)
NEUTROPHILS NFR BLD AUTO: 78.2 % (ref 42.7–76)
NEUTROPHILS NFR BLD AUTO: 8.85 10*3/MM3 (ref 1.7–7)
NRBC BLD AUTO-RTO: 0 /100 WBC (ref 0–0.2)
PHOSPHATE SERPL-MCNC: 2.8 MG/DL (ref 2.5–4.5)
PLATELET # BLD AUTO: 275 10*3/MM3 (ref 140–450)
PMV BLD AUTO: 10.2 FL (ref 6–12)
POTASSIUM SERPL-SCNC: 3.9 MMOL/L (ref 3.5–5.2)
PROT SERPL-MCNC: 6.2 G/DL (ref 6–8.5)
RBC # BLD AUTO: 4.66 10*6/MM3 (ref 3.77–5.28)
SODIUM SERPL-SCNC: 142 MMOL/L (ref 136–145)
WBC NRBC COR # BLD: 11.33 10*3/MM3 (ref 3.4–10.8)

## 2022-09-13 PROCEDURE — 80053 COMPREHEN METABOLIC PANEL: CPT | Performed by: SURGERY

## 2022-09-13 PROCEDURE — 25010000002 ENOXAPARIN PER 10 MG: Performed by: SURGERY

## 2022-09-13 PROCEDURE — 25010000002 METOCLOPRAMIDE PER 10 MG: Performed by: SURGERY

## 2022-09-13 PROCEDURE — 84100 ASSAY OF PHOSPHORUS: CPT | Performed by: SURGERY

## 2022-09-13 PROCEDURE — 25010000002 CYANOCOBALAMIN PER 1000 MCG: Performed by: SURGERY

## 2022-09-13 PROCEDURE — 85025 COMPLETE CBC W/AUTO DIFF WBC: CPT | Performed by: SURGERY

## 2022-09-13 PROCEDURE — 25010000002 THIAMINE PER 100 MG: Performed by: SURGERY

## 2022-09-13 PROCEDURE — 83735 ASSAY OF MAGNESIUM: CPT | Performed by: SURGERY

## 2022-09-13 RX ORDER — ONDANSETRON 4 MG/1
4 TABLET, ORALLY DISINTEGRATING ORAL EVERY 4 HOURS PRN
Qty: 20 TABLET | Refills: 0 | Status: SHIPPED | OUTPATIENT
Start: 2022-09-13 | End: 2022-11-10 | Stop reason: ALTCHOICE

## 2022-09-13 RX ORDER — ENOXAPARIN SODIUM 100 MG/ML
40 INJECTION SUBCUTANEOUS EVERY 12 HOURS SCHEDULED
Qty: 11.2 ML | Refills: 0
Start: 2022-09-13 | End: 2022-09-27

## 2022-09-13 RX ORDER — HYDROMORPHONE HYDROCHLORIDE 2 MG/1
2 TABLET ORAL EVERY 4 HOURS PRN
Qty: 18 TABLET | Refills: 0 | Status: SHIPPED | OUTPATIENT
Start: 2022-09-13 | End: 2022-09-22

## 2022-09-13 RX ADMIN — GABAPENTIN 300 MG: 300 CAPSULE ORAL at 09:02

## 2022-09-13 RX ADMIN — METOCLOPRAMIDE HYDROCHLORIDE 10 MG: 5 INJECTION INTRAMUSCULAR; INTRAVENOUS at 09:02

## 2022-09-13 RX ADMIN — FAMOTIDINE 20 MG: 10 INJECTION INTRAVENOUS at 09:02

## 2022-09-13 RX ADMIN — METOCLOPRAMIDE HYDROCHLORIDE 10 MG: 5 INJECTION INTRAMUSCULAR; INTRAVENOUS at 02:49

## 2022-09-13 RX ADMIN — AMLODIPINE BESYLATE 5 MG: 5 TABLET ORAL at 09:02

## 2022-09-13 RX ADMIN — CYANOCOBALAMIN 1000 MCG: 1000 INJECTION, SOLUTION INTRAMUSCULAR; SUBCUTANEOUS at 09:02

## 2022-09-13 RX ADMIN — ACETAMINOPHEN 1000 MG: 500 TABLET, FILM COATED ORAL at 05:28

## 2022-09-13 RX ADMIN — ACETAMINOPHEN 1000 MG: 500 TABLET, FILM COATED ORAL at 11:21

## 2022-09-13 RX ADMIN — ENOXAPARIN SODIUM 40 MG: 100 INJECTION SUBCUTANEOUS at 10:55

## 2022-09-13 RX ADMIN — FOLIC ACID 250 ML/HR: 5 INJECTION, SOLUTION INTRAMUSCULAR; INTRAVENOUS; SUBCUTANEOUS at 00:53

## 2022-09-13 RX ADMIN — SODIUM CHLORIDE, POTASSIUM CHLORIDE, SODIUM LACTATE AND CALCIUM CHLORIDE 150 ML/HR: 600; 310; 30; 20 INJECTION, SOLUTION INTRAVENOUS at 05:28

## 2022-09-13 RX ADMIN — LISINOPRIL 40 MG: 40 TABLET ORAL at 09:02

## 2022-09-13 NOTE — PLAN OF CARE
Goal Outcome Evaluation:  Plan of Care Reviewed With: patient        Progress: improving  Outcome Evaluation: Patient arrived from [facility] after undergoing lap gastric sleeve surgery in stable condition. VSS and voiding function is intact. Patient is able to ambulate with stand by assist and was able to ambulate in the gan. Patient did have x1 episode of spontaneous vomiting which was effectively treated with zofran. Patient later vomited again after attempt to take oral pills. Patient with complaints of epigastric gas pain, relieved with x1 dose of levsin. Patient's wife at bedside. Plan for d/c home with family assist tomorrow.

## 2022-09-13 NOTE — PLAN OF CARE
Goal Outcome Evaluation:              Outcome Evaluation: Pt for d/c to home today, education given for lovenox, needs attended, stable condition.

## 2022-09-13 NOTE — PROGRESS NOTES
Continued Stay Note  [facility]     Patient Name: Emilie SAUNDERS  MRN: [id number]  Today's Date: 9/13/2022    Admit Date: 9/12/2022     Discharge Plan     Row Name 09/13/22 1033       Plan    Final Discharge Disposition Code 01 - home or self-care    Final Note D/c home               Discharge Codes    No documentation.               Expected Discharge Date and Time     Expected Discharge Date Expected Discharge Time    Sep 13, 2022             Carolynn Reyes RN

## 2022-09-13 NOTE — DISCHARGE INSTRUCTIONS
GOING HOME AFTER GASTRIC SLEEVE/ GASTRIC BYPASS SURGERY  [facility] Weight Loss: Post-Operative Information/Instructions  Delfino Rivera Jr., MD  General Patient Instructions for Discharge   - Call Surgeon's office at [phone] for follow-up appointment.    - Be sure you, the patient, have a follow-up appointment to be seen within seven (7) days after discharge. If not, please call [phone] to schedule an appointment. If you are discharged on a Saturday or Sunday, please call Monday to schedule the appointment.  - Contact the Surgeon at [phone] for any questions or concerns, including temperature greater than or equal to 101F, shortness of breath, leg swelling, redness at incision sites, nausea, vomiting, chills, or problems or questions.    - Follow the Gastric Stage 1 Diet    Clear liquids, room temperature, sugar-free, caffeine-free, non-carbonated, 70 grams of protein, No Straws.  - You may shower. No tub bath for 2 weeks.  - No lifting, pushing, pulling, or tugging >25 pounds for 3 weeks.  - Ambulate every 3 hours while awake minimum for seven (7) days, increase distance daily.  - For the next several weeks, you are at an increased risk for blood clot formation. Therefore, you should walk regularly. You should not sit for prolonged periods of time, more than 45 minutes, without getting up and walking for 5-10 minutes. This includes any car rides, including the drive home from the hospital. If driving any distance greater than 30 miles over the next two (2) weeks, stop every 30-45 minutes and walk for 5-10 minutes each time.  - Continue using Incentive Spirometer and coughing exercises at least every two (2) hours while awake for one week.  - Continue use of CPAP/BIPAP for diagnosis of sleep apnea as directed.  - No driving or operating machinery allowed while taking narcotic (prescription) pain medication, and until you feel comfortable forcefully applying the brakes if needed. (This  usually takes more than 3 days.)    - Make an appointment with your Primary Care Physician within one week post-op to look at your home medications for possible changes or discontinuity.   Medications  - The nurse will provide a list of medications for you to continue at home   - If you received a Lovenox (Enoxaparin) or Apixiban (Eliquis) prescription at pre-op visit with Surgeon, start taking the medicine the morning after discharge unless directed otherwise.    - If you were prescribed Lovenox (Enoxaparin), review the education/teaching material/video with the nurse.    - Take post op pain meds as prescribed as needed.   - Continue Foltx until finished.   - Resume use of Actigall (Ursodiol) one (1) week after surgery if patient still has gallbladder. You should have been given a prescription at your pre-op visit. Contact the office if you do not have the prescription.   - Resume bariatric vitamin regimen as instructed in pre-op education with bariatric coordinator.    - Zegerid or Prilosec OTC (or generic) by mouth once daily for four (4) weeks unless you are already taking a proton pump inhibitor as home medication. Follow dosing instructions on package.   Nausea/Vomiting:  The following are possible causes for nausea/vomiting:  - Drinking too much or too fast.  - Sinus drainage/post nasal drip for allergy sufferers (you may take Sudafed, Claritin, Tylenol Sinus/Allergy, or other decongestants and nose sprays to help with this discomfort).  - Low blood sugar (sweating, shaky, irritable, weakness, dizzy or tunnel-vision) - treatment is to sip 100% fruit juice - no sugar added until symptoms subside.  - Acid in fruit juice - (may dilute with water or avoid).  - Eating or drinking something that is not on clear liquid (stage 1) diet.  Any nausea/vomiting that prohibits you from keeping fluids down for greater than 24 hours requires a call to the surgeon's office.  Urine:  Use your urine color as a guide to  determine if you are drinking enough fluid. The darker the urine, the more fluids you need to drink. Urine should be clear to light yellow if you are getting enough fluid. If you should experience frequency, burning or pain with urination, blood in urine, contact us or your primary care physician for possible UTI (urinary tract infection), which could require antibiotics (liquid preferred).  Bowel Movements:  You may not have a bowel movement for 2-5 days after going home. You may then experience liquid, runny or loose stools for approximately 3-4 weeks following surgery. This would require you to drink even more fluids to prevent dehydration. Some patients may experience constipation, which can be treated with increased fluids, drinking warm liquids, increased activity and the use of a Fleets Enema, Milk of Magnesia, or suppositories. The first couple of bowel movements could be bloody, tarry black or dark maroon in color. This is OK as long as the stool returns to a normal color in 1-2 days. If however, you have frequent or a large amount of bloody or tarry black stools and/or become light-headed or dizzy, you may be bleeding and require urgent attention. Please call us right away.  Abdominal Incisions:  You will have small incisions. Do not scrub incisions, but allow the warm, soapy water to run over the incisions, rinse well, and pat dry. You may use any brand of anti-bacterial soap. Do not use Peroxide or Neosporin type ointments on sites, unless instructed to do so by a surgeon or nurse. Monitor daily for signs/symptoms of infection, which might include: drainage with a foul odor, pain, redness, swelling or heat at the incision sight; fever, body aches and chills. If you suspect infection or have a fever, give us a call.  Pain:  You will be given a prescription for pain medication to control your pain. If you feel the dose is too strong, you may take half the ordered dose, or you may take Tylenol adult liquid  per package instructions for minor pain. Do not take any medications that contain aspirin or aspirin products.  Do not take medications like: Motrin, Aleve, Ibuprofen, Advil, Naproxen, Celebrex, Daypro, Bextra, Meloxicam or other medications commonly used for arthritis or joint pain.  No steroids or cortisone injections. There may be pain, which should improve every few days. Pain should not suddenly get worse or more intense. Pain that suddenly changes and is constant and severe should be called in to the surgeon's office. Any sudden pain in the lower extremities with associated warmth and redness should be called in to the surgeon's office immediately. Do not rub or massage this area, as it could be a blood clot.  Diet:  Remain on the clear liquid diet (stage 1) per your  which includes 70 grams of protein each day, sugar free, non carbonated and no straws. Day 1 is the day of surgery. If you are tolerating the stage 1 diet, you may then proceed to stage 2 diet, as instructed in the . Do not progress to the stage 2 diet if you are having nausea/vomiting. Refer to the Basic Nutrition and Food Principles guide.  Medications:  The nurse will let you know which medications you will need to continue once you go home. Do not take any medications that are extended or time released if you had the gastric bypass procedure, OK to take if you had the gastric sleeve procedure. Large capsules can be opened and diluted with clear liquids. Check with your physician or pharmacist as to which pills may be crushed and which capsules may be opened and diluted safely. Continue taking Foltx as surgeon orders. If you still have your gall bladder and were prescribed Actigall (Ursodiol), you may resume this medication one week after your surgery. You will remain on Actigall (Ursodiol) for approximately 6 months. The dose is 1 pill, 2 times each day for 6 months.  Activity:  Continue your deep breathing and  coughing exercises with your Incentive Spirometer breathing device at least every 3 hours while awake (10 repetitions each time) for one week. May use CPAP. This will help to prevent respiratory problems such as pneumonia. No lifting, pulling or tugging anything over 25 pounds for 3 weeks after surgery. You may shower but no tub baths, hot tubs or swimming for 2 weeks. Moderate walking is recommended every 3 hours while awake minimum, increase distance daily. Further exercise will be discussed at the first post-op visit. No driving or operating machinery allow until off narcotic pain medication and until you feel comfortable forcefully applying the brakes (usually takes 3 or more days). For the next few weeks you are at an increased risk for blood clot formation. Therefore you should walk regularly and you should not sit for prolonged periods of time, more than 45 minutes without getting up and walking for 5-10 minutes. This includes car rides. Including riding home from the hospital. If riding a distance greater than 30 miles over the next 2 weeks stop every 30-45 minutes and walk 5-10 mintues each time. No tanning bed use for 8 weeks after surgery and in general, not recommended due to the increased risk for skin cancer. Incisions will burn/blister very badly with tanning bed use.  Illness:  Your primary care physician should treat general illness such as ear infections, sinus infections, and viral type illnesses, etc. Medications prescribed should be liquid/elixir form when possible, for the first 30 days.  General:  In general, it is recommended that you weigh yourself no more than once per week. Let the weight come off you and concentrate on more important things. Remember the weight was not gained overnight, nor will it be lost overnight. Gastric Bypass/ Gastric Sleeve weight loss will continue over a period of 12-18 months. Do not  yourself according to how others are doing after surgery, as this will  cause unnecessary discouragement.  THE ABOVE ARE GENERAL GUIDELINES TO ASSIST YOU ONCE HOME, IF YOU ARE IN DOUBT, OR YOU HAVE ANY QUESTIONS, CALL US AT THE NUMBERS LISTED BELOW.  IN THE EVENT OF SUDDEN CHEST PAIN, SHORTNESS OF BREATH, OR ANY LIFE THREATENING CONDITION, CALL 911.  Any time you are evaluated or admitted to another facility, please have someone notify the surgeon's office.  Supplements:  70 grams of protein taken EVERY DAY. Remember to drink at least 64 ounces of fluid a day, sipping slowly early on. Increase this amount during the summertime. Sipping slowly will not stretch your new stomach. Drinking too fast or gulping liquids will cause brief discomfort and early could cause staple line disruption (leak). With eating, tiny bites, then chew, chew, chew, and swallow. Lay your fork/spoon down for 2-3 minutes, and then take your next bite. Your pouch will tell you within 1-2 bites if it is going to tolerate what you are eating.   Protein Vendors:  Refer to protein vendors' handout from consult class. You can always find protein drinks at the bariatric office, grocery stores, [employer], drug [employer], [employer], health food stores, and on the Internet. Find one high in protein (15-30 grams per serving) and low carb (less than 18 grams per serving).  Now is a great time to re-read your . Please review specific instructions given to you at discharge by your physician (surgeon).  HOW/WHEN TO CONTACT US:  It is imperative that you contact us with any of the following:    - fever greater than 101 degrees  - shortness of breath  - leg swelling  - body aches  - shaking chills  - nausea and vomitting  - pain that has worsened  - redness at incision sites  - pus or foul smelling drainage from an incision or wound  - inability to keep fluids down for more than a day  - any other condition you feel needs our attention.  [facility] - Bariatric: [phone] call this number anytime 24 hours a  day / 7 days a week.  Teach-back Questions to be answered by the patient prior to discharge.   What complications would prompt you to call your doctor when you return home? _________________    What is the purpose of your prescribed medication? ________________  What are some potential side effects of the medications you will be taking at home? _______________

## 2022-09-14 ENCOUNTER — TRANSITIONAL CARE MANAGEMENT TELEPHONE ENCOUNTER (OUTPATIENT)
Dept: CALL CENTER | Facility: HOSPITAL | Age: 52
End: 2022-09-14

## 2022-09-14 LAB
LAB AP CASE REPORT: NORMAL
PATH REPORT.ADDENDUM SPEC: NORMAL
PATH REPORT.FINAL DX SPEC: NORMAL
PATH REPORT.GROSS SPEC: NORMAL

## 2022-09-14 NOTE — OUTREACH NOTE
Prep Survey    Flowsheet Row Responses   [facility] facility patient discharged from? [place]   Is LACE score < 7 ? Yes   Emergency Room discharge w/ pulse ox? No   Eligibility [facility]   Date of Admission 09/12/22   Date of Discharge 09/13/22   Discharge Disposition Home or Self Care   Discharge diagnosis Gastric sleeve lap   Does the patient have one of the following disease processes/diagnoses(primary or secondary)? General Surgery   Does the patient have Home health ordered? No   Is there a DME ordered? No   Prep survey completed? Yes          HILDA WARREN - Registered Nurse

## 2022-09-14 NOTE — DISCHARGE SUMMARY
"Discharge Summary    Patient name: Emilie SAUNDERS    Medical record number: [id number]    Admission date: 9/12/2022  Discharge date:  9/13/2022    Attending physician: Dr. Delfino Rivera    Primary care physician: Pinky Hyatt APRN    Referring physician: Delfino Rivera Jr., MD  [address] [address]    Condition on discharge: Stable    Primary Diagnoses:  Morbid obesity with co-morbidities    Operative Procedure:  Robotic assisted laparoscopic sleeve gastrectomy     Emilie SAUNDERS  is post op day one status post procedure listed. Patient denies shortness of air and lower extremity pain. Feels better than yesterday. No vomiting this am. Ambulating well and using incentive spirometer.          /80   Pulse 56   Temp 98.1 °F (36.7 °C) (Oral)   Resp 16   Ht 167.6 cm (65.98\")   Wt (!) 140 kg (308 lb 10.3 oz)   LMP 06/25/2020   SpO2 94%   BMI 49.84 kg/m²     General:  alert, appears stated age and cooperative   Abdomen: soft, bowel sounds active, appropriate tenderness   Incision:   healing well, no drainage, no erythema, no hernia, no seroma, no swelling, no dehiscence, incision well approximated   Heart: Regular rate   Lungs: Clear to auscultation bilaterally     I reviewed the patient's new clinical results.     Lab Results (last 24 hours)     Procedure Component Value Units Date/Time    Comprehensive Metabolic Panel [[id number]]  (Abnormal) Collected: 09/13/22 0527    Specimen: Blood Updated: 09/13/22 0629     Glucose 131 mg/dL      BUN 8 mg/dL      Creatinine 0.76 mg/dL      Sodium 142 mmol/L      Potassium 3.9 mmol/L      Comment: Slight hemolysis detected by analyzer. Results may be affected.        Chloride 106 mmol/L      CO2 22.0 mmol/L      Calcium 9.8 mg/dL      Total Protein 6.2 g/dL      Albumin 3.90 g/dL      ALT (SGPT) 49 U/L      AST (SGOT) 31 U/L      Alkaline Phosphatase 89 U/L      Total Bilirubin 0.5 mg/dL      Globulin 2.3 gm/dL      A/G Ratio 1.7 " g/dL      BUN/Creatinine Ratio 10.5     Anion Gap 14.0 mmol/L      eGFR 94.4 mL/min/1.73      Comment: National Kid[employer] and American Society of Nephrology (ASN) Task Force recommended calculation based on the Chronic Kidney Disease Epidemiology Collaboration (CKD-EPI) equation refit without adjustment for race.       Narrative:      GFR Normal >60  Chronic Kidney Disease <60  Kidney Failure <15      Phosphorus [[id number]]  (Normal) Collected: 09/13/22 0527    Specimen: Blood Updated: 09/13/22 0629     Phosphorus 2.8 mg/dL     Magnesium [[id number]]  (Normal) Collected: 09/13/22 0527    Specimen: Blood Updated: 09/13/22 0628     Magnesium 2.5 mg/dL     CBC & Differential [[id number]]  (Abnormal) Collected: 09/13/22 0527    Specimen: Blood Updated: 09/13/22 0603    Narrative:      The following orders were created for panel order CBC & Differential.  Procedure                               Abnormality         Status                     ---------                               -----------         ------                     CBC Auto Differential[[id number]]        Abnormal            Final result                 Please view results for these tests on the individual orders.    CBC Auto Differential [[id number]]  (Abnormal) Collected: 09/13/22 0527    Specimen: Blood Updated: 09/13/22 0603     WBC 11.33 10*3/mm3      RBC 4.66 10*6/mm3      Hemoglobin 13.2 g/dL      Hematocrit 38.8 %      MCV 83.3 fL      MCH 28.3 pg      MCHC 34.0 g/dL      RDW 15.5 %      RDW-SD 47.1 fl      MPV 10.2 fL      Platelets 275 10*3/mm3      Neutrophil % 78.2 %      Lymphocyte % 13.9 %      Monocyte % 7.2 %      Eosinophil % 0.0 %      Basophil % 0.2 %      Immature Grans % 0.5 %      Neutrophils, Absolute 8.85 10*3/mm3      Lymphocytes, Absolute 1.58 10*3/mm3      Monocytes, Absolute 0.82 10*3/mm3      Eosinophils, Absolute 0.00 10*3/mm3      Basophils, Absolute 0.02 10*3/mm3      Immature Grans, Absolute 0.06 10*3/mm3      nRBC 0.0 /100  WBC              Assessment:      Doing well postoperatively.      Plan:   1. Continue Stage 1 diet  2. Continue with ambulation and Incentive spirometry  3. Plan for d/c home    Patient was seen and examined by Dr. Rivera.    Hospital Course: The patient is a very pleasant 52 y.o. female that was admitted to the hospital with morbid obesity with comorbidities who underwent the above mentioned procedure without complication.  Postoperatively the patient was then transferred to the bariatric unit per protocol.  The patient was afebrile with vital signs stable.  They were ambulating well and using the incentive spirometer.  POD 1 the patient was started on bariatric diet which they tolerated without difficulty.  Patient was then discharged home to follow up as an outpatient.      Discharge medications:      Discharge Medications      New Medications      Instructions Start Date   Enoxaparin Sodium 40 MG/0.4ML solution prefilled syringe syringe  Commonly known as: LOVENOX   40 mg, Subcutaneous, Every 12 Hours Scheduled      HYDROmorphone 2 MG tablet  Commonly known as: Dilaudid   2 mg, Oral, Every 4 Hours PRN      ondansetron ODT 4 MG disintegrating tablet  Commonly known as: ZOFRAN-ODT   4 mg, Translingual, Every 4 Hours PRN         Changes to Medications      Instructions Start Date   amLODIPine 5 MG tablet  Commonly known as: NORVASC  What changed: when to take this   5 mg, Oral, Daily      lisinopril 40 MG tablet  Commonly known as: PRINIVIL,ZESTRIL  What changed: when to take this   40 mg, Oral, Daily      potassium chloride 8 MEQ CR tablet  Commonly known as: KLOR-CON  What changed: additional instructions   8 mEq, Oral, Daily      vitamin D 1.25 MG (91276 UT) capsule capsule  Commonly known as: ERGOCALCIFEROL  What changed: additional instructions   50,000 Units, Oral, Weekly         Continue These Medications      Instructions Start Date   allopurinol 300 MG tablet  Commonly known as: ZYLOPRIM   300 mg, Oral,  Daily      anastrozole 1 MG tablet  Commonly known as: Arimidex   1 mg, Oral, Daily      folic acid-vit B6-vit B12 2.5-25-1 MG tablet tablet  Commonly known as: FOLBEE   1 tablet, Oral, Daily      furosemide 20 MG tablet  Commonly known as: LASIX   20 mg, Oral, Daily      LORazepam 0.5 MG tablet  Commonly known as: ATIVAN   0.5 mg, Oral, Every 8 Hours PRN, for anxiety      metFORMIN  MG 24 hr tablet  Commonly known as: GLUCOPHAGE-XR   500 mg, Oral, Daily With Breakfast      multivitamin tablet tablet   1 tablet, Oral, Daily      omeprazole 20 MG capsule  Commonly known as: priLOSEC   20 mg, Oral, Every Morning      PROBIOTIC PO   1 capsule, Oral, Daily, PT HOLDING FOR SURGERY             Discharge instructions:  Per Bariatric manual; per our protocol      Follow-up appointment: Follow up with Dr. Rivera in the office as scheduled.  If not already scheduled call for appointment at [phone].

## 2022-09-14 NOTE — PAYOR COMM NOTE
"Daysi SAUNDERS (52 y.o. Female)     ATTN: PATIENT DISCHARGED 09/13/2022 :  [id number]    OP NOTE FOR REVIEW    UR DEPT: [phone],  [phone]    [facility]              Date of Birth   1970    Social Security Number       Address   [facility] [address] [place] [address]    Home Phone   [phone]    MRN   [id number]       Hinduism   Sikhism     Marital Status                               Admission Date   9/12/22    Admission Type   Elective    Admitting Provider   Delfino Rivera Jr., MD    Attending Provider       Department, Room/Bed   [facility] [address] [place], P778/1       Discharge Date   9/13/2022    Discharge Disposition   Home or Self Care    Discharge Destination                               Attending Provider: (none)   Allergies: Citalopram, Flexeril [Cyclobenzaprine], Doxycycline, Keflex [Cephalexin], Sulfa Antibiotics, Vilazodone Hcl    Isolation: None   Infection: COVID (History) (09/12/22)   Code Status: Prior   Advance Care Planning Activity    Ht: 167.6 cm (65.98\")   Wt: 140 kg (308 lb 10.3 oz)    Admission Cmt: None   Principal Problem: Obesity, Class III, BMI 40-49.9 (morbid obesity) ([facility]) [E66.01]                 Active Insurance as of 9/12/2022     Primary Coverage     Payor Plan Insurance Group Employer/Plan Group    [employer] [facility] [employer] [id number]     Payor Plan Address Payor Plan Phone Number Payor Plan Fax Number Effective Dates    [id number] [phone]  1/1/2022 - None Entered    [place] [address]       Subscriber Name Subscriber Birth Date Member ID       DAYSI SAUNDERS 1970 [id number]                 Emergency Contacts      (Rel.) Home Phone Work Phone Mobile Phone    CHIPTrena (Spouse) -- -- [phone]               History & Physical      Delfino Rivera Jr., MD at 09/12/22 0854          H&P reviewed. The patient was examined and there are no " changes to the H&P.          Electronically signed by Delfino Rivera Jr., MD at 09/12/22 0854   Source Note          Bariatric Consult:  Referred by Pinky Hyatt APRN    Emilie SAUNDERS is here today for consult on No chief complaint on file.      History of Present Illness:     Emilie SAUNDERS is a 52 y.o. female with morbid obesity with co-morbidities including sleep apnea, diabetes, hypertension, dyslipidemia, osteoarthritis, back pain, knee pain and GERD who presents for surgical consultation for the above procedure. Emilie has completed the initial intake visit and has been examined by our nurse practitioner, dietician, psychologist and underwent the extensive educational teaching process under the guidance of our bariatric coordinator and myself. Emilie also has seen or if has not yet will see the educational video MONICA on the surgical procedure if available. Emilie attended today more educational teaching from our bariatric coordinator and myself. Emilie has had an extensive medical workup including a visit with their primary care physician, EKG, chest radiograph, blood work, EGD or UGI and possibly further testing. These have been reviewed by me and discussed with the patient. Emilie is now ready to proceed with surgery. Emilie presently denies nausea, vomiting, fever, chills, chest pain, shortness of air, melena, hematochezia, hemetemesis, dysuria, frequency, hematuria.       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   • Depression 01/01/1988   • Diabetes mellitus (HCC)     pre diabetic   • Family history of breast cancer 5/14/2019   • Headache    • History of kidney stones    • Hyperlipidemia    • Hypertension    • Irritable bowel syndrome    • Lumbago    • Mood disorder (HCC)    • Obesity life   • PONV (postoperative nausea and vomiting)    • S/P radiation therapy 1993     PAROTID GLAND CANCER   • Sleep apnea     C-PAP IN USE   • Vitamin D deficiency        Encounter Diagnoses   Name Primary?   • Class 3 severe obesity with serious comorbidity and body mass index (BMI) of 50.0 to 59.9 in adult, unspecified obesity type (HCC) Yes   • Prediabetes    • PCOS (polycystic ovarian syndrome)    • Benign essential HTN    • MARIBELL (obstructive sleep apnea) using CPAP    • Anxiety    • Gastric polyps        Past Surgical History:   Procedure Laterality Date   • APPENDECTOMY  1989    removed when removing dermoid on right ovary   • BREAST BIOPSY Right 2019   • BREAST LUMPECTOMY Right    • BREAST LUMPECTOMY WITH SENTINEL NODE BIOPSY Right 5/28/2019    Procedure: Right central partial mastectomy (with removal of the nipple-areola-complex) and sentinel lymph node biopsy;  Surgeon: Trena Greene MD;  Location: [facility] MAIN OR;  Service: General   • COLONOSCOPY N/A 12/5/2019    Procedure: COLONOSCOPY TO CECUM;  Surgeon: Pedrito Fulton Jr., MD;  Location: [facility] ENDOSCOPY;  Service: General   • DERMOID CYST  EXCISION  1989   • ENDOSCOPY N/A 12/28/2021    Procedure: ESOPHAGOGASTRODUODENOSCOPY WITH BIOPSY;  Surgeon: Delfino Rivera Jr., MD;  Location: [facility] ENDOSCOPY;  Service: General;  Laterality: N/A;  PRE- DYSPEPSIA  POST-- GASTRITIS, GASTRIC POLYPS   • HERNIA REPAIR  2006    umbilical   • HYSTERECTOMY Bilateral 09/04/2020    Dr. Neumann, due to ovarian cysts with previous hx breast cancer   • LAPAROSCOPIC CHOLECYSTECTOMY     • SALIVARY GLAND SURGERY  1993    due to cancer   • TONSILLECTOMY  01/01/1979    I was 9 years old       Patient Active Problem List   Diagnosis   • Anxiety   • Benign essential HTN   • Vitamin D deficiency   • Hyperlipidemia   • Lumbago   • Malignant neoplasm of central portion of right breast in female, estrogen receptor positive (HCC)   • Monoallelic mutation of CHEK2 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   • PCOS (polycystic ovarian syndrome)   • History of total abdominal  hysterectomy and bilateral salpingo-oophorectomy   • Class 3 severe obesity with serious comorbidity and body mass index (BMI) of 50.0 to 59.9 in adult (HCC)   • Ankle edema   • Increased risk of breast cancer   • Hyperuricemia   • MARIBELL (obstructive sleep apnea) using CPAP   • Urinary, incontinence, stress female   • Anxious depression   • Prediabetes   • Dyspepsia   • Gastric polyps   • COVID-19 virus infection       Allergies   Allergen Reactions   • Citalopram Rash   • Flexeril [Cyclobenzaprine] Unknown - High Severity     MUSCLES TENSE-OPPOSITE OF WHAT IT IS INTENDED TO DO-ELEVATES B/P   • Doxycycline Rash     Blisters on hands   • Keflex [Cephalexin] Rash   • Sulfa Antibiotics Rash     NAUSEA/VOMITING/FEVER   • Vilazodone Hcl Hives, Itching and Rash         Current Outpatient Medications:   •  allopurinol (ZYLOPRIM) 300 MG tablet, TAKE 1 TABLET BY MOUTH DAILY, Disp: 90 tablet, Rfl: 1  •  amLODIPine (NORVASC) 5 MG tablet, TAKE 1 TABLET BY MOUTH DAILY, Disp: 90 tablet, Rfl: 2  •  anastrozole (Arimidex) 1 MG tablet, Take 1 tablet by mouth Daily., Disp: 90 tablet, Rfl: 1  •  furosemide (LASIX) 20 MG tablet, TAKE 1 TABLET BY MOUTH DAILY, Disp: 90 tablet, Rfl: 1  •  lisinopril (PRINIVIL,ZESTRIL) 40 MG tablet, Take 1 tablet by mouth Daily., Disp: 90 tablet, Rfl: 3  •  LORazepam (ATIVAN) 0.5 MG tablet, Take 1 tablet by mouth Every 8 (Eight) Hours As Needed for Anxiety. for anxiety, Disp: 30 tablet, Rfl: 0  •  metFORMIN ER (GLUCOPHAGE-XR) 500 MG 24 hr tablet, TAKE 1 TABLET BY MOUTH DAILY WITH BREAKFAST, Disp: 90 tablet, Rfl: 1  •  potassium chloride (KLOR-CON) 8 MEQ CR tablet, TAKE 1 TABLET BY MOUTH DAILY, Disp: 90 tablet, Rfl: 0  •  vitamin D (ERGOCALCIFEROL) 1.25 MG (04991 UT) capsule capsule, Take 1 capsule by mouth 1 (One) Time Per Week., Disp: 4 capsule, Rfl: 2  •  Enoxaparin Sodium (LOVENOX) 40 MG/0.4ML solution prefilled syringe syringe, Inject 0.4 mL under the skin into the appropriate area as directed Every 12  (Twelve) Hours for 14 days. Start after surgery unless instructed otherwise, Disp: 11.2 mL, Rfl: 0  •  folic acid-vit B6-vit B12 (FOLBEE) 2.5-25-1 MG tablet tablet, Take 1 tablet by mouth Daily., Disp: 40 tablet, Rfl: 0    Social History     Socioeconomic History   • Marital status:      Spouse name: Trena Montenegro   • Number of children: 0   Tobacco Use   • Smoking status: Never Smoker   • Smokeless tobacco: Never Used   • Tobacco comment: Parents smoked.  I have never smoked   Vaping Use   • Vaping Use: Never used   Substance and Sexual Activity   • Alcohol use: Not Currently     Comment: OCCASIONALLY   • Drug use: Never   • Sexual activity: Yes     Partners: Female     Birth control/protection: None, Surgical       Family History   Problem Relation Age of Onset   • Hypertension Mother    • Arthritis Mother    • Kidney cancer Mother    • Depression Mother         Not Dr elton onofre   • Heart disease Father    • Hernia Father    • Hypertension Father    • No Known Problems Brother    • Breast cancer Paternal Aunt    • Breast cancer Paternal Cousin    • Drug abuse Paternal Uncle         life long   • Drug abuse Paternal Uncle         life long   • Breast cancer Paternal Grandmother    • Osteoporosis Maternal Grandmother    • Lung cancer Paternal Grandfather    • Malig Hyperthermia Neg Hx        Review of Systems:  Review of Systems   Constitutional: Positive for fatigue.   Musculoskeletal: Positive for arthralgias and back pain.   All other systems reviewed and are negative.        Physical Exam:    Vital Signs:  Weight: (!) 140 kg (307 lb 9.6 oz)   Body mass index is 52.52 kg/m².  Temp: 98 °F (36.7 °C)   Heart Rate: 83   BP: 171/91       Physical Exam  Vitals reviewed.   HENT:      Head: Normocephalic and atraumatic.      Mouth/Throat:      Mouth: Mucous membranes are moist.      Pharynx: Oropharynx is clear.   Eyes:      General: No scleral icterus.     Extraocular Movements: Extraocular movements  intact.      Conjunctiva/sclera: Conjunctivae normal.      Pupils: Pupils are equal, round, and reactive to light.   Neck:      Thyroid: No thyromegaly.   Cardiovascular:      Rate and Rhythm: Normal rate.   Pulmonary:      Effort: Pulmonary effort is normal. No respiratory distress.      Breath sounds: Normal breath sounds. No stridor. No wheezing or rhonchi.   Abdominal:      General: Bowel sounds are normal.      Palpations: Abdomen is soft.      Tenderness: There is no abdominal tenderness. There is no right CVA tenderness, left CVA tenderness, guarding or rebound.      Hernia: No hernia is present.   Musculoskeletal:         General: Normal range of motion.      Cervical back: Normal range of motion and neck supple.   Lymphadenopathy:      Cervical: No cervical adenopathy.   Skin:     General: Skin is warm and dry.      Findings: No erythema.   Neurological:      Mental Status: She is alert and oriented to person, place, and time.   Psychiatric:         Mood and Affect: Mood normal.         Behavior: Behavior normal.         Thought Content: Thought content normal.         Judgment: Judgment normal.           Assessment:    Emilie SAUNDERS is a 52 y.o. year old female with medically complicated severe obesity with a BMI of Body mass index is 52.52 kg/m². and multiple co-morbidities listed in the encounter diagnosis.    I think she is an appropriate candidate for this surgery, and is ready to proceed.      Plan/Discussion/Summary:  No hiatal hernia per me.  No PPI.  H. pylori negative.  Polyps benign    The patient has returned to the office for a surgical consultation and has requested to proceed with gastric sleeve.  I have had the opportunity to obtain a history, examine the patient and review the patient's chart. The procedure could be done laparoscopically and or robotically.    The patient understands that surgery is a tool and that weight loss is not guaranteed but only seen in the context of  appropriate use, regular follow up, exercise and making appropriate food choices.     I personally discussed the potential complications of the laparoscopic gastric sleeve with this patient.  The patient is well aware of potential complications of the surgery that include but not limited to bleeding, infections, deep vein thrombosis, pulmonary embolism, pulmonary complications such as pneumonia, cardiac event, hernias, small bowel obstruction, damage to the spleen or other organs, bowel injury, disfiguring scars, failure to lose weight, need for additional surgery, conversion to an open procedure and death.  The patient is also aware of complications which apply in particular to the gastric sleeve and can include but not limited to the leakage of gastric contents at the staple line, the development of an intra-abdominal abscess, gastroesophageal reflux disease, Joe's esophagus, ulcers, vitamin/mineral deficiencies, strictures, and the possibility of converting this procedure to a Cecilia-en-Y gastric bypass. The patient also understands the possibility of requiring an acid reducer medication for the rest of their life.    The risks, benefits, potential complications and alternative therapies were discussed at great length as outlined in our extensive consent forms, online consent and educational teaching processes.    The patient has confirmed the participation in the programs extensive educational activities.    All questions and concerns were answered to patient's satisfaction.  The patient now wishes to proceed with surgery.     The patient has agreed to a postoperative course of anitcoagulant therapy.      I instructed patient that the surgery could be laparoscopically and/or robotically.    I instructed patient to start on a H2 blocker or proton pump inhibitor if not already on one of these medications.    I explained in detail the procedures that are in the consent.  All of these procedures have a chance to  convert to open if any technical challenges or complications do occur.  Bariatric surgery is not cosmetic surgery but rather a tool to help a patient make a life-long commitment lifestyle change including diet, exercise, behavior changes, and taking supplemental vitamins and minerals.    Problems after surgery may require more operations to correct them.    The risks, benefits, alternatives, and potential complications of all of the procedures were explained in detail including, but not limited to death, anesthesia and medication adverse effect, deep venous thrombosis, pulmonary embolism, trocar site/incisional hernia, wound infection, abdominal infection, bleeding, failure to lose weight, gain weight, a change in body image, metabolic complications with vitamin deficiences and anemia.    Weight loss expectations were discussed with the patient in detail. The weight loss operations most commonly performed are the sleeve gastrectomy and the Cecilia-en-Y gastric bypass. These operations result in weight losses up to approximately 25-35% of initial body weight 12 to 24 months after surgery with the gastric bypass usually the higher percent of weight loss but depends on patient using the tool.    For the gastric bypass and loop duodenal switch (SATYA-S) the risks include but not limited to the following early complications:  Anastomotic leak/peritonitis, Cecilia/Alimentary/biliopancreatic limb obstruction, severe & minor wound infection/seroma, and nausea/vomiting.  Late complications can include but are not limited to malnutrition, vitamin deficiencies, frequent loose stools,  stomal stenosis, marginal ulcer, bowel obstruction, intussusception, internal, and incisional hernia.    Regarding the gastric sleeve, there is higher risk of dysphagia and reflux leading to possible Joe's esophagus compared to a gastric bypass, as well as risk of internal visceral/organ injury, splenectomy, bleeding, infection, leak (which could  require further intervention possible conversion to Cecilia-en-Y gastric bypass), stenosis and possibility of regaining weight.    Emilie was counseled regarding diagnostic results, instructions for management, risk factor reductions, prognosis, patient and family education, impressions, risks and benefits of treatment options and importance of compliance with treatment. Total time of the encounter was over 45 minutes counseling the patient regarding the procedure as above and reviewing as well as ordering labs, medications and the procedure.  The chart was also reviewed prior to seeing the patient reviewing previous testing, studies and labs.    Emilie understands the surgical procedures and the different surgical options that are available.  She understands the lifestyle changes that are required after surgery and has agreed to follow the guidelines outlined in the weight management program.  She also expressed understanding of the risks involved and had all of female questions answered and desires to proceed.      Delfino Rivera MD  8/25/2022    Electronically signed by Delfino Rivera Jr., MD at 08/25/22 1202             Delfino Rivera Jr., MD at 08/25/22 1115          Bariatric Consult:  Referred by Pinky Hyatt APRN    Emilie SAUNDERS is here today for consult on No chief complaint on file.      History of Present Illness:     Emilie SAUNDERS is a 52 y.o. female with morbid obesity with co-morbidities including sleep apnea, diabetes, hypertension, dyslipidemia, osteoarthritis, back pain, knee pain and GERD who presents for surgical consultation for the above procedure. Emilie has completed the initial intake visit and has been examined by our nurse practitioner, dietician, psychologist and underwent the extensive educational teaching process under the guidance of our bariatric coordinator and myself. Emilie also has seen or if has not yet will see the educational video MONICA on the surgical  procedure if available. Emilie attended today more educational teaching from our bariatric coordinator and myself. Emilie has had an extensive medical workup including a visit with their primary care physician, EKG, chest radiograph, blood work, EGD or UGI and possibly further testing. These have been reviewed by me and discussed with the patient. Emilie is now ready to proceed with surgery. Emilie presently denies nausea, vomiting, fever, chills, chest pain, shortness of air, melena, hematochezia, hemetemesis, dysuria, frequency, hematuria.       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   • Class 3 severe obesity with serious comorbidity and body mass index (BMI) of 50.0 to 59.9 in adult, unspecified obesity type (HCC) Yes   • Prediabetes    • PCOS (polycystic ovarian syndrome)    • Benign essential HTN    • MARIBELL (obstructive sleep apnea) using CPAP    • Anxiety    • Gastric polyps        Past Surgical History:   Procedure Laterality Date   • APPENDECTOMY  1989    removed when removing dermoid on right ovary   • BREAST BIOPSY Right 2019   • BREAST LUMPECTOMY Right    • BREAST LUMPECTOMY WITH SENTINEL NODE BIOPSY Right 5/28/2019    Procedure: Right central partial mastectomy (with removal of the nipple-areola-complex) and sentinel lymph node biopsy;  Surgeon: Trena Greene MD;  Location: [facility] OR;  Service:  General   • COLONOSCOPY N/A 12/5/2019    Procedure: COLONOSCOPY TO CECUM;  Surgeon: Pedrito Fulton Jr., MD;  Location:  [facility] ENDOSCOPY;  Service: General   • DERMOID CYST  EXCISION  1989   • ENDOSCOPY N/A 12/28/2021    Procedure: ESOPHAGOGASTRODUODENOSCOPY WITH BIOPSY;  Surgeon: Delfino Rivera Jr., MD;  Location:  [facility] ENDOSCOPY;  Service: General;  Laterality: N/A;  PRE- DYSPEPSIA  POST-- GASTRITIS, GASTRIC POLYPS   • HERNIA REPAIR  2006    umbilical   • HYSTERECTOMY Bilateral 09/04/2020    Dr. Neumann, due to ovarian cysts with previous hx breast cancer   • LAPAROSCOPIC CHOLECYSTECTOMY     • SALIVARY GLAND SURGERY  1993    due to cancer   • TONSILLECTOMY  01/01/1979    I was 9 years old       Patient Active Problem List   Diagnosis   • Anxiety   • Benign essential HTN   • Vitamin D deficiency   • Hyperlipidemia   • Lumbago   • Malignant neoplasm of central portion of right breast in female, estrogen receptor positive (HCC)   • Monoallelic mutation of CHEK2 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   • PCOS (polycystic ovarian syndrome)   • History of total abdominal hysterectomy and bilateral salpingo-oophorectomy   • Class 3 severe obesity with serious comorbidity and body mass index (BMI) of 50.0 to 59.9 in adult (HCC)   • Ankle edema   • Increased risk of breast cancer   • Hyperuricemia   • MARIBELL (obstructive sleep apnea) using CPAP   • Urinary, incontinence, stress female   • Anxious depression   • Prediabetes   • Dyspepsia   • Gastric polyps   • COVID-19 virus infection       Allergies   Allergen Reactions   • Citalopram Rash   • Flexeril [Cyclobenzaprine] Unknown - High Severity     MUSCLES TENSE-OPPOSITE OF WHAT IT IS INTENDED TO DO-ELEVATES B/P   • Doxycycline Rash     Blisters on hands   • Keflex [Cephalexin] Rash   • Sulfa Antibiotics Rash     NAUSEA/VOMITING/FEVER   • Vilazodone Hcl Hives, Itching and Rash         Current Outpatient Medications:   •  allopurinol (ZYLOPRIM) 300 MG tablet, TAKE 1 TABLET BY MOUTH DAILY,  Disp: 90 tablet, Rfl: 1  •  amLODIPine (NORVASC) 5 MG tablet, TAKE 1 TABLET BY MOUTH DAILY, Disp: 90 tablet, Rfl: 2  •  anastrozole (Arimidex) 1 MG tablet, Take 1 tablet by mouth Daily., Disp: 90 tablet, Rfl: 1  •  furosemide (LASIX) 20 MG tablet, TAKE 1 TABLET BY MOUTH DAILY, Disp: 90 tablet, Rfl: 1  •  lisinopril (PRINIVIL,ZESTRIL) 40 MG tablet, Take 1 tablet by mouth Daily., Disp: 90 tablet, Rfl: 3  •  LORazepam (ATIVAN) 0.5 MG tablet, Take 1 tablet by mouth Every 8 (Eight) Hours As Needed for Anxiety. for anxiety, Disp: 30 tablet, Rfl: 0  •  metFORMIN ER (GLUCOPHAGE-XR) 500 MG 24 hr tablet, TAKE 1 TABLET BY MOUTH DAILY WITH BREAKFAST, Disp: 90 tablet, Rfl: 1  •  potassium chloride (KLOR-CON) 8 MEQ CR tablet, TAKE 1 TABLET BY MOUTH DAILY, Disp: 90 tablet, Rfl: 0  •  vitamin D (ERGOCALCIFEROL) 1.25 MG (16960 UT) capsule capsule, Take 1 capsule by mouth 1 (One) Time Per Week., Disp: 4 capsule, Rfl: 2  •  Enoxaparin Sodium (LOVENOX) 40 MG/0.4ML solution prefilled syringe syringe, Inject 0.4 mL under the skin into the appropriate area as directed Every 12 (Twelve) Hours for 14 days. Start after surgery unless instructed otherwise, Disp: 11.2 mL, Rfl: 0  •  folic acid-vit B6-vit B12 (FOLBEE) 2.5-25-1 MG tablet tablet, Take 1 tablet by mouth Daily., Disp: 40 tablet, Rfl: 0    Social History     Socioeconomic History   • Marital status:      Spouse name: Trena Montenegro   • Number of children: 0   Tobacco Use   • Smoking status: Never Smoker   • Smokeless tobacco: Never Used   • Tobacco comment: Parents smoked.  I have never smoked   Vaping Use   • Vaping Use: Never used   Substance and Sexual Activity   • Alcohol use: Not Currently     Comment: OCCASIONALLY   • Drug use: Never   • Sexual activity: Yes     Partners: Female     Birth control/protection: None, Surgical       Family History   Problem Relation Age of Onset   • Hypertension Mother    • Arthritis Mother    • Kidney cancer Mother    • Depression Mother          Not Dr sanders- edison onofre   • Heart disease Father    • Hernia Father    • Hypertension Father    • No Known Problems Brother    • Breast cancer Paternal Aunt    • Breast cancer Paternal Cousin    • Drug abuse Paternal Uncle         life long   • Drug abuse Paternal Uncle         life long   • Breast cancer Paternal Grandmother    • Osteoporosis Maternal Grandmother    • Lung cancer Paternal Grandfather    • Malig Hyperthermia Neg Hx        Review of Systems:  Review of Systems   Constitutional: Positive for fatigue.   Musculoskeletal: Positive for arthralgias and back pain.   All other systems reviewed and are negative.        Physical Exam:    Vital Signs:  Weight: (!) 140 kg (307 lb 9.6 oz)   Body mass index is 52.52 kg/m².  Temp: 98 °F (36.7 °C)   Heart Rate: 83   BP: 171/91       Physical Exam  Vitals reviewed.   HENT:      Head: Normocephalic and atraumatic.      Mouth/Throat:      Mouth: Mucous membranes are moist.      Pharynx: Oropharynx is clear.   Eyes:      General: No scleral icterus.     Extraocular Movements: Extraocular movements intact.      Conjunctiva/sclera: Conjunctivae normal.      Pupils: Pupils are equal, round, and reactive to light.   Neck:      Thyroid: No thyromegaly.   Cardiovascular:      Rate and Rhythm: Normal rate.   Pulmonary:      Effort: Pulmonary effort is normal. No respiratory distress.      Breath sounds: Normal breath sounds. No stridor. No wheezing or rhonchi.   Abdominal:      General: Bowel sounds are normal.      Palpations: Abdomen is soft.      Tenderness: There is no abdominal tenderness. There is no right CVA tenderness, left CVA tenderness, guarding or rebound.      Hernia: No hernia is present.   Musculoskeletal:         General: Normal range of motion.      Cervical back: Normal range of motion and neck supple.   Lymphadenopathy:      Cervical: No cervical adenopathy.   Skin:     General: Skin is warm and dry.      Findings: No erythema.   Neurological:       Mental Status: She is alert and oriented to person, place, and time.   Psychiatric:         Mood and Affect: Mood normal.         Behavior: Behavior normal.         Thought Content: Thought content normal.         Judgment: Judgment normal.           Assessment:    Emilie SAUNDERS is a 52 y.o. year old female with medically complicated severe obesity with a BMI of Body mass index is 52.52 kg/m². and multiple co-morbidities listed in the encounter diagnosis.    I think she is an appropriate candidate for this surgery, and is ready to proceed.      Plan/Discussion/Summary:  No hiatal hernia per me.  No PPI.  H. pylori negative.  Polyps benign    The patient has returned to the office for a surgical consultation and has requested to proceed with gastric sleeve.  I have had the opportunity to obtain a history, examine the patient and review the patient's chart. The procedure could be done laparoscopically and or robotically.    The patient understands that surgery is a tool and that weight loss is not guaranteed but only seen in the context of appropriate use, regular follow up, exercise and making appropriate food choices.     I personally discussed the potential complications of the laparoscopic gastric sleeve with this patient.  The patient is well aware of potential complications of the surgery that include but not limited to bleeding, infections, deep vein thrombosis, pulmonary embolism, pulmonary complications such as pneumonia, cardiac event, hernias, small bowel obstruction, damage to the spleen or other organs, bowel injury, disfiguring scars, failure to lose weight, need for additional surgery, conversion to an open procedure and death.  The patient is also aware of complications which apply in particular to the gastric sleeve and can include but not limited to the leakage of gastric contents at the staple line, the development of an intra-abdominal abscess, gastroesophageal reflux disease, Joe's  esophagus, ulcers, vitamin/mineral deficiencies, strictures, and the possibility of converting this procedure to a Cecilia-en-Y gastric bypass. The patient also understands the possibility of requiring an acid reducer medication for the rest of their life.    The risks, benefits, potential complications and alternative therapies were discussed at great length as outlined in our extensive consent forms, online consent and educational teaching processes.    The patient has confirmed the participation in the programs extensive educational activities.    All questions and concerns were answered to patient's satisfaction.  The patient now wishes to proceed with surgery.     The patient has agreed to a postoperative course of anitcoagulant therapy.      I instructed patient that the surgery could be laparoscopically and/or robotically.    I instructed patient to start on a H2 blocker or proton pump inhibitor if not already on one of these medications.    I explained in detail the procedures that are in the consent.  All of these procedures have a chance to convert to open if any technical challenges or complications do occur.  Bariatric surgery is not cosmetic surgery but rather a tool to help a patient make a life-long commitment lifestyle change including diet, exercise, behavior changes, and taking supplemental vitamins and minerals.    Problems after surgery may require more operations to correct them.    The risks, benefits, alternatives, and potential complications of all of the procedures were explained in detail including, but not limited to death, anesthesia and medication adverse effect, deep venous thrombosis, pulmonary embolism, trocar site/incisional hernia, wound infection, abdominal infection, bleeding, failure to lose weight, gain weight, a change in body image, metabolic complications with vitamin deficiences and anemia.    Weight loss expectations were discussed with the patient in detail. The weight loss  operations most commonly performed are the sleeve gastrectomy and the Cecilia-en-Y gastric bypass. These operations result in weight losses up to approximately 25-35% of initial body weight 12 to 24 months after surgery with the gastric bypass usually the higher percent of weight loss but depends on patient using the tool.    For the gastric bypass and loop duodenal switch (SATYA-S) the risks include but not limited to the following early complications:  Anastomotic leak/peritonitis, Cecilia/Alimentary/biliopancreatic limb obstruction, severe & minor wound infection/seroma, and nausea/vomiting.  Late complications can include but are not limited to malnutrition, vitamin deficiencies, frequent loose stools,  stomal stenosis, marginal ulcer, bowel obstruction, intussusception, internal, and incisional hernia.    Regarding the gastric sleeve, there is higher risk of dysphagia and reflux leading to possible Joe's esophagus compared to a gastric bypass, as well as risk of internal visceral/organ injury, splenectomy, bleeding, infection, leak (which could require further intervention possible conversion to Cecilia-en-Y gastric bypass), stenosis and possibility of regaining weight.    Emilie was counseled regarding diagnostic results, instructions for management, risk factor reductions, prognosis, patient and family education, impressions, risks and benefits of treatment options and importance of compliance with treatment. Total time of the encounter was over 45 minutes counseling the patient regarding the procedure as above and reviewing as well as ordering labs, medications and the procedure.  The chart was also reviewed prior to seeing the patient reviewing previous testing, studies and labs.    Emilie understands the surgical procedures and the different surgical options that are available.  She understands the lifestyle changes that are required after surgery and has agreed to follow the guidelines outlined in the weight  management program.  She also expressed understanding of the risks involved and had all of female questions answered and desires to proceed.      Delfino Rivera MD  8/25/2022    Electronically signed by Delfino Rivera Jr., MD at 08/25/22 1202          Operative/Procedure Notes (all)      Delfino Rivera Jr., MD at 09/12/22 1204  Version 1 of 1       PREOPERATIVE DIAGNOSIS:  Morbid obesity with multiple comorbidities as referenced in the most recent history and physical.    POSTOPERATIVE DIAGNOSIS:  Morbid obesity with multiple comorbidities as referenced in the most recent history and physical.    PROCEDURES PERFORMED:  1.  Robotic assisted laparoscopic sleeve gastrectomy with Titan Stapler # [id number]  2.  Tisseel application    SURGEON:  CORINA Walls Jr., FACS    ASSISTANT: IJEOMA Elias      Surgery assisted and facilitated by a certified physician assistant, who directly resulted in a decreased operative time, anesthetic time, wound exposure, and possibly of an operative wound infection, thereby decreasing patient morbidity and ultimately total expenditures.  The surgical assistant assisted in placement of trochars, take down of the gastrocolic omentum, short gastric vessels and dissection at the angle of His.  Also assisted in retraction of the stomach during stapling so as not to kink the gastric sleeve.  Also assisted in removing of the gastric specimen, closure of the fascial defect as well as closure of the skin incisions.    ANESTHESIA:  General endotracheal and TAP block with Ropivacaine mixture    ESTIMATED BLOOD LOSS:   Less than 25 mL unless dictated below.    FLUIDS:  Crystalloids.    SPECIMENS:  Gastric remnant    DRAINS:  None.    COUNTS:  Correct.    COMPLICATIONS:  None.    INDICATIONS:  This patient with morbid obesity and associated comorbidities presents for elective laparoscopic,robotic, possible open sleeve gastrectomy.  The patient has received medical clearance to  proceed.  The patient has undergone our extensive educational process and consent process and wishes to proceed.    DESCRIPTION OF PROCEDURE:  The patient was brought to the operating room and placed supine upon the operating room table. SCD hose were placed.  The patient underwent uneventful general endotracheal anesthesia per the anesthesiology staff. The abdomen was prepped with ChloraPrep and draped in the usual sterile fashion. Anesthesia staff passed a 38-Albanian bougie into the stomach to decompress the stomach and then was pulled back to the esophagus.      An 8 mm transverse incision was made just to the left of midline.  The peritoneal cavity was entered under direct camera visualization using a 5  mm 0° laparoscope and an Optiview trocar.  The abdomen was then insufflated to a pressure of 15 mmHg with CO2 gas.  Exploratory laparoscopy revealed no evidence of injury from the entrance technique and no significant abnormalities unless addendum dictated below.  An angled laparoscope was then used.  The patient was placed in reverse Trendelenburg position.  Under direct camera visualization two 8 mm trocar was placed in the left lateral midabdominal position.  A 12 mm trocar was placed in the right abdomen.  A Sadie retractor was placed through an epigastric incision and used to elevate the left lobe of the liver.      Next the 30 degree 8 mm scope was brought into the 8 mm port just to the left of the umbilicus after the corresponding trocar was docked to the da Jean Marie robot.  Targeting then took place and then the rest of the trochars were docked to the robot and angled into position.  Instruments were brought in through the trochars in place for laparoscopic view.       I then took control of the surgical console. At this point, approximately shelter along the greater curvature, the gastrocolic omentum was divided with the Vessel Sealer and this proceeded superiorly to the angle of His taking down the  short gastric vessels.  All posterior attachments of the lesser sac and posterior aspect of the stomach to the pancreas were taken down as well.      Dissection then proceeded medially taking down the greater curvature with the vessel sealer to just proximal to the pylorus.  The 38-Senegalese bougie was passed back down into the stomach to aid in sizing the sleeve.       The right 12 mm intuitive trocar was removed and replaced with the 19 mm trocar.  The stomach was marked with indelible ink 3 cm at the incisura and approximately 4 to 5 cm from the pylorus.  The Titan stapler was advanced into the abdomen and placed into position and used to create the gastric sleeve.  The stapler was then removed after firing.    Insufflation of the stomach under water was performed and there was no evidence of leak.    Tisseel was then sprayed on the staple line.    The specimen was removed through the 19 mm port site.  The liver retractor was removed. The fascia at the 19 mm trocar site incision that was used for extraction was closed with a single 0 Vicryl suture in a figure-of-eight fashion placed under direct laparoscopic camera visualization with a suture passer and tying the knot extracorporeally.  The fascia in the area was infiltrated with local anesthesia. All incisions were then infiltrated with local anesthetic. The remaining trocars were removed under direct camera visualization with no bleeding noted from their sites.  The abdomen was desufflated of gas. The skin in each incision was closed using 4-0 antibiotic impregnated Monocryl in a subcuticular fashion followed by Dermabond.  The patient tolerated the procedure well without complication and was taken to the recovery room in stable condition.  All sponge, needle and instrument counts were correct.     The hiatus was checked for a hernia and no hernia was detected      Electronically signed by Delfino Rivera Jr., MD at 09/12/22 7260       Discharge Summary    No  notes of this type exist for this encounter.         Discharge Order (From admission, onward)     Start     Ordered    09/13/22 0838  Discharge patient  Once        Expected Discharge Date: 09/13/22    Discharge Disposition: Home or Self Care    Physician of Record for Attribution - Please select from Treatment Team: SEVERO BRANDON JR [[id number]]    Review needed by CMO to determine Physician of Record: No       Question Answer Comment   Physician of Record for Attribution - Please select from Treatment Team SEVERO BRANDON JR    Review needed by CMO to determine Physician of Record No        09/13/22 0840

## 2022-09-14 NOTE — OUTREACH NOTE
Call Center TCM Note    Flowsheet Row Responses   [facility] patient discharged from? [place]   Does the patient have one of the following disease processes/diagnoses(primary or secondary)? General Surgery   TCM attempt successful? Yes  [verbal release for Trena Carrizales, spouse]   Call start time 1013   Call end time 1017   Discharge diagnosis Gastric sleeve lap   Medication alerts for this patient Lovenox   Meds reviewed with patient/caregiver? Yes   Is the patient having any side effects they believe may be caused by any medication additions or changes? No   Does the patient have all medications related to this admission filled (includes all antibiotics, pain medications, etc.) Yes   Is the patient taking all medications as directed (includes completed medication regime)? Yes   Comments Hospital PCP FOLLOW UP APPOINTMENT IS 9/15/22@1130am   Has home health visited the patient within 72 hours of discharge? N/A   Psychosocial issues? No   Did the patient receive a copy of their discharge instructions? Yes   Nursing interventions Reviewed instructions with patient   What is the patient's perception of their health status since discharge? Improving  [Endorses expected surgical soreness and not requiring narcotics, no BM yet, no nausea, meeting nutritional goals.]   Nursing interventions Nurse provided patient education  [routine post op care reviewed, s/s dvt reviewed]   Is the patient /caregiver able to teach back basic post-op care? Continue use of incentive spirometry at least 1 week post discharge, No tub bath, swimming, or hot tub until instructed by MD, Keep incision areas clean,dry and protected, Lifting as instructed by MD in discharge instructions   Is the patient/caregiver able to teach back signs and symptoms of incisional infection? Increased redness, swelling or pain at the incisonal site, Increased drainage or bleeding, Incisional warmth, Pus or odor from incision, Fever  [NO issues with  incisions]   Is the patient/caregiver able to teach back steps to recovery at home? Rest and rebuild strength, gradually increase activity   Is the patient/caregiver able to teach back the hierarchy of who to call/visit for symptoms/problems? PCP, Specialist, Home health nurse, Urgent Care, ED, 911 Yes   TCM call completed? Yes   Wrap up additional comments NO questions or concerns at time of call          Josefina Gaston RN    9/14/2022, 10:20 EDT

## 2022-09-15 ENCOUNTER — OFFICE VISIT (OUTPATIENT)
Dept: BARIATRICS/WEIGHT MGMT | Facility: CLINIC | Age: 52
End: 2022-09-15

## 2022-09-15 ENCOUNTER — OFFICE VISIT (OUTPATIENT)
Dept: INTERNAL MEDICINE | Facility: CLINIC | Age: 52
End: 2022-09-15

## 2022-09-15 VITALS
HEIGHT: 66 IN | DIASTOLIC BLOOD PRESSURE: 82 MMHG | BODY MASS INDEX: 47.09 KG/M2 | SYSTOLIC BLOOD PRESSURE: 159 MMHG | TEMPERATURE: 97.7 F | HEART RATE: 65 BPM | WEIGHT: 293 LBS

## 2022-09-15 VITALS
HEIGHT: 66 IN | TEMPERATURE: 96.4 F | DIASTOLIC BLOOD PRESSURE: 92 MMHG | SYSTOLIC BLOOD PRESSURE: 144 MMHG | HEART RATE: 82 BPM | WEIGHT: 293 LBS | BODY MASS INDEX: 47.09 KG/M2 | OXYGEN SATURATION: 96 %

## 2022-09-15 DIAGNOSIS — E66.01 OBESITY, CLASS III, BMI 40-49.9 (MORBID OBESITY): ICD-10-CM

## 2022-09-15 DIAGNOSIS — E55.9 VITAMIN D DEFICIENCY: Chronic | ICD-10-CM

## 2022-09-15 DIAGNOSIS — E66.01 OBESITY, CLASS III, BMI 40-49.9 (MORBID OBESITY): Primary | ICD-10-CM

## 2022-09-15 DIAGNOSIS — I10 BENIGN ESSENTIAL HTN: Primary | ICD-10-CM

## 2022-09-15 DIAGNOSIS — Z71.3 DIETARY COUNSELING: ICD-10-CM

## 2022-09-15 DIAGNOSIS — B37.0 ORAL THRUSH: ICD-10-CM

## 2022-09-15 DIAGNOSIS — B37.0 THRUSH: ICD-10-CM

## 2022-09-15 DIAGNOSIS — Z98.84 S/P LAPAROSCOPIC SLEEVE GASTRECTOMY: ICD-10-CM

## 2022-09-15 PROBLEM — E66.813 CLASS 3 SEVERE OBESITY WITH SERIOUS COMORBIDITY AND BODY MASS INDEX (BMI) OF 50.0 TO 59.9 IN ADULT: Chronic | Status: RESOLVED | Noted: 2021-03-08 | Resolved: 2022-09-15

## 2022-09-15 PROCEDURE — 99024 POSTOP FOLLOW-UP VISIT: CPT | Performed by: NURSE PRACTITIONER

## 2022-09-15 PROCEDURE — 99496 TRANSJ CARE MGMT HIGH F2F 7D: CPT | Performed by: NURSE PRACTITIONER

## 2022-09-15 RX ORDER — FUROSEMIDE 20 MG/1
20 TABLET ORAL DAILY PRN
Qty: 90 TABLET | Refills: 1
Start: 2022-09-15 | End: 2022-12-12

## 2022-09-15 NOTE — PROGRESS NOTES
[facility]  [address] [address]  [phone]  [address] [address]  [phone]  Dept: [phone]  9/15/2022      Emilie SAUNDERS.  [id number]  [id number]  1970  female      Chief Complaint   Patient presents with   • Post-op     1WPO  PD:9/12/22       [facility] Post-Op Bariatric Surgery:   Emilie SAUNDERS is status post laparopscopic Laparoscopic Sleeve procedure, performed on 9/12/2022.     HPI:   Today's weight is 134 kg (295 lb) pounds, today's BMI is Body mass index is 47.64 kg/m².,@ has a  loss of 12 pounds since surgery. The patient reports a decreased portion size and loss of appetite.  Emilie SAUNDERS denies vomiting, reflux, dysphagia and reports some nausea.  Has not been drinking protein shakes as everything tastes sweet to her.  She has not been taking nausea medicine or pain medicine. States pain is not bad and she hasn't felt the need for it.  Is able to tolerate broth and water. The patient c/o appropriate post-op incisional discomfort that is improving. she is doing well with protein and water intake so far. Taking their vitamins, walking and using IS. Denies fevers, chills, chest pain or shortness of air.      Diet and Exercise: Diet history reviewed and discussed with the patient. Weight loss/gains to date discussed with the patient. No carbonated beverage consumption and exercising regularly- walking frequently.   Supplements: multivitamins, B-12, calcium, iron, B-1 and Vitamin D. HAS NOT STARTED YET.  Patient is taking blood thinner as prescribed: Lovenox  Current outpatient and discharge medications have been reconciled for the patient.  Reviewed by: VICENTE Colby        Review of Systems   Constitutional: Positive for activity change, appetite change and fatigue.   Gastrointestinal: Positive for abdominal pain and nausea. Negative for vomiting.   All other systems  reviewed and are negative.      Patient Active Problem List   Diagnosis   • Anxiety   • Benign essential HTN   • Vitamin D deficiency   • Hyperlipidemia   • Lumbago   • Malignant neoplasm of central portion of right breast in female, estrogen receptor positive (HCC)   • Monoallelic mutation of CHEK2 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   • PCOS (polycystic ovarian syndrome)   • History of total abdominal hysterectomy and bilateral salpingo-oophorectomy   • Ankle edema   • Increased risk of breast cancer   • Hyperuricemia   • MARIBELL (obstructive sleep apnea) using CPAP   • Urinary, incontinence, stress female   • Anxious depression   • Prediabetes   • Dyspepsia   • Gastric polyps   • COVID-19 virus infection   • Obesity, Class III, BMI 40-49.9 (morbid obesity) (HCC)   • S/P laparoscopic sleeve gastrectomy   • Dietary counseling   • Oral thrush       The following portions of the patient's history were reviewed and updated as appropriate: allergies, current medications, past medical history, past surgical history and problem list.    Vitals:    09/15/22 0817   BP: 159/82   Pulse: 65   Temp: 97.7 °F (36.5 °C)       Physical Exam  Vitals reviewed.   Constitutional:       General: She is awake. She is not in acute distress.     Appearance: She is morbidly obese.   HENT:      Head: Normocephalic and atraumatic.      Mouth/Throat:      Mouth: Mucous membranes are moist.      Pharynx: Oropharyngeal exudate present.      Comments: White, thick exudate covering tongue  Eyes:      General: No scleral icterus.     Extraocular Movements: Extraocular movements intact.      Conjunctiva/sclera: Conjunctivae normal.      Pupils: Pupils are equal, round, and reactive to light.   Cardiovascular:      Rate and Rhythm: Normal rate and regular rhythm.      Heart sounds: Normal heart sounds.   Pulmonary:      Effort: Pulmonary effort is normal. No respiratory distress.      Breath sounds: Normal breath sounds.   Abdominal:      General: Abdomen is flat.  Bowel sounds are normal. There is no distension.      Palpations: Abdomen is soft.      Tenderness: There is no abdominal tenderness. There is no guarding.      Comments: Incisions clean, dry, intact; no erythema   Musculoskeletal:         General: Normal range of motion.      Cervical back: Normal range of motion and neck supple.   Skin:     General: Skin is warm and dry.   Neurological:      General: No focal deficit present.      Mental Status: She is alert and oriented to person, place, and time.   Psychiatric:         Mood and Affect: Mood normal.         Behavior: Behavior normal. Behavior is cooperative.         Thought Content: Thought content normal.         Judgment: Judgment normal.         Assessment:   Post-op, the patient is doing well though struggling with nausea and a sweet taste in her mouth.     Encounter Diagnoses   Name Primary?   • Obesity, Class III, BMI 40-49.9 (morbid obesity) (HCC) Yes   • S/P laparoscopic sleeve gastrectomy    • Dietary counseling    • Oral thrush        Plan:   Reviewed proper postoperative diet.  Questions answered.  Did discuss adding unflavored protein powder to soups or tea.    Will treat thrush with nystatin s/s.  Call if no improvement.    Reviewed with patient the importance of following the manual for diet progression. Increase activity as tolerated. Continue increasing daily intake of protein and water.   Return to work: the patient is to return to 3 weeks from their surgery date with no restrictions unless they develop medical problems in which we will see them back in the office. They received a note in our office today with their return to work date.  Activity restrictions: no lifting, pushing or pulling over 25lbs for 3 weeks.   Recommended patient be sure to get at least 70 grams of protein per day. Discussed with the patient the recommended amount of water per day to intake. Reviewed vitamin requirements. Be sure to do routine exercise and increase activity  as tolerated. No asa, nsaids or steroids for 8 weeks if gastric sleeve procedure and lifelong if gastric bypass procedure.. Patient may use miralax as needed if necessary.     Instructions / Recommendations: dietary counseling recommended, recommended a daily protein intake of  grams, vitamin supplement(s) recommended, recommended exercising at least 150 minutes per week, behavior modifications recommended and instructed to call the office for concerns, questions, or problems.     The patient was instructed to follow up at one month follow up appt.     The patient was counseled regarding post op bariatric manual

## 2022-09-15 NOTE — PROGRESS NOTES
"Transitional Care Follow Up Visit  Subjective     Emilie SAUNDERS is a 52 y.o. female who presents for a transitional care management visit.    Within 48 business hours after discharge our office contacted her via telephone to coordinate her care and needs.      I reviewed and discussed the details of that call along with the discharge summary, hospital problems, inpatient lab results, inpatient diagnostic studies, and consultation reports with Emilie.     Current outpatient and discharge medications have been reconciled for the patient.  Reviewed by: VICENTE Valencia      Date of TCM Phone Call 9/13/2022   [facility]   Date of Admission 9/12/2022   Date of Discharge 9/13/2022   Discharge Disposition Home or Self Care     Risk for Readmission (LACE) Score: 6 (9/13/2022  6:00 AM)      History of Present Illness  She underwent a gastric sleeve 9/12/22 by Dr. Rivera which she has tolerated well. She c/o generalized soreness today (feels as \"though she has been beat up\") but denies chest pain and/or shortness of breath. She has not had any intake since her discharge due to a taste of sweetness in her mouth. She was given Nystatin today for thrush.     Course During Hospital Stay:  Stable     The following portions of the patient's history were reviewed and updated as appropriate: allergies, current medications, past family history, past medical history, past social history, past surgical history and problem list.    Review of Systems   Constitutional: Positive for fatigue.   Respiratory: Negative for cough and shortness of breath.    Cardiovascular: Negative for chest pain, palpitations and leg swelling.   Gastrointestinal: Positive for nausea. Negative for abdominal pain (abdominal soreness).   Neurological: Positive for weakness.       Objective   Physical Exam  Constitutional:       Appearance: She is well-developed. She is not ill-appearing.   HENT:      Head: Normocephalic.      Right Ear: Hearing, " tympanic membrane and external ear normal.      Left Ear: Hearing, tympanic membrane and external ear normal.      Nose: Nose normal. No nasal deformity, mucosal edema or rhinorrhea.      Right Sinus: No maxillary sinus tenderness or frontal sinus tenderness.      Left Sinus: No maxillary sinus tenderness or frontal sinus tenderness.      Mouth/Throat:      Dentition: Normal dentition.   Eyes:      General: Lids are normal.         Right eye: No discharge.         Left eye: No discharge.      Conjunctiva/sclera: Conjunctivae normal.      Right eye: No exudate.     Left eye: No exudate.  Neck:      Thyroid: No thyroid mass or thyromegaly.      Vascular: No carotid bruit.      Trachea: Trachea normal.   Cardiovascular:      Rate and Rhythm: Regular rhythm.      Pulses: Normal pulses.      Heart sounds: Normal heart sounds. No murmur heard.  Pulmonary:      Effort: No respiratory distress.      Breath sounds: Normal breath sounds. No decreased breath sounds, wheezing, rhonchi or rales.   Abdominal:      General: Bowel sounds are normal.      Palpations: Abdomen is soft.      Tenderness: There is no abdominal tenderness.   Musculoskeletal:      Cervical back: Normal range of motion. No edema.   Lymphadenopathy:      Head:      Right side of head: No submental, submandibular, tonsillar, preauricular, posterior auricular or occipital adenopathy.      Left side of head: No submental, submandibular, tonsillar, preauricular, posterior auricular or occipital adenopathy.   Skin:     General: Skin is warm and dry.      Nails: There is no clubbing.   Neurological:      Mental Status: She is alert.   Psychiatric:         Behavior: Behavior is cooperative.         Assessment & Plan   Diagnoses and all orders for this visit:    1. Benign essential HTN (Primary)  Assessment & Plan:  We reviewed her medications today and discussed adjustment due to recent surgery.  She will continue lisinopril daily but discontinue amlodipine.  She  will also change Lasix to only as needed for lower extremity edema.  We may need to titrate her lisinopril dosage further in the future as her weight loss continues.    Orders:  -     furosemide (LASIX) 20 MG tablet; Take 1 tablet by mouth Daily As Needed (edema).  Dispense: 90 tablet; Refill: 1    2. Obesity, Class III, BMI 40-49.9 (morbid obesity) ([facility])  Assessment & Plan:  Patient's (Body mass index is 47.45 kg/m².) indicates that they are morbidly obese (BMI > 40 or > 35 with obesity - related health condition) with health conditions that include hypertension and lower extremity venous stasis disease . Weight is improving with treatment. BMI is is above average; BMI management plan is completed.   She is s/p gastric sleeve and is working with bariatrics regarding weight loss.      3. Vitamin D deficiency  Assessment & Plan:  She will continue her weekly Vitamin D supplement once her nausea has resolved.      4. Thrush  Assessment & Plan:  Noted on exam today, she will begin nystatin as prescribed and continue to monitor symptoms.      Current outpatient and discharge medications have been reconciled for the patient.  Reviewed by: VICENTE Valencia

## 2022-09-16 NOTE — ASSESSMENT & PLAN NOTE
Patient's (Body mass index is 47.45 kg/m².) indicates that they are morbidly obese (BMI > 40 or > 35 with obesity - related health condition) with health conditions that include hypertension and lower extremity venous stasis disease . Weight is improving with treatment. BMI is is above average; BMI management plan is completed.   She is s/p gastric sleeve and is working with bariatrics regarding weight loss.

## 2022-09-16 NOTE — ASSESSMENT & PLAN NOTE
We reviewed her medications today and discussed adjustment due to recent surgery.  She will continue lisinopril daily but discontinue amlodipine.  She will also change Lasix to only as needed for lower extremity edema.  We may need to titrate her lisinopril dosage further in the future as her weight loss continues.

## 2022-09-19 ENCOUNTER — TELEPHONE (OUTPATIENT)
Dept: BARIATRICS/WEIGHT MGMT | Facility: CLINIC | Age: 52
End: 2022-09-19

## 2022-09-19 NOTE — TELEPHONE ENCOUNTER
Spoke with patient about different option of protein shakes for her. Patient is concern about money at this time, I advice to use the protein shake she have at home and add extra water or milk, so it not too sweet. Increase protein intake

## 2022-09-20 DIAGNOSIS — C50.111 MALIGNANT NEOPLASM OF CENTRAL PORTION OF RIGHT BREAST IN FEMALE, ESTROGEN RECEPTOR POSITIVE: ICD-10-CM

## 2022-09-20 DIAGNOSIS — Z17.0 MALIGNANT NEOPLASM OF CENTRAL PORTION OF RIGHT BREAST IN FEMALE, ESTROGEN RECEPTOR POSITIVE: ICD-10-CM

## 2022-09-20 RX ORDER — ANASTROZOLE 1 MG/1
1 TABLET ORAL DAILY
Qty: 90 TABLET | Refills: 1 | Status: SHIPPED | OUTPATIENT
Start: 2022-09-20 | End: 2023-03-14

## 2022-10-05 ENCOUNTER — APPOINTMENT (OUTPATIENT)
Dept: MRI IMAGING | Facility: HOSPITAL | Age: 52
End: 2022-10-05

## 2022-10-19 ENCOUNTER — TELEMEDICINE (OUTPATIENT)
Dept: INTERNAL MEDICINE | Facility: CLINIC | Age: 52
End: 2022-10-19

## 2022-10-19 DIAGNOSIS — H10.31 ACUTE CONJUNCTIVITIS OF RIGHT EYE, UNSPECIFIED ACUTE CONJUNCTIVITIS TYPE: Primary | ICD-10-CM

## 2022-10-19 PROCEDURE — 99213 OFFICE O/P EST LOW 20 MIN: CPT | Performed by: NURSE PRACTITIONER

## 2022-10-19 RX ORDER — MOXIFLOXACIN 5 MG/ML
1 SOLUTION/ DROPS OPHTHALMIC 3 TIMES DAILY
Qty: 3 ML | Refills: 0 | Status: SHIPPED | OUTPATIENT
Start: 2022-10-19 | End: 2022-12-12

## 2022-10-23 NOTE — PROGRESS NOTES
"Chief Complaint  Eye redness    Subjective        Emilie SAUNDERS presents to [facility] due to eye irritation and drainage.    You have chosen to receive care through a telehealth visit.  Do you consent to use a video/audio connection for your medical care today? Yes    Location of patient: home  Location of provider: [facility]  The visit included audio and video interaction between patient and provider due to the COVID-19 pandemic.      History of Present Illness  She presents due to redness and irritation of her right eye which began a couple days ago without acute injury or trauma.  She does wear contacts and has relieved without significant improvement in symptoms.      Objective   Vital Signs:  There were no vitals taken for this visit.  Estimated body mass index is 47.45 kg/m² as calculated from the following:    Height as of 9/15/22: 167.6 cm (65.98\").    Weight as of 9/15/22: 133 kg (293 lb 12.8 oz).    Class 3 Severe Obesity (BMI >=40). Obesity-related health conditions include the following: hypertension, diabetes mellitus, dyslipidemias and osteoarthritis. Obesity is improving with treatment. BMI is is above average; BMI management plan is completed. We discussed portion control and increasing exercise.      Physical Exam  Constitutional:       Appearance: She is well-developed.   HENT:      Head: Normocephalic and atraumatic.   Eyes:      General:         Right eye: Discharge present.         Left eye: No discharge.      Conjunctiva/sclera:      Right eye: Right conjunctiva is injected.   Pulmonary:      Effort: Pulmonary effort is normal.   Neurological:      Mental Status: She is alert and oriented to person, place, and time.   Psychiatric:         Behavior: Behavior normal.         Thought Content: Thought content normal.         Judgment: Judgment normal.        Result Review :  The following data was reviewed by: Pinky Hyatt, VICENTE on 10/19/2022:  Common labs  "   Common Labs 7/22/22 7/22/22 8/25/22 8/25/22 9/13/22 9/13/22    1540 1540 1550 1550 0527 0527   Glucose    113 (A)  131 (A)   BUN    10  8   Creatinine    0.54 (A)  0.76   Sodium    134 (A)  142   Potassium    4.0  3.9   Chloride    101  106   Calcium    10.2  9.8   Albumin    4.10  3.90   Total Bilirubin    0.9  0.5   Alkaline Phosphatase    97  89   AST (SGOT)    45 (A)  31   ALT (SGPT)    57 (A)  49 (A)   WBC   7.92  11.33 (A)    Hemoglobin   12.9  13.2    Hematocrit   37.5  38.8    Platelets   264  275    Total Cholesterol 234 (A)        Triglycerides 398 (A)        HDL Cholesterol 46        LDL Cholesterol  118 (A)        Hemoglobin A1C  6.3 (A)       (A) Abnormal value       Comments are available for some flowsheets but are not being displayed.                     Assessment and Plan   Diagnoses and all orders for this visit:    1. Acute conjunctivitis of right eye, unspecified acute conjunctivitis type (Primary)  -     moxifloxacin (VIGAMOX) 0.5 % ophthalmic solution; Administer 0.05 mL to the right eye 3 (Three) Times a Day.  Dispense: 3 mL; Refill: 0    Symptoms are suggestive of acute conjunctivitis, will treat with antibiotic drops and continue to monitor symptoms.  She will apply warm compresses and continue to avoid contacts.  Follow-up with ophthalmology if symptoms do not improve in 3 to 4 days.  She is doing well since her gastric sleeve.  She is having difficulty with the shakes due to her intolerance to sweet foods.  She has an appointment on Thursday for the support group.    History and medical judgement obtained from video conversation with patient. No hands-on physical examination was performed. Approximately 12 minutes spent in video conversation with patient and in chart review.       Follow Up   Return if symptoms worsen or fail to improve, for Next scheduled follow up.  Patient was given instructions and counseling regarding her condition or for health maintenance advice. Please see  specific information pulled into the AVS if appropriate.

## 2022-10-28 ENCOUNTER — APPOINTMENT (OUTPATIENT)
Dept: MRI IMAGING | Facility: HOSPITAL | Age: 52
End: 2022-10-28

## 2022-11-10 ENCOUNTER — OFFICE VISIT (OUTPATIENT)
Dept: BARIATRICS/WEIGHT MGMT | Facility: CLINIC | Age: 52
End: 2022-11-10

## 2022-11-10 VITALS
SYSTOLIC BLOOD PRESSURE: 135 MMHG | HEIGHT: 64 IN | TEMPERATURE: 97.5 F | DIASTOLIC BLOOD PRESSURE: 70 MMHG | BODY MASS INDEX: 44.13 KG/M2 | HEART RATE: 70 BPM | WEIGHT: 258.5 LBS

## 2022-11-10 DIAGNOSIS — Z71.3 DIETARY COUNSELING: ICD-10-CM

## 2022-11-10 DIAGNOSIS — E66.01 OBESITY, CLASS III, BMI 40-49.9 (MORBID OBESITY): Primary | ICD-10-CM

## 2022-11-10 DIAGNOSIS — E55.9 VITAMIN D DEFICIENCY: Chronic | ICD-10-CM

## 2022-11-10 DIAGNOSIS — R73.03 PREDIABETES: Chronic | ICD-10-CM

## 2022-11-10 DIAGNOSIS — M25.473 ANKLE EDEMA: ICD-10-CM

## 2022-11-10 DIAGNOSIS — Z98.84 S/P LAPAROSCOPIC SLEEVE GASTRECTOMY: ICD-10-CM

## 2022-11-10 DIAGNOSIS — I10 BENIGN ESSENTIAL HTN: Chronic | ICD-10-CM

## 2022-11-10 DIAGNOSIS — G47.33 OSA (OBSTRUCTIVE SLEEP APNEA): ICD-10-CM

## 2022-11-10 PROCEDURE — 99024 POSTOP FOLLOW-UP VISIT: CPT | Performed by: NURSE PRACTITIONER

## 2022-11-10 NOTE — PROGRESS NOTES
[facility]  [address]  [facility] [address]  [phone]  [address] [id number] [phone]  Dept: [phone]  11/11/2022      Emilie SAUNDERS.  [id number]  [id number]  1970  female      Chief Complaint   Patient presents with   • Post-op     GS       [facility] Post-Op Bariatric Surgery:   Emilie SAUNDERS is status post Laparoscopic Sleeve procedure, performed on 9/12/2022     HPI:   Today's weight is 117 kg (258 lb 8 oz) pounds, today's BMI is Body mass index is 44.13 kg/m².,has a  loss of 37 pounds since the last visit andweight loss since surgery is 49 pounds. The patient reports a decreased portion size and loss of appetite.      Emilie SAUNDERS denies nausea, vomiting, reflux, dysphagia and reports tolerating aregular diet.  Has not been doing protein shakes.  Has been able to get her water in.  Is tracking her intake.  Has added fiber and probiotic.  Feels like she is prioritizing protein intake.       Diet and Exercise: Diet history reviewed and discussed with the patient. Weight loss/gains to date discussed with the patient. The patient states they are eating 40-60 grams of protein per day. She reports eating 3 meals per day, a typical portion size of 1/2 cup, eating 0 snacks per day, drinking 5 or more 8-oz. glasses of water per day, no carbonated beverage consumption and exercising regularly.     Supplements: flintstone.     Review of Systems    Patient Active Problem List   Diagnosis   • Anxiety   • Benign essential HTN   • Vitamin D deficiency   • Hyperlipidemia   • Lumbago   • Malignant neoplasm of central portion of right breast in female, estrogen receptor positive (HCC)   • Monoallelic mutation of CHEK2 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   • PCOS (polycystic ovarian syndrome)   • History of total abdominal hysterectomy and bilateral salpingo-oophorectomy   • Ankle edema   • Increased risk of breast  cancer   • Hyperuricemia   • MARIBELL (obstructive sleep apnea) using CPAP   • Urinary, incontinence, stress female   • Anxious depression   • Prediabetes   • Dyspepsia   • Gastric polyps   • COVID-19 virus infection   • Obesity, Class III, BMI 40-49.9 (morbid obesity) (HCC)   • S/P laparoscopic sleeve gastrectomy   • Dietary counseling   • Thrush       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   • Depression 01/01/1988   • Diabetes mellitus (HCC)     pre diabetic   • Family history of breast cancer 05/14/2019   • GERD (gastroesophageal reflux disease)    • Headache    • History of 2019 novel coronavirus disease (COVID-19)     2/2022 AND 8/9/22 (TESTED AT [employer], RESULTS IN EPIC)   • History of kidney stones    • Hyperlipidemia    • Hypertension    • Irritable bowel syndrome    • Lumbago    • Mood disorder (HCC)    • Obesity life   • PONV (postoperative nausea and vomiting)    • 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   • Sleep apnea     C-PAP IN USE   • Vitamin D deficiency        The following portions of the patient's history were reviewed and updated as appropriate: allergies, current medications, past medical history, past surgical history and problem list.    Vitals:    11/10/22 1340   BP: 135/70   Pulse: 70   Temp: 97.5 °F (36.4 °C)       Physical Exam    Assessment:   Post-op, the patient is doing well.     Encounter Diagnoses   Name Primary?   • Obesity, Class III, BMI 40-49.9 (morbid obesity) (HCC) Yes   • S/P laparoscopic sleeve gastrectomy    • Dietary counseling    • Benign essential HTN    • Vitamin D deficiency    • Prediabetes    • Ankle edema    • MARIBELL (obstructive sleep apnea) using CPAP        Plan:   Will get 1 month labs.   Encouraged to increase protein to 70 grams per day.    Encouraged patient to be sure to get plenty of lean protein per day through small frequent meals all  with a protein source.   Activity restrictions: none.   Recommended patient be sure to get at least 70 grams of protein per day by eating small, frequent meals all with high lean protein choices. Be sure to limit/cut back on daily carbohydrate intake. Discussed with the patient the recommended amount of water per day to intake- half of body weight in ounces. Reviewed vitamin requirements. Be sure to do routine exercise, 150 minutes per week minimum, including both cardio and strength training.     Instructions / Recommendations: dietary counseling recommended, recommended a daily protein intake of  grams, vitamin supplement(s) recommended, recommended exercising at least 150 minutes per week, behavior modifications recommended and instructed to call the office for concerns, questions, or problems.     The patient was instructed to follow up in 2 months .     Total time spent during this encounter today was 25 minutes.

## 2022-12-01 ENCOUNTER — HOSPITAL ENCOUNTER (OUTPATIENT)
Dept: MRI IMAGING | Facility: HOSPITAL | Age: 52
Discharge: HOME | End: 2022-12-01
Admitting: NURSE PRACTITIONER

## 2022-12-01 DIAGNOSIS — Z15.89 MONOALLELIC MUTATION OF CHEK2 GENE IN FEMALE PATIENT: Chronic | ICD-10-CM

## 2022-12-01 DIAGNOSIS — Z15.01 MONOALLELIC MUTATION OF CHEK2 GENE IN FEMALE PATIENT: Chronic | ICD-10-CM

## 2022-12-01 DIAGNOSIS — Z15.09 MONOALLELIC MUTATION OF CHEK2 GENE IN FEMALE PATIENT: Chronic | ICD-10-CM

## 2022-12-01 DIAGNOSIS — C50.111 MALIGNANT NEOPLASM OF CENTRAL PORTION OF RIGHT BREAST IN FEMALE, ESTROGEN RECEPTOR POSITIVE: ICD-10-CM

## 2022-12-01 DIAGNOSIS — E66.01 CLASS 3 SEVERE OBESITY WITH SERIOUS COMORBIDITY AND BODY MASS INDEX (BMI) OF 50.0 TO 59.9 IN ADULT, UNSPECIFIED OBESITY TYPE: Chronic | ICD-10-CM

## 2022-12-01 DIAGNOSIS — Z17.0 MALIGNANT NEOPLASM OF CENTRAL PORTION OF RIGHT BREAST IN FEMALE, ESTROGEN RECEPTOR POSITIVE: ICD-10-CM

## 2022-12-01 DIAGNOSIS — Z91.89 INCREASED RISK OF BREAST CANCER: ICD-10-CM

## 2022-12-01 DIAGNOSIS — Z15.02 MONOALLELIC MUTATION OF CHEK2 GENE IN FEMALE PATIENT: Chronic | ICD-10-CM

## 2022-12-01 PROCEDURE — A9577 INJ MULTIHANCE: HCPCS | Performed by: NURSE PRACTITIONER

## 2022-12-01 PROCEDURE — 77049 MRI BREAST C-+ W/CAD BI: CPT

## 2022-12-01 PROCEDURE — 0 GADOBENATE DIMEGLUMINE 529 MG/ML SOLUTION: Performed by: NURSE PRACTITIONER

## 2022-12-01 PROCEDURE — 82565 ASSAY OF CREATININE: CPT

## 2022-12-01 RX ADMIN — GADOBENATE DIMEGLUMINE 20 ML: 529 INJECTION, SOLUTION INTRAVENOUS at 19:21

## 2022-12-02 DIAGNOSIS — I10 BENIGN ESSENTIAL HTN: Chronic | ICD-10-CM

## 2022-12-02 LAB — CREAT BLDA-MCNC: 0.7 MG/DL (ref 0.6–1.3)

## 2022-12-02 RX ORDER — LISINOPRIL 40 MG/1
40 TABLET ORAL DAILY
Qty: 90 TABLET | Refills: 3 | Status: SHIPPED | OUTPATIENT
Start: 2022-12-02

## 2022-12-06 ENCOUNTER — TELEPHONE (OUTPATIENT)
Dept: SURGERY | Facility: CLINIC | Age: 52
End: 2022-12-06

## 2022-12-06 DIAGNOSIS — R92.8 ABNORMAL FINDING ON BREAST IMAGING: Primary | ICD-10-CM

## 2022-12-06 NOTE — TELEPHONE ENCOUNTER
MRI results from 12/1/22 reported to patient.  Left axillary adenopathy noted in MRI, patient did receive recent covid vaccine in her left arm.  We will repeat the left axillary US in 8 weeks as recommended.  She will return for clinic visit after that imaging.    IMPRESSION AND RECOMMENDATION:     1.  Multiple new asymmetrically enlarged lymph nodes with cortical  thickening and surrounding ill-defined edema. Per Epic chart review, the  patient received a Covid vaccine 4 days ago. Correlate with side of  vaccination. If the vaccine was administered on the left, these findings  are probably benign, and short-term follow-up targeted left axillary  ultrasound would be recommended in 6-8 weeks document resolution.  However, if the vaccine was administered on the right, findings would be  considered suspicious, and further evaluation with targeted left  axillary ultrasound and possible ultrasound-guided core needle biopsy  would be recommended at this time.  2.  Otherwise, no MRI evidence of malignancy in either breast.     An Epic in basket message was sent to VICENTE Smith on  12/2/2022.     BI-RADS Category 3: Probably benign

## 2022-12-12 ENCOUNTER — OFFICE VISIT (OUTPATIENT)
Dept: INTERNAL MEDICINE | Facility: CLINIC | Age: 52
End: 2022-12-12

## 2022-12-12 VITALS
WEIGHT: 242 LBS | SYSTOLIC BLOOD PRESSURE: 136 MMHG | OXYGEN SATURATION: 98 % | TEMPERATURE: 97.8 F | HEIGHT: 64 IN | DIASTOLIC BLOOD PRESSURE: 84 MMHG | HEART RATE: 74 BPM | BODY MASS INDEX: 41.32 KG/M2

## 2022-12-12 DIAGNOSIS — I10 BENIGN ESSENTIAL HTN: Primary | ICD-10-CM

## 2022-12-12 DIAGNOSIS — Z98.84 S/P LAPAROSCOPIC SLEEVE GASTRECTOMY: ICD-10-CM

## 2022-12-12 DIAGNOSIS — E55.9 VITAMIN D DEFICIENCY: ICD-10-CM

## 2022-12-12 DIAGNOSIS — R73.03 PREDIABETES: ICD-10-CM

## 2022-12-12 DIAGNOSIS — E66.01 OBESITY, CLASS III, BMI 40-49.9 (MORBID OBESITY): ICD-10-CM

## 2022-12-12 DIAGNOSIS — E78.00 PURE HYPERCHOLESTEROLEMIA: Chronic | ICD-10-CM

## 2022-12-12 PROCEDURE — 99214 OFFICE O/P EST MOD 30 MIN: CPT | Performed by: NURSE PRACTITIONER

## 2022-12-12 RX ORDER — ERGOCALCIFEROL 1.25 MG/1
50000 CAPSULE ORAL WEEKLY
Qty: 5 CAPSULE | Refills: 2 | Status: SHIPPED | OUTPATIENT
Start: 2022-12-12

## 2022-12-12 NOTE — PROGRESS NOTES
"Chief Complaint  Hypertension (3 month follow up)  Subjective        Emilie SAUNDERS presents to [facility]     History of Present Illness    Emilei Saunders is a 52-year-old female who presents to the clinic today for a follow-up regarding hypertension, prediabetes and hyperlipidemia.    The patient states she is feeling great since losing 68 pounds. However, she expresses that mentally she feels like she still weighs 300 pounds.  She is scheduled to see a counselor in 4 months through bariatrics. The patient is working on her mental status on the weight loss and grievance of food. She notes that she has reached her ultimate goal which is to be healthier.     She notes that she has been out of blood pressure medication (Lisinopril), BP reading was 188/110 mmHg at store recently. Denies chest pain and/or shortness of breath. She is no longer taking Lasix and denies swelling in lower extremities.     She c/o increased congestion and drainage with laryngitis since 12/5/22. She started Dayquil and Nyquil and notes gradual improvement in sx. Denies fever and/or chills.    She is leaving for [facility] 12/16/22.    Objective   Vital Signs:  /84 (BP Location: Left arm, Patient Position: Sitting, Cuff Size: Adult)   Pulse 74   Temp 97.8 °F (36.6 °C) (Temporal)   Ht 163 cm (64.17\")   Wt 110 kg (242 lb)   SpO2 98%   BMI 41.32 kg/m²   Estimated body mass index is 41.32 kg/m² as calculated from the following:    Height as of this encounter: 163 cm (64.17\").    Weight as of this encounter: 110 kg (242 lb).          Physical Exam  Constitutional:       General: She is not in acute distress.     Appearance: Normal appearance. She is well-developed.   HENT:      Head: Normocephalic and atraumatic.      Right Ear: Tympanic membrane, ear canal and external ear normal.      Left Ear: Tympanic membrane, ear canal and external ear normal.      Mouth/Throat:      Mouth: Mucous membranes are " moist.      Pharynx: Oropharynx is clear.   Eyes:      Conjunctiva/sclera: Conjunctivae normal.      Pupils: Pupils are equal, round, and reactive to light.   Neck:      Thyroid: No thyromegaly.   Cardiovascular:      Rate and Rhythm: Normal rate and regular rhythm.      Heart sounds: No murmur heard.  Pulmonary:      Effort: Pulmonary effort is normal.      Breath sounds: Normal breath sounds. No wheezing.   Abdominal:      General: Bowel sounds are normal.      Palpations: Abdomen is soft.      Tenderness: There is no abdominal tenderness.   Musculoskeletal:         General: Normal range of motion.      Cervical back: Neck supple.   Lymphadenopathy:      Cervical: No cervical adenopathy.   Skin:     General: Skin is warm and dry.   Neurological:      Mental Status: She is alert and oriented to person, place, and time.   Psychiatric:         Mood and Affect: Mood normal.         Behavior: Behavior normal.        Result Review :  The following data was reviewed by: VICENTE Ruth on 12/12/2022:  Common labs    Common Labs 9/13/22 9/13/22 12/1/22 12/12/22 12/12/22 12/12/22 12/12/22    0527 0527  1230 1230 1230 1230   Glucose  131 (A)  103 (A)      BUN  8  5 (A)      Creatinine  0.76 0.70 0.62      Sodium  142  142      Potassium  3.9  4.1      Chloride  106  102      Calcium  9.8  10.4 (A)      Total Protein    6.7      Albumin  3.90  4.5      Total Bilirubin  0.5  1.0      Alkaline Phosphatase  89  83      AST (SGOT)  31  20      ALT (SGPT)  49 (A)  25      WBC 11.33 (A)    7.3     Hemoglobin 13.2    13.5     Hematocrit 38.8    41.7     Platelets 275    271     Total Cholesterol       187   Triglycerides       207 (A)   HDL Cholesterol       45   LDL Cholesterol        106 (A)   Hemoglobin A1C      5.0    (A) Abnormal value       Comments are available for some flowsheets but are not being displayed.           Data reviewed: Consultant notes bariatrics 11/10/22          Assessment and Plan   Diagnoses and  all orders for this visit:    1. Benign essential HTN (Primary)  Assessment & Plan:  BP has been elevated and she has had elevated readings at pharmacy, she will restart Lisinopril daily with home BP monitoring. We will need to watch this closely as she continues to lose weight.    Orders:  -     Comprehensive Metabolic Panel  -     CBC & Differential    2. Vitamin D deficiency  Assessment & Plan:  She was previously managed on a weekly Vitamin D supplement but has not restarted since gastric bypass, recheck level.    Orders:  -     vitamin D (ERGOCALCIFEROL) 1.25 MG (39499 UT) capsule capsule; Take 1 capsule by mouth 1 (One) Time Per Week.  Dispense: 5 capsule; Refill: 2  -     Vitamin D,25-Hydroxy    3. Prediabetes  Assessment & Plan:  Recheck A1c since recent weight loss    Orders:  -     Hemoglobin A1c    4. Pure hypercholesterolemia  Assessment & Plan:  She has followed a low-carb, low-fat diet since gastric bypass; recheck lipid panel today.    Orders:  -     Lipid Panel    5. S/P laparoscopic sleeve gastrectomy  Comments:  s/p gastric sleeve 9/12/22  Orders:  -     Vitamin B1, Whole Blood  -     Prealbumin  -     Methylmalonic Acid, Serum  -     Ferritin  -     Iron  -     Folate    6. Obesity, Class III, BMI 40-49.9 (morbid obesity) ([facility])  Assessment & Plan:  Patient's (Body mass index is 41.32 kg/m².) indicates that they are morbidly obese (BMI > 40 or > 35 with obesity - related health condition) with health conditions that include hypertension and dyslipidemias . Weight is improving with lifestyle modifications. BMI is is above average; BMI management plan is completed. We discussed portion control and increasing exercise.     Orders:  -     Vitamin B1, Whole Blood  -     Prealbumin  -     Methylmalonic Acid, Serum  -     Ferritin  -     Iron  -     Folate         Follow Up   Return in about 4 months (around 4/12/2023).  Patient was given instructions and counseling regarding her condition or for health  maintenance advice. Please see specific information pulled into the AVS if appropriate.     Transcribed from ambient dictation for Pinky Hyatt, VICENTE by Beth Sutherland.  12/12/22   15:18 EST    Patient or patient representative verbalized consent to the visit recording.  I have personally performed the services described in this document as transcribed by the above individual, and it is both accurate and complete.

## 2022-12-16 LAB
25(OH)D3+25(OH)D2 SERPL-MCNC: 33.9 NG/ML (ref 30–100)
ALBUMIN SERPL-MCNC: 4.5 G/DL (ref 3.8–4.9)
ALBUMIN/GLOB SERPL: 2 {RATIO} (ref 1.2–2.2)
ALP SERPL-CCNC: 83 IU/L (ref 44–121)
ALT SERPL-CCNC: 25 IU/L (ref 0–32)
AST SERPL-CCNC: 20 IU/L (ref 0–40)
BASOPHILS # BLD AUTO: 0.1 X10E3/UL (ref 0–0.2)
BASOPHILS NFR BLD AUTO: 1 %
BILIRUB SERPL-MCNC: 1 MG/DL (ref 0–1.2)
BUN SERPL-MCNC: 5 MG/DL (ref 6–24)
BUN/CREAT SERPL: 8 (ref 9–23)
CALCIUM SERPL-MCNC: 10.4 MG/DL (ref 8.7–10.2)
CHLORIDE SERPL-SCNC: 102 MMOL/L (ref 96–106)
CHOLEST SERPL-MCNC: 187 MG/DL (ref 100–199)
CO2 SERPL-SCNC: 26 MMOL/L (ref 20–29)
CREAT SERPL-MCNC: 0.62 MG/DL (ref 0.57–1)
EGFRCR SERPLBLD CKD-EPI 2021: 107 ML/MIN/1.73
EOSINOPHIL # BLD AUTO: 0.2 X10E3/UL (ref 0–0.4)
EOSINOPHIL NFR BLD AUTO: 3 %
ERYTHROCYTE [DISTWIDTH] IN BLOOD BY AUTOMATED COUNT: 13.8 % (ref 11.7–15.4)
FERRITIN SERPL-MCNC: 137 NG/ML (ref 15–150)
FOLATE SERPL-MCNC: 17.9 NG/ML
GLOBULIN SER CALC-MCNC: 2.2 G/DL (ref 1.5–4.5)
GLUCOSE SERPL-MCNC: 103 MG/DL (ref 70–99)
HBA1C MFR BLD: 5 % (ref 4.8–5.6)
HCT VFR BLD AUTO: 41.7 % (ref 34–46.6)
HDLC SERPL-MCNC: 45 MG/DL
HGB BLD-MCNC: 13.5 G/DL (ref 11.1–15.9)
IMM GRANULOCYTES # BLD AUTO: 0 X10E3/UL (ref 0–0.1)
IMM GRANULOCYTES NFR BLD AUTO: 0 %
IRON SERPL-MCNC: 67 UG/DL (ref 27–159)
LDLC SERPL CALC-MCNC: 106 MG/DL (ref 0–99)
LYMPHOCYTES # BLD AUTO: 2.1 X10E3/UL (ref 0.7–3.1)
LYMPHOCYTES NFR BLD AUTO: 28 %
MCH RBC QN AUTO: 28.5 PG (ref 26.6–33)
MCHC RBC AUTO-ENTMCNC: 32.4 G/DL (ref 31.5–35.7)
MCV RBC AUTO: 88 FL (ref 79–97)
METHYLMALONATE SERPL-SCNC: 70 NMOL/L (ref 0–378)
MONOCYTES # BLD AUTO: 0.4 X10E3/UL (ref 0.1–0.9)
MONOCYTES NFR BLD AUTO: 5 %
NEUTROPHILS # BLD AUTO: 4.5 X10E3/UL (ref 1.4–7)
NEUTROPHILS NFR BLD AUTO: 63 %
PLATELET # BLD AUTO: 271 X10E3/UL (ref 150–450)
POTASSIUM SERPL-SCNC: 4.1 MMOL/L (ref 3.5–5.2)
PREALB SERPL-MCNC: 19 MG/DL (ref 10–36)
PROT SERPL-MCNC: 6.7 G/DL (ref 6–8.5)
RBC # BLD AUTO: 4.74 X10E6/UL (ref 3.77–5.28)
SODIUM SERPL-SCNC: 142 MMOL/L (ref 134–144)
TRIGL SERPL-MCNC: 207 MG/DL (ref 0–149)
VIT B1 BLD-SCNC: 128.6 NMOL/L (ref 66.5–200)
VLDLC SERPL CALC-MCNC: 36 MG/DL (ref 5–40)
WBC # BLD AUTO: 7.3 X10E3/UL (ref 3.4–10.8)

## 2022-12-18 PROBLEM — E66.01 OBESITY, CLASS III, BMI 40-49.9 (MORBID OBESITY) (HCC): Chronic | Status: ACTIVE | Noted: 2022-09-12

## 2022-12-18 PROBLEM — E66.813 OBESITY, CLASS III, BMI 40-49.9 (MORBID OBESITY): Chronic | Status: ACTIVE | Noted: 2022-09-12

## 2022-12-18 NOTE — ASSESSMENT & PLAN NOTE
She was previously managed on a weekly Vitamin D supplement but has not restarted since gastric bypass, recheck level.

## 2022-12-18 NOTE — ASSESSMENT & PLAN NOTE
Patient's (Body mass index is 41.32 kg/m².) indicates that they are morbidly obese (BMI > 40 or > 35 with obesity - related health condition) with health conditions that include hypertension and dyslipidemias . Weight is improving with lifestyle modifications. BMI is is above average; BMI management plan is completed. We discussed portion control and increasing exercise.

## 2022-12-18 NOTE — ASSESSMENT & PLAN NOTE
BP has been elevated and she has had elevated readings at pharmacy, she will restart Lisinopril daily with home BP monitoring. We will need to watch this closely as she continues to lose weight.

## 2023-01-17 ENCOUNTER — LAB (OUTPATIENT)
Dept: LAB | Facility: HOSPITAL | Age: 53
End: 2023-01-17
Payer: COMMERCIAL

## 2023-01-17 ENCOUNTER — OFFICE VISIT (OUTPATIENT)
Dept: ONCOLOGY | Facility: CLINIC | Age: 53
End: 2023-01-17
Payer: COMMERCIAL

## 2023-01-17 VITALS
TEMPERATURE: 98 F | HEART RATE: 74 BPM | WEIGHT: 233 LBS | BODY MASS INDEX: 39.78 KG/M2 | DIASTOLIC BLOOD PRESSURE: 96 MMHG | RESPIRATION RATE: 18 BRPM | HEIGHT: 64 IN | SYSTOLIC BLOOD PRESSURE: 140 MMHG | OXYGEN SATURATION: 98 %

## 2023-01-17 DIAGNOSIS — Z15.01 MONOALLELIC MUTATION OF CHEK2 GENE IN FEMALE PATIENT: ICD-10-CM

## 2023-01-17 DIAGNOSIS — Z15.09 MONOALLELIC MUTATION OF CHEK2 GENE IN FEMALE PATIENT: ICD-10-CM

## 2023-01-17 DIAGNOSIS — E83.52 SERUM CALCIUM ELEVATED: ICD-10-CM

## 2023-01-17 DIAGNOSIS — C50.111 MALIGNANT NEOPLASM OF CENTRAL PORTION OF RIGHT BREAST IN FEMALE, ESTROGEN RECEPTOR POSITIVE: ICD-10-CM

## 2023-01-17 DIAGNOSIS — Z15.89 MONOALLELIC MUTATION OF CHEK2 GENE IN FEMALE PATIENT: ICD-10-CM

## 2023-01-17 DIAGNOSIS — Z17.0 MALIGNANT NEOPLASM OF CENTRAL PORTION OF RIGHT BREAST IN FEMALE, ESTROGEN RECEPTOR POSITIVE: Primary | ICD-10-CM

## 2023-01-17 DIAGNOSIS — Z17.0 MALIGNANT NEOPLASM OF CENTRAL PORTION OF RIGHT BREAST IN FEMALE, ESTROGEN RECEPTOR POSITIVE: ICD-10-CM

## 2023-01-17 DIAGNOSIS — C50.111 MALIGNANT NEOPLASM OF CENTRAL PORTION OF RIGHT BREAST IN FEMALE, ESTROGEN RECEPTOR POSITIVE: Primary | ICD-10-CM

## 2023-01-17 DIAGNOSIS — Z15.02 MONOALLELIC MUTATION OF CHEK2 GENE IN FEMALE PATIENT: ICD-10-CM

## 2023-01-17 LAB
ALBUMIN SERPL-MCNC: 4.1 G/DL (ref 3.5–5.2)
ALBUMIN/GLOB SERPL: 1.4 G/DL (ref 1.1–2.4)
ALP SERPL-CCNC: 82 U/L (ref 38–116)
ALT SERPL W P-5'-P-CCNC: 12 U/L (ref 0–33)
ANION GAP SERPL CALCULATED.3IONS-SCNC: 11.4 MMOL/L (ref 5–15)
AST SERPL-CCNC: 17 U/L (ref 0–32)
BASOPHILS # BLD AUTO: 0.05 10*3/MM3 (ref 0–0.2)
BASOPHILS NFR BLD AUTO: 0.6 % (ref 0–1.5)
BILIRUB SERPL-MCNC: 0.8 MG/DL (ref 0.2–1.2)
BUN SERPL-MCNC: 11 MG/DL (ref 6–20)
BUN/CREAT SERPL: 15.7 (ref 7.3–30)
CALCIUM SPEC-SCNC: 10.8 MG/DL (ref 8.5–10.2)
CHLORIDE SERPL-SCNC: 102 MMOL/L (ref 98–107)
CO2 SERPL-SCNC: 24.6 MMOL/L (ref 22–29)
CREAT SERPL-MCNC: 0.7 MG/DL (ref 0.6–1.1)
DEPRECATED RDW RBC AUTO: 41.7 FL (ref 37–54)
EGFRCR SERPLBLD CKD-EPI 2021: 104.2 ML/MIN/1.73
EOSINOPHIL # BLD AUTO: 0.21 10*3/MM3 (ref 0–0.4)
EOSINOPHIL NFR BLD AUTO: 2.5 % (ref 0.3–6.2)
ERYTHROCYTE [DISTWIDTH] IN BLOOD BY AUTOMATED COUNT: 12.9 % (ref 12.3–15.4)
GLOBULIN UR ELPH-MCNC: 2.9 GM/DL (ref 1.8–3.5)
GLUCOSE SERPL-MCNC: 99 MG/DL (ref 74–124)
HCT VFR BLD AUTO: 42.7 % (ref 34–46.6)
HGB BLD-MCNC: 13.8 G/DL (ref 12–15.9)
IMM GRANULOCYTES # BLD AUTO: 0.02 10*3/MM3 (ref 0–0.05)
IMM GRANULOCYTES NFR BLD AUTO: 0.2 % (ref 0–0.5)
LYMPHOCYTES # BLD AUTO: 2.34 10*3/MM3 (ref 0.7–3.1)
LYMPHOCYTES NFR BLD AUTO: 27.8 % (ref 19.6–45.3)
MCH RBC QN AUTO: 28.7 PG (ref 26.6–33)
MCHC RBC AUTO-ENTMCNC: 32.3 G/DL (ref 31.5–35.7)
MCV RBC AUTO: 88.8 FL (ref 79–97)
MONOCYTES # BLD AUTO: 0.42 10*3/MM3 (ref 0.1–0.9)
MONOCYTES NFR BLD AUTO: 5 % (ref 5–12)
NEUTROPHILS NFR BLD AUTO: 5.39 10*3/MM3 (ref 1.7–7)
NEUTROPHILS NFR BLD AUTO: 63.9 % (ref 42.7–76)
NRBC BLD AUTO-RTO: 0 /100 WBC (ref 0–0.2)
PLATELET # BLD AUTO: 177 10*3/MM3 (ref 140–450)
PMV BLD AUTO: 10.2 FL (ref 6–12)
POTASSIUM SERPL-SCNC: 4.2 MMOL/L (ref 3.5–4.7)
PROT SERPL-MCNC: 7 G/DL (ref 6.3–8)
RBC # BLD AUTO: 4.81 10*6/MM3 (ref 3.77–5.28)
SODIUM SERPL-SCNC: 138 MMOL/L (ref 134–145)
WBC NRBC COR # BLD: 8.43 10*3/MM3 (ref 3.4–10.8)

## 2023-01-17 PROCEDURE — 80053 COMPREHEN METABOLIC PANEL: CPT

## 2023-01-17 PROCEDURE — 85025 COMPLETE CBC W/AUTO DIFF WBC: CPT

## 2023-01-17 PROCEDURE — 36415 COLL VENOUS BLD VENIPUNCTURE: CPT

## 2023-01-17 PROCEDURE — 99213 OFFICE O/P EST LOW 20 MIN: CPT | Performed by: INTERNAL MEDICINE

## 2023-01-17 NOTE — PROGRESS NOTES
Subjective     REASON FOR CONSULTATION:1. High grade, invasive ductal carcinoma of right breast, ER/NM+, Her2 negative; clinical T1bN0, anatomic stage IA, prognostic stage IA  · May 28, 2019 right lumpectomy with removal of nipple areolar complex and right sentinel lymph node biopsy  -Central 2:00, invasive ductal carcinoma, grade 2, Elvin score of 6, tumor size is 10 mm, no DCIS, one lymph node negative, good margins    2.  Oncotype DX score of 6, low risk, no chemo necessary    3.  June 28, 2019: Started tamoxifen    4.  Family history of breast cancer, CHEK2  Positive  · Patient's cousin had breast cancer at age 40 and her aunt had breast cancer her paternal aunt  · CHEK2 usually is associated with 15 to 40% risk of breast cancer  · Will need MRI breast alternating with mammogram after age 40    5.  S/p hysterectomy with bilateral salpingo-oophorectomy September 4, 2020 by Dr. Nery Neumann.  Patient had bilateral ovarian cysts.  Pathology was consistent with complex atypical endometrial hyperplasia, endometrial polyp otherwise unremarkable.  Acute on chronic cervicitis with squamous metaplasia.  Right ovary with hemorrhagic corpus luteum cyst and left ovary also with corpus luteum cyst pelvic peritoneal biopsy negative for malignancy omental biopsy negative for malignancy.    · Patient switched to aromatase inhibitor from tamoxifen.  · November 2, 2020: Started Arimidex    HISTORY OF PRESENT ILLNESS:   Patient is a 51-year-old female with history of T1 be N0 invasive ductal carcinoma ER/NM positive HER-2 negative.  She also has CHEK2 mutation.  She underwent hysterectomy with bilateral salpingo-oophorectomy which was done by Dr. Nery Neumann.  Initially was started on tamoxifen but after surgery and bilateral oophorectomy she has been started on Arimidex.  She has mild hot flashes but does not have any joint pains.  She has been on Arimidex since September 2020.  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  She has been vaccinated.  She is scheduled for MRI of the breast in September 2020 following which she will see Danny ZHANG .     Patient's bone density is normal.  She had elevated calcium secondary to hydrochlorothiazide and that has been discontinued.  Her calcium was down to 10.1 last time.  She is taking vitamin D supplements and her vitamin D level was low at 21 and have asked her to take 2000 units daily.  Given that hydrochlorothiazide has been discontinued and her calcium normalized she can take 600 mg of calcium once daily.  Encouraged her to take increased fluids only because she had a remote history of kidney stone.    Interval history:   The patient is doing well. She is currently taking anastrozole. She had bariatric surgery in 10/2022. She has lost 80 pounds of weight since then. She is otherwise doing well without any complaints. She does not have hot flashes, and she does not have any joint pains. She does have CHEK2 mutation. She had a bilateral breast MRI done on 12/01/2022, and this was 4 days after her COVID-19 vaccine. The MRI showed multiple new enlarged lymph nodes with cortical thickening and ill defined edema. She is scheduled for a repeat bilateral breast MRI on 02/07/2023.       Oncologic history:   The patient is a 52 y.o. year old female who is here for an opinion about the above issue.    Patient is a 49-year old female who presents today as a new patient for consultation on her newly diagnosed high grade, invasive ductal carcinoma of right breast.   Patient had seen Dr. Greene on May 14, 2019.    She underwent a screening mammogram but did not appreciate any mass herself and denied any breast pain or nipple discharge.    Patient is accompanied with her wife Trena.  They have been together for 19 years and  for the last 2 years.    April 18, 2019: Screening mammogram showed a 10 mm small oval mass in the subareolar region of the right breast.  Left  breast was negative.  Spot compression and ultrasound recommended.  Of the right breast.    April 29, 2019: Right diagnostic mammogram showed a high density round mass 10 mm with indistinct margins in the anterior one third subareolar region of the right breast located to centimeters from the nipple.    April 29, 2019: Ultrasound suggested a solid mass with indistinct margins 9 mm and subareolar region.  There is abnormal axillary node in the right axilla.  There are 2 adjacent lymph nodes in the right axilla with mildly prominent cortices.  The left axilla was evaluated for comparison and the nodes on the right are of greater cortical thickness.      Ultrasound-guided biopsy of the breast mass and right axillary lymph node was suggested.     5/9/19:  US guided biopsy sent to [facility] lab  showing solid mass in the sub-areorolar region of right breast revealing invasive high-grade mammary carcinoma, grade 3. Tubule score 3, nuclear grade 3, and mitotic score 2 for a total score of 8. Neoplasm is present in 4/8 biopsies and measures 4 mm in greatest dimension.   Biopsy performed on 05/09/2019 of prominent lymph node of right axilla was benign- negative for metastatic carcinoma.  ER: 82.35%,  positive  KS: 92.68%, positive  HER-2/latoya: 1+ negative  Ki-67 14.81%       05/09/2019 MRI of bilateral breast:   IMPRESSION:  1. Biopsy-proven malignancy in the right breast in the anterior  one-third approximately 1.8 cm from the nipple at the 2:30 position with  an internal metallic clip. The mass measures on the order of 1 cm in  greatest dimension. No other suspicious findings are seen within the  right breast and there is no evidence for right axillary or internal  mammary chain adenopathy. The patient appears to be a candidate for  breast conservation therapy.  2. There are no findings suspicious for malignancy in the left breast.  BI-RADS Category 6: Known biopsy-proven malignancy.    5/14/19: Patient was seen by Dr. Greene who  felt that she was a good candidate for lumpectomy given the size of the lesion related to her breast size.  Because of superficial subareolar location she would require removal of the nipple areolar complex in order to make sure she has negative margins.  Patient preferred breast conservation with central lumpectomy.  This included the removal of the nipple areolar complex.      Also obtained INVITAE  genetic test given her family history of malignancy.    Estrogen receptor: positive (82.35% moderate),   Progesterone receptor:  positive (92.68%, strong)   HER2/Amy: negative (IHC score1+) and Ki-67=14.8%  Pathologic Stage: pT1b, (sn)N0 (G2).    Synoptic Report :  TUMOR  Tumor Site: Invasive Carcinoma Central  Clock Position of Tumor Site 2 o'clock  3 o'clock  Histologic Type Invasive carcinoma of no special type (ductal, not otherwise  specified)  Glandular (Acinar) / Tubular Differentiation Score 2  Nuclear Pleomorphism Score 2  Mitotic Rate Score 2 (4-7 mitoses per mm2)  Number of Mitoses per 10 High-Power Fields 15 mitotic figures  Diameter of Microscope Field in Millimeters (mm) 0.55 Millimeters (mm)  Overall Grade Grade 2 (scores of 6 or 7)  Tumor Size Greatest dimension of largest invasive focus in Millimeters (mm): 10  Millimeters (mm)    MARGINS  Invasive Carcinoma Margins Uninvolved by invasive carcinoma    LYMPH NODES  Regional Lymph Nodes Uninvolved by tumor cells  Number of Lymph Nodes Examined 1  Number of [place] Nodes Examined 1    Oncotype DX testing ordered but pending.    Patient has a family history of paternal grandmother with breast cancer, paternal aunt with breast cancer, cousin on father's side dying of breast cancer at age 43.  History of ovarian or prostate cancer or uterine cancer.  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     Patient is now scheduled for counseling with genetic and also with  radiation oncology next week.  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   • Depression 01/01/1988   • Diabetes mellitus (HCC)     pre diabetic   • Family history of breast cancer 05/14/2019   • GERD (gastroesophageal reflux disease)    • Headache    • History of 2019 novel coronavirus disease (COVID-19)     2/2022 AND 8/9/22 (TESTED AT [facility], RESULTS IN EPIC)   • History of kidney stones    • Hyperlipidemia    • Hypertension    • Irritable bowel syndrome    • Lumbago    • Mood disorder (HCC)    • Obesity life   • PONV (postoperative nausea and vomiting)    • 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   • Sleep apnea     C-PAP IN USE   • Vitamin D deficiency         Past Surgical History:   Procedure Laterality Date   • APPENDECTOMY  1989    removed when removing dermoid on right ovary   • BREAST BIOPSY Right 2019   • BREAST LUMPECTOMY Right    • BREAST LUMPECTOMY WITH SENTINEL NODE BIOPSY Right 5/28/2019    Procedure: Right central partial mastectomy (with removal of the nipple-areola-complex) and sentinel lymph node biopsy;  Surgeon: Trena Greene MD;  Location: [facility] MAIN OR;  Service: General   • COLONOSCOPY N/A 12/5/2019    Procedure: COLONOSCOPY TO CECUM;  Surgeon: Pedrito Fulton Jr., MD;  Location: [facility] ENDOSCOPY;  Service: General   • DERMOID CYST  EXCISION  1989   • ENDOSCOPY N/A 12/28/2021    Procedure: ESOPHAGOGASTRODUODENOSCOPY WITH BIOPSY;  Surgeon: Delfino Rivera Jr., MD;  Location: [facility] ENDOSCOPY;  Service: General;  Laterality: N/A;  PRE- DYSPEPSIA  POST-- GASTRITIS, GASTRIC POLYPS   • GASTRIC SLEEVE LAPAROSCOPIC N/A 9/12/2022    Procedure: GASTRIC SLEEVE  LAPAROSCOPIC OR ROBOTIC;  Surgeon: Delfino Rivera Jr., MD;  Location: [facility] [place], [place];  Service: Robotics - DaVinci;  Laterality: N/A;   • HERNIA REPAIR  2006    umbilical   • HYSTERECTOMY Bilateral 09/04/2020    Dr. Neumann, due to ovarian cysts with previous hx breast cancer   • LAPAROSCOPIC CHOLECYSTECTOMY     • SALIVARY GLAND SURGERY  1993    due to cancer   • TONSILLECTOMY  01/01/1979    I was 9 years old        Current Outpatient Medications on File Prior to Visit   Medication Sig Dispense Refill   • anastrozole (Arimidex) 1 MG tablet Take 1 tablet by mouth Daily. 90 tablet 1   • lisinopril (PRINIVIL,ZESTRIL) 40 MG tablet Take 1 tablet by mouth Daily. 90 tablet 3   • LORazepam (ATIVAN) 0.5 MG tablet Take 1 tablet by mouth Every 8 (Eight) Hours As Needed for Anxiety. for anxiety 30 tablet 0   • multivitamin (THERAGRAN) tablet tablet Take 1 tablet by mouth Daily.     • Probiotic Product (PROBIOTIC PO) Take 1 capsule by mouth Daily. PT HOLDING FOR SURGERY     • vitamin D (ERGOCALCIFEROL) 1.25 MG (12327 UT) capsule capsule Take 1 capsule by mouth 1 (One) Time Per Week. 5 capsule 2     No current facility-administered medications on file prior to visit.        ALLERGIES:    Allergies   Allergen Reactions   • Citalopram Rash   • Flexeril [Cyclobenzaprine] Unknown - High Severity     MUSCLES TENSE-OPPOSITE OF WHAT IT IS INTENDED TO DO-ELEVATES B/P   • Doxycycline Rash     Blisters on hands   • Keflex [Cephalexin] Rash   • Sulfa Antibiotics Rash     NAUSEA/VOMITING/FEVER   • Vilazodone Hcl Hives, Itching and Rash        Social History     Socioeconomic History   • Marital status:      Spouse name: Trena Montenegro   • Number of children: 0   Tobacco Use   • Smoking status: Never   • Smokeless tobacco: Never   • Tobacco comments:     Parents smoked.  I have never smoked   Vaping Use   • Vaping Use: Never used   Substance and Sexual Activity   • Alcohol use: Yes     Comment: RARELY 1-2 TIMES PER YEAR   • Drug  use: Never   • Sexual activity: Yes     Partners: Female     Birth control/protection: None, Surgical        Family History   Problem Relation Age of Onset   • Hypertension Mother    • Arthritis Mother    • Kidney cancer Mother    • Depression Mother         Not Dr sanders- edison go   • Heart disease Father    • Hernia Father    • Hypertension Father    • No Known Problems Brother    • Breast cancer Paternal Aunt    • Breast cancer Paternal Cousin    • Drug abuse Paternal Uncle         life long   • Drug abuse Paternal Uncle         life long   • Breast cancer Paternal Grandmother    • Osteoporosis Maternal Grandmother    • Lung cancer Paternal Grandfather    • Malig Hyperthermia Neg Hx     PAST MEDICAL HISTORY:  1. Hypertension- Under control with medications  2. Pre-diabetes- Diet control  3. Obesity  4. PCOS   5. Anxiety- Worse since cancer diagnosis. Taking Gabapentin since yesterday which helped with her symptoms. Followed by Alexa Trinh.   6. No hx of CVA or CAD.     FAMILY HISTORY:  Paternal cousin with breast cancer diagnosed at 43 and . Paternal aunt with breast cancer s/p mastectomy; paternal grandmother with breast cancer s/p lumpectomy.   No other family history of ovarian cancer or prostrate cancer  Mother had kidney cancer s/p surgery.     OB-GYN:  Menarche- 9 years old  Menopause- n/a  Pregnancies- none  Post menopausal HRT- N/A  Hx of birth control pills- None    SOCIAL HISTORY:  She works at [employer].  She is  to her wife for the last 2 years but lived with her for 19.  Years.  Patient's wife is a schoolteacher.  Patient does not smoke.  She does not drink.  Allergies reviewed with the patient today.      Review of Systems   Constitutional: Negative for activity change, appetite change, chills, diaphoresis, fatigue, fever and unexpected weight change (intended weight loss, see HPI).   HENT: Negative for hearing loss, mouth sores, nosebleeds, sore throat and trouble swallowing.   "  Respiratory: Negative for cough, chest tightness, shortness of breath and wheezing.    Cardiovascular: Negative for chest pain, palpitations and leg swelling.   Gastrointestinal: Negative for abdominal distention, abdominal pain, constipation, diarrhea, nausea and vomiting.   Genitourinary: Negative for difficulty urinating, dysuria, frequency, hematuria and urgency.   Musculoskeletal: Negative for joint swelling.        No muscle weakness.   Skin: Negative for rash and wound.   Neurological: Negative for dizziness, seizures, syncope, speech difficulty, weakness, numbness and headaches.   Hematological: Negative for adenopathy. Does not bruise/bleed easily.   Psychiatric/Behavioral: Negative for behavioral problems, confusion and suicidal ideas. The patient is nervous/anxious.    All other systems reviewed and are negative.    Patient has minimal hot flashes no arthralgias  I have reviewed and confirmed the accuracy of the ROS as documented by the MA/LPN/RN Tia Trevizo MD      Objective     Vitals:    01/17/23 1608   BP: 140/96  Comment: Manual Left arm   Pulse: 74   Resp: 18   Temp: 98 °F (36.7 °C)   TempSrc: Temporal   SpO2: 98%   Weight: 106 kg (233 lb)   Height: 163 cm (64.17\")   PainSc: 0-No pain      Current Status 1/17/2023   ECOG score 0     PHYSICAL EXAM      CONSTITUTIONAL:  Vital signs reviewed.  Alert and oriented x3  No distress, looks comfortable.  EYES:  Conjunctivae and lids unremarkable.  Extraocular eye movements intact.  HEENT: No evidence of lymphadenopathy, no thyromegaly  BREAST: Right breast: No skin changes, no evidence of breast mass, no nipple discharge, no evidence of any right axillary adenopathy or right supraclavicular adenopathy  Left breast: No evidence of any skin changes, no evidence of any left breast mass and no evidence of left nipple discharge as well as no left axillary adenopathy or left supraclavicular adenopathy.  No adenopathy in the axilla. No masses in the bilateral " breast.  RESPIRATORY:  Normal respiratory effort.  No rales  or wheezing, clear.   CARDIOVASCULAR:  Regular rate and rhythm, no murmur  No significant lower extremity edema.  Abdomen: Soft nontender positive bowel sounds no hepatosplenomegaly  SKIN: No wounds.  No rashes.  MUSCULOSKELETAL/EXTREMITIES: No clubbing or cyanosis.  No apparent unilateral weakness.  NEURO: CN 2-12 appear intact. No focal neurological deficits noted.  PSYCHIATRIC:  Normal judgment and insight.  Normal mood and affect.    I have examined the patient and its unchanged as of today  I have reexamined the patient and the results are consistent with the previously documented exam. Tia Trevizo MD     RECENT LABS:  Hematology WBC   Date Value Ref Range Status   01/17/2023 8.43 3.40 - 10.80 10*3/mm3 Final   12/12/2022 7.3 3.4 - 10.8 x10E3/uL Final   09/04/2020 8.52 4.5 - 11.0 10*3/uL Final     RBC   Date Value Ref Range Status   01/17/2023 4.81 3.77 - 5.28 10*6/mm3 Final   12/12/2022 4.74 3.77 - 5.28 x10E6/uL Final   09/04/2020 4.51 4.0 - 5.2 10*6/uL Final     Hemoglobin   Date Value Ref Range Status   01/17/2023 13.8 12.0 - 15.9 g/dL Final   09/04/2020 12.9 12.0 - 16.0 g/dL Final     Hematocrit   Date Value Ref Range Status   01/17/2023 42.7 34.0 - 46.6 % Final   09/04/2020 38.4 36.0 - 46.0 % Final     Platelets   Date Value Ref Range Status   01/17/2023 177 140 - 450 10*3/mm3 Final   09/04/2020 266 140 - 440 10*3/uL Final            Assessment & Plan      1. High grade, invasive ductal carcinoma of right breast, ER/CA+, Her2 negative; clinical T1bN0, anatomic stage IA, prognostic stage IA:   · May 6, 2019 ultrasound-guided biopsy of the right breast mass,  - grade 3, invasive mammary carcinoma, Elvin score of 8, biopsy of right axillary lymph node negative  ER 82.35%, CA 92.68%, HER-2/latoya 1+ , Ki-67 14.8%    · May 28, 2019 right lumpectomy with removal of nipple areolar complex and right sentinel lymph node biopsy  -Central 2:00,  invasive ductal carcinoma, grade 2, Pep score of 6, tumor size is 10 mm, no DCIS, one lymph node negative, good margins  · Oncotype DX score of 6, low risk disease, been not give chemotherapy  · We will plan on starting tamoxifen given premenopausal for 2 to 3 years followed by Arimidex once postmenopausal.  · 2020, patient continues on tamoxifen and is tolerating this well.  She will have a follow-up MRI of the breast in September.  · September 3, 2020 MRI breast negative  · 2020 s/p hysterectomy with bilateral subfinger oophorectomy, pathology benignWe will switch patient to aromatase inhibitor since she is postmenopausal now with Arimidex, started 2020  · 2022 continue Arimidex tolerating well  · On 2023, she is continuing anastrozole and tolerating it well without any hot flashes or joint pains. MRI of the bilateral breast was reviewed and shows evidence of axillary lymph nodes, which is likely secondary to COVID-19 vaccine, which was taken 4 days prior to her MRI. She is scheduled for a repeat MRI of the breast on 2023.       2. Family history of cancer: Paternal cousin with breast cancer diagnosed at 43 and . Paternal aunt with breast cancer s/p mastectomy; paternal grandmother with breast cancer s/p lumpectomy.   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  ·  She has an appointment with  in early July.   · We will plan on alternating MRI of breast with mammogram alternating.  · Discussed in breast cancer conference today and Dr. Greene and we agreed that best option is to make an MRI of breast with mammogram every 6 months.  Given CHEK2 mutation patient is to alternate MRI with mammogram  · MRI done on 2022 has been reviewed. She will be scheduled for a repeat MRI on 2023 given that she had the COVID-19 vaccine taken before the MRI of the  breast, which showed axillary lymphadenopathy.       3.  Vitamin B-12 deficiency: B12 level 270, advised patient to take over the counter B-12 thousand micrograms orally daily.   · Encourage patient to take vitamin B12 1000 mcg twice weekly.    4. PCOS, Complicated with obesity: Patient attempted to undergo bariatric surgery in order to reduce weight and further help with her PCOS. This was stalled due to her recent breast cancer diagnosis. In past patient has been able to lose 85 lbs with diet modifications.   · Advised patient to exercise 5 times per week with 20 minutes of aerobics, total of 40 minutes/day .   · Suggested Live [facility] program and patient to visit the cancer resource center.  · Referral to Nutritionist for further recommendations.   · Referred patient to OB/GYN today for further evaluation.  · Continues to follow closely with Dr. Lexii Rahman with recent pelvic exam, planning to have endometrial biopsy tomorrow.  · S/p hysterectomy with bilateral salpingo-oophorectomy September 4, 2020.  Pathology benign  · Doing well    5.  Anxiety: Patient has significant anxiety, has been referred to Alexa Trinh.  In the past she has tried Zoloft Wellbutrin and Seroquel and did not have good response to any of these medications.  · Patient is being monitored by Alexa Trinh.  · Improved    6.  Hot flashes severe and also had mood swings following hysterectomy and salpingo-oophorectomy.  · 5/28/2021, hot flashes have significantly improved.  She does still experience occasional hot flashes.  · Resolved    7.  Hypertension.  Blood pressure control today.    8. Hypercalcemia  · The patient been evaluated by her PCP who first discontinued HCTZ and started Lasix due to hypercalcemia and elevated liver function studies.  While off HCTZ the patients calcium normalized and her liver function studies started to improve.  Her blood pressure was not controlled on Lasix, so the patient was then placed on HCTZ/lisinopril  and Lasix was discontinued.   · Calcium today elevated at 10.6, the patient wishes to follow with her PCP and adjust her blood pressure medication further to see if this normalizes her calcium before proceeding with any scans.  · Related to hydrochlorothiazide and has been discontinued.  Calcium 10.1  · Calcium increased to 10.5 again today.  PTH intact was 37.4.  Ionized calcium mildly elevated.  Does not appear to be hyperparathyroidism.  · Normalized calcium    9.  Bone density DEXA scan January 13, 2021 normal      PLAN:  1. Continue Arimidex  2. Continue vitamin D 3 2000 international units once daily   3. Increase exercise and patient is eating low carbohydrate diet  4. Plan to continue alternating an MRI of the breast as well as a mammogram of the breast every 6 months due to the patient's CHEK2 mutation.    5. Due for bone density January 2023  6. Next MRI due in October 2022.  Reviewed mammogram from Mary 10, 2022 which is negative  7. Follow-up with me in 6 months with labs    The patient is on high risk medication that requires close monitoring for toxicity.    PLAN  1. Continue Arimidex.   2. Continue calcium and vitamin D supplements.   3. MRI of the bilateral breast reviewed, and it shows axillary lymphadenopathy secondary to COVID-19 vaccine.   4. Repeat MRI of the bilateral breast scheduled for 02/07/2023.   5. She will follow up after her MRI at the end of 02/2023.      Tia Trevizo MD           Cc: Dr. Trena Hyatt, VICENTE Myles    Transcribed from ambient dictation for Tia Trevizo MD by Silverio Barrera.  01/17/23   20:11 EST    I have personally performed the services described in this document as transcribed by the above individual, and it is both accurate and complete.

## 2023-01-18 ENCOUNTER — TELEPHONE (OUTPATIENT)
Dept: ONCOLOGY | Facility: CLINIC | Age: 53
End: 2023-01-18
Payer: COMMERCIAL

## 2023-01-18 DIAGNOSIS — E83.52 SERUM CALCIUM ELEVATED: ICD-10-CM

## 2023-01-18 DIAGNOSIS — C50.111 MALIGNANT NEOPLASM OF CENTRAL PORTION OF RIGHT BREAST IN FEMALE, ESTROGEN RECEPTOR POSITIVE: Primary | ICD-10-CM

## 2023-01-18 DIAGNOSIS — Z17.0 MALIGNANT NEOPLASM OF CENTRAL PORTION OF RIGHT BREAST IN FEMALE, ESTROGEN RECEPTOR POSITIVE: Primary | ICD-10-CM

## 2023-01-18 NOTE — TELEPHONE ENCOUNTER
Call to Ms. Carrizales to give her instructions re: elevated calcium level.  Left direct number requesting patient call this RN back.

## 2023-01-18 NOTE — TELEPHONE ENCOUNTER
Call to Ms. Carrizales to let her know her calcium was elevated on her lab results from previous day.  Instructed her to stop taking her calcium supplements, and Dr. Trevizo wants to recheck labs in 6 weeks.  Let her know the labs could be done when she returns for her visit at the end of February.  Patient requests what labs would be done, and let her know PTH RP and ionized calcium.  Patient verbalized understanding.

## 2023-02-01 ENCOUNTER — HOSPITAL ENCOUNTER (OUTPATIENT)
Dept: ULTRASOUND IMAGING | Facility: HOSPITAL | Age: 53
Discharge: HOME | End: 2023-02-01
Payer: COMMERCIAL

## 2023-02-10 ENCOUNTER — TELEMEDICINE (OUTPATIENT)
Dept: INTERNAL MEDICINE | Facility: CLINIC | Age: 53
End: 2023-02-10
Payer: COMMERCIAL

## 2023-02-10 DIAGNOSIS — M10.9 ACUTE GOUT OF RIGHT FOOT, UNSPECIFIED CAUSE: Primary | ICD-10-CM

## 2023-02-10 PROCEDURE — 99213 OFFICE O/P EST LOW 20 MIN: CPT | Performed by: NURSE PRACTITIONER

## 2023-02-10 RX ORDER — PREDNISONE 10 MG/1
TABLET ORAL
Qty: 20 TABLET | Refills: 0 | Status: SHIPPED | OUTPATIENT
Start: 2023-02-10 | End: 2023-02-21

## 2023-02-10 RX ORDER — ALLOPURINOL 100 MG/1
100 TABLET ORAL DAILY
Qty: 30 TABLET | Refills: 2 | Status: SHIPPED | OUTPATIENT
Start: 2023-02-10

## 2023-02-19 NOTE — PROGRESS NOTES
"Chief Complaint  Right foot pain    Subjective        Emilie SAUNDERS presents to [facility]     You have chosen to receive care through a telehealth visit.  Do you consent to use a video/audio connection for your medical care today? Yes    Location of patient: home  [place] [facility]  The visit included audio and video interaction between patient and provider due to the COVID-19 pandemic.    History of Present Illness  She presents due to right foot pain over the past several days without acute injury or trauma, notes increased redness and swelling along great toe. She has a hx of elevated uric acid, not currently manage don allopurinol. Denies fever and/or chills. She states pain is persistent and describes as a dull ache.      Objective   Vital Signs:  There were no vitals taken for this visit.  Estimated body mass index is 39.78 kg/m² as calculated from the following:    Height as of 1/17/23: 163 cm (64.17\").    Weight as of 1/17/23: 106 kg (233 lb).       BMI is within normal parameters. No other follow-up for BMI required.      Physical Exam  Constitutional:       Appearance: She is well-developed.   HENT:      Head: Normocephalic and atraumatic.   Eyes:      General:         Right eye: No discharge.         Left eye: No discharge.      Conjunctiva/sclera: Conjunctivae normal.   Pulmonary:      Effort: Pulmonary effort is normal.   Neurological:      Mental Status: She is alert and oriented to person, place, and time.   Psychiatric:         Behavior: Behavior normal.         Thought Content: Thought content normal.         Judgment: Judgment normal.        Result Review :  The following data was reviewed by: VICENTE Ruth on 02/10/2023:  Common labs    Common Labs 12/1/22 12/12/22 12/12/22 12/12/22 12/12/22 1/17/23 1/17/23     1230 1230 1230 1230 1602 1602   Glucose  103 (A)     99   BUN  5 (A)     11   Creatinine 0.70 0.62     0.70   Sodium  142     " 138   Potassium  4.1     4.2   Chloride  102     102   Calcium  10.4 (A)     10.8 (A)   Total Protein  6.7        Albumin  4.5     4.1   Total Bilirubin  1.0     0.8   Alkaline Phosphatase  83     82   AST (SGOT)  20     17   ALT (SGPT)  25     12   WBC   7.3   8.43    Hemoglobin   13.5   13.8    Hematocrit   41.7   42.7    Platelets   271   177    Total Cholesterol     187     Triglycerides     207 (A)     HDL Cholesterol     45     LDL Cholesterol      106 (A)     Hemoglobin A1C    5.0      (A) Abnormal value       Comments are available for some flowsheets but are not being displayed.                        Assessment and Plan   Diagnoses and all orders for this visit:    1. Acute gout of right foot, unspecified cause (Primary)  -     predniSONE (DELTASONE) 10 MG tablet; 1 tab tid x3 days then 1 tab bid x3 days then 1 tab qd x5 days  Dispense: 20 tablet; Refill: 0  -     allopurinol (Zyloprim) 100 MG tablet; Take 1 tablet by mouth Daily.  Dispense: 30 tablet; Refill: 2    Sx are suggestive of gout flare, she was previously managed on allopurinol which was discontinued after gastric sleeve surgery. We will treat her acute flare with tapering prednisone. However, we discussed use of allopurinol in the future (not today) for prevention-recheck uric acid with next labs.    History and medical judgement obtained from video conversation with patient. No hands-on physical examination was performed. Approximately 16 minutes spent in video conversation with patient and in chart review.         Follow Up   Return if symptoms worsen or fail to improve, for Next scheduled follow up.  Patient was given instructions and counseling regarding her condition or for health maintenance advice. Please see specific information pulled into the AVS if appropriate.

## 2023-02-21 ENCOUNTER — OFFICE VISIT (OUTPATIENT)
Dept: BARIATRICS/WEIGHT MGMT | Facility: CLINIC | Age: 53
End: 2023-02-21
Payer: COMMERCIAL

## 2023-02-21 VITALS
HEART RATE: 85 BPM | WEIGHT: 224.5 LBS | BODY MASS INDEX: 38.33 KG/M2 | DIASTOLIC BLOOD PRESSURE: 110 MMHG | TEMPERATURE: 98 F | SYSTOLIC BLOOD PRESSURE: 190 MMHG | HEIGHT: 64 IN

## 2023-02-21 DIAGNOSIS — Z98.84 S/P LAPAROSCOPIC SLEEVE GASTRECTOMY: ICD-10-CM

## 2023-02-21 DIAGNOSIS — E66.9 OBESITY, CLASS II, BMI 35-39.9: Primary | ICD-10-CM

## 2023-02-21 DIAGNOSIS — R10.13 DYSPEPSIA: ICD-10-CM

## 2023-02-21 DIAGNOSIS — Z71.3 DIETARY COUNSELING: ICD-10-CM

## 2023-02-21 PROBLEM — E66.812 OBESITY, CLASS II, BMI 35-39.9: Status: ACTIVE | Noted: 2023-02-21

## 2023-02-21 PROBLEM — E66.813 OBESITY, CLASS III, BMI 40-49.9 (MORBID OBESITY): Chronic | Status: RESOLVED | Noted: 2022-09-12 | Resolved: 2023-02-21

## 2023-02-21 PROBLEM — E66.01 OBESITY, CLASS III, BMI 40-49.9 (MORBID OBESITY): Chronic | Status: RESOLVED | Noted: 2022-09-12 | Resolved: 2023-02-21

## 2023-02-21 PROCEDURE — 99214 OFFICE O/P EST MOD 30 MIN: CPT | Performed by: NURSE PRACTITIONER

## 2023-02-21 RX ORDER — OMEPRAZOLE 20 MG/1
20 CAPSULE, DELAYED RELEASE ORAL DAILY
COMMUNITY
End: 2023-02-21

## 2023-02-21 RX ORDER — PANTOPRAZOLE SODIUM 40 MG/1
40 TABLET, DELAYED RELEASE ORAL DAILY
Qty: 30 TABLET | Refills: 3 | Status: SHIPPED | OUTPATIENT
Start: 2023-02-21

## 2023-02-21 NOTE — PROGRESS NOTES
[facility]  [address] [facility] [address]  [place] [id number] [phone]  [address] [facility] [address]  [place] [id number] [phone]  Dept: [phone]  2/21/2023      Emilie SAUNDERS.  [id number]  [id number]  1970  female      No chief complaint on file.       Post-Op Bariatric Surgery:   Emilie SAUNDERS is status post Laparoscopic Sleeve procedure, performed on 9/12/2022     HPI:   Today's weight is 102 kg (224 lb 8 oz) pounds, today's BMI is Body mass index is 38.33 kg/m².,has a  loss of 34 pounds since the last visit andweight loss since surgery is 83 pounds. The patient reports a decreased portion size and loss of appetite.      Emilie SAUNDERS denies nausea, vomiting, reflux, dysphagia and reports tolerating regular diet.  Struggles to get in water.  Feels like she is dehydrated.  Able to drink more at night when playing on phone or computer.  During day at work so focused on work, hard to remember to drink.  Is getting about 12 oz water during the day.  Can drink 44 oz during the evening.  Walking at lunch.  Using baritastic and using reminders but if busy, doesn't pay attention.  Thinks getting 64 oz.    Does not drink protein shakes.  Has tried multiple different types and tastes too sweet to her.  Has tried adding pb2 powder.  Did tolerate one yesterday.  Can do every once in a while.  Has tried unflavored protein and makes it too thick.  Has found herself in a stall recently.  Was only getting 300-400 eriberto/day.  Since last week getting 600-1000.  Had been eating twice per day.  Has increased to 3 meals per day.  Is able to eat 2 oz of chicken with 1 oz of cheese.    Has started regular exercise last week.  She is training for the mini marathon.    Having some reflux especially with tomato sauce and if eats late at night.  Taking pepcid but not controlling it.       Diet and Exercise: Diet history reviewed and discussed with the  patient. Weight loss/gains to date discussed with the patient. The patient states they are eating 60-75 grams of protein per day. She reports eating 3 meals per day, a typical portion size of 1/2 cup, eating 1 snacks per day, drinking 4-8 or more 8-oz. glasses of water per day, no carbonated beverage consumption and exercising regularly.     Supplements: flintstones.     Review of Systems   Constitutional: Positive for activity change and appetite change.   Respiratory: Negative for chest tightness and shortness of breath.    Cardiovascular: Negative for chest pain.   All other systems reviewed and are negative.      Patient Active Problem List   Diagnosis   • Anxiety   • Benign essential HTN   • Vitamin D deficiency   • Hyperlipidemia   • Lumbago   • Malignant neoplasm of central portion of right breast in female, estrogen receptor positive (HCC)   • Monoallelic mutation of CHEK2 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   • PCOS (polycystic ovarian syndrome)   • History of total abdominal hysterectomy and bilateral salpingo-oophorectomy   • Ankle edema   • Increased risk of breast cancer   • Hyperuricemia   • MARIBELL (obstructive sleep apnea) using CPAP   • Urinary, incontinence, stress female   • Anxious depression   • Prediabetes   • Dyspepsia   • Gastric polyps   • COVID-19 virus infection   • S/P laparoscopic sleeve gastrectomy   • Dietary counseling   • Thrush   • Abnormal finding on breast imaging   • Obesity, Class II, BMI 35-39.9       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   • Depression 01/01/1988   • Diabetes mellitus (HCC)     pre diabetic   • Family history of breast cancer 05/14/2019   • GERD (gastroesophageal reflux disease)    • Headache    • History of 2019 novel coronavirus disease (COVID-19)     2/2022 AND 8/9/22 (TESTED AT [facility], RESULTS IN EPIC)   • History of kidney stones    • Hyperlipidemia    •  Hypertension    • Irritable bowel syndrome    • Lumbago    • Mood disorder (HCC)    • Obesity life   • PONV (postoperative nausea and vomiting)    • 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   • Sleep apnea     C-PAP IN USE   • Vitamin D deficiency        The following portions of the patient's history were reviewed and updated as appropriate: allergies, current medications, past medical history, past surgical history and problem list.    Vitals:    02/21/23 1204   BP: (!) 190/110   Pulse: 85   Temp: 98 °F (36.7 °C)       Physical Exam  Vitals reviewed.   Constitutional:       General: She is not in acute distress.     Appearance: Normal appearance. She is obese.   HENT:      Head: Normocephalic and atraumatic.      Mouth/Throat:      Mouth: Mucous membranes are moist.      Pharynx: Oropharynx is clear.   Eyes:      General: No scleral icterus.     Extraocular Movements: Extraocular movements intact.      Conjunctiva/sclera: Conjunctivae normal.      Pupils: Pupils are equal, round, and reactive to light.   Cardiovascular:      Rate and Rhythm: Normal rate and regular rhythm.      Heart sounds: Normal heart sounds. No murmur heard.    No gallop.   Pulmonary:      Effort: Pulmonary effort is normal. No respiratory distress.   Abdominal:      General: Bowel sounds are normal.      Palpations: Abdomen is soft.   Musculoskeletal:         General: Normal range of motion.      Cervical back: Normal range of motion and neck supple.   Skin:     General: Skin is warm and dry.   Neurological:      General: No focal deficit present.      Mental Status: She is alert and oriented to person, place, and time.   Psychiatric:         Mood and Affect: Mood normal.         Behavior: Behavior normal.         Thought Content: Thought content normal.         Judgment: Judgment normal.         Assessment:   Post-op, the patient is doing well.     Encounter Diagnoses   Name Primary?   • Obesity, Class II, BMI 35-39.9 Yes   • S/P  laparoscopic sleeve gastrectomy    • Dietary counseling    • Dyspepsia        Plan:   Discussion about appropriate portion sizes and caloric intake.  Should shoot for 4 oz of protein per meal as tolerated.  If training for mini will need 1000 calories on days not training and more on days she is.    Recommend follow up with dietician  Change to protonix.  Discussed not eating within 2-3 hours of bedtime.  Encouraged patient to be sure to get plenty of lean protein per day through small frequent meals all with a protein source.   Activity restrictions: none.   Recommended patient be sure to get at least 70 grams of protein per day by eating small, frequent meals all with high lean protein choices. Be sure to limit/cut back on daily carbohydrate intake. Discussed with the patient the recommended amount of water per day to intake- half of body weight in ounces. Reviewed vitamin requirements. Be sure to do routine exercise, 150 minutes per week minimum, including both cardio and strength training.     Instructions / Recommendations: dietary counseling recommended, recommended a daily protein intake of  grams, vitamin supplement(s) recommended, recommended exercising at least 150 minutes per week, behavior modifications recommended and instructed to call the office for concerns, questions, or problems.     The patient was instructed to follow up in 3 months .     Total time spent during this encounter today was 35 minutes.

## 2023-02-27 ENCOUNTER — LAB (OUTPATIENT)
Dept: LAB | Facility: HOSPITAL | Age: 53
End: 2023-02-27
Payer: COMMERCIAL

## 2023-02-27 ENCOUNTER — OFFICE VISIT (OUTPATIENT)
Dept: ONCOLOGY | Facility: CLINIC | Age: 53
End: 2023-02-27
Payer: COMMERCIAL

## 2023-02-27 VITALS
BODY MASS INDEX: 38 KG/M2 | OXYGEN SATURATION: 100 % | HEART RATE: 78 BPM | WEIGHT: 222.6 LBS | RESPIRATION RATE: 18 BRPM | DIASTOLIC BLOOD PRESSURE: 92 MMHG | HEIGHT: 64 IN | TEMPERATURE: 96.9 F | SYSTOLIC BLOOD PRESSURE: 164 MMHG

## 2023-02-27 DIAGNOSIS — Z17.0 MALIGNANT NEOPLASM OF CENTRAL PORTION OF RIGHT BREAST IN FEMALE, ESTROGEN RECEPTOR POSITIVE: ICD-10-CM

## 2023-02-27 DIAGNOSIS — C50.111 MALIGNANT NEOPLASM OF CENTRAL PORTION OF RIGHT BREAST IN FEMALE, ESTROGEN RECEPTOR POSITIVE: ICD-10-CM

## 2023-02-27 DIAGNOSIS — Z17.0 MALIGNANT NEOPLASM OF CENTRAL PORTION OF RIGHT BREAST IN FEMALE, ESTROGEN RECEPTOR POSITIVE: Primary | ICD-10-CM

## 2023-02-27 DIAGNOSIS — E83.52 SERUM CALCIUM ELEVATED: ICD-10-CM

## 2023-02-27 DIAGNOSIS — C50.111 MALIGNANT NEOPLASM OF CENTRAL PORTION OF RIGHT BREAST IN FEMALE, ESTROGEN RECEPTOR POSITIVE: Primary | ICD-10-CM

## 2023-02-27 LAB
ALBUMIN SERPL-MCNC: 4.2 G/DL (ref 3.5–5.2)
ALBUMIN/GLOB SERPL: 1.7 G/DL (ref 1.1–2.4)
ALP SERPL-CCNC: 85 U/L (ref 38–116)
ALT SERPL W P-5'-P-CCNC: 16 U/L (ref 0–33)
ANION GAP SERPL CALCULATED.3IONS-SCNC: 12.5 MMOL/L (ref 5–15)
AST SERPL-CCNC: 13 U/L (ref 0–32)
BASOPHILS # BLD AUTO: 0.07 10*3/MM3 (ref 0–0.2)
BASOPHILS NFR BLD AUTO: 0.7 % (ref 0–1.5)
BILIRUB SERPL-MCNC: 0.8 MG/DL (ref 0.2–1.2)
BUN SERPL-MCNC: 10 MG/DL (ref 6–20)
BUN/CREAT SERPL: 13.7 (ref 7.3–30)
CALCIUM SPEC-SCNC: 10.3 MG/DL (ref 8.5–10.2)
CHLORIDE SERPL-SCNC: 102 MMOL/L (ref 98–107)
CO2 SERPL-SCNC: 26.5 MMOL/L (ref 22–29)
CREAT SERPL-MCNC: 0.73 MG/DL (ref 0.6–1.1)
DEPRECATED RDW RBC AUTO: 44.5 FL (ref 37–54)
EGFRCR SERPLBLD CKD-EPI 2021: 99.1 ML/MIN/1.73
EOSINOPHIL # BLD AUTO: 0.21 10*3/MM3 (ref 0–0.4)
EOSINOPHIL NFR BLD AUTO: 2.2 % (ref 0.3–6.2)
ERYTHROCYTE [DISTWIDTH] IN BLOOD BY AUTOMATED COUNT: 13.8 % (ref 12.3–15.4)
GLOBULIN UR ELPH-MCNC: 2.5 GM/DL (ref 1.8–3.5)
GLUCOSE SERPL-MCNC: 100 MG/DL (ref 74–124)
HCT VFR BLD AUTO: 38.4 % (ref 34–46.6)
HGB BLD-MCNC: 13.1 G/DL (ref 12–15.9)
IMM GRANULOCYTES # BLD AUTO: 0.02 10*3/MM3 (ref 0–0.05)
IMM GRANULOCYTES NFR BLD AUTO: 0.2 % (ref 0–0.5)
LYMPHOCYTES # BLD AUTO: 2.09 10*3/MM3 (ref 0.7–3.1)
LYMPHOCYTES NFR BLD AUTO: 22.4 % (ref 19.6–45.3)
MCH RBC QN AUTO: 29.9 PG (ref 26.6–33)
MCHC RBC AUTO-ENTMCNC: 34.1 G/DL (ref 31.5–35.7)
MCV RBC AUTO: 87.7 FL (ref 79–97)
MONOCYTES # BLD AUTO: 0.48 10*3/MM3 (ref 0.1–0.9)
MONOCYTES NFR BLD AUTO: 5.1 % (ref 5–12)
NEUTROPHILS NFR BLD AUTO: 6.47 10*3/MM3 (ref 1.7–7)
NEUTROPHILS NFR BLD AUTO: 69.4 % (ref 42.7–76)
NRBC BLD AUTO-RTO: 0 /100 WBC (ref 0–0.2)
PLATELET # BLD AUTO: 232 10*3/MM3 (ref 140–450)
PMV BLD AUTO: 9.4 FL (ref 6–12)
POTASSIUM SERPL-SCNC: 3.8 MMOL/L (ref 3.5–4.7)
PROT SERPL-MCNC: 6.7 G/DL (ref 6.3–8)
RBC # BLD AUTO: 4.38 10*6/MM3 (ref 3.77–5.28)
SODIUM SERPL-SCNC: 141 MMOL/L (ref 134–145)
WBC NRBC COR # BLD: 9.34 10*3/MM3 (ref 3.4–10.8)

## 2023-02-27 PROCEDURE — 99214 OFFICE O/P EST MOD 30 MIN: CPT | Performed by: INTERNAL MEDICINE

## 2023-02-27 PROCEDURE — 36415 COLL VENOUS BLD VENIPUNCTURE: CPT

## 2023-02-27 PROCEDURE — 80053 COMPREHEN METABOLIC PANEL: CPT

## 2023-02-27 PROCEDURE — 83970 ASSAY OF PARATHORMONE: CPT | Performed by: INTERNAL MEDICINE

## 2023-02-27 PROCEDURE — 85025 COMPLETE CBC W/AUTO DIFF WBC: CPT

## 2023-02-27 NOTE — PROGRESS NOTES
Subjective     REASON FOR follow-up:    1. High grade, invasive ductal carcinoma of right breast, ER/MN+, Her2 negative; clinical T1bN0, anatomic stage IA, prognostic stage IA  · May 28, 2019 right lumpectomy with removal of nipple areolar complex and right sentinel lymph node biopsy  -Central 2:00, invasive ductal carcinoma, grade 2, [place] score of 6, tumor size is 10 mm, no DCIS, one lymph node negative, good margins    2.  Oncotype DX score of 6, low risk, no chemo necessary    3.  June 28, 2019: Started tamoxifen    4.  Family history of breast cancer, CHEK2  Positive  · Patient's cousin had breast cancer at age 40 and her aunt had breast cancer her paternal aunt  · CHEK2 usually is associated with 15 to 40% risk of breast cancer  · Will need MRI breast alternating with mammogram after age 40    5.  S/p hysterectomy with bilateral salpingo-oophorectomy September 4, 2020 by Dr. Nery Neumann.  Patient had bilateral ovarian cysts.  Pathology was consistent with complex atypical endometrial hyperplasia, endometrial polyp otherwise unremarkable.  Acute on chronic cervicitis with squamous metaplasia.  Right ovary with hemorrhagic corpus luteum cyst and left ovary also with corpus luteum cyst pelvic peritoneal biopsy negative for malignancy omental biopsy negative for malignancy.    · Patient switched to aromatase inhibitor from tamoxifen.  · November 2, 2020: Started Arimidex    HISTORY OF PRESENT ILLNESS:   Patient is a 51-year-old female with history of T1 be N0 invasive ductal carcinoma ER/MN positive HER-2 negative.  She also has CHEK2 mutation.  She underwent hysterectomy with bilateral salpingo-oophorectomy which was done by Dr. Nery Neumann.  Initially was started on tamoxifen but after surgery and bilateral oophorectomy she has been started on Arimidex.  She has mild hot flashes but does not have any joint pains.  She has been on Arimidex since September 2020.  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  She has been vaccinated.  She is scheduled for MRI of the breast in September 2020 following which she will see Danny ZHANG .     Patient's bone density is normal.  She had elevated calcium secondary to hydrochlorothiazide and that has been discontinued.  Her calcium was down to 10.1 last time.  She is taking vitamin D supplements and her vitamin D level was low at 21 and have asked her to take 2000 units daily.  Given that hydrochlorothiazide has been discontinued and her calcium normalized she can take 600 mg of calcium once daily.  Encouraged her to take increased fluids only because she had a remote history of kidney stone.    Interval history:   The patient is doing well. She is currently taking anastrozole. She had bariatric surgery in 10/2022. She has lost 80 pounds of weight since then. She is otherwise doing well without any complaints. She does not have hot flashes, and she does not have any joint pains. She does have CHEK2 mutation. She had a bilateral breast MRI done on 12/01/2022, and this was 4 days after her COVID-19 vaccine. The MRI showed multiple new enlarged lymph nodes with cortical thickening and ill defined edema.     Patient denies feeling any axillary lymph nodes now.  She is already scheduled for ultrasound of the left axilla by breast surgery.  Will await those results.  Patient denies any complaints    Oncologic history:   The patient is a 52 y.o. year old female who is here for an opinion about the above issue.    Patient is a 49-year old female who presents today as a new patient for consultation on her newly diagnosed high grade, invasive ductal carcinoma of right breast.   Patient had seen Dr. Greene on May 14, 2019.    She underwent a screening mammogram but did not appreciate any mass herself and denied any breast pain or nipple discharge.    Patient is accompanied with her wife Trena.  They have been together for 19 years and  for the last 2  years.    April 18, 2019: Screening mammogram showed a 10 mm small oval mass in the subareolar region of the right breast.  Left breast was negative.  Spot compression and ultrasound recommended.  Of the right breast.    April 29, 2019: Right diagnostic mammogram showed a high density round mass 10 mm with indistinct margins in the anterior one third subareolar region of the right breast located to centimeters from the nipple.    April 29, 2019: Ultrasound suggested a solid mass with indistinct margins 9 mm and subareolar region.  There is abnormal axillary node in the right axilla.  There are 2 adjacent lymph nodes in the right axilla with mildly prominent cortices.  The left axilla was evaluated for comparison and the nodes on the right are of greater cortical thickness.      Ultrasound-guided biopsy of the breast mass and right axillary lymph node was suggested.     5/9/19:  US guided biopsy sent to [facility] lab  showing solid mass in the sub-areorolar region of right breast revealing invasive high-grade mammary carcinoma, grade 3. Tubule score 3, nuclear grade 3, and mitotic score 2 for a total score of 8. Neoplasm is present in 4/8 biopsies and measures 4 mm in greatest dimension.   Biopsy performed on 05/09/2019 of prominent lymph node of right axilla was benign- negative for metastatic carcinoma.  ER: 82.35%,  positive  MN: 92.68%, positive  HER-2/latoya: 1+ negative  Ki-67 14.81%       05/09/2019 MRI of bilateral breast:   IMPRESSION:  1. Biopsy-proven malignancy in the right breast in the anterior  one-third approximately 1.8 cm from the nipple at the 2:30 position with  an internal metallic clip. The mass measures on the order of 1 cm in  greatest dimension. No other suspicious findings are seen within the  right breast and there is no evidence for right axillary or internal  mammary chain adenopathy. The patient appears to be a candidate for  breast conservation therapy.  2. There are no findings suspicious for  malignancy in the left breast.  BI-RADS Category 6: Known biopsy-proven malignancy.    5/14/19: Patient was seen by Dr. Greene who felt that she was a good candidate for lumpectomy given the size of the lesion related to her breast size.  Because of superficial subareolar location she would require removal of the nipple areolar complex in order to make sure she has negative margins.  Patient preferred breast conservation with central lumpectomy.  This included the removal of the nipple areolar complex.      Also obtained INVITAE  genetic test given her family history of malignancy.    Estrogen receptor: positive (82.35% moderate),   Progesterone receptor:  positive (92.68%, strong)   HER2/Amy: negative (IHC score1+) and Ki-67=14.8%  Pathologic Stage: pT1b, (sn)N0 (G2).    Synoptic Report :  TUMOR  Tumor Site: Invasive Carcinoma Central  Clock Position of Tumor Site 2 o'clock  3 o'clock  Histologic Type Invasive carcinoma of no special type (ductal, not otherwise  specified)  Glandular (Acinar) / Tubular Differentiation Score 2  Nuclear Pleomorphism Score 2  Mitotic Rate Score 2 (4-7 mitoses per mm2)  Number of Mitoses per 10 High-Power Fields 15 mitotic figures  Diameter of Microscope Field in Millimeters (mm) 0.55 Millimeters (mm)  Overall Grade Grade 2 (scores of 6 or 7)  Tumor Size Greatest dimension of largest invasive focus in Millimeters (mm): 10  Millimeters (mm)    MARGINS  Invasive Carcinoma Margins Uninvolved by invasive carcinoma    LYMPH NODES  Regional Lymph Nodes Uninvolved by tumor cells  Number of Lymph Nodes Examined 1  Number of [place] Nodes Examined 1    Oncotype DX testing ordered but pending.    Patient has a family history of paternal grandmother with breast cancer, paternal aunt with breast cancer, cousin on father's side dying of breast cancer at age 43.  History of ovarian or prostate cancer or uterine cancer.  Mother had kidney cancer status post surgery    INVITAE testing positive for  CHEK2- Patient is following up with  in July.     Patient is now scheduled for counseling with genetic and also with radiation oncology next week.  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   • Depression 01/01/1988   • Diabetes mellitus (HCC)     pre diabetic   • Family history of breast cancer 05/14/2019   • GERD (gastroesophageal reflux disease)    • Headache    • History of 2019 novel coronavirus disease (COVID-19)     2/2022 AND 8/9/22 (TESTED AT [facility], RESULTS IN EPIC)   • History of kidney stones    • Hyperlipidemia    • Hypertension    • Irritable bowel syndrome    • Lumbago    • Mood disorder (HCC)    • Obesity life   • PONV (postoperative nausea and vomiting)    • 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   • Sleep apnea     C-PAP IN USE   • Vitamin D deficiency         Past Surgical History:   Procedure Laterality Date   • APPENDECTOMY  1989    removed when removing dermoid on right ovary   • BREAST BIOPSY Right 2019   • BREAST LUMPECTOMY Right    • BREAST LUMPECTOMY WITH SENTINEL NODE BIOPSY Right 5/28/2019    Procedure: Right central partial mastectomy (with removal of the nipple-areola-complex) and sentinel lymph node biopsy;  Surgeon: Trena Greene MD;  Location: [facility] MAIN OR;  Service: General   • COLONOSCOPY N/A 12/5/2019    Procedure: COLONOSCOPY TO CECUM;  Surgeon: Pedrito Fulton Jr., MD;  Location: [facility] ENDOSCOPY;  Service: General   • DERMOID CYST  EXCISION  1989   • ENDOSCOPY N/A 12/28/2021    Procedure: ESOPHAGOGASTRODUODENOSCOPY WITH BIOPSY;  Surgeon: Delfino Rivera Jr., MD;  Location: [facility] ENDOSCOPY;  Service: General;  Laterality: N/A;   PRE- DYSPEPSIA  POST-- GASTRITIS, GASTRIC POLYPS   • GASTRIC SLEEVE LAPAROSCOPIC N/A 9/12/2022    Procedure: GASTRIC SLEEVE LAPAROSCOPIC OR ROBOTIC;  Surgeon: Delfino Rivera Jr., MD;  [place] [facility] [place] [facility];  Service: Robotics - [facility];  Laterality: N/A;   • HERNIA REPAIR  2006    umbilical   • HYSTERECTOMY Bilateral 09/04/2020    Dr. Neumann, due to ovarian cysts with previous hx breast cancer   • LAPAROSCOPIC CHOLECYSTECTOMY     • SALIVARY GLAND SURGERY  1993    due to cancer   • TONSILLECTOMY  01/01/1979    I was 9 years old        Current Outpatient Medications on File Prior to Visit   Medication Sig Dispense Refill   • allopurinol (Zyloprim) 100 MG tablet Take 1 tablet by mouth Daily. 30 tablet 2   • anastrozole (Arimidex) 1 MG tablet Take 1 tablet by mouth Daily. 90 tablet 1   • lisinopril (PRINIVIL,ZESTRIL) 40 MG tablet Take 1 tablet by mouth Daily. 90 tablet 3   • LORazepam (ATIVAN) 0.5 MG tablet Take 1 tablet by mouth Every 8 (Eight) Hours As Needed for Anxiety. for anxiety 30 tablet 0   • multivitamin (THERAGRAN) tablet tablet Take 1 tablet by mouth Daily.     • pantoprazole (PROTONIX) 40 MG EC tablet Take 1 tablet by mouth Daily. 30 tablet 3   • Probiotic Product (PROBIOTIC PO) Take 1 capsule by mouth Daily. PT HOLDING FOR SURGERY     • vitamin D (ERGOCALCIFEROL) 1.25 MG (89707 UT) capsule capsule Take 1 capsule by mouth 1 (One) Time Per Week. 5 capsule 2     No current facility-administered medications on file prior to visit.        ALLERGIES:    Allergies   Allergen Reactions   • Citalopram Rash   • Flexeril [Cyclobenzaprine] Unknown - High Severity     MUSCLES TENSE-OPPOSITE OF WHAT IT IS INTENDED TO DO-ELEVATES B/P   • Doxycycline Rash     Blisters on hands   • Keflex [Cephalexin] Rash   • Sulfa Antibiotics Rash     NAUSEA/VOMITING/FEVER   • Vilazodone Hcl Hives, Itching and Rash        Social History     Socioeconomic History   • Marital status:      Spouse name: Trena Montenegro   •  Number of children: 0   Tobacco Use   • Smoking status: Never   • Smokeless tobacco: Never   • Tobacco comments:     Parents smoked.  I have never smoked   Vaping Use   • Vaping Use: Never used   Substance and Sexual Activity   • Alcohol use: Yes     Comment: RARELY 1-2 TIMES PER YEAR   • Drug use: Never   • Sexual activity: Yes     Partners: Female     Birth control/protection: None, Surgical        Family History   Problem Relation Age of Onset   • Hypertension Mother    • Arthritis Mother    • Kidney cancer Mother    • Depression Mother         Not Dr tommy- edison go   • Heart disease Father    • Hernia Father    • Hypertension Father    • No Known Problems Brother    • Breast cancer Paternal Aunt    • Breast cancer Paternal Cousin    • Drug abuse Paternal Uncle         life long   • Drug abuse Paternal Uncle         life long   • Breast cancer Paternal Grandmother    • Osteoporosis Maternal Grandmother    • Lung cancer Paternal Grandfather    • Malig Hyperthermia Neg Hx     PAST MEDICAL HISTORY:  1. Hypertension- Under control with medications  2. Pre-diabetes- Diet control  3. Obesity  4. PCOS   5. Anxiety- Worse since cancer diagnosis. Taking Gabapentin since yesterday which helped with her symptoms. Followed by Alexa Trinh.   6. No hx of CVA or CAD.     FAMILY HISTORY:  Paternal cousin with breast cancer diagnosed at 43 and . Paternal aunt with breast cancer s/p mastectomy; paternal grandmother with breast cancer s/p lumpectomy.   No other family history of ovarian cancer or prostrate cancer  Mother had kidney cancer s/p surgery.     OB-GYN:  Menarche- 9 years old  Menopause- n/a  Pregnancies- none  Post menopausal HRT- N/A  Hx of birth control pills- None    SOCIAL HISTORY:  She works at [employer].  She is  to her wife for the last 2 years but lived with her for 19.  Years.  Patient's wife is a schoolteacher.  Patient does not smoke.  She does not drink.  Allergies reviewed with the patient  "today.      Review of Systems   Constitutional: Negative for activity change, appetite change, chills, diaphoresis, fatigue, fever and unexpected weight change (intended weight loss, see HPI).   HENT: Negative for hearing loss, mouth sores, nosebleeds, sore throat and trouble swallowing.    Respiratory: Negative for cough, chest tightness, shortness of breath and wheezing.    Cardiovascular: Negative for chest pain, palpitations and leg swelling.   Gastrointestinal: Negative for abdominal distention, abdominal pain, constipation, diarrhea, nausea and vomiting.   Genitourinary: Negative for difficulty urinating, dysuria, frequency, hematuria and urgency.   Musculoskeletal: Negative for joint swelling.        No muscle weakness.   Skin: Negative for rash and wound.   Neurological: Negative for dizziness, seizures, syncope, speech difficulty, weakness, numbness and headaches.   Hematological: Negative for adenopathy. Does not bruise/bleed easily.   Psychiatric/Behavioral: Negative for behavioral problems, confusion and suicidal ideas. The patient is nervous/anxious.    All other systems reviewed and are negative.      I have reviewed and confirmed the accuracy of the ROS as documented by the MA/LPN/RN Tia Trevizo MD      Objective     Vitals:    02/27/23 1655   BP: 164/92   Pulse: 78   Resp: 18   Temp: 96.9 °F (36.1 °C)   TempSrc: Temporal   SpO2: 100%   Weight: 101 kg (222 lb 9.6 oz)   Height: 163 cm (64.17\")   PainSc: 0-No pain      Current Status 2/27/2023   ECOG score 0     PHYSICAL EXAM      CONSTITUTIONAL:  Vital signs reviewed.  Alert and oriented x3  No distress, looks comfortable.  EYES:  Conjunctivae and lids unremarkable.  Extraocular eye movements intact.  HEENT: No evidence of lymphadenopathy, no thyromegaly  BREAST: Right breast: No skin changes, no evidence of breast mass, no nipple discharge, no evidence of any right axillary adenopathy or right supraclavicular adenopathy  Left breast: No evidence " of any skin changes, no evidence of any left breast mass and no evidence of left nipple discharge as well as no left axillary adenopathy or left supraclavicular adenopathy.  No adenopathy in the axilla. No masses in the bilateral breast.  RESPIRATORY:  Normal respiratory effort.  No rales  or wheezing, clear.   CARDIOVASCULAR:  Regular rate and rhythm, no murmur  No significant lower extremity edema.  Abdomen: Soft nontender positive bowel sounds no hepatosplenomegaly  SKIN: No wounds.  No rashes.  MUSCULOSKELETAL/EXTREMITIES: No clubbing or cyanosis.  No apparent unilateral weakness.  NEURO: CN 2-12 appear intact. No focal neurological deficits noted.  PSYCHIATRIC:  Normal judgment and insight.  Normal mood and affect.    I have examined the patient and its unchanged as of today  I have reexamined the patient and the results are consistent with the previously documented exam. Tia Trevizo MD     RECENT LABS:  Hematology WBC   Date Value Ref Range Status   02/27/2023 9.34 3.40 - 10.80 10*3/mm3 Final   12/12/2022 7.3 3.4 - 10.8 x10E3/uL Final   09/04/2020 8.52 4.5 - 11.0 10*3/uL Final     RBC   Date Value Ref Range Status   02/27/2023 4.38 3.77 - 5.28 10*6/mm3 Final   12/12/2022 4.74 3.77 - 5.28 x10E6/uL Final   09/04/2020 4.51 4.0 - 5.2 10*6/uL Final     Hemoglobin   Date Value Ref Range Status   02/27/2023 13.1 12.0 - 15.9 g/dL Final   09/04/2020 12.9 12.0 - 16.0 g/dL Final     Hematocrit   Date Value Ref Range Status   02/27/2023 38.4 34.0 - 46.6 % Final   09/04/2020 38.4 36.0 - 46.0 % Final     Platelets   Date Value Ref Range Status   02/27/2023 232 140 - 450 10*3/mm3 Final   09/04/2020 266 140 - 440 10*3/uL Final            Assessment & Plan      1. High grade, invasive ductal carcinoma of right breast, ER/MT+, Her2 negative; clinical T1bN0, anatomic stage IA, prognostic stage IA:   · May 6, 2019 ultrasound-guided biopsy of the right breast mass,  - grade 3, invasive mammary carcinoma, [place] score of 8,  biopsy of right axillary lymph node negative  ER 82.35%, MT 92.68%, HER-2/latoya 1+ , Ki-67 14.8%    · May 28, 2019 right lumpectomy with removal of nipple areolar complex and right sentinel lymph node biopsy  -Central 2:00, invasive ductal carcinoma, grade 2, [place] score of 6, tumor size is 10 mm, no DCIS, one lymph node negative, good margins  · Oncotype DX score of 6, low risk disease, been not give chemotherapy  · We will plan on starting tamoxifen given premenopausal for 2 to 3 years followed by Arimidex once postmenopausal.  · 2020, patient continues on tamoxifen and is tolerating this well.  She will have a follow-up MRI of the breast in September.  · September 3, 2020 MRI breast negative  · 2020 s/p hysterectomy with bilateral subfinger oophorectomy, pathology benignWe will switch patient to aromatase inhibitor since she is postmenopausal now with Arimidex, started 2020  · 2022 continue Arimidex tolerating well  · On 2023, she is continuing anastrozole and tolerating it well without any hot flashes or joint pains. MRI of the bilateral breast was reviewed and shows evidence of axillary lymph nodes, which is likely secondary to COVID-19 vaccine, which was taken 4 days prior to her MRI. She is scheduled for a repeat MRI of the breast on 2023.  · Patient was seen by breast surgery and scheduled for ultrasound of the left axilla as the lymph nodes were only in the left axilla .  Currently on exam I do not appreciate lymph nodes    2. Family history of cancer: Paternal cousin with breast cancer diagnosed at 43 and . Paternal aunt with breast cancer s/p mastectomy; paternal grandmother with breast cancer s/p lumpectomy.   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  ·  She has an appointment with  in early July.   · We will plan on alternating MRI of breast with  mammogram alternating.  · Discussed in breast cancer conference today and Dr. Greene and we agreed that best option is to make an MRI of breast with mammogram every 6 months.  Given CHEK2 mutation patient is to alternate MRI with mammogram  · MRI done on 12/01/2022 has been reviewed. She will be scheduled for a repeat MRI on 02/07/2023 given that she had the COVID-19 vaccine taken before the MRI of the breast, which showed axillary lymphadenopathy.       3.  Vitamin B-12 deficiency: B12 level 270, advised patient to take over the counter B-12 thousand micrograms orally daily.   · Encourage patient to take vitamin B12 1000 mcg twice weekly.    4. PCOS, Complicated with obesity: Patient attempted to undergo bariatric surgery in order to reduce weight and further help with her PCOS. This was stalled due to her recent breast cancer diagnosis. In past patient has been able to lose 85 lbs with diet modifications.   · Advised patient to exercise 5 times per week with 20 minutes of aerobics, total of 40 minutes/day .   · Suggested Live [facility] program and patient to visit the cancer resource center.  · Referral to Nutritionist for further recommendations.   · Referred patient to OB/GYN today for further evaluation.  · Continues to follow closely with Dr. Lexii Rahman with recent pelvic exam, planning to have endometrial biopsy tomorrow.  · S/p hysterectomy with bilateral salpingo-oophorectomy September 4, 2020.  Pathology benign  · Doing well    5.  Anxiety: Patient has significant anxiety, has been referred to Alexa Trinh.  In the past she has tried Zoloft Wellbutrin and Seroquel and did not have good response to any of these medications.  · Patient is being monitored by Alexa Trinh.  · Improved    6.  Hot flashes severe and also had mood swings following hysterectomy and salpingo-oophorectomy.  · 5/28/2021, hot flashes have significantly improved.  She does still experience occasional hot flashes.  · Resolved    7.   Hypertension.  Blood pressure control today.    8. Hypercalcemia  · The patient been evaluated by her PCP who first discontinued HCTZ and started Lasix due to hypercalcemia and elevated liver function studies.  While off HCTZ the patients calcium normalized and her liver function studies started to improve.  Her blood pressure was not controlled on Lasix, so the patient was then placed on HCTZ/lisinopril and Lasix was discontinued.   · Calcium today elevated at 10.6, the patient wishes to follow with her PCP and adjust her blood pressure medication further to see if this normalizes her calcium before proceeding with any scans.  · Related to hydrochlorothiazide and has been discontinued.  Calcium 10.1  · Calcium increased to 10.5 again today.  PTH intact was 37.4.  Ionized calcium mildly elevated.  Does not appear to be hyperparathyroidism.  · Normalized calcium    9.  Bone density DEXA scan January 13, 2021 normal      PLAN:  1. Continue Arimidex  2. Continue vitamin D 3 2000 international units once daily   3. Increase exercise and patient is eating low carbohydrate diet  4. Plan to continue alternating an MRI of the breast as well as a mammogram of the breast every 6 months due to the patient's CHEK2 mutation.    5. Due for bone density January 2023  6. Next MRI due in October 2022.  Reviewed mammogram from Mary 10, 2022 which is negative  7. Patient is to call us after ultrasound of the left axilla is done.  8. Follow-up with me in 6 months with labs    The patient is on high risk medication that requires close monitoring for toxicity.      Tia Trevizo MD           Cc: Dr. Trena Hyatt, VICENTE Myles

## 2023-02-28 LAB
CA-I SERPL ISE-MCNC: 5.7 MG/DL (ref 4.5–5.6)
PTH-INTACT SERPL-MCNC: 58.2 PG/ML (ref 15–65)

## 2023-03-07 ENCOUNTER — TELEMEDICINE (OUTPATIENT)
Dept: INTERNAL MEDICINE | Facility: CLINIC | Age: 53
End: 2023-03-07
Payer: COMMERCIAL

## 2023-03-07 DIAGNOSIS — R30.0 DYSURIA: Primary | ICD-10-CM

## 2023-03-07 DIAGNOSIS — N30.00 ACUTE CYSTITIS WITHOUT HEMATURIA: Primary | ICD-10-CM

## 2023-03-07 DIAGNOSIS — I10 BENIGN ESSENTIAL HTN: ICD-10-CM

## 2023-03-07 PROBLEM — B37.0 THRUSH: Status: RESOLVED | Noted: 2022-09-15 | Resolved: 2023-03-07

## 2023-03-07 PROBLEM — M25.473 ANKLE EDEMA: Status: RESOLVED | Noted: 2021-03-08 | Resolved: 2023-03-07

## 2023-03-07 PROBLEM — G47.33 OSA (OBSTRUCTIVE SLEEP APNEA): Chronic | Status: ACTIVE | Noted: 2021-11-19

## 2023-03-07 LAB — PTH RELATED PROT SERPL-SCNC: <2 PMOL/L

## 2023-03-07 PROCEDURE — 99213 OFFICE O/P EST LOW 20 MIN: CPT | Performed by: NURSE PRACTITIONER

## 2023-03-07 RX ORDER — AMLODIPINE BESYLATE 5 MG/1
5 TABLET ORAL NIGHTLY
Qty: 90 TABLET | Refills: 1 | Status: SHIPPED | OUTPATIENT
Start: 2023-03-07

## 2023-03-07 RX ORDER — NITROFURANTOIN 25; 75 MG/1; MG/1
100 CAPSULE ORAL EVERY 12 HOURS SCHEDULED
Qty: 14 CAPSULE | Refills: 0 | Status: SHIPPED | OUTPATIENT
Start: 2023-03-07 | End: 2023-03-14

## 2023-03-08 ENCOUNTER — LAB (OUTPATIENT)
Dept: LAB | Facility: HOSPITAL | Age: 53
End: 2023-03-08
Payer: COMMERCIAL

## 2023-03-08 LAB
BACTERIA UR QL AUTO: ABNORMAL /HPF
BILIRUB UR QL STRIP: NEGATIVE
CLARITY UR: CLEAR
COLOR UR: YELLOW
GLUCOSE UR STRIP-MCNC: NEGATIVE MG/DL
HGB UR QL STRIP.AUTO: NEGATIVE
HYALINE CASTS UR QL AUTO: ABNORMAL /LPF
KETONES UR QL STRIP: NEGATIVE
LEUKOCYTE ESTERASE UR QL STRIP.AUTO: ABNORMAL
NITRITE UR QL STRIP: NEGATIVE
PH UR STRIP.AUTO: 6.5 [PH] (ref 5–8)
PROT UR QL STRIP: ABNORMAL
RBC # UR STRIP: ABNORMAL /HPF
REF LAB TEST METHOD: ABNORMAL
SP GR UR STRIP: 1.02 (ref 1–1.03)
SQUAMOUS #/AREA URNS HPF: ABNORMAL /HPF
UROBILINOGEN UR QL STRIP: ABNORMAL
WBC # UR STRIP: ABNORMAL /HPF

## 2023-03-08 PROCEDURE — 87086 URINE CULTURE/COLONY COUNT: CPT | Performed by: NURSE PRACTITIONER

## 2023-03-08 PROCEDURE — 87186 SC STD MICRODIL/AGAR DIL: CPT | Performed by: NURSE PRACTITIONER

## 2023-03-08 PROCEDURE — 81001 URINALYSIS AUTO W/SCOPE: CPT | Performed by: NURSE PRACTITIONER

## 2023-03-08 PROCEDURE — 87077 CULTURE AEROBIC IDENTIFY: CPT | Performed by: NURSE PRACTITIONER

## 2023-03-08 NOTE — ASSESSMENT & PLAN NOTE
Her blood pressure has been elevated over the past few months.  She will continue lisinopril 40 mg but we will add amlodipine 5 mg daily.  We will continue to avoid HCTZ due to history of elevated calcium with medication.  We will continue to monitor blood pressure closely as she continues with her weight loss efforts.

## 2023-03-08 NOTE — PROGRESS NOTES
"Chief Complaint  Urinary Tract Infection and Hypertension    Subjective        Emilie SAUNDERS presents to [facility] due to urinary symptoms.    You have chosen to receive care through a telehealth visit.  Do you consent to use a video/audio connection for your medical care today? Yes    Location of patient: home  Location of [place] [facility]  The visit included audio and video interaction between patient and provider due to the COVID-19 pandemic.    History of Present Illness  She presents due to urinary frequency and suprapubic pressure which began last Thursday.  She has increased her water intake which has been mildly helpful.  However, last night she experienced chills with a sensation that she had a fever.  Denies nausea or vomiting.  No hematuria.  Her blood pressure has also been elevated, amlodipine was stopped previously after gastric sleeve.  She has now lost 90 pounds.  Her blood pressure readings have been elevated (164/92, 190/110) and her recent visits with specialist.  She denies chest pain or shortness of breath.      Objective   Vital Signs:  There were no vitals taken for this visit.  Estimated body mass index is 38 kg/m² as calculated from the following:    Height as of 2/27/23: 163 cm (64.17\").    Weight as of 2/27/23: 101 kg (222 lb 9.6 oz).       BMI is within normal parameters. No other follow-up for BMI required.      Physical Exam  Constitutional:       Appearance: She is well-developed.   HENT:      Head: Normocephalic and atraumatic.   Eyes:      General:         Right eye: No discharge.         Left eye: No discharge.      Conjunctiva/sclera: Conjunctivae normal.   Pulmonary:      Effort: Pulmonary effort is normal.   Neurological:      Mental Status: She is alert and oriented to person, place, and time.   Psychiatric:         Behavior: Behavior normal.         Thought Content: Thought content normal.         Judgment: Judgment normal.        Result " Review :  The following data was reviewed by: VICENTE Ruth on 03/07/2023:  Common labs    Common Labs 12/12/22 12/12/22 12/12/22 12/12/22 1/17/23 1/17/23 2/27/23 2/27/23    1230 1230 1230 1230 1602 1602 1634 1634   Glucose 103 (A)     99  100   BUN 5 (A)     11  10   Creatinine 0.62     0.70  0.73   Sodium 142     138  141   Potassium 4.1     4.2  3.8   Chloride 102     102  102   Calcium 10.4 (A)     10.8 (A)  10.3 (A)   Total Protein 6.7          Albumin 4.5     4.1  4.2   Total Bilirubin 1.0     0.8  0.8   Alkaline Phosphatase 83     82  85   AST (SGOT) 20     17  13   ALT (SGPT) 25     12  16   WBC  7.3   8.43  9.34    Hemoglobin  13.5   13.8  13.1    Hematocrit  41.7   42.7  38.4    Platelets  271   177  232    Total Cholesterol    187       Triglycerides    207 (A)       HDL Cholesterol    45       LDL Cholesterol     106 (A)       Hemoglobin A1C   5.0        (A) Abnormal value       Comments are available for some flowsheets but are not being displayed.           Data reviewed: Consultant notes oncology 2/27/23             Assessment and Plan   Diagnoses and all orders for this visit:    1. Acute cystitis without hematuria (Primary)  Comments:  She will go to the hospital after work in order to provide a urine specimen.  She is given an antibiotic but will hold off on beginning until we obtain a UA.  Orders:  -     nitrofurantoin, macrocrystal-monohydrate, (MACROBID) 100 MG capsule; Take 1 capsule by mouth Every 12 (Twelve) Hours for 7 days.  Dispense: 14 capsule; Refill: 0    2. Benign essential HTN  Assessment & Plan:  Her blood pressure has been elevated over the past few months.  She will continue lisinopril 40 mg but we will add amlodipine 5 mg daily.  We will continue to avoid HCTZ due to history of elevated calcium with medication.  We will continue to monitor blood pressure closely as she continues with her weight loss efforts.    Orders:  -     amLODIPine (NORVASC) 5 MG tablet; Take 1  tablet by mouth Every Night.  Dispense: 90 tablet; Refill: 1    History and medical judgement obtained from video conversation with patient. No hands-on physical examination was performed. Approximately 18 minutes spent in video conversation with patient and in chart review.         Follow Up   Return if symptoms worsen or fail to improve, for Next scheduled follow up.  Patient was given instructions and counseling regarding her condition or for health maintenance advice. Please see specific information pulled into the AVS if appropriate.

## 2023-03-10 LAB — BACTERIA SPEC AEROBE CULT: ABNORMAL

## 2023-03-11 DIAGNOSIS — C50.111 MALIGNANT NEOPLASM OF CENTRAL PORTION OF RIGHT BREAST IN FEMALE, ESTROGEN RECEPTOR POSITIVE: ICD-10-CM

## 2023-03-11 DIAGNOSIS — Z17.0 MALIGNANT NEOPLASM OF CENTRAL PORTION OF RIGHT BREAST IN FEMALE, ESTROGEN RECEPTOR POSITIVE: ICD-10-CM

## 2023-03-13 NOTE — TELEPHONE ENCOUNTER
Rx Refill Note  Requested Prescriptions     Pending Prescriptions Disp Refills   • anastrozole (ARIMIDEX) 1 MG tablet [Pharmacy Med Name: ANASTROZOLE 1MG TABLETS]  1     Sig: TAKE 1 TABLET BY MOUTH DAILY      Last office visit with prescribing clinician: 2/27/2023   Last telemedicine visit with prescribing clinician: 4/17/2023   Next office visit with prescribing clinician: 8/29/2023                         Would you like a call back once the refill request has been completed: [] Yes [] No    If the office needs to give you a call back, can they leave a voicemail: [] Yes [] No    Kathy Villalba  03/13/23, 13:38 EDT

## 2023-03-14 RX ORDER — ANASTROZOLE 1 MG/1
1 TABLET ORAL DAILY
Qty: 30 TABLET | Refills: 5 | Status: SHIPPED | OUTPATIENT
Start: 2023-03-14

## 2023-03-15 ENCOUNTER — HOSPITAL ENCOUNTER (OUTPATIENT)
Dept: ULTRASOUND IMAGING | Facility: HOSPITAL | Age: 53
Discharge: HOME | End: 2023-03-15
Admitting: NURSE PRACTITIONER
Payer: COMMERCIAL

## 2023-03-15 DIAGNOSIS — R92.8 ABNORMAL FINDING ON BREAST IMAGING: ICD-10-CM

## 2023-03-15 PROCEDURE — 76882 US LMTD JT/FCL EVL NVASC XTR: CPT

## 2023-03-21 ENCOUNTER — TELEPHONE (OUTPATIENT)
Dept: SURGERY | Facility: CLINIC | Age: 53
End: 2023-03-21
Payer: COMMERCIAL

## 2023-03-21 DIAGNOSIS — Z17.0 MALIGNANT NEOPLASM OF CENTRAL PORTION OF RIGHT BREAST IN FEMALE, ESTROGEN RECEPTOR POSITIVE: Primary | ICD-10-CM

## 2023-03-21 DIAGNOSIS — C50.111 MALIGNANT NEOPLASM OF CENTRAL PORTION OF RIGHT BREAST IN FEMALE, ESTROGEN RECEPTOR POSITIVE: Primary | ICD-10-CM

## 2023-03-21 NOTE — TELEPHONE ENCOUNTER
US of left axilla reported to patient via .  Results area BiRADs 2, likely reactive LN seen in MRI done 12/22.  She is due her screening mammogram and exam in 6/2023, we will contact her to schedule.    IMPRESSION:  Benign-appearing left axillary lymph nodes, which were likely reactive  to the reported recent left arm Covid vaccination at the time of her  prior MRI in December. Recommend annual screening mammogram in June 2023.     BI-RADS Category 2: Benign

## 2023-04-17 ENCOUNTER — OFFICE VISIT (OUTPATIENT)
Dept: INTERNAL MEDICINE | Facility: CLINIC | Age: 53
End: 2023-04-17
Payer: COMMERCIAL

## 2023-04-17 VITALS
DIASTOLIC BLOOD PRESSURE: 110 MMHG | TEMPERATURE: 97.1 F | BODY MASS INDEX: 36.67 KG/M2 | SYSTOLIC BLOOD PRESSURE: 150 MMHG | HEIGHT: 64 IN | OXYGEN SATURATION: 96 % | HEART RATE: 84 BPM | WEIGHT: 214.8 LBS

## 2023-04-17 DIAGNOSIS — F41.9 ANXIETY: Chronic | ICD-10-CM

## 2023-04-17 DIAGNOSIS — E78.00 PURE HYPERCHOLESTEROLEMIA: Chronic | ICD-10-CM

## 2023-04-17 DIAGNOSIS — I10 BENIGN ESSENTIAL HTN: Primary | Chronic | ICD-10-CM

## 2023-04-17 PROCEDURE — 99214 OFFICE O/P EST MOD 30 MIN: CPT | Performed by: NURSE PRACTITIONER

## 2023-04-17 RX ORDER — AMLODIPINE BESYLATE 10 MG/1
10 TABLET ORAL NIGHTLY
Qty: 30 TABLET | Refills: 1 | Status: SHIPPED | OUTPATIENT
Start: 2023-04-17

## 2023-05-07 NOTE — PROGRESS NOTES
"Chief Complaint  Hypertension (4 month f/u), Anxiety, and Hyperlipidemia    Subjective        Emilie SAUNDERS presents to [facility] for f/u regarding HTN, hypercholesterolemia and anxiety.    History of Present Illness  Her BP is elevated in the office today, currently managed on Lisinopril 40 mg daily. Her medications were decreased after her gastric sleeve 9/2022. She has continued to lose weight and is following a low-carb, low-sugar diet.  She also notes increased anxiety with frequent panic attacks, has seen psychiatry in the past but did not do well with SSRI's/SRNIs.    Objective   Vital Signs:  BP (!) 150/110 (BP Location: Left arm, Patient Position: Sitting, Cuff Size: Adult)   Pulse 84   Temp 97.1 °F (36.2 °C) (Infrared)   Ht 162.6 cm (64\")   Wt 97.4 kg (214 lb 12.8 oz)   SpO2 96%   BMI 36.87 kg/m²   Estimated body mass index is 36.87 kg/m² as calculated from the following:    Height as of this encounter: 162.6 cm (64\").    Weight as of this encounter: 97.4 kg (214 lb 12.8 oz).             Physical Exam  Constitutional:       Appearance: She is well-developed. She is not ill-appearing.   HENT:      Head: Normocephalic.      Right Ear: Hearing, tympanic membrane and external ear normal.      Left Ear: Hearing, tympanic membrane and external ear normal.      Nose: Nose normal. No nasal deformity, mucosal edema or rhinorrhea.      Right Sinus: No maxillary sinus tenderness or frontal sinus tenderness.      Left Sinus: No maxillary sinus tenderness or frontal sinus tenderness.      Mouth/Throat:      Dentition: Normal dentition.   Eyes:      General: Lids are normal.         Right eye: No discharge.         Left eye: No discharge.      Conjunctiva/sclera: Conjunctivae normal.      Right eye: No exudate.     Left eye: No exudate.  Neck:      Thyroid: No thyroid mass or thyromegaly.      Vascular: No carotid bruit.      Trachea: Trachea normal.   Cardiovascular:      Rate " and Rhythm: Regular rhythm.      Pulses: Normal pulses.      Heart sounds: Normal heart sounds. No murmur heard.  Pulmonary:      Effort: No respiratory distress.      Breath sounds: Normal breath sounds. No decreased breath sounds, wheezing, rhonchi or rales.   Abdominal:      General: Bowel sounds are normal.      Palpations: Abdomen is soft.      Tenderness: There is no abdominal tenderness.   Musculoskeletal:      Cervical back: Normal range of motion. No edema.   Lymphadenopathy:      Head:      Right side of head: No submental, submandibular, tonsillar, preauricular, posterior auricular or occipital adenopathy.      Left side of head: No submental, submandibular, tonsillar, preauricular, posterior auricular or occipital adenopathy.   Skin:     General: Skin is warm and dry.      Nails: There is no clubbing.   Neurological:      Mental Status: She is alert.   Psychiatric:         Behavior: Behavior is cooperative.        Result Review :                   Assessment and Plan   Diagnoses and all orders for this visit:    1. Benign essential HTN (Primary)  Assessment & Plan:  She will continue lisinopril 40 mg daily and increase amlodipine from 5 mg to 10 mg daily. Continue to monitor BP and call if readings are consistently >140/90.    Orders:  -     amLODIPine (NORVASC) 10 MG tablet; Take 1 tablet by mouth Every Night.  Dispense: 30 tablet; Refill: 1    2. Pure hypercholesterolemia  Assessment & Plan:   with 12/2022 labs; continue a low-fat, low-cholesterol diet with continued weight loss.      3. Anxiety  Assessment & Plan:  She has had testing through psychiatry in the past, has responded well to Fetzima in the past-she will start medication due to increased anxiety and we will re-evaluate in 6-8 weeks.    Orders:  -     Levomilnacipran HCl ER 20 MG capsule sustained-release 24 hr; Take 20 mg by mouth Daily.  Dispense: 30 capsule; Refill: 0         Follow Up   Return in about 4 weeks (around  5/15/2023).  Patient was given instructions and counseling regarding her condition or for health maintenance advice. Please see specific information pulled into the AVS if appropriate.

## 2023-05-07 NOTE — ASSESSMENT & PLAN NOTE
She will continue lisinopril 40 mg daily and increase amlodipine from 5 mg to 10 mg daily. Continue to monitor BP and call if readings are consistently >140/90.

## 2023-05-07 NOTE — ASSESSMENT & PLAN NOTE
She has had testing through psychiatry in the past, has responded well to Fetzima in the past-she will start medication due to increased anxiety and we will re-evaluate in 6-8 weeks.

## 2023-05-16 ENCOUNTER — OFFICE VISIT (OUTPATIENT)
Dept: INTERNAL MEDICINE | Facility: CLINIC | Age: 53
End: 2023-05-16

## 2023-05-16 ENCOUNTER — PRIOR AUTHORIZATION (OUTPATIENT)
Dept: INTERNAL MEDICINE | Facility: CLINIC | Age: 53
End: 2023-05-16

## 2023-05-16 ENCOUNTER — TELEPHONE (OUTPATIENT)
Dept: SURGERY | Facility: CLINIC | Age: 53
End: 2023-05-16
Payer: COMMERCIAL

## 2023-05-16 VITALS
BODY MASS INDEX: 36.74 KG/M2 | TEMPERATURE: 97.8 F | HEIGHT: 64 IN | HEART RATE: 76 BPM | OXYGEN SATURATION: 98 % | DIASTOLIC BLOOD PRESSURE: 78 MMHG | WEIGHT: 215.2 LBS | SYSTOLIC BLOOD PRESSURE: 132 MMHG

## 2023-05-16 DIAGNOSIS — I10 BENIGN ESSENTIAL HTN: Primary | Chronic | ICD-10-CM

## 2023-05-16 DIAGNOSIS — F41.9 ANXIETY: Chronic | ICD-10-CM

## 2023-05-16 PROCEDURE — 99213 OFFICE O/P EST LOW 20 MIN: CPT | Performed by: NURSE PRACTITIONER

## 2023-05-16 NOTE — TELEPHONE ENCOUNTER
NEREIDA for call back regarding r/s her 6/21/23 appt with Shaye Dowling and to tell her the mammogram is scheduled for 6/12/23 @ 12:45 [facility].

## 2023-05-17 NOTE — TELEPHONE ENCOUNTER
PA denied    Formulary alternative(s) are desvenlafaxine succinate extended-release (generic  Pristiq), duloxetine delayed-release, venlafaxine, venlafaxine extended-release per denial letter

## 2023-05-18 NOTE — TELEPHONE ENCOUNTER
"Patient states she is allergic to Viibryd and it caused hives. Patient states she can't take Pristiq because it made her feel like she was \"coming out of her body\". Advised patient we don't have many other options.   "

## 2023-05-18 NOTE — TELEPHONE ENCOUNTER
Patient states she can't take Pristiq, Cymbalta or Effexor due to genetics testing. Patient is calling her insurance to check what else might be covered.

## 2023-05-18 NOTE — TELEPHONE ENCOUNTER
Please notify pt PA for medication has been denied-according to her testing Pristiq would be the best option and it is covered. Would she like a prescription for this? Thanks.

## 2023-05-18 NOTE — TELEPHONE ENCOUNTER
Actually, the genetic testing listed Viiveronicad, Trinoma (not approved) or Pristiq.  She can check with her insurance regarding coverage of any of these. Thanks.

## 2023-05-30 NOTE — PROGRESS NOTES
"Chief Complaint  Hypertension (5 week follow up)    Subjective        Emilie SAUNDERS presents to [facility] for f/u regarding HTN.    History of Present Illness  She was seen last month in follow-up due to elevated blood pressure and her amlodipine was increased from 5 mg to 10 mg.  She is tolerating this higher dose well, unsure what home blood pressure readings have been.  She continues on a low-carb diet and has continued to lose weight since her gastric sleeve last September.  She was also started on Fetzima for anxiety but has not been able to  at the pharmacy (no PA received from pharmacy). According to genomic testing Viibryd and Pristiq are good options but she has not tolerated these well in the past.      Objective   Vital Signs:  /78 (BP Location: Left arm, Patient Position: Sitting, Cuff Size: Adult)   Pulse 76   Temp 97.8 °F (36.6 °C) (Temporal)   Ht 162.6 cm (64\")   Wt 97.6 kg (215 lb 3.2 oz)   SpO2 98%   BMI 36.94 kg/m²   Estimated body mass index is 36.94 kg/m² as calculated from the following:    Height as of this encounter: 162.6 cm (64\").    Weight as of this encounter: 97.6 kg (215 lb 3.2 oz).       Class 2 Severe Obesity (BMI >=35 and <=39.9). Obesity-related health conditions include the following: hypertension, dyslipidemias and osteoarthritis. Obesity is improving with treatment. BMI is is above average; BMI management plan is completed. We discussed portion control and increasing exercise.      Physical Exam  Constitutional:       Appearance: She is well-developed. She is not ill-appearing.   HENT:      Head: Normocephalic.      Right Ear: Hearing, tympanic membrane and external ear normal.      Left Ear: Hearing, tympanic membrane and external ear normal.      Nose: Nose normal. No nasal deformity, mucosal edema or rhinorrhea.      Right Sinus: No maxillary sinus tenderness or frontal sinus tenderness.      Left Sinus: No maxillary sinus " tenderness or frontal sinus tenderness.      Mouth/Throat:      Dentition: Normal dentition.   Eyes:      General: Lids are normal.         Right eye: No discharge.         Left eye: No discharge.      Conjunctiva/sclera: Conjunctivae normal.      Right eye: No exudate.     Left eye: No exudate.  Neck:      Thyroid: No thyroid mass or thyromegaly.      Vascular: No carotid bruit.      Trachea: Trachea normal.   Cardiovascular:      Rate and Rhythm: Regular rhythm.      Pulses: Normal pulses.      Heart sounds: Normal heart sounds. No murmur heard.  Pulmonary:      Effort: No respiratory distress.      Breath sounds: Normal breath sounds. No decreased breath sounds, wheezing, rhonchi or rales.   Abdominal:      General: Bowel sounds are normal.      Palpations: Abdomen is soft.      Tenderness: There is no abdominal tenderness.   Musculoskeletal:      Cervical back: Normal range of motion. No edema.   Lymphadenopathy:      Head:      Right side of head: No submental, submandibular, tonsillar, preauricular, posterior auricular or occipital adenopathy.      Left side of head: No submental, submandibular, tonsillar, preauricular, posterior auricular or occipital adenopathy.   Skin:     General: Skin is warm and dry.      Nails: There is no clubbing.   Neurological:      Mental Status: She is alert.   Psychiatric:         Behavior: Behavior is cooperative.        Result Review :  The following data was reviewed by: VICENTE Ruth on 05/16/2023:  Common labs        12/12/2022    12:30 1/17/2023    16:02 2/27/2023    16:34   Common Labs   Glucose 103   99   100     BUN 5   11   10     Creatinine 0.62   0.70   0.73     Sodium 142   138   141     Potassium 4.1   4.2   3.8     Chloride 102   102   102     Calcium 10.4   10.8   10.3     Total Protein 6.7       Albumin 4.5   4.1   4.2     Total Bilirubin 1.0   0.8   0.8     Alkaline Phosphatase 83   82   85     AST (SGOT) 20   17   13     ALT (SGPT) 25   12   16     WBC  7.3   8.43   9.34     Hemoglobin 13.5   13.8   13.1     Hematocrit 41.7   42.7   38.4     Platelets 271   177   232     Total Cholesterol 187       Triglycerides 207       HDL Cholesterol 45       LDL Cholesterol  106       Hemoglobin A1C 5.0                      Assessment and Plan   Diagnoses and all orders for this visit:    1. Benign essential HTN (Primary)  Assessment & Plan:  Blood pressure was elevated in the office but improved with repeat; continue amlodipine and lisinopril along with a low-sodium diet.  She is encouraged to monitor blood pressure at home as well and call office if blood pressure remains elevated.      2. Anxiety  Assessment & Plan:  She was unable to  the prescription for Fetzima at her last appointment, no information received to PA.  Will resend medication, she will notify office if she is unable to  medication.    Orders:  -     Levomilnacipran HCl ER 20 MG capsule sustained-release 24 hr; Take 20 mg by mouth Daily.  Dispense: 30 capsule; Refill: 0           Follow Up   Return in about 4 months (around 9/16/2023).  Patient was given instructions and counseling regarding her condition or for health maintenance advice. Please see specific information pulled into the AVS if appropriate.

## 2023-05-31 ENCOUNTER — TELEMEDICINE (OUTPATIENT)
Dept: INTERNAL MEDICINE | Facility: CLINIC | Age: 53
End: 2023-05-31

## 2023-05-31 DIAGNOSIS — K29.00 ACUTE GASTRITIS WITHOUT HEMORRHAGE, UNSPECIFIED GASTRITIS TYPE: ICD-10-CM

## 2023-05-31 DIAGNOSIS — R11.0 NAUSEA: Primary | ICD-10-CM

## 2023-05-31 PROCEDURE — 99213 OFFICE O/P EST LOW 20 MIN: CPT | Performed by: NURSE PRACTITIONER

## 2023-05-31 RX ORDER — PROMETHAZINE HYDROCHLORIDE 25 MG/1
25 TABLET ORAL EVERY 6 HOURS PRN
Qty: 30 TABLET | Refills: 0 | Status: SHIPPED | OUTPATIENT
Start: 2023-05-31

## 2023-05-31 NOTE — ASSESSMENT & PLAN NOTE
She was unable to  the prescription for Fetzima at her last appointment, no information received to PA.  Will resend medication, she will notify office if she is unable to  medication.

## 2023-05-31 NOTE — ASSESSMENT & PLAN NOTE
Blood pressure was elevated in the office but improved with repeat; continue amlodipine and lisinopril along with a low-sodium diet.  She is encouraged to monitor blood pressure at home as well and call office if blood pressure remains elevated.

## 2023-05-31 NOTE — PROGRESS NOTES
"           Name: Emilie SAUNDERS  :  1970  Provider: Pinky Hyatt APRN     Subjective:      Chief Complaint   Patient presents with   • Nausea        Emilie SAUNDERS is a 52 y.o. female and has scheduled an Video Visit.     Patient presents during the COVID-19 pandemic/federally declared [facility] public health emergency.   This service was conducted via Twilio .     Nausea all night.   No diarrhea.  Loose yesterday during the day.   Fever/chills: 102  Stomach: \"aching and churning\"     Hx bariatric surgery 8 months ago     Wife and mother-in-law had GI bug last week.     Tried pepto-bismol: some improvement.       Location:   Provider: in office  Patient: TeleLocation: home    I have reviewed the patient's medical history in detail and updated the computerized patient record.    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   • Depression 1988   • Diabetes mellitus     pre diabetic   • Family history of breast cancer 2019   • GERD (gastroesophageal reflux disease)    • Headache    • History of 2019 novel coronavirus disease (COVID-19)     2022 AND 22 (TESTED AT [facility], RESULTS IN EPIC)   • History of kidney stones    • Hyperlipidemia    • Hypertension    • Irritable bowel syndrome    • Lumbago    • Mood disorder    • Obesity life   • PONV (postoperative nausea and vomiting)    • 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   • Sleep apnea     C-PAP IN USE   • Vitamin D deficiency        Past Surgical History:   Procedure Laterality Date   • APPENDECTOMY      removed when removing dermoid on right ovary   • BREAST BIOPSY Right 2019   • BREAST LUMPECTOMY Right    • BREAST LUMPECTOMY WITH SENTINEL NODE BIOPSY Right 2019    Procedure: Right central partial mastectomy (with removal of the nipple-areola-complex) and sentinel lymph node biopsy;  Surgeon: Trena Greene MD; "  Location:  [facility];  Service: General   • COLONOSCOPY N/A 12/5/2019    Procedure: COLONOSCOPY TO CECUM;  Surgeon: Pedrito Fulton Jr., MD;  Location:  [facility];  Service: General   • DERMOID CYST  EXCISION  1989   • ENDOSCOPY N/A 12/28/2021    Procedure: ESOPHAGOGASTRODUODENOSCOPY WITH BIOPSY;  Surgeon: Delfino Rivera Jr., MD;  Location:  [facility];  Service: General;  Laterality: N/A;  PRE- DYSPEPSIA  POST-- GASTRITIS, GASTRIC POLYPS   • GASTRIC SLEEVE LAPAROSCOPIC N/A 9/12/2022    Procedure: GASTRIC SLEEVE LAPAROSCOPIC OR ROBOTIC;  Surgeon: Delfino Rivera Jr., MD;  Location:  [facility];  Service: Robotics - DaVinci;  Laterality: N/A;   • HERNIA REPAIR  2006    umbilical   • HYSTERECTOMY Bilateral 09/04/2020    Dr. Neumann, due to ovarian cysts with previous hx breast cancer   • LAPAROSCOPIC CHOLECYSTECTOMY     • SALIVARY GLAND SURGERY  1993    due to cancer   • TONSILLECTOMY  01/01/1979    I was 9 years old       Family History   Problem Relation Age of Onset   • Hypertension Mother    • Arthritis Mother    • Kidney cancer Mother    • Depression Mother         Not Dr sanders- [facility] go   • Heart disease Father    • Hernia Father    • Hypertension Father    • No Known Problems Brother    • Breast cancer Paternal Aunt    • Breast cancer Paternal Cousin    • Drug abuse Paternal Uncle         life long   • Drug abuse Paternal Uncle         life long   • Breast cancer Paternal Grandmother    • Osteoporosis Maternal Grandmother    • Lung cancer Paternal Grandfather    • Malig Hyperthermia Neg Hx        Social History     Socioeconomic History   • Marital status:      Spouse name: Trena Montenegro   • Number of children: 0   Tobacco Use   • Smoking status: Never   • Smokeless tobacco: Never   • Tobacco comments:     Parents smoked.  I have never smoked   Vaping Use   • Vaping Use: Never used   Substance and Sexual Activity   • Alcohol use: Yes     Comment: RARELY 1-2 TIMES PER YEAR    • Drug use: Never   • Sexual activity: Yes     Partners: Female     Birth control/protection: None, Surgical       Most Recent Immunizations   Administered Date(s) Administered   • COVID-19 (MODERNA) 1st,2nd,3rd Dose Monovalent 12/20/2021   • COVID-19 (UNSPECIFIED) 11/28/2022   • FluLaval/Fluzone >6mos 09/14/2020   • Influenza, Unspecified 11/09/2021   • Pneumococcal Polysaccharide (PPSV23) 10/01/2019   • Tdap 03/27/2020   • flucelvax quad pfs =>4 YRS 10/01/2019         Review of Systems:   Review of Systems   Constitutional: Positive for chills and fever.   Gastrointestinal: Positive for nausea.         Objective:      Physical Exam:   NO Physical exam due to video visit.  On Visual and Verbal exam:    Constitution: NAD  Pulmonary: unlabored   Psych: A&O, Calm       Vital Signs:  NO Office Vitals due to video visit.    Patient provided with home vitals which include:         Requested Prescriptions     Signed Prescriptions Disp Refills   • promethazine (PHENERGAN) 25 MG tablet 30 tablet 0     Sig: Take 1 tablet by mouth Every 6 (Six) Hours As Needed for Nausea or Vomiting.     Routine medications provided by this office will also be refilled via pharmacy request.       Current Outpatient Medications:   •  allopurinol (Zyloprim) 100 MG tablet, Take 1 tablet by mouth Daily., Disp: 30 tablet, Rfl: 2  •  amLODIPine (NORVASC) 10 MG tablet, Take 1 tablet by mouth Every Night., Disp: 30 tablet, Rfl: 1  •  anastrozole (ARIMIDEX) 1 MG tablet, TAKE 1 TABLET BY MOUTH DAILY, Disp: 30 tablet, Rfl: 5  •  Levomilnacipran HCl ER 20 MG capsule sustained-release 24 hr, Take 20 mg by mouth Daily., Disp: 30 capsule, Rfl: 0  •  lisinopril (PRINIVIL,ZESTRIL) 40 MG tablet, Take 1 tablet by mouth Daily., Disp: 90 tablet, Rfl: 3  •  LORazepam (ATIVAN) 0.5 MG tablet, Take 1 tablet by mouth Every 8 (Eight) Hours As Needed for Anxiety. for anxiety, Disp: 30 tablet, Rfl: 0  •  multivitamin (THERAGRAN) tablet tablet, Take 1 tablet by mouth  "Daily., Disp: , Rfl:   •  pantoprazole (PROTONIX) 40 MG EC tablet, Take 1 tablet by mouth Daily., Disp: 30 tablet, Rfl: 3  •  Probiotic Product (PROBIOTIC PO), Take 1 capsule by mouth Daily. PT HOLDING FOR SURGERY, Disp: , Rfl:   •  promethazine (PHENERGAN) 25 MG tablet, Take 1 tablet by mouth Every 6 (Six) Hours As Needed for Nausea or Vomiting., Disp: 30 tablet, Rfl: 0  •  vitamin D (ERGOCALCIFEROL) 1.25 MG (56907 UT) capsule capsule, Take 1 capsule by mouth 1 (One) Time Per Week., Disp: 5 capsule, Rfl: 2       Assessment and Plan:      Based upon patient provided history and account of medical problem, I suspect the patient has:     Problems Addressed this Visit    None  Visit Diagnoses     Nausea    -  Primary    Relevant Medications    promethazine (PHENERGAN) 25 MG tablet    Acute gastritis without hemorrhage, unspecified gastritis type          Diagnoses       Codes Comments    Nausea    -  Primary ICD-10-CM: R11.0  ICD-9-CM: 787.02     Acute gastritis without hemorrhage, unspecified gastritis type     ICD-10-CM: K29.00  ICD-9-CM: 535.00         I discussed medication use, dosing and possible side effects.   Patient was given opportunity to ask any questions.   Tolerated before.   May cause drowsiness.     Increase fluids.      See \"patient instructions\" for portions of the plan for treatment.     Discussed any change in Rx and discussed visit with patient.  All questions answered.      Return if symptoms worsen or fail to improve.    Leo \"Arvin\" Janet, APRN   05/31/23    Dragon disclaimer:   Much of this encounter note is an electronic transcription/translation of spoken language to printed text. The electronic translation of spoken language may permit erroneous, or at times, nonsensical words or phrases to be inadvertently transcribed; Although I have reviewed the note for such errors, some may still exist.     Additional Patient Counseling:       There are no Patient Instructions on file for this " visit.

## 2023-05-31 NOTE — LETTER
May 31, 2023     Patient: Emilie SAUNDERS   YOB: 1970   Date of Visit: 5/31/2023       To Whom It May Concern:    It is my medical opinion that Emilie SAUNDERS may return to work on 6/1/23.           Sincerely,        Rohini Gonzales III, NP-C    CC: No Recipients

## 2023-06-12 ENCOUNTER — HOSPITAL ENCOUNTER (OUTPATIENT)
Dept: MAMMOGRAPHY | Facility: HOSPITAL | Age: 53
Discharge: HOME | End: 2023-06-12
Admitting: NURSE PRACTITIONER
Payer: COMMERCIAL

## 2023-06-12 DIAGNOSIS — C50.111 MALIGNANT NEOPLASM OF CENTRAL PORTION OF RIGHT BREAST IN FEMALE, ESTROGEN RECEPTOR POSITIVE: ICD-10-CM

## 2023-06-12 DIAGNOSIS — Z17.0 MALIGNANT NEOPLASM OF CENTRAL PORTION OF RIGHT BREAST IN FEMALE, ESTROGEN RECEPTOR POSITIVE: ICD-10-CM

## 2023-06-12 PROCEDURE — 77063 BREAST TOMOSYNTHESIS BI: CPT

## 2023-06-12 PROCEDURE — 77067 SCR MAMMO BI INCL CAD: CPT

## 2023-08-08 ENCOUNTER — TELEMEDICINE (OUTPATIENT)
Dept: BARIATRICS/WEIGHT MGMT | Facility: CLINIC | Age: 53
End: 2023-08-08
Payer: COMMERCIAL

## 2023-08-08 VITALS — WEIGHT: 202 LBS | HEIGHT: 64 IN | BODY MASS INDEX: 34.49 KG/M2

## 2023-08-08 DIAGNOSIS — G47.33 OSA (OBSTRUCTIVE SLEEP APNEA): Chronic | ICD-10-CM

## 2023-08-08 DIAGNOSIS — E55.9 VITAMIN D DEFICIENCY: Chronic | ICD-10-CM

## 2023-08-08 DIAGNOSIS — E78.00 PURE HYPERCHOLESTEROLEMIA: Chronic | ICD-10-CM

## 2023-08-08 DIAGNOSIS — E66.9 OBESITY, CLASS I, BMI 30-34.9: Primary | ICD-10-CM

## 2023-08-08 DIAGNOSIS — E79.0 HYPERURICEMIA: Chronic | ICD-10-CM

## 2023-08-08 DIAGNOSIS — Z71.3 DIETARY COUNSELING: ICD-10-CM

## 2023-08-08 DIAGNOSIS — N39.3 URINARY, INCONTINENCE, STRESS FEMALE: Chronic | ICD-10-CM

## 2023-08-08 DIAGNOSIS — R73.03 PREDIABETES: Chronic | ICD-10-CM

## 2023-08-08 DIAGNOSIS — I10 BENIGN ESSENTIAL HTN: Chronic | ICD-10-CM

## 2023-08-08 DIAGNOSIS — Z98.84 S/P LAPAROSCOPIC SLEEVE GASTRECTOMY: ICD-10-CM

## 2023-08-08 PROBLEM — E66.811 OBESITY, CLASS I, BMI 30-34.9: Status: ACTIVE | Noted: 2023-02-21

## 2023-08-08 NOTE — PROGRESS NOTES
"[facility]  [facility]  [address] [facility] [address]  [facility] [id number] [phone]  [address]  [facility] [id number] [phone]  Dept: [phone]  8/8/2023      Emilie SAUNDERS.  [id number]  [id number]  1970  female      Chief Complaint   Patient presents with    Follow-up     S/p gastric sleeve 11 months     This service was conducted via GivU.  I am located at [facility] at [address] [facility] [address], [address].  The patient is located at work in [place].    You have chosen to receive care through a video visit. Do you consent to use a video visit for your medical care today? Yes    BH Post-Op Bariatric Surgery:   Emilie SAUNDERS is status post Laparoscopic Sleeve procedure, performed on 9/12/2022.     HPI:   Today's weight is 91.6 kg (202 lb) pounds, today's BMI is Body mass index is 34.49 kg/mý.,HE@ has a  loss of 24 pounds since the last visit andHIS@ weight loss since surgery is 105 pounds. The patient reports a decreased portion size and loss of appetite.      Emilie SAUNDERS denies nausea, vomiting, reflux, dysphagia and reports tolerating regular diet.  Weight loss has slowed.  Feels good.  Has been increasing exercise.  Making sure to fill rings on watch daily on activity tracker.  In competition with others.  Doing cardio and weight training.  Not drinking any soda.  Using crystal lite powders to flavor water.  Eating high protein foods.  Not using shakes/bars.  Had some nausea with shakes due to \"too sweet\".  Plans on trying to reintroduce now as is working out more.  Eats protein first but no room for veggies afterwards.  Tries to sneak bites of veggies in between protein intake.  Was eating popcorn as a snack but has stopped after noticed that she was able to eat more of that than she thought she should.  Sets alarm at work to get up and walk/exercise every hour.   Can eat broccoli, a few " nitin simon.  Veggie portion of meals is very small.  Worried not getting enough nutrition other than protein.     Feels like sometimes foods get stuck in throat.  Unable to pinpoint food types or if eating too fast or not chewing enough.  Eating off toddler plate.  Portions are a little larger than they were several months ago but cannot finish the toddler plate.  Struggles at times with cravings.  Does not crave sugar.  Some sugar free candy is too sweet.       Diet and Exercise: Diet history reviewed and discussed with the patient. Weight loss/gains to date discussed with the patient. The patient states they are eating unknown grams of protein per day. She reports eating 2-3 meals per day, a typical portion size of 1/2 cup, eating 0-2 snacks per day, drinking 8 or more 8-oz. glasses of water per day, no carbonated beverage consumption and exercising regularly.     Supplements: flintstone.     Review of Systems   Constitutional:  Positive for activity change.   Respiratory:  Negative for chest tightness and shortness of breath.    Cardiovascular:  Negative for chest pain.   Gastrointestinal:  Negative for abdominal pain.   All other systems reviewed and are negative.    Patient Active Problem List   Diagnosis    Anxiety    Benign essential HTN    Vitamin D deficiency    Hyperlipidemia    Malignant neoplasm of central portion of right breast in female, estrogen receptor positive    Monoallelic mutation of CHEK2 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    PCOS (polycystic ovarian syndrome)    History of total abdominal hysterectomy and bilateral salpingo-oophorectomy    Increased risk of breast cancer    Hyperuricemia    MARIBELL (obstructive sleep apnea) using CPAP    Urinary, incontinence, stress female    Anxious depression    Prediabetes    Dyspepsia    Gastric polyps    COVID-19 virus infection    S/P laparoscopic sleeve gastrectomy    Dietary counseling    Abnormal finding on breast imaging    Obesity, Class I, BMI 30-34.9        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    Depression 01/01/1988    Diabetes mellitus     pre diabetic    Family history of breast cancer 05/14/2019    GERD (gastroesophageal reflux disease)     Headache     History of 2019 novel coronavirus disease (COVID-19)     2/2022 AND 8/9/22 ([facility] RESULTS [facility])    History of kidney stones     Hyperlipidemia     Hypertension     Irritable bowel syndrome     Lumbago     Mood disorder     Obesity life    PONV (postoperative nausea and vomiting)     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    Sleep apnea     C-PAP IN USE    Vitamin D deficiency        The following portions of the patient's history were reviewed and updated as appropriate: allergies, current medications, past medical history, past surgical history, and problem list.    There were no vitals filed for this visit.    Physical Exam  Constitutional:       General: She is not in acute distress.  Pulmonary:      Effort: Pulmonary effort is normal.   Neurological:      General: No focal deficit present.      Mental Status: She is alert and oriented to person, place, and time.   Psychiatric:         Mood and Affect: Mood normal.         Behavior: Behavior normal.         Thought Content: Thought content normal.         Judgment: Judgment normal.       Assessment:   Post-op, the patient is doing well.     Encounter Diagnoses   Name Primary?    Obesity, Class I, BMI 30-34.9 Yes    S/P laparoscopic sleeve gastrectomy     Dietary counseling     Benign essential HTN     Pure hypercholesterolemia     Vitamin D deficiency     Prediabetes     Urinary, incontinence, stress female     Hyperuricemia     MARIBELL (obstructive sleep apnea) using CPAP        Plan:   Will get labs  If feeling like food stuck in back of throat continues, call/mc message and can order scope.  Denies increase in reflux or decrease  of portion size at this time.    Encouraged patient to be sure to get plenty of lean protein per day through small frequent meals all with a protein source.   Activity restrictions: none.   Recommended patient be sure to get at least 70 grams of protein per day by eating small, frequent meals all with high lean protein choices. Be sure to limit/cut back on daily carbohydrate intake. Discussed with the patient the recommended amount of water per day to intake- half of body weight in ounces. Reviewed vitamin requirements. Be sure to do routine exercise, 150 minutes per week minimum, including both cardio and strength training.     Instructions / Recommendations: dietary counseling recommended, recommended a daily protein intake of  grams, vitamin supplement(s) recommended, recommended exercising at least 150 minutes per week, behavior modifications recommended and instructed to call the office for concerns, questions, or problems.     The patient was instructed to follow up in 6 months .     The patient was counseled regarding. Total time spent face to face was 25 minutes and 20 minutes was spent counseling.   This was an audio and video enabled telemedicine encounter.

## 2023-09-05 ENCOUNTER — OFFICE VISIT (OUTPATIENT)
Dept: INTERNAL MEDICINE | Facility: CLINIC | Age: 53
End: 2023-09-05
Payer: COMMERCIAL

## 2023-09-05 VITALS
OXYGEN SATURATION: 97 % | SYSTOLIC BLOOD PRESSURE: 138 MMHG | DIASTOLIC BLOOD PRESSURE: 84 MMHG | TEMPERATURE: 97 F | HEIGHT: 64 IN | HEART RATE: 64 BPM | WEIGHT: 209.4 LBS | BODY MASS INDEX: 35.75 KG/M2

## 2023-09-05 DIAGNOSIS — I10 BENIGN ESSENTIAL HTN: Chronic | ICD-10-CM

## 2023-09-05 DIAGNOSIS — Z00.00 PE (PHYSICAL EXAM), ANNUAL: Primary | ICD-10-CM

## 2023-09-05 DIAGNOSIS — E78.00 PURE HYPERCHOLESTEROLEMIA: Chronic | ICD-10-CM

## 2023-09-05 DIAGNOSIS — F41.9 ANXIETY: Chronic | ICD-10-CM

## 2023-09-05 DIAGNOSIS — R73.09 ELEVATED GLUCOSE: ICD-10-CM

## 2023-09-05 DIAGNOSIS — E79.0 HYPERURICEMIA: Chronic | ICD-10-CM

## 2023-09-05 PROBLEM — R73.03 PREDIABETES: Chronic | Status: RESOLVED | Noted: 2021-11-19 | Resolved: 2023-09-05

## 2023-09-05 PROCEDURE — 99396 PREV VISIT EST AGE 40-64: CPT | Performed by: NURSE PRACTITIONER

## 2023-09-05 RX ORDER — AMLODIPINE BESYLATE 10 MG/1
10 TABLET ORAL NIGHTLY
Qty: 30 TABLET | Refills: 1 | Status: SHIPPED | OUTPATIENT
Start: 2023-09-05

## 2023-09-05 NOTE — ASSESSMENT & PLAN NOTE
The patient's hemoglobin A1c levels will be assessed, previously in diabetic range but glucose has improved with recent weight loss.

## 2023-09-05 NOTE — PROGRESS NOTES
"Chief Complaint  Hypertension (4 month follow up), Glucose intolerance, and Anxiety    Subjective        Emilie SAUNDERS presents to [facility]  History of Present Illness  Emilie Saunders is a 53-year-old female who presents today to follow up on glucose intolerance, hypertension, and anxiety.    The patient reports that her blood pressure has decreased. She states that her blood pressure was 124/77 mmHg the other day at [facility]. She is taking amlodipine and lisinopril. She has been experiencing edema of her bilateral lower extremities, but notes that her symptoms improve at night time. She denies edema of her bilateral upper extremities.    She states she has plateaued with her weight loss but notes she is not consuming enough protein. She underwent a laparoscopic sleeve procedure on 09/12/2022. She reports that she began exercising 2 months ago which she is tolerating without chest pain or shortness of breath.    The patient is taking lorazepam as needed. She denies symptoms of depression at this time. She reports seeing a counselor for anxiety. She is practicing meditation and notes that it is working well for her.  She is working on her self-identity as she has lost weight.    She confirms that she is taking vitamin D weekly. She is taking a multivitamin. She denies taking Phenergan or a probiotic. She denies experiencing heartburn or dyspepsia.    The patient reports experiencing a migraine a couple of weeks ago.    Objective   Vital Signs:  /84 (BP Location: Left arm, Patient Position: Sitting, Cuff Size: Adult)   Pulse 64   Temp 97 °F (36.1 °C) (Temporal)   Ht 163 cm (64.17\")   Wt 95 kg (209 lb 6.4 oz)   SpO2 97%   BMI 35.75 kg/m²   Estimated body mass index is 35.75 kg/m² as calculated from the following:    Height as of this encounter: 163 cm (64.17\").    Weight as of this encounter: 95 kg (209 lb 6.4 oz).               Physical Exam  Constitutional:  "      General: She is not in acute distress.     Appearance: Normal appearance. She is well-developed. She is not ill-appearing or diaphoretic.   HENT:      Head: Normocephalic and atraumatic.      Right Ear: Hearing, tympanic membrane, ear canal and external ear normal.      Left Ear: Hearing, tympanic membrane, ear canal and external ear normal.      Nose: Nose normal. No nasal deformity, mucosal edema or rhinorrhea.      Right Sinus: No maxillary sinus tenderness or frontal sinus tenderness.      Left Sinus: No maxillary sinus tenderness or frontal sinus tenderness.      Mouth/Throat:      Mouth: Mucous membranes are moist.      Dentition: Normal dentition.      Pharynx: Oropharynx is clear.   Eyes:      General: Lids are normal.         Right eye: No discharge.         Left eye: No discharge.      Conjunctiva/sclera: Conjunctivae normal.      Right eye: No exudate.     Left eye: No exudate.  Neck:      Thyroid: No thyroid mass or thyromegaly.      Vascular: No carotid bruit.      Trachea: Trachea normal.   Cardiovascular:      Rate and Rhythm: Normal rate and regular rhythm.      Pulses: Normal pulses.      Heart sounds: Normal heart sounds. No murmur heard.  Pulmonary:      Effort: Pulmonary effort is normal. No respiratory distress.      Breath sounds: Normal breath sounds. No decreased breath sounds, wheezing, rhonchi or rales.   Abdominal:      General: Bowel sounds are normal.      Palpations: Abdomen is soft.      Tenderness: There is no abdominal tenderness.   Musculoskeletal:         General: No swelling or tenderness.      Cervical back: Normal range of motion. No edema.   Lymphadenopathy:      Head:      Right side of head: No submental, submandibular, tonsillar, preauricular, posterior auricular or occipital adenopathy.      Left side of head: No submental, submandibular, tonsillar, preauricular, posterior auricular or occipital adenopathy.   Skin:     General: Skin is warm and dry.      Nails: There  is no clubbing.   Neurological:      General: No focal deficit present.      Mental Status: She is alert and oriented to person, place, and time.   Psychiatric:         Mood and Affect: Mood normal.         Behavior: Behavior normal. Behavior is cooperative.         Judgment: Judgment normal.      Result Review :  The following data was reviewed by: VICENTE Ruth on 09/05/2023:  Common labs          12/12/2022    12:30 1/17/2023    16:02 2/27/2023    16:34   Common Labs   Glucose 103  99  100    BUN 5  11  10    Creatinine 0.62  0.70  0.73    Sodium 142  138  141    Potassium 4.1  4.2  3.8    Chloride 102  102  102    Calcium 10.4  10.8  10.3    Total Protein 6.7      Albumin 4.5  4.1  4.2    Total Bilirubin 1.0  0.8  0.8    Alkaline Phosphatase 83  82  85    AST (SGOT) 20  17  13    ALT (SGPT) 25  12  16    WBC 7.3  8.43  9.34    Hemoglobin 13.5  13.8  13.1    Hematocrit 41.7  42.7  38.4    Platelets 271  177  232    Total Cholesterol 187      Triglycerides 207      HDL Cholesterol 45      LDL Cholesterol  106      Hemoglobin A1C 5.0                     Assessment and Plan   Diagnoses and all orders for this visit:    1. PE (physical exam), annual (Primary)  Assessment & Plan:  Baseline laboratory tests will be done.    Orders:  -     Cancel: CBC (No Diff)  -     Cancel: Comprehensive Metabolic Panel  -     Cancel: Lipid Panel  -     Cancel: TSH    2. Benign essential HTN  Assessment & Plan:  The patient will continue taking amlodipine and lisinopril daily which she is tolerating well.  Her blood pressure is mildly elevated in the office but home readings are consistently low, continue to monitor..    Orders:  -     amLODIPine (NORVASC) 10 MG tablet; Take 1 tablet by mouth Every Night.  Dispense: 30 tablet; Refill: 1    3. Elevated glucose  Assessment & Plan:  The patient's hemoglobin A1c levels will be assessed, previously in diabetic range but glucose has improved with recent weight loss.    Orders:  -      Cancel: Hemoglobin A1c  -     Hemoglobin A1c    4. Hyperuricemia  Assessment & Plan:  She is currently managed on allopurinol 100 mg daily, recheck uric acid.    Orders:  -     Uric Acid    5. Pure hypercholesterolemia  Assessment & Plan:  Recheck lipid panel with recent weight loss; continue a low-fat, low-cholesterol diet along with regular exercise.    Orders:  -     Lipid Panel    6. Anxiety  Assessment & Plan:  She is working with a counselor and practicing meditation.  She has lorazepam to take only as needed for acute panic attacks.        Patient has been erroneously marked as diabetic. Based on the available clinical information, she does not have diabetes and should therefore be excluded from diabetic health maintenance and quality measures for the remainder of the reporting period.         Follow Up   Return in about 4 months (around 1/5/2024).  Patient was given instructions and counseling regarding her condition or for health maintenance advice. Please see specific information pulled into the AVS if appropriate.     Transcribed from ambient dictation for VICENTE Ruth by Kathy Yeboah.  09/05/23   17:58 EDT    Patient or patient representative verbalized consent to the visit recording.  I have personally performed the services described in this document as transcribed by the above individual, and it is both accurate and complete.

## 2023-09-05 NOTE — ASSESSMENT & PLAN NOTE
The patient will continue taking amlodipine and lisinopril daily which she is tolerating well.  Her blood pressure is mildly elevated in the office but home readings are consistently low, continue to monitor..

## 2023-09-06 PROBLEM — Z71.3 DIETARY COUNSELING: Status: RESOLVED | Noted: 2022-09-15 | Resolved: 2023-09-06

## 2023-09-06 PROBLEM — F41.8 ANXIOUS DEPRESSION: Status: RESOLVED | Noted: 2021-11-19 | Resolved: 2023-09-06

## 2023-09-06 NOTE — ASSESSMENT & PLAN NOTE
She is working with a counselor and practicing meditation.  She has lorazepam to take only as needed for acute panic attacks.

## 2023-09-11 DIAGNOSIS — Z17.0 MALIGNANT NEOPLASM OF CENTRAL PORTION OF RIGHT BREAST IN FEMALE, ESTROGEN RECEPTOR POSITIVE: ICD-10-CM

## 2023-09-11 DIAGNOSIS — C50.111 MALIGNANT NEOPLASM OF CENTRAL PORTION OF RIGHT BREAST IN FEMALE, ESTROGEN RECEPTOR POSITIVE: ICD-10-CM

## 2023-09-12 RX ORDER — ANASTROZOLE 1 MG/1
1 TABLET ORAL DAILY
Qty: 90 TABLET | Refills: 3 | Status: SHIPPED | OUTPATIENT
Start: 2023-09-12

## 2023-09-21 ENCOUNTER — TELEMEDICINE (OUTPATIENT)
Dept: INTERNAL MEDICINE | Facility: CLINIC | Age: 53
End: 2023-09-21
Payer: COMMERCIAL

## 2023-09-21 DIAGNOSIS — J06.9 UPPER RESPIRATORY TRACT INFECTION, UNSPECIFIED TYPE: Primary | ICD-10-CM

## 2023-09-21 DIAGNOSIS — R53.83 OTHER FATIGUE: ICD-10-CM

## 2023-09-21 DIAGNOSIS — R52 BODY ACHES: ICD-10-CM

## 2023-09-21 DIAGNOSIS — R68.83 CHILLS: ICD-10-CM

## 2023-09-21 PROCEDURE — 99213 OFFICE O/P EST LOW 20 MIN: CPT

## 2023-09-21 RX ORDER — METHYLPREDNISOLONE 4 MG/1
TABLET ORAL
Qty: 21 TABLET | Refills: 0 | Status: SHIPPED | OUTPATIENT
Start: 2023-09-21

## 2023-09-21 NOTE — PROGRESS NOTES
Chief Complaint  Chills, URI, Fatigue, and Generalized Body Aches     Subjective:      History of Present Illness {CC  Problem List  Visit  Diagnosis   Encounters  Notes  Medications  Labs  Result Review Imaging  Media :23}     Emilie SAUNDERS presents to [facility] for:      You have chosen to receive care through a telehealth visit.  Do you consent to use a video/audio connection for your medical care today? Yes     The patient was in a secure location on a personal electronic device and I was at the office.      History of Present Illness  Pt states that she started getting chills a few days ago. Pt states she started having rhinorrhea and yesterday she started to have fatigue, body aches, and fatigue. Pt also complains of headaches. Pt took a COVID test at home and states it was negative.   URI   This is a new problem. The current episode started in the past 7 days. Associated symptoms include headaches and rhinorrhea. Pertinent negatives include no congestion, coughing or sore throat.   Chills  This is a new problem. The current episode started in the past 7 days. Associated symptoms include arthralgias, chills, fatigue, a fever, headaches and myalgias. Pertinent negatives include no congestion, coughing or sore throat. The treatment provided mild relief.   Fatigue  This is a new problem. The current episode started in the past 7 days. The problem occurs constantly. The problem has been gradually worsening. Associated symptoms include arthralgias, chills, fatigue, a fever, headaches and myalgias. Pertinent negatives include no congestion, coughing or sore throat. She has tried nothing for the symptoms.          I have reviewed patient's medical history, any new submitted information provided by patient or medical assistant and updated medical record.      Objective:      Physical Exam  Constitutional:       General: She is not in acute distress.     Appearance:  Normal appearance. She is obese. She is ill-appearing. She is not toxic-appearing or diaphoretic.   Pulmonary:      Effort: Pulmonary effort is normal.   Neurological:      General: No focal deficit present.      Mental Status: She is alert. Mental status is at baseline.   Psychiatric:         Mood and Affect: Mood normal.         Behavior: Behavior normal.         Thought Content: Thought content normal.         Judgment: Judgment normal.      Result Review  Data Reviewed:{ Labs  Result Review  Imaging  Med Tab  Media :23}                Vital Signs:   There were no vitals taken for this visit.        Requested Prescriptions     Signed Prescriptions Disp Refills    methylPREDNISolone (MEDROL) 4 MG dose pack 21 tablet 0     Sig: Take as directed on package instructions.       Routine medications provided by this office will also be refilled via pharmacy request.       Current Outpatient Medications:     allopurinol (Zyloprim) 100 MG tablet, Take 1 tablet by mouth Daily., Disp: 30 tablet, Rfl: 2    amLODIPine (NORVASC) 10 MG tablet, Take 1 tablet by mouth Every Night., Disp: 30 tablet, Rfl: 1    anastrozole (ARIMIDEX) 1 MG tablet, TAKE 1 TABLET BY MOUTH DAILY, Disp: 90 tablet, Rfl: 3    lisinopril (PRINIVIL,ZESTRIL) 40 MG tablet, Take 1 tablet by mouth Daily., Disp: 90 tablet, Rfl: 3    LORazepam (ATIVAN) 0.5 MG tablet, Take 1 tablet by mouth Every 8 (Eight) Hours As Needed for Anxiety. for anxiety, Disp: 30 tablet, Rfl: 0    methylPREDNISolone (MEDROL) 4 MG dose pack, Take as directed on package instructions., Disp: 21 tablet, Rfl: 0    multivitamin (THERAGRAN) tablet tablet, Take 1 tablet by mouth Daily., Disp: , Rfl:     vitamin D (ERGOCALCIFEROL) 1.25 MG (18233 UT) capsule capsule, Take 1 capsule by mouth 1 (One) Time Per Week., Disp: 5 capsule, Rfl: 2     Assessment and Plan:      Assessment and Plan {CC Problem List  Visit Diagnosis  ROS  Review (Popup)  Health Maintenance  Quality  BestPractice   Medications  SmartSets  SnapShot Encounters  Media :23}     Problem List Items Addressed This Visit    None  Visit Diagnoses       Upper respiratory tract infection, unspecified type    -  Primary    Relevant Medications    methylPREDNISolone (MEDROL) 4 MG dose pack    Other fatigue        Relevant Medications    methylPREDNISolone (MEDROL) 4 MG dose pack    Body aches        Relevant Medications    methylPREDNISolone (MEDROL) 4 MG dose pack    Chills        Relevant Medications    methylPREDNISolone (MEDROL) 4 MG dose pack          Educated patient on dosing and side effects of Medrol Dosepak.  Educated patient to continue taking COVID test if she starts to feel worse.  Educated patient to treat symptoms as they come, use ibuprofen, use Mucinex, rest, increase fluid intake.  Patient verbalized understanding and is comfortable with the plan of care.    Follow Up {Instructions Charge Capture  Follow-up Communications :23}     Return if symptoms worsen or fail to improve.      Patient was given instructions and counseling regarding her condition or for health maintenance advice. Please see specific information pulled into the AVS if appropriate.    Dragon disclaimer:   Much of this encounter note is an electronic transcription/translation of spoken language to printed text. The electronic translation of spoken language may permit erroneous, or at times, nonsensical words or phrases to be inadvertently transcribed; Although I have reviewed the note for such errors, some may still exist.     Additional Patient Counseling:       There are no Patient Instructions on file for this visit.

## 2023-09-21 NOTE — LETTER
September 21, 2023     Patient: Emilie SAUNDERS   YOB: 1970   Date of Visit: 9/21/2023       To Whom It May Concern:    It is my medical opinion that Emilie SAUNDERS may return to work/school in 2 days. Please excuse her absence 9/21/2023 and 9/22/2023.            Sincerely,        VICENTE Valdovinos    CC:   No Recipients

## 2023-10-06 DIAGNOSIS — I10 BENIGN ESSENTIAL HTN: Chronic | ICD-10-CM

## 2023-10-06 RX ORDER — AMLODIPINE BESYLATE 10 MG/1
10 TABLET ORAL NIGHTLY
Qty: 30 TABLET | Refills: 1 | Status: SHIPPED | OUTPATIENT
Start: 2023-10-06

## 2023-10-30 ENCOUNTER — TELEPHONE (OUTPATIENT)
Dept: INTERNAL MEDICINE | Facility: CLINIC | Age: 53
End: 2023-10-30
Payer: COMMERCIAL

## 2023-12-10 DIAGNOSIS — M10.9 ACUTE GOUT OF RIGHT FOOT, UNSPECIFIED CAUSE: ICD-10-CM

## 2023-12-10 DIAGNOSIS — I10 BENIGN ESSENTIAL HTN: Chronic | ICD-10-CM

## 2023-12-11 RX ORDER — LISINOPRIL 40 MG/1
40 TABLET ORAL DAILY
Qty: 90 TABLET | Refills: 3 | Status: SHIPPED | OUTPATIENT
Start: 2023-12-11

## 2023-12-11 RX ORDER — ALLOPURINOL 100 MG/1
100 TABLET ORAL DAILY
Qty: 90 TABLET | Refills: 3 | Status: SHIPPED | OUTPATIENT
Start: 2023-12-11

## 2023-12-13 ENCOUNTER — TELEMEDICINE (OUTPATIENT)
Dept: INTERNAL MEDICINE | Facility: CLINIC | Age: 53
End: 2023-12-13
Payer: COMMERCIAL

## 2023-12-13 DIAGNOSIS — J01.00 ACUTE NON-RECURRENT MAXILLARY SINUSITIS: Primary | ICD-10-CM

## 2023-12-13 DIAGNOSIS — R05.1 ACUTE COUGH: ICD-10-CM

## 2023-12-13 DIAGNOSIS — J06.9 UPPER RESPIRATORY TRACT INFECTION, UNSPECIFIED TYPE: ICD-10-CM

## 2023-12-13 PROCEDURE — 99213 OFFICE O/P EST LOW 20 MIN: CPT

## 2023-12-13 RX ORDER — DEXTROMETHORPHAN HYDROBROMIDE AND PROMETHAZINE HYDROCHLORIDE 15; 6.25 MG/5ML; MG/5ML
5 SYRUP ORAL 4 TIMES DAILY PRN
Qty: 118 ML | Refills: 0 | Status: SHIPPED | OUTPATIENT
Start: 2023-12-13

## 2023-12-13 RX ORDER — AZITHROMYCIN 250 MG/1
TABLET, FILM COATED ORAL
Qty: 6 TABLET | Refills: 0 | Status: SHIPPED | OUTPATIENT
Start: 2023-12-13

## 2023-12-13 RX ORDER — METHYLPREDNISOLONE 4 MG/1
TABLET ORAL
Qty: 21 TABLET | Refills: 0 | Status: SHIPPED | OUTPATIENT
Start: 2023-12-13

## 2023-12-13 NOTE — PROGRESS NOTES
Chief Complaint  URI and Cough     Subjective:      History of Present Illness {CC  Problem List  Visit  Diagnosis   Encounters  Notes  Medications  Labs  Result Review Imaging  Media :23}     Emilie Carrizales presents to [facility] for:    You have chosen to receive care through a telehealth visit.  Do you consent to use a video/audio connection for your medical care today? Yes     The patient was in a secure location on a personal electronic device and I was at the office.    History of Present Illness  Pt states wife had RSV that turned into bronchitis. Pt states she started with symptoms 1 month ago. Pt has taken Dayquil and cough drops. Pt states she now feels sisgnificant chest congestion.   URI   The current episode started 1 to 4 weeks ago. The problem has been gradually worsening. The maximum temperature recorded prior to her arrival was 100.4 - 100.9 F. The fever has been present for Less than 1 day. Associated symptoms include congestion, coughing, sinus pain and a sore throat. Pertinent negatives include no ear pain, headaches or plugged ear sensation. She has tried decongestant for the symptoms.   Cough  This is a new problem. The current episode started 1 to 4 weeks ago. The problem has been gradually worsening. The cough is Productive of sputum. Associated symptoms include chills, a fever, myalgias, nasal congestion, a sore throat and shortness of breath (slight and intermittent per pt.). Pertinent negatives include no ear pain or headaches. The symptoms are aggravated by lying down. She has tried OTC cough suppressant for the symptoms. The treatment provided mild relief.          I have reviewed patient's medical history, any new submitted information provided by patient or medical assistant and updated medical record.      Objective:      Physical Exam  Constitutional:       General: She is not in acute distress.     Appearance: Normal appearance.  She is ill-appearing. She is not toxic-appearing or diaphoretic.   Pulmonary:      Effort: Pulmonary effort is normal.   Neurological:      General: No focal deficit present.      Mental Status: She is alert. Mental status is at baseline.   Psychiatric:         Mood and Affect: Mood normal.         Thought Content: Thought content normal.        Result Review  Data Reviewed:{ Labs  Result Review  Imaging  Med Tab  Media :23}                Vital Signs:   There were no vitals taken for this visit.        Requested Prescriptions     Signed Prescriptions Disp Refills    azithromycin (Zithromax Z-Garth) 250 MG tablet 6 tablet 0     Sig: Take 2 tablets by mouth on day 1, then 1 tablet daily on days 2-5    methylPREDNISolone (MEDROL) 4 MG dose pack 21 tablet 0     Sig: Take as directed on package instructions.    promethazine-dextromethorphan (PROMETHAZINE-DM) 6.25-15 MG/5ML syrup 118 mL 0     Sig: Take 5 mL by mouth 4 (Four) Times a Day As Needed for Cough.       Routine medications provided by this office will also be refilled via pharmacy request.       Current Outpatient Medications:     allopurinol (ZYLOPRIM) 100 MG tablet, TAKE 1 TABLET BY MOUTH DAILY, Disp: 90 tablet, Rfl: 3    amLODIPine (NORVASC) 10 MG tablet, TAKE 1 TABLET BY MOUTH EVERY NIGHT, Disp: 30 tablet, Rfl: 1    anastrozole (ARIMIDEX) 1 MG tablet, TAKE 1 TABLET BY MOUTH DAILY, Disp: 90 tablet, Rfl: 3    azithromycin (Zithromax Z-Garth) 250 MG tablet, Take 2 tablets by mouth on day 1, then 1 tablet daily on days 2-5, Disp: 6 tablet, Rfl: 0    lisinopril (PRINIVIL,ZESTRIL) 40 MG tablet, TAKE 1 TABLET BY MOUTH DAILY, Disp: 90 tablet, Rfl: 3    LORazepam (ATIVAN) 0.5 MG tablet, Take 1 tablet by mouth Every 8 (Eight) Hours As Needed for Anxiety. for anxiety, Disp: 30 tablet, Rfl: 0    methylPREDNISolone (MEDROL) 4 MG dose pack, Take as directed on package instructions., Disp: 21 tablet, Rfl: 0    multivitamin (THERAGRAN) tablet tablet, Take 1 tablet by mouth  Daily., Disp: , Rfl:     promethazine-dextromethorphan (PROMETHAZINE-DM) 6.25-15 MG/5ML syrup, Take 5 mL by mouth 4 (Four) Times a Day As Needed for Cough., Disp: 118 mL, Rfl: 0    vitamin D (ERGOCALCIFEROL) 1.25 MG (04223 UT) capsule capsule, Take 1 capsule by mouth 1 (One) Time Per Week., Disp: 5 capsule, Rfl: 2     Assessment and Plan:      Assessment and Plan {CC Problem List  Visit Diagnosis  ROS  Review (Popup)  Health Maintenance  Quality  BestPractice  Medications  SmartSets  SnapShot Encounters  Media :23}     Problem List Items Addressed This Visit    None  Visit Diagnoses       Acute non-recurrent maxillary sinusitis    -  Primary    Relevant Medications    azithromycin (Zithromax Z-Garth) 250 MG tablet    methylPREDNISolone (MEDROL) 4 MG dose pack    Acute cough        Relevant Medications    methylPREDNISolone (MEDROL) 4 MG dose pack    promethazine-dextromethorphan (PROMETHAZINE-DM) 6.25-15 MG/5ML syrup    Upper respiratory tract infection, unspecified type        Relevant Medications    azithromycin (Zithromax Z-Garth) 250 MG tablet    methylPREDNISolone (MEDROL) 4 MG dose pack          Educated patient on dosing and side effects of Z-Garth, Medrol Dosepak, Promethazine DM.  Educated patient she can continue taking Mucinex if needed.  Educated patient that if this continues I would need for her to come in for further management and possible imaging.  Patient verbalized understanding is comfortable with the plan of care.  Follow Up {Instructions Charge Capture  Follow-up Communications :23}     No follow-ups on file.      Patient was given instructions and counseling regarding her condition or for health maintenance advice. Please see specific information pulled into the AVS if appropriate.    Dragon disclaimer:   Much of this encounter note is an electronic transcription/translation of spoken language to printed text. The electronic translation of spoken language may permit erroneous, or at  times, nonsensical words or phrases to be inadvertently transcribed; Although I have reviewed the note for such errors, some may still exist.     Additional Patient Counseling:       There are no Patient Instructions on file for this visit.

## 2023-12-13 NOTE — OP NOTE
RF for inhaler denied. Unexpected request.   Surgeon:     Pedrito Fulton Jr., M.D.    Preoperative Diagnosis:     Need for screening colonoscopy    Postoperative Diagnosis:     Diverticulosis    Procedure Performed:     Colonoscopy    Indications:     The patient is a pleasant 49-year-old female who presents for screening colonoscopy.  The patient understands the indications, alternatives, risks, and benefits of the procedure and wishes to proceed.    Procedure:     The patient was identified, taken to the endoscopy suite, and place in the left side down decubitus procedure.  The patient underwent a MAC anesthesia and was appropriately monitored through the case by the anesthesia personnel.  A rectal exam was performed that was normal.  The colonoscope was introduced into the rectum and advanced very carefully to the cecum that was identified by the cecal strap, ileocecal valve, and the appendiceal orifice.  The scope was then slowly withdrawn with careful circumferential examination of the mucosa performed.  The bowel prep was good allowing adequate visualization of mucosal surfaces.  The scope was withdrawn.  The patient tolerated the procedure well and was transferred the recovery area in stable condition.    Findings:    There was diverticulosis involving most of the colon but especially the sigmoid colon.  No other abnormalities present.    Recommendations:     1.  High-fiber diet.  2.  Repeat colonoscopy in 10 years unless symptoms develop.    Pedrito Fulton Jr., M.D.

## 2023-12-18 DIAGNOSIS — I10 BENIGN ESSENTIAL HTN: Chronic | ICD-10-CM

## 2023-12-18 RX ORDER — AMLODIPINE BESYLATE 10 MG/1
10 TABLET ORAL NIGHTLY
Qty: 90 TABLET | Refills: 0 | Status: SHIPPED | OUTPATIENT
Start: 2023-12-18

## 2024-01-09 ENCOUNTER — OFFICE VISIT (OUTPATIENT)
Dept: INTERNAL MEDICINE | Facility: CLINIC | Age: 54
End: 2024-01-09
Payer: COMMERCIAL

## 2024-01-09 VITALS
BODY MASS INDEX: 37.05 KG/M2 | TEMPERATURE: 98.4 F | HEART RATE: 76 BPM | SYSTOLIC BLOOD PRESSURE: 138 MMHG | OXYGEN SATURATION: 100 % | DIASTOLIC BLOOD PRESSURE: 84 MMHG | WEIGHT: 217 LBS | HEIGHT: 64 IN

## 2024-01-09 DIAGNOSIS — R68.89 FLU-LIKE SYMPTOMS: ICD-10-CM

## 2024-01-09 DIAGNOSIS — J06.9 VIRAL URI: Primary | ICD-10-CM

## 2024-01-09 LAB
EXPIRATION DATE: NORMAL
FLUAV AG UPPER RESP QL IA.RAPID: NOT DETECTED
FLUBV AG UPPER RESP QL IA.RAPID: NOT DETECTED
INTERNAL CONTROL: NORMAL
Lab: NORMAL
SARS-COV-2 AG UPPER RESP QL IA.RAPID: NOT DETECTED

## 2024-01-09 PROCEDURE — 87428 SARSCOV & INF VIR A&B AG IA: CPT | Performed by: NURSE PRACTITIONER

## 2024-01-09 PROCEDURE — 99213 OFFICE O/P EST LOW 20 MIN: CPT | Performed by: NURSE PRACTITIONER

## 2024-01-09 RX ORDER — PREDNISONE 10 MG/1
TABLET ORAL
Qty: 36 TABLET | Refills: 0 | Status: SHIPPED | OUTPATIENT
Start: 2024-01-09 | End: 2024-01-25

## 2024-01-09 NOTE — PROGRESS NOTES
"Chief Complaint  URI (SX STARTED LAST WEEK), Fever, and Sore Throat  Subjective        Emilie Carrizales presents to [facility] due to respiratory symptoms.    History of Present Illness    She has been sick since Saturday. She has been taking Dayquil every 4 hours. She went to [place] for Key and they were hiking, her wife became sick.  She c/o an elevated temperature on Saturday, woke up in a full sweat. Her highest temperature was 101.8. She has taken Dayquil and Nyquil for congestion and drainage. She c/o diffuse body aches. She notes mild chest tightness with an intermittent cough, denies dyspnea. She c/o mild GI upset without abdominal pain.     Objective   Vital Signs:  /84 (BP Location: Left arm, Patient Position: Sitting, Cuff Size: Large Adult)   Pulse 76   Temp 98.4 °F (36.9 °C) (Oral)   Ht 162.6 cm (64\")   Wt 98.4 kg (217 lb)   SpO2 100%   BMI 37.25 kg/m²   Estimated body mass index is 37.25 kg/m² as calculated from the following:    Height as of this encounter: 162.6 cm (64\").    Weight as of this encounter: 98.4 kg (217 lb).            Physical Exam  Vitals (Heart rate is 76.) reviewed.   HENT:      Head: Normocephalic.      Right Ear: External ear normal. Tympanic membrane is bulging.      Left Ear: External ear normal. Tympanic membrane is bulging.      Nose: Nose normal.      Mouth/Throat:      Pharynx: Posterior oropharyngeal erythema present.   Eyes:      General:         Right eye: No discharge.         Left eye: No discharge.      Conjunctiva/sclera: Conjunctivae normal.   Cardiovascular:      Pulses: Normal pulses.      Heart sounds: Normal heart sounds. No murmur heard.  Pulmonary:      Effort: No respiratory distress.      Breath sounds: Normal breath sounds. No wheezing, rhonchi or rales.   Musculoskeletal:      Cervical back: Normal range of motion.   Lymphadenopathy:      Cervical: No cervical adenopathy.   Skin:     General: Skin is warm " and dry.   Neurological:      Mental Status: She is alert.        Result Review :                    Assessment and Plan   Diagnoses and all orders for this visit:    1. Viral URI (Primary)  -     predniSONE (DELTASONE) 10 MG tablet; Take 1 tablet by mouth four times daily for 3 days then 1 tablet three times daily for 3 days then 1 tab twice daily for 5 days then 1 tab once daily for 5 days  Dispense: 36 tablet; Refill: 0    2. Flu-like symptoms  -     POCT SARS-CoV-2 + Flu Antigen SANDER    Swab performed in the office is negative for COVID-19 and Influenza A&B. Sx appear more viral, will treat acute bronchospasm with prednisone and continue to monitor. She will reschedule her physical exam due to worsening symptoms.         Follow Up   Return for Annual physical.  Patient was given instructions and counseling regarding her condition or for health maintenance advice. Please see specific information pulled into the AVS if appropriate.     Transcribed from ambient dictation for VICENTE Ruth by Kim Reece.  01/09/24   10:27 EST    Patient or patient representative verbalized consent to the visit recording.  I have personally performed the services described in this document as transcribed by the above individual, and it is both accurate and complete.

## 2024-01-18 ENCOUNTER — OFFICE VISIT (OUTPATIENT)
Dept: INTERNAL MEDICINE | Facility: CLINIC | Age: 54
End: 2024-01-18
Payer: COMMERCIAL

## 2024-01-18 VITALS
HEART RATE: 77 BPM | WEIGHT: 221.4 LBS | HEIGHT: 64 IN | SYSTOLIC BLOOD PRESSURE: 152 MMHG | OXYGEN SATURATION: 99 % | DIASTOLIC BLOOD PRESSURE: 94 MMHG | BODY MASS INDEX: 37.8 KG/M2

## 2024-01-18 DIAGNOSIS — I10 BENIGN ESSENTIAL HTN: Chronic | ICD-10-CM

## 2024-01-18 DIAGNOSIS — E78.00 PURE HYPERCHOLESTEROLEMIA: Chronic | ICD-10-CM

## 2024-01-18 DIAGNOSIS — E55.9 VITAMIN D DEFICIENCY: Chronic | ICD-10-CM

## 2024-01-18 DIAGNOSIS — R73.09 ELEVATED GLUCOSE: ICD-10-CM

## 2024-01-18 DIAGNOSIS — Z00.00 PE (PHYSICAL EXAM), ANNUAL: Primary | ICD-10-CM

## 2024-01-18 PROBLEM — U07.1 COVID-19 VIRUS INFECTION: Status: RESOLVED | Noted: 2022-08-08 | Resolved: 2024-01-18

## 2024-01-18 PROBLEM — R92.8 ABNORMAL FINDING ON BREAST IMAGING: Status: RESOLVED | Noted: 2022-12-06 | Resolved: 2024-01-18

## 2024-01-18 PROBLEM — Z91.89 INCREASED RISK OF BREAST CANCER: Status: RESOLVED | Noted: 2021-03-16 | Resolved: 2024-01-18

## 2024-01-18 LAB
25(OH)D3+25(OH)D2 SERPL-MCNC: 21.1 NG/ML (ref 30–100)
ALBUMIN SERPL-MCNC: 4.4 G/DL (ref 3.5–5.2)
ALBUMIN/GLOB SERPL: 2.2 G/DL
ALP SERPL-CCNC: 87 U/L (ref 39–117)
ALT SERPL-CCNC: 20 U/L (ref 1–33)
APPEARANCE UR: CLEAR
AST SERPL-CCNC: 9 U/L (ref 1–32)
BILIRUB SERPL-MCNC: 0.7 MG/DL (ref 0–1.2)
BILIRUB UR QL STRIP: NEGATIVE
BUN SERPL-MCNC: 16 MG/DL (ref 6–20)
BUN/CREAT SERPL: 25 (ref 7–25)
CALCIUM SERPL-MCNC: 9.7 MG/DL (ref 8.6–10.5)
CHLORIDE SERPL-SCNC: 100 MMOL/L (ref 98–107)
CHOLEST SERPL-MCNC: 204 MG/DL (ref 0–200)
CO2 SERPL-SCNC: 26.6 MMOL/L (ref 22–29)
COLOR UR: YELLOW
CREAT SERPL-MCNC: 0.64 MG/DL (ref 0.57–1)
EGFRCR SERPLBLD CKD-EPI 2021: 105.8 ML/MIN/1.73
GLOBULIN SER CALC-MCNC: 2 GM/DL
GLUCOSE SERPL-MCNC: 109 MG/DL (ref 65–99)
GLUCOSE UR QL STRIP: NEGATIVE
HBA1C MFR BLD: 5.3 % (ref 4.8–5.6)
HDLC SERPL-MCNC: 86 MG/DL (ref 40–60)
HGB UR QL STRIP: NEGATIVE
KETONES UR QL STRIP: NEGATIVE
LDLC SERPL CALC-MCNC: 98 MG/DL (ref 0–100)
LEUKOCYTE ESTERASE UR QL STRIP: NEGATIVE
NITRITE UR QL STRIP: NEGATIVE
PH UR STRIP: 7 [PH] (ref 5–8)
POTASSIUM SERPL-SCNC: 4.2 MMOL/L (ref 3.5–5.2)
PROT SERPL-MCNC: 6.4 G/DL (ref 6–8.5)
PROT UR QL STRIP: ABNORMAL
SODIUM SERPL-SCNC: 139 MMOL/L (ref 136–145)
SP GR UR STRIP: 1.01 (ref 1–1.03)
TRIGL SERPL-MCNC: 114 MG/DL (ref 0–150)
TSH SERPL DL<=0.005 MIU/L-ACNC: 0.74 UIU/ML (ref 0.27–4.2)
UROBILINOGEN UR STRIP-MCNC: ABNORMAL MG/DL
VLDLC SERPL CALC-MCNC: 20 MG/DL (ref 5–40)

## 2024-01-18 PROCEDURE — 99396 PREV VISIT EST AGE 40-64: CPT | Performed by: NURSE PRACTITIONER

## 2024-01-18 NOTE — ASSESSMENT & PLAN NOTE
BP is elevated in the office despite taking amlodipine and lisinopril (also on prednisone), monitor BP at home and call if readings are >140/90.

## 2024-01-18 NOTE — PROGRESS NOTES
Subjective   Emilie Carrizales is a 53 y.o. female who is here for a physical exam.    History of Present Illness     The patient is a 53-year-old female who presents today for a physical exam. Upper respiratory infection.    She is feeling better with her respiratory symptoms since her last visit 01/09/2024. She has lingering drainage and congestion. She is still taking the prednisone. She has normal sinus headaches. Her neck hurt when she was unwell.    History of breast cancer  She has not consulted with Dr. Trevizo since 02/27/2023, will schedule an appointment..    Depressed mood  She notes worsening mood due to the weather and does not know how to deal with living from [facility] to [facility].  She has a strong sense of everything is her fault. She has a responsibility to keep her family warm, has had issues with a broken heater this week    Health maintenance  She is up to date on her mammogram and colonoscopy. She has a MRI of the breast scheduled with a subsequent f/u with the breast clinic. She is sleeping well. She denies any breast tenderness.     She notes improvement in joint pain with recent weight loss. She does note a recent plateau in her weight loss but continues to follow a low carb diet. She has been consuming more fruits and feels that it may be an issue with her weight.     The following portions of the patient's history were reviewed and updated as appropriate: allergies, current medications, past social history and problem list.    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    Depression 01/01/1988    Diabetes mellitus     pre diabetic    Family history of breast cancer 05/14/2019    GERD (gastroesophageal reflux disease)     Headache     History of 2019 novel coronavirus disease (COVID-19)     2/2022 AND 8/9/22 (TESTED AT [facility], RESULTS IN EPIC)    History of kidney stones     Hyperlipidemia      Hypertension     Irritable bowel syndrome     Lumbago     Mood disorder     Obesity life    PONV (postoperative nausea and vomiting)     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    Sleep apnea     C-PAP IN USE    Vitamin D deficiency          Current Outpatient Medications:     allopurinol (ZYLOPRIM) 100 MG tablet, TAKE 1 TABLET BY MOUTH DAILY, Disp: 90 tablet, Rfl: 3    amLODIPine (NORVASC) 10 MG tablet, TAKE 1 TABLET BY MOUTH EVERY NIGHT, Disp: 90 tablet, Rfl: 0    anastrozole (ARIMIDEX) 1 MG tablet, TAKE 1 TABLET BY MOUTH DAILY, Disp: 90 tablet, Rfl: 3    lisinopril (PRINIVIL,ZESTRIL) 40 MG tablet, TAKE 1 TABLET BY MOUTH DAILY, Disp: 90 tablet, Rfl: 3    LORazepam (ATIVAN) 0.5 MG tablet, Take 1 tablet by mouth Every 8 (Eight) Hours As Needed for Anxiety. for anxiety, Disp: 30 tablet, Rfl: 0    multivitamin (THERAGRAN) tablet tablet, Take 1 tablet by mouth Daily., Disp: , Rfl:     predniSONE (DELTASONE) 10 MG tablet, Take 1 tablet by mouth four times daily for 3 days then 1 tablet three times daily for 3 days then 1 tab twice daily for 5 days then 1 tab once daily for 5 days, Disp: 36 tablet, Rfl: 0    Allergies   Allergen Reactions    Citalopram Rash    Flexeril [Cyclobenzaprine] Unknown - High Severity     MUSCLES TENSE-OPPOSITE OF WHAT IT IS INTENDED TO DO-ELEVATES B/P    Doxycycline Rash     Blisters on hands    Keflex [Cephalexin] Rash    Sulfa Antibiotics Rash     NAUSEA/VOMITING/FEVER    Vilazodone Hcl Hives, Itching and Rash       Review of Systems   Constitutional:  Negative for activity change, appetite change, chills, diaphoresis, fatigue, fever and unexpected weight change.   HENT:  Positive for congestion, postnasal drip and rhinorrhea. Negative for dental problem, drooling, ear discharge, ear pain, facial swelling, hearing loss, mouth sores, nosebleeds, sinus pressure, sore throat, tinnitus and trouble swallowing.    Eyes:  Negative for photophobia, pain, discharge, redness, itching and  "visual disturbance.   Respiratory:  Negative for apnea, cough, choking, chest tightness, shortness of breath and wheezing.    Cardiovascular:  Negative for chest pain, palpitations and leg swelling.        No orthopnea, PND, MACDONALD   Gastrointestinal:  Negative for abdominal pain, blood in stool, constipation, diarrhea, nausea and vomiting.   Endocrine: Negative for cold intolerance, heat intolerance, polydipsia and polyuria.   Genitourinary:  Negative for decreased urine volume, dysuria, enuresis, flank pain, frequency, hematuria and urgency.        Vaginal itching.   Musculoskeletal:  Negative for arthralgias, back pain, gait problem, joint swelling, myalgias, neck pain and neck stiffness.   Skin:  Positive for color change. Negative for rash.        No hair changes, no nail changes   Allergic/Immunologic: Negative for environmental allergies, food allergies and immunocompromised state.   Neurological:  Negative for dizziness, tremors, seizures, syncope, speech difficulty, weakness, light-headedness, numbness and headaches.   Hematological:  Negative for adenopathy. Does not bruise/bleed easily.   Psychiatric/Behavioral:  Positive for dysphoric mood. Negative for agitation, confusion, decreased concentration, sleep disturbance and suicidal ideas. The patient is not nervous/anxious.         Depressed mood.       Objective   Vitals:    01/18/24 0732   BP: 152/94   BP Location: Left arm   Patient Position: Sitting   Cuff Size: Large Adult   Pulse: 77   SpO2: 99%   Weight: 100 kg (221 lb 6.4 oz)   Height: 162.6 cm (64\")     Physical Exam  Vitals reviewed.   Constitutional:       Appearance: Normal appearance. She is well-developed.   HENT:      Head: Normocephalic and atraumatic.      Right Ear: Hearing, tympanic membrane, ear canal and external ear normal.      Left Ear: Hearing, tympanic membrane, ear canal and external ear normal.      Nose: Nose normal.      Mouth/Throat:      Lips: Pink.      Mouth: Mucous membranes " are moist.      Pharynx: Oropharynx is clear. Uvula midline.   Eyes:      General: Lids are normal.      Extraocular Movements: Extraocular movements intact.      Conjunctiva/sclera: Conjunctivae normal.      Pupils: Pupils are equal, round, and reactive to light.   Neck:      Thyroid: No thyromegaly.      Trachea: Trachea normal.   Cardiovascular:      Rate and Rhythm: Normal rate and regular rhythm.      Pulses:           Carotid pulses are 2+ on the right side and 2+ on the left side.     Heart sounds: Normal heart sounds.   Pulmonary:      Effort: Pulmonary effort is normal. No respiratory distress.      Breath sounds: Normal breath sounds.   Abdominal:      General: Bowel sounds are normal.      Palpations: Abdomen is soft.      Tenderness: There is no abdominal tenderness.   Musculoskeletal:      Comments: Normal range of motion of all major joints   Skin:     General: Skin is warm and dry.      Capillary Refill: Capillary refill takes 2 to 3 seconds.      Comments: Red papule on right shoulder   Neurological:      Mental Status: She is alert and oriented to person, place, and time.      GCS: GCS eye subscore is 4. GCS verbal subscore is 5. GCS motor subscore is 6.   Psychiatric:         Attention and Perception: Attention normal.         Mood and Affect: Mood normal.         Speech: Speech normal.         Behavior: Behavior normal. Behavior is cooperative.         Thought Content: Thought content normal.         Assessment & Plan   Diagnoses and all orders for this visit:    1. PE (physical exam), annual (Primary)  -     Comprehensive Metabolic Panel  -     Lipid Panel  -     TSH  -     Urinalysis With Microscopic If Indicated (No Culture) - Urine, Clean Catch    2. Benign essential HTN  Assessment & Plan:  BP is elevated in the office despite taking amlodipine and lisinopril (also on prednisone), monitor BP at home and call if readings are >140/90.      3. Vitamin D deficiency  Assessment & Plan:  She is no  longer taking her Vitamin D supplement (elevated calcium), recheck level.    Orders:  -     Vitamin D,25-Hydroxy    4. Pure hypercholesterolemia  Assessment & Plan:  She is not currently on any meds for cholesterol control, recheck lipid panel.      5. Elevated glucose  Assessment & Plan:  Discussed a low-carb, low-sugar diet; recheck labs.    Orders:  -     Hemoglobin A1c    Risk assessment:  She has a family hx (mother) of kidney cancer.  Her Body mass index is 38 kg/m². - She has lost weight since undergoing a gastric sleeve 9/2022; discussed lifestyle modifications for continued weight loss.    Prevention:  Health Maintenance   Topic Date Due    Hepatitis B (1 of 3 - 3-dose series) Never done    ZOSTER VACCINE (1 of 2) Never done    Pneumococcal Vaccine 0-64 (2 of 2 - PCV) 10/01/2020    LIPID PANEL  12/12/2023    MAMMOGRAM  06/12/2024    BMI FOLLOWUP  08/08/2024    ANNUAL PHYSICAL  09/05/2024    COLORECTAL CANCER SCREENING  12/05/2029    TDAP/TD VACCINES (2 - Td or Tdap) 03/27/2030    HEPATITIS C SCREENING  Completed    COVID-19 Vaccine  Completed    INFLUENZA VACCINE  Completed    DIABETIC FOOT EXAM  Discontinued    PAP SMEAR  Discontinued    HEMOGLOBIN A1C  Discontinued    DIABETIC EYE EXAM  Discontinued    URINE MICROALBUMIN  Discontinued   We discussed the shingles vaccine which she will receive at the pharmacy.    Discussed healthy lifestyle choices such as maintaining a balanced diet low in carbohydrates and limiting caffeine and alcohol intake.  Recommended routine exercise for bone strength and cardiovascular health.           Transcribed from ambient dictation for VICENTE Ruth by Christina Linn.  01/18/24   10:17 EST    Patient or patient representative verbalized consent to the visit recording.  I have personally performed the services described in this document as transcribed by the above individual, and it is both accurate and complete.

## 2024-01-18 NOTE — PROGRESS NOTES
Subjective   Emilie Carrizales is a 53 y.o. female who is here for a physical exam.    History of Present Illness     The following portions of the patient's history were reviewed and updated as appropriate: allergies, current medications, past social history and problem list.    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   • Depression 01/01/1988   • Diabetes mellitus     pre diabetic   • Family history of breast cancer 05/14/2019   • GERD (gastroesophageal reflux disease)    • Headache    • History of 2019 novel coronavirus disease (COVID-19)     2/2022 AND 8/9/22 (TESTED AT [facility], RESULTS IN EPIC)   • History of kidney stones    • Hyperlipidemia    • Hypertension    • Irritable bowel syndrome    • Lumbago    • Mood disorder    • Obesity life   • PONV (postoperative nausea and vomiting)    • 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   • Sleep apnea     C-PAP IN USE   • Vitamin D deficiency          Current Outpatient Medications:   •  allopurinol (ZYLOPRIM) 100 MG tablet, TAKE 1 TABLET BY MOUTH DAILY, Disp: 90 tablet, Rfl: 3  •  amLODIPine (NORVASC) 10 MG tablet, TAKE 1 TABLET BY MOUTH EVERY NIGHT, Disp: 90 tablet, Rfl: 0  •  anastrozole (ARIMIDEX) 1 MG tablet, TAKE 1 TABLET BY MOUTH DAILY, Disp: 90 tablet, Rfl: 3  •  lisinopril (PRINIVIL,ZESTRIL) 40 MG tablet, TAKE 1 TABLET BY MOUTH DAILY, Disp: 90 tablet, Rfl: 3  •  LORazepam (ATIVAN) 0.5 MG tablet, Take 1 tablet by mouth Every 8 (Eight) Hours As Needed for Anxiety. for anxiety, Disp: 30 tablet, Rfl: 0  •  multivitamin (THERAGRAN) tablet tablet, Take 1 tablet by mouth Daily., Disp: , Rfl:   •  predniSONE (DELTASONE) 10 MG tablet, Take 1 tablet by mouth four times daily for 3 days then 1 tablet three times daily for 3 days then 1 tab twice daily for 5 days then 1 tab once daily for 5 days, Disp: 36 tablet, Rfl: 0    Allergies   Allergen Reactions   •  "Citalopram Rash   • Flexeril [Cyclobenzaprine] Unknown - High Severity     MUSCLES TENSE-OPPOSITE OF WHAT IT IS INTENDED TO DO-ELEVATES B/P   • Doxycycline Rash     Blisters on hands   • Keflex [Cephalexin] Rash   • Sulfa Antibiotics Rash     NAUSEA/VOMITING/FEVER   • Vilazodone Hcl Hives, Itching and Rash       Review of Systems    Objective   Vitals:    01/18/24 0732   BP: 152/94   BP Location: Left arm   Patient Position: Sitting   Cuff Size: Large Adult   Pulse: 77   SpO2: 99%   Weight: 100 kg (221 lb 6.4 oz)   Height: 162.6 cm (64\")     Physical Exam    Assessment & Plan   There are no diagnoses linked to this encounter.    Risk assessment:  She has a family hx ***  Her Body mass index is 38 kg/m². - She  ***    Prevention:  Health Maintenance   Topic Date Due   • Hepatitis B (1 of 3 - 3-dose series) Never done   • ZOSTER VACCINE (1 of 2) Never done   • Pneumococcal Vaccine 0-64 (2 of 2 - PCV) 10/01/2020   • LIPID PANEL  12/12/2023   • MAMMOGRAM  06/12/2024   • BMI FOLLOWUP  08/08/2024   • ANNUAL PHYSICAL  09/05/2024   • COLORECTAL CANCER SCREENING  12/05/2029   • TDAP/TD VACCINES (2 - Td or Tdap) 03/27/2030   • HEPATITIS C SCREENING  Completed   • COVID-19 Vaccine  Completed   • INFLUENZA VACCINE  Completed   • DIABETIC FOOT EXAM  Discontinued   • PAP SMEAR  Discontinued   • HEMOGLOBIN A1C  Discontinued   • DIABETIC EYE EXAM  Discontinued   • URINE MICROALBUMIN  Discontinued       Discussed healthy lifestyle choices such as maintaining a balanced diet low in carbohydrates and limiting caffeine and alcohol intake.  Recommended routine exercise for bone strength and cardiovascular health.               "

## 2024-02-03 ENCOUNTER — HOSPITAL ENCOUNTER (OUTPATIENT)
Dept: MRI IMAGING | Facility: HOSPITAL | Age: 54
Discharge: HOME | End: 2024-02-03
Admitting: NURSE PRACTITIONER
Payer: COMMERCIAL

## 2024-02-03 DIAGNOSIS — Z17.0 MALIGNANT NEOPLASM OF CENTRAL PORTION OF RIGHT BREAST IN FEMALE, ESTROGEN RECEPTOR POSITIVE: Chronic | ICD-10-CM

## 2024-02-03 DIAGNOSIS — E66.9 OBESITY, CLASS II, BMI 35-39.9: ICD-10-CM

## 2024-02-03 DIAGNOSIS — Z15.01 MONOALLELIC MUTATION OF CHEK2 GENE IN FEMALE PATIENT: Chronic | ICD-10-CM

## 2024-02-03 DIAGNOSIS — Z15.02 MONOALLELIC MUTATION OF CHEK2 GENE IN FEMALE PATIENT: Chronic | ICD-10-CM

## 2024-02-03 DIAGNOSIS — Z15.09 MONOALLELIC MUTATION OF CHEK2 GENE IN FEMALE PATIENT: Chronic | ICD-10-CM

## 2024-02-03 DIAGNOSIS — C50.111 MALIGNANT NEOPLASM OF CENTRAL PORTION OF RIGHT BREAST IN FEMALE, ESTROGEN RECEPTOR POSITIVE: Chronic | ICD-10-CM

## 2024-02-03 DIAGNOSIS — Z15.89 MONOALLELIC MUTATION OF CHEK2 GENE IN FEMALE PATIENT: Chronic | ICD-10-CM

## 2024-02-03 PROCEDURE — A9577 INJ MULTIHANCE: HCPCS | Performed by: NURSE PRACTITIONER

## 2024-02-03 PROCEDURE — 0 GADOBENATE DIMEGLUMINE 529 MG/ML SOLUTION: Performed by: NURSE PRACTITIONER

## 2024-02-03 PROCEDURE — 77049 MRI BREAST C-+ W/CAD BI: CPT

## 2024-02-03 RX ADMIN — GADOBENATE DIMEGLUMINE 20 ML: 529 INJECTION, SOLUTION INTRAVENOUS at 11:08

## 2024-02-06 ENCOUNTER — OFFICE VISIT (OUTPATIENT)
Dept: SURGERY | Facility: CLINIC | Age: 54
End: 2024-02-06
Payer: COMMERCIAL

## 2024-02-06 VITALS
BODY MASS INDEX: 37.73 KG/M2 | DIASTOLIC BLOOD PRESSURE: 80 MMHG | WEIGHT: 221 LBS | HEIGHT: 64 IN | SYSTOLIC BLOOD PRESSURE: 140 MMHG

## 2024-02-06 DIAGNOSIS — Z17.0 MALIGNANT NEOPLASM OF CENTRAL PORTION OF RIGHT BREAST IN FEMALE, ESTROGEN RECEPTOR POSITIVE: Primary | Chronic | ICD-10-CM

## 2024-02-06 DIAGNOSIS — Z15.09 MONOALLELIC MUTATION OF CHEK2 GENE IN FEMALE PATIENT: Chronic | ICD-10-CM

## 2024-02-06 DIAGNOSIS — E66.9 OBESITY, CLASS I, BMI 30-34.9: ICD-10-CM

## 2024-02-06 DIAGNOSIS — Z15.01 MONOALLELIC MUTATION OF CHEK2 GENE IN FEMALE PATIENT: Chronic | ICD-10-CM

## 2024-02-06 DIAGNOSIS — Z15.89 MONOALLELIC MUTATION OF CHEK2 GENE IN FEMALE PATIENT: Chronic | ICD-10-CM

## 2024-02-06 DIAGNOSIS — C50.111 MALIGNANT NEOPLASM OF CENTRAL PORTION OF RIGHT BREAST IN FEMALE, ESTROGEN RECEPTOR POSITIVE: Primary | Chronic | ICD-10-CM

## 2024-02-06 DIAGNOSIS — Z15.02 MONOALLELIC MUTATION OF CHEK2 GENE IN FEMALE PATIENT: Chronic | ICD-10-CM

## 2024-02-06 NOTE — LETTER
February 6, 2024     Pinky Hyatt, VICENTE  [address]  [address]  [facility] [address]    Patient: Emilie Carrizales   YOB: 1970   Date of Visit: 2/6/2024     Dear VICENTE Valencia:       Thank you for referring Emilie Carrizales to me for evaluation. Below are the relevant portions of my assessment and plan of care.    If you have questions, please do not hesitate to call me. I look forward to following Emilie along with you.         Sincerely,        VICENTE Smith        CC: [facility]    Shaye Dowling APRN  02/06/24 1235  Sign when Signing Visit  [facility]     Referring Provider: No ref. provider found     Chief complaint:  Routine followup breast cancer     HPI:   5/14/19: seen by Dr Greene  Ms. Emilie Soto is a 47 yo woman, seen at the request of Dr. Mel Cline, for a new diagnosis of right breast cancer. This was initially detected as an imaging abnormality. Her work-up is detailed in the oncologic history below. She denies any breast lumps, pain, skin changes, or nipple discharge. She has a past history of a benign left breast stereotactic biopsy in 2014. 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 She has a family history of breast cancer in a paternal aunt and paternal cousin (unknown ages at diagnosis). She denies any family history of ovarian cancer.      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 She denies any new complaints.      6/7/19: seen by Dr Greene  She underwent right central partial mastectomy & SLNB on 5/28/19. See surgery & pathology details below in oncologic history. She is complaining of soreness at her axillary incision, however says that her breast feels fine. She also is complaining of generalized anxiety, which has been worse since her diagnosis.      9/13/19: seen by Dr Greene  She returns  today for scheduled follow-up. I have reviewed the interval records since her last visit. She completed radiation on 8/14/19. She had issues with fatigue during treatment, however this is been slowly improving. She started tamoxifen about a month ago and has been tolerating this well. She has been seen by the genetics clinic for her CHEK2 mutation. She is being followed by PT/LE clinic. On 8/14/19 her L-Dex bioimpedance score was slightly elevated, however this was reevaluated today and back down to baseline. She is working with PT on increasing her right upper extremity strength, because she has not been using her right arm very much since surgery. She denies any new complaints.     3/9/20:seen by Dr Greene  She returns today for scheduled follow-up. She remains on tamoxifen and is still tolerating this well. She last saw PT on 11/11/19 and her bioimpedance score was within normal limits. She underwent MMG prior to her appointment which was benign (see imaging report details below). She is complaining of some occasional pain near her right axillary scar.     11/20/20:Follow up with Dr Greene  She underwent a breast MRI in 9/2020 which was benign (see report details below). Her follow-up with me was initially delayed because she underwent TRH/BSO with Dr. Neumann on 9/4/20 due to ovarian cysts and her history of a CHEK2 mutation. Final pathology showed atypical endometrial hyperplasia. Immediately after the hysterectomy she began having worsening hot flashes. She held her tamoxifen earlier this month and plans on starting Arimidex in a couple of weeks. She has not seen PT again since her last visit with me, however says she does not feel like she is having any current issues. She denies any right arm swelling. She denies any new breast related complaints.    3/16/2021:  She returns today for follow up with no breast complaints or health changes.  Screening with tomosynthesis was completed at [facility] on 3/8/2021:  Alyson  2    6/21/22:   She returns today for follow up with no breast concerns, she had covid in 1/22 which delayed follow up.  She is planning gastric sleeve in near future now that she is feeling better.    She is contemplating gastric sleeve placement, currently seen in Bariatric clinic.    Her last clinic visit with Dr Trevizo was in 7/21:  Patient switched to aromatase inhibitor from tamoxifen.  November 2, 2020: Started Arimidex    Breast MRI in 10/21 was BiRads 2.  (see full report below)    Screening mammogram with tomosynthesis on 6/10/22 was stable, BiRads 2.  (see full report below)    7/11/23:  She returns today for follow up with no breast concerns, loss of volume due to weight loss.  In 9/2022 she had gastric sleeve placement and has lost 104 pounds, she feels so much better and is now exercising.    She was last seen by Dr Trevizo in 2/23:  She is currently taking anastrozole. She had bariatric surgery in 10/2022. She has lost 80 pounds of weight since then. She is otherwise doing well without any complaints. She does not have hot flashes, and she does not have any joint pains. She does have CHEK2 mutation.     12/1/2022: Breast MRI at [facility] was stable, BiRAds 3, multiple enlarged LN after recent covid vaccine.  (see full report below)    3/15/23: left axillary US was stable, BiRAds 2.  (see full report below)    6/12/2023: bilateral screening mammogram with tomosynthesis was stable, BiRAds 2. (see full report below)    2/6/2024, Interval History:  She returns today for follow up with no breast changes. She has lost 102 pounds since her gastric sleeve surgery.  She continues on arimidex    Her last clinic visit with Dr Trevizo was in 2/2023.    Breast MRI on 2/3/24 was stable, biRads 2.  (see full report below)        Oncology/Hematology History Overview Note   1/7/14, Screening MMG ([facility]):   There is a stable small focal asymmetry seen in the left breast at 12:00-1:00. In the right breast, no masses,  significant calcifications or other abnormalities are seen.   BI-RADS 0: Incomplete.    2/4/14, Left Diagnostic MMG & Left US ([facility]):   MMG:  On the present examination, there is an isodense focal asymmetry measuring 15 mm with indistinct margins in the left breast at 12:00-1:00 located 10 cm from the nipple. Focal asymmetric density has become more defined.   US:  There is no evidence of any solid mass or abnormal cystic elements. Stereotactic biopsy is recommended.   BI-RADS 4A: Suspicious.    2/11/14, Left Breast, 12-1:00, Stereotactic Biopsy ([facility]):   Fibroadenomatoid type stromal changes, mild columnar cell hyperplasia, mild fibrocystic changes. Negative for atypia or malignancy.   -Concordant.  Recommend 6 month follow-up mammogram and ultrasound.     Malignant neoplasm of central portion of right breast in female, estrogen receptor positive   4/17/2019 Initial Diagnosis    Malignant neoplasm of central portion of right breast in female, estrogen receptor positive (CMS/HCC)     4/18/2019 Imaging    Screening MMG ([facility]):   There are scattered areas of fibroglandular density. There is a small, oval mass measuring 10 mm with indistinct margins seen in the sub-areolar region of the right breast. Note is made of a biopsy clip in the left breast as evidence of a previous surgical procedure. In the left breast, no suspicious masses, significant calcifications or other abnormalities are seen.   BI-RADS 0: Incomplete.       4/29/2019 Imaging    Right Diagnostic MMG with [facility]rence & Right US ([facility]):   MMG:  On the present examination, there is a new high density, round mass measuring 10 mm with indistinct margins in the anterior one-third sub-areolar region of the right breast located 2 cm from the nipple.   US:   1. Ultrasound is suggestive of a round solid mass with indistinct margins measuring 9 mm. Internal echotexture is homogeneous and hypoechoic relative adipose tissue.   2. There is an abnormal axillary lymph  node in the right axilla. There are 2 adjacent lymph nodes in the right axilla with mildly prominent cortices. The cortex has been measured at 3.1 mm, although no significant focal thickening is seen. The left axilla was evaluated for comparison, and the nodes on the right are of greater cortical thickness.   BI-RADS 4C: Suspicious.     5/6/2019 Biopsy    Right breast & Right axillary lymph node, US-guided biopsy ([facility]):    1. Right Subareolar, core biopsy:    Invasive High Grade Mammary Carcinoma (Invasive Ductal Carcinoma, Grade III). Tubule Score 3, Nuclear Grade Score 3, and Mitotic Score 2 for A Total Score of 8. Neoplasm is Present in Four of Eight Biopsies and Measures 4 mm in Greatest Dimension.    ER+ (82.35%, moderate)  [place]+ (92.68%, strong)  Her2 negative (IHC 1+)  Ki-67 14.81%    2. Right axilla, core biopsy:  Benign Lymph Node, Negative for Metastatic Carcinoma.     5/9/2019 Imaging    Bilateral Breast MRI ([facility]):  Within the retroareolar region of the right breast located slightly medial to the plane of the nipple at the 2:30 position on the order of 1.8 cm from the nipple there is an irregular enhancing mass that measures 1.0 cm in the superior-inferior dimension, 1.0 cm in anterior to posterior dimension and 1.0 cm in the medial to lateral dimension. A metallic clip is seen within the mass. This represents the biopsy-proven site malignancy.     No other areas of abnormal enhancement or morphology are seen within the right breast. I see no evidence for abnormal right breast skin, nipple or chest wall enhancement and there is no evidence for right axillary adenopathy.     There are no areas of abnormal enhancement or morphology in the left breast. I see no evidence for abnormal left breast skin, nipple or chest wall enhancement and there is no evidence for left axillary adenopathy.  BI-RADS 6: Known malignancy.     5/14/2019 Genetic Testing    [employer] Breast Cancer STAT Panel (9 genes):    CHEK2  mutation     5/28/2019 Surgery    Right central partial mastectomy (with removal of the nipple-areola-complex) and sentinel lymph node biopsy    1.  Right Axilla, [place] Lymph Node #1 (hot, blue, count 628):                A.  One lymph node with fatty infiltration and focal florid foreign body giant cell response consistent with clip                     Placement.               B.  No metastatic carcinoma identified by routine staining (0/1).     2.  Right Axilla, Non-[place] Lymph Node:                A.  Benign fibrofatty tissue with focal minimal fat necrosis.                B.  No intact lymph node tissue identified.      3.  Right Central Partial Mastectomy:                A.  Invasive mammary carcinoma of no special type (ductal carcinoma).                            1.  Invasive carcinoma measures 10 mm x 8 mm x 8 mm.                            2.  Overall Elvin grade II (glandular score = 2, nuclear score = 2, mitotic score = 2).                 B.  No associated ductal carcinoma in situ component by IHC staining (see comment).                   C.  Margins are negative for invasive carcinoma.  Invasive carcinoma measures at least 15 mm from                     the closest (Posterior) margin of excision.                     All other margins measure at least 20 mm from invasive carcinoma including:                             Anterior margin = 20 mm.                            Superior margin = 20 mm.                            Inferior margin = 53 mm.                            Medial Margin = 50 mm.                            Lateral margin = 60 mm.                   D.  No lymphovascular space invasion identified.                E.  No pagetoid involvement of skin nor nipple by carcinoma identified.               F.  Focal stromal fibrosis (scar) organizing fat necrosis and foreign giant cell reaction noted consistent with                     previous needled biopsy site.                G.   Nonneoplastic breast tissue with fibrocystic change, focal adenosis, organizing fat necrosis.                H.  Microcalcifications are identified within benign ductal tissue.                I.   Previously reported biomarkers: Estrogen receptor: positive (82.35% moderate), Progesterone receptor:                    positive (92.68%, strong), HER2/Amy: negative (IHC score1+) and Ki-67=14.8%                     (not reviewed).                   Pathologic Stage: pT1b, (sn)N0 (G2).                 See synoptic for tumor details.     4.  Additional Lateral and Superior Margins:                A.  No in situ nor infiltrating carcinoma identified.               B.  New superior margin is negative for malignancy by an additional 25 mm.     Oncotype DX Score: 6     7/11/2019 - 8/14/2019 Radiation    Radiation OncologyTreatment Course:  Emilie Soto received 5000 cGy in 25 fractions to the right breast.     8/17/2019 - 11/2/2020 Hormonal Therapy    Tamoxifen     3/6/2020 Imaging    Bilateral Diagnostic MMG with Clarence ([facility]):  Scattered areas of fibroglandular density.   There is a new area of skin thickening, a new area of trabecular thickening and a new postsurgical scar seen in the anterior region of the right breast. Finding is in an area of prior lumpectomy. Findings are consistent with postsurgical and post radiation therapy changes. Metallic surgical clips noted at the lumpectomy site and at the axilla. No suspicious masses or microcalcifications are identified.  In the left breast, no suspicious masses, significant calcifications or other abnormalities are seen.  BI-RADS 2: Benign.     9/3/2020 Imaging    Bilateral Breast MRI ([facility]):  The parenchyma of both breasts is largely fatty-replaced. Minimal background parenchymal enhancement is noted. There is evidence of prior right partial mastectomy with mild anterior right breast architectural distortion and comparatively smaller right breast volume relative to the  left breast. Additionally, the right nipple appears to have been surgically removed. Otherwise, no areas of abnormal morphology or enhancement are seen in either breast. I see no evidence for abnormal skin, chest wall or left nipple enhancement and there is no evidence for axillary or internal mammary chain adenopathy.   BI-RADS 2: Benign.     9/4/2020 Surgery    Total robotic hysterectomy and bilateral salpingo-oophorectomy, Dr. Nery Neumann     12/1/2020 -  Hormonal Therapy    Arimidex     3/8/2021 Imaging    3/8/2021:  Screening mammogram with tomosynthesis at [facility]   Scattered areas of fibroglandular density.  Right breast postoperative changes again seen.  There are no suspicious masses, significant calcifications, or other abnormality seen in either breast.  There is a biopsy clip in the left breast.  Impression:  There is no mammographic evidence of malignancy.  Screening mammogram 1 year is recommended.  BI-RADS Category 2     10/5/2021 Imaging    Breast MRI at [facility]  FINDINGS:The parenchyma of both breasts is largely fatty-replaced.  Minimal background parenchymal enhancement of both breasts is noted.There is evidence of prior right partial mastectomy that appears stable.The right nipple has been surgically removed. Otherwise, no areas of abnormal morphology or enhancement are seen in either breast. I see no evidence for abnormal skin, nipple or chest wall enhancement and there is no evidence for axillary or internal mammary chain adenopathy.   IMPRESSION:  There are no findings suspicious for malignancy in either breast. Routine follow-up imaging is recommended.   BI-RADS category  2: Benign..     6/10/2022 Imaging    [facility]  Scattered areas of fibroglandular density.  Right breast postoperative changes again seen.  There are no suspicious masses, significant calcifications, or other abnormality seen in either breast.  There is a biopsy clip  in the left breast.  Impression:  There is no mammographic evidence of malignancy.  Screening mammogram 1 year is recommended.  BI-RADS Category 2     12/1/2022 Imaging    Breast MRI at [facility]  FINDINGS: There is scattered fibroglandular tissue. There is minimal background parenchymal enhancement.   RIGHT BREAST:    Benign-appearing postsurgical and treatment-related changes are identified in the right breast. No suspicious enhancing mass or area of non-mass enhancement is identified.The visualized axilla is within normal limits.    LEFT BREAST:    No suspicious enhancing mass or area of non-mass enhancement is identified.  There are multiple new asymmetrically enlarged left axillary lymph nodes with cortical thickening, including reference adjacent lymph nodes measuring 1.8 x 1.3 x 1.5 cm and 1.6 x 1.3 x 1.1 cm. There is moderate  surrounding ill-defined T2 hyperintense signal suggestive of edema,which extends into the posterior upper outer quadrant of the left breast.   EXTRAMAMMARY FINDINGS:   There are no pathologically enlarged internal mammary chain lymph nodes on either side.      IMPRESSION AND RECOMMENDATION:   1.  Multiple new asymmetrically enlarged lymph nodes with cortical thickening and surrounding ill-defined edema. Per Epic chart review, the patient received a Covid vaccine 4 days ago. Correlate with side of  vaccination. If the vaccine was administered on the left, these findings are probably benign, and short-term follow-up targeted left axillary ultrasound would be recommended in 6-8 weeks document resolution.  However, if the vaccine was administered on the right, findings would be considered suspicious, and further evaluation with targeted left axillary ultrasound and possible ultrasound-guided core needle biopsy would be recommended at this time.  2.  Otherwise, no MRI evidence of malignancy in either breast.   BI-RADS Category 3: Probably benign     3/15/2023 Imaging    Left axillary US at  [facility]  FINDINGS:  Targeted sonographic evaluation of the left axilla was performed.Multiple benign-appearing left axillary lymph nodes are identified,which have decreased in size and cortical thickness compared to prior MRI from 12/1/2022 when accounting for differences in modality.   IMPRESSION:  Benign-appearing left axillary lymph nodes, which were likely reactive to the reported recent left arm Covid vaccination at the time of her prior MRI in December. Recommend annual screening mammogram in June 2023.   BI-RADS Category 2: Benign       6/12/2023 Imaging    Bilateral screening mammogram with tomosynthesis at [facility]  FINDINGS:  There are scattered areas of fibroglandular density.   Benign-appearing postsurgical changes are identified in the right breast. There is a biopsy clip in the left breast. There are no suspicious masses, calcifications, or areas of architectural distortion. There is new mild diffuse bilateral trabecular and skin thickening, which could be in part technical but also raise concern for an  underlying systemic process, including congestive heart or renal failure. Findings could be due to interval weight loss or changes in hormone therapy.   IMPRESSION/RECOMMENDATION(S):  New mild diffuse bilateral trabecular and skin thickening, which could be in part technical but also raise concern for an underlying systemic process, including congestive heart or renal failure. Findings could be due to interval weight loss or changes in hormone therapy. Recommend clinical assessment.  No mammographic evidence of malignancy. Recommend annual screening mammogram in one year.   BI-RADS Category 2: Benign      2/3/2024 Imaging    Breast MRI at [facility]  FINDINGS: Scattered fibroglandular tissue is seen throughout both breasts . Mild background parenchymal enhancement of both breasts is noted.   Stable postsurgical change of the right breast with slight increase in subcutaneous and trabecular edema is noted. No areas of  suspicious  enhancement or morphology are seen in either breast. I see no evidence for abnormal skin, nipple or chest wall enhancement of either breast and  there is no evidence for axillary or internal mammary chain adenopathy.   IMPRESSION:  There are no findings suspicious for malignancy in either breast. Post breast conservation therapy changes of the right breast with slight increase in subcutaneous and trabecular edema of a benign-appearing etiology is noted. Clinical follow-up is recommended.  Routine follow-up imaging is also recommended.   BI-RADS CATEGORY 2: Benign findings         Review of Systems:  See interval history.       Medications:    Current Outpatient Medications:   •  allopurinol (ZYLOPRIM) 100 MG tablet, TAKE 1 TABLET BY MOUTH DAILY, Disp: 90 tablet, Rfl: 3  •  amLODIPine (NORVASC) 10 MG tablet, TAKE 1 TABLET BY MOUTH EVERY NIGHT, Disp: 90 tablet, Rfl: 0  •  anastrozole (ARIMIDEX) 1 MG tablet, TAKE 1 TABLET BY MOUTH DAILY, Disp: 90 tablet, Rfl: 3  •  lisinopril (PRINIVIL,ZESTRIL) 40 MG tablet, TAKE 1 TABLET BY MOUTH DAILY, Disp: 90 tablet, Rfl: 3  •  LORazepam (ATIVAN) 0.5 MG tablet, Take 1 tablet by mouth Every 8 (Eight) Hours As Needed for Anxiety. for anxiety, Disp: 30 tablet, Rfl: 0  •  multivitamin (THERAGRAN) tablet tablet, Take 1 tablet by mouth Daily., Disp: , Rfl:       Allergies   Allergen Reactions   • Citalopram Rash   • Flexeril [Cyclobenzaprine] Unknown - High Severity     MUSCLES TENSE-OPPOSITE OF WHAT IT IS INTENDED TO DO-ELEVATES B/P   • Doxycycline Rash     Blisters on hands   • Keflex [Cephalexin] Rash   • Sulfa Antibiotics Rash     NAUSEA/VOMITING/FEVER   • Vilazodone Hcl Hives, Itching and Rash       Family History   Problem Relation Age of Onset   • Hypertension Mother    • Arthritis Mother    • Kidney cancer Mother    • Depression Mother         Not Dr tommy- wont go   • Heart disease Father    • Hernia Father    • Hypertension Father    • No Known Problems Brother   "  • Breast cancer Paternal Aunt    • Breast cancer Paternal Cousin    • Drug abuse Paternal Uncle         life long   • Drug abuse Paternal Uncle         life long   • Breast cancer Paternal Grandmother    • Osteoporosis Maternal Grandmother    • Lung cancer Paternal Grandfather    • Malig Hyperthermia Neg Hx        PHYSICAL EXAMINATION:   Vitals:    02/06/24 1217   BP: 140/80          /80 (BP Location: Left arm)   Ht 162.6 cm (64\")   Wt 100 kg (221 lb)   LMP 06/25/2020   BMI 37.93 kg/m²     I reviewed physical exam, no changes noted    ECOG 0 - Asymptomatic  General: NAD, well appearing, obese  Psych: a&o x 3, normal mood and affect  Eyes: EOMI, no scleral icterus  ENMT: neck supple without masses or thyromegaly, mucus membranes moist  MSK: normal gait, normal ROM in bilateral shoulders  Lymph nodes: Well-healed right axillary scar with some surrounding firm scar tissue; no cervical, supraclavicular or axillary lymphadenopathy  Breast:   Right: The right breast is smaller and uplifted versus the left. There is moderate hyperpigmentation of the entire breast and axilla. Well-healed, soft, central scar with absence of the NAC. No masses.  Mild thickening c/w surgical and RT changes  Left: No visible abnormalities on inspection while seated, with arms raised or hands on hips. No masses, skin changes, or nipple abnormalities.      Assessment:   1)  53 y.o. F with a diagnosis of right breast cancer 5/2019: intermediate grade, invasive ductal carcinoma, ER/NC+, Her2 negative. She is sp right central partial mastectomy & SLNB on 5/28/19, pT1bN0, anatomic stage IA, prognostic stage IA. She completed radiation on 8/14/19. She took tamoxifen from 8/2019 to 10/2020. She underwent TRH/BSO on 9/4/20 and has started armidex  2)  Increased risk breast cancer, she has a pathogenic CHEK2 mutation and has a higher than average risk of a subsequent breast cancer, however declined prophylactic mastectomy. She is currently " undergoing high risk screening, alternating MMG & MRI.  3) Family history of breast cancer  4)  obesity, BMI 36  S/p gastric sleeve in 9/22    Discussion:  Dr Greene has discussed with her the significance of pathologenic CHEK 2 mutation.  She is at increased risk for development of breast cancer.  We discussed management options for individuals who are at increased risk (>20% lifetime risk):  1) High risk screening - Annual mammogram and annual breast MRI, alternating one test every 6 months, biannual clinical exam and monthly self breast exam. She meets criteria for high risk screening.  2) Chemoprevention with Tamoxifen, Raloxifene or Exemestane. These may reduce risk up to 50%. I reviewed that these particular medications are not without risks and the risk/benefit ratio must be considered carefully. She is currently taking arimidex  3) Risk reducing surgery such as prophylactic mastectomy  which may reduce risk by 90-95%. We discussed that this is a relatively radical strategy and is generally reserved for individuals with a known genetic mutation predisposing them to an increased risk (~50% risk) of breast cancer. She has declined prophylactic mastectomy.  4) I discussed the importance of exercise and weight management as part of a risk reducing strategy, since increased BMI is associated with an increased risk of breast cancer. This is especially true in her case, as I expect her risk would decrease as she lost weight.    She is interested in increased surveillance with mammogram alternating with MRI.    We discussed the changes noted in the right breast in recent MRI.  This is c/w her weight loss and subsequent loss of volume in her breasts and more prominent surgical and RT changes.  I advised her to massage and wear a supportive bra..    She continues to work on diet and exercise changes for weight loss since gastric sleeve surgery.    Plan:  -Continue follow-up with Dr. Trevizo.  - follow up with  bariatric clinic  - bilateral screening mammogram with tomosynthesis in 6/2024 at [facility] followed by exam  -She was instructed to call sooner with any questions, concerns or changes on BSE.    VICENTE Smith      CC:  [facility]  VICENTE Elliott

## 2024-02-06 NOTE — PROGRESS NOTES
[facility]     Referring Provider: No ref. provider found     Chief complaint:  Routine followup breast cancer     HPI:   5/14/19: seen by Dr Greene  Ms. Emilie Soto is a 49 yo woman, seen at the request of Dr. Mel Cline, for a new diagnosis of right breast cancer. This was initially detected as an imaging abnormality. Her work-up is detailed in the oncologic history below. She denies any breast lumps, pain, skin changes, or nipple discharge. She has a past history of a benign left breast stereotactic biopsy in 2014. 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 She has a family history of breast cancer in a paternal aunt and paternal cousin (unknown ages at diagnosis). She denies any family history of ovarian cancer.      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 She denies any new complaints.      6/7/19: seen by Dr Greene  She underwent right central partial mastectomy & SLNB on 5/28/19. See surgery & pathology details below in oncologic history. She is complaining of soreness at her axillary incision, however says that her breast feels fine. She also is complaining of generalized anxiety, which has been worse since her diagnosis.      9/13/19: seen by Dr Greene  She returns today for scheduled follow-up. I have reviewed the interval records since her last visit. She completed radiation on 8/14/19. She had issues with fatigue during treatment, however this is been slowly improving. She started tamoxifen about a month ago and has been tolerating this well. She has been seen by the genetics clinic for her CHEK2 mutation. She is being followed by PT/LE clinic. On 8/14/19 her L-Dex bioimpedance score was slightly elevated, however this was reevaluated today and back down to baseline. She is working with PT on increasing her right upper extremity strength, because she has not been  using her right arm very much since surgery. She denies any new complaints.     3/9/20:seen by Dr Greene  She returns today for scheduled follow-up. She remains on tamoxifen and is still tolerating this well. She last saw PT on 11/11/19 and her bioimpedance score was within normal limits. She underwent MMG prior to her appointment which was benign (see imaging report details below). She is complaining of some occasional pain near her right axillary scar.     11/20/20:Follow up with Dr Greene  She underwent a breast MRI in 9/2020 which was benign (see report details below). Her follow-up with me was initially delayed because she underwent TRH/BSO with Dr. Neumann on 9/4/20 due to ovarian cysts and her history of a CHEK2 mutation. Final pathology showed atypical endometrial hyperplasia. Immediately after the hysterectomy she began having worsening hot flashes. She held her tamoxifen earlier this month and plans on starting Arimidex in a couple of weeks. She has not seen PT again since her last visit with me, however says she does not feel like she is having any current issues. She denies any right arm swelling. She denies any new breast related complaints.    3/16/2021:  She returns today for follow up with no breast complaints or health changes.  Screening with tomosynthesis was completed at [facility] on 3/8/2021:  BiRads 2    6/21/22:   She returns today for follow up with no breast concerns, she had covid in 1/22 which delayed follow up.  She is planning gastric sleeve in near future now that she is feeling better.    She is contemplating gastric sleeve placement, currently seen in Bariatric clinic.    Her last clinic visit with Dr Trevizo was in 7/21:  Patient switched to aromatase inhibitor from tamoxifen.  November 2, 2020: Started Arimidex    Breast MRI in 10/21 was BiRads 2.  (see full report below)    Screening mammogram with tomosynthesis on 6/10/22 was stable, BiRads 2.  (see full report below)    7/11/23:  She  returns today for follow up with no breast concerns, loss of volume due to weight loss.  In 9/2022 she had gastric sleeve placement and has lost 104 pounds, she feels so much better and is now exercising.    She was last seen by Dr Trevizo in 2/23:  She is currently taking anastrozole. She had bariatric surgery in 10/2022. She has lost 80 pounds of weight since then. She is otherwise doing well without any complaints. She does not have hot flashes, and she does not have any joint pains. She does have CHEK2 mutation.     12/1/2022: Breast MRI at [facility] was stable, BiRAds 3, multiple enlarged LN after recent covid vaccine.  (see full report below)    3/15/23: left axillary US was stable, BiRAds 2.  (see full report below)    6/12/2023: bilateral screening mammogram with tomosynthesis was stable, BiRAds 2. (see full report below)    2/6/2024, Interval History:  She returns today for follow up with no breast changes. She has lost 102 pounds since her gastric sleeve surgery.  She continues on arimidex    Her last clinic visit with Dr Trevizo was in 2/2023.    Breast MRI on 2/3/24 was stable, biRads 2.  (see full report below)        Oncology/Hematology History Overview Note   1/7/14, Screening MMG ([facility]):   There is a stable small focal asymmetry seen in the left breast at 12:00-1:00. In the right breast, no masses, significant calcifications or other abnormalities are seen.   BI-RADS 0: Incomplete.    2/4/14, Left Diagnostic MMG & Left US ([facility]):   MMG:  On the present examination, there is an isodense focal asymmetry measuring 15 mm with indistinct margins in the left breast at 12:00-1:00 located 10 cm from the nipple. Focal asymmetric density has become more defined.   US:  There is no evidence of any solid mass or abnormal cystic elements. Stereotactic biopsy is recommended.   BI-RADS 4A: Suspicious.    2/11/14, Left Breast, 12-1:00, Stereotactic Biopsy ([facility]):   Fibroadenomatoid type stromal changes, mild columnar cell  hyperplasia, mild fibrocystic changes. Negative for atypia or malignancy.   -Concordant.  Recommend 6 month follow-up mammogram and ultrasound.     Malignant neoplasm of central portion of right breast in female, estrogen receptor positive   4/17/2019 Initial Diagnosis    Malignant neoplasm of central portion of right breast in female, estrogen receptor positive (CMS/HCC)     4/18/2019 Imaging    Screening MMG ([facility]):   There are scattered areas of fibroglandular density. There is a small, oval mass measuring 10 mm with indistinct margins seen in the sub-areolar region of the right breast. Note is made of a biopsy clip in the left breast as evidence of a previous surgical procedure. In the left breast, no suspicious masses, significant calcifications or other abnormalities are seen.   BI-RADS 0: Incomplete.       4/29/2019 Imaging    Right Diagnostic MMG with [facility] & Right US ([facility]):   MMG:  On the present examination, there is a new high density, round mass measuring 10 mm with indistinct margins in the anterior one-third sub-areolar region of the right breast located 2 cm from the nipple.   US:   1. Ultrasound is suggestive of a round solid mass with indistinct margins measuring 9 mm. Internal echotexture is homogeneous and hypoechoic relative adipose tissue.   2. There is an abnormal axillary lymph node in the right axilla. There are 2 adjacent lymph nodes in the right axilla with mildly prominent cortices. The cortex has been measured at 3.1 mm, although no significant focal thickening is seen. The left axilla was evaluated for comparison, and the nodes on the right are of greater cortical thickness.   BI-RADS 4C: Suspicious.     5/6/2019 Biopsy    Right breast & Right axillary lymph node, US-guided biopsy ([facility]):    1. Right Subareolar, core biopsy:    Invasive High Grade Mammary Carcinoma (Invasive Ductal Carcinoma, Grade III). Tubule Score 3, Nuclear Grade Score 3, and Mitotic Score 2 for A Total Score  of 8. Neoplasm is Present in Four of Eight Biopsies and Measures 4 mm in Greatest Dimension.    ER+ (82.35%, moderate)  VT+ (92.68%, strong)  Her2 negative (IHC 1+)  Ki-67 14.81%    2. Right axilla, core biopsy:  Benign Lymph Node, Negative for Metastatic Carcinoma.     5/9/2019 Imaging    Bilateral Breast MRI ([facility]):  Within the retroareolar region of the right breast located slightly medial to the plane of the nipple at the 2:30 position on the order of 1.8 cm from the nipple there is an irregular enhancing mass that measures 1.0 cm in the superior-inferior dimension, 1.0 cm in anterior to posterior dimension and 1.0 cm in the medial to lateral dimension. A metallic clip is seen within the mass. This represents the biopsy-proven site malignancy.     No other areas of abnormal enhancement or morphology are seen within the right breast. I see no evidence for abnormal right breast skin, nipple or chest wall enhancement and there is no evidence for right axillary adenopathy.     There are no areas of abnormal enhancement or morphology in the left breast. I see no evidence for abnormal left breast skin, nipple or chest wall enhancement and there is no evidence for left axillary adenopathy.  BI-RADS 6: Known malignancy.     5/14/2019 Genetic Testing    [employer] Breast Cancer STAT Panel (9 genes):    CHEK2 mutation     5/28/2019 Surgery    Right central partial mastectomy (with removal of the nipple-areola-complex) and sentinel lymph node biopsy    1.  Right Axilla, [place] Lymph Node #1 (hot, blue, count [id number]):                A.  One lymph node with fatty infiltration and focal florid foreign body giant cell response consistent with clip                     Placement.               B.  No metastatic carcinoma identified by routine staining (0/1).     2.  Right Axilla, Non-[place] Lymph Node:                A.  Benign fibrofatty tissue with focal minimal fat necrosis.                B.  No intact lymph node tissue  identified.      3.  Right Central Partial Mastectomy:                A.  Invasive mammary carcinoma of no special type (ductal carcinoma).                            1.  Invasive carcinoma measures 10 mm x 8 mm x 8 mm.                            2.  Overall Elvin grade II (glandular score = 2, nuclear score = 2, mitotic score = 2).                 B.  No associated ductal carcinoma in situ component by IHC staining (see comment).                   C.  Margins are negative for invasive carcinoma.  Invasive carcinoma measures at least 15 mm from                     the closest (Posterior) margin of excision.                     All other margins measure at least 20 mm from invasive carcinoma including:                             Anterior margin = 20 mm.                            Superior margin = 20 mm.                            Inferior margin = 53 mm.                            Medial Margin = 50 mm.                            Lateral margin = 60 mm.                   D.  No lymphovascular space invasion identified.                E.  No pagetoid involvement of skin nor nipple by carcinoma identified.               F.  Focal stromal fibrosis (scar) organizing fat necrosis and foreign giant cell reaction noted consistent with                     previous needled biopsy site.                G.  Nonneoplastic breast tissue with fibrocystic change, focal adenosis, organizing fat necrosis.                H.  Microcalcifications are identified within benign ductal tissue.                I.   Previously reported biomarkers: Estrogen receptor: positive (82.35% moderate), Progesterone receptor:                    positive (92.68%, strong), HER2/Amy: negative (IHC score1+) and Ki-67=14.8%                     (not reviewed).                   Pathologic Stage: pT1b, (sn)N0 (G2).                 See synoptic for tumor details.     4.  Additional Lateral and Superior Margins:                A.  No in situ nor  infiltrating carcinoma identified.               B.  New superior margin is negative for malignancy by an additional 25 mm.     Oncotype DX Score: 6     7/11/2019 - 8/14/2019 Radiation    Radiation OncologyTreatment Course:  Emilie Soto received 5000 cGy in 25 fractions to the right breast.     8/17/2019 - 11/2/2020 Hormonal Therapy    Tamoxifen     3/6/2020 Imaging    Bilateral Diagnostic MMG with Clarence ([facility]):  Scattered areas of fibroglandular density.   There is a new area of skin thickening, a new area of trabecular thickening and a new postsurgical scar seen in the anterior region of the right breast. Finding is in an area of prior lumpectomy. Findings are consistent with postsurgical and post radiation therapy changes. Metallic surgical clips noted at the lumpectomy site and at the axilla. No suspicious masses or microcalcifications are identified.  In the left breast, no suspicious masses, significant calcifications or other abnormalities are seen.  BI-RADS 2: Benign.     9/3/2020 Imaging    Bilateral Breast MRI ([facility]):  The parenchyma of both breasts is largely fatty-replaced. Minimal background parenchymal enhancement is noted. There is evidence of prior right partial mastectomy with mild anterior right breast architectural distortion and comparatively smaller right breast volume relative to the left breast. Additionally, the right nipple appears to have been surgically removed. Otherwise, no areas of abnormal morphology or enhancement are seen in either breast. I see no evidence for abnormal skin, chest wall or left nipple enhancement and there is no evidence for axillary or internal mammary chain adenopathy.   BI-RADS 2: Benign.     9/4/2020 Surgery    Total robotic hysterectomy and bilateral salpingo-oophorectomy, Dr. Nery Neumann     12/1/2020 -  Hormonal Therapy    Arimidex     3/8/2021 Imaging    3/8/2021:  Screening mammogram with tomosynthesis at [facility]   Scattered areas of fibroglandular  density.  Right breast postoperative changes again seen.  There are no suspicious masses, significant calcifications, or other abnormality seen in either breast.  There is a biopsy clip in the left breast.  Impression:  There is no mammographic evidence of malignancy.  Screening mammogram 1 year is recommended.  BI-RADS Category 2     10/5/2021 Imaging    Breast MRI at [facility]  FINDINGS:The parenchyma of both breasts is largely fatty-replaced.  Minimal background parenchymal enhancement of both breasts is noted.There is evidence of prior right partial mastectomy that appears stable.The right nipple has been surgically removed. Otherwise, no areas of abnormal morphology or enhancement are seen in either breast. I see no evidence for abnormal skin, nipple or chest wall enhancement and there is no evidence for axillary or internal mammary chain adenopathy.   IMPRESSION:  There are no findings suspicious for malignancy in either breast. Routine follow-up imaging is recommended.   BI-RADS category  2: Benign..     6/10/2022 Imaging    Bilateral screening mammogram with tomosynthesis at [facility]  Scattered areas of fibroglandular density.  Right breast postoperative changes again seen.  There are no suspicious masses, significant calcifications, or other abnormality seen in either breast.  There is a biopsy clip in the left breast.  Impression:  There is no mammographic evidence of malignancy.  Screening mammogram 1 year is recommended.  BI-RADS Category 2     12/1/2022 Imaging    Breast MRI at [facility]  FINDINGS: There is scattered fibroglandular tissue. There is minimal background parenchymal enhancement.   RIGHT BREAST:    Benign-appearing postsurgical and treatment-related changes are identified in the right breast. No suspicious enhancing mass or area of non-mass enhancement is identified.The visualized axilla is within normal limits.    LEFT BREAST:    No suspicious enhancing mass or area of non-mass  enhancement is identified.  There are multiple new asymmetrically enlarged left axillary lymph nodes with cortical thickening, including reference adjacent lymph nodes measuring 1.8 x 1.3 x 1.5 cm and 1.6 x 1.3 x 1.1 cm. There is moderate  surrounding ill-defined T2 hyperintense signal suggestive of edema,which extends into the posterior upper outer quadrant of the left breast.   EXTRAMAMMARY FINDINGS:   There are no pathologically enlarged internal mammary chain lymph nodes on either side.      IMPRESSION AND RECOMMENDATION:   1.  Multiple new asymmetrically enlarged lymph nodes with cortical thickening and surrounding ill-defined edema. Per Epic chart review, the patient received a Covid vaccine 4 days ago. Correlate with side of  vaccination. If the vaccine was administered on the left, these findings are probably benign, and short-term follow-up targeted left axillary ultrasound would be recommended in 6-8 weeks document resolution.  However, if the vaccine was administered on the right, findings would be considered suspicious, and further evaluation with targeted left axillary ultrasound and possible ultrasound-guided core needle biopsy would be recommended at this time.  2.  Otherwise, no MRI evidence of malignancy in either breast.   BI-RADS Category 3: Probably benign     3/15/2023 Imaging    Left axillary US at [facility]  FINDINGS:  Targeted sonographic evaluation of the left axilla was performed.Multiple benign-appearing left axillary lymph nodes are identified,which have decreased in size and cortical thickness compared to prior MRI from 12/1/2022 when accounting for differences in modality.   IMPRESSION:  Benign-appearing left axillary lymph nodes, which were likely reactive to the reported recent left arm Covid vaccination at the time of her prior MRI in December. Recommend annual screening mammogram in June 2023.   BI-RADS Category 2: Benign       6/12/2023 Imaging    Bilateral screening mammogram with  tomosynthesis at [facility]  FINDINGS:  There are scattered areas of fibroglandular density.   Benign-appearing postsurgical changes are identified in the right breast. There is a biopsy clip in the left breast. There are no suspicious masses, calcifications, or areas of architectural distortion. There is new mild diffuse bilateral trabecular and skin thickening, which could be in part technical but also raise concern for an  underlying systemic process, including congestive heart or renal failure. Findings could be due to interval weight loss or changes in hormone therapy.   IMPRESSION/RECOMMENDATION(S):  New mild diffuse bilateral trabecular and skin thickening, which could be in part technical but also raise concern for an underlying systemic process, including congestive heart or renal failure. Findings could be due to interval weight loss or changes in hormone therapy. Recommend clinical assessment.  No mammographic evidence of malignancy. Recommend annual screening mammogram in one year.   BI-RADS Category 2: Benign      2/3/2024 Imaging    Breast MRI at [facility]  FINDINGS: Scattered fibroglandular tissue is seen throughout both breasts . Mild background parenchymal enhancement of both breasts is noted.   Stable postsurgical change of the right breast with slight increase in subcutaneous and trabecular edema is noted. No areas of suspicious  enhancement or morphology are seen in either breast. I see no evidence for abnormal skin, nipple or chest wall enhancement of either breast and  there is no evidence for axillary or internal mammary chain adenopathy.   IMPRESSION:  There are no findings suspicious for malignancy in either breast. Post breast conservation therapy changes of the right breast with slight increase in subcutaneous and trabecular edema of a benign-appearing etiology is noted. Clinical follow-up is recommended.  Routine follow-up imaging is also recommended.   BI-RADS CATEGORY 2: Benign findings    "      Review of Systems:  See interval history.       Medications:    Current Outpatient Medications:     allopurinol (ZYLOPRIM) 100 MG tablet, TAKE 1 TABLET BY MOUTH DAILY, Disp: 90 tablet, Rfl: 3    amLODIPine (NORVASC) 10 MG tablet, TAKE 1 TABLET BY MOUTH EVERY NIGHT, Disp: 90 tablet, Rfl: 0    anastrozole (ARIMIDEX) 1 MG tablet, TAKE 1 TABLET BY MOUTH DAILY, Disp: 90 tablet, Rfl: 3    lisinopril (PRINIVIL,ZESTRIL) 40 MG tablet, TAKE 1 TABLET BY MOUTH DAILY, Disp: 90 tablet, Rfl: 3    LORazepam (ATIVAN) 0.5 MG tablet, Take 1 tablet by mouth Every 8 (Eight) Hours As Needed for Anxiety. for anxiety, Disp: 30 tablet, Rfl: 0    multivitamin (THERAGRAN) tablet tablet, Take 1 tablet by mouth Daily., Disp: , Rfl:       Allergies   Allergen Reactions    Citalopram Rash    Flexeril [Cyclobenzaprine] Unknown - High Severity     MUSCLES TENSE-OPPOSITE OF WHAT IT IS INTENDED TO DO-ELEVATES B/P    Doxycycline Rash     Blisters on hands    Keflex [Cephalexin] Rash    Sulfa Antibiotics Rash     NAUSEA/VOMITING/FEVER    Vilazodone Hcl Hives, Itching and Rash       Family History   Problem Relation Age of Onset    Hypertension Mother     Arthritis Mother     Kidney cancer Mother     Depression Mother         Not Dr diagnosed- wont go    Heart disease Father     Hernia Father     Hypertension Father     No Known Problems Brother     Breast cancer Paternal Aunt     Breast cancer Paternal Cousin     Drug abuse Paternal Uncle         life long    Drug abuse Paternal Uncle         life long    Breast cancer Paternal Grandmother     Osteoporosis Maternal Grandmother     Lung cancer Paternal Grandfather     Malig Hyperthermia Neg Hx        PHYSICAL EXAMINATION:   Vitals:    02/06/24 1217   BP: 140/80          /80 (BP Location: Left arm)   Ht 162.6 cm (64\")   Wt 100 kg (221 lb)   LMP 06/25/2020   BMI 37.93 kg/m²     I reviewed physical exam, no changes noted    ECOG 0 - Asymptomatic  General: NAD, well appearing, obese  Psych: " a&o x 3, normal mood and affect  Eyes: EOMI, no scleral icterus  ENMT: neck supple without masses or thyromegaly, mucus membranes moist  MSK: normal gait, normal ROM in bilateral shoulders  Lymph nodes: Well-healed right axillary scar with some surrounding firm scar tissue; no cervical, supraclavicular or axillary lymphadenopathy  Breast:   Right: The right breast is smaller and uplifted versus the left. There is moderate hyperpigmentation of the entire breast and axilla. Well-healed, soft, central scar with absence of the NAC. No masses.  Mild thickening c/w surgical and RT changes  Left: No visible abnormalities on inspection while seated, with arms raised or hands on hips. No masses, skin changes, or nipple abnormalities.      Assessment:   1)  53 y.o. F with a diagnosis of right breast cancer 5/2019: intermediate grade, invasive ductal carcinoma, ER/RI+, Her2 negative. She is sp right central partial mastectomy & SLNB on 5/28/19, pT1bN0, anatomic stage IA, prognostic stage IA. She completed radiation on 8/14/19. She took tamoxifen from 8/2019 to 10/2020. She underwent TRH/BSO on 9/4/20 and has started armidex  2)  Increased risk breast cancer, she has a pathogenic CHEK2 mutation and has a higher than average risk of a subsequent breast cancer, however declined prophylactic mastectomy. She is currently undergoing high risk screening, alternating MMG & MRI.  3) Family history of breast cancer  4)  obesity, BMI 36  S/p gastric sleeve in 9/22    Discussion:  Dr Greene has discussed with her the significance of pathologenic CHEK 2 mutation.  She is at increased risk for development of breast cancer.  We discussed management options for individuals who are at increased risk (>20% lifetime risk):  1) High risk screening - Annual mammogram and annual breast MRI, alternating one test every 6 months, biannual clinical exam and monthly self breast exam. She meets criteria for high risk screening.  2) Chemoprevention with  Tamoxifen, Raloxifene or Exemestane. These may reduce risk up to 50%. I reviewed that these particular medications are not without risks and the risk/benefit ratio must be considered carefully. She is currently taking arimidex  3) Risk reducing surgery such as prophylactic mastectomy  which may reduce risk by 90-95%. We discussed that this is a relatively radical strategy and is generally reserved for individuals with a known genetic mutation predisposing them to an increased risk (~50% risk) of breast cancer. She has declined prophylactic mastectomy.  4) I discussed the importance of exercise and weight management as part of a risk reducing strategy, since increased BMI is associated with an increased risk of breast cancer. This is especially true in her case, as I expect her risk would decrease as she lost weight.    She is interested in increased surveillance with mammogram alternating with MRI.    We discussed the changes noted in the right breast in recent MRI.  This is c/w her weight loss and subsequent loss of volume in her breasts and more prominent surgical and RT changes.  I advised her to massage and wear a supportive bra..    She continues to work on diet and exercise changes for weight loss since gastric sleeve surgery.    Plan:  -Continue follow-up with Dr. Trevizo.  - follow up with bariatric clinic  - bilateral screening mammogram with tomosynthesis in 6/2024 at [facility] followed by exam  -She was instructed to call sooner with any questions, concerns or changes on BSE.    VICENTE Smith      CC:  [facility]  VICENTE Elliott

## 2024-02-15 DIAGNOSIS — I10 BENIGN ESSENTIAL HTN: Chronic | ICD-10-CM

## 2024-02-15 RX ORDER — AMLODIPINE BESYLATE 10 MG/1
10 TABLET ORAL NIGHTLY
Qty: 90 TABLET | Refills: 0 | Status: SHIPPED | OUTPATIENT
Start: 2024-02-15

## 2024-02-15 RX ORDER — AMLODIPINE BESYLATE 10 MG/1
10 TABLET ORAL NIGHTLY
Qty: 30 TABLET | Refills: 0 | Status: SHIPPED | OUTPATIENT
Start: 2024-02-15 | End: 2024-02-15

## 2024-03-21 ENCOUNTER — OFFICE VISIT (OUTPATIENT)
Dept: INTERNAL MEDICINE | Facility: CLINIC | Age: 54
End: 2024-03-21
Payer: COMMERCIAL

## 2024-03-21 VITALS
OXYGEN SATURATION: 99 % | DIASTOLIC BLOOD PRESSURE: 84 MMHG | BODY MASS INDEX: 38.41 KG/M2 | HEIGHT: 64 IN | HEART RATE: 67 BPM | SYSTOLIC BLOOD PRESSURE: 120 MMHG | TEMPERATURE: 98.2 F | WEIGHT: 225 LBS

## 2024-03-21 DIAGNOSIS — R68.89 FLU-LIKE SYMPTOMS: ICD-10-CM

## 2024-03-21 DIAGNOSIS — J06.9 VIRAL URI: Primary | ICD-10-CM

## 2024-03-21 PROCEDURE — 87428 SARSCOV & INF VIR A&B AG IA: CPT | Performed by: NURSE PRACTITIONER

## 2024-03-21 PROCEDURE — 99213 OFFICE O/P EST LOW 20 MIN: CPT | Performed by: NURSE PRACTITIONER

## 2024-03-21 RX ORDER — OSELTAMIVIR PHOSPHATE 75 MG/1
75 CAPSULE ORAL 2 TIMES DAILY
Qty: 10 CAPSULE | Refills: 0 | Status: SHIPPED | OUTPATIENT
Start: 2024-03-21 | End: 2024-03-26

## 2024-03-21 NOTE — PROGRESS NOTES
"Chief Complaint  URI (Started yesterday ) and Fever  Subjective        Emilie Carrizales presents to [facility]  History of Present Illness  History of Present Illness  The patient is a 28-year-old female who presents due to respiratory symptoms.    She c/o increased congestion and drainage over since Tuesday evening. She spiked a fever at 2:00 am on Wednesday morning, unsure of reading. She started aching. She started taking Tylenol. Yesterday, she was fine as long as she took Tylenol. At one point, her temperature was 101.     She denies chest congestion and cough. She c/o a sensation that her ears are \"clogged up.\" She notes increased fatigue with increased thirst.    She c/o mild nausea but denies abdominal pain. Denies vomiting and/or diarrhea.    Objective   Vital Signs:  /84 (BP Location: Left arm, Patient Position: Sitting, Cuff Size: Adult)   Pulse 67   Temp 98.2 °F (36.8 °C) (Oral)   Ht 162.6 cm (64\")   Wt 102 kg (225 lb)   SpO2 99%   BMI 38.62 kg/m²   Estimated body mass index is 38.62 kg/m² as calculated from the following:    Height as of this encounter: 162.6 cm (64\").    Weight as of this encounter: 102 kg (225 lb).            Physical Exam  HENT:      Head: Normocephalic.      Right Ear: External ear normal. Tympanic membrane is bulging.      Left Ear: External ear normal. Tympanic membrane is bulging.      Nose: Nose normal.      Mouth/Throat:      Pharynx: Posterior oropharyngeal erythema present.   Eyes:      General:         Right eye: No discharge.         Left eye: No discharge.      Conjunctiva/sclera: Conjunctivae normal.   Cardiovascular:      Pulses: Normal pulses.      Heart sounds: Normal heart sounds. No murmur heard.  Pulmonary:      Effort: No respiratory distress.      Breath sounds: Normal breath sounds. No wheezing, rhonchi or rales.   Musculoskeletal:      Cervical back: Normal range of motion.   Lymphadenopathy:      Cervical: No cervical " adenopathy.   Skin:     General: Skin is warm and dry.   Neurological:      Mental Status: She is alert.        Physical Exam  Clear.    Vital Signs  Temperature is 98.2.    Result Review :           Results               Assessment and Plan   Diagnoses and all orders for this visit:    1. Viral URI (Primary)  -     oseltamivir (TAMIFLU) 75 MG capsule; Take 1 capsule by mouth 2 (Two) Times a Day for 5 days.  Dispense: 10 capsule; Refill: 0    2. Flu-like symptoms  -     POCT SARS-CoV-2 Antigen SANDER + Flu      Assessment & Plan   Suspected influenza.  She is negative for influenza and COVID-19. However, I suspect influenza based on her symptoms. I will start her on Tamiflu for body aches. She can take Tylenol for fever and body aches. RTC if sx persist or worsen.    She was provided with a work note.           Follow Up   Return if symptoms worsen or fail to improve, for Next scheduled follow up.  Patient was given instructions and counseling regarding her condition or for health maintenance advice. Please see specific information pulled into the AVS if appropriate.     Patient or patient representative verbalized consent for the use of Ambient Listening during the visit with  VICENTE Ruth for chart documentation. 3/23/2024  17:39 EDT

## 2024-05-22 DIAGNOSIS — I10 BENIGN ESSENTIAL HTN: Chronic | ICD-10-CM

## 2024-05-22 RX ORDER — AMLODIPINE BESYLATE 10 MG/1
10 TABLET ORAL NIGHTLY
Qty: 90 TABLET | Refills: 0 | Status: SHIPPED | OUTPATIENT
Start: 2024-05-22

## 2024-05-27 DIAGNOSIS — I10 BENIGN ESSENTIAL HTN: Chronic | ICD-10-CM

## 2024-05-28 RX ORDER — AMLODIPINE BESYLATE 10 MG/1
10 TABLET ORAL NIGHTLY
Qty: 90 TABLET | Refills: 0 | OUTPATIENT
Start: 2024-05-28

## 2024-07-15 ENCOUNTER — TELEPHONE (OUTPATIENT)
Dept: SURGERY | Facility: CLINIC | Age: 54
End: 2024-07-15
Payer: COMMERCIAL

## 2024-07-15 NOTE — TELEPHONE ENCOUNTER
Patient has not read my [employer] message so I sent a letter in the mail regarding her missed MGM appt and appt with Shaye.

## 2024-07-29 ENCOUNTER — OFFICE VISIT (OUTPATIENT)
Dept: INTERNAL MEDICINE | Facility: CLINIC | Age: 54
End: 2024-07-29
Payer: COMMERCIAL

## 2024-07-29 VITALS
HEIGHT: 64 IN | HEART RATE: 70 BPM | SYSTOLIC BLOOD PRESSURE: 130 MMHG | BODY MASS INDEX: 38.24 KG/M2 | OXYGEN SATURATION: 97 % | TEMPERATURE: 97.5 F | DIASTOLIC BLOOD PRESSURE: 84 MMHG | WEIGHT: 224 LBS

## 2024-07-29 DIAGNOSIS — R73.09 ELEVATED GLUCOSE: ICD-10-CM

## 2024-07-29 DIAGNOSIS — F43.21 GRIEF: ICD-10-CM

## 2024-07-29 DIAGNOSIS — I10 BENIGN ESSENTIAL HTN: Primary | Chronic | ICD-10-CM

## 2024-07-29 DIAGNOSIS — E55.9 VITAMIN D DEFICIENCY: Chronic | ICD-10-CM

## 2024-07-29 DIAGNOSIS — E79.0 HYPERURICEMIA: Chronic | ICD-10-CM

## 2024-07-29 DIAGNOSIS — E78.00 PURE HYPERCHOLESTEROLEMIA: Chronic | ICD-10-CM

## 2024-07-29 PROCEDURE — 99214 OFFICE O/P EST MOD 30 MIN: CPT | Performed by: NURSE PRACTITIONER

## 2024-07-29 NOTE — ASSESSMENT & PLAN NOTE
She tries to maintain a low-carb, low-sugar diet and has lost weight over the past year; recheck A1c.

## 2024-07-29 NOTE — ASSESSMENT & PLAN NOTE
Hypertension is stable and controlled  Continue current treatment regimen.  Blood pressure will be reassessed in 6 months.  Continue amlodipine and lisinopril daily.

## 2024-07-29 NOTE — ASSESSMENT & PLAN NOTE
The 10-year ASCVD risk score (Ivette HUYNH, et al., 2019) is: 3.1%    Values used to calculate the score:      Age: 54 years      Sex: Female      Is Non- : No      Diabetic: Yes      Tobacco smoker: No      Systolic Blood Pressure: 130 mmHg      Is BP treated: Yes      HDL Cholesterol: 86 mg/dL      Total Cholesterol: 204 mg/dL  Continue a low-fat, low-cholesterol diet along with regular exercise.

## 2024-07-29 NOTE — PROGRESS NOTES
"Chief Complaint  Hypertension (6 month follow up) and Depression  Subjective        Emilie Carrizales presents to [facility]  Hypertension    Depression  Her past medical history is significant for depression.     History of Present Illness  The patient presents for evaluation of multiple medical concerns.    The patient reports experiencing depressive symptoms, which she attributes to the recent loss of her 11-year-old dog and raising a new puppy. Her sleep pattern has been disrupted, with frequent feedings every 4 to 5 hours per night. This has resulted in significant exhaustion and sadness. She is currently under the care of a therapist named Leda, who has provided her with a jairo that provides additional visits, which she reports as beneficial. She also reports a lack of energy, a lack of exercise, and subsequent weight gain.    The patient missed her mammogram appointment and has not yet rescheduled it. She initiated anastrozole therapy on 11/02/2020 and was initially on tamoxifen.    The patient experiences heartburn and indigestion, for which she takes Pepcid daily, which she reports as effective. However, she has experienced episodes of nocturnal nausea accompanied by a burning sensation.     Supplemental Information  She is taking allopurinol, amlodipine, and lisinopril.    Objective   Vital Signs:  /84 (BP Location: Left arm, Patient Position: Sitting, Cuff Size: Large Adult)   Pulse 70   Temp 97.5 °F (36.4 °C)   Ht 162.6 cm (64\")   Wt 102 kg (224 lb)   SpO2 97%   BMI 38.45 kg/m²   Estimated body mass index is 38.45 kg/m² as calculated from the following:    Height as of this encounter: 162.6 cm (64\").    Weight as of this encounter: 102 kg (224 lb).            Physical Exam  Constitutional:       Appearance: She is well-developed. She is not ill-appearing.   HENT:      Head: Normocephalic.      Right Ear: Hearing, tympanic membrane and external ear normal.     "  Left Ear: Hearing, tympanic membrane and external ear normal.      Nose: Nose normal. No nasal deformity, mucosal edema or rhinorrhea.      Right Sinus: No maxillary sinus tenderness or frontal sinus tenderness.      Left Sinus: No maxillary sinus tenderness or frontal sinus tenderness.      Mouth/Throat:      Dentition: Normal dentition.      Pharynx: Posterior oropharyngeal erythema present.   Eyes:      General: Lids are normal.         Right eye: No discharge.         Left eye: No discharge.      Conjunctiva/sclera: Conjunctivae normal.      Right eye: No exudate.     Left eye: No exudate.  Neck:      Thyroid: No thyroid mass or thyromegaly.      Vascular: No carotid bruit.      Trachea: Trachea normal.   Cardiovascular:      Rate and Rhythm: Regular rhythm.      Pulses: Normal pulses.      Heart sounds: Normal heart sounds. No murmur heard.  Pulmonary:      Effort: No respiratory distress.      Breath sounds: Normal breath sounds. No decreased breath sounds, wheezing, rhonchi or rales.   Abdominal:      General: Bowel sounds are normal.      Palpations: Abdomen is soft.      Tenderness: There is no abdominal tenderness.   Musculoskeletal:      Cervical back: Normal range of motion. No edema.   Lymphadenopathy:      Head:      Right side of head: No submental, submandibular, tonsillar, preauricular, posterior auricular or occipital adenopathy.      Left side of head: No submental, submandibular, tonsillar, preauricular, posterior auricular or occipital adenopathy.   Skin:     General: Skin is warm and dry.      Nails: There is no clubbing.   Neurological:      Mental Status: She is alert.   Psychiatric:         Behavior: Behavior is cooperative.        Physical Exam      Result Review :  The following data was reviewed by: VICENTE Ruth on 07/29/2024:  Common labs          1/18/2024    08:28   Common Labs   Glucose 109    BUN 16    Creatinine 0.64    Sodium 139    Potassium 4.2    Chloride 100     Calcium 9.7    Total Protein 6.4    Albumin 4.4    Total Bilirubin 0.7    Alkaline Phosphatase 87    AST (SGOT) 9    ALT (SGPT) 20    Total Cholesterol 204    Triglycerides 114    HDL Cholesterol 86    LDL Cholesterol  98    Hemoglobin A1C 5.30           Results             Assessment and Plan   Diagnoses and all orders for this visit:    1. Benign essential HTN (Primary)  Assessment & Plan:  Hypertension is stable and controlled  Continue current treatment regimen.  Blood pressure will be reassessed in 6 months.  Continue amlodipine and lisinopril daily.    Orders:  -     CBC (No Diff)  -     Comprehensive Metabolic Panel  -     TSH    2. Pure hypercholesterolemia  Assessment & Plan:  The 10-year ASCVD risk score (Ivette HUYNH, et al., 2019) is: 3.1%    Values used to calculate the score:      Age: 54 years      Sex: Female      Is Non- : No      Diabetic: Yes      Tobacco smoker: No      Systolic Blood Pressure: 130 mmHg      Is BP treated: Yes      HDL Cholesterol: 86 mg/dL      Total Cholesterol: 204 mg/dL  Continue a low-fat, low-cholesterol diet along with regular exercise.    Orders:  -     Lipid Panel    3. Vitamin D deficiency  Assessment & Plan:  She is currently managed on a daily Vitamin D supplement, recheck level.    Orders:  -     Vitamin D,25-Hydroxy    4. Hyperuricemia  Assessment & Plan:  She denies recent gout flares, currently managed on allopurinol daily-recheck uric acid.    Orders:  -     Uric Acid    5. Elevated glucose  Assessment & Plan:  She tries to maintain a low-carb, low-sugar diet and has lost weight over the past year; recheck A1c.    Orders:  -     Hemoglobin A1c    6. Grief  Assessment & Plan:  She c/o worsening mood with the recent death of her dog and now raising a puppy. She is working with a therapist, has not tolerated medications for anxiety well int he past but does have Lorazepam as needed for panic attacks.        Assessment & Plan  Heartburn and  indigestion.  The patient is advised to take over-the-counter Pepcid 20 mg as needed, continue to monitor.           Follow Up   Return in about 4 weeks (around 8/26/2024) for Annual physical.  Patient was given instructions and counseling regarding her condition or for health maintenance advice. Please see specific information pulled into the AVS if appropriate.     Patient or patient representative verbalized consent for the use of Ambient Listening during the visit with  VICENTE Ruth for chart documentation. 7/29/2024  19:22 EDT

## 2024-07-29 NOTE — ASSESSMENT & PLAN NOTE
She c/o worsening mood with the recent death of her dog and now raising a puppy. She is working with a therapist, has not tolerated medications for anxiety well int he past but does have Lorazepam as needed for panic attacks.

## 2024-07-30 NOTE — PROGRESS NOTES
Prior Authorization COMPLETED  for DIAZEPAM 2MG TABLETS    KEY# [id number]    Pharmacy [facility]  Phone: [phone]  Fax: [phone]     Subjective   Emilie Soto is a 50 y.o. female who presents for a telephone visit to f/u on ovarian cysts and GYN/ONC visit.    You have chosen to receive care through a telephone visit. Do you consent to use a telephone visit for your medical care today? Yes    She had a transvaginal ultrasound which revealed ovarian cyst bilaterally.She underwent an endometrial biopsy and was ultimately referred to Dr. Neumann with [facility].  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  Dr. Neumann recommended a complete hysterectomy due to her genetic mutation and abnormal ultrasound.  The surgery is scheduled for next week.  She is currently managed on tamoxifen and has done well with this medication.  Denies hot flashes.  She also is managed on daily Pristiq for her anxiety.       The following portions of the patient's history were reviewed and updated as appropriate: allergies, current medications, past social history and problem list.    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 May begin 1 tablet afterward., Disp: 30 tablet, Rfl: 0  •  desvenlafaxine (PRISTIQ) 100 MG 24 hr tablet, Take 1 tablet by mouth Daily., Disp: 90 tablet,  Rfl: 0  •  lisinopril-hydrochlorothiazide (PRINZIDE,ZESTORETIC) 20-25 MG per tablet, TAKE 1 TABLET BY MOUTH DAILY, Disp: 30 tablet, Rfl: 5  •  LORazepam (ATIVAN) 0.5 MG tablet, Take 1 tablet by mouth Every 8 (Eight) Hours As Needed for Anxiety. for anxiety, Disp: 45 tablet, Rfl: 0  •  methylphenidate LA (Ritalin LA) 20 MG 24 hr capsule, Take 1 capsule by mouth Daily, Disp: 30 capsule, Rfl: 0  •  moxifloxacin (VIGAMOX) 0.5 % ophthalmic solution, Administer 0.05 mL to the right eye 3 (Three) Times a Day., Disp: 3 mL, Rfl: 0  •  tamoxifen (NOLVADEX) 20 MG chemo tablet, Take 1 tablet by mouth Daily., Disp: 30 tablet, Rfl: 5  •  vitamin B-12 (CYANOCOBALAMIN) 1000 MCG tablet, Take 1,000 mcg by mouth Daily., Disp: , Rfl:     Allergies   Allergen Reactions   • Citalopram Rash   • Flexeril [Cyclobenzaprine] Unknown - High Severity     MUSCLES TENSE-OPPOSITE OF WHAT IT IS INTENDED TO DO-ELEVATES B/P   • Doxycycline Rash     Blisters on hands   • Keflex [Cephalexin] Rash   • Sulfa Antibiotics Rash     NAUSEA/VOMITING/FEVER   • Vilazodone Hcl Hives, Itching and Rash       Review of Systems   Constitutional: Negative for chills, fatigue, fever and unexpected weight change.   HENT: Negative for congestion, ear pain, postnasal drip, sinus pressure, sore throat and trouble swallowing.    Eyes: Negative for visual disturbance.   Respiratory: Negative for cough, chest tightness and wheezing.    Cardiovascular: Negative for chest pain, palpitations and leg swelling.   Gastrointestinal: Negative for abdominal pain, blood in stool, nausea and vomiting.   Genitourinary: Negative for dysuria, frequency and urgency.   Musculoskeletal: Negative for arthralgias and joint swelling.   Skin: Negative for color change.   Neurological: Negative for syncope, weakness and headaches.   Hematological: Does not bruise/bleed easily.   Psychiatric/Behavioral: The patient is nervous/anxious.        Objective   Vitals:    08/27/20 1034   Temp: 99.8 °F  "(37.7 °C)   TempSrc: Oral   Weight: (!) 140 kg (308 lb)   Height: 167.6 cm (66\")     Body mass index is 49.71 kg/m².  Physical Exam    Assessment/Plan   Emilie was seen today for ovarian cysts.    Diagnoses and all orders for this visit:    Bilateral ovarian cysts    PCOS (polycystic ovarian syndrome)    Monoallelic mutation of CHEK2 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    Personal history of breast cancer    Reviewed notes from Dr. Neumann with patient.  She is recommending a hysterectomy due to several of her ovarian cancer patients having the CHEK-2 mutation.  Patient is agreeable to having procedure but is very anxious that her gynecologist and oncologist are included in this information as well.  We discussed how this will initiate menopause and she is currently managed on tamoxifen.  She is not currently experiencing any hot flashes. She has a f/u appt with oncology.    History and medical judgement obtained from phone conversation with patient. No physical examination was performed. Approximately 25 minutes spent in chart review and phone conversation with patient.             "

## 2024-09-05 ENCOUNTER — OFFICE VISIT (OUTPATIENT)
Dept: INTERNAL MEDICINE | Facility: CLINIC | Age: 54
End: 2024-09-05
Payer: COMMERCIAL

## 2024-09-05 VITALS
HEIGHT: 64 IN | WEIGHT: 225 LBS | SYSTOLIC BLOOD PRESSURE: 154 MMHG | TEMPERATURE: 98.1 F | BODY MASS INDEX: 38.41 KG/M2 | DIASTOLIC BLOOD PRESSURE: 88 MMHG | OXYGEN SATURATION: 98 % | HEART RATE: 70 BPM

## 2024-09-05 DIAGNOSIS — C50.111 MALIGNANT NEOPLASM OF CENTRAL PORTION OF RIGHT BREAST IN FEMALE, ESTROGEN RECEPTOR POSITIVE: ICD-10-CM

## 2024-09-05 DIAGNOSIS — Z17.0 MALIGNANT NEOPLASM OF CENTRAL PORTION OF RIGHT BREAST IN FEMALE, ESTROGEN RECEPTOR POSITIVE: Chronic | ICD-10-CM

## 2024-09-05 DIAGNOSIS — C50.111 MALIGNANT NEOPLASM OF CENTRAL PORTION OF RIGHT BREAST IN FEMALE, ESTROGEN RECEPTOR POSITIVE: Chronic | ICD-10-CM

## 2024-09-05 DIAGNOSIS — G43.801 OTHER MIGRAINE WITH STATUS MIGRAINOSUS, NOT INTRACTABLE: ICD-10-CM

## 2024-09-05 DIAGNOSIS — I10 BENIGN ESSENTIAL HTN: Primary | Chronic | ICD-10-CM

## 2024-09-05 DIAGNOSIS — Z17.0 MALIGNANT NEOPLASM OF CENTRAL PORTION OF RIGHT BREAST IN FEMALE, ESTROGEN RECEPTOR POSITIVE: ICD-10-CM

## 2024-09-05 RX ORDER — AMLODIPINE BESYLATE 10 MG/1
10 TABLET ORAL NIGHTLY
Qty: 90 TABLET | Refills: 1 | Status: SHIPPED | OUTPATIENT
Start: 2024-09-05

## 2024-09-05 RX ORDER — ANASTROZOLE 1 MG/1
1 TABLET ORAL DAILY
Qty: 90 TABLET | Refills: 3 | Status: SHIPPED | OUTPATIENT
Start: 2024-09-05

## 2024-09-22 PROBLEM — F43.21 GRIEF: Status: RESOLVED | Noted: 2024-07-29 | Resolved: 2024-09-22

## 2024-09-22 PROBLEM — G43.909 MIGRAINE: Status: ACTIVE | Noted: 2024-09-22

## 2024-09-30 ENCOUNTER — OFFICE VISIT (OUTPATIENT)
Dept: INTERNAL MEDICINE | Facility: CLINIC | Age: 54
End: 2024-09-30
Payer: COMMERCIAL

## 2024-09-30 VITALS
BODY MASS INDEX: 39.09 KG/M2 | WEIGHT: 229 LBS | OXYGEN SATURATION: 99 % | DIASTOLIC BLOOD PRESSURE: 84 MMHG | HEART RATE: 70 BPM | SYSTOLIC BLOOD PRESSURE: 134 MMHG | HEIGHT: 64 IN

## 2024-09-30 DIAGNOSIS — J06.9 VIRAL URI: ICD-10-CM

## 2024-09-30 DIAGNOSIS — R07.81 RIB PAIN ON LEFT SIDE: Primary | ICD-10-CM

## 2024-09-30 DIAGNOSIS — I10 BENIGN ESSENTIAL HTN: Chronic | ICD-10-CM

## 2024-09-30 DIAGNOSIS — Z17.0 MALIGNANT NEOPLASM OF CENTRAL PORTION OF RIGHT BREAST IN FEMALE, ESTROGEN RECEPTOR POSITIVE: Chronic | ICD-10-CM

## 2024-09-30 DIAGNOSIS — C50.111 MALIGNANT NEOPLASM OF CENTRAL PORTION OF RIGHT BREAST IN FEMALE, ESTROGEN RECEPTOR POSITIVE: Chronic | ICD-10-CM

## 2024-09-30 PROCEDURE — 99214 OFFICE O/P EST MOD 30 MIN: CPT | Performed by: NURSE PRACTITIONER

## 2024-10-20 PROBLEM — J06.9 VIRAL URI: Status: ACTIVE | Noted: 2024-10-20

## 2024-10-20 PROBLEM — R07.81 RIB PAIN ON LEFT SIDE: Status: ACTIVE | Noted: 2024-10-20

## 2024-10-20 NOTE — ASSESSMENT & PLAN NOTE
The patient's blood pressure was recorded at 130/90, which is slightly elevated. A recheck showed 134/84. She was advised to monitor her blood pressure at home.

## 2024-10-20 NOTE — ASSESSMENT & PLAN NOTE
The patient experienced congestion and a headache starting last night, which improved by the morning. The back of her throat appeared slightly red. Mucinex was recommended to thin mucus and facilitate drainage. RTC if sx worsen.

## 2024-10-20 NOTE — PROGRESS NOTES
"Chief Complaint  Rib Pain  Subjective        Emilie Carrizales presents to [facility]  History of Present Illness  History of Present Illness  The patient presents for evaluation of left rib pain.    She reports an improvement in her condition compared to the previous week when she initially sought medical attention. The onset of her symptoms occurred two weeks ago while she was changing a truck starter. She experienced a sudden, sharp pain on her left side, which she describes as a \"big pinch\". Despite the discomfort, she continued her task. She is uncertain whether she has bruised or broken a rib, or pulled a muscle. The pain intensifies when she takes deep breaths, although it has lessened since last week.    She reports no rashes, shortness of breath, coughing, or congestion. She also mentions feeling unwell yesterday, with symptoms of congestion and a headache, but felt better upon waking up this morning.    Objective   Vital Signs:  /84 (BP Location: Left arm, Patient Position: Sitting, Cuff Size: Adult)   Pulse 70   Ht 162.6 cm (64\")   Wt 104 kg (229 lb)   SpO2 99%   BMI 39.31 kg/m²   Estimated body mass index is 39.31 kg/m² as calculated from the following:    Height as of this encounter: 162.6 cm (64\").    Weight as of this encounter: 104 kg (229 lb).            Physical Exam  Constitutional:       Appearance: She is well-developed. She is not ill-appearing.   HENT:      Head: Normocephalic.      Right Ear: Hearing, tympanic membrane and external ear normal. Tympanic membrane is bulging.      Left Ear: Hearing, tympanic membrane and external ear normal. Tympanic membrane is bulging.      Nose: Nose normal. No nasal deformity, mucosal edema or rhinorrhea.      Right Sinus: No maxillary sinus tenderness or frontal sinus tenderness.      Left Sinus: No maxillary sinus tenderness or frontal sinus tenderness.      Mouth/Throat:      Dentition: Normal dentition.      " Pharynx: Posterior oropharyngeal erythema present.   Eyes:      General: Lids are normal.         Right eye: No discharge.         Left eye: No discharge.      Conjunctiva/sclera: Conjunctivae normal.      Right eye: No exudate.     Left eye: No exudate.  Neck:      Thyroid: No thyroid mass or thyromegaly.      Vascular: No carotid bruit.      Trachea: Trachea normal.   Cardiovascular:      Rate and Rhythm: Regular rhythm.      Pulses: Normal pulses.      Heart sounds: Normal heart sounds. No murmur heard.  Pulmonary:      Effort: No respiratory distress.      Breath sounds: Normal breath sounds. No decreased breath sounds, wheezing, rhonchi or rales.   Chest:      Chest wall: Tenderness (left lateral rib tenderness) present.   Abdominal:      General: Bowel sounds are normal.      Palpations: Abdomen is soft.      Tenderness: There is no abdominal tenderness.   Musculoskeletal:      Cervical back: Normal range of motion. No edema.   Lymphadenopathy:      Head:      Right side of head: No submental, submandibular, tonsillar, preauricular, posterior auricular or occipital adenopathy.      Left side of head: No submental, submandibular, tonsillar, preauricular, posterior auricular or occipital adenopathy.      Cervical: No cervical adenopathy.   Skin:     General: Skin is warm and dry.      Nails: There is no clubbing.   Neurological:      Mental Status: She is alert.   Psychiatric:         Behavior: Behavior is cooperative.        Physical Exam  Vital Signs  Blood pressure measures 134/84.    Result Review :  The following data was reviewed by: VICENTE Ruth on 09/30/2024:  Common labs          1/18/2024    08:28   Common Labs   Glucose 109    BUN 16    Creatinine 0.64    Sodium 139    Potassium 4.2    Chloride 100    Calcium 9.7    Total Protein 6.4    Albumin 4.4    Total Bilirubin 0.7    Alkaline Phosphatase 87    AST (SGOT) 9    ALT (SGPT) 20    Total Cholesterol 204    Triglycerides 114    HDL  Cholesterol 86    LDL Cholesterol  98    Hemoglobin A1C 5.30           Results               Assessment and Plan   Diagnoses and all orders for this visit:    1. Rib pain on left side (Primary)  Assessment & Plan:  The rib pain started two weeks ago after changing a truck starter. The pain was initially sharp but has since become achy. It is likely due to a muscle strain rather than a fracture, given the improvement in symptoms. The patient was advised to use ice post-activity to reduce inflammation and apply heat at night to alleviate muscle tightness. An x-ray of the ribs was considered but deemed unnecessary at this point.      2. Viral URI  Assessment & Plan:  The patient experienced congestion and a headache starting last night, which improved by the morning. The back of her throat appeared slightly red. Mucinex was recommended to thin mucus and facilitate drainage. RTC if sx worsen.      3. Malignant neoplasm of central portion of right breast in female, estrogen receptor positive  -     Ambulatory Referral to Hematology / Oncology    4. Benign essential HTN  Assessment & Plan:  The patient's blood pressure was recorded at 130/90, which is slightly elevated. A recheck showed 134/84. She was advised to monitor her blood pressure at home.        Assessment & Plan    Health Maintenance.  She needs to reschedule her mammogram and get her blood work done. She also mentioned needing the COVID-19 vaccine, flu shot, and shingles vaccine. It was suggested to delay the shingles vaccine due to her current symptoms, as it can cause body aches and chills.    She will f/u with [facility] regarding hx of breast cancer (previous oncologist has retired).       Follow Up   Return if symptoms worsen or fail to improve, for Next scheduled follow up.  Patient was given instructions and counseling regarding her condition or for health maintenance advice. Please see specific information pulled into the AVS if appropriate.     Patient or  patient representative verbalized consent for the use of Ambient Listening during the visit with  VICENTE Ruth for chart documentation. 10/20/2024  07:44 EDT  Answers submitted by the patient for this visit:  Other (Submitted on 9/26/2024)  Please describe your symptoms.: I think i bruised or broke a rib…. Or pulled a muscle.  Not really sure  Have you had these symptoms before?: No  How long have you been having these symptoms?: 1-2 weeks  Please list any medications you are currently taking for this condition.: None  Please describe any probable cause for these symptoms. : I was changing the starter in my truck.  I was twisted and bent over into the truck and my left side was on the battery.  I felt a big pinch and the pain.  And it hasn’t went away.  It hurts when I cough of take a deep breath in.  I was cutting grass last night and it hurt to pull the cord to start the mower.  Primary Reason for Visit (Submitted on 9/26/2024)  What is the primary reason for your visit?: Problem Not Listed

## 2024-10-20 NOTE — ASSESSMENT & PLAN NOTE
The rib pain started two weeks ago after changing a truck starter. The pain was initially sharp but has since become achy. It is likely due to a muscle strain rather than a fracture, given the improvement in symptoms. The patient was advised to use ice post-activity to reduce inflammation and apply heat at night to alleviate muscle tightness. An x-ray of the ribs was considered but deemed unnecessary at this point.

## 2024-11-20 ENCOUNTER — LAB (OUTPATIENT)
Dept: LAB | Facility: HOSPITAL | Age: 54
End: 2024-11-20
Payer: COMMERCIAL

## 2024-11-20 ENCOUNTER — OFFICE VISIT (OUTPATIENT)
Dept: ONCOLOGY | Facility: CLINIC | Age: 54
End: 2024-11-20
Payer: COMMERCIAL

## 2024-11-20 VITALS
SYSTOLIC BLOOD PRESSURE: 158 MMHG | HEIGHT: 64 IN | HEART RATE: 82 BPM | OXYGEN SATURATION: 98 % | DIASTOLIC BLOOD PRESSURE: 95 MMHG | BODY MASS INDEX: 40.46 KG/M2 | RESPIRATION RATE: 16 BRPM | WEIGHT: 237 LBS | TEMPERATURE: 98.1 F

## 2024-11-20 DIAGNOSIS — C50.111 MALIGNANT NEOPLASM OF CENTRAL PORTION OF RIGHT BREAST IN FEMALE, ESTROGEN RECEPTOR POSITIVE: ICD-10-CM

## 2024-11-20 DIAGNOSIS — Z17.0 MALIGNANT NEOPLASM OF CENTRAL PORTION OF RIGHT BREAST IN FEMALE, ESTROGEN RECEPTOR POSITIVE: Primary | ICD-10-CM

## 2024-11-20 DIAGNOSIS — Z12.31 VISIT FOR SCREENING MAMMOGRAM: ICD-10-CM

## 2024-11-20 DIAGNOSIS — Z78.0 POST-MENOPAUSAL: ICD-10-CM

## 2024-11-20 DIAGNOSIS — C50.111 MALIGNANT NEOPLASM OF CENTRAL PORTION OF RIGHT BREAST IN FEMALE, ESTROGEN RECEPTOR POSITIVE: Primary | ICD-10-CM

## 2024-11-20 DIAGNOSIS — Z17.0 MALIGNANT NEOPLASM OF CENTRAL PORTION OF RIGHT BREAST IN FEMALE, ESTROGEN RECEPTOR POSITIVE: ICD-10-CM

## 2024-11-20 LAB
ALBUMIN SERPL-MCNC: 4.3 G/DL (ref 3.5–5.2)
ALBUMIN/GLOB SERPL: 1.5 G/DL
ALP SERPL-CCNC: 72 U/L (ref 39–117)
ALT SERPL W P-5'-P-CCNC: 18 U/L (ref 1–33)
ANION GAP SERPL CALCULATED.3IONS-SCNC: 8 MMOL/L (ref 5–15)
AST SERPL-CCNC: 17 U/L (ref 1–32)
BASOPHILS # BLD AUTO: 0.04 10*3/MM3 (ref 0–0.2)
BASOPHILS NFR BLD AUTO: 0.7 % (ref 0–1.5)
BILIRUB SERPL-MCNC: 0.8 MG/DL (ref 0–1.2)
BUN SERPL-MCNC: 14 MG/DL (ref 6–20)
BUN/CREAT SERPL: 21.9 (ref 7–25)
CALCIUM SPEC-SCNC: 10.3 MG/DL (ref 8.6–10.5)
CHLORIDE SERPL-SCNC: 102 MMOL/L (ref 98–107)
CO2 SERPL-SCNC: 29 MMOL/L (ref 22–29)
CREAT SERPL-MCNC: 0.64 MG/DL (ref 0.57–1)
DEPRECATED RDW RBC AUTO: 38.6 FL (ref 37–54)
EGFRCR SERPLBLD CKD-EPI 2021: 105.2 ML/MIN/1.73
EOSINOPHIL # BLD AUTO: 0.24 10*3/MM3 (ref 0–0.4)
EOSINOPHIL NFR BLD AUTO: 4.4 % (ref 0.3–6.2)
ERYTHROCYTE [DISTWIDTH] IN BLOOD BY AUTOMATED COUNT: 12 % (ref 12.3–15.4)
GLOBULIN UR ELPH-MCNC: 2.8 GM/DL
GLUCOSE SERPL-MCNC: 101 MG/DL (ref 65–99)
HCT VFR BLD AUTO: 42.4 % (ref 34–46.6)
HGB BLD-MCNC: 14.4 G/DL (ref 12–15.9)
IMM GRANULOCYTES # BLD AUTO: 0.01 10*3/MM3 (ref 0–0.05)
IMM GRANULOCYTES NFR BLD AUTO: 0.2 % (ref 0–0.5)
LYMPHOCYTES # BLD AUTO: 1.55 10*3/MM3 (ref 0.7–3.1)
LYMPHOCYTES NFR BLD AUTO: 28.1 % (ref 19.6–45.3)
MCH RBC QN AUTO: 29.9 PG (ref 26.6–33)
MCHC RBC AUTO-ENTMCNC: 34 G/DL (ref 31.5–35.7)
MCV RBC AUTO: 88 FL (ref 79–97)
MONOCYTES # BLD AUTO: 0.34 10*3/MM3 (ref 0.1–0.9)
MONOCYTES NFR BLD AUTO: 6.2 % (ref 5–12)
NEUTROPHILS NFR BLD AUTO: 3.33 10*3/MM3 (ref 1.7–7)
NEUTROPHILS NFR BLD AUTO: 60.4 % (ref 42.7–76)
NRBC BLD AUTO-RTO: 0 /100 WBC (ref 0–0.2)
PLATELET # BLD AUTO: 205 10*3/MM3 (ref 140–450)
PMV BLD AUTO: 9.1 FL (ref 6–12)
POTASSIUM SERPL-SCNC: 4.2 MMOL/L (ref 3.5–5.2)
PROT SERPL-MCNC: 7.1 G/DL (ref 6–8.5)
RBC # BLD AUTO: 4.82 10*6/MM3 (ref 3.77–5.28)
SODIUM SERPL-SCNC: 139 MMOL/L (ref 136–145)
WBC NRBC COR # BLD AUTO: 5.51 10*3/MM3 (ref 3.4–10.8)

## 2024-11-20 PROCEDURE — 36415 COLL VENOUS BLD VENIPUNCTURE: CPT

## 2024-11-20 PROCEDURE — 85025 COMPLETE CBC W/AUTO DIFF WBC: CPT

## 2024-11-20 PROCEDURE — 80053 COMPREHEN METABOLIC PANEL: CPT

## 2024-11-20 NOTE — PROGRESS NOTES
Subjective   Emilie Carrizales is a 54 y.o. female. With right breast invasive ductal carcinoma, stage Ia    History of Present Illness   Ms. Blankenship is a 54-year-old postmenopausal  lady who was diagnosed with invasive ductal carcinoma of the right breast, clinical T1b N0 M0, anatomic stage Ia, prognostic stage Ia, ER +82.35%, MO +92.68%, HER2/latoya negative 1+, Ki-67 14.8%.  She is status post right breast lumpectomy on 5/28/2019 with removal of the nipple areolar complex and right sentinel lymph node biopsy.  Invasive ductal carcinoma with grade 2, tumor measured 10 mm, no DCIS and no lymph node involvement  Oncotype DX recurrence score of 6 indicating no benefit from chemotherapy.  Patient was premenopausal at the time of diagnosis of breast cancer and she was started on tamoxifen on 6/28/2019.  She underwent a hysterectomy and bilateral salpingo-oophorectomy on 9/4/2020 following which treatment was changed to Arimidex on 11/2/2020.  Patient continues on Arimidex without any evidence of recurrent disease.  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    Interval history  Emilie returns today for follow-up.  She lost her dog during childbirth a few months ago and she has been extremely tearful about this.  She has been seeing the therapist at the [facility] for the same.  She does not want any medications to help with the depression.  She has been compliant with anastrozole.  She has been on endocrine therapy for over 5 years now.  She reports 0 side effects from it.  She denies any new breast masses.  She was unable to get her screening mammogram in June 2024 due to the death of her dog and having to take care of the puppies.  She is willing to reschedule that.    The following portions of the patient's history were reviewed and updated as appropriate: allergies, current  medications, past family history, past medical history, past social history, past surgical history, and problem list.    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    Depression 01/01/1988    Diabetes mellitus     pre diabetic    Family history of breast cancer 05/14/2019    GERD (gastroesophageal reflux disease)     Headache     History of 2019 novel coronavirus disease (COVID-19)     2/2022 AND 8/9/22 (TESTED AT [facility], RESULTS IN EPIC)    History of kidney stones     Hyperlipidemia     Hypertension     Irritable bowel syndrome     Lumbago     Mood disorder     Obesity life    PONV (postoperative nausea and vomiting)     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    Sleep apnea     C-PAP IN USE    Vitamin D deficiency         Past Surgical History:   Procedure Laterality Date    APPENDECTOMY  1989    removed when removing dermoid on right ovary    BREAST BIOPSY Right 2019    BREAST LUMPECTOMY Right     BREAST LUMPECTOMY WITH SENTINEL NODE BIOPSY Right 5/28/2019    Procedure: Right central partial mastectomy (with removal of the nipple-areola-complex) and sentinel lymph node biopsy;  Surgeon: Trena Greene MD;  Location: [facility] MAIN OR;  Service: General    COLONOSCOPY N/A 12/5/2019    Procedure: COLONOSCOPY TO CECUM;  Surgeon: Pedrito Fulton Jr., MD;  Location: [facility] ENDOSCOPY;  Service: General    DERMOID CYST  EXCISION  1989    ENDOSCOPY N/A 12/28/2021    Procedure: ESOPHAGOGASTRODUODENOSCOPY WITH BIOPSY;  Surgeon: Dlefino Rivera Jr., MD;  Location: [facility] ENDOSCOPY;  Service: General;  Laterality: N/A;  PRE- DYSPEPSIA  POST-- GASTRITIS, GASTRIC POLYPS    GASTRIC SLEEVE LAPAROSCOPIC N/A 9/12/2022    Procedure: GASTRIC SLEEVE LAPAROSCOPIC OR ROBOTIC;  Surgeon: Delfino Rivera Jr., MD;  Location: [facility] OR [facility];  Service: Robotics - DaVinci;  Laterality: N/A;    HERNIA REPAIR  2006    umbilical     HYSTERECTOMY Bilateral 2020    Dr. Neumann, due to ovarian cysts with previous hx breast cancer    LAPAROSCOPIC CHOLECYSTECTOMY      SALIVARY GLAND SURGERY      due to cancer    TONSILLECTOMY  1979    I was 9 years old        Family History   Problem Relation Age of Onset    Hypertension Mother     Arthritis Mother     Kidney cancer Mother     Depression Mother         Not Dr vela- wont go    Heart disease Father     Hernia Father     Hypertension Father     Hearing loss Father     No Known Problems Brother     Breast cancer Paternal Aunt     Breast cancer Paternal Cousin     Drug abuse Paternal Uncle         life long    Drug abuse Paternal Uncle         life long    Breast cancer Paternal Grandmother     Osteoporosis Maternal Grandmother     Lung cancer Paternal Grandfather     Malig Hyperthermia Neg Hx         Social History     Socioeconomic History    Marital status:      Spouse name: Trena Montenegro    Number of children: 0   Tobacco Use    Smoking status: Never    Smokeless tobacco: Never    Tobacco comments:     Parents smoked.  I have never smoked   Vaping Use    Vaping status: Never Used   Substance and Sexual Activity    Alcohol use: Yes     Comment: Only once or twice a year.  Mixed Drinks    Drug use: No    Sexual activity: Yes     Partners: Female     Birth control/protection: None        OB History          0    Para   0    Term   0       0    AB   0    Living   0         SAB   0    IAB   0    Ectopic   0    Molar   0    Multiple   0    Live Births   0                 Allergies   Allergen Reactions    Citalopram Rash    Flexeril [Cyclobenzaprine] Unknown - High Severity     MUSCLES TENSE-OPPOSITE OF WHAT IT IS INTENDED TO DO-ELEVATES B/P    Doxycycline Rash     Blisters on hands    Keflex [Cephalexin] Rash    Sulfa Antibiotics Rash     NAUSEA/VOMITING/FEVER    Vilazodone Hcl Hives, Itching and Rash   Age at menarche-9  Patient was never pregnant  No use of  "hormone replacement therapy  She is postmenopausal after hysterectomy and salpingo-oophorectomy November 2020        Review of Systems  Review of systems as mentioned HPI otherwise negative    Objective   Blood pressure 158/95, pulse 82, temperature 98.1 °F (36.7 °C), temperature source Oral, resp. rate 16, height 162.6 cm (64.02\"), weight 108 kg (237 lb), last menstrual period 06/25/2020, SpO2 98%, not currently breastfeeding.   Physical Exam  Vitals reviewed.   Constitutional:       Appearance: Normal appearance. She is obese.   HENT:      Nose: Nose normal.      Mouth/Throat:      Pharynx: Oropharynx is clear.   Eyes:      Conjunctiva/sclera: Conjunctivae normal.   Cardiovascular:      Rate and Rhythm: Normal rate.   Pulmonary:      Effort: Pulmonary effort is normal.   Abdominal:      General: Abdomen is flat.   Musculoskeletal:         General: Normal range of motion.   Neurological:      General: No focal deficit present.      Mental Status: She is alert.   Psychiatric:         Mood and Affect: Mood normal.         Thought Content: Thought content normal.         Judgment: Judgment normal.       Breast Exam: Right breast status post nipple areolar complex removal, no palpable abnormalities of the right breast.  Surgical scar present.  Left breast appears normal on inspection.  No palpable abnormalities of the left breast or axilla.    Lab on 11/20/2024   Component Date Value Ref Range Status    WBC 11/20/2024 5.51  3.40 - 10.80 10*3/mm3 Final    RBC 11/20/2024 4.82  3.77 - 5.28 10*6/mm3 Final    Hemoglobin 11/20/2024 14.4  12.0 - 15.9 g/dL Final    Hematocrit 11/20/2024 42.4  34.0 - 46.6 % Final    MCV 11/20/2024 88.0  79.0 - 97.0 fL Final    MCH 11/20/2024 29.9  26.6 - 33.0 pg Final    MCHC 11/20/2024 34.0  31.5 - 35.7 g/dL Final    RDW 11/20/2024 12.0 (L)  12.3 - 15.4 % Final    RDW-SD 11/20/2024 38.6  37.0 - 54.0 fl Final    MPV 11/20/2024 9.1  6.0 - 12.0 fL Final    Platelets 11/20/2024 205  140 - 450 " 10*3/mm3 Final    Neutrophil % 11/20/2024 60.4  42.7 - 76.0 % Final    Lymphocyte % 11/20/2024 28.1  19.6 - 45.3 % Final    Monocyte % 11/20/2024 6.2  5.0 - 12.0 % Final    Eosinophil % 11/20/2024 4.4  0.3 - 6.2 % Final    Basophil % 11/20/2024 0.7  0.0 - 1.5 % Final    Immature Grans % 11/20/2024 0.2  0.0 - 0.5 % Final    Neutrophils, Absolute 11/20/2024 3.33  1.70 - 7.00 10*3/mm3 Final    Lymphocytes, Absolute 11/20/2024 1.55  0.70 - 3.10 10*3/mm3 Final    Monocytes, Absolute 11/20/2024 0.34  0.10 - 0.90 10*3/mm3 Final    Eosinophils, Absolute 11/20/2024 0.24  0.00 - 0.40 10*3/mm3 Final    Basophils, Absolute 11/20/2024 0.04  0.00 - 0.20 10*3/mm3 Final    Immature Grans, Absolute 11/20/2024 0.01  0.00 - 0.05 10*3/mm3 Final    nRBC 11/20/2024 0.0  0.0 - 0.2 /100 WBC Final        No radiology results for the last 30 days.     11/20/2024-CBC and CMP reviewed and within normal limits.    Assessment & Plan       *Right breast invasive ductal carcinoma  pT1b N0 M0, stage Ia prognostic, grade 2 invasive ductal carcinoma, ER +82.35%, CO +92.68%, HER2 1+ on immunohistochemistry, Ki-67 14.8%.  5/28/2019-right lumpectomy with removal of the nipple areolar complex and right sentinel lymph node biopsy which was negative  Oncotype DX recurrence score of 6 with no additional benefit from chemotherapy  Started on tamoxifen in June 2019  Continued on tamoxifen till November 2020 and subsequently treatment changed to Arimidex after undergoing a hysterectomy and salpingo-oophorectomy in September 2020  Patient tolerates anastrozole well without any recurrence of disease.  She has completed 5 years of endocrine therapy.  Today we discussed regarding prolonged endocrine therapy versus discontinuing the medication.  Discussed the risks and benefits of the same however patient feels strongly that she wants to continue endocrine therapy.  She is tolerating anastrozole well without any side effects and we will continue.    *CHEK2  mutation  Paternal cousin with breast cancer at the age of 43 and  of breast cancer.  Paternal aunt with breast cancer status post mastectomy and paternal grandmother with breast cancer status post lumpectomy  [facility] genetic testing positive for CHEK2 autosomal dominant mutation increasing the risk of breast and colon cancer and parathyroid and prostate cancer.  She has met with   Currently undergoing annual mammogram alternating with MRI  She will require colonoscopies every 5 years, will discuss at next visit.    *Surveillance  Past due for screening mammogram, will be ordered and scheduled today  MRI due 2025    *Bone health  DEXA scan from 2021 normal  She is past due for DEXA  Repeat DEXA  Continue vitamin D      *Anxiety and depression  She does not wish to be on medications  She is very tearful at the visit today due to the death of her dog.  She currently sees a therapist and wishes to continue with them.      *Hypertension  Blood pressure 158/95  Continue current medications    *Hypercalcemia  Most recent calcium level normal.    *Hot flashes  Had them right after hysterectomy however now manageable.  Does not wish to be on any medications for that.    *Recommendations  Continue anastrozole  Continue vitamin D  DEXA scan  Annual screening mammogram past due  MRI 2025  APRN in 6 months and MD in 1 year  Continue follow-up with therapist for depression    41 minutes total spent on the encounter on the same day

## 2024-12-03 DIAGNOSIS — I10 BENIGN ESSENTIAL HTN: Chronic | ICD-10-CM

## 2024-12-03 RX ORDER — AMLODIPINE BESYLATE 10 MG/1
10 TABLET ORAL NIGHTLY
Qty: 90 TABLET | Refills: 1 | Status: SHIPPED | OUTPATIENT
Start: 2024-12-03

## 2024-12-03 RX ORDER — LISINOPRIL 40 MG/1
40 TABLET ORAL DAILY
Qty: 90 TABLET | Refills: 1 | Status: SHIPPED | OUTPATIENT
Start: 2024-12-03

## 2024-12-04 ENCOUNTER — PATIENT MESSAGE (OUTPATIENT)
Dept: INTERNAL MEDICINE | Facility: CLINIC | Age: 54
End: 2024-12-04
Payer: COMMERCIAL

## 2024-12-06 ENCOUNTER — OFFICE VISIT (OUTPATIENT)
Dept: INTERNAL MEDICINE | Facility: CLINIC | Age: 54
End: 2024-12-06
Payer: COMMERCIAL

## 2024-12-06 VITALS
BODY MASS INDEX: 40.87 KG/M2 | OXYGEN SATURATION: 97 % | HEIGHT: 64 IN | TEMPERATURE: 98.7 F | HEART RATE: 77 BPM | SYSTOLIC BLOOD PRESSURE: 152 MMHG | RESPIRATION RATE: 20 BRPM | DIASTOLIC BLOOD PRESSURE: 88 MMHG | WEIGHT: 239.4 LBS

## 2024-12-06 DIAGNOSIS — R68.89 FLU-LIKE SYMPTOMS: ICD-10-CM

## 2024-12-06 DIAGNOSIS — J20.9 ACUTE BRONCHITIS, UNSPECIFIED ORGANISM: Primary | ICD-10-CM

## 2024-12-06 DIAGNOSIS — I10 BENIGN ESSENTIAL HTN: Chronic | ICD-10-CM

## 2024-12-06 PROCEDURE — 87428 SARSCOV & INF VIR A&B AG IA: CPT | Performed by: NURSE PRACTITIONER

## 2024-12-06 PROCEDURE — 99213 OFFICE O/P EST LOW 20 MIN: CPT | Performed by: NURSE PRACTITIONER

## 2024-12-06 RX ORDER — AZITHROMYCIN 250 MG/1
TABLET, FILM COATED ORAL
Qty: 6 TABLET | Refills: 0 | Status: SHIPPED | OUTPATIENT
Start: 2024-12-06 | End: 2024-12-12

## 2024-12-06 RX ORDER — PREDNISONE 10 MG/1
TABLET ORAL
Qty: 36 TABLET | Refills: 0 | Status: SHIPPED | OUTPATIENT
Start: 2024-12-06 | End: 2024-12-22

## 2024-12-08 NOTE — ASSESSMENT & PLAN NOTE
Her blood pressure was significantly elevated at 178/110, likely due to the use of DayQuil. Upon re-evaluation, her blood pressure was 152/88, which remains high but improved slightly. She is advised to discontinue DayQuil and switch to plain Mucinex to avoid further elevation of blood pressure.

## 2024-12-08 NOTE — PROGRESS NOTES
"Chief Complaint  URI  Subjective        Emilie Carrizales presents to [facility]  History of Present Illness  History of Present Illness  The patient presents due to respiratory symptoms.    She c/o increased congestion and drainage since Monday with an elevated temperature since Tuesday. She also c/o diffuse body aches with skin sensitivity. She has been managing her symptoms with DayQuil. She also reports chest tightness and shortness of breath with exertion, note a productive cough. Denies GI upset or nausea.    Her fever improves with Tylenol (through DayQuil) but returns within 6 hours. Fever has been as high as 102.     She has been taking lisinopril for her blood pressure.    Supplemental Information  She has a scheduled mammogram.    IMMUNIZATIONS  She has received her influenza vaccine and COVID-19 vaccine.    Objective   Vital Signs:  /88 (BP Location: Left arm, Patient Position: Sitting, Cuff Size: Adult)   Pulse 77   Temp 98.7 °F (37.1 °C) (Oral)   Resp 20   Ht 162.6 cm (64.02\")   Wt 109 kg (239 lb 6.4 oz)   SpO2 97%   BMI 41.07 kg/m²   Estimated body mass index is 41.07 kg/m² as calculated from the following:    Height as of this encounter: 162.6 cm (64.02\").    Weight as of this encounter: 109 kg (239 lb 6.4 oz).            Physical Exam  HENT:      Head: Normocephalic.      Right Ear: External ear normal. Tympanic membrane is bulging.      Left Ear: External ear normal. Tympanic membrane is bulging.      Nose: Nose normal.      Mouth/Throat:      Pharynx: Posterior oropharyngeal erythema present.   Eyes:      General:         Right eye: No discharge.         Left eye: No discharge.      Conjunctiva/sclera: Conjunctivae normal.   Cardiovascular:      Pulses: Normal pulses.      Heart sounds: Normal heart sounds. No murmur heard.  Pulmonary:      Effort: No respiratory distress.      Breath sounds: Normal breath sounds. No wheezing, rhonchi or rales. "   Musculoskeletal:      Cervical back: Normal range of motion.   Lymphadenopathy:      Cervical: No cervical adenopathy.   Skin:     General: Skin is warm and dry.   Neurological:      Mental Status: She is alert.        Physical Exam  Lungs were auscultated.    Vital Signs  The patient's blood pressure is 178/110. Repeat blood pressure is 152/88.    Result Review :           Results  Laboratory Studies  Negative for Covid and flu.             Assessment and Plan   Diagnoses and all orders for this visit:    1. Acute bronchitis, unspecified organism (Primary)  -     azithromycin (Zithromax Z-Garth) 250 MG tablet; Take 2 tablets the first day, then 1 tablet daily for 4 days.  Dispense: 6 tablet; Refill: 0  -     predniSONE (DELTASONE) 10 MG tablet; Take 1 tablet by mouth 4 (Four) Times a Day for 3 days, THEN 1 tab 3 times a day for 3 days, THEN 1 tab 2 (Two) Times a Day for 5 days, THEN 1 tab Daily for 5 days  Dispense: 36 tablet; Refill: 0    2. Benign essential HTN  Assessment & Plan:  Her blood pressure was significantly elevated at 178/110, likely due to the use of DayQuil. Upon re-evaluation, her blood pressure was 152/88, which remains high but improved slightly. She is advised to discontinue DayQuil and switch to plain Mucinex to avoid further elevation of blood pressure.      3. Flu-like symptoms  -     POCT SARS-CoV-2 + Flu Antigen SANDER      Assessment & Plan    Her COVID-19 and flu tests were negative, sx suggestive of bacterial infection. Will treat with tapering prednisone and a Zpak. She is advised to discontinue DayQuil due to its potential to elevate blood pressure and switch to plain Mucinex (guaifenesin). The use of steroids, Mucinex, and an antihistamine such as Zyrtec or Claritin is recommended to manage her symptoms. A work note will be provided.         Follow Up   Return if symptoms worsen or fail to improve, for Next scheduled follow up.  Patient was given instructions and counseling regarding her  condition or for health maintenance advice. Please see specific information pulled into the AVS if appropriate.     Patient or patient representative verbalized consent for the use of Ambient Listening during the visit with  VICENTE Ruth for chart documentation. 12/8/2024  08:01 EST

## 2025-01-16 ENCOUNTER — HOSPITAL ENCOUNTER (OUTPATIENT)
Dept: MAMMOGRAPHY | Facility: HOSPITAL | Age: 55
Discharge: HOME | End: 2025-01-16
Admitting: INTERNAL MEDICINE
Payer: COMMERCIAL

## 2025-01-16 DIAGNOSIS — Z12.31 VISIT FOR SCREENING MAMMOGRAM: ICD-10-CM

## 2025-01-16 DIAGNOSIS — Z17.0 MALIGNANT NEOPLASM OF CENTRAL PORTION OF RIGHT BREAST IN FEMALE, ESTROGEN RECEPTOR POSITIVE: ICD-10-CM

## 2025-01-16 DIAGNOSIS — C50.111 MALIGNANT NEOPLASM OF CENTRAL PORTION OF RIGHT BREAST IN FEMALE, ESTROGEN RECEPTOR POSITIVE: ICD-10-CM

## 2025-01-16 PROCEDURE — 77067 SCR MAMMO BI INCL CAD: CPT

## 2025-01-16 PROCEDURE — 77063 BREAST TOMOSYNTHESIS BI: CPT

## 2025-01-20 ENCOUNTER — TELEPHONE (OUTPATIENT)
Dept: ONCOLOGY | Facility: CLINIC | Age: 55
End: 2025-01-20
Payer: COMMERCIAL

## 2025-01-20 NOTE — TELEPHONE ENCOUNTER
Notified pt of benign mammogram per Dr Farrar's request. Pt voiced understanding, states she saw the results on [facility].

## 2025-01-20 NOTE — TELEPHONE ENCOUNTER
----- Message from Missy Farrar sent at 1/20/2025  6:49 AM EST -----  Benign  ----- Message -----  From: Interface, [employer] [place]  Sent: 1/17/2025   5:24 AM EST  To: Missy Farrar MD

## 2025-01-21 ENCOUNTER — OFFICE VISIT (OUTPATIENT)
Dept: INTERNAL MEDICINE | Facility: CLINIC | Age: 55
End: 2025-01-21
Payer: COMMERCIAL

## 2025-01-21 VITALS
SYSTOLIC BLOOD PRESSURE: 130 MMHG | OXYGEN SATURATION: 96 % | TEMPERATURE: 98.2 F | WEIGHT: 242 LBS | BODY MASS INDEX: 41.32 KG/M2 | DIASTOLIC BLOOD PRESSURE: 86 MMHG | HEIGHT: 64 IN | HEART RATE: 80 BPM

## 2025-01-21 DIAGNOSIS — R73.09 ELEVATED GLUCOSE: Chronic | ICD-10-CM

## 2025-01-21 DIAGNOSIS — E79.0 HYPERURICEMIA: Chronic | ICD-10-CM

## 2025-01-21 DIAGNOSIS — E55.9 VITAMIN D DEFICIENCY: Chronic | ICD-10-CM

## 2025-01-21 DIAGNOSIS — E78.00 PURE HYPERCHOLESTEROLEMIA: Chronic | ICD-10-CM

## 2025-01-21 DIAGNOSIS — Z00.00 PE (PHYSICAL EXAM), ANNUAL: Primary | ICD-10-CM

## 2025-01-21 DIAGNOSIS — I10 BENIGN ESSENTIAL HTN: Chronic | ICD-10-CM

## 2025-01-21 PROBLEM — J06.9 VIRAL URI: Status: RESOLVED | Noted: 2024-10-20 | Resolved: 2025-01-21

## 2025-01-21 PROCEDURE — 99396 PREV VISIT EST AGE 40-64: CPT | Performed by: NURSE PRACTITIONER

## 2025-01-26 NOTE — ASSESSMENT & PLAN NOTE
Her glucose has improved significantly with her recent weight loss and dietary changes, recheck A1c today.

## 2025-01-26 NOTE — PROGRESS NOTES
Subjective   Emilie Carrizales is a 54 y.o. female who is here for a physical exam.    History of Present Illness   History of Present Illness  The patient presents for a physical exam.    She reports no new health concerns since her last visit. She has been adhering to her prescribed medication regimen, which includes lisinopril, anastrozole, amlodipine, allopurinol, and lorazepam (used sparingly for emergencies).     She has not been using her CPAP machine since her gastric sleeve surgery but reports no sleep disturbances or morning fatigue.     She has been consuming carbohydrates but has eliminated sugar from her diet. She attributes her recent weight gain to decreased physical activity during the winter months and anticipates resuming her regular activity level in the spring.    She has a bone density test scheduled for 04/2025.    She was recently treated for an URI with a Zpak. Her respiratory function has since returned to normal, with no reported chest tightness or persistent cough. Upon returning from a cruise, she developed rhinorrhea and nasal congestion but reports no associated headaches, dizziness, or lightheadedness.    She experiences lower extremity swelling at night, which she attributes to prolonged sitting. She finds relief from these symptoms through walking.       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    Depression 01/01/1988    Diabetes mellitus     pre diabetic    Drug therapy     Family history of breast cancer 05/14/2019    GERD (gastroesophageal reflux disease)     Headache     History of 2019 novel coronavirus disease (COVID-19)     2/2022 AND 8/9/22 (TESTED AT for; to (do), RESULTS IN EPIC)    History of kidney stones     Hx of radiation therapy     Hyperlipidemia     Hypertension     Irritable bowel syndrome     Lumbago     Mood disorder     Obesity life    PONV  (postoperative nausea and vomiting)     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    Sleep apnea     C-PAP IN USE    Vitamin D deficiency          Current Outpatient Medications:     allopurinol (ZYLOPRIM) 100 MG tablet, TAKE 1 TABLET BY MOUTH DAILY, Disp: 90 tablet, Rfl: 3    amLODIPine (NORVASC) 10 MG tablet, Take 1 tablet by mouth Every Night., Disp: 90 tablet, Rfl: 1    anastrozole (ARIMIDEX) 1 MG tablet, TAKE 1 TABLET BY MOUTH DAILY, Disp: 90 tablet, Rfl: 3    lisinopril (PRINIVIL,ZESTRIL) 40 MG tablet, Take 1 tablet by mouth Daily., Disp: 90 tablet, Rfl: 1    LORazepam (ATIVAN) 0.5 MG tablet, Take 1 tablet by mouth Every 8 (Eight) Hours As Needed for Anxiety. for anxiety, Disp: 30 tablet, Rfl: 0    multivitamin (THERAGRAN) tablet tablet, Take 1 tablet by mouth Daily., Disp: , Rfl:     Allergies   Allergen Reactions    Citalopram Rash    Flexeril [Cyclobenzaprine] Unknown - High Severity     MUSCLES TENSE-OPPOSITE OF WHAT IT IS INTENDED TO DO-ELEVATES B/P    Doxycycline Rash     Blisters on hands    Keflex [Cephalexin] Rash    Sulfa Antibiotics Rash     NAUSEA/VOMITING/FEVER    Vilazodone Hcl Hives, Itching and Rash       Review of Systems   Constitutional:  Negative for activity change, appetite change, chills, diaphoresis, fatigue, fever and unexpected weight change.   HENT:  Positive for congestion, postnasal drip and rhinorrhea. Negative for dental problem, drooling, ear discharge, ear pain, facial swelling, hearing loss, mouth sores, nosebleeds, sinus pressure, sore throat, tinnitus and trouble swallowing.    Eyes:  Negative for photophobia, pain, discharge, redness, itching and visual disturbance.   Respiratory:  Negative for apnea, cough, choking, chest tightness, shortness of breath and wheezing.    Cardiovascular:  Positive for leg swelling. Negative for chest pain and palpitations.        No orthopnea, PND, MACDONALD   Gastrointestinal:  Negative for abdominal pain, blood in stool, constipation,  "diarrhea, nausea and vomiting.   Endocrine: Negative for cold intolerance, heat intolerance, polydipsia and polyuria.   Genitourinary:  Negative for decreased urine volume, dysuria, enuresis, flank pain, frequency, hematuria and urgency.   Musculoskeletal:  Negative for arthralgias, back pain, gait problem, joint swelling, myalgias, neck pain and neck stiffness.   Skin:  Positive for rash. Negative for color change.        No hair changes, no nail changes   Allergic/Immunologic: Negative for environmental allergies, food allergies and immunocompromised state.   Neurological:  Negative for dizziness, tremors, seizures, syncope, speech difficulty, weakness, light-headedness, numbness and headaches.   Hematological:  Negative for adenopathy. Does not bruise/bleed easily.   Psychiatric/Behavioral:  Negative for agitation, confusion, decreased concentration, dysphoric mood, sleep disturbance and suicidal ideas. The patient is nervous/anxious.        Objective   Vitals:    01/21/25 0916   BP: 130/86   BP Location: Left arm   Patient Position: Sitting   Cuff Size: Large Adult   Pulse: 80   Temp: 98.2 °F (36.8 °C)   SpO2: 96%   Weight: 110 kg (242 lb)   Height: 162.6 cm (64\")     Physical Exam  Constitutional:       General: She is not in acute distress.     Appearance: Normal appearance. She is not diaphoretic.   HENT:      Head: Normocephalic and atraumatic.      Right Ear: Tympanic membrane, ear canal and external ear normal.      Left Ear: Tympanic membrane, ear canal and external ear normal.      Nose: Nose normal. No rhinorrhea.      Mouth/Throat:      Mouth: Mucous membranes are moist.      Pharynx: Oropharynx is clear.   Eyes:      General:         Right eye: No discharge.         Left eye: No discharge.      Conjunctiva/sclera: Conjunctivae normal.   Cardiovascular:      Rate and Rhythm: Normal rate and regular rhythm.      Pulses: Normal pulses.      Heart sounds: Normal heart sounds.   Pulmonary:      Effort: " Pulmonary effort is normal.      Breath sounds: Normal breath sounds.   Abdominal:      General: Bowel sounds are normal.      Tenderness: There is no abdominal tenderness.   Musculoskeletal:         General: No swelling or tenderness.      Cervical back: Normal range of motion.   Skin:     General: Skin is warm and dry.      Comments: Dry, scaly patches on upper extremities   Neurological:      General: No focal deficit present.      Mental Status: She is alert and oriented to person, place, and time.   Psychiatric:         Mood and Affect: Mood normal.         Behavior: Behavior normal.         Judgment: Judgment normal.       Physical Exam      Vital Signs  Blood pressure is 124/78. Heart rate is low.    Results  Laboratory Studies  Hemoglobin A1c was 5.3.       Assessment & Plan   Diagnoses and all orders for this visit:    1. PE (physical exam), annual (Primary)    2. Benign essential HTN  Assessment & Plan:  BP is minimally elevated in the office, continue amlodipine and lisinopril which he will continue along with a low sodium diet.      3. Pure hypercholesterolemia  Assessment & Plan:  Continue a low-fat, low-cholesterol diet; recheck lipid panel.      4. Elevated glucose  Assessment & Plan:  Her glucose has improved significantly with her recent weight loss and dietary changes, recheck A1c today.      5. Vitamin D deficiency  Assessment & Plan:  She is currently managed on a daily Vitamin D supplement, recheck level.      6. Hyperuricemia  Assessment & Plan:  She is currently managed on allopurinol daily without recent gout flares, recheck uric acid level.      Lab order in chart which she will receive today.  Assessment & Plan      Risk Assessment:  Family History   Problem Relation Age of Onset    Hypertension Mother     Arthritis Mother     Kidney cancer Mother     Depression Mother         Not Dr vela- edison go    Heart disease Father     Hernia Father     Hypertension Father     Hearing loss  Father     No Known Problems Brother     Breast cancer Paternal Aunt     Breast cancer Paternal Cousin     Drug abuse Paternal Uncle         life long    Drug abuse Paternal Uncle         life long    Breast cancer Paternal Grandmother     Osteoporosis Maternal Grandmother     Lung cancer Paternal Grandfather     Malig Hyperthermia Neg Hx      Her Body mass index is 41.54 kg/m². She is trying to follow a low-carb, low-sugar diet with the goal of weight loss. She has been successful in losing weight since her previous gastric sleeve.    Prevention:  Health Maintenance   Topic Date Due    ZOSTER VACCINE (1 of 2) Never done    ANNUAL PHYSICAL  01/18/2025    LIPID PANEL  01/18/2025    BMI FOLLOWUP  09/05/2025    MAMMOGRAM  01/16/2027    COLORECTAL CANCER SCREENING  12/05/2029    TDAP/TD VACCINES (2 - Td or Tdap) 03/27/2030    HEPATITIS C SCREENING  Completed    COVID-19 Vaccine  Completed    INFLUENZA VACCINE  Completed    Pneumococcal Vaccine 0-64  Aged Out    PAP SMEAR  Discontinued    HEMOGLOBIN A1C  Discontinued    URINE MICROALBUMIN  Discontinued       Discussed healthy lifestyle choices such as maintaining a balanced diet low in carbohydrates and limiting caffeine and alcohol intake.  Recommended routine exercise for bone strength and cardiovascular health.         Patient or patient representative verbalized consent for the use of Ambient Listening during the visit with  VICENTE Ruth for chart documentation. 1/26/2025  08:03 EST

## 2025-01-26 NOTE — ASSESSMENT & PLAN NOTE
BP is minimally elevated in the office, continue amlodipine and lisinopril which he will continue along with a low sodium diet.

## 2025-01-26 NOTE — ASSESSMENT & PLAN NOTE
She is currently managed on allopurinol daily without recent gout flares, recheck uric acid level.

## 2025-02-04 ENCOUNTER — TELEMEDICINE (OUTPATIENT)
Dept: FAMILY MEDICINE CLINIC | Facility: TELEHEALTH | Age: 55
End: 2025-02-04
Payer: COMMERCIAL

## 2025-02-04 DIAGNOSIS — J10.1 INFLUENZA A: Primary | ICD-10-CM

## 2025-02-04 PROCEDURE — 99213 OFFICE O/P EST LOW 20 MIN: CPT | Performed by: NURSE PRACTITIONER

## 2025-02-04 RX ORDER — OSELTAMIVIR PHOSPHATE 75 MG/1
75 CAPSULE ORAL 2 TIMES DAILY
Qty: 10 CAPSULE | Refills: 0 | Status: SHIPPED | OUTPATIENT
Start: 2025-02-04 | End: 2025-02-09

## 2025-02-04 RX ORDER — ONDANSETRON 4 MG/1
4 TABLET, ORALLY DISINTEGRATING ORAL EVERY 8 HOURS PRN
Qty: 10 TABLET | Refills: 0 | Status: SHIPPED | OUTPATIENT
Start: 2025-02-04

## 2025-02-04 NOTE — PROGRESS NOTES
CHIEF COMPLAINT  Chief Complaint   Patient presents with    GI Problem         HPI  Emilie Carrizales is a 54 y.o. female  presents with complaint of Sunday had fever of 104 and had nausea, vomiting and diarrhea. She was able to eat popsicles.   Her wife started having similar symptom ans she tested positive for the flu.   She is no longer throwing up, but continues to have diarrhea.       Review of Systems   Constitutional:  Positive for chills, diaphoresis, fatigue and fever.   HENT:  Negative for congestion, rhinorrhea, sneezing and sore throat.    Respiratory:  Positive for cough and chest tightness. Negative for shortness of breath and wheezing.    Gastrointestinal:  Positive for diarrhea, nausea and vomiting. Negative for abdominal distention, abdominal pain, anal bleeding, blood in stool and rectal pain.   Musculoskeletal:  Positive for myalgias.   Neurological:  Positive for headaches.       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    Depression 01/01/1988    Diabetes mellitus     pre diabetic    Drug therapy     Family history of breast cancer 05/14/2019    GERD (gastroesophageal reflux disease)     Headache     History of 2019 novel coronavirus disease (COVID-19)     2/2022 AND 8/9/22 ([email or web address] RESULTS IN EPIC)    History of kidney stones     Hx of radiation therapy     Hyperlipidemia     Hypertension     Irritable bowel syndrome     Lumbago     Mood disorder     Obesity life    PONV (postoperative nausea and vomiting)     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    Sleep apnea     C-PAP IN USE    Vitamin D deficiency        Family History   Problem Relation Age of Onset    Hypertension Mother     Arthritis Mother     Kidney cancer Mother     Depression Mother         Not Dr vela- edison go    Heart disease Father     Hernia Father     Hypertension Father     Hearing  loss Father     No Known Problems Brother     Breast cancer Paternal Aunt     Breast cancer Paternal Cousin     Drug abuse Paternal Uncle         life long    Drug abuse Paternal Uncle         life long    Breast cancer Paternal Grandmother     Osteoporosis Maternal Grandmother     Lung cancer Paternal Grandfather     Malig Hyperthermia Neg Hx        Social History     Socioeconomic History    Marital status:      Spouse name: Trena Montenegro    Number of children: 0   Tobacco Use    Smoking status: Never     Passive exposure: Never    Smokeless tobacco: Never    Tobacco comments:     Parents smoked.  I have never smoked   Vaping Use    Vaping status: Never Used   Substance and Sexual Activity    Alcohol use: Yes     Comment: Only once or twice a year.  Mixed Drinks    Drug use: No    Sexual activity: Yes     Partners: Female     Birth control/protection: None         LMP 06/25/2020     PHYSICAL EXAM  Physical Exam   Constitutional: She is oriented to person, place, and time. She appears well-developed and well-nourished. She has a sickly appearance. She does not appear ill. No distress.   HENT:   Head: Normocephalic and atraumatic.   Eyes: EOM are normal.   Neck: Neck normal appearance.  Pulmonary/Chest: Effort normal.  No respiratory distress.  Neurological: She is alert and oriented to person, place, and time.   Skin: Skin is dry.   Psychiatric: She has a normal mood and affect.         Diagnoses and all orders for this visit:    1. Influenza A (Primary)    Other orders  -     oseltamivir (Tamiflu) 75 MG capsule; Take 1 capsule by mouth 2 (Two) Times a Day for 5 days.  Dispense: 10 capsule; Refill: 0  -     ondansetron ODT (ZOFRAN-ODT) 4 MG disintegrating tablet; Place 1 tablet on the tongue Every 8 (Eight) Hours As Needed for Nausea or Vomiting.  Dispense: 10 tablet; Refill: 0        Mode of visit: Video   Myself and Emilie Carrizales participated in this visit. The patient is located in [address] [place] [address]  [place] [address]. I am located in [place], [place]. Mychart and Twilio were utilized.   You have chosen to receive care through a telehealth visit.     Does the patient consent to use a video/audio connection for your medical care today? Yes       Note Disclaimer: At [facility], we believe that sharing information builds trust and better   relationships. You are receiving this note because you recently visited [facility]. It is possible you   will see health information before a provider has talked with you about it. This kind of information can   be easy to misunderstand. To help you fully understand what it means for your health, we urge you to   discuss this note with your provider.    Alva Razo, VICENTE  02/04/2025  17:12 EST

## 2025-02-04 NOTE — LETTER
February 4, 2025     Patient: Emilie Carrizales   YOB: 1970   Date of Visit: 2/4/2025       To Whom It May Concern:    It is my medical opinion that Emilie Carrizales may return to work on Saturday 2/8/2025.     Sincerely,    VICENTE De La Vega     URGENT CARE VIDEO VISIT PROVIDER    CC: No Recipients

## 2025-02-04 NOTE — PATIENT INSTRUCTIONS
Drink plenty of water  Over the counter pain relievers okay   If symptoms do not improve in 3-5 days follow up with your primary care provider or urgent care  If symptoms worsen follow up with urgent care or the emergency room      Drink plenty of fluids   Clear liquid diet until no longer vomiting. Stay away from fatty and fried foods and milk products until diarrhea resolves.   If symptoms do not improve in 3 days follow up with your primary care provider or urgent care  If you develop abdominal pain or more severe symptoms, go to the emergency department.        Influenza, Adult  Influenza is also called the flu. It's an infection that affects your respiratory tract. This includes your nose, throat, windpipe, and lungs.  The flu is contagious. This means it spreads easily from person to person. It causes symptoms that are like a cold. It can also cause a high fever and body aches.  What are the causes?  The flu is caused by the influenza virus. You can get it by:  Breathing in droplets that are in the air after an infected person coughs or sneezes.  Touching something that has the virus on it and then touching your mouth, nose, or eyes.  What increases the risk?  You may be more likely to get the flu if:  You don't wash your hands often.  You're near a lot of people during cold and flu season.  You touch your mouth, eyes, or nose without washing your hands first.  You don't get a flu shot each year.  You may also be more at risk for the flu and serious problems, such as a lung infection called pneumonia, if:  You're older than 65.  You're pregnant.  Your immune system is weak. Your immune system is your body's defense system.  You have a long-term, or chronic, condition, such as:  Heart, kidney, or lung disease.  Diabetes.  A liver disorder.  Asthma.  You're very overweight.  You have anemia. This is when you don't have enough red blood cells in your body.  What are the signs or symptoms?  Flu symptoms often  start all of a sudden. They may last 4-14 days and include:  Fever and chills.  Headaches, body aches, or muscle aches.  Sore throat.  Cough.  Runny or stuffy nose.  Discomfort in your chest.  Not wanting to eat as much as normal.  Feeling weak or tired.  Feeling dizzy.  Nausea or vomiting.  How is this diagnosed?  The flu may be diagnosed based on your symptoms and medical history. You may also have a physical exam. A swab may be taken from your nose or throat and tested for the virus.  How is this treated?  If the flu is found early, you can be treated with antiviral medicine. This may be given to you by mouth or through an IV. It can help you feel less sick and get better faster.  Taking care of yourself at home can also help your symptoms get better. Your health care provider may tell you to:  Take over-the-counter medicines.  Drink lots of fluids.  The flu often goes away on its own. If you have very bad symptoms or problems caused by the flu, you may need to be treated in a hospital.  Follow these instructions at home:  Activity  Rest as needed. Get lots of sleep.  Stay home from work or school as told by your provider.  Leave home only to go see your provider.  Do not leave home for other reasons until you don't have a fever for 24 hours without taking medicine.  Eating and drinking  Take an oral rehydration solution (ORS). This is a drink that is sold at pharmacies and stores.  Drink enough fluid to keep your pee pale yellow.  Try to drink small amounts of clear fluids. These include water, ice chips, fruit juice mixed with water, and low-calorie sports drinks.  Try to eat bland foods that are easy to digest. These include bananas, applesauce, rice, lean meats, toast, and crackers.  Avoid drinks that have a lot of sugar or caffeine in them. These include energy drinks, regular sports drinks, and soda.  Do not drink alcohol.  Do not eat spicy or fatty foods.  General instructions         Take your medicines  only as told by your provider.  Use a cool mist humidifier to add moisture to the air in your home. This can make it easier for you to breathe. You should also clean the humidifier every day. To do so:  Empty the water.  Pour clean water in.  Cover your mouth and nose when you cough or sneeze.  Wash your hands with soap and water often and for at least 20 seconds. It's extra important to do so after you cough or sneeze. If you can't use soap and water, use hand .  How is this prevented?    Get a flu shot every year. Ask your provider when you should get your flu shot.  Stay away from people who are sick during fall and winter. Fall and winter are cold and flu season.  Contact a health care provider if:  You get new symptoms.  You have chest pain.  You have watery poop, also called diarrhea.  You have a fever.  Your cough gets worse.  You start to have more mucus.  You feel like you may vomit, or you vomit.  Get help right away if:  You become short of breath or have trouble breathing.  Your skin or nails turn blue.  You have very bad pain or stiffness in your neck.  You get a sudden headache or pain in your face or ear.  You vomit each time you eat or drink.  These symptoms may be an emergency. Call 911 right away.  Do not wait to see if the symptoms will go away.  Do not drive yourself to the hospital.  This information is not intended to replace advice given to you by your health care provider. Make sure you discuss any questions you have with your health care provider.  Document Revised: 09/19/2024 Document Reviewed: 01/25/2024  Elsevier Patient Education © 2024 [employer]

## 2025-02-11 RX ORDER — MOXIFLOXACIN 5 MG/ML
1 SOLUTION/ DROPS OPHTHALMIC 3 TIMES DAILY
Qty: 3 ML | Refills: 0 | Status: SHIPPED | OUTPATIENT
Start: 2025-02-11

## 2025-02-12 NOTE — PROGRESS NOTES
Pt was seen 2/4/25 through telemedicine for Influenza A. She notes improvement in nasal congestion and drainage but c/o drainage and irritation from her right eye while in the office 2/11/25 for her wife's appointment. The conjunctiva is red and injected, will treat with antibiotic eyedrops and continue to monitor-RTC or f/u with opth if sx do not improve within 3-4 days.

## 2025-02-25 ENCOUNTER — HOSPITAL ENCOUNTER (OUTPATIENT)
Dept: MRI IMAGING | Facility: HOSPITAL | Age: 55
Discharge: HOME | End: 2025-02-25
Admitting: INTERNAL MEDICINE
Payer: COMMERCIAL

## 2025-02-25 DIAGNOSIS — Z17.0 MALIGNANT NEOPLASM OF CENTRAL PORTION OF RIGHT BREAST IN FEMALE, ESTROGEN RECEPTOR POSITIVE: ICD-10-CM

## 2025-02-25 DIAGNOSIS — C50.111 MALIGNANT NEOPLASM OF CENTRAL PORTION OF RIGHT BREAST IN FEMALE, ESTROGEN RECEPTOR POSITIVE: ICD-10-CM

## 2025-02-25 PROCEDURE — 25510000002 GADOBENATE DIMEGLUMINE 529 MG/ML SOLUTION: Performed by: INTERNAL MEDICINE

## 2025-02-25 PROCEDURE — 77049 MRI BREAST C-+ W/CAD BI: CPT

## 2025-02-25 PROCEDURE — A9577 INJ MULTIHANCE: HCPCS | Performed by: INTERNAL MEDICINE

## 2025-02-25 RX ADMIN — GADOBENATE DIMEGLUMINE 20 ML: 529 INJECTION, SOLUTION INTRAVENOUS at 14:27

## 2025-03-06 ENCOUNTER — TELEPHONE (OUTPATIENT)
Dept: ONCOLOGY | Facility: CLINIC | Age: 55
End: 2025-03-06
Payer: COMMERCIAL

## 2025-03-06 DIAGNOSIS — R92.8 ABNORMAL MRI, BREAST: Primary | ICD-10-CM

## 2025-03-06 NOTE — TELEPHONE ENCOUNTER
Contacted Emilie by phone to let her know the recent MRI recommends an left breast ultrasound. We will place the order and schedule the same. She voiced understanding.

## 2025-03-19 ENCOUNTER — HOSPITAL ENCOUNTER (OUTPATIENT)
Dept: ULTRASOUND IMAGING | Facility: HOSPITAL | Age: 55
Discharge: HOME | End: 2025-03-19
Admitting: INTERNAL MEDICINE
Payer: COMMERCIAL

## 2025-03-19 DIAGNOSIS — R92.8 ABNORMAL MRI, BREAST: ICD-10-CM

## 2025-03-19 PROCEDURE — 76642 ULTRASOUND BREAST LIMITED: CPT

## 2025-03-20 ENCOUNTER — TELEMEDICINE (OUTPATIENT)
Dept: BARIATRICS/WEIGHT MGMT | Facility: CLINIC | Age: 55
End: 2025-03-20
Payer: COMMERCIAL

## 2025-03-20 DIAGNOSIS — Z71.3 DIETARY COUNSELING: ICD-10-CM

## 2025-03-20 DIAGNOSIS — E66.01 OBESITY, CLASS III, BMI 40-49.9 (MORBID OBESITY): Primary | ICD-10-CM

## 2025-03-20 DIAGNOSIS — R73.03 PREDIABETES: ICD-10-CM

## 2025-03-20 DIAGNOSIS — I10 BENIGN ESSENTIAL HTN: ICD-10-CM

## 2025-03-20 DIAGNOSIS — Z98.84 S/P LAPAROSCOPIC SLEEVE GASTRECTOMY: ICD-10-CM

## 2025-03-20 NOTE — PROGRESS NOTES
"Metabolic and Bariatric Surgery Nutrition Follow Up    Patient Name: Emilie Carrizales    YOB: 1970   Age: 54 y.o.  Sex: female  MRN: [id number]     Patient presents today for telehealth service. This service was conducted via [facility] video visit. This provider is located at [address] [place] [address], [address]. Patient is being seen remotely at home.    You have chosen to receive care through a telehealth visit. Do you consent to use a video/audio connection for your care today? Yes  The visit included audio and video interaction. No technical issues occurred during this visit     ASSESSMENT    Procedure Performed: Sleeve  Date of Surgery: 9/12/22    Anthropometrics  Estimated body mass index is 41.54 kg/m² as calculated from the following:    Height as of 1/21/25: 162.6 cm (64\").    Weight as of 1/21/25: 110 kg (242 lb).    Surgery weight: 308 lbs  Weight change since surgery: - 66 lbs  Reason for visit:  follow up    Emilie Carrizales is status post sleeve procedure and presents for nutrition follow up. Last visit was 8/8/23. Patient reports they are tolerating diet well. Diet history reviewed and discussed with the patient. Weight loss/gains to date discussed with the patient. Reports weight gain of 40 lbs in past year. Got down to 208 lbs and then gradually started gaining weight. Recent financial difficulties.   Exercise: was walking every day at lunch, but hasn't been doing over the winter     Diet Review   Patient is eating 2 meals and 1-2 snacks daily. Prefers not eating in the morning.       24 Hour Recall   Breakfast: Coffee with zero sugar creamer    Lunch: Low carb tortilla or English muffin with cream cheese or peanut butter   Dinner: Chicken or steak, potatoes or rice, salad   Snacks: Chips    Beverages: No sodas/carbonation\, flavored water    Vitamins/supplements: MVI   Dietary restrictions: Intolerance to ground beef and sausage      Estimated Nutrition Requirements  BMR (using [place] " Jeor equation): 1728 calories per day   TDEE (using activity factor 1.2): 2073 calories per day   Daily protein needs: 75-90 grams per day     DIAGNOSIS    Nutrition problem statement: Obesity related to multifactorial biochemical, behavioral and environmental contributors to disease as evidenced by BMI 41.54 kg/m².    INTERVENTION    Nutrition education and coaching for behavior change completed. Educational materials provided. I reviewed the appropriate dietary choices with the patient and provided guidance and suggestions for making necessary changes. Discussed sustainable habit changes and setting small, achievable goals that can be maintained long term. Recommended focus on eating protein first, followed by vegetables/fruits/fiber rich foods. Limit or weigh/measure portions sizes for high fat and calorie dense foods. Discussed bariatric portion plate model for guidelines on putting together balanced meals. Use small plate and eat protein first, then vegetables, then starches. Suggested the option of keeping a food journal which will help patient become more aware of the nutritional value of food, establish starting point and identify areas for improvement. Aim for at least 64 oz water daily and wait 30 minutes after eating before drinking. Reviewed vitamin requirements. Be sure to do routine exercise, work up to 150 minutes per week, including both cardio and strength training. Offered follow up for support and accountability.      MONITORING & EVALUATION    Goals/Plan:   Begin measuring portion sizes and tracking intake.   Increase protein. Aim for 20-30 grams per meal and 10-15 grams per snack. Avoid grazing on slider foods, but incorporate balanced and portion controlled snacks as needed.   Discussed budget friendly meal and snack ideas.  Follow up with provider in our office and annual bariatric labs    Recommendations for weight loss:   1575 calories   177 gm carbohydrate (45%)  98 gm protein (25%)  53 gm  fat (30%)    Follow-Up:  1 month    Total time spent during this encounter today exceeded 50 minutes and includes preparing for the visit, reviewing tests, counseling and educating the patient/family/caregiver and documenting information in the medical record.    Electronically signed by:  Tonie Delacruz RD   03/20/25 12:03 EDT

## 2025-04-02 ENCOUNTER — OFFICE VISIT (OUTPATIENT)
Dept: INTERNAL MEDICINE | Facility: CLINIC | Age: 55
End: 2025-04-02
Payer: COMMERCIAL

## 2025-04-02 VITALS
TEMPERATURE: 97.6 F | HEIGHT: 64 IN | OXYGEN SATURATION: 100 % | BODY MASS INDEX: 43.16 KG/M2 | DIASTOLIC BLOOD PRESSURE: 84 MMHG | WEIGHT: 252.8 LBS | HEART RATE: 73 BPM | SYSTOLIC BLOOD PRESSURE: 144 MMHG

## 2025-04-02 DIAGNOSIS — G51.0 BELL'S PALSY: Primary | ICD-10-CM

## 2025-04-02 PROCEDURE — 99214 OFFICE O/P EST MOD 30 MIN: CPT | Performed by: NURSE PRACTITIONER

## 2025-04-02 RX ORDER — PREDNISONE 20 MG/1
TABLET ORAL
Qty: 36 TABLET | Refills: 0 | Status: SHIPPED | OUTPATIENT
Start: 2025-04-02 | End: 2025-04-19

## 2025-04-12 NOTE — PROGRESS NOTES
"Chief Complaint  Earache (Right ear pain - spreading to neck, nose, eye )  Subjective        Emilie Carrizales presents to [facility]  History of Present Illness  History of Present Illness  The patient presents for evaluation of right ear pain.    She reports bilateral ear congestion and pain that started approximately 1.5 weeks ago, initially suspecting an ear infection. The left ear has since cleared, but the right ear remains painful and swollen. She also notes swelling around her eye and nose, with nasal discharge that is brown and green in color. The patient experiences a sensation of drainage into her neck and back. She describes a twitching sensation in her lip, which improves when she lies on her side, allowing her ear to drain. Her symptoms are less severe in the morning. She expresses concern about potential vision impairment due to her condition. She has been using prescribed eye drops for redness and bloodshot eyes. She has removed her contact lenses due to dryness and infection. She reports no dental pain and does not believe her symptoms are tooth-related. She has a history of sinus infections that typically affect her ears annually.    Objective   Vital Signs:  /84 (BP Location: Left arm, Patient Position: Sitting, Cuff Size: Large Adult)   Pulse 73   Temp 97.6 °F (36.4 °C)   Ht 162.6 cm (64\")   Wt 115 kg (252 lb 12.8 oz)   SpO2 100%   BMI 43.39 kg/m²   Estimated body mass index is 43.39 kg/m² as calculated from the following:    Height as of this encounter: 162.6 cm (64\").    Weight as of this encounter: 115 kg (252 lb 12.8 oz).            Physical Exam  Constitutional:       Appearance: She is well-developed. She is not ill-appearing.   HENT:      Head: Normocephalic.      Right Ear: Hearing, tympanic membrane and external ear normal.      Left Ear: Hearing, tympanic membrane and external ear normal.      Nose: Nose normal. No nasal deformity, mucosal " edema or rhinorrhea.      Right Sinus: No maxillary sinus tenderness or frontal sinus tenderness.      Left Sinus: No maxillary sinus tenderness or frontal sinus tenderness.      Mouth/Throat:      Dentition: Normal dentition.   Eyes:      General: Lids are normal.         Right eye: No discharge.         Left eye: No discharge.      Conjunctiva/sclera: Conjunctivae normal.      Right eye: No exudate.     Left eye: No exudate.  Neck:      Thyroid: No thyroid mass or thyromegaly.      Vascular: No carotid bruit.      Trachea: Trachea normal.   Cardiovascular:      Rate and Rhythm: Regular rhythm.      Pulses: Normal pulses.      Heart sounds: Normal heart sounds. No murmur heard.  Pulmonary:      Effort: No respiratory distress.      Breath sounds: Normal breath sounds. No decreased breath sounds, wheezing, rhonchi or rales.   Abdominal:      General: Bowel sounds are normal.      Palpations: Abdomen is soft.      Tenderness: There is no abdominal tenderness.   Musculoskeletal:      Cervical back: Normal range of motion. No edema.   Lymphadenopathy:      Head:      Right side of head: No submental, submandibular, tonsillar, preauricular, posterior auricular or occipital adenopathy.      Left side of head: No submental, submandibular, tonsillar, preauricular, posterior auricular or occipital adenopathy.   Skin:     General: Skin is warm and dry.      Nails: There is no clubbing.   Neurological:      Mental Status: She is alert.      Cranial Nerves: Facial asymmetry present.      Comments: Right-sided facial asymettry   Psychiatric:         Behavior: Behavior is cooperative.            Result Review :           Results               Assessment and Plan   Diagnoses and all orders for this visit:    1. Bell's palsy (Primary)  -     predniSONE (DELTASONE) 20 MG tablet; Take 1 tablet by mouth 3 times a day for 7 days, THEN 1 tablet 2 (Two) Times a Day for 5 days, THEN 1 tablet Daily for 5 days then Stop  Dispense: 36  tablet; Refill: 0      Assessment & Plan  Bell's palsy.  The patient's symptoms, including facial nerve paralysis, right ear pain, and sinus drainage, are consistent with Bell's palsy. The etiology is likely viral, possibly a variant of the herpes virus. The condition is not contagious and should not impact her vision. The use of glasses is recommended for eye protection. Over-the-counter lubricating eye drops such as Refresh are suggested to prevent dryness due to reduced blinking. A prescription for prednisone 60 mg has been provided, to be taken as one tablet three times daily for 7 days, then twice daily for 5 days, and finally once daily for 5 days before discontinuation. If the prednisone does not alleviate the pain, gabapentin may be considered as an alternative treatment option.         Follow Up   Return if symptoms worsen or fail to improve, for Next scheduled follow up.  Patient was given instructions and counseling regarding her condition or for health maintenance advice. Please see specific information pulled into the AVS if appropriate.     Patient or patient representative verbalized consent for the use of Ambient Listening during the visit with  VICENTE Ruth for chart documentation. 4/12/2025  07:16 EDT

## 2025-04-15 ENCOUNTER — TELEMEDICINE (OUTPATIENT)
Dept: INTERNAL MEDICINE | Facility: CLINIC | Age: 55
End: 2025-04-15
Payer: COMMERCIAL

## 2025-04-15 VITALS — HEIGHT: 64 IN | BODY MASS INDEX: 43.39 KG/M2

## 2025-04-15 DIAGNOSIS — F41.9 ANXIETY: ICD-10-CM

## 2025-04-15 PROCEDURE — 99213 OFFICE O/P EST LOW 20 MIN: CPT | Performed by: NURSE PRACTITIONER

## 2025-04-15 RX ORDER — LORAZEPAM 0.5 MG/1
0.5 TABLET ORAL EVERY 8 HOURS PRN
Qty: 30 TABLET | Refills: 0 | Status: SHIPPED | OUTPATIENT
Start: 2025-04-15

## 2025-04-29 NOTE — PROGRESS NOTES
Chief Complaint  Anxiety and Fatigue    Subjective        Emilie Carrizales presents to [facility]     You have chosen to receive care through a telehealth visit.  Do you consent to use a video/audio connection for your medical care today? Yes  Mode of Visit: Video  Location of patient: other: work office  Location of [place] [facility] [place] [facility]  You have chosen to receive care through a telehealth visit.  The patient has signed the video visit consent form.  The visit included audio and video interaction. No technical issues occurred during this visit.     History of Present Illness  History of Present Illness  The patient presents via virtual visit for evaluation of anxiety.    Significant stress has been experienced due to a series of unfortunate events, including the loss of her pet dog, the diagnosis of cancer in her sister-in-law and father, and her mother's fall. These events have led to financial strain due to missed workdays. Overwhelmed by the current political climate, she reports a sensation of internal pain and brokenness, which she is unable to overcome. Despite an aversion to medication, intervention is acknowledged as necessary due to waking up feeling exhausted and emotionally drained. Nervousness and panic attacks, reminiscent of her reaction to her cancer diagnosis, are reported.     Sleep is disturbed, with frequent awakenings and disorientation. Physical manifestations of stress include shaking and a sensation of her skin crawling. Meditation has been unsuccessful due to racing thoughts, and she feels devastated daily.  Recurrent dreams of being chased by shadowy figures are reported.     Reluctance to take Ativan due to its addictive nature is noted, but two doses have been taken in the past three days, providing some relief from nerve-related symptoms. Regular appointments with her counselor are maintained, with a follow-up scheduled in three weeks. Journaling and  "meditation have been attempted without success.    A history of adverse reactions to certain medications is noted, including hives with Viibryd and \"brain zaps\" with Pristiq. Rashes with Celexa required hospitalization and breathing treatments. Previous trials of Zoloft and Prozac were unsuccessful. Wellbutrin was tolerated, but Seroquel caused her to feel \"loopy.\" Currently, Ativan 0.5 mg is taken as needed.      Objective   Vital Signs:  Ht 162.6 cm (64\")   BMI 43.39 kg/m²   Estimated body mass index is 43.39 kg/m² as calculated from the following:    Height as of this encounter: 162.6 cm (64\").    Weight as of 4/2/25: 115 kg (252 lb 12.8 oz).       BMI is within normal parameters. No other follow-up for BMI required.      Physical Exam  Constitutional:       Appearance: She is well-developed.   HENT:      Head: Normocephalic and atraumatic.   Eyes:      General:         Right eye: No discharge.         Left eye: No discharge.      Conjunctiva/sclera: Conjunctivae normal.   Pulmonary:      Effort: Pulmonary effort is normal.   Neurological:      Mental Status: She is alert and oriented to person, place, and time.   Psychiatric:         Behavior: Behavior normal.         Thought Content: Thought content normal.         Judgment: Judgment normal.        Physical Exam  Neurological: Awake, alert, oriented x4, no focal deficit    Result Review :  The following data was reviewed by: VICENTE Ruth on 04/15/2025:  Common labs          11/20/2024    08:41   Common Labs   Glucose 101    BUN 14    Creatinine 0.64    Sodium 139    Potassium 4.2    Chloride 102    Calcium 10.3    Albumin 4.3    Total Bilirubin 0.8    Alkaline Phosphatase 72    AST (SGOT) 17    ALT (SGPT) 18    WBC 5.51    Hemoglobin 14.4    Hematocrit 42.4    Platelets 205        Results  Imaging   - MRI: Normal   - Ultrasound: Normal              Assessment and Plan   Diagnoses and all orders for this visit:    1. Anxiety  -     FLUoxetine (PROzac) " 20 MG capsule; Take 1 capsule by mouth Daily.  Dispense: 30 capsule; Refill: 0  -     LORazepam (ATIVAN) 0.5 MG tablet; Take 1 tablet by mouth Every 8 (Eight) Hours As Needed for Anxiety.  Dispense: 30 tablet; Refill: 0      Assessment & Plan  1. Anxiety.  - Reports experiencing significant anxiety, exhaustion, and disturbed sleep, which are affecting daily life and physical health.  - Ativan 1 mg has been used as needed for acute anxiety episodes, helping with nerve symptoms but not other aspects of anxiety.  - Prozac 20 mg prescribed, with potential to increase dosage to 40 mg in a few weeks if necessary.  - Advised to continue using Ativan 1 mg for emergencies; refill provided. Monitor for rash development while on Prozac due to past reactions to similar medications.    Follow-up  - Scheduled for follow-up in 4 weeks.    History and medical judgement obtained from video conversation with patient. No hands-on physical examination was performed. Approximately 25 minutes spent in video conversation with patient and in chart review.           Follow Up   No follow-ups on file.  Patient was given instructions and counseling regarding her condition or for health maintenance advice. Please see specific information pulled into the AVS if appropriate.   Patient or patient representative verbalized consent for the use of Ambient Listening during the visit with  VICENTE Ruth for chart documentation. 4/29/2025  17:34 EDT

## 2025-05-06 ENCOUNTER — TELEPHONE (OUTPATIENT)
Dept: ONCOLOGY | Facility: CLINIC | Age: 55
End: 2025-05-06
Payer: COMMERCIAL

## 2025-05-06 NOTE — TELEPHONE ENCOUNTER
Caller: Emilie Carrizales    Relationship to patient: Self    Best call back number: [phone]    Type of visit: LAB, FU    Requested date: NEEDS AN APPT BETWEEN 12-12:30 IF BEFORE JULY 1.       If rescheduling, when is the original appointment: 5/7     Additional notes:PT HAS NO MORE SICK TIME AND IT DOESN'T RESET UNTIL JULY 1

## 2025-05-16 ENCOUNTER — OFFICE VISIT (OUTPATIENT)
Dept: INTERNAL MEDICINE | Facility: CLINIC | Age: 55
End: 2025-05-16
Payer: COMMERCIAL

## 2025-05-16 VITALS
DIASTOLIC BLOOD PRESSURE: 84 MMHG | WEIGHT: 260.6 LBS | HEIGHT: 64 IN | BODY MASS INDEX: 44.49 KG/M2 | OXYGEN SATURATION: 97 % | HEART RATE: 70 BPM | SYSTOLIC BLOOD PRESSURE: 120 MMHG | TEMPERATURE: 98.6 F

## 2025-05-16 DIAGNOSIS — F41.9 ANXIETY: Chronic | ICD-10-CM

## 2025-05-16 DIAGNOSIS — J06.9 VIRAL URI: Primary | ICD-10-CM

## 2025-05-16 DIAGNOSIS — G51.0 BELL'S PALSY: ICD-10-CM

## 2025-05-16 PROCEDURE — 99214 OFFICE O/P EST MOD 30 MIN: CPT | Performed by: NURSE PRACTITIONER

## 2025-05-16 RX ORDER — GUAIFENESIN 600 MG/1
600 TABLET, EXTENDED RELEASE ORAL EVERY 12 HOURS SCHEDULED
Start: 2025-05-16 | End: 2025-05-23

## 2025-05-16 NOTE — PROGRESS NOTES
"Chief Complaint  Facial Droop ([place] palsy - 4 week follow up//Symptoms have improved) and URI (Scratchy throat, congestion)    Subjective        Emilie Carrizales presents to [facility]  History of Present Illness  The patient presents for Bell's palsy, depression, and viral infection.    She was recently (4/2/25) treated for Bel's palsy, sx have resolved without any lingering symptoms.    She was started on fluoxetine on 4/2/25 as well which she is tolerating well, notes improvement in her mood since starting medication. Initially experienced side effects, including out-of-body experiences which was managed by adjusting medication schedule to nighttime. Experienced heightened brain activity during first two weeks of treatment, now resolved. No need for Ativan. Adopted coping strategies such as distancing from social media and news outlets. Acknowledges need for assistance and open to medication use. Reports harmonious home environment. Experienced near-panic attacks when partner's mother visited, managed with calming techniques. Maintains friendly relationship with partner's mother and working through associated trauma with counselor. History of intermittent depression, reports improvement in current depressive episode.    Began experiencing congestion approximately 2.5 days ago, coinciding with viral outbreak at workplace. No known outdoor allergies. Initiated coughing today and experiencing postnasal drip. Plans to rest over weekend. Denies dyspnea. No fever and/or chills.      Objective   Vital Signs:  /84 (BP Location: Left arm, Patient Position: Sitting, Cuff Size: Large Adult)   Pulse 70   Temp 98.6 °F (37 °C)   Ht 162.6 cm (64\")   Wt 118 kg (260 lb 9.6 oz)   SpO2 97%   BMI 44.73 kg/m²   Estimated body mass index is 44.73 kg/m² as calculated from the following:    Height as of this encounter: 162.6 cm (64\").    Weight as of this encounter: 118 kg (260 lb 9.6 " oz).            Physical Exam  HENT:      Head: Normocephalic.      Right Ear: External ear normal. Tympanic membrane is bulging.      Left Ear: External ear normal. Tympanic membrane is bulging.      Nose: Nose normal.      Mouth/Throat:      Pharynx: Posterior oropharyngeal erythema present.   Eyes:      General:         Right eye: No discharge.         Left eye: No discharge.      Conjunctiva/sclera: Conjunctivae normal.   Cardiovascular:      Pulses: Normal pulses.      Heart sounds: Normal heart sounds. No murmur heard.  Pulmonary:      Effort: No respiratory distress.      Breath sounds: Normal breath sounds. No wheezing, rhonchi or rales.   Musculoskeletal:      Cervical back: Normal range of motion.   Lymphadenopathy:      Cervical: No cervical adenopathy.   Skin:     General: Skin is warm and dry.   Neurological:      Mental Status: She is alert.        Result Review :  The following data was reviewed by: VICENTE Ruth on 05/16/2025:  Common labs          11/20/2024    08:41   Common Labs   Glucose 101    BUN 14    Creatinine 0.64    Sodium 139    Potassium 4.2    Chloride 102    Calcium 10.3    Albumin 4.3    Total Bilirubin 0.8    Alkaline Phosphatase 72    AST (SGOT) 17    ALT (SGPT) 18    WBC 5.51    Hemoglobin 14.4    Hematocrit 42.4    Platelets 205                Assessment and Plan   Diagnoses and all orders for this visit:    1. Viral URI (Primary)  Assessment & Plan:  Sx appear more viral in nature, start Mucinex 600 mg bid for increased drainage. RTC if sx persist or worsen.    Orders:  -     guaiFENesin (Mucinex) 600 MG 12 hr tablet; Take 1 tablet by mouth Every 12 (Twelve) Hours for 7 days.    2. Anxiety  Assessment & Plan:  She is doing well with fluoxetine which she will continue. She will continue with therapist as well.    Orders:  -     FLUoxetine (PROzac) 20 MG capsule; Take 1 capsule by mouth Daily.  Dispense: 90 capsule; Refill: 1    3. Bell's palsy  Comments:  Sx  resolved             Follow Up   Return if symptoms worsen or fail to improve, for Next scheduled follow up.  Patient was given instructions and counseling regarding her condition or for health maintenance advice. Please see specific information pulled into the AVS if appropriate.

## 2025-05-16 NOTE — ASSESSMENT & PLAN NOTE
Sx appear more viral in nature, start Mucinex 600 mg bid for increased drainage. RTC if sx persist or worsen.

## 2025-05-16 NOTE — ASSESSMENT & PLAN NOTE
She is doing well with fluoxetine which she will continue. She will continue with therapist as well.

## 2025-06-06 DIAGNOSIS — I10 BENIGN ESSENTIAL HTN: Chronic | ICD-10-CM

## 2025-06-09 RX ORDER — AMLODIPINE BESYLATE 10 MG/1
10 TABLET ORAL NIGHTLY
Qty: 90 TABLET | Refills: 1 | Status: SHIPPED | OUTPATIENT
Start: 2025-06-09

## 2025-06-09 RX ORDER — LISINOPRIL 40 MG/1
40 TABLET ORAL DAILY
Qty: 90 TABLET | Refills: 1 | Status: SHIPPED | OUTPATIENT
Start: 2025-06-09

## 2025-06-15 NOTE — PROGRESS NOTES
Subjective   Emilie Carrizales is a 55 y.o. female. With right breast invasive ductal carcinoma, stage Ia    History of Present Illness   Ms. Blankenship is a 54-year-old postmenopausal  lady who was diagnosed with invasive ductal carcinoma of the right breast, clinical T1b N0 M0, anatomic stage Ia, prognostic stage Ia, ER +82.35%, SC +92.68%, HER2/latoya negative 1+, Ki-67 14.8%.  She is status post right breast lumpectomy on 5/28/2019 with removal of the nipple areolar complex and right sentinel lymph node biopsy.  Invasive ductal carcinoma with grade 2, tumor measured 10 mm, no DCIS and no lymph node involvement  Oncotype DX recurrence score of 6 indicating no benefit from chemotherapy.  Patient was premenopausal at the time of diagnosis of breast cancer and she was started on tamoxifen on 6/28/2019.  She underwent a hysterectomy and bilateral salpingo-oophorectomy on 9/4/2020 following which treatment was changed to Arimidex on 11/2/2020.  Patient continues on Arimidex without any evidence of recurrent disease.  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    Interval history:  Emilie returns today for follow-up.  She continues on anastrozole daily.  She continues to tolerate well, no hot flashes or arthralgias.  Appetite adequate.  Bowels moving regularly.  Denies nausea, vomiting, abdominal pain, bone pain, cough or shortness of breath.  She is doing monthly self breast exams at home, denies any new palpable abnormalities.    She is up-to-date on her mammogram and MRI.  Her last colonoscopy was December 2019.  She is also overdue for her DEXA scan.  Did have an appointment for DEXA scan in April, however missed her appointment.    The following portions of the patient's history were reviewed and updated as appropriate: allergies, current medications, past family history, past  medical history, past social history, past surgical history, and problem list.    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    Depression 01/01/1988    Diabetes mellitus     pre diabetic    Drug therapy     Family history of breast cancer 05/14/2019    GERD (gastroesophageal reflux disease)     Headache     History of 2019 novel coronavirus disease (COVID-19)     2/2022 AND 8/9/22 (TESTED AT [facility], RESULTS IN EPIC)    History of kidney stones     Hx of radiation therapy     Hyperlipidemia     Hypertension     Irritable bowel syndrome     Lumbago     Mood disorder     Obesity life    PONV (postoperative nausea and vomiting)     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    Sleep apnea     C-PAP IN USE    Vitamin D deficiency         Past Surgical History:   Procedure Laterality Date    APPENDECTOMY  1989    removed when removing dermoid on right ovary    BREAST BIOPSY Right 2019    BREAST LUMPECTOMY Right     BREAST LUMPECTOMY WITH SENTINEL NODE BIOPSY Right 5/28/2019    Procedure: Right central partial mastectomy (with removal of the nipple-areola-complex) and sentinel lymph node biopsy;  Surgeon: Trena Greene MD;  Location: [facility] MAIN OR;  Service: General    COLONOSCOPY N/A 12/5/2019    Procedure: COLONOSCOPY TO CECUM;  Surgeon: Pedrito Fulton Jr., MD;  Location: [facility] ENDOSCOPY;  Service: General    DERMOID CYST  EXCISION  1989    ENDOSCOPY N/A 12/28/2021    Procedure: ESOPHAGOGASTRODUODENOSCOPY WITH BIOPSY;  Surgeon: Delfino Rivera Jr., MD;  Location: [facility] ENDOSCOPY;  Service: General;  Laterality: N/A;  PRE- DYSPEPSIA  POST-- GASTRITIS, GASTRIC POLYPS    GASTRIC SLEEVE LAPAROSCOPIC N/A 9/12/2022    Procedure: GASTRIC SLEEVE LAPAROSCOPIC OR ROBOTIC;  Surgeon: Delfino Rivera Jr., MD;  Location: [facility] OR OSC;  Service: Robotics - DaVinci;  Laterality: N/A;    HERNIA  REPAIR  2006    umbilical    HYSTERECTOMY Bilateral 2020    Dr. Neumann, due to ovarian cysts with previous hx breast cancer    LAPAROSCOPIC CHOLECYSTECTOMY      SALIVARY GLAND SURGERY  1993    due to cancer    TONSILLECTOMY  1979    I was 9 years old        Family History   Problem Relation Age of Onset    Hypertension Mother     Arthritis Mother     Kidney cancer Mother     Depression Mother         Not Dr vela- wont go    Heart disease Father     Hernia Father     Hypertension Father     Hearing loss Father     Other (Other) Father 72    No Known Problems Brother     Osteoporosis Maternal Grandmother     Breast cancer Paternal Grandmother     Lung cancer Paternal Grandfather     Breast cancer Paternal Aunt     Drug abuse Paternal Uncle         life long    Drug abuse Paternal Uncle         life long    Breast cancer Paternal Cousin     Malig Hyperthermia Neg Hx         Social History     Socioeconomic History    Marital status:      Spouse name: Trena Montenegro    Number of children: 0   Tobacco Use    Smoking status: Never     Passive exposure: Never    Smokeless tobacco: Never    Tobacco comments:     Parents smoked.  I have never smoked   Vaping Use    Vaping status: Never Used   Substance and Sexual Activity    Alcohol use: Yes     Comment: Only once or twice a year.  Mixed Drinks    Drug use: No    Sexual activity: Yes     Partners: Female     Birth control/protection: None        OB History          0    Para   0    Term   0       0    AB   0    Living   0         SAB   0    IAB   0    Ectopic   0    Molar   0    Multiple   0    Live Births   0                 Allergies   Allergen Reactions    Citalopram Rash    Flexeril [Cyclobenzaprine] Unknown - High Severity     MUSCLES TENSE-OPPOSITE OF WHAT IT IS INTENDED TO DO-ELEVATES B/P    Doxycycline Rash     Blisters on hands    Keflex [Cephalexin] Rash    Sulfa Antibiotics Rash     NAUSEA/VOMITING/FEVER    Vilazodone Hcl  "Hives, Itching and Rash   Age at menarche-9  Patient was never pregnant  No use of hormone replacement therapy  She is postmenopausal after hysterectomy and salpingo-oophorectomy November 2020        Review of Systems  Review of systems as mentioned HPI otherwise negative    Objective   Blood pressure 146/91, pulse 83, temperature 98.1 °F (36.7 °C), temperature source Oral, height 162.6 cm (64.02\"), weight 117 kg (259 lb), last menstrual period 06/25/2020, SpO2 100%, not currently breastfeeding.     Physical Exam  Vitals reviewed.   Constitutional:       Appearance: Normal appearance. She is obese.   HENT:      Nose: Nose normal.      Mouth/Throat:      Pharynx: Oropharynx is clear.   Eyes:      Conjunctiva/sclera: Conjunctivae normal.   Cardiovascular:      Rate and Rhythm: Normal rate.   Pulmonary:      Effort: Pulmonary effort is normal.   Abdominal:      General: Abdomen is flat.   Musculoskeletal:         General: Normal range of motion.   Neurological:      General: No focal deficit present.      Mental Status: She is alert.   Psychiatric:         Mood and Affect: Mood normal.         Thought Content: Thought content normal.         Judgment: Judgment normal.       Breast Exam: Right breast status post nipple areolar complex removal, no palpable abnormalities of the right breast.  Surgical scar present.  Heat rash present under the right breast.  Left breast appears normal on inspection.  No palpable abnormalities of the left breast or axilla.    No visits with results within 30 Day(s) from this visit.   Latest known visit with results is:   Office Visit on 12/06/2024   Component Date Value Ref Range Status    SARS Antigen 12/06/2024 Not Detected  Not Detected, Presumptive Negative Final    Influenza A Antigen SANDER 12/06/2024 Not Detected  Not Detected Final    Influenza B Antigen SANDER 12/06/2024 Not Detected  Not Detected Final    Internal Control 12/06/2024 Passed  Passed Final    Lot Number 12/06/2024 " [id number]   Final    Expiration Date 2024   Final        No radiology results for the last 30 days.     Assessment & Plan       *Right breast invasive ductal carcinoma  pT1b N0 M0, stage Ia prognostic, grade 2 invasive ductal carcinoma, ER +82.35%, UT +92.68%, HER2 1+ on immunohistochemistry, Ki-67 14.8%.  2019-right lumpectomy with removal of the nipple areolar complex and right sentinel lymph node biopsy which was negative  Oncotype DX recurrence score of 6 with no additional benefit from chemotherapy  Started on tamoxifen in 2019  Continued on tamoxifen till 2020 and subsequently treatment changed to Arimidex after undergoing a hysterectomy and salpingo-oophorectomy in 2020  Patient tolerates anastrozole well without any recurrence of disease.  She has completed 5 years of endocrine therapy.  Discussed regarding prolonged endocrine therapy versus discontinuing the medication.  Discussed the risks and benefits of the same however patient feels strongly that she wants to continue endocrine therapy.  2025: She is tolerating anastrozole well without any side effects and we will continue.     *CHEK2 mutation  Paternal cousin with breast cancer at the age of 43 and  of breast cancer.  Paternal aunt with breast cancer status post mastectomy and paternal grandmother with breast cancer status post lumpectomy  [facility] genetic testing positive for CHEK2 autosomal dominant mutation increasing the risk of breast and colon cancer and parathyroid and prostate cancer.  She has met with   Currently undergoing annual mammogram alternating with MRI  She will require colonoscopies every 5 years.  It appears her last colonoscopy was 2019, will refer back to Dr. Fulton who did her previous colonoscopy.     *Surveillance  Screening mammogram 2025 benign.  Breast MRI 2025 recommending left breast ultrasound which was performed 3/19/2025 and  negative.    *Bone health  DEXA scan from 1/13/2021 normal  She is past due for DEXA  Repeat DEXA, patient missed appointment from April 2025.  Will have this rescheduled.  Continue vitamin D    *Anxiety and depression  She does not wish to be on medications  She is very tearful at the visit today due to the death of her dog.  She currently sees a therapist and wishes to continue with them.    *Hypertension  Blood pressure BP: 146/91   Continue current medications    *Hypercalcemia  Most recent calcium level normal.    *Hot flashes  Had them right after hysterectomy however now manageable.  Does not wish to be on any medications for that.  Hot flashes have resolved.    PLAN:   Continue anastrozole daily.  Initiated endocrine therapy June 2019.  Patient has completed 5 years of endocrine therapy, but wishes to remain on endocrine therapy.  Refill sent to pharmacy.  Continue vitamin D  DEXA scan due, patient missed appointment from April 2025.  Will reorder and reschedule.  Screening mammogram next due 1/2026  Breast MRI due to CHEK2 mutation, last performed 2/25/2025  Due to CHEK2 mutation requires colonoscopy every 5 years, last colonoscopy December 2019.  Will refer back to Dr. Fulton she did previous colonoscopy.  6 months MD.  Continue follow-up with therapist for depression

## 2025-06-20 ENCOUNTER — OFFICE VISIT (OUTPATIENT)
Dept: ONCOLOGY | Facility: CLINIC | Age: 55
End: 2025-06-20
Payer: COMMERCIAL

## 2025-06-20 VITALS
HEART RATE: 83 BPM | WEIGHT: 259 LBS | SYSTOLIC BLOOD PRESSURE: 146 MMHG | OXYGEN SATURATION: 100 % | TEMPERATURE: 98.1 F | HEIGHT: 64 IN | DIASTOLIC BLOOD PRESSURE: 91 MMHG | BODY MASS INDEX: 44.22 KG/M2

## 2025-06-20 DIAGNOSIS — Z79.811 LONG TERM (CURRENT) USE OF AROMATASE INHIBITORS: ICD-10-CM

## 2025-06-20 DIAGNOSIS — C50.111 MALIGNANT NEOPLASM OF CENTRAL PORTION OF RIGHT BREAST IN FEMALE, ESTROGEN RECEPTOR POSITIVE: Primary | ICD-10-CM

## 2025-06-20 DIAGNOSIS — Z00.00 HEALTHCARE MAINTENANCE: ICD-10-CM

## 2025-06-20 DIAGNOSIS — Z17.0 MALIGNANT NEOPLASM OF CENTRAL PORTION OF RIGHT BREAST IN FEMALE, ESTROGEN RECEPTOR POSITIVE: Primary | ICD-10-CM

## 2025-06-20 DIAGNOSIS — Z91.89 HIGH RISK FOR COLON CANCER: ICD-10-CM

## 2025-06-20 DIAGNOSIS — Z78.0 POST-MENOPAUSAL: ICD-10-CM

## 2025-06-20 RX ORDER — ANASTROZOLE 1 MG/1
1 TABLET ORAL DAILY
Qty: 90 TABLET | Refills: 3 | Status: SHIPPED | OUTPATIENT
Start: 2025-06-20

## 2025-06-30 ENCOUNTER — TELEMEDICINE (OUTPATIENT)
Dept: INTERNAL MEDICINE | Facility: CLINIC | Age: 55
End: 2025-06-30
Payer: COMMERCIAL

## 2025-06-30 DIAGNOSIS — F41.9 ANXIETY: Primary | Chronic | ICD-10-CM

## 2025-06-30 PROCEDURE — 99213 OFFICE O/P EST LOW 20 MIN: CPT | Performed by: NURSE PRACTITIONER

## 2025-06-30 RX ORDER — FLUOXETINE 10 MG/1
10 CAPSULE ORAL DAILY
Qty: 30 CAPSULE | Refills: 1 | Status: SHIPPED | OUTPATIENT
Start: 2025-06-30

## 2025-06-30 NOTE — PROGRESS NOTES
"Chief Complaint  Anxiety (Discuss medication)    Subjective        Emilie Carrizales presents to [facility] for f/u regarding anxiety.    You have chosen to receive care through a telehealth visit.  Do you consent to use a video/audio connection for your medical care today? Yes  Mode of Visit: Video  Location of patient: other: work  Location of provider: [facility] [place] [facility]  You have chosen to receive care through a telehealth visit.  The patient has signed the video visit consent form.  The visit included audio and video interaction. No technical issues occurred during this visit.     History of Present Illness  History of Present Illness     She has been managed on fluoxetine 20 mg since 4/2025 which has been helpful with anxiety and depression, notes decreased panic attacks. Denies significant side effects from medication. However, she notes apathy and a flat affect over the past few weeks. She also notes a lack of motivation and has had difficulty caring enough to take care of her house.      Objective   Vital Signs:  There were no vitals taken for this visit.  Estimated body mass index is 44.44 kg/m² as calculated from the following:    Height as of 6/20/25: 162.6 cm (64.02\").    Weight as of 6/20/25: 117 kg (259 lb).       BMI is within normal parameters. No other follow-up for BMI required.      Physical Exam  Constitutional:       Appearance: She is well-developed.   HENT:      Head: Normocephalic and atraumatic.   Eyes:      General:         Right eye: No discharge.         Left eye: No discharge.      Conjunctiva/sclera: Conjunctivae normal.   Pulmonary:      Effort: Pulmonary effort is normal.   Neurological:      Mental Status: She is alert and oriented to person, place, and time.   Psychiatric:         Behavior: Behavior normal.         Thought Content: Thought content normal.         Judgment: Judgment normal.        Physical Exam      Result Review :  The following data was " reviewed by: VICENTE Ruth on 06/30/2025:  Common labs          11/20/2024    08:41   Common Labs   Glucose 101    BUN 14    Creatinine 0.64    Sodium 139    Potassium 4.2    Chloride 102    Calcium 10.3    Albumin 4.3    Total Bilirubin 0.8    Alkaline Phosphatase 72    AST (SGOT) 17    ALT (SGPT) 18    WBC 5.51    Hemoglobin 14.4    Hematocrit 42.4    Platelets 205        Results                Assessment and Plan   Diagnoses and all orders for this visit:    1. Anxiety (Primary)  -     FLUoxetine (PROzac) 10 MG capsule; Take 1 capsule by mouth Daily.  Dispense: 30 capsule; Refill: 1      She c/o a flat affect on fluoxetine 20 mg daily, will lower to 10 mg daily and continue to monitor symptoms. Consider adding Wellbutrin XL at a later date if lack of motivation persists. Continues with therapy sessions. Notify office if sx do not improve (has f/u appt next month in office).  Assessment & Plan      History and medical judgement obtained from video conversation with patient. No hands-on physical examination was performed. Approximately  13 minutes spent in video conversation with patient and in chart review.           Follow Up   Return if symptoms worsen or fail to improve, for Next scheduled follow up.  Patient was given instructions and counseling regarding her condition or for health maintenance advice. Please see specific information pulled into the AVS if appropriate.   SARAH technology not used during office visit.

## 2025-07-06 PROBLEM — J06.9 VIRAL URI: Status: RESOLVED | Noted: 2024-10-20 | Resolved: 2025-07-06

## 2025-07-07 ENCOUNTER — HOSPITAL ENCOUNTER (OUTPATIENT)
Facility: HOSPITAL | Age: 55
Discharge: HOME | End: 2025-07-07
Admitting: NURSE PRACTITIONER
Payer: COMMERCIAL

## 2025-07-07 DIAGNOSIS — Z78.0 POST-MENOPAUSAL: ICD-10-CM

## 2025-07-07 PROCEDURE — 77080 DXA BONE DENSITY AXIAL: CPT

## 2025-07-11 ENCOUNTER — RESULTS FOLLOW-UP (OUTPATIENT)
Dept: ONCOLOGY | Facility: CLINIC | Age: 55
End: 2025-07-11
Payer: COMMERCIAL

## 2025-07-17 ENCOUNTER — PATIENT MESSAGE (OUTPATIENT)
Dept: INTERNAL MEDICINE | Facility: CLINIC | Age: 55
End: 2025-07-17
Payer: COMMERCIAL

## 2025-07-17 ENCOUNTER — TELEPHONE (OUTPATIENT)
Dept: INTERNAL MEDICINE | Facility: CLINIC | Age: 55
End: 2025-07-17
Payer: COMMERCIAL

## 2025-07-17 NOTE — TELEPHONE ENCOUNTER
Emilie Carrizales called office today and asked if her appointment could be changed to a my chart video visit on 07/23/2025. I explained to patient I will check with Pinky Casiano to see if we can change appointment and will call patient back

## 2025-07-23 ENCOUNTER — TELEMEDICINE (OUTPATIENT)
Dept: INTERNAL MEDICINE | Facility: CLINIC | Age: 55
End: 2025-07-23
Payer: COMMERCIAL

## 2025-07-23 VITALS — HEIGHT: 64 IN | BODY MASS INDEX: 44.46 KG/M2

## 2025-07-23 DIAGNOSIS — F41.9 ANXIETY: Primary | Chronic | ICD-10-CM

## 2025-07-23 DIAGNOSIS — B35.1 ONYCHOMYCOSIS: ICD-10-CM

## 2025-07-23 PROCEDURE — 99213 OFFICE O/P EST LOW 20 MIN: CPT | Performed by: NURSE PRACTITIONER

## 2025-07-25 NOTE — ASSESSMENT & PLAN NOTE
"She is working with a counselor and has been referred to psychiatry for an ADD evaluation. She notes difficulty focusing with mind racing. I agree with psychiatry evaluation both for ADD but also for medication adjustment as she has been intolerant to several medications and is overall reluctant to take. She is currently managed on fluoxetine 10 mg daily which she will continue (c/o worsening \"flat affect\" with higher doses).  "

## 2025-07-25 NOTE — PROGRESS NOTES
"Chief Complaint  Hypertension (6 month follow up), Mental Health Problem (\"Mental stuff\" //Updates on therapist), and Nail Problem (Toenail fungus - hx of this issues and requesting Rx )    Subjective        Emilie Carrizales presents to [facility] for f/u regarding anxiety and difficulty concentrating.    You have chosen to receive care through a telehealth visit.  Do you consent to use a video/audio connection for your medical care today? Yes  Mode of Visit: Video  Location of patient: home  Location of provider: [facility] [place] [facility]  You have chosen to receive care through a telehealth visit.  The patient has signed the video visit consent form.  The visit included audio and video interaction. No technical issues occurred during this visit.     History of Present Illness    She presents with due to decreased motivation and a generalized sense of apathy. She is working with a counselor (Leda through [facility]) and has been referred to psychiatry for further evaluation and possible ADD testing.  Her fluoxetine was decreased from 20 mg to 10 mg last month due to concerns over side effects. She is tolerating this dose well but continues to c/o increased anxiety, feeling \"miserable in my own skin.\" She feels as though her mind races with difficulty focusing and completing tasks. She notes missed appointments due to her lack of focus/attention to details.    Her counselor performed an ADD assessment and she states her score was 5/6. She has been intolerant to several medications (\"brain zaps\" with Pristiq, Fetzmia). She has had genesight testing and the best medications listed for her were Viibryd, Fetzima and Pristiq. She has been reluctant to take any medications for her symptoms.    She also notes a generalized lack of interest in activities with a lack of excitement for things she used to enjoy.    She also c/ thickening of her toenail with discoloration, +hx fungal infection which was " "previously treated with Lamisil. However, she notes a recent recurrence of her symptoms.    She has labs that were ordered 7/29/24 that have not been completed.    Objective   Vital Signs:  Ht 162.6 cm (64\")   BMI 44.46 kg/m²   Estimated body mass index is 44.46 kg/m² as calculated from the following:    Height as of this encounter: 162.6 cm (64\").    Weight as of 6/20/25: 117 kg (259 lb).       BMI is within normal parameters. No other follow-up for BMI required.      Physical Exam  Constitutional:       Appearance: She is well-developed.   HENT:      Head: Normocephalic and atraumatic.   Eyes:      General:         Right eye: No discharge.         Left eye: No discharge.      Conjunctiva/sclera: Conjunctivae normal.   Pulmonary:      Effort: Pulmonary effort is normal.   Neurological:      Mental Status: She is alert and oriented to person, place, and time.   Psychiatric:         Behavior: Behavior normal.         Thought Content: Thought content normal.         Judgment: Judgment normal.        Result Review :  The following data was reviewed by: VICENTE Ruth on 07/23/2025:  Common labs          11/20/2024    08:41   Common Labs   Glucose 101    BUN 14    Creatinine 0.64    Sodium 139    Potassium 4.2    Chloride 102    Calcium 10.3    Albumin 4.3    Total Bilirubin 0.8    Alkaline Phosphatase 72    AST (SGOT) 17    ALT (SGPT) 18    WBC 5.51    Hemoglobin 14.4    Hematocrit 42.4    Platelets 205      Data reviewed : Radiologic studies DEXA 7/7/25             Assessment and Plan   Diagnoses and all orders for this visit:    1. Anxiety (Primary)  Assessment & Plan:  She is working with a counselor and has been referred to psychiatry for an ADD evaluation. She notes difficulty focusing with mind racing. I agree with psychiatry evaluation both for ADD but also for medication adjustment as she has been intolerant to several medications and is overall reluctant to take. She is currently managed on fluoxetine " "10 mg daily which she will continue (c/o worsening \"flat affect\" with higher doses).      2. Onychomycosis  Assessment & Plan:  Sx previously treated with Lamisil, sx have returned. She will have labs done as ordered in her chart (ordered 7/2024) and if normal lfts will consider restarting Lamisil.        She had a DEXA scan 7/7/25 which was normal.     History and medical judgement obtained from video conversation with patient. No hands-on physical examination was performed. Approximately 30 minutes spent in video conversation with patient and in chart review.         Follow Up   No follow-ups on file.  Patient was given instructions and counseling regarding her condition or for health maintenance advice. Please see specific information pulled into the AVS if appropriate.     "

## 2025-07-25 NOTE — ASSESSMENT & PLAN NOTE
Sx previously treated with Lamisil, sx have returned. She will have labs done as ordered in her chart (ordered 7/2024) and if normal lfts will consider restarting Lamisil.

## (undated) DEVICE — KT INK TISS MARGINMARKER STD 6COLOR

## (undated) DEVICE — LN SMPL CO2 SHTRM SD STREAM W/M LUER

## (undated) DEVICE — LOU GENERAL ROBOT: Brand: MEDLINE INDUSTRIES, INC.

## (undated) DEVICE — MSK PROC CURAPLEX O2 2/ADAPT 7FT

## (undated) DEVICE — MARKR SKIN W/RULR AND LBL

## (undated) DEVICE — VISUALIZATION SYSTEM: Brand: CLEARIFY

## (undated) DEVICE — SENSR O2 OXIMAX FNGR A/ 18IN NONSTR

## (undated) DEVICE — CANN NASL CO2 TRULINK W/O2 A/

## (undated) DEVICE — GLV SURG SENSICARE PI MIC PF SZ8.5 LF STRL

## (undated) DEVICE — APPL CHLORAPREP HI/LITE 26ML ORNG

## (undated) DEVICE — LEGGINGS, PAIR, 29X43, STERILE: Brand: MEDLINE

## (undated) DEVICE — TUBING, SUCTION, 1/4" X 10', STRAIGHT: Brand: MEDLINE

## (undated) DEVICE — NDL HYPO ECLPS SFTY 22G 1 1/2IN

## (undated) DEVICE — KT VLV BIOGUARD SXN BIOP AIR/H20 CONN 4PC DISP

## (undated) DEVICE — ADAPT CLN BIOGUARD AIR/H2O DISP

## (undated) DEVICE — CVR TRANSD CIV FLX TPR 11.9 TO 3.8X61CM

## (undated) DEVICE — SUT MNCRYL PLS ANTIB UD 4/0 PS2 18IN

## (undated) DEVICE — BITEBLOCK OMNI BLOC

## (undated) DEVICE — CANNULA SEAL

## (undated) DEVICE — TOWEL,OR,DSP,ST,BLUE,STD,4/PK,20PK/CS: Brand: MEDLINE

## (undated) DEVICE — COLUMN DRAPE

## (undated) DEVICE — BLADELESS OBTURATOR: Brand: WECK VISTA

## (undated) DEVICE — PK UNIV COMPL 40

## (undated) DEVICE — SEAL

## (undated) DEVICE — APPL CHLORAPREP W/TINT 26ML ORNG

## (undated) DEVICE — GOWN,NON-REINFORCED,SIRUS,SET IN SLV,XL: Brand: MEDLINE

## (undated) DEVICE — GLV SURG SENSICARE POLYISPRN W/ALOE PF LF 6.5 GRN STRL

## (undated) DEVICE — LAPAROSCOPIC DISSECTOR: Brand: DEROYAL

## (undated) DEVICE — ENDOPATH XCEL WITH OPTIVIEW TECHNOLOGY BLADELESS TROCARS WITH STABILITY SLEEVES: Brand: ENDOPATH XCEL OPTIVIEW

## (undated) DEVICE — SOL LR 1000ML

## (undated) DEVICE — ARM DRAPE

## (undated) DEVICE — STCKNT IMPERV 9X36IN STRL

## (undated) DEVICE — TOTAL TRAY, 16FR 10ML SIL FOLEY, URN: Brand: MEDLINE

## (undated) DEVICE — Device: Brand: STANDARD BOUGIE, 38FR

## (undated) DEVICE — CONN TBG Y 5 IN 1 LF STRL

## (undated) DEVICE — GLV SURG SENSICARE PI PF LF 8.5 GRN STRL

## (undated) DEVICE — DEV COND GAS LAP INSUFLOW W/LUER CONN

## (undated) DEVICE — NDL HYPO PRECISIONGLIDE REG 20G 1 1/2

## (undated) DEVICE — SPNG LAP 18X18IN LF STRL PK/5

## (undated) DEVICE — GLV SURG SENSICARE PI MIC PF SZ6 LF STRL

## (undated) DEVICE — ELECTRD BLD EDGE/INSUL1P 2.4X5.1MM STRL

## (undated) DEVICE — SUT SILK 0 SH 30IN K834H

## (undated) DEVICE — IRRIGATOR BULB ASEPTO 60CC STRL

## (undated) DEVICE — FRCP BX RADJAW4 NDL 2.8 240CM LG OG BX40

## (undated) DEVICE — 500ML,PRESSURE INFUSER W/STOPCOCK: Brand: MEDLINE

## (undated) DEVICE — APL DUPLOSPRAYER MIS 40CM

## (undated) DEVICE — SYR LUERLOK 20CC BX/50

## (undated) DEVICE — SUT VIC 0 CT1 27IN DYED J340H

## (undated) DEVICE — CVR PROB GEN PURP W ISOSILK 6X48

## (undated) DEVICE — DRAPE,UTILITY,TAPE,15X26,STERILE: Brand: MEDLINE

## (undated) DEVICE — KT ORCA ORCAPOD DISP STRL

## (undated) DEVICE — VESSEL SEALER EXTEND: Brand: ENDOWRIST

## (undated) DEVICE — GLV SURG SENSICARE MICRO PF LF 6.5 STRL

## (undated) DEVICE — REDUCER: Brand: ENDOWRIST

## (undated) DEVICE — ADHS SKIN DERMABOND TOP ADVANCED

## (undated) DEVICE — THE TORRENT IRRIGATION SCOPE CONNECTOR IS USED WITH THE TORRENT IRRIGATION TUBING TO PROVIDE IRRIGATION FLUIDS SUCH AS STERILE WATER DURING GASTROINTESTINAL ENDOSCOPIC PROCEDURES WHEN USED IN CONJUNCTION WITH AN IRRIGATION PUMP (OR ELECTROSURGICAL UNIT).: Brand: TORRENT

## (undated) DEVICE — ANTIBACTERIAL UNDYED BRAIDED (POLYGLACTIN 910), SYNTHETIC ABSORBABLE SUTURE: Brand: COATED VICRYL